# Patient Record
Sex: MALE | Race: WHITE | Employment: OTHER | URBAN - METROPOLITAN AREA
[De-identification: names, ages, dates, MRNs, and addresses within clinical notes are randomized per-mention and may not be internally consistent; named-entity substitution may affect disease eponyms.]

---

## 2017-01-27 ENCOUNTER — HOSPITAL ENCOUNTER (OUTPATIENT)
Dept: CARDIOLOGY | Age: 82
Discharge: HOME | End: 2017-01-27
Payer: MEDICARE

## 2017-01-27 PROCEDURE — 93280 PM DEVICE PROGR EVAL DUAL: CPT

## 2017-04-11 RX ORDER — ATENOLOL 25 MG/1
TABLET ORAL
Qty: 180 TABLET | Refills: 0 | Status: SHIPPED | OUTPATIENT
Start: 2017-04-11 | End: 2017-07-10 | Stop reason: SDUPTHER

## 2017-05-07 ENCOUNTER — HOSPITAL ENCOUNTER (EMERGENCY)
Age: 82
Discharge: HOME | End: 2017-05-07
Attending: FAMILY MEDICINE
Payer: MEDICARE

## 2017-05-07 ENCOUNTER — APPOINTMENT (OUTPATIENT)
Dept: GENERAL RADIOLOGY | Age: 82
End: 2017-05-07
Payer: MEDICARE

## 2017-05-07 VITALS
HEIGHT: 66 IN | DIASTOLIC BLOOD PRESSURE: 54 MMHG | HEART RATE: 62 BPM | TEMPERATURE: 97.4 F | OXYGEN SATURATION: 95 % | WEIGHT: 160 LBS | SYSTOLIC BLOOD PRESSURE: 124 MMHG | RESPIRATION RATE: 18 BRPM | BODY MASS INDEX: 25.71 KG/M2

## 2017-05-07 DIAGNOSIS — J40 BRONCHITIS: Primary | ICD-10-CM

## 2017-05-07 LAB
ALBUMIN SERPL-MCNC: 3.7 G/DL (ref 3.9–4.9)
ALP BLD-CCNC: 72 U/L (ref 35–104)
ALT SERPL-CCNC: 9 U/L (ref 0–41)
ANION GAP SERPL CALCULATED.3IONS-SCNC: 12 MEQ/L (ref 7–13)
AST SERPL-CCNC: 14 U/L (ref 0–40)
BASOPHILS ABSOLUTE: 0.1 K/UL (ref 0–0.2)
BASOPHILS RELATIVE PERCENT: 0.9 %
BILIRUB SERPL-MCNC: 0.7 MG/DL (ref 0–1.2)
BUN BLDV-MCNC: 26 MG/DL (ref 8–23)
CALCIUM SERPL-MCNC: 8.6 MG/DL (ref 8.6–10.2)
CHLORIDE BLD-SCNC: 98 MEQ/L (ref 98–107)
CO2: 24 MEQ/L (ref 22–29)
CREAT SERPL-MCNC: 1.06 MG/DL (ref 0.7–1.2)
EOSINOPHILS ABSOLUTE: 0.3 K/UL (ref 0–0.7)
EOSINOPHILS RELATIVE PERCENT: 3.3 %
GFR AFRICAN AMERICAN: >60
GFR NON-AFRICAN AMERICAN: >60
GLOBULIN: 2.9 G/DL (ref 2.3–3.5)
GLUCOSE BLD-MCNC: 202 MG/DL (ref 74–109)
HCT VFR BLD CALC: 37.2 % (ref 42–52)
HEMOGLOBIN: 12.5 G/DL (ref 14–18)
LYMPHOCYTES ABSOLUTE: 1.9 K/UL (ref 1–4.8)
LYMPHOCYTES RELATIVE PERCENT: 21.5 %
MCH RBC QN AUTO: 31.2 PG (ref 27–31.3)
MCHC RBC AUTO-ENTMCNC: 33.6 % (ref 33–37)
MCV RBC AUTO: 92.9 FL (ref 80–100)
MONOCYTES ABSOLUTE: 0.9 K/UL (ref 0.2–0.8)
MONOCYTES RELATIVE PERCENT: 10.3 %
NEUTROPHILS ABSOLUTE: 5.6 K/UL (ref 1.4–6.5)
NEUTROPHILS RELATIVE PERCENT: 64 %
PDW BLD-RTO: 13.4 % (ref 11.5–14.5)
PLATELET # BLD: 182 K/UL (ref 130–400)
POTASSIUM SERPL-SCNC: 4.2 MEQ/L (ref 3.5–5.1)
RBC # BLD: 4.01 M/UL (ref 4.7–6.1)
SODIUM BLD-SCNC: 134 MEQ/L (ref 132–144)
TOTAL PROTEIN: 6.6 G/DL (ref 6.4–8.1)
WBC # BLD: 8.7 K/UL (ref 4.8–10.8)

## 2017-05-07 PROCEDURE — 80053 COMPREHEN METABOLIC PANEL: CPT

## 2017-05-07 PROCEDURE — 71010 XR CHEST PORTABLE: CPT

## 2017-05-07 PROCEDURE — 96367 TX/PROPH/DG ADDL SEQ IV INF: CPT

## 2017-05-07 PROCEDURE — 6360000002 HC RX W HCPCS: Performed by: FAMILY MEDICINE

## 2017-05-07 PROCEDURE — 87077 CULTURE AEROBIC IDENTIFY: CPT

## 2017-05-07 PROCEDURE — 94640 AIRWAY INHALATION TREATMENT: CPT

## 2017-05-07 PROCEDURE — 6370000000 HC RX 637 (ALT 250 FOR IP): Performed by: FAMILY MEDICINE

## 2017-05-07 PROCEDURE — 87185 SC STD ENZYME DETCJ PER NZM: CPT

## 2017-05-07 PROCEDURE — 85025 COMPLETE CBC W/AUTO DIFF WBC: CPT

## 2017-05-07 PROCEDURE — 36415 COLL VENOUS BLD VENIPUNCTURE: CPT

## 2017-05-07 PROCEDURE — 96365 THER/PROPH/DIAG IV INF INIT: CPT

## 2017-05-07 PROCEDURE — 87040 BLOOD CULTURE FOR BACTERIA: CPT

## 2017-05-07 PROCEDURE — 99283 EMERGENCY DEPT VISIT LOW MDM: CPT

## 2017-05-07 PROCEDURE — 87070 CULTURE OTHR SPECIMN AEROBIC: CPT

## 2017-05-07 PROCEDURE — 87205 SMEAR GRAM STAIN: CPT

## 2017-05-07 PROCEDURE — 2580000003 HC RX 258: Performed by: FAMILY MEDICINE

## 2017-05-07 RX ORDER — AZITHROMYCIN 250 MG/1
TABLET, FILM COATED ORAL
Qty: 1 PACKET | Refills: 0 | Status: SHIPPED | OUTPATIENT
Start: 2017-05-07 | End: 2017-05-17

## 2017-05-07 RX ORDER — ALBUTEROL SULFATE 2.5 MG/3ML
2.5 SOLUTION RESPIRATORY (INHALATION)
Status: DISCONTINUED | OUTPATIENT
Start: 2017-05-07 | End: 2017-05-07 | Stop reason: HOSPADM

## 2017-05-07 RX ORDER — IPRATROPIUM BROMIDE AND ALBUTEROL SULFATE 2.5; .5 MG/3ML; MG/3ML
1 SOLUTION RESPIRATORY (INHALATION) ONCE
Status: COMPLETED | OUTPATIENT
Start: 2017-05-07 | End: 2017-05-07

## 2017-05-07 RX ADMIN — IPRATROPIUM BROMIDE AND ALBUTEROL SULFATE 1 AMPULE: .5; 3 SOLUTION RESPIRATORY (INHALATION) at 15:05

## 2017-05-07 RX ADMIN — CEFTRIAXONE SODIUM: 1 INJECTION, POWDER, FOR SOLUTION INTRAMUSCULAR; INTRAVENOUS at 16:05

## 2017-05-07 RX ADMIN — ALBUTEROL SULFATE 2.5 MG: 2.5 SOLUTION RESPIRATORY (INHALATION) at 15:12

## 2017-05-07 RX ADMIN — AZITHROMYCIN 500 MG: 500 INJECTION, POWDER, LYOPHILIZED, FOR SOLUTION INTRAVENOUS at 16:43

## 2017-05-07 ASSESSMENT — PAIN SCALES - GENERAL: PAINLEVEL_OUTOF10: 6

## 2017-05-07 ASSESSMENT — PAIN DESCRIPTION - LOCATION: LOCATION: CHEST

## 2017-05-07 ASSESSMENT — PAIN DESCRIPTION - DESCRIPTORS: DESCRIPTORS: ACHING

## 2017-05-07 ASSESSMENT — PAIN DESCRIPTION - FREQUENCY: FREQUENCY: CONTINUOUS

## 2017-05-07 ASSESSMENT — ENCOUNTER SYMPTOMS: COUGH: 1

## 2017-05-09 LAB
CULTURE, RESPIRATORY: ABNORMAL
CULTURE, RESPIRATORY: ABNORMAL
GRAM STAIN RESULT: ABNORMAL
ORGANISM: ABNORMAL

## 2017-05-12 LAB
BLOOD CULTURE, ROUTINE: NORMAL
CULTURE, BLOOD 2: NORMAL

## 2017-06-01 ENCOUNTER — OFFICE VISIT (OUTPATIENT)
Dept: CARDIOLOGY | Age: 82
End: 2017-06-01

## 2017-06-01 VITALS
BODY MASS INDEX: 27.12 KG/M2 | DIASTOLIC BLOOD PRESSURE: 64 MMHG | RESPIRATION RATE: 16 BRPM | WEIGHT: 168 LBS | SYSTOLIC BLOOD PRESSURE: 132 MMHG | HEART RATE: 76 BPM | OXYGEN SATURATION: 96 %

## 2017-06-01 DIAGNOSIS — I77.9 BILATERAL CAROTID ARTERY DISEASE (HCC): ICD-10-CM

## 2017-06-01 DIAGNOSIS — I25.10 CORONARY ARTERY DISEASE INVOLVING NATIVE CORONARY ARTERY OF NATIVE HEART, ANGINA PRESENCE UNSPECIFIED: Primary | ICD-10-CM

## 2017-06-01 DIAGNOSIS — R09.89 ABNORMAL FOOT PULSE: ICD-10-CM

## 2017-06-01 PROCEDURE — 93000 ELECTROCARDIOGRAM COMPLETE: CPT | Performed by: INTERNAL MEDICINE

## 2017-06-01 PROCEDURE — 99213 OFFICE O/P EST LOW 20 MIN: CPT | Performed by: INTERNAL MEDICINE

## 2017-06-01 RX ORDER — AMLODIPINE BESYLATE 5 MG/1
5 TABLET ORAL DAILY
Status: ON HOLD | COMMUNITY
Start: 2017-04-03 | End: 2018-02-15 | Stop reason: SDUPTHER

## 2017-06-07 ENCOUNTER — HOSPITAL ENCOUNTER (OUTPATIENT)
Dept: NON INVASIVE DIAGNOSTICS | Age: 82
Discharge: HOME | End: 2017-06-07
Payer: MEDICARE

## 2017-06-07 ENCOUNTER — HOSPITAL ENCOUNTER (OUTPATIENT)
Dept: ULTRASOUND IMAGING | Age: 82
Discharge: HOME | End: 2017-06-07
Payer: MEDICARE

## 2017-06-07 DIAGNOSIS — I25.10 CORONARY ARTERY DISEASE INVOLVING NATIVE CORONARY ARTERY OF NATIVE HEART, ANGINA PRESENCE UNSPECIFIED: ICD-10-CM

## 2017-06-07 DIAGNOSIS — R09.89 ABNORMAL FOOT PULSE: ICD-10-CM

## 2017-06-07 DIAGNOSIS — I77.9 BILATERAL CAROTID ARTERY DISEASE (HCC): ICD-10-CM

## 2017-06-07 LAB
LV EF: 50 %
LVEF MODALITY: NORMAL

## 2017-06-07 PROCEDURE — 93924 LWR XTR VASC STDY BILAT: CPT

## 2017-06-07 PROCEDURE — 93306 TTE W/DOPPLER COMPLETE: CPT

## 2017-06-07 PROCEDURE — 93880 EXTRACRANIAL BILAT STUDY: CPT

## 2017-07-10 RX ORDER — ATENOLOL 25 MG/1
TABLET ORAL
Qty: 180 TABLET | Refills: 3 | Status: ON HOLD | OUTPATIENT
Start: 2017-07-10 | End: 2018-09-18 | Stop reason: HOSPADM

## 2017-07-27 ENCOUNTER — OFFICE VISIT (OUTPATIENT)
Dept: CARDIOLOGY | Age: 82
End: 2017-07-27

## 2017-07-27 VITALS
HEART RATE: 60 BPM | BODY MASS INDEX: 27.64 KG/M2 | SYSTOLIC BLOOD PRESSURE: 110 MMHG | DIASTOLIC BLOOD PRESSURE: 80 MMHG | WEIGHT: 172 LBS | HEIGHT: 66 IN

## 2017-07-27 DIAGNOSIS — E78.5 DYSLIPIDEMIA: ICD-10-CM

## 2017-07-27 DIAGNOSIS — I25.10 CORONARY ARTERY DISEASE INVOLVING NATIVE CORONARY ARTERY OF NATIVE HEART WITHOUT ANGINA PECTORIS: ICD-10-CM

## 2017-07-27 DIAGNOSIS — I10 ESSENTIAL HYPERTENSION: Primary | ICD-10-CM

## 2017-07-27 DIAGNOSIS — Z87.891 HISTORY OF TOBACCO ABUSE: ICD-10-CM

## 2017-07-27 PROCEDURE — 99213 OFFICE O/P EST LOW 20 MIN: CPT | Performed by: INTERNAL MEDICINE

## 2017-10-27 ENCOUNTER — HOSPITAL ENCOUNTER (OUTPATIENT)
Dept: CARDIOLOGY | Age: 82
Discharge: HOME | End: 2017-10-27
Payer: MEDICARE

## 2017-10-27 PROCEDURE — 93280 PM DEVICE PROGR EVAL DUAL: CPT

## 2017-11-18 ENCOUNTER — HOSPITAL ENCOUNTER (EMERGENCY)
Age: 82
Discharge: HOME | End: 2017-11-18
Payer: MEDICARE

## 2017-11-18 ENCOUNTER — APPOINTMENT (OUTPATIENT)
Dept: GENERAL RADIOLOGY | Age: 82
End: 2017-11-18
Payer: MEDICARE

## 2017-11-18 VITALS
BODY MASS INDEX: 27.8 KG/M2 | SYSTOLIC BLOOD PRESSURE: 144 MMHG | HEART RATE: 65 BPM | WEIGHT: 173 LBS | TEMPERATURE: 97.9 F | HEIGHT: 66 IN | DIASTOLIC BLOOD PRESSURE: 75 MMHG | RESPIRATION RATE: 20 BRPM | OXYGEN SATURATION: 95 %

## 2017-11-18 DIAGNOSIS — G89.29 CHRONIC RIGHT SHOULDER PAIN: ICD-10-CM

## 2017-11-18 DIAGNOSIS — S89.92XA INJURY OF LEFT KNEE, INITIAL ENCOUNTER: Primary | ICD-10-CM

## 2017-11-18 DIAGNOSIS — M25.511 CHRONIC RIGHT SHOULDER PAIN: ICD-10-CM

## 2017-11-18 PROCEDURE — 99283 EMERGENCY DEPT VISIT LOW MDM: CPT

## 2017-11-18 PROCEDURE — 73564 X-RAY EXAM KNEE 4 OR MORE: CPT

## 2017-11-18 PROCEDURE — 73030 X-RAY EXAM OF SHOULDER: CPT

## 2017-11-18 ASSESSMENT — PAIN DESCRIPTION - PROGRESSION: CLINICAL_PROGRESSION: NOT CHANGED

## 2017-11-18 ASSESSMENT — PAIN DESCRIPTION - LOCATION: LOCATION: SHOULDER;KNEE

## 2017-11-18 ASSESSMENT — PAIN SCALES - GENERAL: PAINLEVEL_OUTOF10: 6

## 2017-11-18 ASSESSMENT — PAIN DESCRIPTION - DESCRIPTORS: DESCRIPTORS: SHARP

## 2017-11-18 ASSESSMENT — PAIN DESCRIPTION - ORIENTATION: ORIENTATION: RIGHT;LEFT

## 2017-11-18 ASSESSMENT — PAIN DESCRIPTION - FREQUENCY: FREQUENCY: CONTINUOUS

## 2017-11-18 ASSESSMENT — ENCOUNTER SYMPTOMS
GASTROINTESTINAL NEGATIVE: 1
RESPIRATORY NEGATIVE: 1
EYES NEGATIVE: 1

## 2017-11-18 ASSESSMENT — PAIN DESCRIPTION - PAIN TYPE: TYPE: ACUTE PAIN;CHRONIC PAIN

## 2017-11-18 ASSESSMENT — PAIN DESCRIPTION - ONSET: ONSET: ON-GOING

## 2017-11-18 NOTE — ED TRIAGE NOTES
To ED with c/o right shoulder and left knee pain. Pt states the shoulder pain has been going on for awhile but the knee pain is new. Pt denies falling but thinks he \"twisted\" his knee.

## 2017-11-18 NOTE — ED PROVIDER NOTES
[facility] ED  eMERGENCY dEPARTMENT eNCOUnter      Pt Name: Yoel Doe  MRN: [id number]  [place] 3/5/1929  Date of evaluation: 11/18/2017  Provider: Clementina Childress PA-C      HISTORY OF PRESENT ILLNESS    Yoel Doe is a 80 y.o. male who presents to the Emergency Department with Chief complaint of left knee pain. Patient states he twisted his knee yesterday and then when he woke up this morning he felt his knee pop when he was getting out of bed. Patient has been ambulating since the injury, but with noted discomfort. Patient states he had a prescription for Tylenol #3 as needed for back pain which he took for his knee pain. Patient states this significantly helped his discomfort. Patient denies any fall or trauma. He also complains of right shoulder pain that he's had for \"long time\". Patient states he went through physical therapy at one point and his symptoms improved, but states he is now experiencing slight discomfort again. Patient is comfortable during emergency room stay and has no other complaints at this time. REVIEW OF SYSTEMS       Review of Systems   Constitutional: Negative. HENT: Negative. Eyes: Negative. Respiratory: Negative. Cardiovascular: Negative. Gastrointestinal: Negative. Endocrine: Negative. Genitourinary: Negative. Musculoskeletal: Positive for arthralgias. Left knee and right shoulder      Skin: Negative. Neurological: Negative. Psychiatric/Behavioral: Negative.           PAST MEDICAL HISTORY     Past Medical History:   Diagnosis Date    Anemia     CAD (coronary artery disease) 4/16/2015    DM (di[facility] mellitus) ([facility] [address]     Dyslipidemia     History of tobacco abuse     HTN (hypertension)     Hypothyroidism     Non-ST elevation MI (NSTEMI) ([facility] [address]     Pacemaker 4/16/2015         SURGICAL HISTORY       Past Surgical History:   Procedure Laterality Date    CATARACT REMOVAL           CURRENT MEDICATIONS Previous Medications    ALBUTEROL (PROVENTIL) (2.5 MG/3ML) 0.083% NEBULIZER SOLUTION    Take 3 mLs by nebulization every 2 hours as needed for Wheezing    AMLODIPINE (NORVASC) 2.5 MG TABLET    Take 2.5 mg by mouth daily    AMLODIPINE (NORVASC) 5 MG TABLET    5 mg daily    ASPIRIN 81 MG TABLET    Take 81 mg by mouth daily. ATENOLOL (TENORMIN) 25 MG TABLET    TAKE 1 TABLET TWICE A DAY    ATORVASTATIN (LIPITOR) 40 MG TABLET    Take 40 mg by mouth daily    BUDESONIDE-FORMOTEROL (SYMBICORT) 80-4.5 MCG/ACT AERO    Inhale 2 puffs into the lungs 2 times daily. CRANBERRY 1000 MG CAPS    Take by mouth    ESOMEPRAZOLE MAGNESIUM (NEXIUM) 40 MG PACK    Take 40 mg by mouth daily    EXENATIDE (BYETTA) 5 MCG/0.02ML INJECTION    Inject 5 mcg into the skin    IPRATROPIUM-ALBUTEROL (DUONEB) 0.5-2.5 (3) MG/3ML SOLN NEBULIZER SOLUTION    Inhale 3 mLs into the lungs 4 times daily    METFORMIN (GLUCOPHAGE) 500 MG TABLET    Take 500 mg by mouth daily (with breakfast). NITROGLYCERIN (NITROSTAT) 0.4 MG SL TABLET    Place 0.4 mg under the tongue every 5 minutes as needed for Chest pain    RAMIPRIL (ALTACE) 5 MG TABLET    Take 5 mg by mouth daily. SITAGLIPTIN (JANUVIA) 50 MG TABLET    Take 50 mg by mouth daily    SYNTHROID 25 MCG TABLET        URSODIOL (ACTIGALL) 300 MG CAPSULE    Take 300 mg by mouth 2 times daily    ZOSTAVAX 24932 UNT/0.65ML INJECTION           ALLERGIES     Review of patient's allergies indicates no known allergies. FAMILY HISTORY     History reviewed. No pertinent family history. SOCIAL HISTORY       Social History     Social History    Marital status:       Spouse name: N/A    Number of children: N/A    Years of education: N/A     Social History Main Topics    Smoking status: Former Smoker    Smokeless tobacco: Never Used    Alcohol use No    Drug use: No    Sexual activity: Not Asked     Other Topics Concern    None     Social History Narrative    None       SCREENINGS

## 2017-11-18 NOTE — ED NOTES
6 inch ace to left knee skin is pink,warm and dry no edema noted.      Willy Ribeiro, MARLENEN  37/88/53 [id number]

## 2018-02-15 ENCOUNTER — HOSPITAL ENCOUNTER (INPATIENT)
Age: 83
LOS: 1 days | Discharge: HOME | DRG: 202 | End: 2018-02-17
Attending: STUDENT IN AN ORGANIZED HEALTH CARE EDUCATION/TRAINING PROGRAM | Admitting: INTERNAL MEDICINE
Payer: MEDICARE

## 2018-02-15 ENCOUNTER — APPOINTMENT (OUTPATIENT)
Dept: CT IMAGING | Age: 83
DRG: 202 | End: 2018-02-15
Payer: MEDICARE

## 2018-02-15 ENCOUNTER — APPOINTMENT (OUTPATIENT)
Dept: GENERAL RADIOLOGY | Age: 83
DRG: 202 | End: 2018-02-15
Payer: MEDICARE

## 2018-02-15 DIAGNOSIS — R94.31 ABNORMAL EKG: Primary | ICD-10-CM

## 2018-02-15 DIAGNOSIS — R06.89 DYSPNEA AND RESPIRATORY ABNORMALITIES: ICD-10-CM

## 2018-02-15 DIAGNOSIS — R77.8 ELEVATED TROPONIN: ICD-10-CM

## 2018-02-15 DIAGNOSIS — J44.1 COPD WITH ACUTE EXACERBATION (HCC): ICD-10-CM

## 2018-02-15 DIAGNOSIS — R06.00 DYSPNEA AND RESPIRATORY ABNORMALITIES: ICD-10-CM

## 2018-02-15 DIAGNOSIS — Z87.898 HISTORY OF FEVER: ICD-10-CM

## 2018-02-15 PROBLEM — R06.02 SOB (SHORTNESS OF BREATH): Status: ACTIVE | Noted: 2018-02-15

## 2018-02-15 LAB
ALBUMIN SERPL-MCNC: 4.1 G/DL (ref 3.9–4.9)
ALP BLD-CCNC: 61 U/L (ref 35–104)
ALT SERPL-CCNC: 12 U/L (ref 0–41)
ANION GAP SERPL CALCULATED.3IONS-SCNC: 13 MEQ/L (ref 7–13)
AST SERPL-CCNC: 15 U/L (ref 0–40)
BASOPHILS ABSOLUTE: 0.1 K/UL (ref 0–0.2)
BASOPHILS RELATIVE PERCENT: 0.6 %
BILIRUB SERPL-MCNC: 1.2 MG/DL (ref 0–1.2)
BILIRUBIN URINE: NEGATIVE
BLOOD, URINE: NEGATIVE
BUN BLDV-MCNC: 23 MG/DL (ref 8–23)
C-REACTIVE PROTEIN, HIGH SENSITIVITY: 46.8 MG/L (ref 0–5)
CALCIUM SERPL-MCNC: 8.7 MG/DL (ref 8.6–10.2)
CHLORIDE BLD-SCNC: 99 MEQ/L (ref 98–107)
CK MB: 2.6 NG/ML (ref 0–6.7)
CLARITY: CLEAR
CO2: 25 MEQ/L (ref 22–29)
COLOR: YELLOW
CREAT SERPL-MCNC: 1.12 MG/DL (ref 0.7–1.2)
CREATINE KINASE-MB INDEX: 2.8 % (ref 0–3.5)
EKG ATRIAL RATE: 61 BPM
EKG P-R INTERVAL: 200 MS
EKG Q-T INTERVAL: 426 MS
EKG QRS DURATION: 100 MS
EKG QTC CALCULATION (BAZETT): 428 MS
EKG R AXIS: -52 DEGREES
EKG T AXIS: -26 DEGREES
EKG VENTRICULAR RATE: 61 BPM
EOSINOPHILS ABSOLUTE: 0.3 K/UL (ref 0–0.7)
EOSINOPHILS RELATIVE PERCENT: 2.9 %
GFR AFRICAN AMERICAN: >60
GFR NON-AFRICAN AMERICAN: >60
GLOBULIN: 2.6 G/DL (ref 2.3–3.5)
GLUCOSE BLD-MCNC: 123 MG/DL (ref 60–115)
GLUCOSE BLD-MCNC: 133 MG/DL (ref 74–109)
GLUCOSE URINE: NEGATIVE MG/DL
HCT VFR BLD CALC: 39.8 % (ref 42–52)
HEMOGLOBIN: 13.3 G/DL (ref 14–18)
KETONES, URINE: NEGATIVE MG/DL
LACTIC ACID: 1.4 MMOL/L (ref 0.5–2.2)
LEUKOCYTE ESTERASE, URINE: NEGATIVE
LYMPHOCYTES ABSOLUTE: 1.5 K/UL (ref 1–4.8)
LYMPHOCYTES RELATIVE PERCENT: 14.5 %
MAGNESIUM: 1.9 MG/DL (ref 1.7–2.3)
MCH RBC QN AUTO: 32 PG (ref 27–31.3)
MCHC RBC AUTO-ENTMCNC: 33.5 % (ref 33–37)
MCV RBC AUTO: 95.6 FL (ref 80–100)
MONOCYTES ABSOLUTE: 0.8 K/UL (ref 0.2–0.8)
MONOCYTES RELATIVE PERCENT: 8.4 %
NEUTROPHILS ABSOLUTE: 7.4 K/UL (ref 1.4–6.5)
NEUTROPHILS RELATIVE PERCENT: 73.6 %
NITRITE, URINE: NEGATIVE
PDW BLD-RTO: 13.7 % (ref 11.5–14.5)
PERFORMED ON: ABNORMAL
PH UA: 6 (ref 5–9)
PLATELET # BLD: 161 K/UL (ref 130–400)
POTASSIUM SERPL-SCNC: 4.8 MEQ/L (ref 3.5–5.1)
PRO-BNP: 651 PG/ML
PROTEIN UA: NEGATIVE MG/DL
RAPID INFLUENZA  B AGN: NEGATIVE
RAPID INFLUENZA A AGN: NEGATIVE
RBC # BLD: 4.16 M/UL (ref 4.7–6.1)
SEDIMENTATION RATE, ERYTHROCYTE: 10 MM (ref 0–20)
SODIUM BLD-SCNC: 137 MEQ/L (ref 132–144)
SPECIFIC GRAVITY UA: 1.04 (ref 1–1.03)
TOTAL CK: 100 U/L (ref 0–190)
TOTAL CK: 94 U/L (ref 0–190)
TOTAL PROTEIN: 6.7 G/DL (ref 6.4–8.1)
TROPONIN: 0.04 NG/ML (ref 0–0.01)
URINE REFLEX TO CULTURE: NORMAL
UROBILINOGEN, URINE: 1 E.U./DL
WBC # BLD: 10.1 K/UL (ref 4.8–10.8)

## 2018-02-15 PROCEDURE — 6370000000 HC RX 637 (ALT 250 FOR IP): Performed by: HOSPITALIST

## 2018-02-15 PROCEDURE — G0378 HOSPITAL OBSERVATION PER HR: HCPCS

## 2018-02-15 PROCEDURE — 6370000000 HC RX 637 (ALT 250 FOR IP): Performed by: STUDENT IN AN ORGANIZED HEALTH CARE EDUCATION/TRAINING PROGRAM

## 2018-02-15 PROCEDURE — 93005 ELECTROCARDIOGRAM TRACING: CPT

## 2018-02-15 PROCEDURE — 83605 ASSAY OF LACTIC ACID: CPT

## 2018-02-15 PROCEDURE — 94640 AIRWAY INHALATION TREATMENT: CPT

## 2018-02-15 PROCEDURE — 82550 ASSAY OF CK (CPK): CPT

## 2018-02-15 PROCEDURE — 2580000003 HC RX 258: Performed by: STUDENT IN AN ORGANIZED HEALTH CARE EDUCATION/TRAINING PROGRAM

## 2018-02-15 PROCEDURE — 81003 URINALYSIS AUTO W/O SCOPE: CPT

## 2018-02-15 PROCEDURE — 80053 COMPREHEN METABOLIC PANEL: CPT

## 2018-02-15 PROCEDURE — 87040 BLOOD CULTURE FOR BACTERIA: CPT

## 2018-02-15 PROCEDURE — 36415 COLL VENOUS BLD VENIPUNCTURE: CPT

## 2018-02-15 PROCEDURE — 85652 RBC SED RATE AUTOMATED: CPT

## 2018-02-15 PROCEDURE — 6360000002 HC RX W HCPCS: Performed by: HOSPITALIST

## 2018-02-15 PROCEDURE — 6360000004 HC RX CONTRAST MEDICATION: Performed by: STUDENT IN AN ORGANIZED HEALTH CARE EDUCATION/TRAINING PROGRAM

## 2018-02-15 PROCEDURE — 71275 CT ANGIOGRAPHY CHEST: CPT

## 2018-02-15 PROCEDURE — 85025 COMPLETE CBC W/AUTO DIFF WBC: CPT

## 2018-02-15 PROCEDURE — 82553 CREATINE MB FRACTION: CPT

## 2018-02-15 PROCEDURE — 71046 X-RAY EXAM CHEST 2 VIEWS: CPT

## 2018-02-15 PROCEDURE — 84484 ASSAY OF TROPONIN QUANT: CPT

## 2018-02-15 PROCEDURE — 83880 ASSAY OF NATRIURETIC PEPTIDE: CPT

## 2018-02-15 PROCEDURE — 2580000003 HC RX 258: Performed by: HOSPITALIST

## 2018-02-15 PROCEDURE — 86403 PARTICLE AGGLUT ANTBDY SCRN: CPT

## 2018-02-15 PROCEDURE — 99285 EMERGENCY DEPT VISIT HI MDM: CPT

## 2018-02-15 PROCEDURE — 94664 DEMO&/EVAL PT USE INHALER: CPT

## 2018-02-15 PROCEDURE — 83735 ASSAY OF MAGNESIUM: CPT

## 2018-02-15 PROCEDURE — 96372 THER/PROPH/DIAG INJ SC/IM: CPT

## 2018-02-15 PROCEDURE — 86141 C-REACTIVE PROTEIN HS: CPT

## 2018-02-15 RX ORDER — ALBUTEROL SULFATE 2.5 MG/3ML
2.5 SOLUTION RESPIRATORY (INHALATION)
Status: DISCONTINUED | OUTPATIENT
Start: 2018-02-15 | End: 2018-02-17 | Stop reason: HOSPADM

## 2018-02-15 RX ORDER — NITROGLYCERIN 0.4 MG/1
0.4 TABLET SUBLINGUAL EVERY 5 MIN PRN
Status: DISCONTINUED | OUTPATIENT
Start: 2018-02-15 | End: 2018-02-17 | Stop reason: HOSPADM

## 2018-02-15 RX ORDER — SODIUM CHLORIDE 0.9 % (FLUSH) 0.9 %
10 SYRINGE (ML) INJECTION PRN
Status: DISCONTINUED | OUTPATIENT
Start: 2018-02-15 | End: 2018-02-17 | Stop reason: HOSPADM

## 2018-02-15 RX ORDER — ASPIRIN 81 MG/1
324 TABLET, CHEWABLE ORAL ONCE
Status: COMPLETED | OUTPATIENT
Start: 2018-02-15 | End: 2018-02-15

## 2018-02-15 RX ORDER — ONDANSETRON 2 MG/ML
4 INJECTION INTRAMUSCULAR; INTRAVENOUS EVERY 6 HOURS PRN
Status: DISCONTINUED | OUTPATIENT
Start: 2018-02-15 | End: 2018-02-17 | Stop reason: HOSPADM

## 2018-02-15 RX ORDER — ATORVASTATIN CALCIUM 40 MG/1
40 TABLET, FILM COATED ORAL DAILY
Status: DISCONTINUED | OUTPATIENT
Start: 2018-02-15 | End: 2018-02-17 | Stop reason: HOSPADM

## 2018-02-15 RX ORDER — LEVOTHYROXINE SODIUM 0.03 MG/1
25 TABLET ORAL DAILY
Status: DISCONTINUED | OUTPATIENT
Start: 2018-02-16 | End: 2018-02-17 | Stop reason: HOSPADM

## 2018-02-15 RX ORDER — AMLODIPINE BESYLATE 5 MG/1
5 TABLET ORAL DAILY
Status: DISCONTINUED | OUTPATIENT
Start: 2018-02-15 | End: 2018-02-16

## 2018-02-15 RX ORDER — ESOMEPRAZOLE MAGNESIUM 40 MG/1
40 FOR SUSPENSION ORAL DAILY
Status: DISCONTINUED | OUTPATIENT
Start: 2018-02-15 | End: 2018-02-15 | Stop reason: CLARIF

## 2018-02-15 RX ORDER — SODIUM CHLORIDE 0.9 % (FLUSH) 0.9 %
10 SYRINGE (ML) INJECTION EVERY 12 HOURS SCHEDULED
Status: DISCONTINUED | OUTPATIENT
Start: 2018-02-15 | End: 2018-02-17 | Stop reason: HOSPADM

## 2018-02-15 RX ORDER — IPRATROPIUM BROMIDE AND ALBUTEROL SULFATE 2.5; .5 MG/3ML; MG/3ML
3 SOLUTION RESPIRATORY (INHALATION) 4 TIMES DAILY
Status: DISCONTINUED | OUTPATIENT
Start: 2018-02-15 | End: 2018-02-15

## 2018-02-15 RX ORDER — SODIUM CHLORIDE 0.9 % (FLUSH) 0.9 %
10 SYRINGE (ML) INJECTION ONCE
Status: COMPLETED | OUTPATIENT
Start: 2018-02-15 | End: 2018-02-15

## 2018-02-15 RX ORDER — ATENOLOL 25 MG/1
25 TABLET ORAL 2 TIMES DAILY
Status: DISCONTINUED | OUTPATIENT
Start: 2018-02-15 | End: 2018-02-17 | Stop reason: HOSPADM

## 2018-02-15 RX ORDER — PANTOPRAZOLE SODIUM 40 MG/1
40 GRANULE, DELAYED RELEASE ORAL
Status: DISCONTINUED | OUTPATIENT
Start: 2018-02-16 | End: 2018-02-17 | Stop reason: HOSPADM

## 2018-02-15 RX ORDER — CEFUROXIME AXETIL 500 MG/1
500 TABLET ORAL 2 TIMES DAILY
Status: ON HOLD | COMMUNITY
End: 2018-02-17

## 2018-02-15 RX ORDER — RAMIPRIL 5 MG/1
5 CAPSULE ORAL DAILY
Status: DISCONTINUED | OUTPATIENT
Start: 2018-02-15 | End: 2018-02-17 | Stop reason: HOSPADM

## 2018-02-15 RX ORDER — ASPIRIN 81 MG/1
81 TABLET, CHEWABLE ORAL DAILY
Status: DISCONTINUED | OUTPATIENT
Start: 2018-02-15 | End: 2018-02-17 | Stop reason: HOSPADM

## 2018-02-15 RX ORDER — DOCUSATE SODIUM 100 MG/1
100 CAPSULE, LIQUID FILLED ORAL 2 TIMES DAILY
Status: DISCONTINUED | OUTPATIENT
Start: 2018-02-15 | End: 2018-02-17 | Stop reason: HOSPADM

## 2018-02-15 RX ORDER — ACETAMINOPHEN 325 MG/1
650 TABLET ORAL EVERY 4 HOURS PRN
Status: DISCONTINUED | OUTPATIENT
Start: 2018-02-15 | End: 2018-02-17 | Stop reason: HOSPADM

## 2018-02-15 RX ORDER — URSODIOL 300 MG/1
300 CAPSULE ORAL 2 TIMES DAILY
Status: DISCONTINUED | OUTPATIENT
Start: 2018-02-15 | End: 2018-02-17 | Stop reason: HOSPADM

## 2018-02-15 RX ORDER — IPRATROPIUM BROMIDE AND ALBUTEROL SULFATE 2.5; .5 MG/3ML; MG/3ML
1 SOLUTION RESPIRATORY (INHALATION) EVERY 4 HOURS PRN
Status: DISCONTINUED | OUTPATIENT
Start: 2018-02-15 | End: 2018-02-16

## 2018-02-15 RX ORDER — AMLODIPINE BESYLATE 5 MG/1
2.5 TABLET ORAL DAILY
Status: CANCELLED | OUTPATIENT
Start: 2018-02-15

## 2018-02-15 RX ORDER — IPRATROPIUM BROMIDE AND ALBUTEROL SULFATE 2.5; .5 MG/3ML; MG/3ML
1 SOLUTION RESPIRATORY (INHALATION)
Status: DISCONTINUED | OUTPATIENT
Start: 2018-02-16 | End: 2018-02-15

## 2018-02-15 RX ADMIN — Medication 10 ML: at 21:20

## 2018-02-15 RX ADMIN — DOCUSATE SODIUM 100 MG: 100 CAPSULE, LIQUID FILLED ORAL at 21:19

## 2018-02-15 RX ADMIN — ATENOLOL 25 MG: 25 TABLET ORAL at 21:19

## 2018-02-15 RX ADMIN — SODIUM CHLORIDE, PRESERVATIVE FREE 10 ML: 5 INJECTION INTRAVENOUS at 17:16

## 2018-02-15 RX ADMIN — IPRATROPIUM BROMIDE AND ALBUTEROL SULFATE 1 AMPULE: .5; 3 SOLUTION RESPIRATORY (INHALATION) at 20:53

## 2018-02-15 RX ADMIN — ACETAMINOPHEN 650 MG: 325 TABLET, FILM COATED ORAL at 21:19

## 2018-02-15 RX ADMIN — IOPAMIDOL 100 ML: 755 INJECTION, SOLUTION INTRAVENOUS at 17:16

## 2018-02-15 RX ADMIN — Medication 2 PUFF: at 20:51

## 2018-02-15 RX ADMIN — ENOXAPARIN SODIUM 40 MG: 100 INJECTION SUBCUTANEOUS at 21:19

## 2018-02-15 RX ADMIN — URSODIOL 300 MG: 300 CAPSULE ORAL at 21:19

## 2018-02-15 RX ADMIN — ASPIRIN 81 MG 324 MG: 81 TABLET ORAL at 18:42

## 2018-02-15 ASSESSMENT — ENCOUNTER SYMPTOMS
DIARRHEA: 0
BACK PAIN: 0
SHORTNESS OF BREATH: 1
VOMITING: 0
CHEST TIGHTNESS: 0
SINUS PRESSURE: 0
ABDOMINAL PAIN: 0
COUGH: 0
TROUBLE SWALLOWING: 0

## 2018-02-15 ASSESSMENT — PAIN SCALES - GENERAL
PAINLEVEL_OUTOF10: 0
PAINLEVEL_OUTOF10: 0
PAINLEVEL_OUTOF10: 5
PAINLEVEL_OUTOF10: 0

## 2018-02-15 ASSESSMENT — PAIN DESCRIPTION - FREQUENCY: FREQUENCY: INTERMITTENT

## 2018-02-15 ASSESSMENT — PAIN DESCRIPTION - PAIN TYPE: TYPE: CHRONIC PAIN

## 2018-02-15 ASSESSMENT — PAIN DESCRIPTION - LOCATION: LOCATION: SHOULDER

## 2018-02-15 NOTE — ED NOTES
Pt given warm blanket. Resting on the cart reading his book. 0 complaints at this time.       Dominic Yee RN  02/15/18 4081

## 2018-02-15 NOTE — ED PROVIDER NOTES
of patient's allergies indicates no known allergies. FAMILY HISTORY     History reviewed. No pertinent family history. SOCIAL HISTORY       Social History     Social History    Marital status:      Spouse name: N/A    Number of children: N/A    Years of education: N/A     Social History Main Topics    Smoking status: Former Smoker    Smokeless tobacco: Never Used    Alcohol use No    Drug use: No    Sexual activity: Not Asked     Other Topics Concern    None     Social History Narrative    None       SCREENINGS             PHYSICAL EXAM    (up to 7 for level 4, 8 or more for level 5)     ED Triage Vitals [02/15/18 1438]   BP Temp Temp src Pulse Resp SpO2 Height Weight   (!) 149/74 99.1 °F (37.3 °C) -- 65 20 93 % 5' 6.5\" (1.689 m) 165 lb (74.8 kg)       Physical Exam   Constitutional: He appears well-developed and well-nourished. No distress. He is not intubated. HENT:   Head: Normocephalic and atraumatic. Head is without Alvarez's sign. Right Ear: External ear normal.   Left Ear: External ear normal.   Nose: Nose normal.   Mouth/Throat: Oropharynx is clear and moist. No oropharyngeal exudate. Eyes: Conjunctivae and EOM are normal. Pupils are equal, round, and reactive to light. No foreign body present in the right eye. Left eye exhibits no exudate. No scleral icterus. Neck: Normal range of motion. Neck supple. No JVD present. No neck rigidity. No tracheal deviation present. Cardiovascular: Normal rate, regular rhythm, normal heart sounds and intact distal pulses. Exam reveals no gallop, no distant heart sounds and no friction rub. No murmur heard. Pulmonary/Chest: Effort normal. No accessory muscle usage or stridor. No apnea, no tachypnea and no bradypnea. He is not intubated. No respiratory distress. He has no decreased breath sounds. He has no wheezes. He has rhonchi in the right lower field. He has no rales. Positive egophony   Abdominal: Soft.  Bowel sounds are normal. He

## 2018-02-16 PROBLEM — R06.02 SOB (SHORTNESS OF BREATH): Status: RESOLVED | Noted: 2018-02-15 | Resolved: 2018-02-16

## 2018-02-16 PROBLEM — J44.1 COPD WITH ACUTE EXACERBATION (HCC): Status: ACTIVE | Noted: 2018-02-16

## 2018-02-16 LAB
ANION GAP SERPL CALCULATED.3IONS-SCNC: 14 MEQ/L (ref 7–13)
BASOPHILS ABSOLUTE: 0.1 K/UL (ref 0–0.2)
BASOPHILS RELATIVE PERCENT: 0.7 %
BUN BLDV-MCNC: 21 MG/DL (ref 8–23)
CALCIUM SERPL-MCNC: 8.8 MG/DL (ref 8.6–10.2)
CHLORIDE BLD-SCNC: 103 MEQ/L (ref 98–107)
CO2: 26 MEQ/L (ref 22–29)
CREAT SERPL-MCNC: 1.28 MG/DL (ref 0.7–1.2)
EOSINOPHILS ABSOLUTE: 0.4 K/UL (ref 0–0.7)
EOSINOPHILS RELATIVE PERCENT: 4.9 %
GFR AFRICAN AMERICAN: >60
GFR NON-AFRICAN AMERICAN: 52.9
GLUCOSE BLD-MCNC: 121 MG/DL (ref 74–109)
GLUCOSE BLD-MCNC: 133 MG/DL (ref 60–115)
GLUCOSE BLD-MCNC: 148 MG/DL (ref 60–115)
GLUCOSE BLD-MCNC: 219 MG/DL (ref 60–115)
GLUCOSE BLD-MCNC: 314 MG/DL (ref 60–115)
HCT VFR BLD CALC: 38.4 % (ref 42–52)
HEMOGLOBIN: 12.6 G/DL (ref 14–18)
LYMPHOCYTES ABSOLUTE: 1.8 K/UL (ref 1–4.8)
LYMPHOCYTES RELATIVE PERCENT: 23.8 %
MAGNESIUM: 2 MG/DL (ref 1.7–2.3)
MCH RBC QN AUTO: 31.6 PG (ref 27–31.3)
MCHC RBC AUTO-ENTMCNC: 32.8 % (ref 33–37)
MCV RBC AUTO: 96.2 FL (ref 80–100)
MONOCYTES ABSOLUTE: 0.7 K/UL (ref 0.2–0.8)
MONOCYTES RELATIVE PERCENT: 9.5 %
NEUTROPHILS ABSOLUTE: 4.6 K/UL (ref 1.4–6.5)
NEUTROPHILS RELATIVE PERCENT: 61.1 %
PDW BLD-RTO: 13.4 % (ref 11.5–14.5)
PERFORMED ON: ABNORMAL
PLATELET # BLD: 151 K/UL (ref 130–400)
POTASSIUM REFLEX MAGNESIUM: 4.9 MEQ/L (ref 3.5–5.1)
RBC # BLD: 3.99 M/UL (ref 4.7–6.1)
SODIUM BLD-SCNC: 143 MEQ/L (ref 132–144)
TROPONIN: 0.03 NG/ML (ref 0–0.01)
TROPONIN: 0.04 NG/ML (ref 0–0.01)
WBC # BLD: 7.5 K/UL (ref 4.8–10.8)

## 2018-02-16 PROCEDURE — 83735 ASSAY OF MAGNESIUM: CPT

## 2018-02-16 PROCEDURE — 97162 PT EVAL MOD COMPLEX 30 MIN: CPT

## 2018-02-16 PROCEDURE — G8987 SELF CARE CURRENT STATUS: HCPCS

## 2018-02-16 PROCEDURE — 6370000000 HC RX 637 (ALT 250 FOR IP): Performed by: INTERNAL MEDICINE

## 2018-02-16 PROCEDURE — G8980 MOBILITY D/C STATUS: HCPCS

## 2018-02-16 PROCEDURE — 6360000002 HC RX W HCPCS: Performed by: HOSPITALIST

## 2018-02-16 PROCEDURE — 96372 THER/PROPH/DIAG INJ SC/IM: CPT

## 2018-02-16 PROCEDURE — 84484 ASSAY OF TROPONIN QUANT: CPT

## 2018-02-16 PROCEDURE — 6370000000 HC RX 637 (ALT 250 FOR IP): Performed by: HOSPITALIST

## 2018-02-16 PROCEDURE — 94640 AIRWAY INHALATION TREATMENT: CPT

## 2018-02-16 PROCEDURE — G8979 MOBILITY GOAL STATUS: HCPCS

## 2018-02-16 PROCEDURE — G8978 MOBILITY CURRENT STATUS: HCPCS

## 2018-02-16 PROCEDURE — 93010 ELECTROCARDIOGRAM REPORT: CPT | Performed by: INTERNAL MEDICINE

## 2018-02-16 PROCEDURE — 36415 COLL VENOUS BLD VENIPUNCTURE: CPT

## 2018-02-16 PROCEDURE — G8989 SELF CARE D/C STATUS: HCPCS

## 2018-02-16 PROCEDURE — G0378 HOSPITAL OBSERVATION PER HR: HCPCS

## 2018-02-16 PROCEDURE — G8988 SELF CARE GOAL STATUS: HCPCS

## 2018-02-16 PROCEDURE — 97165 OT EVAL LOW COMPLEX 30 MIN: CPT

## 2018-02-16 PROCEDURE — 85025 COMPLETE CBC W/AUTO DIFF WBC: CPT

## 2018-02-16 PROCEDURE — 2700000000 HC OXYGEN THERAPY PER DAY

## 2018-02-16 PROCEDURE — 2580000003 HC RX 258: Performed by: HOSPITALIST

## 2018-02-16 PROCEDURE — 80048 BASIC METABOLIC PNL TOTAL CA: CPT

## 2018-02-16 PROCEDURE — 83036 HEMOGLOBIN GLYCOSYLATED A1C: CPT

## 2018-02-16 PROCEDURE — 99222 1ST HOSP IP/OBS MODERATE 55: CPT | Performed by: INTERNAL MEDICINE

## 2018-02-16 PROCEDURE — 1210000000 HC MED SURG R&B

## 2018-02-16 RX ORDER — IPRATROPIUM BROMIDE AND ALBUTEROL SULFATE 2.5; .5 MG/3ML; MG/3ML
1 SOLUTION RESPIRATORY (INHALATION) 3 TIMES DAILY
Status: DISCONTINUED | OUTPATIENT
Start: 2018-02-16 | End: 2018-02-17 | Stop reason: HOSPADM

## 2018-02-16 RX ORDER — PREDNISONE 20 MG/1
40 TABLET ORAL DAILY
Status: DISCONTINUED | OUTPATIENT
Start: 2018-02-16 | End: 2018-02-17

## 2018-02-16 RX ORDER — DEXTROSE MONOHYDRATE 50 MG/ML
100 INJECTION, SOLUTION INTRAVENOUS PRN
Status: DISCONTINUED | OUTPATIENT
Start: 2018-02-16 | End: 2018-02-17 | Stop reason: HOSPADM

## 2018-02-16 RX ORDER — CEFUROXIME AXETIL 500 MG/1
500 TABLET ORAL 2 TIMES DAILY
Status: DISCONTINUED | OUTPATIENT
Start: 2018-02-16 | End: 2018-02-17 | Stop reason: HOSPADM

## 2018-02-16 RX ORDER — DEXTROSE MONOHYDRATE 25 G/50ML
12.5 INJECTION, SOLUTION INTRAVENOUS PRN
Status: DISCONTINUED | OUTPATIENT
Start: 2018-02-16 | End: 2018-02-17 | Stop reason: HOSPADM

## 2018-02-16 RX ORDER — BENZONATATE 100 MG/1
100 CAPSULE ORAL 3 TIMES DAILY PRN
Status: DISCONTINUED | OUTPATIENT
Start: 2018-02-16 | End: 2018-02-17 | Stop reason: HOSPADM

## 2018-02-16 RX ORDER — NICOTINE POLACRILEX 4 MG
15 LOZENGE BUCCAL PRN
Status: DISCONTINUED | OUTPATIENT
Start: 2018-02-16 | End: 2018-02-17 | Stop reason: HOSPADM

## 2018-02-16 RX ORDER — AMLODIPINE BESYLATE 2.5 MG/1
2.5 TABLET ORAL DAILY
Status: DISCONTINUED | OUTPATIENT
Start: 2018-02-16 | End: 2018-02-17 | Stop reason: HOSPADM

## 2018-02-16 RX ORDER — GUAIFENESIN/DEXTROMETHORPHAN 100-10MG/5
5 SYRUP ORAL EVERY 4 HOURS PRN
Status: DISCONTINUED | OUTPATIENT
Start: 2018-02-16 | End: 2018-02-17 | Stop reason: HOSPADM

## 2018-02-16 RX ADMIN — PREDNISONE 40 MG: 20 TABLET ORAL at 11:01

## 2018-02-16 RX ADMIN — Medication 2 PUFF: at 19:37

## 2018-02-16 RX ADMIN — INSULIN LISPRO 2 UNITS: 100 INJECTION, SOLUTION INTRAVENOUS; SUBCUTANEOUS at 18:37

## 2018-02-16 RX ADMIN — URSODIOL 300 MG: 300 CAPSULE ORAL at 08:26

## 2018-02-16 RX ADMIN — IPRATROPIUM BROMIDE AND ALBUTEROL SULFATE 1 AMPULE: .5; 3 SOLUTION RESPIRATORY (INHALATION) at 14:07

## 2018-02-16 RX ADMIN — Medication 10 ML: at 21:23

## 2018-02-16 RX ADMIN — AMLODIPINE BESYLATE 5 MG: 5 TABLET ORAL at 08:27

## 2018-02-16 RX ADMIN — ENOXAPARIN SODIUM 40 MG: 100 INJECTION SUBCUTANEOUS at 21:23

## 2018-02-16 RX ADMIN — PANTOPRAZOLE SODIUM 40 MG: 40 GRANULE, DELAYED RELEASE ORAL at 06:46

## 2018-02-16 RX ADMIN — URSODIOL 300 MG: 300 CAPSULE ORAL at 21:23

## 2018-02-16 RX ADMIN — ASPIRIN 81 MG 81 MG: 81 TABLET ORAL at 08:26

## 2018-02-16 RX ADMIN — IPRATROPIUM BROMIDE AND ALBUTEROL SULFATE 1 AMPULE: .5; 3 SOLUTION RESPIRATORY (INHALATION) at 19:37

## 2018-02-16 RX ADMIN — LEVOTHYROXINE SODIUM 25 MCG: 25 TABLET ORAL at 06:46

## 2018-02-16 RX ADMIN — Medication 10 ML: at 08:29

## 2018-02-16 RX ADMIN — IPRATROPIUM BROMIDE AND ALBUTEROL SULFATE 1 AMPULE: .5; 3 SOLUTION RESPIRATORY (INHALATION) at 10:32

## 2018-02-16 RX ADMIN — LINAGLIPTIN 5 MG: 5 TABLET, FILM COATED ORAL at 08:27

## 2018-02-16 RX ADMIN — ATORVASTATIN CALCIUM 40 MG: 40 TABLET, FILM COATED ORAL at 08:27

## 2018-02-16 RX ADMIN — INSULIN LISPRO 1 UNITS: 100 INJECTION, SOLUTION INTRAVENOUS; SUBCUTANEOUS at 13:33

## 2018-02-16 RX ADMIN — ATENOLOL 25 MG: 25 TABLET ORAL at 08:27

## 2018-02-16 RX ADMIN — RAMIPRIL 5 MG: 5 CAPSULE ORAL at 08:27

## 2018-02-16 RX ADMIN — CEFUROXIME AXETIL 500 MG: 500 TABLET ORAL at 11:01

## 2018-02-16 RX ADMIN — CEFUROXIME AXETIL 500 MG: 500 TABLET ORAL at 21:23

## 2018-02-16 RX ADMIN — DOCUSATE SODIUM 100 MG: 100 CAPSULE, LIQUID FILLED ORAL at 08:27

## 2018-02-16 RX ADMIN — Medication 2 PUFF: at 10:34

## 2018-02-16 ASSESSMENT — ENCOUNTER SYMPTOMS
SORE THROAT: 0
HEARTBURN: 1
ORTHOPNEA: 0
DOUBLE VISION: 0
NAUSEA: 0
ABDOMINAL PAIN: 0
SHORTNESS OF BREATH: 1
HEMOPTYSIS: 0
VOMITING: 0
COUGH: 1
DIARRHEA: 0
WHEEZING: 1

## 2018-02-16 ASSESSMENT — PAIN SCALES - GENERAL: PAINLEVEL_OUTOF10: 0

## 2018-02-16 NOTE — H&P
Tessie Singh is an 80 y.o.  male. Past medical history of COPD, coronary artery disease with history of CABG, hypertension, diabetes type 2, PVD, hyperlipidemia. Presents with cough and shortness of breath. States he went to an urgent care center due to above symptoms, given antibiotic, doesn't feel better. States she still has shortness of breath and cough. Noted elevated troponin ER, patient denies chest pain. No abdominal pain, no nausea vomiting or diarrhea. Past Medical History:   Diagnosis Date    Anemia     CAD (coronary artery disease) 4/16/2015    DM [facility] mellitus) ([facility] [address]     Dyslipidemia     History of tobacco abuse     HTN (hypertension)     Hypothyroidism     Non-ST elevation MI (NSTEMI) ([facility] [address]     Pacemaker 4/16/2015     Past Surgical History:   Procedure Laterality Date    CATARACT REMOVAL      MIDDLE EAR SURGERY Left 1998    \"they had to reset the bones\" after an infection     No current facility-administered medications on file prior to encounter. Current Outpatient Prescriptions on File Prior to Encounter   Medication Sig Dispense Refill    atenolol (TENORMIN) 25 MG tablet TAKE 1 TABLET TWICE A  tablet 3    atorvastatin (LIPITOR) 40 MG tablet Take 40 mg by mouth daily      ursodiol (ACTIGALL) 300 MG capsule Take 300 mg by mouth 2 times daily      sitaGLIPtin (JANUVIA) 50 MG tablet Take 50 mg by mouth daily      amLODIPine (NORVASC) 2.5 MG tablet Take 2.5 mg by mouth daily      esomeprazole Magnesium (NEXIUM) 40 MG PACK Take 40 mg by mouth daily      Cranberry 1000 MG CAPS Take by mouth      exenatide (BYETTA) 5 MCG/0.02ML injection Inject 5 mcg into the skin      nitroGLYCERIN (NITROSTAT) 0.4 MG SL tablet Place 0.4 mg under the tongue every 5 minutes as needed for Chest pain      aspirin 81 MG tablet Take 81 mg by mouth daily.  SYNTHROID 25 MCG tablet       metFORMIN (GLUCOPHAGE) 500 MG tablet Take 500 mg by mouth daily (with breakfast).  ramipril (ALTACE) 5 MG tablet Take 5 mg by mouth daily.  budesonide-formoterol (SYMBICORT) 80-4.5 MCG/ACT AERO Inhale 2 puffs into the lungs 2 times daily. Social History     Social History    Marital status:      Spouse name: N/A    Number of children: N/A    Years of education: N/A     Social History Main Topics    Smoking status: Former Smoker    Smokeless tobacco: Never Used    Alcohol use No    Drug use: No    Sexual activity: Not Asked     Other Topics Concern    None     Social History Narrative    None     History reviewed. No pertinent family history. Noncontributory    Allergies: No Known Allergies    Principal Problem:    SOB (shortness of breath)  Resolved Problems:    * No resolved hospital problems. *    Blood pressure 116/61, pulse 60, temperature 97.9 °F (36.6 °C), temperature source Oral, resp. rate 16, height 5' 6\" (1.676 m), weight 162 lb 7.7 oz (73.7 kg), SpO2 97 %. Review of Systems   Constitutional: Positive for fever and malaise/fatigue. HENT: Positive for hearing loss. Negative for sore throat. Eyes: Negative for double vision. Respiratory: Positive for cough, shortness of breath and wheezing. Negative for hemoptysis. Cardiovascular: Negative for chest pain, palpitations, orthopnea, claudication and leg swelling. Gastrointestinal: Positive for heartburn. Negative for abdominal pain, diarrhea, nausea and vomiting. Genitourinary: Negative for dysuria. Musculoskeletal: Positive for myalgias. Skin: Negative for itching and rash. Neurological: Negative for dizziness. Physical Exam   HENT:   Head: Normocephalic and atraumatic. Eyes: Conjunctivae and EOM are normal. Pupils are equal, round, and reactive to light. Neck: Neck supple. Cardiovascular: Normal rate, regular rhythm and normal heart sounds. Pulmonary/Chest: He has decreased breath sounds in the right lower field and the left lower field. He has wheezes.    Abdominal: Soft. Bowel sounds are normal. He exhibits no distension. There is no tenderness. Musculoskeletal: He exhibits no edema. Lymphadenopathy:     He has no cervical adenopathy. Neurological: He is alert. Skin: Skin is warm and dry. Nursing note and vitals reviewed.     Results for orders placed or performed during the hospital encounter of 02/15/18   Rapid Influenza A/B Antigens   Result Value Ref Range    Rapid Influenza A Ag Negative Negative    Rapid Influenza B Ag Negative Negative   CBC Auto Differential   Result Value Ref Range    WBC 10.1 4.8 - 10.8 K/uL    RBC 4.16 (L) 4.70 - 6.10 M/uL    Hemoglobin 13.3 (L) 14.0 - 18.0 g/dL    Hematocrit 39.8 (L) 42.0 - 52.0 %    MCV 95.6 80.0 - 100.0 fL    MCH 32.0 (H) 27.0 - 31.3 pg    MCHC 33.5 33.0 - 37.0 %    RDW 13.7 11.5 - 14.5 %    Platelets 403 623 - 152 K/uL    Neutrophils % 73.6 %    Lymphocytes % 14.5 %    Monocytes % 8.4 %    Eosinophils % 2.9 %    Basophils % 0.6 %    Neutrophils # 7.4 (H) 1.4 - 6.5 K/uL    Lymphocytes # 1.5 1.0 - 4.8 K/uL    Monocytes # 0.8 0.2 - 0.8 K/uL    Eosinophils # 0.3 0.0 - 0.7 K/uL    Basophils # 0.1 0.0 - 0.2 K/uL   Comprehensive Metabolic Panel   Result Value Ref Range    Sodium 137 132 - 144 mEq/L    Potassium 4.8 3.5 - 5.1 mEq/L    Chloride 99 98 - 107 mEq/L    CO2 25 22 - 29 mEq/L    Anion Gap 13 7 - 13 mEq/L    Glucose 133 (H) 74 - 109 mg/dL    BUN 23 8 - 23 mg/dL    CREATININE 1.12 0.70 - 1.20 mg/dL    GFR Non-African American >60.0 >60    GFR  >60.0 >60    Calcium 8.7 8.6 - 10.2 mg/dL    Total Protein 6.7 6.4 - 8.1 g/dL    Alb 4.1 3.9 - 4.9 g/dL    Total Bilirubin 1.2 0.0 - 1.2 mg/dL    Alkaline Phosphatase 61 35 - 104 U/L    ALT 12 0 - 41 U/L    AST 15 0 - 40 U/L    Globulin 2.6 2.3 - 3.5 g/dL   Lactic Acid, Plasma   Result Value Ref Range    Lactic Acid 1.4 0.5 - 2.2 mmol/L   Brain Natriuretic Peptide   Result Value Ref Range    Pro- pg/mL   CK   Result Value Ref Range    Total  0 - 190 U/L

## 2018-02-16 NOTE — PLAN OF CARE
Problem: Mobility - Impaired:  Goal: Mobility will improve  Mobility will improve  Outcome: Met This Shift  Therapy evaluation completed. Please see daily notes and/or progress notes for details related to planned treatment interventions, goals and functional abilities.

## 2018-02-16 NOTE — CARE COORDINATION
I met w pt and explained C3 role in pt care. From home w gdtr, states he is independent, still drives/cooks/clean, etc. Does not know O2 provider company name. Has been seen by HRV and I also reinforced him using his nebulizer (he hasn't used it in a very long time). I also personally showed him proper use of MDI. Pt does not have a pulmonologist and does not want to see anyone but Dr Medhat Kwon. I left a note for [facility] asking for [facility]. C3 will follow.

## 2018-02-16 NOTE — PROGRESS NOTES
will consist of: OT evaluation only; pt at functional baseline. Goals:  OT evaluation only; pt at functional baseline    Patient Goal: \"Drive home once I leave here. \"   Discussed and agreed upon: [x] Yes   [] No         Comments:     Assessment/Discharge Disposition:  Assessment: Pt presents with minimal decrease in endurance compared to baseline. Pt is independent in ADLs/basic IADLs and functional transfers/mobility with AD. Pt does not required skilled OT services. Performance deficits / Impairments: Decreased endurance  Prognosis: Good  Discharge Recommendations: Home independently  No Skilled OT: At baseline function, Independent with functional mobility, Independent with ADL's, No OT goals identified       Prognosis:  [x] Good   []Fair   [] Poor     Barriers to Improvement: none     G-Codes:  OT G-codes  Functional Limitation: Self care  Self Care Current Status (): At least 1 percent but less than 20 percent impaired, limited or restricted  Self Care Goal Status (): At least 1 percent but less than 20 percent impaired, limited or restricted  Self Care Discharge Status ():  At least 1 percent but less than 20 percent impaired, limited or restricted    Time in:  11:40  Time out:  11:55  Timed treatment minutes:  15  Total treatment time/minutes:  15    Electronically signed by:    Eliza Marroquin OT  2/16/2018, 11:51 AM

## 2018-02-16 NOTE — CONSULTS
5 mg Oral Daily    atenolol  25 mg Oral BID    levothyroxine  25 mcg Oral Daily    sodium chloride flush  10 mL Intravenous 2 times per day    docusate sodium  100 mg Oral BID    enoxaparin  40 mg Subcutaneous Daily    pantoprazole sodium  40 mg Oral QAM AC       PRN Meds:nitroGLYCERIN, albuterol, sodium chloride flush, acetaminophen, ondansetron, ipratropium-albuterol    REVIEW OF SYSTEMS:  As in history of present illness  Other 14 point review of system is negative. PHYSICAL EXAM:    Vitals:  /61   Pulse 60   Temp 97.9 °F (36.6 °C) (Oral)   Resp 16   Ht 5' 6\" (1.676 m)   Wt 162 lb 7.7 oz (73.7 kg)   SpO2 97%   BMI 26.22 kg/m²   General: Alert, awake, Oriented x3  .comfortable in bed, No distress. Head: Atraumatic , Normocephalic   Eyes: PERRL. No sclera icterus. No conjunctival injection. No discharge   ENT: No nasal  discharge. Pharynx clear. Neck:  Trachea midline. No thyromegaly, no JVD, No cervical adenopathy. Chest : Bilaterally symmetrical ,Normal effort,  No accessory muscle use  Lung : . Fair BS bilateral, decreased BS at bases. No Rales. Few scattered wheezing. No rhonchi. No dullness on percussion. Heart[de-identified] Normal  rate. Regular rhythm. No mumur ,  Rub or gallop  ABD: Non-tender. Non-distended. No masses. No organmegaly. Normal bowel sounds. No hernia. Musculoskeleton: normal range of motion in all extremites, strength and tone   Extremities: No pitting ,Cyanosis ,No clubbing  Neuro: no cranial nerve abnormality, normal reflex and sensation, no focal weakness   Skin: Warm and dry. No erythema rash on exposed extremities.         Data Review  Recent Labs      02/15/18   1507  02/16/18   0538   WBC  10.1  7.5   HGB  13.3*  12.6*   HCT  39.8*  38.4*   PLT  161  151      Recent Labs      02/15/18   1507  02/16/18   0538   NA  137  143   K  4.8  4.9   CL  99  103   CO2  25  26   BUN  23  21   CREATININE  1.12  1.28*   GLUCOSE  133*  121*       ABGs: No results for input(s): PHART,

## 2018-02-16 NOTE — PROGRESS NOTES
Assistance: Independent  Homemaking Assistance: Independent  Homemaking Responsibilities: Yes  Ambulation Assistance: Independent (no AD)  Transfer Assistance: Independent    OBJECTIVE:   Vision/Hearing:  Vision: Within Functional Limits  Hearing: Exceptions to [facility]  Hearing Exceptions: Hard of hearing/hearing concerns;Bilateral hearing aid    Cognition:  Overall Orientation Status: Within Normal Limits  Follows Commands: Within Functional Limits    Observation/Palpation  Observation: no acute distress noted2 L O2    ROM:  RLE AROM: WFL  LLE AROM : WFL    Strength:  Strength RLE  Comment: 4+/5 functionally assessed  Strength LLE  Comment: 4+/5 functionally assessed  Strength RUE  Strength RUE: WFL  Strength LUE  Strength LUE: WFL  Strength Other  Other: core WFL    Neuro:  Balance  Posture: Fair  Sitting - Static: Good  Sitting - Dynamic: Good  Standing - Static: Good  Standing - Dynamic: Good;- (functional reaching ~6 in OOBOS)     Motor Control  Gross Motor?: WFL  Sensation  Overall Sensation Status: WNL    Bed mobility  Supine to Sit: Independent    Transfers  Sit to Stand: Independent  Stand to sit: Independent  Comment: 5 x STS 11.5 seconds place pt at low risk for falls compared to age related norms    Ambulation  Ambulation?: Yes  Ambulation 1  Surface: level tile  Device: No Device  Other Apparatus: O2 (2 L O2)  Assistance: Supervision  Quality of Gait: reciprocal, no LOB  Distance: 60ft with turns  Comments: VC for safety with line management with good follow through    Activity Tolerance  Activity Tolerance: Patient Tolerated treatment well  Activity Tolerance: Denies SOB with functional mobility assessment          ASSESSMENT:   Body structures, Functions, Activity limitations: Decreased functional mobility ; Decreased strength;Decreased endurance;Decreased balance  Decision Making: Medium Complexity  No Skilled PT: Safe to return home    Prognosis: Good  Patient Education: Pt educated in role of acute care PT, PT POC, benefits of mobility while in house, benefits of sitting up in chair, safety techniques with O2 tubing, use of call light for assistance, d/c planning, d/c recommendation. Barriers to Learning: none    DISCHARGE RECOMMENDATIONS:  Discharge Recommendations: Continue to assess pending progress  No Skilled PT: Safe to return home    Pt is independent with all functional mobility. Pt states that he has no d/c needs or concerns for safe return home with PRN assistance from family. No skilled PT needs identified at this time. Please re-consult if there is a change in functional mobility status. REQUIRES PT FOLLOW UP: No      PLAN OF CARE:  Plan  Times per week: 3-6  Times per day: Daily  Current Treatment Recommendations: Strengthening, Balance Training, Transfer Training, Endurance Training, Gait Training, Stair training, ROM, Patient/Caregiver Education & Training, Home Exercise Program, Safety Education & Training, Neuromuscular Re-education  Safety Devices  Type of devices: All fall risk precautions in place    G-Code:  PT G-Codes  Functional Assessment Tool Used: clinical judgement  Functional Limitation: Mobility: Walking and moving around  Mobility: Walking and Moving Around Current Status (): At least 1 percent but less than 20 percent impaired, limited or restricted  Mobility: Walking and Moving Around Goal Status (): At least 1 percent but less than 20 percent impaired, limited or restricted  Mobility: Walking and Moving Around Discharge Status ():  At least 1 percent but less than 20 percent impaired, limited or restricted    Goals:  Patient goals : \"go home\"    Therapy Time:   Individual   Time In 1009   Time Out [address] [place], [place], 02/16/18 at 10:30 AM

## 2018-02-17 VITALS
HEART RATE: 66 BPM | OXYGEN SATURATION: 99 % | SYSTOLIC BLOOD PRESSURE: 122 MMHG | HEIGHT: 66 IN | BODY MASS INDEX: 26.11 KG/M2 | TEMPERATURE: 97.3 F | RESPIRATION RATE: 18 BRPM | DIASTOLIC BLOOD PRESSURE: 43 MMHG | WEIGHT: 162.48 LBS

## 2018-02-17 LAB
GLUCOSE BLD-MCNC: 169 MG/DL (ref 60–115)
GLUCOSE BLD-MCNC: 186 MG/DL (ref 60–115)
HBA1C MFR BLD: 7.3 % (ref 4.8–5.9)
PERFORMED ON: ABNORMAL
PERFORMED ON: ABNORMAL

## 2018-02-17 PROCEDURE — 6360000002 HC RX W HCPCS: Performed by: HOSPITALIST

## 2018-02-17 PROCEDURE — 96372 THER/PROPH/DIAG INJ SC/IM: CPT

## 2018-02-17 PROCEDURE — 2700000000 HC OXYGEN THERAPY PER DAY

## 2018-02-17 PROCEDURE — G0378 HOSPITAL OBSERVATION PER HR: HCPCS

## 2018-02-17 PROCEDURE — 2580000003 HC RX 258: Performed by: HOSPITALIST

## 2018-02-17 PROCEDURE — 94760 N-INVAS EAR/PLS OXIMETRY 1: CPT

## 2018-02-17 PROCEDURE — 6370000000 HC RX 637 (ALT 250 FOR IP): Performed by: INTERNAL MEDICINE

## 2018-02-17 PROCEDURE — 6370000000 HC RX 637 (ALT 250 FOR IP): Performed by: HOSPITALIST

## 2018-02-17 PROCEDURE — 94640 AIRWAY INHALATION TREATMENT: CPT

## 2018-02-17 RX ORDER — PREDNISONE 1 MG/1
10 TABLET ORAL 2 TIMES DAILY
Status: DISCONTINUED | OUTPATIENT
Start: 2018-02-17 | End: 2018-02-17 | Stop reason: HOSPADM

## 2018-02-17 RX ORDER — PREDNISONE 10 MG/1
10 TABLET ORAL 2 TIMES DAILY
Qty: 10 TABLET | Refills: 0 | Status: SHIPPED | OUTPATIENT
Start: 2018-02-17 | End: 2018-02-22

## 2018-02-17 RX ORDER — CEFUROXIME AXETIL 500 MG/1
500 TABLET ORAL 2 TIMES DAILY
Qty: 10 TABLET | Refills: 0 | Status: SHIPPED | OUTPATIENT
Start: 2018-02-17 | End: 2018-02-22

## 2018-02-17 RX ORDER — IPRATROPIUM BROMIDE AND ALBUTEROL SULFATE 2.5; .5 MG/3ML; MG/3ML
3 SOLUTION RESPIRATORY (INHALATION) 3 TIMES DAILY
Qty: 100 ML | Refills: 0 | Status: ON HOLD | OUTPATIENT
Start: 2018-02-17 | End: 2022-02-07

## 2018-02-17 RX ORDER — ALBUTEROL SULFATE 2.5 MG/3ML
2.5 SOLUTION RESPIRATORY (INHALATION)
Qty: 100 EACH | Refills: 0 | Status: ON HOLD | OUTPATIENT
Start: 2018-02-17 | End: 2019-07-09 | Stop reason: HOSPADM

## 2018-02-17 RX ORDER — GUAIFENESIN/DEXTROMETHORPHAN 100-10MG/5
5 SYRUP ORAL EVERY 4 HOURS PRN
Qty: 120 ML | COMMUNITY
Start: 2018-02-17 | End: 2018-02-27

## 2018-02-17 RX ADMIN — ASPIRIN 81 MG 81 MG: 81 TABLET ORAL at 09:22

## 2018-02-17 RX ADMIN — LINAGLIPTIN 5 MG: 5 TABLET, FILM COATED ORAL at 09:22

## 2018-02-17 RX ADMIN — PREDNISONE 10 MG: 5 TABLET ORAL at 09:22

## 2018-02-17 RX ADMIN — RAMIPRIL 5 MG: 5 CAPSULE ORAL at 09:22

## 2018-02-17 RX ADMIN — URSODIOL 300 MG: 300 CAPSULE ORAL at 09:22

## 2018-02-17 RX ADMIN — CEFUROXIME AXETIL 500 MG: 500 TABLET ORAL at 09:22

## 2018-02-17 RX ADMIN — INSULIN LISPRO 1 UNITS: 100 INJECTION, SOLUTION INTRAVENOUS; SUBCUTANEOUS at 09:23

## 2018-02-17 RX ADMIN — Medication 10 ML: at 09:21

## 2018-02-17 RX ADMIN — IPRATROPIUM BROMIDE AND ALBUTEROL SULFATE 1 AMPULE: .5; 3 SOLUTION RESPIRATORY (INHALATION) at 08:02

## 2018-02-17 RX ADMIN — ATENOLOL 25 MG: 25 TABLET ORAL at 09:22

## 2018-02-17 RX ADMIN — PANTOPRAZOLE SODIUM 40 MG: 40 GRANULE, DELAYED RELEASE ORAL at 05:25

## 2018-02-17 RX ADMIN — DOCUSATE SODIUM 100 MG: 100 CAPSULE, LIQUID FILLED ORAL at 09:22

## 2018-02-17 RX ADMIN — ATORVASTATIN CALCIUM 40 MG: 40 TABLET, FILM COATED ORAL at 09:22

## 2018-02-17 RX ADMIN — ENOXAPARIN SODIUM 40 MG: 100 INJECTION SUBCUTANEOUS at 09:21

## 2018-02-17 RX ADMIN — Medication 2 PUFF: at 08:02

## 2018-02-17 RX ADMIN — LEVOTHYROXINE SODIUM 25 MCG: 25 TABLET ORAL at 05:25

## 2018-02-17 RX ADMIN — AMLODIPINE BESYLATE 2.5 MG: 2.5 TABLET ORAL at 09:22

## 2018-02-17 ASSESSMENT — ENCOUNTER SYMPTOMS
SHORTNESS OF BREATH: 0
VOMITING: 0
COUGH: 0
NAUSEA: 0

## 2018-02-17 NOTE — PROGRESS NOTES
Irene Anna is a 80 y.o. male patient.     Current Facility-Administered Medications   Medication Dose Route Frequency Provider Last Rate Last Dose    predniSONE (DELTASONE) tablet 10 mg  10 mg Oral BID Huang Doss MD   10 mg at 02/17/18 0922    ipratropium-albuterol (DUONEB) nebulizer solution 1 ampule  1 ampule Inhalation TID Huang Doss MD   1 ampule at 02/17/18 0802    insulin lispro (HUMALOG) injection vial 0-6 Units  0-6 Units Subcutaneous TID  Huang Doss MD   1 Units at 02/17/18 5770    insulin lispro (HUMALOG) injection vial 0-3 Units  0-3 Units Subcutaneous Nightly Huang Doss MD   2 Units at 02/16/18 2124    glucose (GLUTOSE) 40 % oral gel 15 g  15 g Oral PRN Huang Doss MD        dextrose 50 % solution 12.5 g  12.5 g Intravenous PRN Huang Doss MD        glucagon (rDNA) injection 1 mg  1 mg Intramuscular PRN Huang Doss MD        dextrose 5 % solution  100 mL/hr Intravenous PRN Huang Dsos MD        guaiFENesin-dextromethorphan (ROBITUSSIN DM) 100-10 MG/5ML syrup 5 mL  5 mL Oral Q4H PRN Huang Doss MD        benzonatate (TESSALON) capsule 100 mg  100 mg Oral TID PRN Huang Doss MD        amLODIPine (NORVASC) tablet 2.5 mg  2.5 mg Oral Daily Huang Doss MD   2.5 mg at 02/17/18 1492    cefUROXime (CEFTIN) tablet 500 mg  500 mg Oral BID Huang Doss MD   500 mg at 02/17/18 3157    ramipril (ALTACE) capsule 5 mg  5 mg Oral Daily Brien Burton MD   5 mg at 02/17/18 0922    mometasone-formoterol (DULERA) 100-5 MCG/ACT inhaler 2 puff  2 puff Inhalation BID Brien Burton MD   2 puff at 02/17/18 0802    aspirin chewable tablet 81 mg  81 mg Oral Daily Brien Burton MD   81 mg at 02/17/18 2033    exenatide (BYETTA) injection 5 mcg  5 mcg Subcutaneous BID  Brien Burton MD        nitroGLYCERIN (NITROSTAT) SL tablet 0.4 mg  0.4 mg Sublingual Q5 Min PRN Brien Burton MD        atorvastatin (LIPITOR) tablet 40 mg  40 mg Oral

## 2018-02-17 NOTE — FLOWSHEET NOTE
Assessment done, lungs clear, rarely has a cough. No complaint of shortness of breath. Up in room with steady gait.

## 2018-02-20 LAB
BLOOD CULTURE, ROUTINE: NORMAL
CULTURE, BLOOD 2: NORMAL

## 2018-03-23 ENCOUNTER — HOSPITAL ENCOUNTER (EMERGENCY)
Age: 83
Discharge: HOME | End: 2018-03-23
Payer: MEDICARE

## 2018-03-23 VITALS
WEIGHT: 167 LBS | SYSTOLIC BLOOD PRESSURE: 130 MMHG | HEIGHT: 67 IN | BODY MASS INDEX: 26.21 KG/M2 | RESPIRATION RATE: 18 BRPM | OXYGEN SATURATION: 96 % | DIASTOLIC BLOOD PRESSURE: 69 MMHG | HEART RATE: 80 BPM | TEMPERATURE: 97.2 F

## 2018-03-23 DIAGNOSIS — L55.9 SUNBURN: Primary | ICD-10-CM

## 2018-03-23 PROCEDURE — 6370000000 HC RX 637 (ALT 250 FOR IP): Performed by: PHYSICIAN ASSISTANT

## 2018-03-23 PROCEDURE — 99282 EMERGENCY DEPT VISIT SF MDM: CPT

## 2018-03-23 RX ORDER — BACITRACIN, NEOMYCIN, POLYMYXIN B 400; 3.5; 5 [USP'U]/G; MG/G; [USP'U]/G
OINTMENT TOPICAL
Qty: 28.35 G | Refills: 2 | Status: ON HOLD | OUTPATIENT
Start: 2018-03-23 | End: 2019-07-09 | Stop reason: HOSPADM

## 2018-03-23 RX ORDER — BACITRACIN, NEOMYCIN, POLYMYXIN B 400; 3.5; 5 [USP'U]/G; MG/G; [USP'U]/G
OINTMENT TOPICAL
Qty: 28.35 G | Refills: 2 | Status: SHIPPED | OUTPATIENT
Start: 2018-03-23 | End: 2018-03-23 | Stop reason: ALTCHOICE

## 2018-03-23 RX ORDER — GINSENG 100 MG
CAPSULE ORAL ONCE
Status: DISCONTINUED | OUTPATIENT
Start: 2018-03-23 | End: 2018-03-23 | Stop reason: HOSPADM

## 2018-03-23 RX ORDER — BACITRACIN, NEOMYCIN, POLYMYXIN B 400; 3.5; 5 [USP'U]/G; MG/G; [USP'U]/G
OINTMENT TOPICAL ONCE
Status: COMPLETED | OUTPATIENT
Start: 2018-03-23 | End: 2018-03-23

## 2018-03-23 RX ADMIN — BACITRACIN, NEOMYCIN, POLYMYXIN B 3.5 G: 400; 3.5; 5 OINTMENT TOPICAL at 13:36

## 2018-03-23 ASSESSMENT — PAIN DESCRIPTION - ORIENTATION: ORIENTATION: RIGHT;LEFT

## 2018-03-23 ASSESSMENT — ENCOUNTER SYMPTOMS
GASTROINTESTINAL NEGATIVE: 1
RESPIRATORY NEGATIVE: 1
COLOR CHANGE: 1
EYES NEGATIVE: 1

## 2018-03-23 ASSESSMENT — PAIN DESCRIPTION - PAIN TYPE: TYPE: ACUTE PAIN

## 2018-03-23 ASSESSMENT — PAIN DESCRIPTION - DESCRIPTORS: DESCRIPTORS: BURNING

## 2018-03-23 ASSESSMENT — PAIN SCALES - GENERAL: PAINLEVEL_OUTOF10: 6

## 2018-03-23 ASSESSMENT — PAIN DESCRIPTION - LOCATION: LOCATION: LEG

## 2018-03-23 NOTE — ED PROVIDER NOTES
[address] [facility] ED  eMERGENCY dEPARTMENT eNCOUnter      Pt Name: Bryan Rawls  MRN: [id number]  [place] 3/5/1929  Date of evaluation: 3/23/2018  Provider: Medhat Adorno PA-C      HISTORY OF PRESENT ILLNESS    Bryan Rawls is a 80 y.o. male who presents to the Emergency Department with Chief complaint of sunburn to bilateral lower extremities. Patient states he was in [place] on Wednesday and sitting outside in shorts developed sunburn to lower extremity. Patient states he noticed the blisters weight on Wednesday evening and into Thursday morning. He has not taken any medication prior to arrival.  He states the pain is not getting any worse since the initial onset 2 days ago. He has no other complaints at this time. REVIEW OF SYSTEMS       Review of Systems   Constitutional: Negative. HENT: Negative. Eyes: Negative. Respiratory: Negative. Cardiovascular: Negative. Gastrointestinal: Negative. Endocrine: Negative. Genitourinary: Negative. Musculoskeletal: Negative. Skin: Positive for color change and wound. Neurological: Negative. Psychiatric/Behavioral: Negative.           PAST MEDICAL HISTORY     Past Medical History:   Diagnosis Date    Anemia     CAD (coronary artery disease) 4/16/2015    DM [facility]di[facility] mellitus) [facility] [address]     Dyslipidemia     History of tobacco abuse     HTN (hypertension)     Hypothyroidism     Non-ST elevation MI (NSTEMI) ([facility] [address] [facility]     Pacemaker 4/16/2015         SURGICAL HISTORY       Past Surgical History:   Procedure Laterality Date    CATARACT REMOVAL      MIDDLE EAR SURGERY Left 1998    \"they had to reset the bones\" after an infection         CURRENT MEDICATIONS       Previous Medications    ALBUTEROL (PROVENTIL) (2.5 MG/3ML) 0.083% NEBULIZER SOLUTION    Take 3 mLs by nebulization every 2 hours as needed for Wheezing    AMLODIPINE (NORVASC) 2.5 MG TABLET    Take 2.5 mg by mouth daily    ASPIRIN 81 MG TABLET    Take 81 mg by mouth Normocephalic and atraumatic. Eyes: Conjunctivae are normal. Pupils are equal, round, and reactive to light. Neck: Normal range of motion. Neck supple. Cardiovascular: Normal rate and regular rhythm. No murmur heard. Pulmonary/Chest: Breath sounds normal. No respiratory distress. He has no wheezes. He has no rales. Abdominal: Soft. He exhibits no distension. There is no tenderness. Musculoskeletal: Normal range of motion. Neurological: He is alert and oriented to person, place, and time. No cranial nerve deficit. Skin: Skin is warm and dry. Burn noted. No rash noted. Rash is not vesicular. There is erythema. Psychiatric: He has a normal mood and affect. Judgment normal.         All other labs were within normal range or not returned as of this dictation. EMERGENCY DEPARTMENT COURSE and DIFFERENTIAL DIAGNOSIS/MDM:   Vitals:    Vitals:    03/23/18 1217   BP: 130/69   Pulse: 80   Resp: 18   Temp: 97.2 °F (36.2 °C)   TempSrc: Oral   SpO2: 96%   Weight: 167 lb (75.8 kg)   Height: 5' 6.5\" (1.689 m)       ED Course        Patient's wound cleansed with Hibiclens and saline. Bacitracin and sterile dressing applied. Patient instructed to follow-up with family doctor in the next 2 days for re-evaluation and treatment. Return here immediatelly if symptoms worsen or if new concerning symptoms arise. He verbalizes understanding of discharge and has no further questions. PROCEDURES:  Unless otherwise noted below, none     Procedures      FINAL IMPRESSION      1.  Sunburn          DISPOSITION/PLAN   DISPOSITION Decision To Discharge 03/23/2018 12:56:51 PM          Brittanie Cárdenas PA-C (electronically signed)  Attending Emergency Physician  [address] [place], [place] [address]VICTORINO  03/23/18 1918

## 2018-03-23 NOTE — ED NOTES
Waiting bacitracin from pharmacy/ pt. Has hibbuckyans soaksat this time. NO C/O PAIN      Cailin Arnett LPN  52/96/21 2070

## 2018-03-23 NOTE — ED NOTES
Wounds cleansed with hibicleans, bacitracin applied and wrapped with non stick dressings and jorden, pt tolerated well     Rajani Lau RN  03/23/18 3319

## 2018-05-15 ENCOUNTER — OFFICE VISIT (OUTPATIENT)
Dept: CARDIOLOGY CLINIC | Age: 83
End: 2018-05-15
Payer: MEDICARE

## 2018-05-15 VITALS
BODY MASS INDEX: 26.02 KG/M2 | WEIGHT: 165.8 LBS | SYSTOLIC BLOOD PRESSURE: 130 MMHG | HEART RATE: 74 BPM | DIASTOLIC BLOOD PRESSURE: 70 MMHG | HEIGHT: 67 IN | OXYGEN SATURATION: 94 %

## 2018-05-15 DIAGNOSIS — Z87.891 HISTORY OF TOBACCO ABUSE: ICD-10-CM

## 2018-05-15 DIAGNOSIS — I25.10 CORONARY ARTERY DISEASE INVOLVING NATIVE CORONARY ARTERY OF NATIVE HEART WITHOUT ANGINA PECTORIS: ICD-10-CM

## 2018-05-15 DIAGNOSIS — E78.5 DYSLIPIDEMIA: ICD-10-CM

## 2018-05-15 DIAGNOSIS — Z95.0 HISTORY OF PACEMAKER: ICD-10-CM

## 2018-05-15 DIAGNOSIS — I10 ESSENTIAL HYPERTENSION: Primary | ICD-10-CM

## 2018-05-15 PROCEDURE — 99214 OFFICE O/P EST MOD 30 MIN: CPT | Performed by: INTERNAL MEDICINE

## 2018-08-02 ENCOUNTER — HOSPITAL ENCOUNTER (OUTPATIENT)
Dept: CARDIOLOGY | Age: 83
Discharge: HOME | End: 2018-08-02
Payer: MEDICARE

## 2018-08-02 PROCEDURE — 93280 PM DEVICE PROGR EVAL DUAL: CPT

## 2018-09-17 ENCOUNTER — APPOINTMENT (OUTPATIENT)
Dept: CT IMAGING | Age: 83
End: 2018-09-17
Payer: MEDICARE

## 2018-09-17 ENCOUNTER — APPOINTMENT (OUTPATIENT)
Dept: GENERAL RADIOLOGY | Age: 83
End: 2018-09-17
Payer: MEDICARE

## 2018-09-17 ENCOUNTER — HOSPITAL ENCOUNTER (OUTPATIENT)
Age: 83
Setting detail: OBSERVATION
Discharge: HOME | End: 2018-09-18
Attending: STUDENT IN AN ORGANIZED HEALTH CARE EDUCATION/TRAINING PROGRAM | Admitting: INTERNAL MEDICINE
Payer: MEDICARE

## 2018-09-17 DIAGNOSIS — I20.8 ANGINAL EQUIVALENT (HCC): Primary | ICD-10-CM

## 2018-09-17 DIAGNOSIS — E11.65 TYPE 2 DIABETES MELLITUS WITH HYPERGLYCEMIA, UNSPECIFIED WHETHER LONG TERM INSULIN USE (HCC): ICD-10-CM

## 2018-09-17 DIAGNOSIS — R77.8 ELEVATED TROPONIN: ICD-10-CM

## 2018-09-17 PROBLEM — R06.02 SOB (SHORTNESS OF BREATH): Status: ACTIVE | Noted: 2018-09-17

## 2018-09-17 LAB
ALBUMIN SERPL-MCNC: 3.6 G/DL (ref 3.9–4.9)
ALP BLD-CCNC: 54 U/L (ref 35–104)
ALT SERPL-CCNC: 19 U/L (ref 0–41)
ANION GAP SERPL CALCULATED.3IONS-SCNC: 13 MEQ/L (ref 7–13)
APTT: 27.1 SEC (ref 21.6–35.4)
AST SERPL-CCNC: 19 U/L (ref 0–40)
BASOPHILS ABSOLUTE: 0.1 K/UL (ref 0–0.2)
BASOPHILS RELATIVE PERCENT: 0.8 %
BILIRUB SERPL-MCNC: 0.6 MG/DL (ref 0–1.2)
BILIRUBIN URINE: NEGATIVE
BLOOD, URINE: NEGATIVE
BUN BLDV-MCNC: 26 MG/DL (ref 8–23)
C-REACTIVE PROTEIN, HIGH SENSITIVITY: 4.8 MG/L (ref 0–5)
CALCIUM SERPL-MCNC: 8.7 MG/DL (ref 8.6–10.2)
CHLORIDE BLD-SCNC: 99 MEQ/L (ref 98–107)
CHOLESTEROL, TOTAL: 177 MG/DL (ref 0–199)
CLARITY: CLEAR
CO2: 26 MEQ/L (ref 22–29)
COLOR: YELLOW
CREAT SERPL-MCNC: 1.39 MG/DL (ref 0.7–1.2)
D DIMER: 1.37 MG/L FEU (ref 0–0.5)
EOSINOPHILS ABSOLUTE: 0.2 K/UL (ref 0–0.7)
EOSINOPHILS RELATIVE PERCENT: 2.7 %
GFR AFRICAN AMERICAN: 58.1
GFR NON-AFRICAN AMERICAN: 48.1
GLOBULIN: 2.6 G/DL (ref 2.3–3.5)
GLUCOSE BLD-MCNC: 168 MG/DL (ref 60–115)
GLUCOSE BLD-MCNC: 225 MG/DL (ref 74–109)
GLUCOSE BLD-MCNC: 242 MG/DL (ref 60–115)
GLUCOSE URINE: 100 MG/DL
HBA1C MFR BLD: 8.2 % (ref 4.8–5.9)
HCT VFR BLD CALC: 37.8 % (ref 42–52)
HDLC SERPL-MCNC: 35 MG/DL (ref 40–59)
HEMOGLOBIN: 12.7 G/DL (ref 14–18)
INR BLD: 1
KETONES, URINE: NEGATIVE MG/DL
LACTIC ACID: 1.4 MMOL/L (ref 0.5–2.2)
LDL CHOLESTEROL CALCULATED: 103 MG/DL (ref 0–129)
LEUKOCYTE ESTERASE, URINE: NEGATIVE
LYMPHOCYTES ABSOLUTE: 1.3 K/UL (ref 1–4.8)
LYMPHOCYTES RELATIVE PERCENT: 18.5 %
MAGNESIUM: 1.9 MG/DL (ref 1.7–2.3)
MCH RBC QN AUTO: 31.6 PG (ref 27–31.3)
MCHC RBC AUTO-ENTMCNC: 33.7 % (ref 33–37)
MCV RBC AUTO: 94 FL (ref 80–100)
MONOCYTES ABSOLUTE: 0.8 K/UL (ref 0.2–0.8)
MONOCYTES RELATIVE PERCENT: 11 %
NEUTROPHILS ABSOLUTE: 4.7 K/UL (ref 1.4–6.5)
NEUTROPHILS RELATIVE PERCENT: 67 %
NITRITE, URINE: NEGATIVE
PDW BLD-RTO: 13.6 % (ref 11.5–14.5)
PERFORMED ON: ABNORMAL
PERFORMED ON: ABNORMAL
PH UA: 6 (ref 5–9)
PLATELET # BLD: 157 K/UL (ref 130–400)
POTASSIUM SERPL-SCNC: 4.4 MEQ/L (ref 3.5–5.1)
PRO-BNP: 241 PG/ML
PROTEIN UA: NEGATIVE MG/DL
PROTHROMBIN TIME: 10.5 SEC (ref 9.6–12.3)
RAPID INFLUENZA  B AGN: NEGATIVE
RAPID INFLUENZA A AGN: NEGATIVE
RBC # BLD: 4.02 M/UL (ref 4.7–6.1)
SODIUM BLD-SCNC: 138 MEQ/L (ref 132–144)
SPECIFIC GRAVITY UA: 1.02 (ref 1–1.03)
TOTAL CK: 116 U/L (ref 0–190)
TOTAL PROTEIN: 6.2 G/DL (ref 6.4–8.1)
TRIGL SERPL-MCNC: 194 MG/DL (ref 0–200)
TROPONIN: 0.03 NG/ML (ref 0–0.01)
TROPONIN: 0.03 NG/ML (ref 0–0.01)
TROPONIN: 0.04 NG/ML (ref 0–0.01)
TSH SERPL DL<=0.05 MIU/L-ACNC: 4.61 UIU/ML (ref 0.27–4.2)
URINE REFLEX TO CULTURE: ABNORMAL
UROBILINOGEN, URINE: 1 E.U./DL
WBC # BLD: 7.1 K/UL (ref 4.8–10.8)

## 2018-09-17 PROCEDURE — 80053 COMPREHEN METABOLIC PANEL: CPT

## 2018-09-17 PROCEDURE — 99215 OFFICE O/P EST HI 40 MIN: CPT | Performed by: INTERNAL MEDICINE

## 2018-09-17 PROCEDURE — 83605 ASSAY OF LACTIC ACID: CPT

## 2018-09-17 PROCEDURE — 83036 HEMOGLOBIN GLYCOSYLATED A1C: CPT

## 2018-09-17 PROCEDURE — 6370000000 HC RX 637 (ALT 250 FOR IP): Performed by: INTERNAL MEDICINE

## 2018-09-17 PROCEDURE — 85730 THROMBOPLASTIN TIME PARTIAL: CPT

## 2018-09-17 PROCEDURE — 94640 AIRWAY INHALATION TREATMENT: CPT

## 2018-09-17 PROCEDURE — 6370000000 HC RX 637 (ALT 250 FOR IP): Performed by: STUDENT IN AN ORGANIZED HEALTH CARE EDUCATION/TRAINING PROGRAM

## 2018-09-17 PROCEDURE — 83880 ASSAY OF NATRIURETIC PEPTIDE: CPT

## 2018-09-17 PROCEDURE — 80061 LIPID PANEL: CPT

## 2018-09-17 PROCEDURE — 6360000004 HC RX CONTRAST MEDICATION: Performed by: STUDENT IN AN ORGANIZED HEALTH CARE EDUCATION/TRAINING PROGRAM

## 2018-09-17 PROCEDURE — 83735 ASSAY OF MAGNESIUM: CPT

## 2018-09-17 PROCEDURE — 6360000002 HC RX W HCPCS: Performed by: INTERNAL MEDICINE

## 2018-09-17 PROCEDURE — G0378 HOSPITAL OBSERVATION PER HR: HCPCS

## 2018-09-17 PROCEDURE — 87040 BLOOD CULTURE FOR BACTERIA: CPT

## 2018-09-17 PROCEDURE — 94664 DEMO&/EVAL PT USE INHALER: CPT

## 2018-09-17 PROCEDURE — 82550 ASSAY OF CK (CPK): CPT

## 2018-09-17 PROCEDURE — 71275 CT ANGIOGRAPHY CHEST: CPT

## 2018-09-17 PROCEDURE — 99285 EMERGENCY DEPT VISIT HI MDM: CPT

## 2018-09-17 PROCEDURE — 87804 INFLUENZA ASSAY W/OPTIC: CPT

## 2018-09-17 PROCEDURE — 96372 THER/PROPH/DIAG INJ SC/IM: CPT

## 2018-09-17 PROCEDURE — 85379 FIBRIN DEGRADATION QUANT: CPT

## 2018-09-17 PROCEDURE — APPNB45 APP NON BILLABLE 31-45 MINUTES: Performed by: NURSE PRACTITIONER

## 2018-09-17 PROCEDURE — 2580000003 HC RX 258: Performed by: INTERNAL MEDICINE

## 2018-09-17 PROCEDURE — 84443 ASSAY THYROID STIM HORMONE: CPT

## 2018-09-17 PROCEDURE — 93005 ELECTROCARDIOGRAM TRACING: CPT

## 2018-09-17 PROCEDURE — 86141 C-REACTIVE PROTEIN HS: CPT

## 2018-09-17 PROCEDURE — 85025 COMPLETE CBC W/AUTO DIFF WBC: CPT

## 2018-09-17 PROCEDURE — 71046 X-RAY EXAM CHEST 2 VIEWS: CPT

## 2018-09-17 PROCEDURE — 85610 PROTHROMBIN TIME: CPT

## 2018-09-17 PROCEDURE — 93010 ELECTROCARDIOGRAM REPORT: CPT | Performed by: INTERNAL MEDICINE

## 2018-09-17 PROCEDURE — 36415 COLL VENOUS BLD VENIPUNCTURE: CPT

## 2018-09-17 PROCEDURE — 81003 URINALYSIS AUTO W/O SCOPE: CPT

## 2018-09-17 PROCEDURE — 84484 ASSAY OF TROPONIN QUANT: CPT

## 2018-09-17 RX ORDER — LEVOTHYROXINE SODIUM 0.03 MG/1
25 TABLET ORAL DAILY
Status: DISCONTINUED | OUTPATIENT
Start: 2018-09-17 | End: 2018-09-18 | Stop reason: HOSPADM

## 2018-09-17 RX ORDER — AZITHROMYCIN 250 MG/1
250 TABLET, FILM COATED ORAL DAILY
Status: DISCONTINUED | OUTPATIENT
Start: 2018-09-17 | End: 2018-09-18 | Stop reason: HOSPADM

## 2018-09-17 RX ORDER — PREDNISONE 10 MG/1
10 TABLET ORAL DAILY
Status: DISCONTINUED | OUTPATIENT
Start: 2018-09-17 | End: 2018-09-18 | Stop reason: HOSPADM

## 2018-09-17 RX ORDER — ASPIRIN 81 MG/1
81 TABLET, CHEWABLE ORAL ONCE
Status: COMPLETED | OUTPATIENT
Start: 2018-09-17 | End: 2018-09-17

## 2018-09-17 RX ORDER — ASPIRIN 81 MG/1
81 TABLET, CHEWABLE ORAL DAILY
Status: DISCONTINUED | OUTPATIENT
Start: 2018-09-18 | End: 2018-09-18 | Stop reason: HOSPADM

## 2018-09-17 RX ORDER — SODIUM CHLORIDE 0.9 % (FLUSH) 0.9 %
10 SYRINGE (ML) INJECTION PRN
Status: DISCONTINUED | OUTPATIENT
Start: 2018-09-17 | End: 2018-09-18 | Stop reason: HOSPADM

## 2018-09-17 RX ORDER — ATORVASTATIN CALCIUM 40 MG/1
40 TABLET, FILM COATED ORAL DAILY
Status: DISCONTINUED | OUTPATIENT
Start: 2018-09-17 | End: 2018-09-18 | Stop reason: HOSPADM

## 2018-09-17 RX ORDER — ATENOLOL 25 MG/1
25 TABLET ORAL 2 TIMES DAILY
Status: DISCONTINUED | OUTPATIENT
Start: 2018-09-17 | End: 2018-09-18

## 2018-09-17 RX ORDER — GUAIFENESIN 600 MG/1
600 TABLET, EXTENDED RELEASE ORAL 2 TIMES DAILY
Status: DISCONTINUED | OUTPATIENT
Start: 2018-09-17 | End: 2018-09-18 | Stop reason: HOSPADM

## 2018-09-17 RX ORDER — DEXTROSE MONOHYDRATE 25 G/50ML
12.5 INJECTION, SOLUTION INTRAVENOUS PRN
Status: DISCONTINUED | OUTPATIENT
Start: 2018-09-17 | End: 2018-09-18 | Stop reason: HOSPADM

## 2018-09-17 RX ORDER — NICOTINE POLACRILEX 4 MG
15 LOZENGE BUCCAL PRN
Status: DISCONTINUED | OUTPATIENT
Start: 2018-09-17 | End: 2018-09-18 | Stop reason: HOSPADM

## 2018-09-17 RX ORDER — ESOMEPRAZOLE MAGNESIUM 40 MG/1
40 FOR SUSPENSION ORAL DAILY
Status: DISCONTINUED | OUTPATIENT
Start: 2018-09-17 | End: 2018-09-17 | Stop reason: CLARIF

## 2018-09-17 RX ORDER — SODIUM CHLORIDE 0.9 % (FLUSH) 0.9 %
10 SYRINGE (ML) INJECTION EVERY 12 HOURS SCHEDULED
Status: DISCONTINUED | OUTPATIENT
Start: 2018-09-17 | End: 2018-09-18 | Stop reason: HOSPADM

## 2018-09-17 RX ORDER — ALBUTEROL SULFATE 2.5 MG/3ML
2.5 SOLUTION RESPIRATORY (INHALATION) EVERY 6 HOURS PRN
Status: DISCONTINUED | OUTPATIENT
Start: 2018-09-17 | End: 2018-09-17

## 2018-09-17 RX ORDER — BENZONATATE 100 MG/1
100 CAPSULE ORAL 3 TIMES DAILY PRN
Status: DISCONTINUED | OUTPATIENT
Start: 2018-09-17 | End: 2018-09-18 | Stop reason: HOSPADM

## 2018-09-17 RX ORDER — IPRATROPIUM BROMIDE AND ALBUTEROL SULFATE 2.5; .5 MG/3ML; MG/3ML
3 SOLUTION RESPIRATORY (INHALATION) 3 TIMES DAILY
Status: DISCONTINUED | OUTPATIENT
Start: 2018-09-17 | End: 2018-09-18 | Stop reason: HOSPADM

## 2018-09-17 RX ORDER — ONDANSETRON 2 MG/ML
4 INJECTION INTRAMUSCULAR; INTRAVENOUS EVERY 6 HOURS PRN
Status: DISCONTINUED | OUTPATIENT
Start: 2018-09-17 | End: 2018-09-18 | Stop reason: HOSPADM

## 2018-09-17 RX ORDER — PANTOPRAZOLE SODIUM 40 MG/1
40 TABLET, DELAYED RELEASE ORAL
Status: DISCONTINUED | OUTPATIENT
Start: 2018-09-17 | End: 2018-09-18 | Stop reason: HOSPADM

## 2018-09-17 RX ORDER — RAMIPRIL 5 MG/1
5 CAPSULE ORAL DAILY
Status: DISCONTINUED | OUTPATIENT
Start: 2018-09-17 | End: 2018-09-17

## 2018-09-17 RX ORDER — URSODIOL 300 MG/1
300 CAPSULE ORAL 2 TIMES DAILY
Status: DISCONTINUED | OUTPATIENT
Start: 2018-09-17 | End: 2018-09-18 | Stop reason: HOSPADM

## 2018-09-17 RX ORDER — SODIUM CHLORIDE 9 MG/ML
INJECTION, SOLUTION INTRAVENOUS CONTINUOUS
Status: DISCONTINUED | OUTPATIENT
Start: 2018-09-17 | End: 2018-09-18 | Stop reason: HOSPADM

## 2018-09-17 RX ORDER — AMLODIPINE BESYLATE 2.5 MG/1
2.5 TABLET ORAL DAILY
Status: DISCONTINUED | OUTPATIENT
Start: 2018-09-17 | End: 2018-09-18

## 2018-09-17 RX ORDER — ALBUTEROL SULFATE 2.5 MG/3ML
2.5 SOLUTION RESPIRATORY (INHALATION)
Status: DISCONTINUED | OUTPATIENT
Start: 2018-09-17 | End: 2018-09-18 | Stop reason: HOSPADM

## 2018-09-17 RX ORDER — NITROGLYCERIN 0.4 MG/1
0.4 TABLET SUBLINGUAL EVERY 5 MIN PRN
Status: DISCONTINUED | OUTPATIENT
Start: 2018-09-17 | End: 2018-09-18 | Stop reason: HOSPADM

## 2018-09-17 RX ORDER — ACETAMINOPHEN 325 MG/1
650 TABLET ORAL EVERY 4 HOURS PRN
Status: DISCONTINUED | OUTPATIENT
Start: 2018-09-17 | End: 2018-09-18 | Stop reason: HOSPADM

## 2018-09-17 RX ORDER — DEXTROSE MONOHYDRATE 50 MG/ML
100 INJECTION, SOLUTION INTRAVENOUS PRN
Status: DISCONTINUED | OUTPATIENT
Start: 2018-09-17 | End: 2018-09-18 | Stop reason: HOSPADM

## 2018-09-17 RX ADMIN — SODIUM CHLORIDE: 9 INJECTION, SOLUTION INTRAVENOUS at 17:27

## 2018-09-17 RX ADMIN — GUAIFENESIN 600 MG: 600 TABLET, EXTENDED RELEASE ORAL at 21:04

## 2018-09-17 RX ADMIN — Medication 2 PUFF: at 19:58

## 2018-09-17 RX ADMIN — IPRATROPIUM BROMIDE AND ALBUTEROL SULFATE 3 ML: .5; 3 SOLUTION RESPIRATORY (INHALATION) at 19:58

## 2018-09-17 RX ADMIN — INSULIN LISPRO 1 UNITS: 100 INJECTION, SOLUTION INTRAVENOUS; SUBCUTANEOUS at 18:24

## 2018-09-17 RX ADMIN — AZITHROMYCIN 250 MG: 250 TABLET, FILM COATED ORAL at 17:37

## 2018-09-17 RX ADMIN — IPRATROPIUM BROMIDE AND ALBUTEROL SULFATE 3 ML: .5; 3 SOLUTION RESPIRATORY (INHALATION) at 16:52

## 2018-09-17 RX ADMIN — Medication 10 ML: at 21:04

## 2018-09-17 RX ADMIN — PREDNISONE 10 MG: 10 TABLET ORAL at 18:24

## 2018-09-17 RX ADMIN — ENOXAPARIN SODIUM 40 MG: 40 INJECTION SUBCUTANEOUS at 17:26

## 2018-09-17 RX ADMIN — IOPAMIDOL 100 ML: 755 INJECTION, SOLUTION INTRAVENOUS at 13:36

## 2018-09-17 RX ADMIN — URSODIOL 300 MG: 300 CAPSULE ORAL at 21:03

## 2018-09-17 RX ADMIN — ATORVASTATIN CALCIUM 40 MG: 40 TABLET, FILM COATED ORAL at 21:03

## 2018-09-17 RX ADMIN — ATENOLOL 25 MG: 25 TABLET ORAL at 21:03

## 2018-09-17 RX ADMIN — ASPIRIN 81 MG 81 MG: 81 TABLET ORAL at 14:51

## 2018-09-17 ASSESSMENT — ENCOUNTER SYMPTOMS
TROUBLE SWALLOWING: 0
GASTROINTESTINAL NEGATIVE: 1
VOMITING: 0
CHOKING: 0
COUGH: 1
SINUS PRESSURE: 0
WHEEZING: 0
CHEST TIGHTNESS: 0
ABDOMINAL PAIN: 0
COUGH: 0
HEMOPTYSIS: 0
STRIDOR: 0
BACK PAIN: 0
EYES NEGATIVE: 1
WHEEZING: 1
DIARRHEA: 0
ALLERGIC/IMMUNOLOGIC NEGATIVE: 1
NAUSEA: 0
SHORTNESS OF BREATH: 1
APNEA: 0
ORTHOPNEA: 0

## 2018-09-17 ASSESSMENT — HEART SCORE: ECG: 1

## 2018-09-17 NOTE — PROGRESS NOTES
[facility] Respiratory Therapy Evaluation   Current Order:  duoneb tid       Home Regimen: yes      Ordering Physician: roxie  Re-evaluation Date:  9/20     Diagnosis: sepsis      Patient Status: Stable / Unstable + Physician notified    The following MDI Criteria must be met in order to convert aerosol to MDI with spacer. If unable to meet, MDI will be converted to aerosol:  []  Patient able to demonstrate the ability to use MDI effectively  []  Patient alert and cooperative  []  Patient able to take deep breath with 5-10 second hold  []  Medication(s) available in this delivery method   []  Peak flow greater than or equal to 200 ml/min            Current Order Substituted To  (same drug, same frequency)   Aerosol to MDI [] Albuterol Sulfate 0.083% unit dose by aerosol Albuterol Sulfate MDI 2 puffs by inhalation with spacer    [] Levalbuterol 1.25 mg unit dose by aerosol Levalbuterol MDI 2 puffs by inhalation with spacer    [] Levalbuterol 0.63 mg unit dose by aerosol Levalbuterol MDI 2 puffs by inhalation with spacer    [] Ipratropium Bromide 0.02% unit dose by aerosol Ipratropium Bromide MDI 2 puffs by inhalation with spacer    [] Duoneb (Ipratropium + Albuterol) unit dose by aerosol Ipratropium MDI + Albuterol MDI 2 puffs by inhalation w/spacer   MDI to Aerosol [] Albuterol Sulfate MDI Albuterol Sulfate 0.083% unit dose by aerosol    [] Levalbuterol MDI 2 puffs by inhalation Levalbuterol 1.25 mg unit dose by aerosol    [] Ipratropium Bromide MDI by inhalation Ipratropium Bromide 0.02% unit dose by aerosol    [] Combivent (Ipratropium + Albuterol) MDI by inhalation Duoneb (Ipratropium + Albuterol) unit dose by aerosol   Treatment Assessment [Frequency/Schedule]:  Change frequency to: _________no change_________________________________________per Protocol, P&T, MEC      Points 0 1 2 3 4   Pulmonary Status  Non-Smoker  []   Smoking history   < 20 pack years  []   Smoking history  ?  20 pack years  []   Pulmonary

## 2018-09-17 NOTE — ED PROVIDER NOTES
[address] [facility] ED  eMERGENCY dEPARTMENT eNCOUnter      Pt Name: Devan Mullen  MRN: [id number]  [place] 3/5/1929  Date of evaluation: 9/17/2018  Provider: Jacobo Torres, [address] [place] [address]       Chief Complaint   Patient presents with    Shortness of Breath     and cough since last night         HISTORY OF PRESENT ILLNESS   (Location/Symptom, Timing/Onset, Context/Setting, Quality, Duration, Modifying Factors, Severity)  Note limiting factors. Devan Mullen is a 80 y.o. male who presents to the emergency department with complaint of SOB last night. No fever or chills. Patient has an occasional cough. Patient denies chest pain. On physical exam Lungs are clear bilaterally. HPI    Nursing Notes were reviewed. REVIEW OF SYSTEMS    (2-9 systems for level 4, 10 or more for level 5)     Review of Systems   Constitutional: Positive for fatigue. Negative for activity change, appetite change, chills, fever and unexpected weight change. HENT: Negative for drooling, ear pain, nosebleeds, sinus pressure and trouble swallowing. Respiratory: Positive for shortness of breath. Negative for cough and chest tightness. Cardiovascular: Negative for chest pain and leg swelling. Gastrointestinal: Negative for abdominal pain, diarrhea, nausea and vomiting. Endocrine: Negative for polydipsia and polyphagia. Genitourinary: Negative for dysuria, flank pain and frequency. Musculoskeletal: Negative for back pain and myalgias. Skin: Negative for pallor and rash. Neurological: Negative for syncope, weakness and headaches. Hematological: Does not bruise/bleed easily. All other systems reviewed and are negative. Except as noted above the remainder of the review of systems was reviewed and negative.        PAST MEDICAL HISTORY     Past Medical History:   Diagnosis Date    Anemia     CAD (coronary artery disease) 4/16/2015    DM (diabetes mellitus) ([facility] [address]     Dyslipidemia     History of tobacco abuse     HTN (hypertension)     Hypothyroidism     Non-ST elevation [facility] ([facility]STEMI) [facility] [address]     Pacemaker 4/16/2015         SURGICAL HISTORY       Past Surgical History:   Procedure Laterality Date    CATARACT REMOVAL      MIDDLE EAR SURGERY Left 1998    \"they had to reset the bones\" after an infection         CURRENT MEDICATIONS       Previous Medications    ALBUTEROL (PROVENTIL) (2.5 MG/3ML) 0.083% NEBULIZER SOLUTION    Take 3 mLs by nebulization every 2 hours as needed for Wheezing    AMLODIPINE (NORVASC) 2.5 MG TABLET    Take 2.5 mg by mouth daily    ASPIRIN 81 MG TABLET    Take 81 mg by mouth daily. ATENOLOL (TENORMIN) 25 MG TABLET    TAKE 1 TABLET TWICE A DAY    ATORVASTATIN (LIPITOR) 40 MG TABLET    Take 40 mg by mouth daily    BUDESONIDE-FORMOTEROL (SYMBICORT) 80-4.5 MCG/ACT AERO    Inhale 2 puffs into the lungs 2 times daily. CRANBERRY 1000 MG CAPS    Take by mouth    ESOMEPRAZOLE MAGNESIUM (NEXIUM) 40 MG PACK    Take 40 mg by mouth daily    EXENATIDE (BYETTA) 5 MCG/0.02ML INJECTION    Inject 5 mcg into the skin    IPRATROPIUM-ALBUTEROL (DUONEB) 0.5-2.5 (3) MG/3ML SOLN NEBULIZER SOLUTION    Inhale 3 mLs into the lungs three times daily    METFORMIN (GLUCOPHAGE) 500 MG TABLET    Take 500 mg by mouth daily (with breakfast). NEOMYCIN-BACITRACIN-POLYMYXIN (NEOSPORIN) 400-5-5000 OINTMENT    Apply topically 2 times daily. NITROGLYCERIN (NITROSTAT) 0.4 MG SL TABLET    Place 0.4 mg under the tongue every 5 minutes as needed for Chest pain    RAMIPRIL (ALTACE) 5 MG TABLET    Take 5 mg by mouth daily. SITAGLIPTIN (JANUVIA) 50 MG TABLET    Take 50 mg by mouth daily    SYNTHROID 25 MCG TABLET        URSODIOL (ACTIGALL) 300 MG CAPSULE    Take 300 mg by mouth 2 times daily       ALLERGIES     Patient has no known allergies. FAMILY HISTORY     History reviewed. No pertinent family history. SOCIAL HISTORY       Social History     Social History    Marital status:       Spouse name: N/A   [facility] [phone],  Coag results called to and read back by Alisia Neville, 09/17/2018 12:10, by  [id number] [facility] [address]    Narrative:     Barbara Morin tel. [phone],  Trop results called to and read back by Alisia Neville, 09/17/2018 12:21, by  JACK   CK   HIGH SENSITIVITY CRP   LACTIC ACID, PLASMA   MAGNESIUM   PROTIME-INR    Narrative:     Barbara Morin tel. [phone],  Coag results called to and read back by Alisia Neville, 09/17/2018 12:10, by  [facility] JOSE       All other labs were within normal range or not returned as of this dictation. EMERGENCY DEPARTMENT COURSE and DIFFERENTIAL DIAGNOSIS/MDM:   Vitals:    Vitals:    09/17/18 1036 09/17/18 1306 09/17/18 1413   BP: (!) 147/73  (!) 172/73   Pulse: 83 61 69   Resp: 18 17 16   Temp: 97.9 °F (36.6 °C)     TempSrc: Oral     SpO2: 95% 100% 99%   Weight: 160 lb (72.6 kg)     Height: 5' 7\" (1.702 m)             MDM  Patient had a positive d-dimer and a CAT scan of the chest was ordered. There is no infiltrate nor pulmonary embolus and seen. Patient has elevation of the troponin. Aspirin was ordered for the patient. Dr. Ursula Dent the hospitalist's accepted the patient to his service. CONSULTS:  IP CONSULT TO HOSPITALIST    PROCEDURES:  Unless otherwise noted below, none     Procedures    FINAL IMPRESSION      1. Anginal equivalent ([address]    2. Type 2 diabetes mellitus with hyperglycemia, unspecified whether long term insulin use (HCC)    3. Elevated troponin          DISPOSITION/PLAN   DISPOSITION Decision To Admit 09/17/2018 02:23:40 PM      PATIENT REFERRED TO:  No follow-up provider specified.     DISCHARGE MEDICATIONS:  New Prescriptions    No medications on file          (Please note that portions of this note were completed with a voice recognition program.  Efforts were made to edit the dictations but occasionally words are mis-transcribed.)    Keshawn English DO (electronically signed)  Attending Emergency Physician          Keshawn English DO  09/17/18 1437

## 2018-09-17 NOTE — CONSULTS
vomiting, diarrhea, constipation, motor weakness, insomnia, weight loss, syncope, dizziness, lightheadedness, palpitations, PND, orthopnea, or claudication. Compliant with meds.      4-10-14: as above, doing well overall, doing \"great\", states he's active and indepedant of ADL. Pt denies chest pain, dyspnea, dyspnea on exertion, change in exercise capacity, fatigue,  nausea, vomiting, diarrhea, constipation, motor weakness, insomnia, weight loss, syncope, dizziness, lightheadedness, palpitations, PND, orthopnea, or claudication. Compliant with meds. Pacer check in 1/2014.      8-12-14: as above, s/p hospitalization for PNA. Pt denies chest pain, dyspnea, dyspnea on exertion, change in exercise capacity, fatigue,  nausea, vomiting, diarrhea, constipation, motor weakness, insomnia, weight loss, syncope, dizziness, lightheadedness, palpitations, PND, orthopnea, or claudication. Compliant with meds.       10-16-14: as above, feeling good overall. Pt denies chest pain, dyspnea, dyspnea on exertion, change in exercise capacity, fatigue,  nausea, vomiting, diarrhea, constipation, motor weakness, insomnia, weight loss, syncope, dizziness, lightheadedness, palpitations, PND, orthopnea, or claudication. Does have a slight cough.      4-16-15: as above, Pt denies chest pain, dyspnea, dyspnea on exertion, change in exercise capacity, fatigue,  nausea, vomiting, diarrhea, constipation, motor weakness, insomnia, weight loss, syncope, dizziness, lightheadedness, palpitations, PND, orthopnea, or claudication. Recent PPM check was normal.      10-15-15: as above, doing well, feels good overall. Denies any symptoms. Pt denies chest pain, dyspnea, dyspnea on exertion, change in exercise capacity, fatigue,  nausea, vomiting, diarrhea, constipation, motor weakness, insomnia, weight loss, syncope, dizziness, lightheadedness, palpitations, PND, orthopnea. Does have some foot cramps at night sometimes. No Sl nitro use.  No THICKENING AND SCATTERED PLAQUE. ESTIMATED RIGHT INTERNAL CAROTID ARTERY STENOSIS IS LESS THAN 50% AND ESTIMATED LEFT INTERNAL CAROTID ARTERY STENOSIS IS LESS THAN 50%.       S/p ECHO  Summary   Normal mitral valve structure and function.   Trace (1+) mitral regurgitation is present.   Aortic valve leaflets are mildly thickened.   Normal tricuspid valve structure and function.   Normal pulmonic valve structure and function.   Mildly dilated left atrium.   Left ventricular ejection fraction is visually estimated at 50%.  Impaired relaxation compatible with diastolic dysfunction. ( reversed E/A   ratio)   Normal right atrium.   Normal right ventricle structure and function.   Pacer wire visualized in right ventricle.   Miscellaneous normal findings were found.   No evidence of pericardial effusion.   No evidence of pleural effusion.        5-15-18: Pt denies chest pain, dyspnea, dyspnea on exertion, change in exercise capacity, fatigue,  nausea, vomiting, diarrhea, constipation, motor weakness, insomnia, weight loss, syncope, dizziness, lightheadedness, palpitations, PND, orthopnea, or claudication. No nitro use. BP and hr are good. CAD is stable. No LE discoloration or ulcers. No LE edema. No CHF type symptoms. Lipid profile is normal. No recent hospitalization. No change in meds. Has not had PPm check in a while.      No Known Allergies    Current Facility-Administered Medications   Medication Dose Route Frequency Provider Last Rate Last Dose    sodium chloride flush 0.9 % injection 10 mL  10 mL Intravenous 2 times per day Emma Henderson MD        sodium chloride flush 0.9 % injection 10 mL  10 mL Intravenous PRN Emma Henderson MD        acetaminophen (TYLENOL) tablet 650 mg  650 mg Oral Q4H PRN Emma Henderson MD        magnesium hydroxide (MILK OF MAGNESIA) 400 MG/5ML suspension 30 mL  30 mL Oral Daily PRN Emma Henderson MD        ondansetron [facility]) injection 4 mg  4 mg Intravenous Q6H PRN Jenifer Jessica Ht 5' 7\" (1.702 m)   Wt 160 lb (72.6 kg)   SpO2 99%   BMI 25.06 kg/m²    Physical Exam   Constitutional: He is oriented to person, place, and time. He appears well-developed and well-nourished. HENT:   Head: Normocephalic. Eyes: Pupils are equal, round, and reactive to light. Neck: No JVD present. No tracheal deviation present. No thyromegaly present. Cardiovascular: Normal rate, regular rhythm, normal heart sounds and intact distal pulses. Exam reveals no gallop and no friction rub. No murmur heard. Pulmonary/Chest: Effort normal. No respiratory distress. He has decreased breath sounds. He has wheezes. He has no rales. He exhibits no tenderness. Abdominal: Soft. Bowel sounds are normal.   Musculoskeletal: Normal range of motion. He exhibits no edema or tenderness. Lymphadenopathy:     He has no cervical adenopathy. Neurological: He is alert and oriented to person, place, and time. Skin: Skin is warm and dry. Psychiatric: He has a normal mood and affect.        LABS:  CBC:   Lab Results   Component Value Date    WBC 7.1 09/17/2018    RBC 4.02 09/17/2018    HGB 12.7 09/17/2018    HCT 37.8 09/17/2018    MCV 94.0 09/17/2018    MCH 31.6 09/17/2018    MCHC 33.7 09/17/2018    RDW 13.6 09/17/2018     09/17/2018    MPV 9.0 07/29/2014     CBC with Differential:    Lab Results   Component Value Date    WBC 7.1 09/17/2018    RBC 4.02 09/17/2018    HGB 12.7 09/17/2018    HCT 37.8 09/17/2018     09/17/2018    MCV 94.0 09/17/2018    MCH 31.6 09/17/2018    MCHC 33.7 09/17/2018    RDW 13.6 09/17/2018    BANDSPCT 14 05/18/2016    LYMPHOPCT 18.5 09/17/2018    MONOPCT 11.0 09/17/2018    BASOPCT 0.8 09/17/2018    MONOSABS 0.8 09/17/2018    LYMPHSABS 1.3 09/17/2018    EOSABS 0.2 09/17/2018    BASOSABS 0.1 09/17/2018     CMP:    Lab Results   Component Value Date     09/17/2018    K 4.4 09/17/2018    K 4.9 02/16/2018    CL 99 09/17/2018    CO2 26 09/17/2018    BUN 26 09/17/2018    CREATININE

## 2018-09-17 NOTE — H&P
Exam   Vitals: BP (!) 172/73   Pulse 69   Temp 97.9 °F (36.6 °C) (Oral)   Resp 16   Ht 5' 7\" (1.702 m)   Wt 160 lb (72.6 kg)   SpO2 99%   BMI 25.06 kg/m²   Constitutional: He is oriented to person, place, and time. He appears well-developed and well-nourished. No distress. HENT:   Head: Normocephalic and atraumatic. Head is without Alvarez's sign. Mouth/Throat: Oropharynx is clear and moist. No oropharyngeal exudate. Eyes: Pupils are equal, round, and reactive to light. Conjunctivae and EOM are normal.   Neck: Normal range of motion. Neck supple. Cardiovascular: Normal rate, regular rhythm, normal heart sounds and intact distal pulses. Exam reveals no gallop, no distant heart sounds and no friction rub.   no edema noted on lower extremities. No murmur heard. Pulmonary/Chest: Few crackles noted on the left side. No wheezing, rales or rhonchi. Abdominal: Soft. Bowel sounds are normal. He exhibits no distension, no pulsatile liver, no ascites and no mass. There is no hepatosplenomegaly. There is no tenderness. There is no rebound and no guarding. Musculoskeletal: Normal range of motion. He has no cervical adenopathy. Neurological: He is alert and oriented to person, place, and time. He has normal reflexes. No cranial nerve deficit. Coordination normal.   Skin: Skin is warm and dry. No rash noted. He is not diaphoretic. No erythema. No pallor. Psychiatric: He has a normal mood and affect. His behavior is normal. Judgment and thought content normal.   Nursing note and vitals reviewed.     Recent Labs      09/17/18   1115   WBC  7.1   HGB  12.7*   PLT  157     Recent Labs      09/17/18   1115   NA  138   K  4.4   CL  99   CO2  26   BUN  26*   CREATININE  1.39*   GLUCOSE  225*   AST  19   ALT  19   BILITOT  0.6   ALKPHOS  54     Troponin T:   Recent Labs      09/17/18   1115   TROPONINI  0.039*       ABGs:   Lab Results   Component Value Date    PHART 7.348 12/23/2016    PO2ART 227 12/23/2016

## 2018-09-18 VITALS
SYSTOLIC BLOOD PRESSURE: 176 MMHG | HEART RATE: 61 BPM | TEMPERATURE: 97.4 F | HEIGHT: 67 IN | RESPIRATION RATE: 18 BRPM | OXYGEN SATURATION: 98 % | WEIGHT: 160 LBS | BODY MASS INDEX: 25.11 KG/M2 | DIASTOLIC BLOOD PRESSURE: 63 MMHG

## 2018-09-18 LAB
EKG ATRIAL RATE: 61 BPM
EKG ATRIAL RATE: 62 BPM
EKG P AXIS: 48 DEGREES
EKG P-R INTERVAL: 210 MS
EKG P-R INTERVAL: 226 MS
EKG Q-T INTERVAL: 468 MS
EKG Q-T INTERVAL: 478 MS
EKG QRS DURATION: 146 MS
EKG QRS DURATION: 152 MS
EKG QTC CALCULATION (BAZETT): 475 MS
EKG QTC CALCULATION (BAZETT): 481 MS
EKG R AXIS: -63 DEGREES
EKG R AXIS: -68 DEGREES
EKG T AXIS: -6 DEGREES
EKG T AXIS: 1 DEGREES
EKG VENTRICULAR RATE: 61 BPM
EKG VENTRICULAR RATE: 62 BPM
GLUCOSE BLD-MCNC: 144 MG/DL (ref 60–115)
GLUCOSE BLD-MCNC: 174 MG/DL (ref 60–115)
LV EF: 60 %
LVEF MODALITY: NORMAL
MAGNESIUM: 2 MG/DL (ref 1.7–2.3)
PERFORMED ON: ABNORMAL
PERFORMED ON: ABNORMAL

## 2018-09-18 PROCEDURE — 99213 OFFICE O/P EST LOW 20 MIN: CPT | Performed by: NURSE PRACTITIONER

## 2018-09-18 PROCEDURE — 6370000000 HC RX 637 (ALT 250 FOR IP): Performed by: INTERNAL MEDICINE

## 2018-09-18 PROCEDURE — G0378 HOSPITAL OBSERVATION PER HR: HCPCS

## 2018-09-18 PROCEDURE — 6360000002 HC RX W HCPCS: Performed by: INTERNAL MEDICINE

## 2018-09-18 PROCEDURE — 93306 TTE W/DOPPLER COMPLETE: CPT

## 2018-09-18 PROCEDURE — 96372 THER/PROPH/DIAG INJ SC/IM: CPT

## 2018-09-18 PROCEDURE — 2580000003 HC RX 258: Performed by: INTERNAL MEDICINE

## 2018-09-18 PROCEDURE — 83735 ASSAY OF MAGNESIUM: CPT

## 2018-09-18 PROCEDURE — 93005 ELECTROCARDIOGRAM TRACING: CPT

## 2018-09-18 PROCEDURE — 94640 AIRWAY INHALATION TREATMENT: CPT

## 2018-09-18 PROCEDURE — 36415 COLL VENOUS BLD VENIPUNCTURE: CPT

## 2018-09-18 PROCEDURE — 93280 PM DEVICE PROGR EVAL DUAL: CPT

## 2018-09-18 PROCEDURE — 2700000000 HC OXYGEN THERAPY PER DAY

## 2018-09-18 PROCEDURE — 94760 N-INVAS EAR/PLS OXIMETRY 1: CPT

## 2018-09-18 RX ORDER — AMLODIPINE BESYLATE 5 MG/1
5 TABLET ORAL DAILY
Qty: 30 TABLET | Refills: 3 | Status: ON HOLD | OUTPATIENT
Start: 2018-09-19 | End: 2019-07-09 | Stop reason: HOSPADM

## 2018-09-18 RX ORDER — AMLODIPINE BESYLATE 10 MG/1
10 TABLET ORAL DAILY
Status: DISCONTINUED | OUTPATIENT
Start: 2018-09-19 | End: 2018-09-18

## 2018-09-18 RX ORDER — CARVEDILOL 12.5 MG/1
12.5 TABLET ORAL 2 TIMES DAILY WITH MEALS
Status: DISCONTINUED | OUTPATIENT
Start: 2018-09-18 | End: 2018-09-18 | Stop reason: HOSPADM

## 2018-09-18 RX ORDER — AMLODIPINE BESYLATE 5 MG/1
5 TABLET ORAL DAILY
Status: DISCONTINUED | OUTPATIENT
Start: 2018-09-19 | End: 2018-09-18 | Stop reason: HOSPADM

## 2018-09-18 RX ORDER — AZITHROMYCIN 250 MG/1
250 TABLET, FILM COATED ORAL DAILY
Qty: 3 TABLET | Refills: 0 | Status: SHIPPED | OUTPATIENT
Start: 2018-09-19 | End: 2018-09-22

## 2018-09-18 RX ORDER — BENZONATATE 100 MG/1
100 CAPSULE ORAL 3 TIMES DAILY PRN
Qty: 30 CAPSULE | Refills: 0 | Status: SHIPPED | OUTPATIENT
Start: 2018-09-18 | End: 2018-09-25

## 2018-09-18 RX ORDER — PREDNISONE 10 MG/1
5 TABLET ORAL DAILY
Qty: 2 TABLET | Refills: 0 | Status: SHIPPED | OUTPATIENT
Start: 2018-09-19 | End: 2018-09-23

## 2018-09-18 RX ORDER — CARVEDILOL 12.5 MG/1
12.5 TABLET ORAL 2 TIMES DAILY WITH MEALS
Qty: 60 TABLET | Refills: 3 | Status: SHIPPED | OUTPATIENT
Start: 2018-09-18 | End: 2019-04-15 | Stop reason: SDUPTHER

## 2018-09-18 RX ADMIN — LINAGLIPTIN 5 MG: 5 TABLET, FILM COATED ORAL at 09:46

## 2018-09-18 RX ADMIN — ASPIRIN 81 MG 81 MG: 81 TABLET ORAL at 09:46

## 2018-09-18 RX ADMIN — IPRATROPIUM BROMIDE AND ALBUTEROL SULFATE 3 ML: .5; 3 SOLUTION RESPIRATORY (INHALATION) at 08:17

## 2018-09-18 RX ADMIN — GUAIFENESIN 600 MG: 600 TABLET, EXTENDED RELEASE ORAL at 09:46

## 2018-09-18 RX ADMIN — URSODIOL 300 MG: 300 CAPSULE ORAL at 09:46

## 2018-09-18 RX ADMIN — INSULIN LISPRO 1 UNITS: 100 INJECTION, SOLUTION INTRAVENOUS; SUBCUTANEOUS at 09:50

## 2018-09-18 RX ADMIN — ATORVASTATIN CALCIUM 40 MG: 40 TABLET, FILM COATED ORAL at 09:46

## 2018-09-18 RX ADMIN — ENOXAPARIN SODIUM 40 MG: 40 INJECTION SUBCUTANEOUS at 09:46

## 2018-09-18 RX ADMIN — Medication 2 PUFF: at 08:17

## 2018-09-18 RX ADMIN — AMLODIPINE BESYLATE 2.5 MG: 2.5 TABLET ORAL at 09:46

## 2018-09-18 RX ADMIN — ATENOLOL 25 MG: 25 TABLET ORAL at 09:46

## 2018-09-18 RX ADMIN — AZITHROMYCIN 250 MG: 250 TABLET, FILM COATED ORAL at 09:46

## 2018-09-18 RX ADMIN — PREDNISONE 10 MG: 10 TABLET ORAL at 09:46

## 2018-09-18 RX ADMIN — INSULIN LISPRO 1 UNITS: 100 INJECTION, SOLUTION INTRAVENOUS; SUBCUTANEOUS at 12:33

## 2018-09-18 RX ADMIN — PANTOPRAZOLE SODIUM 40 MG: 40 TABLET, DELAYED RELEASE ORAL at 06:24

## 2018-09-18 RX ADMIN — IPRATROPIUM BROMIDE AND ALBUTEROL SULFATE 3 ML: .5; 3 SOLUTION RESPIRATORY (INHALATION) at 13:27

## 2018-09-18 RX ADMIN — LEVOTHYROXINE SODIUM 25 MCG: 25 TABLET ORAL at 06:24

## 2018-09-18 RX ADMIN — Medication 10 ML: at 09:47

## 2018-09-18 ASSESSMENT — ENCOUNTER SYMPTOMS: COUGH: 1

## 2018-09-18 NOTE — DISCHARGE SUMMARY
Hospital Medicine Discharge Summary    Patient: Errol Cowden     MRN: [id number]  Gender: male  : 3/5/1929   Age: 80 y.o. Code Status: Full Code     Admit Date: 2018   Discharge Date:   2017  Disposition:  Home or Self Care     Primary Care Provider: Marlen Jordan DO    Admitting Physician: Amador Mckenzie MD  Discharge Physician: Amador Mckenzie MD       Discharge Diagnoses:  . Cough and shortness of breath. Acute bronchitis. Detected troponin. no evidence of acute coronary syndrome. Likely secondary to viral illness caused some demand ischemia and probably from his kidney dysfunction. Chronic kidney disease stage 3  . Hypertension. Uncontrolled. hyperlipidemia coronary artery disease status post CABG diabetes mellitus      Hospital Course: The patient was admitted and treated in the hospital for the above medical problems. His breathing improved with prednisone and azithromycin and Tessalon. Cardiology had seen the patient and cleared him for discharge. Blood pressure medications were adjusted per cardiology recommendation. Patient will be discharged in stable medical condition.   He needs to follow-up with his primary care physician      Discharge Medications:   Current Discharge Medication List      START taking these medications    Details   carvedilol (COREG) 12.5 MG tablet Take 1 tablet by mouth 2 times daily (with meals)  Qty: 60 tablet, Refills: 3      azithromycin (ZITHROMAX) 250 MG tablet Take 1 tablet by mouth daily for 3 days  Qty: 3 tablet, Refills: 0      predniSONE (DELTASONE) 10 MG tablet Take 0.5 tablets by mouth daily for 4 days  Qty: 2 tablet, Refills: 0      benzonatate (TESSALON) 100 MG capsule Take 1 capsule by mouth 3 times daily as needed for Cough  Qty: 30 capsule, Refills: 0           Current Discharge Medication List      CONTINUE these medications which have CHANGED    Details   amLODIPine (NORVASC) 5 MG tablet Take 1 tablet by mouth daily  Qty: 30 tablet, Refills: 3           Current Discharge Medication List      CONTINUE these medications which have NOT CHANGED    Details   albuterol (PROVENTIL) (2.5 MG/3ML) 0.083% nebulizer solution Take 3 mLs by nebulization every 2 hours as needed for Wheezing  Qty: 100 each, Refills: 0    Associated Diagnoses: COPD with acute exacerbation ([facility] [address]      ipratropium-albuterol (DUONEB) 0.5-2.5 (3) MG/3ML SOLN nebulizer solution Inhale 3 mLs into the lungs three times daily  Qty: 100 mL, Refills: 0    Associated Diagnoses: COPD with acute exacerbation ([facility])      atorvastatin (LIPITOR) 40 MG tablet Take 40 mg by mouth daily      ursodiol (ACTIGALL) 300 MG capsule Take 300 mg by mouth 2 times daily      sitaGLIPtin (JANUVIA) 50 MG tablet Take 50 mg by mouth daily      esomeprazole Magnesium (NEXIUM) 40 MG PACK Take 40 mg by mouth daily      Cranberry 1000 MG CAPS Take by mouth      nitroGLYCERIN (NITROSTAT) 0.4 MG SL tablet Place 0.4 mg under the tongue every 5 minutes as needed for Chest pain      aspirin 81 MG tablet Take 81 mg by mouth daily. SYNTHROID 25 MCG tablet       metFORMIN (GLUCOPHAGE) 500 MG tablet Take 500 mg by mouth daily (with breakfast). ramipril (ALTACE) 5 MG tablet Take 5 mg by mouth daily. budesonide-formoterol (SYMBICORT) 80-4.5 MCG/ACT AERO Inhale 2 puffs into the lungs 2 times daily. neomycin-bacitracin-polymyxin (NEOSPORIN) 400-5-5000 ointment Apply topically 2 times daily.   Qty: 28.35 g, Refills: 2      exenatide (BYETTA) 5 MCG/0.02ML injection Inject 5 mcg into the skin           Current Discharge Medication List      STOP taking these medications       atenolol (TENORMIN) 25 MG tablet Comments:   Reason for Stopping:                 Exam:   BP (!) 176/63   Pulse 61   Temp 97.4 °F (36.3 °C) (Axillary)   Resp 18   Ht 5' 7\" (1.702 m)   Wt 160 lb (72.6 kg)   SpO2 98%   BMI 25.06 kg/m²         Significant Test Results   Radiology:  Xr Chest Standard (2 Vw)    Result Date: this facility use dose modulation, iterative reconstruction, and/or weight based dosing when appropriate to reduce radiation dose to as low as reasonably achievable. Consults:     IP CONSULT TO HOSPITALIST  IP CONSULT TO CARDIOLOGY    Labs: For convenience and continuity at follow-up the following most recent labs are provided:    Lab Results   Component Value Date    WBC 7.1 09/17/2018    HGB 12.7 09/17/2018    HCT 37.8 09/17/2018    MCV 94.0 09/17/2018     09/17/2018     09/17/2018    K 4.4 09/17/2018    K 4.9 02/16/2018    CL 99 09/17/2018    CO2 26 09/17/2018    BUN 26 09/17/2018    CREATININE 1.39 09/17/2018    CALCIUM 8.7 09/17/2018    PHOS 3.3 12/08/2013    ALKPHOS 54 09/17/2018    ALT 19 09/17/2018    AST 19 09/17/2018    BILITOT 0.6 09/17/2018    LABALBU 3.6 09/17/2018    LDLCALC 103 09/17/2018    TRIG 194 09/17/2018     Lab Results   Component Value Date    INR 1.0 09/17/2018    INR 1.0 12/23/2016    INR 1.0 05/18/2016       The patient was seen and examined on day of discharge and this discharge summary is in conjunction with any daily progress note from day of discharge. Time spent on discharge is more than 20 minutes in the examination, evaluation, counseling and review of medications and discharge plan. Signed: Gerardo Pollard MD   9/18/2018      Thank you Khris Ramirez DO for the opportunity to be involved in this patient's care.  If you have any questions or concerns please feel free to contact

## 2018-09-18 NOTE — FLOWSHEET NOTE
1450- Saline lock removed, telemetry removed and returned to [facility].Discharge instructions given. 36- Discharged via wheelchair with belongings, RX and instructions.

## 2018-09-18 NOTE — CONSULTS
% 18.5 %    Monocytes % 11.0 %    Eosinophils % 2.7 %    Basophils % 0.8 %    Neutrophils # 4.7 1.4 - 6.5 K/uL    Lymphocytes # 1.3 1.0 - 4.8 K/uL    Monocytes # 0.8 0.2 - 0.8 K/uL    Eosinophils # 0.2 0.0 - 0.7 K/uL    Basophils # 0.1 0.0 - 0.2 K/uL   CK    Collection Time: 09/17/18 11:15 AM   Result Value Ref Range    Total  0 - 190 U/L   Comprehensive Metabolic Panel    Collection Time: 09/17/18 11:15 AM   Result Value Ref Range    Sodium 138 132 - 144 mEq/L    Potassium 4.4 3.5 - 5.1 mEq/L    Chloride 99 98 - 107 mEq/L    CO2 26 22 - 29 mEq/L    Anion Gap 13 7 - 13 mEq/L    Glucose 225 (H) 74 - 109 mg/dL    BUN 26 (H) 8 - 23 mg/dL    CREATININE 1.39 (H) 0.70 - 1.20 mg/dL    GFR Non- 48.1 (L) >60    GFR  58.1 (L) >60    Calcium 8.7 8.6 - 10.2 mg/dL    Total Protein 6.2 (L) 6.4 - 8.1 g/dL    Alb 3.6 (L) 3.9 - 4.9 g/dL    Total Bilirubin 0.6 0.0 - 1.2 mg/dL    Alkaline Phosphatase 54 35 - 104 U/L    ALT 19 0 - 41 U/L    AST 19 0 - 40 U/L    Globulin 2.6 2.3 - 3.5 g/dL   Lactic Acid, Plasma    Collection Time: 09/17/18 11:15 AM   Result Value Ref Range    Lactic Acid 1.4 0.5 - 2.2 mmol/L   Lipid Panel    Collection Time: 09/17/18 11:15 AM   Result Value Ref Range    Cholesterol, Total 177 0 - 199 mg/dL    Triglycerides 194 0 - 200 mg/dL    HDL 35 (L) 40 - 59 mg/dL    LDL Calculated 103 0 - 129 mg/dL   Magnesium    Collection Time: 09/17/18 11:15 AM   Result Value Ref Range    Magnesium 1.9 1.7 - 2.3 mg/dL   Protime-INR    Collection Time: 09/17/18 11:15 AM   Result Value Ref Range    Protime 10.5 9.6 - 12.3 sec    INR 1.0    Troponin    Collection Time: 09/17/18 11:15 AM   Result Value Ref Range    Troponin 0.039 (HH) 0.000 - 0.010 ng/mL   TSH without Reflex    Collection Time: 09/17/18 11:15 AM   Result Value Ref Range    TSH 4.610 (H) 0.270 - 4.200 uIU/mL   Urinalysis Reflex to Culture    Collection Time: 09/17/18 11:15 AM   Result Value Ref Range    Color, UA Yellow LDL-C management. 2. Cons. Med rx  3. No plan for any invasive workup  4. Check 2D Echo for LV function, PA pressures, wall motion abnormalities and any significant valvular disease. 5. Monitor on tele  6. GI/DVT proph  7. No nephrotoxic agents. Will hold ACEI for now.        Electronically signed by Esteban Posada DO on 9/17/2018 at 10:31 PM

## 2018-09-18 NOTE — FLOWSHEET NOTE
1391- Assessment done patient in bed resting, no complaints offered. Does have productive cough of thick yellow tinged sputum.

## 2018-09-18 NOTE — PROGRESS NOTES
Progress Note  Patient: Roxi Core  Unit/Bed: V691/K654-99  YOB: 1929  MRN: [id number]  Acct: [de-identified]   Admitting Diagnosis: SOB (shortness of breath) [R06.02]  Admit Date:  9/17/2018  Hospital Day: 0    Chief Complaint:  SOB    Subjective  9/18.18  Patient awake and alert  Vital signs BP slightly elevated  Tele paced    89y male presented to the emergency department drove himself to ER complaining of shortness of breath, cough, congestion past 2 days. Has history of copd, hx cabg 2013 lima-LAD and SVG-PDA, htn, dyslipidemia, DM, pacemaker. He states felt cough and was concerned he was getting pneumonia. No chest pain at this time able to speak full sentences. EKG paced  Slight elevation in trop secondary renal insuff     Patient presents for follow up medical evaluation. Patient is followed on a regular basis by Dr. Keke Patel DO. S/p hospitalization for back pain/sciatica type of pain and was noted to have a mildly elevated Troponin. Patient completely denied any CP, SOB or diaphoresis, or N/V. He did have mildly elevated BP upon presentation to ER. Had a stressful evening with daughter the night before. He did have a normal stress test earlier this year at [facility]. Denies any hx of cardiac cathPt denies chest pain, dyspnea, dyspnea on exertion, change in exercise capacity, fatigue, nausea, vomiting, diarrhea, constipation, motor weakness, insomnia, weight loss, syncope, dizziness, lightheadedness, palpitations, PND, orthopnea, or claudication.    12--2-13: as above, s/p abnormal cardiac CTA, could not exclude significant stenosis of LAD or RCA due to heavy calcification. Patient did have an episode left sided CP yesterday.     12-30-13; as above, cadiac cath with severe RCA and LAD stenosis, s/p CABGx2 with LIMA to LAD and SVG to PDA. Patient with post op bradycardia/arrhythmia needing to have a dual chamber PPM. Doing well overall, living at home now.  Pt denies chest pain, dyspnea, dyspnea waveforms.        Review of Systems:   Review of Systems   Constitutional: Positive for fatigue. Respiratory: Positive for cough. Physical Examination:    BP (!) 176/63   Pulse 61   Temp 97.4 °F (36.3 °C) (Axillary)   Resp 18   Ht 5' 7\" (1.702 m)   Wt 160 lb (72.6 kg)   SpO2 98%   BMI 25.06 kg/m²    Physical Exam   Constitutional: He is oriented to person, place, and time. He appears well-developed and well-nourished. HENT:   Head: Normocephalic. Eyes: Pupils are equal, round, and reactive to light. Neck: No JVD present. No tracheal deviation present. No thyromegaly present. Cardiovascular: Normal rate, regular rhythm, normal heart sounds and intact distal pulses. Exam reveals no gallop and no friction rub. No murmur heard. Pulmonary/Chest: Effort normal. No respiratory distress. He has decreased breath sounds. He has no wheezes. He has no rales. He exhibits no tenderness. Abdominal: Soft. Bowel sounds are normal.   Musculoskeletal: Normal range of motion. He exhibits no edema or tenderness. Lymphadenopathy:     He has no cervical adenopathy. Neurological: He is alert and oriented to person, place, and time. Skin: Skin is warm and dry. Psychiatric: He has a normal mood and affect.        LABS:  CBC:   Lab Results   Component Value Date    WBC 7.1 09/17/2018    RBC 4.02 09/17/2018    HGB 12.7 09/17/2018    HCT 37.8 09/17/2018    MCV 94.0 09/17/2018    MCH 31.6 09/17/2018    MCHC 33.7 09/17/2018    RDW 13.6 09/17/2018     09/17/2018    MPV 9.0 07/29/2014     CBC with Differential:    Lab Results   Component Value Date    WBC 7.1 09/17/2018    RBC 4.02 09/17/2018    HGB 12.7 09/17/2018    HCT 37.8 09/17/2018     09/17/2018    MCV 94.0 09/17/2018    MCH 31.6 09/17/2018    MCHC 33.7 09/17/2018    RDW 13.6 09/17/2018    BANDSPCT 14 05/18/2016    LYMPHOPCT 18.5 09/17/2018    MONOPCT 11.0 09/17/2018    BASOPCT 0.8 09/17/2018    MONOSABS 0.8 09/17/2018    LYMPHSABS 1.3

## 2018-09-19 PROCEDURE — 93010 ELECTROCARDIOGRAM REPORT: CPT | Performed by: INTERNAL MEDICINE

## 2018-09-22 LAB
BLOOD CULTURE, ROUTINE: NORMAL
CULTURE, BLOOD 2: NORMAL

## 2018-12-09 ENCOUNTER — HOSPITAL ENCOUNTER (EMERGENCY)
Age: 83
Discharge: HOME | End: 2018-12-09
Payer: MEDICARE

## 2018-12-09 VITALS
HEIGHT: 67 IN | DIASTOLIC BLOOD PRESSURE: 68 MMHG | HEART RATE: 76 BPM | BODY MASS INDEX: 25.9 KG/M2 | SYSTOLIC BLOOD PRESSURE: 120 MMHG | RESPIRATION RATE: 18 BRPM | WEIGHT: 165 LBS | OXYGEN SATURATION: 98 % | TEMPERATURE: 98 F

## 2018-12-09 DIAGNOSIS — E11.65 TYPE 2 DIABETES MELLITUS WITH HYPERGLYCEMIA, UNSPECIFIED WHETHER LONG TERM INSULIN USE (HCC): ICD-10-CM

## 2018-12-09 DIAGNOSIS — L30.9 DERMATITIS: Primary | ICD-10-CM

## 2018-12-09 LAB
CHP ED QC CHECK: YES
GLUCOSE BLD-MCNC: 190 MG/DL
GLUCOSE BLD-MCNC: 191 MG/DL (ref 60–115)
PERFORMED ON: ABNORMAL

## 2018-12-09 PROCEDURE — 99282 EMERGENCY DEPT VISIT SF MDM: CPT

## 2018-12-09 RX ORDER — MOMETASONE FUROATE 1 MG/G
CREAM TOPICAL
Qty: 1 TUBE | Refills: 0 | Status: ON HOLD | OUTPATIENT
Start: 2018-12-09 | End: 2019-07-09 | Stop reason: HOSPADM

## 2018-12-09 RX ORDER — FEXOFENADINE HCL 180 MG/1
180 TABLET ORAL DAILY
Qty: 10 TABLET | Refills: 0 | Status: SHIPPED | OUTPATIENT
Start: 2018-12-09 | End: 2019-01-08

## 2018-12-09 ASSESSMENT — ENCOUNTER SYMPTOMS
PHOTOPHOBIA: 0
BACK PAIN: 0
ANAL BLEEDING: 0
SHORTNESS OF BREATH: 0
VOICE CHANGE: 0
EYE DISCHARGE: 0
APNEA: 0
COUGH: 0
ABDOMINAL DISTENTION: 0

## 2018-12-09 NOTE — ED PROVIDER NOTES
negative. Except as noted above the remainder of the review of systems was reviewed and negative. PAST MEDICAL HISTORY     Past Medical History:   Diagnosis Date    Anemia     CAD (coronary artery disease) 4/16/2015    [facility]di[facility] mellitus) [facility] [address]     Dyslipidemia     History of tobacco abuse     HTN (hypertension)     Hypothyroidism     Non-ST elevation [facility]     Pacemaker 4/16/2015         SURGICALHISTORY       Past Surgical History:   Procedure Laterality Date    CATARACT REMOVAL      MIDDLE EAR SURGERY Left 1998    \"they had to reset the bones\" after an infection         CURRENT MEDICATIONS       Previous Medications    ALBUTEROL (PROVENTIL) (2.5 MG/3ML) 0.083% NEBULIZER SOLUTION    Take 3 mLs by nebulization every 2 hours as needed for Wheezing    AMLODIPINE (NORVASC) 5 MG TABLET    Take 1 tablet by mouth daily    ASPIRIN 81 MG TABLET    Take 81 mg by mouth daily. ATORVASTATIN (LIPITOR) 40 MG TABLET    Take 40 mg by mouth daily    BUDESONIDE-FORMOTEROL (SYMBICORT) 80-4.5 MCG/ACT AERO    Inhale 2 puffs into the lungs 2 times daily. CARVEDILOL (COREG) 12.5 MG TABLET    Take 1 tablet by mouth 2 times daily (with meals)    CRANBERRY 1000 MG CAPS    Take by mouth    ESOMEPRAZOLE MAGNESIUM (NEXIUM) 40 MG PACK    Take 40 mg by mouth daily    EXENATIDE (BYETTA) 5 MCG/0.02ML INJECTION    Inject 5 mcg into the skin    IPRATROPIUM-ALBUTEROL (DUONEB) 0.5-2.5 (3) MG/3ML SOLN NEBULIZER SOLUTION    Inhale 3 mLs into the lungs three times daily    METFORMIN (GLUCOPHAGE) 500 MG TABLET    Take 500 mg by mouth daily (with breakfast). NEOMYCIN-BACITRACIN-POLYMYXIN (NEOSPORIN) 400-5-5000 OINTMENT    Apply topically 2 times daily. NITROGLYCERIN (NITROSTAT) 0.4 MG SL TABLET    Place 0.4 mg under the tongue every 5 minutes as needed for Chest pain    RAMIPRIL (ALTACE) 5 MG TABLET    Take 5 mg by mouth daily.     SITAGLIPTIN (JANUVIA) 50 MG TABLET    Take 50 mg by mouth daily    SYNTHROID 25 MCG TABLET        URSODIOL (ACTIGALL) 300 MG CAPSULE    Take 300 mg by mouth 2 times daily       ALLERGIES     Patient has no known allergies. FAMILY HISTORY     No family history on file. SOCIAL HISTORY       Social History     Social History    Marital status:      Spouse name: N/A    Number of children: N/A    Years of education: N/A     Social History Main Topics    Smoking status: Former Smoker    Smokeless tobacco: Never Used    Alcohol use No    Drug use: No    Sexual activity: Not on file     Other Topics Concern    Not on file     Social History Narrative    No narrative on file       SCREENINGS      @FLOW([id number])@      PHYSICAL EXAM    (up to 7 for level 4, 8 or more for level 5)     ED Triage Vitals [12/09/18 1747]   BP Temp Temp Source Pulse Resp SpO2 Height Weight   120/68 98 °F (36.7 °C) Temporal 76 18 98 % 5' 6.5\" (1.689 m) 165 lb (74.8 kg)       Physical Exam   Constitutional: He is oriented to person, place, and time. He appears well-developed and well-nourished. No distress. HENT:   Head: Normocephalic and atraumatic. Mouth/Throat: Oropharynx is clear and moist.   Eyes: Pupils are equal, round, and reactive to light. Right eye exhibits no discharge. Left eye exhibits no discharge. Neck: Normal range of motion. Neck supple. Cardiovascular: Normal rate, regular rhythm, normal heart sounds and intact distal pulses. Pulmonary/Chest: Effort normal and breath sounds normal. No stridor. No respiratory distress. He has no wheezes. He has no rales. Abdominal: Soft. Bowel sounds are normal. He exhibits no distension. There is no tenderness. Musculoskeletal: Normal range of motion. He exhibits no edema or tenderness. Neurological: He is alert and oriented to person, place, and time. Skin: Skin is warm. Rash noted. raised rash noted left leg greater than right    Psychiatric: He has a normal mood and affect. Nursing note and vitals reviewed.       DIAGNOSTIC RESULTS     EKG: All EKG's are interpreted by the Emergency Department Physician who either signs or Co-signsthis chart in the absence of a cardiologist.         RADIOLOGY:   [place] such as CT, Ultrasound and MRI are read by the radiologist. Plain radiographic images are visualized and preliminarily interpreted by the emergency physician with the below findings:         Interpretation per the Radiologist below, if available at the time ofthis note:    No orders to display         ED BEDSIDE ULTRASOUND:   Performed by ED Physician - none    LABS:  Labs Reviewed   POCT GLUCOSE   POCT GLUCOSE       All other labs were within normal range or not returned as of this dictation. EMERGENCY DEPARTMENT COURSE and DIFFERENTIAL DIAGNOSIS/MDM:   Vitals:    Vitals:    12/09/18 1747   BP: 120/68   Pulse: 76   Resp: 18   Temp: 98 °F (36.7 °C)   TempSrc: Temporal   SpO2: 98%   Weight: 165 lb (74.8 kg)   Height: 5' 6.5\" (1.689 m)            MDM  Number of Diagnoses or Management Options  Diagnosis management comments: The patient regarding medications use topical medications and antihistamines since he has diabetes. We discussed follow-up with primary care return to ER for any symptoms worsen his symptoms develop. Amount and/or Complexity of Data Reviewed  Clinical lab tests: ordered and reviewed        CRITICAL CARE TIME     CONSULTS:  None    PROCEDURES:  Unless otherwise noted below, none     Procedures    FINAL IMPRESSION      1. Dermatitis    2.  Type 2 diabetes mellitus with hyperglycemia, unspecified whether long term insulin use [facility])          DISPOSITION/PLAN   DISPOSITION Decision To Discharge 12/09/2018 05:59:42 PM      PATIENT REFERRED TO:  Yeni James DO  [address] [place] [address] [id number]    Schedule an appointment as soon as possible for a visit in 2 days      [facility]) ED  [address] [address]  [phone]    If symptoms worsen      DISCHARGE

## 2018-12-29 ENCOUNTER — HOSPITAL ENCOUNTER (EMERGENCY)
Age: 83
Discharge: HOME | End: 2018-12-29
Attending: EMERGENCY MEDICINE
Payer: MEDICARE

## 2018-12-29 ENCOUNTER — APPOINTMENT (OUTPATIENT)
Dept: GENERAL RADIOLOGY | Age: 83
End: 2018-12-29
Payer: MEDICARE

## 2018-12-29 VITALS
OXYGEN SATURATION: 93 % | TEMPERATURE: 97.4 F | RESPIRATION RATE: 17 BRPM | HEART RATE: 65 BPM | HEIGHT: 66 IN | DIASTOLIC BLOOD PRESSURE: 57 MMHG | SYSTOLIC BLOOD PRESSURE: 177 MMHG | WEIGHT: 165 LBS | BODY MASS INDEX: 26.52 KG/M2

## 2018-12-29 DIAGNOSIS — J20.9 ACUTE BRONCHITIS, UNSPECIFIED ORGANISM: Primary | ICD-10-CM

## 2018-12-29 DIAGNOSIS — J44.1 COPD EXACERBATION (HCC): ICD-10-CM

## 2018-12-29 LAB
ALBUMIN SERPL-MCNC: 4.1 G/DL (ref 3.9–4.9)
ALP BLD-CCNC: 56 U/L (ref 35–104)
ALT SERPL-CCNC: 20 U/L (ref 0–41)
ANION GAP SERPL CALCULATED.3IONS-SCNC: 11 MEQ/L (ref 7–13)
AST SERPL-CCNC: 17 U/L (ref 0–40)
BASOPHILS ABSOLUTE: 0.1 K/UL (ref 0–0.2)
BASOPHILS RELATIVE PERCENT: 1 %
BILIRUB SERPL-MCNC: 0.6 MG/DL (ref 0–1.2)
BUN BLDV-MCNC: 22 MG/DL (ref 8–23)
CALCIUM SERPL-MCNC: 9.2 MG/DL (ref 8.6–10.2)
CHLORIDE BLD-SCNC: 100 MEQ/L (ref 98–107)
CO2: 28 MEQ/L (ref 22–29)
CREAT SERPL-MCNC: 1.36 MG/DL (ref 0.7–1.2)
EKG ATRIAL RATE: 65 BPM
EKG P AXIS: 100 DEGREES
EKG P-R INTERVAL: 214 MS
EKG Q-T INTERVAL: 438 MS
EKG QRS DURATION: 140 MS
EKG QTC CALCULATION (BAZETT): 455 MS
EKG R AXIS: -62 DEGREES
EKG T AXIS: -7 DEGREES
EKG VENTRICULAR RATE: 65 BPM
EOSINOPHILS ABSOLUTE: 0.3 K/UL (ref 0–0.7)
EOSINOPHILS RELATIVE PERCENT: 3.8 %
GFR AFRICAN AMERICAN: 59.6
GFR NON-AFRICAN AMERICAN: 49.2
GLOBULIN: 3.3 G/DL (ref 2.3–3.5)
GLUCOSE BLD-MCNC: 166 MG/DL (ref 74–109)
HCT VFR BLD CALC: 40.1 % (ref 42–52)
HEMOGLOBIN: 13.4 G/DL (ref 14–18)
LACTIC ACID: 1 MMOL/L (ref 0.5–2.2)
LYMPHOCYTES ABSOLUTE: 2.4 K/UL (ref 1–4.8)
LYMPHOCYTES RELATIVE PERCENT: 31.5 %
MCH RBC QN AUTO: 32.1 PG (ref 27–31.3)
MCHC RBC AUTO-ENTMCNC: 33.5 % (ref 33–37)
MCV RBC AUTO: 95.8 FL (ref 80–100)
MONOCYTES ABSOLUTE: 0.7 K/UL (ref 0.2–0.8)
MONOCYTES RELATIVE PERCENT: 9.6 %
NEUTROPHILS ABSOLUTE: 4.1 K/UL (ref 1.4–6.5)
NEUTROPHILS RELATIVE PERCENT: 54.1 %
PDW BLD-RTO: 13.1 % (ref 11.5–14.5)
PLATELET # BLD: 188 K/UL (ref 130–400)
POTASSIUM SERPL-SCNC: 4.2 MEQ/L (ref 3.5–5.1)
PROCALCITONIN: 0.03 NG/ML (ref 0–0.15)
RAPID INFLUENZA  B AGN: NEGATIVE
RAPID INFLUENZA A AGN: NEGATIVE
RBC # BLD: 4.18 M/UL (ref 4.7–6.1)
SODIUM BLD-SCNC: 139 MEQ/L (ref 132–144)
TOTAL PROTEIN: 7.4 G/DL (ref 6.4–8.1)
TROPONIN: 0.04 NG/ML (ref 0–0.01)
WBC # BLD: 7.6 K/UL (ref 4.8–10.8)

## 2018-12-29 PROCEDURE — 96375 TX/PRO/DX INJ NEW DRUG ADDON: CPT

## 2018-12-29 PROCEDURE — 99285 EMERGENCY DEPT VISIT HI MDM: CPT

## 2018-12-29 PROCEDURE — 85025 COMPLETE CBC W/AUTO DIFF WBC: CPT

## 2018-12-29 PROCEDURE — 83605 ASSAY OF LACTIC ACID: CPT

## 2018-12-29 PROCEDURE — 2580000003 HC RX 258: Performed by: EMERGENCY MEDICINE

## 2018-12-29 PROCEDURE — 93005 ELECTROCARDIOGRAM TRACING: CPT

## 2018-12-29 PROCEDURE — 96365 THER/PROPH/DIAG IV INF INIT: CPT

## 2018-12-29 PROCEDURE — 84145 PROCALCITONIN (PCT): CPT

## 2018-12-29 PROCEDURE — 6360000002 HC RX W HCPCS: Performed by: EMERGENCY MEDICINE

## 2018-12-29 PROCEDURE — 87804 INFLUENZA ASSAY W/OPTIC: CPT

## 2018-12-29 PROCEDURE — 94760 N-INVAS EAR/PLS OXIMETRY 1: CPT

## 2018-12-29 PROCEDURE — 84484 ASSAY OF TROPONIN QUANT: CPT

## 2018-12-29 PROCEDURE — 80053 COMPREHEN METABOLIC PANEL: CPT

## 2018-12-29 PROCEDURE — 87040 BLOOD CULTURE FOR BACTERIA: CPT

## 2018-12-29 PROCEDURE — 36415 COLL VENOUS BLD VENIPUNCTURE: CPT

## 2018-12-29 PROCEDURE — 71045 X-RAY EXAM CHEST 1 VIEW: CPT

## 2018-12-29 PROCEDURE — 94640 AIRWAY INHALATION TREATMENT: CPT

## 2018-12-29 RX ORDER — AZITHROMYCIN 250 MG/1
TABLET, FILM COATED ORAL
Qty: 1 PACKET | Refills: 0 | Status: SHIPPED | OUTPATIENT
Start: 2018-12-29 | End: 2019-04-17 | Stop reason: ALTCHOICE

## 2018-12-29 RX ORDER — SODIUM CHLORIDE 0.9 % (FLUSH) 0.9 %
3 SYRINGE (ML) INJECTION EVERY 8 HOURS
Status: DISCONTINUED | OUTPATIENT
Start: 2018-12-29 | End: 2018-12-30 | Stop reason: HOSPADM

## 2018-12-29 RX ORDER — 0.9 % SODIUM CHLORIDE 0.9 %
500 INTRAVENOUS SOLUTION INTRAVENOUS ONCE
Status: COMPLETED | OUTPATIENT
Start: 2018-12-29 | End: 2018-12-29

## 2018-12-29 RX ORDER — METHYLPREDNISOLONE SODIUM SUCCINATE 125 MG/2ML
125 INJECTION, POWDER, LYOPHILIZED, FOR SOLUTION INTRAMUSCULAR; INTRAVENOUS ONCE
Status: COMPLETED | OUTPATIENT
Start: 2018-12-29 | End: 2018-12-29

## 2018-12-29 RX ORDER — ALBUTEROL SULFATE 90 UG/1
2 AEROSOL, METERED RESPIRATORY (INHALATION) EVERY 6 HOURS PRN
Qty: 1 INHALER | Refills: 3 | Status: SHIPPED | OUTPATIENT
Start: 2018-12-29

## 2018-12-29 RX ORDER — LEVALBUTEROL 1.25 MG/.5ML
1.25 SOLUTION, CONCENTRATE RESPIRATORY (INHALATION)
Status: DISCONTINUED | OUTPATIENT
Start: 2018-12-29 | End: 2018-12-30 | Stop reason: HOSPADM

## 2018-12-29 RX ORDER — METHYLPREDNISOLONE 4 MG/1
TABLET ORAL
Qty: 1 KIT | Refills: 0 | Status: ON HOLD | OUTPATIENT
Start: 2018-12-29 | End: 2019-07-06

## 2018-12-29 RX ADMIN — METHYLPREDNISOLONE SODIUM SUCCINATE 125 MG: 125 INJECTION, POWDER, FOR SOLUTION INTRAMUSCULAR; INTRAVENOUS at 21:30

## 2018-12-29 RX ADMIN — SODIUM CHLORIDE 500 ML: 9 INJECTION, SOLUTION INTRAVENOUS at 21:34

## 2018-12-29 RX ADMIN — AZITHROMYCIN MONOHYDRATE 500 MG: 500 INJECTION, POWDER, LYOPHILIZED, FOR SOLUTION INTRAVENOUS at 21:37

## 2018-12-29 RX ADMIN — CEFTRIAXONE SODIUM 1 G: 1 INJECTION, POWDER, FOR SOLUTION INTRAMUSCULAR; INTRAVENOUS at 21:30

## 2018-12-29 RX ADMIN — LEVALBUTEROL 1.25 MG: 1.25 SOLUTION, CONCENTRATE RESPIRATORY (INHALATION) at 20:51

## 2018-12-31 PROCEDURE — 93010 ELECTROCARDIOGRAM REPORT: CPT | Performed by: INTERNAL MEDICINE

## 2019-01-04 LAB
BLOOD CULTURE, ROUTINE: NORMAL
CULTURE, BLOOD 2: NORMAL

## 2019-02-10 ENCOUNTER — HOSPITAL ENCOUNTER (EMERGENCY)
Age: 84
Discharge: HOME | End: 2019-02-10
Payer: MEDICARE

## 2019-02-10 VITALS
HEART RATE: 78 BPM | TEMPERATURE: 97.6 F | DIASTOLIC BLOOD PRESSURE: 98 MMHG | OXYGEN SATURATION: 97 % | BODY MASS INDEX: 25.9 KG/M2 | RESPIRATION RATE: 18 BRPM | SYSTOLIC BLOOD PRESSURE: 161 MMHG | HEIGHT: 67 IN | WEIGHT: 165 LBS

## 2019-02-10 DIAGNOSIS — L30.9 DERMATITIS: Primary | ICD-10-CM

## 2019-02-10 PROCEDURE — 99282 EMERGENCY DEPT VISIT SF MDM: CPT

## 2019-02-10 RX ORDER — DIAPER,BRIEF,INFANT-TODD,DISP
EACH MISCELLANEOUS
Qty: 1 TUBE | Refills: 1 | Status: SHIPPED | OUTPATIENT
Start: 2019-02-10 | End: 2019-02-17

## 2019-02-10 RX ORDER — DIPHENHYDRAMINE HCL 25 MG
25 CAPSULE ORAL EVERY 4 HOURS PRN
Qty: 1 CAPSULE | Refills: 0 | Status: SHIPPED | OUTPATIENT
Start: 2019-02-10 | End: 2019-02-20

## 2019-02-10 ASSESSMENT — ENCOUNTER SYMPTOMS
COUGH: 0
SHORTNESS OF BREATH: 0
ABDOMINAL PAIN: 0
BACK PAIN: 0

## 2019-04-15 RX ORDER — CARVEDILOL 12.5 MG/1
12.5 TABLET ORAL 2 TIMES DAILY WITH MEALS
Qty: 60 TABLET | Refills: 5 | Status: SHIPPED | OUTPATIENT
Start: 2019-04-15 | End: 2019-04-15 | Stop reason: SDUPTHER

## 2019-04-16 RX ORDER — CARVEDILOL 12.5 MG/1
TABLET ORAL
Qty: 180 TABLET | Refills: 2 | Status: ON HOLD | OUTPATIENT
Start: 2019-04-16 | End: 2019-07-09 | Stop reason: SINTOL

## 2019-04-17 ENCOUNTER — HOSPITAL ENCOUNTER (EMERGENCY)
Age: 84
Discharge: HOME | End: 2019-04-17
Payer: MEDICARE

## 2019-04-17 ENCOUNTER — APPOINTMENT (OUTPATIENT)
Dept: GENERAL RADIOLOGY | Age: 84
End: 2019-04-17
Payer: MEDICARE

## 2019-04-17 VITALS
WEIGHT: 165 LBS | BODY MASS INDEX: 26.52 KG/M2 | RESPIRATION RATE: 16 BRPM | HEART RATE: 61 BPM | TEMPERATURE: 97.6 F | SYSTOLIC BLOOD PRESSURE: 151 MMHG | HEIGHT: 66 IN | DIASTOLIC BLOOD PRESSURE: 64 MMHG | OXYGEN SATURATION: 96 %

## 2019-04-17 DIAGNOSIS — J44.1 COPD EXACERBATION (HCC): Primary | ICD-10-CM

## 2019-04-17 LAB
ALBUMIN SERPL-MCNC: 4.3 G/DL (ref 3.5–4.6)
ALP BLD-CCNC: 55 U/L (ref 35–104)
ALT SERPL-CCNC: 17 U/L (ref 0–41)
ANION GAP SERPL CALCULATED.3IONS-SCNC: 12 MEQ/L (ref 9–15)
AST SERPL-CCNC: 22 U/L (ref 0–40)
BASOPHILS ABSOLUTE: 0.1 K/UL (ref 0–0.2)
BASOPHILS RELATIVE PERCENT: 0.9 %
BILIRUB SERPL-MCNC: 0.4 MG/DL (ref 0.2–0.7)
BUN BLDV-MCNC: 29 MG/DL (ref 8–23)
CALCIUM SERPL-MCNC: 8.7 MG/DL (ref 8.5–9.9)
CHLORIDE BLD-SCNC: 102 MEQ/L (ref 95–107)
CO2: 24 MEQ/L (ref 20–31)
CREAT SERPL-MCNC: 1.41 MG/DL (ref 0.7–1.2)
EKG ATRIAL RATE: 61 BPM
EKG P AXIS: 24 DEGREES
EKG P-R INTERVAL: 232 MS
EKG Q-T INTERVAL: 456 MS
EKG QRS DURATION: 148 MS
EKG QTC CALCULATION (BAZETT): 459 MS
EKG R AXIS: -71 DEGREES
EKG T AXIS: -36 DEGREES
EKG VENTRICULAR RATE: 61 BPM
EOSINOPHILS ABSOLUTE: 0.2 K/UL (ref 0–0.7)
EOSINOPHILS RELATIVE PERCENT: 2.6 %
GFR AFRICAN AMERICAN: 57.1
GFR NON-AFRICAN AMERICAN: 47.2
GLOBULIN: 2.5 G/DL (ref 2.3–3.5)
GLUCOSE BLD-MCNC: 129 MG/DL (ref 70–99)
HCT VFR BLD CALC: 40 % (ref 42–52)
HEMOGLOBIN: 13.6 G/DL (ref 14–18)
LYMPHOCYTES ABSOLUTE: 2.5 K/UL (ref 1–4.8)
LYMPHOCYTES RELATIVE PERCENT: 32.3 %
MCH RBC QN AUTO: 32 PG (ref 27–31.3)
MCHC RBC AUTO-ENTMCNC: 33.9 % (ref 33–37)
MCV RBC AUTO: 94.3 FL (ref 80–100)
MONOCYTES ABSOLUTE: 0.7 K/UL (ref 0.2–0.8)
MONOCYTES RELATIVE PERCENT: 9.3 %
NEUTROPHILS ABSOLUTE: 4.3 K/UL (ref 1.4–6.5)
NEUTROPHILS RELATIVE PERCENT: 54.9 %
PDW BLD-RTO: 13.5 % (ref 11.5–14.5)
PLATELET # BLD: 186 K/UL (ref 130–400)
POTASSIUM SERPL-SCNC: 5.1 MEQ/L (ref 3.4–4.9)
RBC # BLD: 4.24 M/UL (ref 4.7–6.1)
SODIUM BLD-SCNC: 138 MEQ/L (ref 135–144)
TOTAL PROTEIN: 6.8 G/DL (ref 6.3–8)
TROPONIN: 0.04 NG/ML (ref 0–0.01)
WBC # BLD: 7.8 K/UL (ref 4.8–10.8)

## 2019-04-17 PROCEDURE — 96374 THER/PROPH/DIAG INJ IV PUSH: CPT

## 2019-04-17 PROCEDURE — 71046 X-RAY EXAM CHEST 2 VIEWS: CPT

## 2019-04-17 PROCEDURE — 93005 ELECTROCARDIOGRAM TRACING: CPT

## 2019-04-17 PROCEDURE — 94640 AIRWAY INHALATION TREATMENT: CPT

## 2019-04-17 PROCEDURE — 36415 COLL VENOUS BLD VENIPUNCTURE: CPT

## 2019-04-17 PROCEDURE — 84484 ASSAY OF TROPONIN QUANT: CPT

## 2019-04-17 PROCEDURE — 85025 COMPLETE CBC W/AUTO DIFF WBC: CPT

## 2019-04-17 PROCEDURE — 99285 EMERGENCY DEPT VISIT HI MDM: CPT

## 2019-04-17 PROCEDURE — 6370000000 HC RX 637 (ALT 250 FOR IP): Performed by: NURSE PRACTITIONER

## 2019-04-17 PROCEDURE — 80053 COMPREHEN METABOLIC PANEL: CPT

## 2019-04-17 PROCEDURE — 6360000002 HC RX W HCPCS: Performed by: NURSE PRACTITIONER

## 2019-04-17 RX ORDER — AZITHROMYCIN 250 MG/1
250 TABLET, FILM COATED ORAL SEE ADMIN INSTRUCTIONS
Qty: 6 TABLET | Refills: 0 | Status: SHIPPED | OUTPATIENT
Start: 2019-04-17 | End: 2019-04-22

## 2019-04-17 RX ORDER — PREDNISONE 20 MG/1
40 TABLET ORAL DAILY
Qty: 10 TABLET | Refills: 0 | Status: SHIPPED | OUTPATIENT
Start: 2019-04-17 | End: 2019-04-22

## 2019-04-17 RX ORDER — IPRATROPIUM BROMIDE AND ALBUTEROL SULFATE 2.5; .5 MG/3ML; MG/3ML
1 SOLUTION RESPIRATORY (INHALATION) ONCE
Status: COMPLETED | OUTPATIENT
Start: 2019-04-17 | End: 2019-04-17

## 2019-04-17 RX ORDER — METHYLPREDNISOLONE SODIUM SUCCINATE 125 MG/2ML
125 INJECTION, POWDER, LYOPHILIZED, FOR SOLUTION INTRAMUSCULAR; INTRAVENOUS ONCE
Status: COMPLETED | OUTPATIENT
Start: 2019-04-17 | End: 2019-04-17

## 2019-04-17 RX ADMIN — METHYLPREDNISOLONE SODIUM SUCCINATE 125 MG: 125 INJECTION, POWDER, FOR SOLUTION INTRAMUSCULAR; INTRAVENOUS at 21:53

## 2019-04-17 RX ADMIN — IPRATROPIUM BROMIDE AND ALBUTEROL SULFATE 1 AMPULE: .5; 3 SOLUTION RESPIRATORY (INHALATION) at 21:53

## 2019-04-17 ASSESSMENT — PAIN DESCRIPTION - LOCATION: LOCATION: HEAD

## 2019-04-17 ASSESSMENT — PAIN DESCRIPTION - FREQUENCY: FREQUENCY: CONTINUOUS

## 2019-04-17 ASSESSMENT — PULMONARY FUNCTION TESTS: PEFR_L/MIN: 180

## 2019-04-17 ASSESSMENT — PAIN SCALES - GENERAL: PAINLEVEL_OUTOF10: 4

## 2019-04-17 ASSESSMENT — PAIN DESCRIPTION - DESCRIPTORS: DESCRIPTORS: ACHING

## 2019-04-17 ASSESSMENT — PAIN DESCRIPTION - PAIN TYPE: TYPE: ACUTE PAIN

## 2019-04-18 NOTE — ED TRIAGE NOTES
Pt c/o a cough, congestion, and a headache that started yesterday, Pt is A&OX3, calm, ambulatory, afebrile, breathes are equal and unlabored, moist productive cough,

## 2019-04-18 NOTE — ED NOTES
Pt a&ox4,s kin w/d/pink, pulses palp, sr on monitor, 0 c/o, 0 pain, 0 distress, pt resting in bed, reading book, will monitor.      Ravi Carey RN  04/17/19 3250

## 2019-04-20 ASSESSMENT — ENCOUNTER SYMPTOMS
COUGH: 1
VOICE CHANGE: 0
SHORTNESS OF BREATH: 1
VOMITING: 0
NAUSEA: 0
CONSTIPATION: 0
DIARRHEA: 0
COLOR CHANGE: 0
TROUBLE SWALLOWING: 0
BACK PAIN: 0
ABDOMINAL PAIN: 0
SORE THROAT: 0

## 2019-04-20 NOTE — ED PROVIDER NOTES
[address] [facility] ED  eMERGENCY dEPARTMENT eNCOUnter      Pt Name: Damon Dotson  MRN: [id number]  [place] 3/5/1929  Date of evaluation: 4/17/2019  Provider: CHRISTINE Vásquez CNP     CHIEF COMPLAINT       Chief Complaint   Patient presents with    Cough     pt c/o a cough, congestion, and headache since yesterday       HISTORYOF PRESENT ILLNESS   (Location/Symptom, Timing/Onset, Context/Setting, Quality, Duration, ModifyingFactors, Severity) Note limiting factors. MARGIE Portillo is a 89 [address]year old male patient who presents to the ER with complaints of cough, congestion and intermittent shortness of breath and headache since yesterday. He denies any vision changes, lightheadedness, dizziness, chest pain, sputum production, fever, recent travel, sick contacts, N/V/D, abdominal pain or urinary symptoms. He notes intermittent shortness of breath however notes that this is not new and he is always short of breath somewhat and is not any more short of breath than usual.     Nursing Notes werereviewed. REVIEW OF SYSTEMS    (2+ for level 4; 10+ for level 5)     Review of Systems   Constitutional: Negative for appetite change and fever. HENT: Positive for congestion. Negative for drooling, ear pain, sore throat, trouble swallowing and voice change. Respiratory: Positive for cough and shortness of breath. Cardiovascular: Negative for chest pain. Gastrointestinal: Negative for abdominal pain, constipation, diarrhea, nausea and vomiting. Genitourinary: Negative for decreased urine volume and dysuria. Musculoskeletal: Negative for arthralgias and back pain. Skin: Negative for color change. Neurological: Positive for headaches. Negative for dizziness, weakness and light-headedness. Psychiatric/Behavioral: Negative for agitation and behavioral problems. All other systems reviewed and are negative.       Except as noted above the remainder of the review of systems was reviewed and daily      Cranberry 1000 MG CAPS Take by mouth      exenatide (BYETTA) 5 MCG/0.02ML injection Inject 5 mcg into the skin      nitroGLYCERIN (NITROSTAT) 0.4 MG SL tablet Place 0.4 mg under the tongue every 5 minutes as needed for Chest pain      aspirin 81 MG tablet Take 81 mg by mouth daily. SYNTHROID 25 MCG tablet Historical Med      metFORMIN (GLUCOPHAGE) 500 MG tablet Take 500 mg by mouth daily (with breakfast). ramipril (ALTACE) 5 MG tablet Take 5 mg by mouth daily. budesonide-formoterol (SYMBICORT) 80-4.5 MCG/ACT AERO Inhale 2 puffs into the lungs 2 times daily. ALLERGIES     Patient has no known allergies. FAMILY HISTORY     History reviewed. No pertinent family history. SOCIAL HISTORY       Social History     Socioeconomic History    Marital status:       Spouse name: None    Number of children: None    Years of education: None    Highest education level: None   Occupational History    None   Social Needs    Financial resource strain: None    Food insecurity:     Worry: None     Inability: None    Transportation needs:     Medical: None     Non-medical: None   Tobacco Use    Smoking status: Former Smoker    Smokeless tobacco: Never Used   Substance and Sexual Activity    Alcohol use: No    Drug use: No    Sexual activity: None   Lifestyle    Physical activity:     Days per week: None     Minutes per session: None    Stress: None   Relationships    Social connections:     Talks on phone: None     Gets together: None     Attends Congregational service: None     Active member of club or organization: None     Attends meetings of clubs or organizations: None     Relationship status: None    Intimate partner violence:     Fear of current or ex partner: None     Emotionally abused: None     Physically abused: None     Forced sexual activity: None   Other Topics Concern    None   Social History Narrative    None       SCREENINGS           PHYSICAL EXAM    (up to 7 for level 4, 8 or more for level 5)     ED Triage Vitals [04/17/19 2039]   BP Temp Temp Source Pulse Resp SpO2 Height Weight   (!) 146/83 97.6 °F (36.4 °C) Oral 80 18 96 % 5' 6\" (1.676 m) 165 lb (74.8 kg)       Physical Exam   Constitutional: He is oriented to person, place, and time. He appears well-developed and well-nourished. No distress. HENT:   Head: Normocephalic and atraumatic. Eyes: Conjunctivae are normal. Right eye exhibits no discharge. Left eye exhibits no discharge. Neck: Normal range of motion. Neck supple. No JVD present. No tracheal deviation present. Cardiovascular: Normal rate and regular rhythm. Pulmonary/Chest: Effort normal. No stridor. No respiratory distress. He has wheezes (faint inspiratory and expiratory wheezing). He has no rales. He exhibits no tenderness. Abdominal: Soft. Bowel sounds are normal. He exhibits no distension and no mass. There is no tenderness. There is no rebound and no guarding. No hernia. Musculoskeletal: Normal range of motion. Lymphadenopathy:     He has no cervical adenopathy. Neurological: He is alert and oriented to person, place, and time. Skin: Skin is warm and dry. Psychiatric: He has a normal mood and affect. Nursing note and vitals reviewed. DIAGNOSTIC RESULTS     EKG: All EKG's are interpreted by the [facility] Physician who either signs or Co-signs this chart in the absence of a cardiologist.    Patient's EKG shows AV dual-aced rhythm with prolonged AV conduction. NO acute st segment elevation. No EKG changes from previous EKG on 12/29/18. RADIOLOGY:   Non-plain film images such as CT, Ultrasound and MRI are read by the radiologist. Plain radiographic images are visualized and preliminarily interpreted by the emergency physician with the below findings:    No acute cardiopulmonary disease process.      Interpretation per the Radiologist below (ifavailable at the time of note entry):    XR CHEST STANDARD (2 VW) Final Result   NO ACUTE CHEST DISEASE          LABS:  Labs Reviewed   CBC WITH AUTO DIFFERENTIAL - Abnormal; Notable for the following components:       Result Value    RBC 4.24 (*)     Hemoglobin 13.6 (*)     Hematocrit 40.0 (*)     MCH 32.0 (*)     All other components within normal limits   COMPREHENSIVE METABOLIC PANEL - Abnormal; Notable for the following components:    Potassium 5.1 (*)     Glucose 129 (*)     BUN 29 (*)     CREATININE 1.41 (*)     GFR Non- 47.2 (*)     GFR  57.1 (*)     All other components within normal limits   TROPONIN - Abnormal; Notable for the following components:    Troponin 0.035 (*)     All other components within normal limits    Narrative:     CALL  [facility]  [facility] tel. [phone],  troponin critical  results called to and read back by rekha hicks, 04/17/2019  22:18, by PRAVIN       All other labs were within normal range or not returned as of this dictation. EMERGENCY DEPARTMENT COURSE and DIFFERENTIAL DIAGNOSIS/MDM:   Vitals:    Vitals:    04/17/19 2039 04/17/19 2139 04/17/19 2246   BP: (!) 146/83 (!) 149/68 (!) 151/64   Pulse: 80 60 61   Resp: 18 18 16   Temp: 97.6 °F (36.4 °C)     TempSrc: Oral     SpO2: 96% 95% 96%   Weight: 165 lb (74.8 kg)     Height: 5' 6\" (1.676 m)         MDM    Presents to the emergency room with the above-noted complaint. He is nontoxic. He is not in any distress and is laying in bed reading his book. Sick labs have been obtained and cardiac workup to rule out acute cardiac disease process. Patient's troponin is elevated however reviewing his past prominence this is his baseline. His urine creatinine are slightly elevated however this is baseline as well. Patient was provided with DuoNeb and Solu-Medrol while in the emergency room. He will be discharged home in stable condition with a diagnosis of COPD exacerbation with a prescription for prednisone and azithromycin.  I have provided him with anticipatory guidance, instructed on follow-up and to return to the ER if not better or any new or concerning symptoms. Patient verbalizes understanding and has no further questions at this time. CRITICAL CARE TIME     Total Critical Care time(not applicable if blank)      Total minutes, excluding separately reportable procedures. There was a high probability of clinically significant/life threatening deterioration in the patient's condition which required my urgent intervention.  This includesdiscussing the case with consultants, reviewing laboratory studies and images independently, arranging disposition, and speaking with patient/family    CONSULTS:  None    PROCEDURES:  Unless otherwise notedbelow, none     Procedures    FINAL IMPRESSION     1. COPD exacerbation ([facility] [address]          DISPOSITION/PLAN   DISPOSITION Decision To Discharge 04/17/2019 10:33:11 PM      PATIENT REFERRED TO:  Marlen Jordan DO  [address] [facility]  [phone]    Schedule an appointment as soon as possible for a visit in 1 day        DISCHARGE MEDICATIONS:  Discharge Medication List as of 4/17/2019 10:34 PM      START taking these medications    Details   predniSONE (DELTASONE) 20 MG tablet Take 2 tablets by mouth daily for 5 days, Disp-10 tablet, R-0Print      azithromycin (ZITHROMAX) 250 MG tablet Take 1 tablet by mouth See Admin Instructions for 5 days 500mg on day 1 followed by 250mg on days 2 - 5, Disp-6 tablet, R-0Print                (Please note that portions of this note were completed with a voice recognition program.  Efforts were [place] [facility] edit the dictations but occasionally words and phrases are mis-transcribed.)    Tramaine Garcia, CHRISTINE - CNP  (electronically signed)                 CHRISTINE Brar CNP  04/20/19 [address], CHRISTINE - CNP  04/20/19 [id number]

## 2019-07-05 ENCOUNTER — APPOINTMENT (OUTPATIENT)
Dept: CT IMAGING | Age: 84
DRG: 641 | End: 2019-07-05
Payer: MEDICARE

## 2019-07-05 ENCOUNTER — HOSPITAL ENCOUNTER (EMERGENCY)
Age: 84
Discharge: HOME | DRG: 641 | End: 2019-07-05
Attending: EMERGENCY MEDICINE
Payer: MEDICARE

## 2019-07-05 VITALS
OXYGEN SATURATION: 94 % | SYSTOLIC BLOOD PRESSURE: 158 MMHG | HEART RATE: 93 BPM | RESPIRATION RATE: 15 BRPM | WEIGHT: 170 LBS | HEIGHT: 66 IN | BODY MASS INDEX: 27.32 KG/M2 | TEMPERATURE: 98 F | DIASTOLIC BLOOD PRESSURE: 61 MMHG

## 2019-07-05 DIAGNOSIS — J44.1 COPD WITH EXACERBATION (HCC): Primary | ICD-10-CM

## 2019-07-05 LAB
ALBUMIN SERPL-MCNC: 4.4 G/DL (ref 3.5–4.6)
ALP BLD-CCNC: 59 U/L (ref 35–104)
ALT SERPL-CCNC: 18 U/L (ref 0–41)
ANION GAP SERPL CALCULATED.3IONS-SCNC: 14 MEQ/L (ref 9–15)
AST SERPL-CCNC: 21 U/L (ref 0–40)
BASOPHILS ABSOLUTE: 0 K/UL (ref 0–0.2)
BASOPHILS RELATIVE PERCENT: 0.8 %
BILIRUB SERPL-MCNC: 1 MG/DL (ref 0.2–0.7)
BUN BLDV-MCNC: 20 MG/DL (ref 8–23)
CALCIUM SERPL-MCNC: 9.2 MG/DL (ref 8.5–9.9)
CHLORIDE BLD-SCNC: 104 MEQ/L (ref 95–107)
CO2: 25 MEQ/L (ref 20–31)
CREAT SERPL-MCNC: 1.11 MG/DL (ref 0.7–1.2)
D DIMER: 1.82 MG/L FEU (ref 0–0.5)
EKG ATRIAL RATE: 76 BPM
EKG P AXIS: 16 DEGREES
EKG P-R INTERVAL: 154 MS
EKG Q-T INTERVAL: 424 MS
EKG QRS DURATION: 136 MS
EKG QTC CALCULATION (BAZETT): 477 MS
EKG R AXIS: -68 DEGREES
EKG T AXIS: 13 DEGREES
EKG VENTRICULAR RATE: 76 BPM
EOSINOPHILS ABSOLUTE: 0.2 K/UL (ref 0–0.7)
EOSINOPHILS RELATIVE PERCENT: 3.5 %
GFR AFRICAN AMERICAN: >60
GFR NON-AFRICAN AMERICAN: >60
GLOBULIN: 2.7 G/DL (ref 2.3–3.5)
GLUCOSE BLD-MCNC: 137 MG/DL (ref 70–99)
HCT VFR BLD CALC: 40.4 % (ref 42–52)
HEMOGLOBIN: 13.7 G/DL (ref 14–18)
LYMPHOCYTES ABSOLUTE: 1.3 K/UL (ref 1–4.8)
LYMPHOCYTES RELATIVE PERCENT: 20.7 %
MAGNESIUM: 1.8 MG/DL (ref 1.7–2.4)
MCH RBC QN AUTO: 32.3 PG (ref 27–31.3)
MCHC RBC AUTO-ENTMCNC: 34 % (ref 33–37)
MCV RBC AUTO: 95.1 FL (ref 80–100)
MONOCYTES ABSOLUTE: 0.8 K/UL (ref 0.2–0.8)
MONOCYTES RELATIVE PERCENT: 12.9 %
NEUTROPHILS ABSOLUTE: 3.9 K/UL (ref 1.4–6.5)
NEUTROPHILS RELATIVE PERCENT: 62.1 %
PDW BLD-RTO: 13.5 % (ref 11.5–14.5)
PLATELET # BLD: 181 K/UL (ref 130–400)
POTASSIUM SERPL-SCNC: 4.7 MEQ/L (ref 3.4–4.9)
PRO-BNP: 553 PG/ML
RBC # BLD: 4.24 M/UL (ref 4.7–6.1)
SODIUM BLD-SCNC: 143 MEQ/L (ref 135–144)
TOTAL PROTEIN: 7.1 G/DL (ref 6.3–8)
TROPONIN: 0.03 NG/ML (ref 0–0.01)
WBC # BLD: 6.3 K/UL (ref 4.8–10.8)

## 2019-07-05 PROCEDURE — 83735 ASSAY OF MAGNESIUM: CPT

## 2019-07-05 PROCEDURE — 93010 ELECTROCARDIOGRAM REPORT: CPT | Performed by: INTERNAL MEDICINE

## 2019-07-05 PROCEDURE — 6360000004 HC RX CONTRAST MEDICATION: Performed by: EMERGENCY MEDICINE

## 2019-07-05 PROCEDURE — 93005 ELECTROCARDIOGRAM TRACING: CPT | Performed by: EMERGENCY MEDICINE

## 2019-07-05 PROCEDURE — 99285 EMERGENCY DEPT VISIT HI MDM: CPT

## 2019-07-05 PROCEDURE — 85025 COMPLETE CBC W/AUTO DIFF WBC: CPT

## 2019-07-05 PROCEDURE — 85379 FIBRIN DEGRADATION QUANT: CPT

## 2019-07-05 PROCEDURE — 80053 COMPREHEN METABOLIC PANEL: CPT

## 2019-07-05 PROCEDURE — 84484 ASSAY OF TROPONIN QUANT: CPT

## 2019-07-05 PROCEDURE — 6360000002 HC RX W HCPCS: Performed by: EMERGENCY MEDICINE

## 2019-07-05 PROCEDURE — 71275 CT ANGIOGRAPHY CHEST: CPT

## 2019-07-05 PROCEDURE — 83880 ASSAY OF NATRIURETIC PEPTIDE: CPT

## 2019-07-05 PROCEDURE — 94761 N-INVAS EAR/PLS OXIMETRY MLT: CPT

## 2019-07-05 PROCEDURE — 36415 COLL VENOUS BLD VENIPUNCTURE: CPT

## 2019-07-05 PROCEDURE — 94640 AIRWAY INHALATION TREATMENT: CPT

## 2019-07-05 PROCEDURE — 6370000000 HC RX 637 (ALT 250 FOR IP): Performed by: EMERGENCY MEDICINE

## 2019-07-05 PROCEDURE — 96374 THER/PROPH/DIAG INJ IV PUSH: CPT

## 2019-07-05 RX ORDER — METHYLPREDNISOLONE SODIUM SUCCINATE 125 MG/2ML
125 INJECTION, POWDER, LYOPHILIZED, FOR SOLUTION INTRAMUSCULAR; INTRAVENOUS ONCE
Status: COMPLETED | OUTPATIENT
Start: 2019-07-05 | End: 2019-07-05

## 2019-07-05 RX ORDER — PREDNISONE 20 MG/1
40 TABLET ORAL DAILY
Qty: 20 TABLET | Refills: 0 | Status: ON HOLD | OUTPATIENT
Start: 2019-07-05 | End: 2019-07-09 | Stop reason: HOSPADM

## 2019-07-05 RX ORDER — ALBUTEROL SULFATE 2.5 MG/3ML
2.5 SOLUTION RESPIRATORY (INHALATION)
Status: COMPLETED | OUTPATIENT
Start: 2019-07-05 | End: 2019-07-05

## 2019-07-05 RX ORDER — AZITHROMYCIN 500 MG/1
500 TABLET, FILM COATED ORAL ONCE
Status: COMPLETED | OUTPATIENT
Start: 2019-07-05 | End: 2019-07-05

## 2019-07-05 RX ORDER — AZITHROMYCIN 250 MG/1
TABLET, FILM COATED ORAL
Qty: 1 PACKET | Refills: 0 | Status: ON HOLD | OUTPATIENT
Start: 2019-07-05 | End: 2019-07-09 | Stop reason: HOSPADM

## 2019-07-05 RX ADMIN — AZITHROMYCIN MONOHYDRATE 500 MG: 500 TABLET ORAL at 11:30

## 2019-07-05 RX ADMIN — ALBUTEROL SULFATE 2.5 MG: 2.5 SOLUTION RESPIRATORY (INHALATION) at 11:41

## 2019-07-05 RX ADMIN — METHYLPREDNISOLONE SODIUM SUCCINATE 125 MG: 125 INJECTION, POWDER, FOR SOLUTION INTRAMUSCULAR; INTRAVENOUS at 11:29

## 2019-07-05 RX ADMIN — IOPAMIDOL 100 ML: 755 INJECTION, SOLUTION INTRAVENOUS at 12:32

## 2019-07-05 RX ADMIN — ALBUTEROL SULFATE 2.5 MG: 2.5 SOLUTION RESPIRATORY (INHALATION) at 11:27

## 2019-07-05 RX ADMIN — ALBUTEROL SULFATE 2.5 MG: 2.5 SOLUTION RESPIRATORY (INHALATION) at 11:16

## 2019-07-05 ASSESSMENT — ENCOUNTER SYMPTOMS
SHORTNESS OF BREATH: 1
COUGH: 1
SORE THROAT: 0
ABDOMINAL PAIN: 0
DIARRHEA: 0
BACK PAIN: 0
NAUSEA: 0
VOMITING: 0

## 2019-07-05 ASSESSMENT — PULMONARY FUNCTION TESTS
PEFR_L/MIN: 150

## 2019-07-05 NOTE — ED PROVIDER NOTES
insecurity:     Worry: None     Inability: None    Transportation needs:     Medical: None     Non-medical: None   Tobacco Use    Smoking status: Former Smoker    Smokeless tobacco: Never Used   Substance and Sexual Activity    Alcohol use: No    Drug use: No    Sexual activity: None   Lifestyle    Physical activity:     Days per week: None     Minutes per session: None    Stress: None   Relationships    Social connections:     Talks on phone: None     Gets together: None     Attends Religion service: None     Active member of club or organization: None     Attends meetings of clubs or organizations: None     Relationship status: None    Intimate partner violence:     Fear of current or ex partner: None     Emotionally abused: None     Physically abused: None     Forced sexual activity: None   Other Topics Concern    None   Social History Narrative    None         PHYSICAL EXAM       ED Triage Vitals [07/05/19 1013]   BP Temp Temp src Pulse Resp SpO2 Height Weight   (!) 161/75 98 °F (36.7 °C) -- 85 22 94 % 5' 6\" (1.676 m) 170 lb (77.1 kg)       Physical Exam   Constitutional: He is oriented to person, place, and time. He appears well-developed. HENT:   Head: Normocephalic. Right Ear: External ear normal.   Left Ear: External ear normal.   Mouth/Throat: Oropharynx is clear and moist.   Eyes: Pupils are equal, round, and reactive to light. Conjunctivae are normal.   Neck: Normal range of motion. Neck supple. Cardiovascular:   Paced heart rhythm   Pulmonary/Chest:   Breath sounds are diminished bilaterally. No wheezing or rales noted on ascultation. No accessory muscle use   Abdominal: Soft. Bowel sounds are normal. He exhibits no distension. There is no tenderness. Musculoskeletal: Normal range of motion. Neurological: He is alert and oriented to person, place, and time. Skin: Skin is warm and dry. Psychiatric: He has a normal mood and affect.    Nursing note and vitals

## 2019-07-05 NOTE — ED TRIAGE NOTES
Pt to ER with c/o SOB and cough that started yesterday with congestion, pt has moist cough and crackles noted , resp even and unlabored, pt a&ox4

## 2019-07-06 ENCOUNTER — HOSPITAL ENCOUNTER (INPATIENT)
Age: 84
LOS: 3 days | Discharge: HOME | DRG: 641 | End: 2019-07-09
Attending: INTERNAL MEDICINE | Admitting: INTERNAL MEDICINE
Payer: MEDICARE

## 2019-07-06 ENCOUNTER — APPOINTMENT (OUTPATIENT)
Dept: GENERAL RADIOLOGY | Age: 84
DRG: 641 | End: 2019-07-06
Payer: MEDICARE

## 2019-07-06 DIAGNOSIS — R19.7 DIARRHEA, UNSPECIFIED TYPE: ICD-10-CM

## 2019-07-06 DIAGNOSIS — E86.1 HYPOTENSION DUE TO HYPOVOLEMIA: Primary | ICD-10-CM

## 2019-07-06 DIAGNOSIS — I95.89 HYPOTENSION DUE TO HYPOVOLEMIA: Primary | ICD-10-CM

## 2019-07-06 PROBLEM — I95.9 HYPOTENSION: Status: ACTIVE | Noted: 2019-07-06

## 2019-07-06 LAB
ALBUMIN SERPL-MCNC: 3.7 G/DL (ref 3.5–4.6)
ALP BLD-CCNC: 48 U/L (ref 35–104)
ALT SERPL-CCNC: 14 U/L (ref 0–41)
ANION GAP SERPL CALCULATED.3IONS-SCNC: 17 MEQ/L (ref 9–15)
AST SERPL-CCNC: 13 U/L (ref 0–40)
BASOPHILS ABSOLUTE: 0 K/UL (ref 0–0.2)
BASOPHILS RELATIVE PERCENT: 0.3 %
BILIRUB SERPL-MCNC: 0.4 MG/DL (ref 0.2–0.7)
BILIRUBIN URINE: NEGATIVE
BLOOD, URINE: NEGATIVE
BUN BLDV-MCNC: 30 MG/DL (ref 8–23)
CALCIUM SERPL-MCNC: 8.1 MG/DL (ref 8.5–9.9)
CHLORIDE BLD-SCNC: 105 MEQ/L (ref 95–107)
CLARITY: CLEAR
CO2: 16 MEQ/L (ref 20–31)
COLOR: YELLOW
CREAT SERPL-MCNC: 1.39 MG/DL (ref 0.7–1.2)
EKG ATRIAL RATE: 62 BPM
EKG P-R INTERVAL: 220 MS
EKG Q-T INTERVAL: 458 MS
EKG QRS DURATION: 140 MS
EKG QTC CALCULATION (BAZETT): 464 MS
EKG R AXIS: -65 DEGREES
EKG T AXIS: -49 DEGREES
EKG VENTRICULAR RATE: 62 BPM
EOSINOPHILS ABSOLUTE: 0 K/UL (ref 0–0.7)
EOSINOPHILS RELATIVE PERCENT: 0.1 %
GFR AFRICAN AMERICAN: 58
GFR NON-AFRICAN AMERICAN: 48
GLOBULIN: 2.4 G/DL (ref 2.3–3.5)
GLUCOSE BLD-MCNC: 219 MG/DL (ref 70–99)
GLUCOSE BLD-MCNC: 251 MG/DL (ref 60–115)
GLUCOSE URINE: NEGATIVE MG/DL
HCT VFR BLD CALC: 32.2 % (ref 42–52)
HEMOGLOBIN: 11.1 G/DL (ref 14–18)
KETONES, URINE: NEGATIVE MG/DL
LACTIC ACID: 3 MMOL/L (ref 0.5–2.2)
LEUKOCYTE ESTERASE, URINE: NEGATIVE
LIPASE: 26 U/L (ref 12–95)
LYMPHOCYTES ABSOLUTE: 0.9 K/UL (ref 1–4.8)
LYMPHOCYTES RELATIVE PERCENT: 9.5 %
MAGNESIUM: 1.9 MG/DL (ref 1.7–2.4)
MCH RBC QN AUTO: 32.5 PG (ref 27–31.3)
MCHC RBC AUTO-ENTMCNC: 34.5 % (ref 33–37)
MCV RBC AUTO: 94 FL (ref 80–100)
MONOCYTES ABSOLUTE: 0.7 K/UL (ref 0.2–0.8)
MONOCYTES RELATIVE PERCENT: 8.4 %
NEUTROPHILS ABSOLUTE: 7.3 K/UL (ref 1.4–6.5)
NEUTROPHILS RELATIVE PERCENT: 81.7 %
NITRITE, URINE: NEGATIVE
PDW BLD-RTO: 14.2 % (ref 11.5–14.5)
PERFORMED ON: ABNORMAL
PH UA: 5.5 (ref 5–9)
PLATELET # BLD: 158 K/UL (ref 130–400)
POTASSIUM SERPL-SCNC: 4.8 MEQ/L (ref 3.4–4.9)
PROCALCITONIN: 0.08 NG/ML (ref 0–0.15)
PROTEIN UA: ABNORMAL MG/DL
RBC # BLD: 3.43 M/UL (ref 4.7–6.1)
SODIUM BLD-SCNC: 138 MEQ/L (ref 135–144)
SPECIFIC GRAVITY UA: 1.02 (ref 1–1.03)
TOTAL PROTEIN: 6.1 G/DL (ref 6.3–8)
TROPONIN: 0.04 NG/ML (ref 0–0.01)
URINE REFLEX TO CULTURE: ABNORMAL
UROBILINOGEN, URINE: 0.2 E.U./DL
WBC # BLD: 8.9 K/UL (ref 4.8–10.8)

## 2019-07-06 PROCEDURE — 84145 PROCALCITONIN (PCT): CPT

## 2019-07-06 PROCEDURE — 2060000000 HC ICU INTERMEDIATE R&B

## 2019-07-06 PROCEDURE — 96372 THER/PROPH/DIAG INJ SC/IM: CPT

## 2019-07-06 PROCEDURE — 2580000003 HC RX 258: Performed by: PHYSICIAN ASSISTANT

## 2019-07-06 PROCEDURE — 83735 ASSAY OF MAGNESIUM: CPT

## 2019-07-06 PROCEDURE — 84484 ASSAY OF TROPONIN QUANT: CPT

## 2019-07-06 PROCEDURE — 6370000000 HC RX 637 (ALT 250 FOR IP): Performed by: INTERNAL MEDICINE

## 2019-07-06 PROCEDURE — 80053 COMPREHEN METABOLIC PANEL: CPT

## 2019-07-06 PROCEDURE — 94640 AIRWAY INHALATION TREATMENT: CPT

## 2019-07-06 PROCEDURE — 85025 COMPLETE CBC W/AUTO DIFF WBC: CPT

## 2019-07-06 PROCEDURE — 94664 DEMO&/EVAL PT USE INHALER: CPT

## 2019-07-06 PROCEDURE — 96361 HYDRATE IV INFUSION ADD-ON: CPT

## 2019-07-06 PROCEDURE — 81003 URINALYSIS AUTO W/O SCOPE: CPT

## 2019-07-06 PROCEDURE — 6360000002 HC RX W HCPCS: Performed by: PHYSICIAN ASSISTANT

## 2019-07-06 PROCEDURE — 36415 COLL VENOUS BLD VENIPUNCTURE: CPT

## 2019-07-06 PROCEDURE — 71045 X-RAY EXAM CHEST 1 VIEW: CPT

## 2019-07-06 PROCEDURE — 83605 ASSAY OF LACTIC ACID: CPT

## 2019-07-06 PROCEDURE — 99285 EMERGENCY DEPT VISIT HI MDM: CPT

## 2019-07-06 PROCEDURE — 6370000000 HC RX 637 (ALT 250 FOR IP): Performed by: PHYSICIAN ASSISTANT

## 2019-07-06 PROCEDURE — 83690 ASSAY OF LIPASE: CPT

## 2019-07-06 PROCEDURE — 93005 ELECTROCARDIOGRAM TRACING: CPT | Performed by: PHYSICIAN ASSISTANT

## 2019-07-06 RX ORDER — SODIUM CHLORIDE 0.9 % (FLUSH) 0.9 %
10 SYRINGE (ML) INJECTION PRN
Status: DISCONTINUED | OUTPATIENT
Start: 2019-07-06 | End: 2019-07-09 | Stop reason: HOSPADM

## 2019-07-06 RX ORDER — BUDESONIDE AND FORMOTEROL FUMARATE DIHYDRATE 80; 4.5 UG/1; UG/1
2 AEROSOL RESPIRATORY (INHALATION) 2 TIMES DAILY
Status: DISCONTINUED | OUTPATIENT
Start: 2019-07-06 | End: 2019-07-06 | Stop reason: CLARIF

## 2019-07-06 RX ORDER — IPRATROPIUM BROMIDE AND ALBUTEROL SULFATE 2.5; .5 MG/3ML; MG/3ML
3 SOLUTION RESPIRATORY (INHALATION) 3 TIMES DAILY
Status: DISCONTINUED | OUTPATIENT
Start: 2019-07-06 | End: 2019-07-07

## 2019-07-06 RX ORDER — SODIUM CHLORIDE 0.9 % (FLUSH) 0.9 %
10 SYRINGE (ML) INJECTION EVERY 12 HOURS SCHEDULED
Status: DISCONTINUED | OUTPATIENT
Start: 2019-07-06 | End: 2019-07-09 | Stop reason: HOSPADM

## 2019-07-06 RX ORDER — SODIUM CHLORIDE 9 MG/ML
INJECTION, SOLUTION INTRAVENOUS CONTINUOUS
Status: DISCONTINUED | OUTPATIENT
Start: 2019-07-06 | End: 2019-07-08

## 2019-07-06 RX ORDER — PREDNISONE 10 MG/1
40 TABLET ORAL DAILY
Status: DISCONTINUED | OUTPATIENT
Start: 2019-07-06 | End: 2019-07-06

## 2019-07-06 RX ORDER — FAMOTIDINE 20 MG/1
20 TABLET, FILM COATED ORAL DAILY
Status: DISCONTINUED | OUTPATIENT
Start: 2019-07-06 | End: 2019-07-09 | Stop reason: HOSPADM

## 2019-07-06 RX ORDER — ATORVASTATIN CALCIUM 40 MG/1
40 TABLET, FILM COATED ORAL DAILY
Status: DISCONTINUED | OUTPATIENT
Start: 2019-07-06 | End: 2019-07-09 | Stop reason: HOSPADM

## 2019-07-06 RX ORDER — 0.9 % SODIUM CHLORIDE 0.9 %
1000 INTRAVENOUS SOLUTION INTRAVENOUS ONCE
Status: COMPLETED | OUTPATIENT
Start: 2019-07-06 | End: 2019-07-06

## 2019-07-06 RX ORDER — ONDANSETRON 2 MG/ML
4 INJECTION INTRAMUSCULAR; INTRAVENOUS EVERY 6 HOURS PRN
Status: DISCONTINUED | OUTPATIENT
Start: 2019-07-06 | End: 2019-07-09 | Stop reason: HOSPADM

## 2019-07-06 RX ORDER — NICOTINE POLACRILEX 4 MG
15 LOZENGE BUCCAL PRN
Status: DISCONTINUED | OUTPATIENT
Start: 2019-07-06 | End: 2019-07-09 | Stop reason: HOSPADM

## 2019-07-06 RX ORDER — DEXTROSE MONOHYDRATE 50 MG/ML
100 INJECTION, SOLUTION INTRAVENOUS PRN
Status: DISCONTINUED | OUTPATIENT
Start: 2019-07-06 | End: 2019-07-09 | Stop reason: HOSPADM

## 2019-07-06 RX ORDER — ALBUTEROL SULFATE 90 UG/1
2 AEROSOL, METERED RESPIRATORY (INHALATION) EVERY 6 HOURS PRN
Status: DISCONTINUED | OUTPATIENT
Start: 2019-07-06 | End: 2019-07-09 | Stop reason: HOSPADM

## 2019-07-06 RX ORDER — ASPIRIN 81 MG/1
81 TABLET, CHEWABLE ORAL DAILY
Status: DISCONTINUED | OUTPATIENT
Start: 2019-07-06 | End: 2019-07-09 | Stop reason: HOSPADM

## 2019-07-06 RX ORDER — DEXTROSE MONOHYDRATE 25 G/50ML
12.5 INJECTION, SOLUTION INTRAVENOUS PRN
Status: DISCONTINUED | OUTPATIENT
Start: 2019-07-06 | End: 2019-07-09 | Stop reason: HOSPADM

## 2019-07-06 RX ORDER — ALBUTEROL SULFATE 2.5 MG/3ML
2.5 SOLUTION RESPIRATORY (INHALATION)
Status: DISCONTINUED | OUTPATIENT
Start: 2019-07-06 | End: 2019-07-09 | Stop reason: HOSPADM

## 2019-07-06 RX ORDER — FAMOTIDINE 20 MG/1
20 TABLET, FILM COATED ORAL 2 TIMES DAILY
Status: DISCONTINUED | OUTPATIENT
Start: 2019-07-06 | End: 2019-07-06

## 2019-07-06 RX ORDER — LEVOTHYROXINE SODIUM 0.03 MG/1
25 TABLET ORAL DAILY
Status: DISCONTINUED | OUTPATIENT
Start: 2019-07-06 | End: 2019-07-09 | Stop reason: HOSPADM

## 2019-07-06 RX ADMIN — SODIUM CHLORIDE: 9 INJECTION, SOLUTION INTRAVENOUS at 22:19

## 2019-07-06 RX ADMIN — FAMOTIDINE 20 MG: 20 TABLET ORAL at 21:02

## 2019-07-06 RX ADMIN — ATORVASTATIN CALCIUM 40 MG: 40 TABLET, FILM COATED ORAL at 21:02

## 2019-07-06 RX ADMIN — ENOXAPARIN SODIUM 40 MG: 40 INJECTION SUBCUTANEOUS at 21:02

## 2019-07-06 RX ADMIN — MOMETASONE FUROATE AND FORMOTEROL FUMARATE DIHYDRATE 2 PUFF: 100; 5 AEROSOL RESPIRATORY (INHALATION) at 22:22

## 2019-07-06 RX ADMIN — SODIUM CHLORIDE 1000 ML: 9 INJECTION, SOLUTION INTRAVENOUS at 13:07

## 2019-07-06 RX ADMIN — IPRATROPIUM BROMIDE AND ALBUTEROL SULFATE 3 ML: .5; 3 SOLUTION RESPIRATORY (INHALATION) at 22:21

## 2019-07-06 ASSESSMENT — ENCOUNTER SYMPTOMS
DIARRHEA: 1
COUGH: 1

## 2019-07-06 ASSESSMENT — PAIN SCALES - GENERAL: PAINLEVEL_OUTOF10: 0

## 2019-07-07 LAB
ANION GAP SERPL CALCULATED.3IONS-SCNC: 11 MEQ/L (ref 9–15)
BASOPHILS ABSOLUTE: 0 K/UL (ref 0–0.2)
BASOPHILS RELATIVE PERCENT: 0.2 %
BUN BLDV-MCNC: 32 MG/DL (ref 8–23)
CALCIUM SERPL-MCNC: 8.2 MG/DL (ref 8.5–9.9)
CHLORIDE BLD-SCNC: 110 MEQ/L (ref 95–107)
CO2: 20 MEQ/L (ref 20–31)
CREAT SERPL-MCNC: 1.35 MG/DL (ref 0.7–1.2)
EOSINOPHILS ABSOLUTE: 0 K/UL (ref 0–0.7)
EOSINOPHILS RELATIVE PERCENT: 0.2 %
GFR AFRICAN AMERICAN: >60
GFR NON-AFRICAN AMERICAN: 49.6
GLUCOSE BLD-MCNC: 167 MG/DL (ref 60–115)
GLUCOSE BLD-MCNC: 212 MG/DL (ref 60–115)
GLUCOSE BLD-MCNC: 216 MG/DL (ref 70–99)
GLUCOSE BLD-MCNC: 221 MG/DL (ref 60–115)
HCT VFR BLD CALC: 33.8 % (ref 42–52)
HEMOGLOBIN: 11.3 G/DL (ref 14–18)
LACTIC ACID: 1.6 MMOL/L (ref 0.5–2.2)
LYMPHOCYTES ABSOLUTE: 1.9 K/UL (ref 1–4.8)
LYMPHOCYTES RELATIVE PERCENT: 18.9 %
MCH RBC QN AUTO: 32 PG (ref 27–31.3)
MCHC RBC AUTO-ENTMCNC: 33.3 % (ref 33–37)
MCV RBC AUTO: 96.1 FL (ref 80–100)
MONOCYTES ABSOLUTE: 0.9 K/UL (ref 0.2–0.8)
MONOCYTES RELATIVE PERCENT: 8.7 %
NEUTROPHILS ABSOLUTE: 7.2 K/UL (ref 1.4–6.5)
NEUTROPHILS RELATIVE PERCENT: 72 %
PDW BLD-RTO: 14.1 % (ref 11.5–14.5)
PERFORMED ON: ABNORMAL
PLATELET # BLD: 168 K/UL (ref 130–400)
POTASSIUM REFLEX MAGNESIUM: 4.6 MEQ/L (ref 3.4–4.9)
RBC # BLD: 3.52 M/UL (ref 4.7–6.1)
SODIUM BLD-SCNC: 141 MEQ/L (ref 135–144)
TSH SERPL DL<=0.05 MIU/L-ACNC: 1.09 UIU/ML (ref 0.44–3.86)
WBC # BLD: 10.1 K/UL (ref 4.8–10.8)

## 2019-07-07 PROCEDURE — 84443 ASSAY THYROID STIM HORMONE: CPT

## 2019-07-07 PROCEDURE — 2580000003 HC RX 258: Performed by: PHYSICIAN ASSISTANT

## 2019-07-07 PROCEDURE — 97165 OT EVAL LOW COMPLEX 30 MIN: CPT

## 2019-07-07 PROCEDURE — 6370000000 HC RX 637 (ALT 250 FOR IP): Performed by: PHYSICIAN ASSISTANT

## 2019-07-07 PROCEDURE — 97161 PT EVAL LOW COMPLEX 20 MIN: CPT

## 2019-07-07 PROCEDURE — 85025 COMPLETE CBC W/AUTO DIFF WBC: CPT

## 2019-07-07 PROCEDURE — 2060000000 HC ICU INTERMEDIATE R&B

## 2019-07-07 PROCEDURE — 36415 COLL VENOUS BLD VENIPUNCTURE: CPT

## 2019-07-07 PROCEDURE — 94640 AIRWAY INHALATION TREATMENT: CPT

## 2019-07-07 PROCEDURE — 80048 BASIC METABOLIC PNL TOTAL CA: CPT

## 2019-07-07 PROCEDURE — 2700000000 HC OXYGEN THERAPY PER DAY

## 2019-07-07 PROCEDURE — 6370000000 HC RX 637 (ALT 250 FOR IP): Performed by: INTERNAL MEDICINE

## 2019-07-07 PROCEDURE — 6360000002 HC RX W HCPCS: Performed by: PHYSICIAN ASSISTANT

## 2019-07-07 PROCEDURE — 83605 ASSAY OF LACTIC ACID: CPT

## 2019-07-07 PROCEDURE — 96372 THER/PROPH/DIAG INJ SC/IM: CPT

## 2019-07-07 RX ORDER — IPRATROPIUM BROMIDE AND ALBUTEROL SULFATE 2.5; .5 MG/3ML; MG/3ML
3 SOLUTION RESPIRATORY (INHALATION) 3 TIMES DAILY
Status: DISCONTINUED | OUTPATIENT
Start: 2019-07-07 | End: 2019-07-09 | Stop reason: HOSPADM

## 2019-07-07 RX ORDER — CARVEDILOL 6.25 MG/1
6.25 TABLET ORAL 2 TIMES DAILY WITH MEALS
Status: DISCONTINUED | OUTPATIENT
Start: 2019-07-07 | End: 2019-07-09 | Stop reason: HOSPADM

## 2019-07-07 RX ADMIN — ENOXAPARIN SODIUM 40 MG: 40 INJECTION SUBCUTANEOUS at 08:19

## 2019-07-07 RX ADMIN — IPRATROPIUM BROMIDE AND ALBUTEROL SULFATE 3 ML: .5; 3 SOLUTION RESPIRATORY (INHALATION) at 12:28

## 2019-07-07 RX ADMIN — ASPIRIN 81 MG 81 MG: 81 TABLET ORAL at 08:19

## 2019-07-07 RX ADMIN — LEVOTHYROXINE SODIUM 25 MCG: 25 TABLET ORAL at 09:29

## 2019-07-07 RX ADMIN — INSULIN LISPRO 4 UNITS: 100 INJECTION, SOLUTION INTRAVENOUS; SUBCUTANEOUS at 11:49

## 2019-07-07 RX ADMIN — Medication 10 ML: at 09:16

## 2019-07-07 RX ADMIN — MOMETASONE FUROATE AND FORMOTEROL FUMARATE DIHYDRATE 2 PUFF: 100; 5 AEROSOL RESPIRATORY (INHALATION) at 19:55

## 2019-07-07 RX ADMIN — MOMETASONE FUROATE AND FORMOTEROL FUMARATE DIHYDRATE 2 PUFF: 100; 5 AEROSOL RESPIRATORY (INHALATION) at 07:03

## 2019-07-07 RX ADMIN — FAMOTIDINE 20 MG: 20 TABLET ORAL at 08:19

## 2019-07-07 RX ADMIN — CARVEDILOL 6.25 MG: 6.25 TABLET, FILM COATED ORAL at 16:24

## 2019-07-07 RX ADMIN — IPRATROPIUM BROMIDE AND ALBUTEROL SULFATE 3 ML: .5; 3 SOLUTION RESPIRATORY (INHALATION) at 07:03

## 2019-07-07 RX ADMIN — INSULIN LISPRO 2 UNITS: 100 INJECTION, SOLUTION INTRAVENOUS; SUBCUTANEOUS at 16:24

## 2019-07-07 RX ADMIN — IPRATROPIUM BROMIDE AND ALBUTEROL SULFATE 3 ML: .5; 3 SOLUTION RESPIRATORY (INHALATION) at 19:55

## 2019-07-07 RX ADMIN — CARVEDILOL 6.25 MG: 6.25 TABLET, FILM COATED ORAL at 09:29

## 2019-07-07 ASSESSMENT — ENCOUNTER SYMPTOMS
DIARRHEA: 1
NAUSEA: 0
SHORTNESS OF BREATH: 0
COUGH: 0
VOMITING: 0

## 2019-07-07 ASSESSMENT — PAIN SCALES - GENERAL
PAINLEVEL_OUTOF10: 0

## 2019-07-07 NOTE — PROGRESS NOTES
Physical Therapy Med Surg Initial Assessment  Facility/Department: [address] [place] [address]  [facility]       NAME: Bebeto Ingram  : 3/5/1929 (80 y.o.)  MRN: [id number]  CODE STATUS: Full Code    Date of Service: 2019    Patient Diagnosis(es): Hypotension [I95.9]   Chief Complaint   Patient presents with    Dizziness     Patient Active Problem List    Diagnosis Date Noted    Anginal equivalent ([facility] [address]      Priority: High    Hypotension 2019    SOB (shortness of breath) 2018    COPD with acute exacerbation ([facility] [address] 2018    Bronchitis 2016    CAD (coronary artery disease) 2015    HTN (hypertension)     Dyslipidemia     DM (diabetes mellitus) ([facility] [address]     Hypothyroidism     History of tobacco abuse     Anemia         Past Medical History:   Diagnosis Date    Anemia     CAD (coronary artery disease) 2015    DM (diabetes mellitus) ([facility] [address]     Dyslipidemia     History of tobacco abuse     HTN (hypertension)     Hypothyroidism     Non-ST elevation MI (NSTEMI) ([facility] [address]     Pacemaker 2015     Past Surgical History:   Procedure Laterality Date    CATARACT REMOVAL      MIDDLE EAR SURGERY Left     \"they had to reset the bones\" after an infection       Chart Reviewed: Yes  Patient assessed for rehabilitation services?: Yes  Family / Caregiver Present: No    Restrictions:  Restrictions/Precautions: Fall Risk     SUBJECTIVE: Subjective: Pt reports feeling 90% of his PLOF, pt feels weakness still  Response To Previous Treatment: Not applicable  Pre Treatment Pain Screening  Pain at present: 0  Scale Used: Numeric Score  Intervention List: Patient able to continue with treatment    Post Treatment Pain Screening:   Pain Screening  Patient Currently in Pain: Denies  Pain Assessment  Pain Assessment: 0-10  Pain Level: 0    Prior Level of Function:  Social/Functional History  Lives With: (granddtr)  Type of Home: House  Home Layout: Two level, Bed/Bath upstairs(13-14 with

## 2019-07-08 LAB
ANION GAP SERPL CALCULATED.3IONS-SCNC: 12 MEQ/L (ref 9–15)
BASOPHILS ABSOLUTE: 0 K/UL (ref 0–0.2)
BASOPHILS RELATIVE PERCENT: 0.5 %
BUN BLDV-MCNC: 27 MG/DL (ref 8–23)
CALCIUM SERPL-MCNC: 8 MG/DL (ref 8.5–9.9)
CHLORIDE BLD-SCNC: 106 MEQ/L (ref 95–107)
CO2: 24 MEQ/L (ref 20–31)
CREAT SERPL-MCNC: 1.4 MG/DL (ref 0.7–1.2)
EOSINOPHILS ABSOLUTE: 0.1 K/UL (ref 0–0.7)
EOSINOPHILS RELATIVE PERCENT: 2.1 %
GFR AFRICAN AMERICAN: 57.6
GFR NON-AFRICAN AMERICAN: 47.6
GLUCOSE BLD-MCNC: 114 MG/DL (ref 60–115)
GLUCOSE BLD-MCNC: 125 MG/DL (ref 70–99)
GLUCOSE BLD-MCNC: 137 MG/DL (ref 60–115)
GLUCOSE BLD-MCNC: 200 MG/DL (ref 60–115)
GLUCOSE BLD-MCNC: 243 MG/DL (ref 60–115)
HCT VFR BLD CALC: 35.2 % (ref 42–52)
HEMOGLOBIN: 11.9 G/DL (ref 14–18)
LACTIC ACID: 1.2 MMOL/L (ref 0.5–2.2)
LV EF: 50 %
LVEF MODALITY: NORMAL
LYMPHOCYTES ABSOLUTE: 2.2 K/UL (ref 1–4.8)
LYMPHOCYTES RELATIVE PERCENT: 31.9 %
MCH RBC QN AUTO: 31.9 PG (ref 27–31.3)
MCHC RBC AUTO-ENTMCNC: 33.7 % (ref 33–37)
MCV RBC AUTO: 94.6 FL (ref 80–100)
MONOCYTES ABSOLUTE: 0.6 K/UL (ref 0.2–0.8)
MONOCYTES RELATIVE PERCENT: 8.6 %
NEUTROPHILS ABSOLUTE: 3.9 K/UL (ref 1.4–6.5)
NEUTROPHILS RELATIVE PERCENT: 56.9 %
PDW BLD-RTO: 14.3 % (ref 11.5–14.5)
PERFORMED ON: ABNORMAL
PERFORMED ON: NORMAL
PLATELET # BLD: 167 K/UL (ref 130–400)
POTASSIUM REFLEX MAGNESIUM: 4.3 MEQ/L (ref 3.4–4.9)
RBC # BLD: 3.72 M/UL (ref 4.7–6.1)
REASON FOR REJECTION: NORMAL
REJECTED TEST: NORMAL
SODIUM BLD-SCNC: 142 MEQ/L (ref 135–144)
WBC # BLD: 6.9 K/UL (ref 4.8–10.8)

## 2019-07-08 PROCEDURE — 93306 TTE W/DOPPLER COMPLETE: CPT

## 2019-07-08 PROCEDURE — 6360000002 HC RX W HCPCS: Performed by: PHYSICIAN ASSISTANT

## 2019-07-08 PROCEDURE — 2060000000 HC ICU INTERMEDIATE R&B

## 2019-07-08 PROCEDURE — 6370000000 HC RX 637 (ALT 250 FOR IP): Performed by: INTERNAL MEDICINE

## 2019-07-08 PROCEDURE — 85025 COMPLETE CBC W/AUTO DIFF WBC: CPT

## 2019-07-08 PROCEDURE — 96372 THER/PROPH/DIAG INJ SC/IM: CPT

## 2019-07-08 PROCEDURE — 36415 COLL VENOUS BLD VENIPUNCTURE: CPT

## 2019-07-08 PROCEDURE — 94640 AIRWAY INHALATION TREATMENT: CPT

## 2019-07-08 PROCEDURE — 97116 GAIT TRAINING THERAPY: CPT

## 2019-07-08 PROCEDURE — 83605 ASSAY OF LACTIC ACID: CPT

## 2019-07-08 PROCEDURE — 6370000000 HC RX 637 (ALT 250 FOR IP): Performed by: PHYSICIAN ASSISTANT

## 2019-07-08 PROCEDURE — 80048 BASIC METABOLIC PNL TOTAL CA: CPT

## 2019-07-08 PROCEDURE — 94760 N-INVAS EAR/PLS OXIMETRY 1: CPT

## 2019-07-08 PROCEDURE — 2700000000 HC OXYGEN THERAPY PER DAY

## 2019-07-08 RX ADMIN — ASPIRIN 81 MG 81 MG: 81 TABLET ORAL at 08:15

## 2019-07-08 RX ADMIN — IPRATROPIUM BROMIDE AND ALBUTEROL SULFATE 3 ML: .5; 3 SOLUTION RESPIRATORY (INHALATION) at 19:44

## 2019-07-08 RX ADMIN — ENOXAPARIN SODIUM 40 MG: 40 INJECTION SUBCUTANEOUS at 08:15

## 2019-07-08 RX ADMIN — INSULIN LISPRO 4 UNITS: 100 INJECTION, SOLUTION INTRAVENOUS; SUBCUTANEOUS at 12:56

## 2019-07-08 RX ADMIN — CARVEDILOL 6.25 MG: 6.25 TABLET, FILM COATED ORAL at 08:15

## 2019-07-08 RX ADMIN — MOMETASONE FUROATE AND FORMOTEROL FUMARATE DIHYDRATE 2 PUFF: 100; 5 AEROSOL RESPIRATORY (INHALATION) at 07:17

## 2019-07-08 RX ADMIN — FAMOTIDINE 20 MG: 20 TABLET ORAL at 08:15

## 2019-07-08 RX ADMIN — IPRATROPIUM BROMIDE AND ALBUTEROL SULFATE 3 ML: .5; 3 SOLUTION RESPIRATORY (INHALATION) at 13:31

## 2019-07-08 RX ADMIN — LEVOTHYROXINE SODIUM 25 MCG: 25 TABLET ORAL at 08:15

## 2019-07-08 RX ADMIN — IPRATROPIUM BROMIDE AND ALBUTEROL SULFATE 3 ML: .5; 3 SOLUTION RESPIRATORY (INHALATION) at 07:17

## 2019-07-08 RX ADMIN — MOMETASONE FUROATE AND FORMOTEROL FUMARATE DIHYDRATE 2 PUFF: 100; 5 AEROSOL RESPIRATORY (INHALATION) at 19:44

## 2019-07-08 ASSESSMENT — PAIN SCALES - GENERAL
PAINLEVEL_OUTOF10: 0

## 2019-07-08 NOTE — CARE COORDINATION
PLANNING FOR DISCHARGE LATER TODAY ONCE ECHO DONE. PT DENIES NEEDS. PT/OT EVAL DONE AND REC. HOME, NO NEEDS.

## 2019-07-09 VITALS
WEIGHT: 160.5 LBS | BODY MASS INDEX: 25.79 KG/M2 | HEIGHT: 66 IN | HEART RATE: 67 BPM | RESPIRATION RATE: 16 BRPM | OXYGEN SATURATION: 91 % | TEMPERATURE: 97.5 F | DIASTOLIC BLOOD PRESSURE: 71 MMHG | SYSTOLIC BLOOD PRESSURE: 161 MMHG

## 2019-07-09 LAB
ANION GAP SERPL CALCULATED.3IONS-SCNC: 13 MEQ/L (ref 9–15)
BASOPHILS ABSOLUTE: 0 K/UL (ref 0–0.2)
BASOPHILS RELATIVE PERCENT: 0.6 %
BUN BLDV-MCNC: 25 MG/DL (ref 8–23)
CALCIUM SERPL-MCNC: 8.5 MG/DL (ref 8.5–9.9)
CHLORIDE BLD-SCNC: 107 MEQ/L (ref 95–107)
CO2: 22 MEQ/L (ref 20–31)
CREAT SERPL-MCNC: 1.18 MG/DL (ref 0.7–1.2)
EOSINOPHILS ABSOLUTE: 0.1 K/UL (ref 0–0.7)
EOSINOPHILS RELATIVE PERCENT: 2.3 %
GFR AFRICAN AMERICAN: >60
GFR NON-AFRICAN AMERICAN: 57.9
GLUCOSE BLD-MCNC: 140 MG/DL (ref 70–99)
GLUCOSE BLD-MCNC: 145 MG/DL (ref 60–115)
GLUCOSE BLD-MCNC: 193 MG/DL (ref 60–115)
HCT VFR BLD CALC: 35.5 % (ref 42–52)
HEMOGLOBIN: 12.2 G/DL (ref 14–18)
LACTIC ACID: 1.3 MMOL/L (ref 0.5–2.2)
LYMPHOCYTES ABSOLUTE: 2 K/UL (ref 1–4.8)
LYMPHOCYTES RELATIVE PERCENT: 33 %
MCH RBC QN AUTO: 32.3 PG (ref 27–31.3)
MCHC RBC AUTO-ENTMCNC: 34.4 % (ref 33–37)
MCV RBC AUTO: 93.8 FL (ref 80–100)
MONOCYTES ABSOLUTE: 0.5 K/UL (ref 0.2–0.8)
MONOCYTES RELATIVE PERCENT: 8.8 %
NEUTROPHILS ABSOLUTE: 3.4 K/UL (ref 1.4–6.5)
NEUTROPHILS RELATIVE PERCENT: 55.3 %
PDW BLD-RTO: 13.7 % (ref 11.5–14.5)
PERFORMED ON: ABNORMAL
PERFORMED ON: ABNORMAL
PLATELET # BLD: 172 K/UL (ref 130–400)
POTASSIUM REFLEX MAGNESIUM: 4.5 MEQ/L (ref 3.4–4.9)
RBC # BLD: 3.79 M/UL (ref 4.7–6.1)
SODIUM BLD-SCNC: 142 MEQ/L (ref 135–144)
WBC # BLD: 6.2 K/UL (ref 4.8–10.8)

## 2019-07-09 PROCEDURE — 94640 AIRWAY INHALATION TREATMENT: CPT

## 2019-07-09 PROCEDURE — 93010 ELECTROCARDIOGRAM REPORT: CPT | Performed by: INTERNAL MEDICINE

## 2019-07-09 PROCEDURE — 96372 THER/PROPH/DIAG INJ SC/IM: CPT

## 2019-07-09 PROCEDURE — 83605 ASSAY OF LACTIC ACID: CPT

## 2019-07-09 PROCEDURE — 85025 COMPLETE CBC W/AUTO DIFF WBC: CPT

## 2019-07-09 PROCEDURE — 6370000000 HC RX 637 (ALT 250 FOR IP): Performed by: INTERNAL MEDICINE

## 2019-07-09 PROCEDURE — 2580000003 HC RX 258: Performed by: PHYSICIAN ASSISTANT

## 2019-07-09 PROCEDURE — 94664 DEMO&/EVAL PT USE INHALER: CPT

## 2019-07-09 PROCEDURE — 6360000002 HC RX W HCPCS: Performed by: PHYSICIAN ASSISTANT

## 2019-07-09 PROCEDURE — 6370000000 HC RX 637 (ALT 250 FOR IP): Performed by: PHYSICIAN ASSISTANT

## 2019-07-09 PROCEDURE — 80048 BASIC METABOLIC PNL TOTAL CA: CPT

## 2019-07-09 PROCEDURE — 36415 COLL VENOUS BLD VENIPUNCTURE: CPT

## 2019-07-09 RX ORDER — CARVEDILOL 12.5 MG/1
6.25 TABLET ORAL 2 TIMES DAILY WITH MEALS
Qty: 180 TABLET | Refills: 0 | Status: ON HOLD
Start: 2019-07-09 | End: 2021-12-28 | Stop reason: HOSPADM

## 2019-07-09 RX ADMIN — Medication 10 ML: at 08:41

## 2019-07-09 RX ADMIN — Medication 10 ML: at 00:55

## 2019-07-09 RX ADMIN — INSULIN LISPRO 2 UNITS: 100 INJECTION, SOLUTION INTRAVENOUS; SUBCUTANEOUS at 08:34

## 2019-07-09 RX ADMIN — LEVOTHYROXINE SODIUM 25 MCG: 25 TABLET ORAL at 08:33

## 2019-07-09 RX ADMIN — IPRATROPIUM BROMIDE AND ALBUTEROL SULFATE 3 ML: .5; 3 SOLUTION RESPIRATORY (INHALATION) at 07:24

## 2019-07-09 RX ADMIN — ENOXAPARIN SODIUM 40 MG: 40 INJECTION SUBCUTANEOUS at 08:34

## 2019-07-09 RX ADMIN — FAMOTIDINE 20 MG: 20 TABLET ORAL at 08:34

## 2019-07-09 RX ADMIN — IPRATROPIUM BROMIDE AND ALBUTEROL SULFATE 3 ML: .5; 3 SOLUTION RESPIRATORY (INHALATION) at 13:08

## 2019-07-09 RX ADMIN — INSULIN LISPRO 2 UNITS: 100 INJECTION, SOLUTION INTRAVENOUS; SUBCUTANEOUS at 12:05

## 2019-07-09 RX ADMIN — CARVEDILOL 6.25 MG: 6.25 TABLET, FILM COATED ORAL at 08:34

## 2019-07-09 RX ADMIN — CARVEDILOL 6.25 MG: 6.25 TABLET, FILM COATED ORAL at 00:54

## 2019-07-09 RX ADMIN — ATORVASTATIN CALCIUM 40 MG: 40 TABLET, FILM COATED ORAL at 00:53

## 2019-07-09 RX ADMIN — MOMETASONE FUROATE AND FORMOTEROL FUMARATE DIHYDRATE 2 PUFF: 100; 5 AEROSOL RESPIRATORY (INHALATION) at 07:25

## 2019-07-09 RX ADMIN — ASPIRIN 81 MG 81 MG: 81 TABLET ORAL at 08:34

## 2019-07-09 NOTE — DISCHARGE INSTR - ACTIVITY
As tolerated. Get up from lying down slowly. If you feel dizzy, sit down. If dizziness does not resolve call you doctor.

## 2019-07-09 NOTE — DISCHARGE SUMMARY
this medication, and follow the directions you see here. carvedilol 12.5 MG tablet  Commonly known as:  COREG  Take 0.5 tablets by mouth 2 times daily (with meals)  What changed:  See the new instructions. CONTINUE taking these medications    aspirin 81 MG tablet     atorvastatin 40 MG tablet  Commonly known as:  LIPITOR     esomeprazole Magnesium 40 MG Pack  Commonly known as:  NEXIUM     ipratropium-albuterol 0.5-2.5 (3) MG/3ML Soln nebulizer solution  Commonly known as:  DUONEB  Inhale 3 mLs into the lungs three times daily     JANUVIA 50 MG tablet  Generic drug:  SITagliptin     nitroGLYCERIN 0.4 MG SL tablet  Commonly known as:  NITROSTAT     SYMBICORT 80-4.5 MCG/ACT Aero  Generic drug:  budesonide-formoterol     SYNTHROID 25 MCG tablet  Generic drug:  levothyroxine     ursodiol 300 MG capsule  Commonly known as:  ACTIGALL        STOP taking these medications    ALTACE 5 MG tablet  Generic drug:  ramipril     amLODIPine 5 MG tablet  Commonly known as:  NORVASC     azithromycin 250 MG tablet  Commonly known as:  ZITHROMAX Z-JAVDE     Cranberry 1000 MG Caps     exenatide 5 MCG/0.02ML injection  Commonly known as:  BYETTA     metFORMIN 500 MG tablet  Commonly known as:  GLUCOPHAGE     methylPREDNISolone 4 MG tablet  Commonly known as:  MEDROL (JAVED)     mometasone 0.1 % cream  Commonly known as:  ELOCON     neomycin-bacitracin-polymyxin 400-5-5000 ointment  Commonly known as:  NEOSPORIN     predniSONE 20 MG tablet  Commonly known as:  Ancel Clock           Where to Get Your Medications      Information about where to get these medications is not yet available    Ask your nurse or doctor about these medications  · carvedilol 12.5 MG tablet       Activity: activity as tolerated  Diet: cardiac diet  Wound Care: none needed    Follow-up with cardiology in 1-2 weeks  Follow - up with PCP in 1-2 weeks    Signed:   Garrett Strong  7/9/2019  9:18 AM     D/C time > 30 minutes

## 2019-07-09 NOTE — PROGRESS NOTES
[facility] EMERGENCY [facility] [place] Respiratory Therapy Evaluation   Current Order:  duoneb tid and albuterol q2 prn       Home Regimen: tid      Ordering Physician: Stephanie Shea  Re-evaluation Date:  7/12     Diagnosis: hypotension      Patient Status: Stable / Unstable + Physician notified    The following MDI Criteria must be met in order to convert aerosol to MDI with spacer.  If unable to meet, MDI will be converted to aerosol:  []  Patient able to demonstrate the ability to use MDI effectively  []  Patient alert and cooperative  []  Patient able to take deep breath with 5-10 second hold  []  Medication(s) available in this delivery method   []  Peak flow greater than or equal to 200 ml/min            Current Order Substituted To  (same drug, same frequency)   Aerosol to MDI [] Albuterol Sulfate 0.083% unit dose by aerosol Albuterol Sulfate MDI 2 puffs by inhalation with spacer    [] Levalbuterol 1.25 mg unit dose by aerosol Levalbuterol MDI 2 puffs by inhalation with spacer    [] Levalbuterol 0.63 mg unit dose by aerosol Levalbuterol MDI 2 puffs by inhalation with spacer    [] Ipratropium Bromide 0.02% unit dose by aerosol Ipratropium Bromide MDI 2 puffs by inhalation with spacer    [] Duoneb (Ipratropium + Albuterol) unit dose by aerosol Ipratropium MDI + Albuterol MDI 2 puffs by inhalation w/spacer   MDI to Aerosol [] Albuterol Sulfate MDI Albuterol Sulfate 0.083% unit dose by aerosol    [] Levalbuterol MDI 2 puffs by inhalation Levalbuterol 1.25 mg unit dose by aerosol    [] Ipratropium Bromide MDI by inhalation Ipratropium Bromide 0.02% unit dose by aerosol    [] Combivent (Ipratropium + Albuterol) MDI by inhalation Duoneb (Ipratropium + Albuterol) unit dose by aerosol   Treatment Assessment [Frequency/Schedule]:  Change frequency to: no changes per home freq __________________________________________________per Protocol, P&T, MEC      Points 0 1 2 3 4   Pulmonary Status  Non-Smoker  []   Smoking history   < 20 pack years  []

## 2019-09-19 ENCOUNTER — HOSPITAL ENCOUNTER (INPATIENT)
Age: 84
LOS: 1 days | Discharge: HOME | DRG: 282 | End: 2019-09-21
Attending: INTERNAL MEDICINE | Admitting: INTERNAL MEDICINE
Payer: MEDICARE

## 2019-09-19 ENCOUNTER — APPOINTMENT (OUTPATIENT)
Dept: GENERAL RADIOLOGY | Age: 84
DRG: 282 | End: 2019-09-19
Payer: MEDICARE

## 2019-09-19 PROBLEM — R07.9 CHEST PAIN: Status: ACTIVE | Noted: 2019-09-19

## 2019-09-19 LAB
ALBUMIN SERPL-MCNC: 4 G/DL (ref 3.5–4.6)
ALP BLD-CCNC: 56 U/L (ref 35–104)
ALT SERPL-CCNC: 18 U/L (ref 0–41)
ANION GAP SERPL CALCULATED.3IONS-SCNC: 12 MEQ/L (ref 9–15)
APTT: 30.5 SEC (ref 24.4–36.8)
AST SERPL-CCNC: 17 U/L (ref 0–40)
BASOPHILS ABSOLUTE: 0.1 K/UL (ref 0–0.2)
BASOPHILS RELATIVE PERCENT: 1.1 %
BILIRUB SERPL-MCNC: 0.4 MG/DL (ref 0.2–0.7)
BUN BLDV-MCNC: 25 MG/DL (ref 8–23)
CALCIUM SERPL-MCNC: 9 MG/DL (ref 8.5–9.9)
CHLORIDE BLD-SCNC: 103 MEQ/L (ref 95–107)
CO2: 26 MEQ/L (ref 20–31)
CREAT SERPL-MCNC: 1.27 MG/DL (ref 0.7–1.2)
EOSINOPHILS ABSOLUTE: 0.2 K/UL (ref 0–0.7)
EOSINOPHILS RELATIVE PERCENT: 2.8 %
GFR AFRICAN AMERICAN: >60
GFR NON-AFRICAN AMERICAN: 53.2
GLOBULIN: 3.3 G/DL (ref 2.3–3.5)
GLUCOSE BLD-MCNC: 171 MG/DL (ref 70–99)
HCT VFR BLD CALC: 40.6 % (ref 42–52)
HEMOGLOBIN: 13.7 G/DL (ref 14–18)
INR BLD: 1
LYMPHOCYTES ABSOLUTE: 2.1 K/UL (ref 1–4.8)
LYMPHOCYTES RELATIVE PERCENT: 34.9 %
MCH RBC QN AUTO: 31.9 PG (ref 27–31.3)
MCHC RBC AUTO-ENTMCNC: 33.8 % (ref 33–37)
MCV RBC AUTO: 94.5 FL (ref 80–100)
MONOCYTES ABSOLUTE: 0.6 K/UL (ref 0.2–0.8)
MONOCYTES RELATIVE PERCENT: 9.9 %
NEUTROPHILS ABSOLUTE: 3.1 K/UL (ref 1.4–6.5)
NEUTROPHILS RELATIVE PERCENT: 51.3 %
PDW BLD-RTO: 13.2 % (ref 11.5–14.5)
PLATELET # BLD: 200 K/UL (ref 130–400)
POTASSIUM SERPL-SCNC: 4.2 MEQ/L (ref 3.4–4.9)
PROTHROMBIN TIME: 13.9 SEC (ref 12.3–14.9)
RBC # BLD: 4.29 M/UL (ref 4.7–6.1)
SODIUM BLD-SCNC: 141 MEQ/L (ref 135–144)
TOTAL CK: 79 U/L (ref 0–190)
TOTAL PROTEIN: 7.3 G/DL (ref 6.3–8)
TROPONIN: 0.03 NG/ML (ref 0–0.01)
TROPONIN: 0.04 NG/ML (ref 0–0.01)
WBC # BLD: 6.1 K/UL (ref 4.8–10.8)

## 2019-09-19 PROCEDURE — 85025 COMPLETE CBC W/AUTO DIFF WBC: CPT

## 2019-09-19 PROCEDURE — 94664 DEMO&/EVAL PT USE INHALER: CPT

## 2019-09-19 PROCEDURE — 6360000002 HC RX W HCPCS: Performed by: PERSONAL EMERGENCY RESPONSE ATTENDANT

## 2019-09-19 PROCEDURE — 2580000003 HC RX 258: Performed by: PERSONAL EMERGENCY RESPONSE ATTENDANT

## 2019-09-19 PROCEDURE — 93005 ELECTROCARDIOGRAM TRACING: CPT | Performed by: PERSONAL EMERGENCY RESPONSE ATTENDANT

## 2019-09-19 PROCEDURE — 6370000000 HC RX 637 (ALT 250 FOR IP): Performed by: PERSONAL EMERGENCY RESPONSE ATTENDANT

## 2019-09-19 PROCEDURE — 85730 THROMBOPLASTIN TIME PARTIAL: CPT

## 2019-09-19 PROCEDURE — G0378 HOSPITAL OBSERVATION PER HR: HCPCS

## 2019-09-19 PROCEDURE — 36415 COLL VENOUS BLD VENIPUNCTURE: CPT

## 2019-09-19 PROCEDURE — 84484 ASSAY OF TROPONIN QUANT: CPT

## 2019-09-19 PROCEDURE — 80053 COMPREHEN METABOLIC PANEL: CPT

## 2019-09-19 PROCEDURE — 93005 ELECTROCARDIOGRAM TRACING: CPT | Performed by: EMERGENCY MEDICINE

## 2019-09-19 PROCEDURE — 99285 EMERGENCY DEPT VISIT HI MDM: CPT

## 2019-09-19 PROCEDURE — 71046 X-RAY EXAM CHEST 2 VIEWS: CPT

## 2019-09-19 PROCEDURE — 85610 PROTHROMBIN TIME: CPT

## 2019-09-19 PROCEDURE — 82550 ASSAY OF CK (CPK): CPT

## 2019-09-19 PROCEDURE — 96372 THER/PROPH/DIAG INJ SC/IM: CPT

## 2019-09-19 RX ORDER — IPRATROPIUM BROMIDE AND ALBUTEROL SULFATE 2.5; .5 MG/3ML; MG/3ML
1 SOLUTION RESPIRATORY (INHALATION) EVERY 4 HOURS PRN
Status: DISCONTINUED | OUTPATIENT
Start: 2019-09-19 | End: 2019-09-21 | Stop reason: HOSPADM

## 2019-09-19 RX ORDER — SODIUM CHLORIDE 0.9 % (FLUSH) 0.9 %
10 SYRINGE (ML) INJECTION PRN
Status: DISCONTINUED | OUTPATIENT
Start: 2019-09-19 | End: 2019-09-21 | Stop reason: HOSPADM

## 2019-09-19 RX ORDER — IPRATROPIUM BROMIDE AND ALBUTEROL SULFATE 2.5; .5 MG/3ML; MG/3ML
1 SOLUTION RESPIRATORY (INHALATION) CONTINUOUS PRN
Status: DISCONTINUED | OUTPATIENT
Start: 2019-09-19 | End: 2019-09-19

## 2019-09-19 RX ORDER — ASPIRIN 81 MG/1
81 TABLET, CHEWABLE ORAL DAILY
Status: DISCONTINUED | OUTPATIENT
Start: 2019-09-20 | End: 2019-09-21 | Stop reason: HOSPADM

## 2019-09-19 RX ORDER — CARVEDILOL 6.25 MG/1
6.25 TABLET ORAL ONCE
Status: COMPLETED | OUTPATIENT
Start: 2019-09-19 | End: 2019-09-19

## 2019-09-19 RX ORDER — ONDANSETRON 2 MG/ML
4 INJECTION INTRAMUSCULAR; INTRAVENOUS EVERY 6 HOURS PRN
Status: DISCONTINUED | OUTPATIENT
Start: 2019-09-19 | End: 2019-09-21 | Stop reason: HOSPADM

## 2019-09-19 RX ORDER — ASPIRIN 81 MG/1
162 TABLET, CHEWABLE ORAL ONCE
Status: COMPLETED | OUTPATIENT
Start: 2019-09-19 | End: 2019-09-19

## 2019-09-19 RX ORDER — NITROGLYCERIN 0.4 MG/1
0.4 TABLET SUBLINGUAL EVERY 5 MIN PRN
Status: DISCONTINUED | OUTPATIENT
Start: 2019-09-19 | End: 2019-09-21 | Stop reason: HOSPADM

## 2019-09-19 RX ORDER — SODIUM CHLORIDE 0.9 % (FLUSH) 0.9 %
10 SYRINGE (ML) INJECTION EVERY 12 HOURS SCHEDULED
Status: DISCONTINUED | OUTPATIENT
Start: 2019-09-19 | End: 2019-09-21 | Stop reason: HOSPADM

## 2019-09-19 RX ADMIN — NITROGLYCERIN 0.4 MG: 0.4 TABLET, ORALLY DISINTEGRATING SUBLINGUAL at 19:57

## 2019-09-19 RX ADMIN — ENOXAPARIN SODIUM 70 MG: 80 INJECTION SUBCUTANEOUS at 22:07

## 2019-09-19 RX ADMIN — ASPIRIN 81 MG 162 MG: 81 TABLET ORAL at 19:56

## 2019-09-19 RX ADMIN — CARVEDILOL 6.25 MG: 6.25 TABLET, FILM COATED ORAL at 20:43

## 2019-09-19 RX ADMIN — Medication 10 ML: at 22:07

## 2019-09-19 ASSESSMENT — PAIN SCALES - GENERAL: PAINLEVEL_OUTOF10: 0

## 2019-09-19 ASSESSMENT — HEART SCORE: ECG: 0

## 2019-09-19 ASSESSMENT — ENCOUNTER SYMPTOMS
SORE THROAT: 0
SHORTNESS OF BREATH: 1
RHINORRHEA: 0
DIARRHEA: 0
ABDOMINAL PAIN: 0
COUGH: 1
BLOOD IN STOOL: 0
VOMITING: 0
COLOR CHANGE: 0
NAUSEA: 1

## 2019-09-19 NOTE — ED TRIAGE NOTES
Pt c/o chest pain ans SOB that started today, Pt is A&OX3, calm, ambulatory, afebrile, breathes are equal and unlabored.

## 2019-09-19 NOTE — ED PROVIDER NOTES
[address] [facility] ED  eMERGENCY dEPARTMENT eNCOUnter      Pt Name: Yoel Tanner  MRN: [id number]  [place] 3/5/1929  Date of evaluation: 9/19/2019  Provider: NA Baum      HISTORY OF PRESENT ILLNESS    Yoel Tanner is a 80 y.o. male with PMHx of pacemaker, CAD, anemia, diabetes, hypertension, hypothyroidism, non-STEMI presents to the emergency department with chest pain. Patient 1.5 hours ago while walking from the car to a restaurant started with midsternal sharp pains which lasted approximately 5 minutes. It seemed to be relieved with rest.  He was lightheaded, short of breath, nauseous, and questionably diaphoretic. He is unsure if he was wheezing at the time, stating that he wheezes pretty frequently. he has been asymptomatic since and is asymptomatic at this time. Family became concerned and wanted him to come to the emergency room. Patient is supposed to wear oxygen but does not per family, patient is unsure how often he is supposed to be wearing oxygen. He has had this pain in the past and states it only goes away on its own but has never had it evaluated by .  He states it has been years since he is seeing his cardiologist.  He has had worsening productive cough lately. He denies fevers or any oral intake today. HPI    Nursing Notes were reviewed. REVIEW OF SYSTEMS       Review of Systems   Constitutional: Negative for appetite change, chills and fever. HENT: Negative for congestion, rhinorrhea and sore throat. Respiratory: Positive for cough and shortness of breath. Cardiovascular: Positive for chest pain. Gastrointestinal: Positive for nausea. Negative for abdominal pain, blood in stool, diarrhea and vomiting. Genitourinary: Negative for difficulty urinating. Musculoskeletal: Negative for neck stiffness. Skin: Negative for color change and rash. Neurological: Negative for dizziness, syncope, weakness, light-headedness, numbness and headaches.    All other systems reviewed and are negative. PAST MEDICAL HISTORY     Past Medical History:   Diagnosis Date    Anemia     CAD (coronary artery disease) 4/16/2015    DM (diabetes mellitus) [facility] [address] [facility]     Dyslipidemia     History of tobacco abuse     HTN (hypertension)     Hypothyroidism     [facility]     Pacemaker 4/16/2015         SURGICAL HISTORY       Past Surgical History:   Procedure Laterality Date    CATARACT REMOVAL      MIDDLE EAR SURGERY Left 1998    \"they had to reset the bones\" after an infection         CURRENT MEDICATIONS       Previous Medications    ALBUTEROL SULFATE HFA (PROVENTIL HFA) 108 (90 BASE) MCG/ACT INHALER    Inhale 2 puffs into the lungs every 6 hours as needed for Wheezing    ASPIRIN 81 MG TABLET    Take 81 mg by mouth daily. ATORVASTATIN (LIPITOR) 40 MG TABLET    Take 40 mg by mouth daily    BUDESONIDE-FORMOTEROL (SYMBICORT) 80-4.5 MCG/ACT AERO    Inhale 2 puffs into the lungs 2 times daily. CARVEDILOL (COREG) 12.5 MG TABLET    Take 0.5 tablets by mouth 2 times daily (with meals)    ESOMEPRAZOLE MAGNESIUM (NEXIUM) 40 MG PACK    Take 40 mg by mouth daily    IPRATROPIUM-ALBUTEROL (DUONEB) 0.5-2.5 (3) MG/3ML SOLN NEBULIZER SOLUTION    Inhale 3 mLs into the lungs three times daily    NITROGLYCERIN (NITROSTAT) 0.4 MG SL TABLET    Place 0.4 mg under the tongue every 5 minutes as needed for Chest pain    SITAGLIPTIN (JANUVIA) 50 MG TABLET    Take 50 mg by mouth daily    SYNTHROID 25 MCG TABLET        URSODIOL (ACTIGALL) 300 MG CAPSULE    Take 300 mg by mouth 2 times daily       ALLERGIES     Patient has no known allergies. FAMILY HISTORY     History reviewed. No pertinent family history. SOCIAL HISTORY       Social History     Socioeconomic History    Marital status:       Spouse name: None    Number of children: None    Years of education: None    Highest education level: None   Occupational History    None   Social Needs    Financial resource strain: None    Food insecurity:     Worry: None     Inability: None    Transportation needs:     Medical: None     Non-medical: None   Tobacco Use    Smoking status: Former Smoker    Smokeless tobacco: Never Used   Substance and Sexual Activity    Alcohol use: No    Drug use: No    Sexual activity: None   Lifestyle    Physical activity:     Days per week: None     Minutes per session: None    Stress: None   Relationships    Social connections:     Talks on phone: None     Gets together: None     Attends Denominational service: None     Active member of club or organization: None     Attends meetings of clubs or organizations: None     Relationship status: None    Intimate partner violence:     Fear of current or ex partner: None     Emotionally abused: None     Physically abused: None     Forced sexual activity: None   Other Topics Concern    None   Social History Narrative    None         PHYSICAL EXAM         ED Triage Vitals [09/19/19 1831]   BP Temp Temp Source Pulse Resp SpO2 Height Weight   (!) 144/62 97.9 °F (36.6 °C) Temporal 81 16 94 % -- --       Physical Exam   Constitutional: He is oriented to person, place, and time. He appears well-developed and well-nourished. Laughing and smiling in the room nontoxic   HENT:   Head: Normocephalic and atraumatic. Mouth/Throat: Oropharynx is clear and moist.   Eyes: Pupils are equal, round, and reactive to light. Conjunctivae and EOM are normal.   Neck: Normal range of motion. Neck supple. No tracheal deviation present. Cardiovascular: Normal heart sounds and intact distal pulses. Pulmonary/Chest: Effort normal and breath sounds normal. No stridor. No respiratory distress. Abdominal: Soft. Bowel sounds are normal. He exhibits no distension and no mass. There is no tenderness. There is no rebound and no guarding. Musculoskeletal: Normal range of motion. Neurological: He is alert and oriented to person, place, and time.  He has 1831 09/19/19 1833 09/19/19 1916   BP: (!) 144/62  (!) 145/67   Pulse: 81  68   Resp: 16  18   Temp: 97.9 °F (36.6 °C) 98.4 °F (36.9 °C)    TempSrc: Temporal     SpO2: 94%  94%         MDM    Chest x-ray shows no acute process. Blood work does reveal creatinine 1.27, BUN 25, troponin 0.036. Patient does have chronically elevated troponins. Heart Score 4. On reassessment, patient is rubbing his chest stating that he does have some chest pain and shortness of breath present. He is unsure what time the chest pain exactly came back. Patient provides very vague information. There is no diaphoresis. He will be admitted at this time. EKG will be repeated and ASA and nitro given. Nitro did relieve some of patients chest pain. He is noted to be slightly hypoxic and will be placed on O2 and given breathing treatments. CRITICAL CARE TIME   Total Critical Caretime was 0 minutes, excluding separately reportable procedures. There was a high probability of clinically significant/life threatening deterioration in the patient's condition which required my urgent intervention. Procedures    FINAL IMPRESSION      1. Chest pain, unspecified type          DISPOSITION/PLAN   DISPOSITION        PATIENT REFERRED TO:  No follow-up provider specified. DISCHARGE MEDICATIONS:  New Prescriptions    No medications on file          (Please notethat portions of this note were completed with a voice recognition program.  Efforts were made to edit the dictations but occasionally words are mis-transcribed. )    NA Mckeon (electronically signed)  Emergency Physician Assistant       Rosetta Choi  09/19/19 2014

## 2019-09-20 ENCOUNTER — APPOINTMENT (OUTPATIENT)
Dept: CARDIAC CATH/INVASIVE PROCEDURES | Age: 84
DRG: 282 | End: 2019-09-20
Payer: MEDICARE

## 2019-09-20 PROBLEM — I21.4 NSTEMI (NON-ST ELEVATED MYOCARDIAL INFARCTION) (HCC): Status: ACTIVE | Noted: 2019-09-20

## 2019-09-20 LAB
GLUCOSE BLD-MCNC: 142 MG/DL (ref 60–115)
GLUCOSE BLD-MCNC: 238 MG/DL (ref 60–115)
HCT VFR BLD CALC: 36.6 % (ref 42–52)
HEMOGLOBIN: 12.4 G/DL (ref 14–18)
LV EF: 50 %
LVEF MODALITY: NORMAL
MCH RBC QN AUTO: 32.1 PG (ref 27–31.3)
MCHC RBC AUTO-ENTMCNC: 33.8 % (ref 33–37)
MCV RBC AUTO: 95 FL (ref 80–100)
PDW BLD-RTO: 13.2 % (ref 11.5–14.5)
PERFORMED ON: ABNORMAL
PERFORMED ON: ABNORMAL
PLATELET # BLD: 177 K/UL (ref 130–400)
RBC # BLD: 3.85 M/UL (ref 4.7–6.1)
TROPONIN: 0.03 NG/ML (ref 0–0.01)
WBC # BLD: 5.3 K/UL (ref 4.8–10.8)

## 2019-09-20 PROCEDURE — C1894 INTRO/SHEATH, NON-LASER: HCPCS

## 2019-09-20 PROCEDURE — 2060000000 HC ICU INTERMEDIATE R&B

## 2019-09-20 PROCEDURE — 93459 L HRT ART/GRFT ANGIO: CPT | Performed by: INTERNAL MEDICINE

## 2019-09-20 PROCEDURE — B2151ZZ FLUOROSCOPY OF LEFT HEART USING LOW OSMOLAR CONTRAST: ICD-10-PCS | Performed by: INTERNAL MEDICINE

## 2019-09-20 PROCEDURE — 84484 ASSAY OF TROPONIN QUANT: CPT

## 2019-09-20 PROCEDURE — 6370000000 HC RX 637 (ALT 250 FOR IP): Performed by: INTERNAL MEDICINE

## 2019-09-20 PROCEDURE — 2580000003 HC RX 258: Performed by: INTERNAL MEDICINE

## 2019-09-20 PROCEDURE — 94640 AIRWAY INHALATION TREATMENT: CPT

## 2019-09-20 PROCEDURE — C1725 CATH, TRANSLUMIN NON-LASER: HCPCS

## 2019-09-20 PROCEDURE — C1769 GUIDE WIRE: HCPCS

## 2019-09-20 PROCEDURE — 85027 COMPLETE CBC AUTOMATED: CPT

## 2019-09-20 PROCEDURE — 2709999900 HC NON-CHARGEABLE SUPPLY

## 2019-09-20 PROCEDURE — 2580000003 HC RX 258

## 2019-09-20 PROCEDURE — 6360000002 HC RX W HCPCS: Performed by: INTERNAL MEDICINE

## 2019-09-20 PROCEDURE — 99223 1ST HOSP IP/OBS HIGH 75: CPT | Performed by: INTERNAL MEDICINE

## 2019-09-20 PROCEDURE — 93010 ELECTROCARDIOGRAM REPORT: CPT | Performed by: INTERNAL MEDICINE

## 2019-09-20 PROCEDURE — 6360000004 HC RX CONTRAST MEDICATION: Performed by: INTERNAL MEDICINE

## 2019-09-20 PROCEDURE — B2111ZZ FLUOROSCOPY OF MULTIPLE CORONARY ARTERIES USING LOW OSMOLAR CONTRAST: ICD-10-PCS | Performed by: INTERNAL MEDICINE

## 2019-09-20 PROCEDURE — 6360000002 HC RX W HCPCS

## 2019-09-20 PROCEDURE — 2580000003 HC RX 258: Performed by: PERSONAL EMERGENCY RESPONSE ATTENDANT

## 2019-09-20 PROCEDURE — 2500000003 HC RX 250 WO HCPCS

## 2019-09-20 PROCEDURE — B2131ZZ FLUOROSCOPY OF MULTIPLE CORONARY ARTERY BYPASS GRAFTS USING LOW OSMOLAR CONTRAST: ICD-10-PCS | Performed by: INTERNAL MEDICINE

## 2019-09-20 PROCEDURE — 6370000000 HC RX 637 (ALT 250 FOR IP): Performed by: PERSONAL EMERGENCY RESPONSE ATTENDANT

## 2019-09-20 PROCEDURE — 2700000000 HC OXYGEN THERAPY PER DAY

## 2019-09-20 PROCEDURE — 4A023N7 MEASUREMENT OF CARDIAC SAMPLING AND PRESSURE, LEFT HEART, PERCUTANEOUS APPROACH: ICD-10-PCS | Performed by: INTERNAL MEDICINE

## 2019-09-20 PROCEDURE — 36415 COLL VENOUS BLD VENIPUNCTURE: CPT

## 2019-09-20 RX ORDER — LEVOTHYROXINE SODIUM 0.03 MG/1
25 TABLET ORAL DAILY
Status: DISCONTINUED | OUTPATIENT
Start: 2019-09-20 | End: 2019-09-21 | Stop reason: HOSPADM

## 2019-09-20 RX ORDER — HYDRALAZINE HYDROCHLORIDE 20 MG/ML
10 INJECTION INTRAMUSCULAR; INTRAVENOUS EVERY 6 HOURS PRN
Status: DISCONTINUED | OUTPATIENT
Start: 2019-09-20 | End: 2019-09-21 | Stop reason: HOSPADM

## 2019-09-20 RX ORDER — SODIUM CHLORIDE 0.9 % (FLUSH) 0.9 %
10 SYRINGE (ML) INJECTION PRN
Status: DISCONTINUED | OUTPATIENT
Start: 2019-09-20 | End: 2019-09-20 | Stop reason: SDUPTHER

## 2019-09-20 RX ORDER — NITROGLYCERIN 0.4 MG/1
0.4 TABLET SUBLINGUAL EVERY 5 MIN PRN
Status: DISCONTINUED | OUTPATIENT
Start: 2019-09-20 | End: 2019-09-20 | Stop reason: SDUPTHER

## 2019-09-20 RX ORDER — PANTOPRAZOLE SODIUM 40 MG/1
40 TABLET, DELAYED RELEASE ORAL
Status: DISCONTINUED | OUTPATIENT
Start: 2019-09-21 | End: 2019-09-21 | Stop reason: HOSPADM

## 2019-09-20 RX ORDER — ALBUTEROL SULFATE 90 UG/1
2 AEROSOL, METERED RESPIRATORY (INHALATION) EVERY 6 HOURS PRN
Status: DISCONTINUED | OUTPATIENT
Start: 2019-09-20 | End: 2019-09-21 | Stop reason: HOSPADM

## 2019-09-20 RX ORDER — ESOMEPRAZOLE MAGNESIUM 40 MG/1
40 FOR SUSPENSION ORAL DAILY
Status: DISCONTINUED | OUTPATIENT
Start: 2019-09-20 | End: 2019-09-20 | Stop reason: CLARIF

## 2019-09-20 RX ORDER — DIPHENHYDRAMINE HYDROCHLORIDE 50 MG/ML
50 INJECTION INTRAMUSCULAR; INTRAVENOUS ONCE
Status: DISCONTINUED | OUTPATIENT
Start: 2019-09-20 | End: 2019-09-21 | Stop reason: HOSPADM

## 2019-09-20 RX ORDER — ACETAMINOPHEN 325 MG/1
650 TABLET ORAL EVERY 4 HOURS PRN
Status: DISCONTINUED | OUTPATIENT
Start: 2019-09-20 | End: 2019-09-21 | Stop reason: HOSPADM

## 2019-09-20 RX ORDER — ATORVASTATIN CALCIUM 40 MG/1
40 TABLET, FILM COATED ORAL DAILY
Status: DISCONTINUED | OUTPATIENT
Start: 2019-09-20 | End: 2019-09-21 | Stop reason: HOSPADM

## 2019-09-20 RX ORDER — SODIUM CHLORIDE 450 MG/100ML
75 INJECTION, SOLUTION INTRAVENOUS CONTINUOUS
Status: DISCONTINUED | OUTPATIENT
Start: 2019-09-20 | End: 2019-09-20 | Stop reason: SDUPTHER

## 2019-09-20 RX ORDER — SODIUM CHLORIDE 0.9 % (FLUSH) 0.9 %
10 SYRINGE (ML) INJECTION EVERY 12 HOURS SCHEDULED
Status: DISCONTINUED | OUTPATIENT
Start: 2019-09-20 | End: 2019-09-21 | Stop reason: HOSPADM

## 2019-09-20 RX ORDER — ONDANSETRON 2 MG/ML
4 INJECTION INTRAMUSCULAR; INTRAVENOUS EVERY 6 HOURS PRN
Status: DISCONTINUED | OUTPATIENT
Start: 2019-09-20 | End: 2019-09-20 | Stop reason: SDUPTHER

## 2019-09-20 RX ORDER — URSODIOL 300 MG/1
300 CAPSULE ORAL 2 TIMES DAILY
Status: DISCONTINUED | OUTPATIENT
Start: 2019-09-20 | End: 2019-09-21 | Stop reason: HOSPADM

## 2019-09-20 RX ORDER — CARVEDILOL 6.25 MG/1
6.25 TABLET ORAL 2 TIMES DAILY WITH MEALS
Status: DISCONTINUED | OUTPATIENT
Start: 2019-09-20 | End: 2019-09-21 | Stop reason: HOSPADM

## 2019-09-20 RX ORDER — IPRATROPIUM BROMIDE AND ALBUTEROL SULFATE 2.5; .5 MG/3ML; MG/3ML
3 SOLUTION RESPIRATORY (INHALATION) 3 TIMES DAILY
Status: DISCONTINUED | OUTPATIENT
Start: 2019-09-20 | End: 2019-09-21 | Stop reason: HOSPADM

## 2019-09-20 RX ORDER — SODIUM CHLORIDE 9 MG/ML
INJECTION, SOLUTION INTRAVENOUS CONTINUOUS
Status: DISPENSED | OUTPATIENT
Start: 2019-09-20 | End: 2019-09-21

## 2019-09-20 RX ADMIN — ATORVASTATIN CALCIUM 40 MG: 40 TABLET, FILM COATED ORAL at 11:22

## 2019-09-20 RX ADMIN — ASPIRIN 81 MG 81 MG: 81 TABLET ORAL at 08:09

## 2019-09-20 RX ADMIN — CARVEDILOL 6.25 MG: 6.25 TABLET, FILM COATED ORAL at 11:23

## 2019-09-20 RX ADMIN — CARVEDILOL 6.25 MG: 6.25 TABLET, FILM COATED ORAL at 20:20

## 2019-09-20 RX ADMIN — Medication 10 ML: at 08:09

## 2019-09-20 RX ADMIN — SODIUM CHLORIDE: 9 INJECTION, SOLUTION INTRAVENOUS at 20:20

## 2019-09-20 RX ADMIN — LINAGLIPTIN 5 MG: 5 TABLET, FILM COATED ORAL at 11:22

## 2019-09-20 RX ADMIN — HYDRALAZINE HYDROCHLORIDE 10 MG: 20 INJECTION INTRAMUSCULAR; INTRAVENOUS at 20:21

## 2019-09-20 RX ADMIN — SODIUM CHLORIDE 75 ML/HR: 4.5 INJECTION, SOLUTION INTRAVENOUS at 11:20

## 2019-09-20 RX ADMIN — MOMETASONE FUROATE AND FORMOTEROL FUMARATE DIHYDRATE 2 PUFF: 100; 5 AEROSOL RESPIRATORY (INHALATION) at 19:36

## 2019-09-20 RX ADMIN — LEVOTHYROXINE SODIUM 25 MCG: 25 TABLET ORAL at 11:22

## 2019-09-20 RX ADMIN — URSODIOL 300 MG: 300 CAPSULE ORAL at 20:20

## 2019-09-20 RX ADMIN — IOPAMIDOL 55 ML: 612 INJECTION, SOLUTION INTRAVENOUS at 17:22

## 2019-09-20 RX ADMIN — IPRATROPIUM BROMIDE AND ALBUTEROL SULFATE 3 ML: .5; 3 SOLUTION RESPIRATORY (INHALATION) at 19:36

## 2019-09-20 ASSESSMENT — ENCOUNTER SYMPTOMS
STRIDOR: 0
SHORTNESS OF BREATH: 1
CHEST TIGHTNESS: 1
BLOOD IN STOOL: 0
WHEEZING: 0
GASTROINTESTINAL NEGATIVE: 1
COUGH: 0
NAUSEA: 0
EYES NEGATIVE: 1

## 2019-09-20 ASSESSMENT — PAIN SCALES - GENERAL
PAINLEVEL_OUTOF10: 0
PAINLEVEL_OUTOF10: 0

## 2019-09-20 NOTE — PLAN OF CARE
Patient laying in bed. Oriented patient to room and call system. Bed in low/locked position with bed alarm active. 0 out of 10 pain stated at this time.

## 2019-09-20 NOTE — H&P
Active member of club or organization: None     Attends meetings of clubs or organizations: None     Relationship status: None    Intimate partner violence:     Fear of current or ex partner: None     Emotionally abused: None     Physically abused: None     Forced sexual activity: None   Other Topics Concern    None   Social History Narrative    None       Family Hx:  History reviewed. No pertinent family history.     No Known Allergies    Current Facility-Administered Medications   Medication Dose Route Frequency Provider Last Rate Last Dose    mometasone-formoterol (DULERA) 100-5 MCG/ACT inhaler 2 puff  2 puff Inhalation BID Real Farr MD        levothyroxine (SYNTHROID) tablet 25 mcg  25 mcg Oral Daily Real Farr MD        aspirin tablet 81 mg  81 mg Oral Daily Real Farr MD        esomeprazole Magnesium (NEXIUM) 40 MG PACK 40 mg  40 mg Oral Daily Real Farr MD        nitroGLYCERIN (NITROSTAT) SL tablet 0.4 mg  0.4 mg Sublingual Q5 Min PRN Real Farr MD        atorvastatin (LIPITOR) tablet 40 mg  40 mg Oral Daily Real Farr MD        ursodiol (ACTIGALL) capsule 300 mg  300 mg Oral BID Real Farr MD        linagliptin (TRADJENTA) tablet 5 mg  5 mg Oral Daily Real Farr MD        ipratropium-albuterol (DUONEB) nebulizer solution 3 mL  3 mL Inhalation TID Real Farr MD        albuterol sulfate  (90 Base) MCG/ACT inhaler 2 puff  2 puff Inhalation Q6H PRN Real Farr MD        carvedilol (COREG) tablet 6.25 mg  6.25 mg Oral BID WC Real Farr MD        nitroGLYCERIN (NITROSTAT) SL tablet 0.4 mg  0.4 mg Sublingual Q5 Min PRN NA Álvarez   0.4 mg at 09/19/19 1957    sodium chloride flush 0.9 % injection 10 mL  10 mL Intravenous 2 times per day NA Álvarez   10 mL at 09/20/19 0809    sodium chloride flush 0.9 % injection 10 mL  10 mL Intravenous PRN NA Álvarez        magnesium hydroxide (MILK OF MAGNESIA) 400 MG/5ML suspension 30 mL  30 mL Oral Daily PRN Lauren Pierre Alaoscarma        ondansetron [facility]) injection 4 mg  4 mg Intravenous Q6H PRN Rosetta Lara        aspirin chewable tablet 81 mg  81 mg Oral Daily Rosetta Lara   81 mg at 09/20/19 0809    ipratropium-albuterol (DUONEB) nebulizer solution 1 ampule  1 ampule Inhalation Q4H PRN Vasiliy Harris MD           Review of Systems:   Review of Systems   Constitutional: Negative. Negative for diaphoresis and fatigue. HENT: Negative. Eyes: Negative. Respiratory: Positive for chest tightness and shortness of breath. Negative for cough, wheezing and stridor. Cardiovascular: Positive for chest pain. Negative for palpitations and leg swelling. Gastrointestinal: Negative. Negative for blood in stool and nausea. Genitourinary: Negative. Musculoskeletal: Negative. Skin: Negative. Neurological: Negative. Negative for dizziness, syncope, weakness and light-headedness. Hematological: Negative. Psychiatric/Behavioral: Negative. Physical Examination:    BP (!) 161/72   Pulse 63   Temp 97.5 °F (36.4 °C)   Resp 18   Ht 5' 7\" (1.702 m)   Wt 162 lb 11.2 oz (73.8 kg)   SpO2 96%   BMI 25.48 kg/m²    Physical Exam   Constitutional: He appears healthy. No distress. HENT:   Normal cephalic and Atraumatic   Eyes: Pupils are equal, round, and reactive to light. Neck: Normal range of motion and thyroid normal. Neck supple. No JVD present. No neck adenopathy. No thyromegaly present. Cardiovascular: Normal rate, regular rhythm, intact distal pulses and normal pulses. Murmur heard. Pulmonary/Chest: Effort normal and breath sounds normal. He has no wheezes. He has no rales. He exhibits no tenderness. Abdominal: Soft. Bowel sounds are normal. There is no tenderness. Musculoskeletal: Normal range of motion. He exhibits no edema or tenderness. Neurological: He is alert and oriented to person, place, and time. Skin: Skin is warm. No cyanosis.  Nails show will give BB now. 3. LVEF 50%  4. PPM  5. Dyslipidemia- Statin   6.  Chronic Anemia- stable      Electronically signed by Pascual Chopra MD on 9/20/2019 at 10:39 AM

## 2019-09-20 NOTE — CARE COORDINATION
[facility] AT [facility] Case Management Initial Discharge Assessment    Met with Patient to discuss discharge plan. PCP: Aleksandr Almaguer, DO                                Date of Last Visit: 1 YR AGO    If no PCP, list provided? N/A    Discharge Planning    Living Arrangements: independently at home    Who do you live with? HIS GRANDDAUGHTER AND HER FAMILY    Who helps you with your care:  self    If lives at home:     Do you have any barriers navigating in your home? no    Patient can perform ADL? Yes    Current Services (outpatient and in home) :  None    Dialysis: No    Is transportation available to get to your appointments? Yes    DME Equipment:  no    Respiratory equipment: PRN/HS Oxygen 2L Liters and Nebulizer    Respiratory provider:  yes - UNKOWN     Pharmacy:  yes -     Consult with Medication Assistance Program?  No      Patient agreeable to [place] ? N/A    Patient agreeable to SNF/Rehab? N/A    Other discharge needs identified? N/A    Was Freedom of Choice Provided? N/A    Does Patient Have a High-Risk for Readmission Diagnosis (CHF, PN, MI, COPD)? Yes- NSTEMI      Initial Discharge Plan? (Note: please see concurrent daily documentation for any updates after initial note).     DENIES NEEDS FOR DISCHARGE    Electronically signed by Liss Elaine RN on 9/20/2019 at 11:45 AM

## 2019-09-20 NOTE — PROGRESS NOTES
Returned from cath lab. Is alert and oriented times three. No bleeding or hematoma noted. Right pedal pulse is 2+. No complaints offered at present .

## 2019-09-20 NOTE — PROGRESS NOTES
Monitor applied to pt and transport put to take pt to [facility] .  Right groin site remains soft and no bleeding or hematoma noted. Right pedal pulse1+. No complaints offered.

## 2019-09-20 NOTE — PROGRESS NOTES
[facility] AT [place] Respiratory Therapy Evaluation   Current Order:  prn      Home Regimen: prn       Ordering Physician: kaylin  Re-evaluation Date:  n/a     Diagnosis: chest pain       Patient Status: Stable / Unstable + Physician notified    The following MDI Criteria must be met in order to convert aerosol to MDI with spacer. If unable to meet, MDI will be converted to aerosol:  []  Patient able to demonstrate the ability to use MDI effectively  []  Patient alert and cooperative  []  Patient able to take deep breath with 5-10 second hold  []  Medication(s) available in this delivery method   []  Peak flow greater than or equal to 200 ml/min            Current Order Substituted To  (same drug, same frequency)   Aerosol to MDI [] Albuterol Sulfate 0.083% unit dose by aerosol Albuterol Sulfate MDI 2 puffs by inhalation with spacer    [] Levalbuterol 1.25 mg unit dose by aerosol Levalbuterol MDI 2 puffs by inhalation with spacer    [] Levalbuterol 0.63 mg unit dose by aerosol Levalbuterol MDI 2 puffs by inhalation with spacer    [] Ipratropium Bromide 0.02% unit dose by aerosol Ipratropium Bromide MDI 2 puffs by inhalation with spacer    [] Duoneb (Ipratropium + Albuterol) unit dose by aerosol Ipratropium MDI + Albuterol MDI 2 puffs by inhalation w/spacer   MDI to Aerosol [] Albuterol Sulfate MDI Albuterol Sulfate 0.083% unit dose by aerosol    [] Levalbuterol MDI 2 puffs by inhalation Levalbuterol 1.25 mg unit dose by aerosol    [] Ipratropium Bromide MDI by inhalation Ipratropium Bromide 0.02% unit dose by aerosol    [] Combivent (Ipratropium + Albuterol) MDI by inhalation Duoneb (Ipratropium + Albuterol) unit dose by aerosol   Treatment Assessment [Frequency/Schedule]:  Change frequency to: _______________q4prn___________________________________per Protocol, P&T, MEC      Points 0 1 2 3 4   Pulmonary Status  Non-Smoker  [x]   Smoking history   < 20 pack years  []   Smoking history  ?  20 pack years  []   Pulmonary Disorder  (acute or chronic)  []   Severe or Chronic w/ Exacerbation  []     Surgical Status No [x]   Surgeries     General []   Surgery Lower []   Abdominal Thoracic or []   Upper Abdominal Thoracic with  PulmonaryDisorder  []     Chest X-ray Clear/Not  Ordered     []  Chronic Changes  Results Pending  [x]  Infiltrates, atelectasis, pleural effusion, or edema  []  Infiltrates in more than one lobe []  Infiltrate + Atelectasis, &/or pleural effusion  []    Respiratory Pattern Regular,  RR = 12-20 [x]  Increased,  RR = 21-25 []  BUTT, irregular,  or RR = 26-30 []  Decreased FEV1  or RR = 31-35 []  Severe SOB, use  of accessory muscles, or RR ? 35  []    Mental Status Alert, oriented,  Cooperative [x]  Confused but Follows commands []  Lethargic or unable to follow commands []  Obtunded  []  Comatose  []    Breath Sounds Clear to  auscultation  [x]  Decreased unilaterally or  in bases only []  Decreased  bilaterally  []  Crackles or intermittent wheezes []  Wheezes []    Cough Strong, Spontan., & nonproductive [x]  Strong,  spontaneous, &  productive []  Weak,  Nonproductive []  Weak, productive or  with wheezes []  No spontaneous  cough or may require suctioning []    Level of Activity Ambulatory []  Ambulatory w/ Assist  [x]  Non-ambulatory []  Paraplegic []  Quadriplegic []    Total    Score:__2_____     Triage Score:___5_____      Tri       Triage:     1. (>20) Freq: Q3    2. (16-20) Freq: Q4   3. (11-15) Freq: QID & Albuterol Q2 PRN    4. (6-10) Freq: TID & Albuterol Q2 PRN    5. (0-5) Freq Q4prn

## 2019-09-20 NOTE — PROGRESS NOTES
Report called to [facility].  No bleeding or hematoma noted to right femoral site.

## 2019-09-21 VITALS
DIASTOLIC BLOOD PRESSURE: 52 MMHG | RESPIRATION RATE: 18 BRPM | HEART RATE: 56 BPM | HEIGHT: 67 IN | SYSTOLIC BLOOD PRESSURE: 130 MMHG | TEMPERATURE: 97.7 F | WEIGHT: 162.7 LBS | OXYGEN SATURATION: 94 % | BODY MASS INDEX: 25.54 KG/M2

## 2019-09-21 LAB
ANION GAP SERPL CALCULATED.3IONS-SCNC: 9 MEQ/L (ref 9–15)
BUN BLDV-MCNC: 19 MG/DL (ref 8–23)
CALCIUM SERPL-MCNC: 8.3 MG/DL (ref 8.5–9.9)
CHLORIDE BLD-SCNC: 109 MEQ/L (ref 95–107)
CO2: 25 MEQ/L (ref 20–31)
CREAT SERPL-MCNC: 1.12 MG/DL (ref 0.7–1.2)
EKG ATRIAL RATE: 61 BPM
EKG ATRIAL RATE: 66 BPM
EKG ATRIAL RATE: 76 BPM
EKG P AXIS: 44 DEGREES
EKG P AXIS: 5 DEGREES
EKG P AXIS: 6 DEGREES
EKG P-R INTERVAL: 158 MS
EKG P-R INTERVAL: 158 MS
EKG P-R INTERVAL: 178 MS
EKG Q-T INTERVAL: 422 MS
EKG Q-T INTERVAL: 456 MS
EKG Q-T INTERVAL: 460 MS
EKG QRS DURATION: 130 MS
EKG QRS DURATION: 136 MS
EKG QRS DURATION: 140 MS
EKG QTC CALCULATION (BAZETT): 463 MS
EKG QTC CALCULATION (BAZETT): 474 MS
EKG QTC CALCULATION (BAZETT): 478 MS
EKG R AXIS: -72 DEGREES
EKG R AXIS: -80 DEGREES
EKG R AXIS: -80 DEGREES
EKG T AXIS: -26 DEGREES
EKG T AXIS: -36 DEGREES
EKG T AXIS: -4 DEGREES
EKG VENTRICULAR RATE: 61 BPM
EKG VENTRICULAR RATE: 66 BPM
EKG VENTRICULAR RATE: 76 BPM
GFR AFRICAN AMERICAN: >60
GFR NON-AFRICAN AMERICAN: >60
GLUCOSE BLD-MCNC: 125 MG/DL (ref 70–99)
HCT VFR BLD CALC: 38.3 % (ref 42–52)
HEMOGLOBIN: 12.5 G/DL (ref 14–18)
MCH RBC QN AUTO: 31.2 PG (ref 27–31.3)
MCHC RBC AUTO-ENTMCNC: 32.7 % (ref 33–37)
MCV RBC AUTO: 95.2 FL (ref 80–100)
PDW BLD-RTO: 13.4 % (ref 11.5–14.5)
PLATELET # BLD: 167 K/UL (ref 130–400)
POTASSIUM SERPL-SCNC: 4.2 MEQ/L (ref 3.4–4.9)
RBC # BLD: 4.03 M/UL (ref 4.7–6.1)
SODIUM BLD-SCNC: 143 MEQ/L (ref 135–144)
WBC # BLD: 5.5 K/UL (ref 4.8–10.8)

## 2019-09-21 PROCEDURE — 85027 COMPLETE CBC AUTOMATED: CPT

## 2019-09-21 PROCEDURE — 2700000000 HC OXYGEN THERAPY PER DAY

## 2019-09-21 PROCEDURE — 94640 AIRWAY INHALATION TREATMENT: CPT

## 2019-09-21 PROCEDURE — 6370000000 HC RX 637 (ALT 250 FOR IP): Performed by: NURSE PRACTITIONER

## 2019-09-21 PROCEDURE — 6370000000 HC RX 637 (ALT 250 FOR IP): Performed by: INTERNAL MEDICINE

## 2019-09-21 PROCEDURE — 80048 BASIC METABOLIC PNL TOTAL CA: CPT

## 2019-09-21 PROCEDURE — 36415 COLL VENOUS BLD VENIPUNCTURE: CPT

## 2019-09-21 PROCEDURE — 2580000003 HC RX 258: Performed by: PERSONAL EMERGENCY RESPONSE ATTENDANT

## 2019-09-21 PROCEDURE — 6370000000 HC RX 637 (ALT 250 FOR IP): Performed by: PERSONAL EMERGENCY RESPONSE ATTENDANT

## 2019-09-21 PROCEDURE — 2580000003 HC RX 258: Performed by: INTERNAL MEDICINE

## 2019-09-21 PROCEDURE — 93005 ELECTROCARDIOGRAM TRACING: CPT | Performed by: INTERNAL MEDICINE

## 2019-09-21 RX ORDER — NITROGLYCERIN 0.4 MG/1
0.4 TABLET SUBLINGUAL EVERY 5 MIN PRN
Qty: 25 TABLET | Refills: 0 | Status: SHIPPED | OUTPATIENT
Start: 2019-09-21 | End: 2022-02-07

## 2019-09-21 RX ORDER — LISINOPRIL 5 MG/1
5 TABLET ORAL DAILY
Qty: 30 TABLET | Refills: 3 | Status: ON HOLD | OUTPATIENT
Start: 2019-09-21 | End: 2021-02-13 | Stop reason: HOSPADM

## 2019-09-21 RX ORDER — LISINOPRIL 5 MG/1
5 TABLET ORAL DAILY
Status: DISCONTINUED | OUTPATIENT
Start: 2019-09-21 | End: 2019-09-21 | Stop reason: HOSPADM

## 2019-09-21 RX ADMIN — MOMETASONE FUROATE AND FORMOTEROL FUMARATE DIHYDRATE 2 PUFF: 100; 5 AEROSOL RESPIRATORY (INHALATION) at 08:52

## 2019-09-21 RX ADMIN — LISINOPRIL 5 MG: 5 TABLET ORAL at 08:30

## 2019-09-21 RX ADMIN — URSODIOL 300 MG: 300 CAPSULE ORAL at 12:15

## 2019-09-21 RX ADMIN — LEVOTHYROXINE SODIUM 25 MCG: 25 TABLET ORAL at 06:35

## 2019-09-21 RX ADMIN — PANTOPRAZOLE SODIUM 40 MG: 40 TABLET, DELAYED RELEASE ORAL at 06:35

## 2019-09-21 RX ADMIN — CARVEDILOL 6.25 MG: 6.25 TABLET, FILM COATED ORAL at 06:35

## 2019-09-21 RX ADMIN — SODIUM CHLORIDE: 9 INJECTION, SOLUTION INTRAVENOUS at 06:35

## 2019-09-21 RX ADMIN — Medication 10 ML: at 08:34

## 2019-09-21 RX ADMIN — LINAGLIPTIN 5 MG: 5 TABLET, FILM COATED ORAL at 08:30

## 2019-09-21 RX ADMIN — ASPIRIN 81 MG 81 MG: 81 TABLET ORAL at 08:30

## 2019-09-21 RX ADMIN — IPRATROPIUM BROMIDE AND ALBUTEROL SULFATE 3 ML: .5; 3 SOLUTION RESPIRATORY (INHALATION) at 08:52

## 2019-09-21 ASSESSMENT — ENCOUNTER SYMPTOMS
CHEST TIGHTNESS: 1
GASTROINTESTINAL NEGATIVE: 1
EYE ITCHING: 0
SORE THROAT: 0
CHOKING: 0
APNEA: 0
COUGH: 0
NAUSEA: 0
COLOR CHANGE: 0
BACK PAIN: 0
EYE DISCHARGE: 0
STRIDOR: 0
SINUS PRESSURE: 0
FACIAL SWELLING: 0
SHORTNESS OF BREATH: 1
WHEEZING: 0
TROUBLE SWALLOWING: 0
VOMITING: 0

## 2019-09-21 ASSESSMENT — PAIN SCALES - GENERAL
PAINLEVEL_OUTOF10: 0
PAINLEVEL_OUTOF10: 0

## 2019-09-21 NOTE — PROGRESS NOTES
Progress Note  Patient: Kendy Gonzalez  Unit/Bed:  N932/O455-81  YOB: 1929  MRN: [id number]  Acct: [de-identified]   Admitting Diagnosis: Chest pain [R07.9]  NSTEMI (non-ST elevated myocardial infarction) [facility]) [I21.4]  Admit Date:  9/19/2019  Hospital Day: 1    Chief Complaint:  Chest pain    History of Present Illness:   Walking from car into restaurant caused severe CP.  initially felt sharp associated with SOB nausea and weakness. Then CP became very heavy and pressure. No radiation. NTG in ER relieved most of his symptoms. Did well overnight. This AM still has pressure sensation. Trops detected.     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 Pt underwent cardiac cath yesterday and his right groin remains soft with no increasing swelling or bruising. Pt states he lives home with granddaughter and his son, so he would not be returning home also. He states he ambulates without any assistive devices    Review of Systems:   Review of Systems   Constitutional: Negative for appetite change, chills, diaphoresis, fatigue and fever. HENT: Negative for congestion, ear pain, facial swelling, hearing loss, sinus pressure, sore throat, tinnitus and trouble swallowing. Eyes: Negative for discharge, itching and visual disturbance. Respiratory: Positive for chest tightness and shortness of breath. Negative for apnea, cough, choking, wheezing and stridor. Cardiovascular: Positive for chest pain. Negative for palpitations and leg swelling. Gastrointestinal: Negative. Endocrine: Negative for cold intolerance, heat intolerance, polydipsia and polyuria. Genitourinary: Negative for dysuria, flank pain, frequency, hematuria and urgency. Musculoskeletal: Negative for arthralgias, back pain, gait problem, joint swelling and myalgias. Skin: Negative.   Negative for color change. Neurological: Negative for dizziness, tremors, seizures, syncope, speech difficulty, weakness, numbness and headaches. Psychiatric/Behavioral: Negative for agitation, behavioral problems, confusion, decreased concentration, dysphoric mood and hallucinations. Physical Examination:    /72   Pulse 69   Temp 97.7 °F (36.5 °C)   Resp 18   Ht 5' 7\" (1.702 m)   Wt 162 lb 11.2 oz (73.8 kg)   SpO2 96%   BMI 25.48 kg/m²    Physical Exam   Constitutional: He is oriented to person, place, and time. He appears well-developed. HENT:   Head: Normocephalic and atraumatic. Mouth/Throat: Oropharynx is clear and moist.   Eyes: Pupils are equal, round, and reactive to light. Neck: No tracheal deviation present. No thyromegaly present. Cardiovascular: Normal rate and regular rhythm. Exam reveals no gallop. Murmur heard. Pulmonary/Chest: Effort normal and breath sounds normal. He has no wheezes. He has no rales. He exhibits no tenderness. Abdominal: Soft. Bowel sounds are normal. He exhibits no distension and no mass. There is no tenderness. There is no rebound and no guarding. Musculoskeletal: Normal range of motion. He exhibits no edema or tenderness. Lymphadenopathy:     He has no cervical adenopathy. Neurological: He is alert and oriented to person, place, and time. Coordination normal.   Skin: Skin is warm and dry. No rash noted. No erythema.    Psychiatric: His behavior is normal. Thought content normal.       LABS:  CBC:   Lab Results   Component Value Date    WBC 5.5 09/21/2019    RBC 4.03 09/21/2019    HGB 12.5 09/21/2019    HCT 38.3 09/21/2019    MCV 95.2 09/21/2019    MCH 31.2 09/21/2019    MCHC 32.7 09/21/2019    RDW 13.4 09/21/2019     09/21/2019    MPV 9.0 07/29/2014     CBC with Differential:   Lab Results   Component Value Date    WBC 5.5 09/21/2019    RBC 4.03 09/21/2019    HGB 12.5 09/21/2019    HCT 38.3 09/21/2019     09/21/2019    MCV 95.2 09/21/2019    MCH 31.2 09/21/2019    MCHC 32.7 09/21/2019    RDW 13.4 09/21/2019    BANDSPCT 14 05/18/2016    LYMPHOPCT 34.9 09/19/2019    MONOPCT 9.9 09/19/2019    BASOPCT 1.1 09/19/2019    MONOSABS 0.6 09/19/2019    LYMPHSABS 2.1 09/19/2019    EOSABS 0.2 09/19/2019    BASOSABS 0.1 09/19/2019     CMP:    Lab Results   Component Value Date     09/19/2019    K 4.2 09/19/2019    K 4.5 07/09/2019     09/19/2019    CO2 26 09/19/2019    BUN 25 09/19/2019    CREATININE 1.27 09/19/2019    GFRAA >60.0 09/19/2019    LABGLOM 53.2 09/19/2019    GLUCOSE 171 09/19/2019    PROT 7.3 09/19/2019    LABALBU 4.0 09/19/2019    CALCIUM 9.0 09/19/2019    BILITOT 0.4 09/19/2019    ALKPHOS 56 09/19/2019    AST 17 09/19/2019    ALT 18 09/19/2019     BMP:    Lab Results   Component Value Date     09/19/2019    K 4.2 09/19/2019    K 4.5 07/09/2019     09/19/2019    CO2 26 09/19/2019    BUN 25 09/19/2019    LABALBU 4.0 09/19/2019    CREATININE 1.27 09/19/2019    CALCIUM 9.0 09/19/2019    GFRAA >60.0 09/19/2019    LABGLOM 53.2 09/19/2019    GLUCOSE 171 09/19/2019     Magnesium:    Lab Results   Component Value Date    MG 1.9 07/06/2019     Troponin:    Lab Results   Component Value Date    TROPONINI 0.032 09/20/2019     Echo form July 2019    Left Ventricle  Left ventricular ejection fraction is visually estimated at 50%. Impaired relaxation compatible with diastolic dysfunction. ( reversed E/A  ratio)  EKG:  need this am EKG    Assessment:    Active Hospital Problems    Diagnosis Date Noted    NSTEMI (non-ST elevated myocardial infarction) ([facility] [address] [I21.4] 09/20/2019    Chest pain [R07.9] 09/19/2019               Plan:  1. NSTEMI- on admission- pt underwent cardiac cath with no interventions- recommended medical management  2. HTN- controlled- BUT WILL ADD ACE/ ARB to medication regime  3. Pt does have a pacemaker- and he does have a paced on tele  4. Chronic anemia stable  5.  Right groin form cath- soft and non tender

## 2019-09-21 NOTE — DISCHARGE INSTR - DIET

## 2019-09-21 NOTE — DISCHARGE SUMMARY
Cardiology Discharge Summary      Patient Identification:  Carlos Colmenares  : 3/5/1929  MRN: [id number]   Account: [de-identified]     Admit date: 2019  Discharge date: 2019   Attending provider: Harper Felipe MD        Primary care provider: Sherif Elaine DO     Admission Diagnoses:  Chest pain  <principal problem not specified>         Discharge Diagnoses: Active Hospital Problems    Diagnosis Date Noted    NSTEMI (non-ST elevated myocardial infarction) ([facility] [address] [I21.4] 2019    Chest pain [R07.9] 2019          Hospital Course:   Carlos Colmenares is a80 y.o. male admitted to [facility] on 2019 for chest pain. Walking from car into restaurant caused severe CP.  initially felt sharp associated with SOB nausea and weakness. Then CP became very heavy and pressure. No radiation.  NTG in ER relieved most of his symptoms.  Did well overnight.  still has pressure sensation. - underwent cardiac cath with no interventions- recommended medical management. This am pt remain CP free, we will see how he does with exertion and plan of d/c today        Procedures:   Cardiac cath      Consults:   none    Examination:  /62   Pulse 63   Temp 97.7 °F (36.5 °C)   Resp 18   Ht 5' 7\" (1.702 m)   Wt 162 lb 11.2 oz (73.8 kg)   SpO2 95%   BMI 25.48 kg/m²    Physical Exam   Constitutional: He is oriented to person, place, and time. He appears well-developed. No distress. HENT:   Head: Normocephalic and atraumatic. Mouth/Throat: No oropharyngeal exudate. Eyes: Pupils are equal, round, and reactive to light. Conjunctivae and EOM are normal. Right eye exhibits no discharge. Left eye exhibits no discharge. Neck: Normal range of motion. Neck supple. No tracheal deviation present. No thyromegaly present. Cardiovascular: Normal rate, regular rhythm and intact distal pulses. Murmur heard.   Pulmonary/Chest: Effort normal and breath sounds normal. No respiratory distress. He has no wheezes. He has no rales. He exhibits no tenderness. Abdominal: Soft. Bowel sounds are normal. There is no tenderness. There is no rebound and no guarding. Musculoskeletal: Normal range of motion. He exhibits no edema, tenderness or deformity. Lymphadenopathy:     He has no cervical adenopathy. Neurological: He is alert and oriented to person, place, and time. No cranial nerve deficit. Coordination normal.   Skin: Skin is warm and dry. No rash noted. He is not diaphoretic. No erythema. Medications:  Current Discharge Medication List      CONTINUE these medications which have NOT CHANGED    Details   carvedilol (COREG) 12.5 MG tablet Take 0.5 tablets by mouth 2 times daily (with meals)  Qty: 180 tablet, Refills: 0    Comments: **Patient requests 90 days supply**      albuterol sulfate HFA (PROVENTIL HFA) 108 (90 Base) MCG/ACT inhaler Inhale 2 puffs into the lungs every 6 hours as needed for Wheezing  Qty: 1 Inhaler, Refills: 3      ipratropium-albuterol (DUONEB) 0.5-2.5 (3) MG/3ML SOLN nebulizer solution Inhale 3 mLs into the lungs three times daily  Qty: 100 mL, Refills: 0    Associated Diagnoses: COPD with acute exacerbation (HCC)      atorvastatin (LIPITOR) 40 MG tablet Take 40 mg by mouth daily      sitaGLIPtin (JANUVIA) 50 MG tablet Take 50 mg by mouth daily      esomeprazole Magnesium (NEXIUM) 40 MG PACK Take 40 mg by mouth daily      nitroGLYCERIN (NITROSTAT) 0.4 MG SL tablet Place 0.4 mg under the tongue every 5 minutes as needed for Chest pain      aspirin 81 MG tablet Take 81 mg by mouth daily. SYNTHROID 25 MCG tablet       budesonide-formoterol (SYMBICORT) 80-4.5 MCG/ACT AERO Inhale 2 puffs into the lungs 2 times daily.       ursodiol (ACTIGALL) 300 MG capsule Take 300 mg by mouth 2 times daily             Significant Diagnostics:   Radiology: Xr Chest Standard (2 Vw)    Result Date: 9/20/2019  EXAMINATION: XR CHEST (2 VW)  CLINICAL HISTORY:  cough COMPARISONS: July 6, 2019 FINDINGS: Two views of the chest are submitted. There is a right-sided ICD device. Leads overlying the cardiac silhouette. Unchanged. There are multiple median sternotomy wires. The cardiac silhouette is of normal size configuration. . Pulmonary vascular attenuated. Right sided trachea. No focal infiltrates. No effusions. No Pneumothoraces.                                                                                    NO ACUTE ACTIVE CARDIOPULMONARY PROCESS      Labs:   Recent Results (from the past 72 hour(s))   EKG 12 Lead - Chest Pain    Collection Time: 09/19/19  6:33 PM   Result Value Ref Range    Ventricular Rate 76 BPM    Atrial Rate 76 BPM    P-R Interval 158 ms    QRS Duration 130 ms    Q-T Interval 422 ms    QTc Calculation (Bazett) 474 ms    P Axis 44 degrees    R Axis -80 degrees    T Axis -4 degrees   APTT    Collection Time: 09/19/19  6:41 PM   Result Value Ref Range    aPTT 30.5 24.4 - 36.8 sec   Protime-INR    Collection Time: 09/19/19  6:41 PM   Result Value Ref Range    Protime 13.9 12.3 - 14.9 sec    INR 1.0    Comprehensive Metabolic Panel    Collection Time: 09/19/19  6:42 PM   Result Value Ref Range    Sodium 141 135 - 144 mEq/L    Potassium 4.2 3.4 - 4.9 mEq/L    Chloride 103 95 - 107 mEq/L    CO2 26 20 - 31 mEq/L    Anion Gap 12 9 - 15 mEq/L    Glucose 171 (H) 70 - 99 mg/dL    BUN 25 (H) 8 - 23 mg/dL    CREATININE 1.27 (H) 0.70 - 1.20 mg/dL    GFR Non-African American 53.2 (L) >60    GFR  >60.0 >60    Calcium 9.0 8.5 - 9.9 mg/dL    Total Protein 7.3 6.3 - 8.0 g/dL    Alb 4.0 3.5 - 4.6 g/dL    Total Bilirubin 0.4 0.2 - 0.7 mg/dL    Alkaline Phosphatase 56 35 - 104 U/L    ALT 18 0 - 41 U/L    AST 17 0 - 40 U/L    Globulin 3.3 2.3 - 3.5 g/dL   Troponin    Collection Time: 09/19/19  6:42 PM   Result Value Ref Range    Troponin 0.036 (HH) 0.000 - 0.010 ng/mL   CK    Collection Time: 09/19/19  6:42 PM   Result Value Ref Range    Total CK 79 0 - 190 U/L   CBC Auto Differential    Collection Time: 09/19/19  6:42 PM   Result Value Ref Range    WBC 6.1 4.8 - 10.8 K/uL    RBC 4.29 (L) 4.70 - 6.10 M/uL    Hemoglobin 13.7 (L) 14.0 - 18.0 g/dL    Hematocrit 40.6 (L) 42.0 - 52.0 %    MCV 94.5 80.0 - 100.0 fL    MCH 31.9 (H) 27.0 - 31.3 pg    MCHC 33.8 33.0 - 37.0 %    RDW 13.2 11.5 - 14.5 %    Platelets 349 171 - 904 K/uL    Neutrophils % 51.3 %    Lymphocytes % 34.9 %    Monocytes % 9.9 %    Eosinophils % 2.8 %    Basophils % 1.1 %    Neutrophils Absolute 3.1 1.4 - 6.5 K/uL    Lymphocytes Absolute 2.1 1.0 - 4.8 K/uL    Monocytes Absolute 0.6 0.2 - 0.8 K/uL    Eosinophils Absolute 0.2 0.0 - 0.7 K/uL    Basophils Absolute 0.1 0.0 - 0.2 K/uL   EKG 12 Lead    Collection Time: 09/19/19  7:55 PM   Result Value Ref Range    Ventricular Rate 66 BPM    Atrial Rate 66 BPM    P-R Interval 158 ms    QRS Duration 136 ms    Q-T Interval 456 ms    QTc Calculation (Bazett) 478 ms    P Axis 6 degrees    R Axis -80 degrees    T Axis -26 degrees   Troponin    Collection Time: 09/19/19  9:41 PM   Result Value Ref Range    Troponin 0.027 (HH) 0.000 - 0.010 ng/mL   Troponin    Collection Time: 09/20/19 12:17 AM   Result Value Ref Range    Troponin 0.032 (HH) 0.000 - 0.010 ng/mL   CBC    Collection Time: 09/20/19  5:46 AM   Result Value Ref Range    WBC 5.3 4.8 - 10.8 K/uL    RBC 3.85 (L) 4.70 - 6.10 M/uL    Hemoglobin 12.4 (L) 14.0 - 18.0 g/dL    Hematocrit 36.6 (L) 42.0 - 52.0 %    MCV 95.0 80.0 - 100.0 fL    MCH 32.1 (H) 27.0 - 31.3 pg    MCHC 33.8 33.0 - 37.0 %    RDW 13.2 11.5 - 14.5 %    Platelets 804 953 - 148 K/uL   POCT Glucose    Collection Time: 09/20/19  5:54 AM   Result Value Ref Range    POC Glucose 142 (H) 60 - 115 mg/dl    Performed on ACCU-CHEK    POCT Glucose    Collection Time: 09/20/19 10:56 AM   Result Value Ref Range    POC Glucose 238 (H) 60 - 115 mg/dl    Performed on ACCU-CHEK    CBC    Collection Time: 09/21/19  5:43 AM   Result Value Ref Range    WBC

## 2019-09-21 NOTE — PROGRESS NOTES
Paloma Waters is a 80 y.o. male patient.     Current Facility-Administered Medications   Medication Dose Route Frequency Provider Last Rate Last Dose    mometasone-formoterol (DULERA) 100-5 MCG/ACT inhaler 2 puff  2 puff Inhalation BID Nadiya Kirkpatrick MD   2 puff at 09/20/19 1936    levothyroxine (SYNTHROID) tablet 25 mcg  25 mcg Oral Daily Nadiya Kirkpatrick MD   25 mcg at 09/20/19 1122    atorvastatin (LIPITOR) tablet 40 mg  40 mg Oral Daily Nadiya Kirkpatrick MD   40 mg at 09/20/19 1122    ursodiol (ACTIGALL) capsule 300 mg  300 mg Oral BID Nadiya Kirkpatrick MD   300 mg at 09/20/19 2020    linagliptin (TRADJENTA) tablet 5 mg  5 mg Oral Daily Nadiya Kirkpatrick MD   5 mg at 09/20/19 1122    ipratropium-albuterol (DUONEB) nebulizer solution 3 mL  3 mL Inhalation TID Nadiya Kirkpatrick MD   3 mL at 09/20/19 1936    albuterol sulfate  (90 Base) MCG/ACT inhaler 2 puff  2 puff Inhalation Q6H PRN Nadiya Kirkpatrick MD        carvedilol (COREG) tablet 6.25 mg  6.25 mg Oral BID WC Nadiya Kirkpatrick MD   6.25 mg at 09/20/19 2020    sodium chloride flush 0.9 % injection 10 mL  10 mL Intravenous 2 times per day Nadiya Kirkpatrick MD        diphenhydrAMINE (BENADRYL) injection 50 mg  50 mg Intravenous Once Nadiya Kirkpatrick MD   Stopped at 09/20/19 1200    hydrocortisone sodium succinate PF (SOLU-CORTEF) injection 200 mg  200 mg Intravenous Once Nadiya Kirkpatrick MD        pantoprazole (PROTONIX) tablet 40 mg  40 mg Oral QAM AC Nadiya Kirkpatrick MD        0.9 % sodium chloride infusion   Intravenous Continuous Jose Burgos,  mL/hr at 09/20/19 2020      acetaminophen (TYLENOL) tablet 650 mg  650 mg Oral Q4H PRN Jose Garciaiday, DO        hydrALAZINE (APRESOLINE) injection 10 mg  10 mg Intravenous Q6H PRN Yuan Rivera MD   10 mg at 09/20/19 2021    nitroGLYCERIN (NITROSTAT) SL tablet 0.4 mg  0.4 mg Sublingual Q5 Min PRN Gary Merlin, PA   0.4 mg at 09/19/19 1957    sodium chloride flush 0.9 % injection 10 mL  10 mL Intravenous 2 times person, place and time. Normal strength. GCS score is 15. Pupils:  Pupils are equal, round, and reactive to light. Skin:  Warm and dry. Assessment:    Condition: In stable condition. (Patient is alert and oriented x 4. Denies pain. Right groin site remains stable, no bleeding no hematoma. Pedal pulses palpable. ). Plan:   Encourage ambulation and ad anna activity. Respiratory Plan: on 2L oxygen continuously at baseline. Consults: cardiology. Restricted diet (Cardiac; no caffeine). Administer medications as ordered.          Diego Moe RN  9/21/2019

## 2019-09-21 NOTE — FLOWSHEET NOTE
Patients right groin cath site is soft, no hematoma noted. Dressing is clean and dry. Pt denies any pain.

## 2019-09-25 PROCEDURE — 93010 ELECTROCARDIOGRAM REPORT: CPT | Performed by: INTERNAL MEDICINE

## 2019-09-26 ENCOUNTER — HOSPITAL ENCOUNTER (EMERGENCY)
Age: 84
Discharge: HOME | End: 2019-09-26
Attending: EMERGENCY MEDICINE
Payer: MEDICARE

## 2019-09-26 ENCOUNTER — APPOINTMENT (OUTPATIENT)
Dept: GENERAL RADIOLOGY | Age: 84
End: 2019-09-26
Payer: MEDICARE

## 2019-09-26 VITALS
RESPIRATION RATE: 20 BRPM | HEART RATE: 68 BPM | WEIGHT: 165 LBS | SYSTOLIC BLOOD PRESSURE: 151 MMHG | HEIGHT: 66 IN | BODY MASS INDEX: 26.52 KG/M2 | TEMPERATURE: 98 F | OXYGEN SATURATION: 95 % | DIASTOLIC BLOOD PRESSURE: 99 MMHG

## 2019-09-26 DIAGNOSIS — J40 BRONCHITIS: Primary | ICD-10-CM

## 2019-09-26 DIAGNOSIS — J44.1 COPD EXACERBATION (HCC): ICD-10-CM

## 2019-09-26 LAB
ALBUMIN SERPL-MCNC: 4.1 G/DL (ref 3.5–4.6)
ALP BLD-CCNC: 64 U/L (ref 35–104)
ALT SERPL-CCNC: 17 U/L (ref 0–41)
ANION GAP SERPL CALCULATED.3IONS-SCNC: 13 MEQ/L (ref 9–15)
AST SERPL-CCNC: 15 U/L (ref 0–40)
BASOPHILS ABSOLUTE: 0 K/UL (ref 0–0.2)
BASOPHILS RELATIVE PERCENT: 0.5 %
BILIRUB SERPL-MCNC: 0.8 MG/DL (ref 0.2–0.7)
BUN BLDV-MCNC: 15 MG/DL (ref 8–23)
CALCIUM SERPL-MCNC: 9 MG/DL (ref 8.5–9.9)
CHLORIDE BLD-SCNC: 100 MEQ/L (ref 95–107)
CO2: 26 MEQ/L (ref 20–31)
CREAT SERPL-MCNC: 1.29 MG/DL (ref 0.7–1.2)
EKG ATRIAL RATE: 67 BPM
EKG P AXIS: 4 DEGREES
EKG P-R INTERVAL: 172 MS
EKG Q-T INTERVAL: 458 MS
EKG QRS DURATION: 140 MS
EKG QTC CALCULATION (BAZETT): 483 MS
EKG R AXIS: -63 DEGREES
EKG T AXIS: -23 DEGREES
EKG VENTRICULAR RATE: 67 BPM
EOSINOPHILS ABSOLUTE: 0.2 K/UL (ref 0–0.7)
EOSINOPHILS RELATIVE PERCENT: 2.7 %
GFR AFRICAN AMERICAN: >60
GFR NON-AFRICAN AMERICAN: 52.3
GLOBULIN: 3.5 G/DL (ref 2.3–3.5)
GLUCOSE BLD-MCNC: 244 MG/DL (ref 70–99)
HCT VFR BLD CALC: 40.6 % (ref 42–52)
HEMOGLOBIN: 13.4 G/DL (ref 14–18)
LACTIC ACID: 2.2 MMOL/L (ref 0.5–2.2)
LYMPHOCYTES ABSOLUTE: 1.9 K/UL (ref 1–4.8)
LYMPHOCYTES RELATIVE PERCENT: 27.1 %
MCH RBC QN AUTO: 31.6 PG (ref 27–31.3)
MCHC RBC AUTO-ENTMCNC: 33 % (ref 33–37)
MCV RBC AUTO: 95.8 FL (ref 80–100)
MONOCYTES ABSOLUTE: 0.7 K/UL (ref 0.2–0.8)
MONOCYTES RELATIVE PERCENT: 9.8 %
NEUTROPHILS ABSOLUTE: 4.3 K/UL (ref 1.4–6.5)
NEUTROPHILS RELATIVE PERCENT: 59.9 %
PDW BLD-RTO: 13.6 % (ref 11.5–14.5)
PLATELET # BLD: 197 K/UL (ref 130–400)
POTASSIUM SERPL-SCNC: 4.1 MEQ/L (ref 3.4–4.9)
RBC # BLD: 4.24 M/UL (ref 4.7–6.1)
SODIUM BLD-SCNC: 139 MEQ/L (ref 135–144)
TOTAL PROTEIN: 7.6 G/DL (ref 6.3–8)
TROPONIN: 0.03 NG/ML (ref 0–0.01)
WBC # BLD: 7.1 K/UL (ref 4.8–10.8)

## 2019-09-26 PROCEDURE — 80053 COMPREHEN METABOLIC PANEL: CPT

## 2019-09-26 PROCEDURE — 93005 ELECTROCARDIOGRAM TRACING: CPT | Performed by: EMERGENCY MEDICINE

## 2019-09-26 PROCEDURE — 2580000003 HC RX 258: Performed by: EMERGENCY MEDICINE

## 2019-09-26 PROCEDURE — 83605 ASSAY OF LACTIC ACID: CPT

## 2019-09-26 PROCEDURE — 99285 EMERGENCY DEPT VISIT HI MDM: CPT

## 2019-09-26 PROCEDURE — 96374 THER/PROPH/DIAG INJ IV PUSH: CPT

## 2019-09-26 PROCEDURE — 6360000002 HC RX W HCPCS: Performed by: EMERGENCY MEDICINE

## 2019-09-26 PROCEDURE — 71046 X-RAY EXAM CHEST 2 VIEWS: CPT

## 2019-09-26 PROCEDURE — 6370000000 HC RX 637 (ALT 250 FOR IP): Performed by: EMERGENCY MEDICINE

## 2019-09-26 PROCEDURE — 94640 AIRWAY INHALATION TREATMENT: CPT

## 2019-09-26 PROCEDURE — 87040 BLOOD CULTURE FOR BACTERIA: CPT

## 2019-09-26 PROCEDURE — 84484 ASSAY OF TROPONIN QUANT: CPT

## 2019-09-26 PROCEDURE — 85025 COMPLETE CBC W/AUTO DIFF WBC: CPT

## 2019-09-26 PROCEDURE — 36415 COLL VENOUS BLD VENIPUNCTURE: CPT

## 2019-09-26 PROCEDURE — 94760 N-INVAS EAR/PLS OXIMETRY 1: CPT

## 2019-09-26 RX ORDER — AZITHROMYCIN 250 MG/1
TABLET, FILM COATED ORAL
Qty: 1 PACKET | Refills: 0 | Status: SHIPPED | OUTPATIENT
Start: 2019-09-26 | End: 2019-09-30

## 2019-09-26 RX ORDER — PREDNISONE 20 MG/1
40 TABLET ORAL DAILY
Qty: 10 TABLET | Refills: 0 | Status: SHIPPED | OUTPATIENT
Start: 2019-09-26 | End: 2019-10-01

## 2019-09-26 RX ORDER — METHYLPREDNISOLONE SODIUM SUCCINATE 125 MG/2ML
125 INJECTION, POWDER, LYOPHILIZED, FOR SOLUTION INTRAMUSCULAR; INTRAVENOUS ONCE
Status: COMPLETED | OUTPATIENT
Start: 2019-09-26 | End: 2019-09-26

## 2019-09-26 RX ORDER — IPRATROPIUM BROMIDE AND ALBUTEROL SULFATE 2.5; .5 MG/3ML; MG/3ML
1 SOLUTION RESPIRATORY (INHALATION) PRN
Status: DISCONTINUED | OUTPATIENT
Start: 2019-09-26 | End: 2019-09-26 | Stop reason: HOSPADM

## 2019-09-26 RX ORDER — 0.9 % SODIUM CHLORIDE 0.9 %
500 INTRAVENOUS SOLUTION INTRAVENOUS ONCE
Status: COMPLETED | OUTPATIENT
Start: 2019-09-26 | End: 2019-09-26

## 2019-09-26 RX ADMIN — SODIUM CHLORIDE 500 ML: 9 INJECTION, SOLUTION INTRAVENOUS at 13:20

## 2019-09-26 RX ADMIN — METHYLPREDNISOLONE SODIUM SUCCINATE 125 MG: 125 INJECTION, POWDER, FOR SOLUTION INTRAMUSCULAR; INTRAVENOUS at 13:20

## 2019-09-26 RX ADMIN — IPRATROPIUM BROMIDE AND ALBUTEROL SULFATE 1 AMPULE: .5; 3 SOLUTION RESPIRATORY (INHALATION) at 13:22

## 2019-09-26 ASSESSMENT — ENCOUNTER SYMPTOMS
ABDOMINAL PAIN: 0
SORE THROAT: 0
EYE PAIN: 0
VOMITING: 0
COUGH: 1
NAUSEA: 0
SHORTNESS OF BREATH: 1
WHEEZING: 1
CHEST TIGHTNESS: 0

## 2019-09-26 ASSESSMENT — PAIN SCALES - GENERAL: PAINLEVEL_OUTOF10: 5

## 2019-09-26 ASSESSMENT — PAIN DESCRIPTION - DESCRIPTORS: DESCRIPTORS: THROBBING

## 2019-09-26 ASSESSMENT — PAIN DESCRIPTION - LOCATION: LOCATION: HEAD

## 2019-09-26 ASSESSMENT — PAIN DESCRIPTION - PAIN TYPE: TYPE: ACUTE PAIN

## 2019-09-27 PROCEDURE — 93010 ELECTROCARDIOGRAM REPORT: CPT | Performed by: INTERNAL MEDICINE

## 2019-10-01 LAB
BLOOD CULTURE, ROUTINE: NORMAL
CULTURE, BLOOD 2: NORMAL

## 2019-12-04 ENCOUNTER — APPOINTMENT (OUTPATIENT)
Dept: GENERAL RADIOLOGY | Age: 84
End: 2019-12-04
Payer: MEDICARE

## 2019-12-04 ENCOUNTER — HOSPITAL ENCOUNTER (EMERGENCY)
Age: 84
Discharge: HOME | End: 2019-12-04
Attending: STUDENT IN AN ORGANIZED HEALTH CARE EDUCATION/TRAINING PROGRAM
Payer: MEDICARE

## 2019-12-04 VITALS
OXYGEN SATURATION: 95 % | BODY MASS INDEX: 26.52 KG/M2 | WEIGHT: 165 LBS | HEIGHT: 66 IN | DIASTOLIC BLOOD PRESSURE: 74 MMHG | RESPIRATION RATE: 16 BRPM | HEART RATE: 58 BPM | SYSTOLIC BLOOD PRESSURE: 153 MMHG | TEMPERATURE: 97.8 F

## 2019-12-04 DIAGNOSIS — J20.9 ACUTE BRONCHITIS, UNSPECIFIED ORGANISM: Primary | ICD-10-CM

## 2019-12-04 LAB
ALBUMIN SERPL-MCNC: 4.3 G/DL (ref 3.5–4.6)
ALP BLD-CCNC: 67 U/L (ref 35–104)
ALT SERPL-CCNC: 14 U/L (ref 0–41)
ANION GAP SERPL CALCULATED.3IONS-SCNC: 11 MEQ/L (ref 9–15)
AST SERPL-CCNC: 15 U/L (ref 0–40)
BASOPHILS ABSOLUTE: 0 K/UL (ref 0–0.2)
BASOPHILS RELATIVE PERCENT: 0.8 %
BILIRUB SERPL-MCNC: 1 MG/DL (ref 0.2–0.7)
BUN BLDV-MCNC: 21 MG/DL (ref 8–23)
C-REACTIVE PROTEIN: 5.4 MG/L (ref 0–5)
CALCIUM SERPL-MCNC: 9.2 MG/DL (ref 8.5–9.9)
CHLORIDE BLD-SCNC: 101 MEQ/L (ref 95–107)
CO2: 25 MEQ/L (ref 20–31)
CREAT SERPL-MCNC: 1.21 MG/DL (ref 0.7–1.2)
EOSINOPHILS ABSOLUTE: 0.2 K/UL (ref 0–0.7)
EOSINOPHILS RELATIVE PERCENT: 2.9 %
GFR AFRICAN AMERICAN: >60
GFR NON-AFRICAN AMERICAN: 56.2
GLOBULIN: 3 G/DL (ref 2.3–3.5)
GLUCOSE BLD-MCNC: 246 MG/DL (ref 70–99)
HCT VFR BLD CALC: 42.6 % (ref 42–52)
HEMOGLOBIN: 14.1 G/DL (ref 14–18)
INFLUENZA A BY PCR: NEGATIVE
INFLUENZA B BY PCR: NEGATIVE
LACTIC ACID: 1.5 MMOL/L (ref 0.5–2.2)
LYMPHOCYTES ABSOLUTE: 1.7 K/UL (ref 1–4.8)
LYMPHOCYTES RELATIVE PERCENT: 29.6 %
MAGNESIUM: 1.9 MG/DL (ref 1.7–2.4)
MCH RBC QN AUTO: 31.2 PG (ref 27–31.3)
MCHC RBC AUTO-ENTMCNC: 33.1 % (ref 33–37)
MCV RBC AUTO: 94.3 FL (ref 80–100)
MONOCYTES ABSOLUTE: 0.5 K/UL (ref 0.2–0.8)
MONOCYTES RELATIVE PERCENT: 9.5 %
NEUTROPHILS ABSOLUTE: 3.3 K/UL (ref 1.4–6.5)
NEUTROPHILS RELATIVE PERCENT: 57.2 %
PDW BLD-RTO: 13.4 % (ref 11.5–14.5)
PLATELET # BLD: 176 K/UL (ref 130–400)
POTASSIUM SERPL-SCNC: 4 MEQ/L (ref 3.4–4.9)
PRO-BNP: 530 PG/ML
RBC # BLD: 4.52 M/UL (ref 4.7–6.1)
SODIUM BLD-SCNC: 137 MEQ/L (ref 135–144)
TOTAL CK: 62 U/L (ref 0–190)
TOTAL PROTEIN: 7.3 G/DL (ref 6.3–8)
WBC # BLD: 5.8 K/UL (ref 4.8–10.8)

## 2019-12-04 PROCEDURE — 85025 COMPLETE CBC W/AUTO DIFF WBC: CPT

## 2019-12-04 PROCEDURE — 71046 X-RAY EXAM CHEST 2 VIEWS: CPT

## 2019-12-04 PROCEDURE — 87502 INFLUENZA DNA AMP PROBE: CPT

## 2019-12-04 PROCEDURE — 36415 COLL VENOUS BLD VENIPUNCTURE: CPT

## 2019-12-04 PROCEDURE — 83880 ASSAY OF NATRIURETIC PEPTIDE: CPT

## 2019-12-04 PROCEDURE — 83735 ASSAY OF MAGNESIUM: CPT

## 2019-12-04 PROCEDURE — 86140 C-REACTIVE PROTEIN: CPT

## 2019-12-04 PROCEDURE — 80053 COMPREHEN METABOLIC PANEL: CPT

## 2019-12-04 PROCEDURE — 82550 ASSAY OF CK (CPK): CPT

## 2019-12-04 PROCEDURE — 83605 ASSAY OF LACTIC ACID: CPT

## 2019-12-04 PROCEDURE — 87040 BLOOD CULTURE FOR BACTERIA: CPT

## 2019-12-04 PROCEDURE — 99283 EMERGENCY DEPT VISIT LOW MDM: CPT

## 2019-12-04 RX ORDER — ECHINACEA PURPUREA EXTRACT 125 MG
1 TABLET ORAL 4 TIMES DAILY PRN
Qty: 1 BOTTLE | Refills: 0 | Status: ON HOLD | OUTPATIENT
Start: 2019-12-04 | End: 2021-06-22

## 2019-12-04 RX ORDER — AZITHROMYCIN 250 MG/1
TABLET, FILM COATED ORAL
Qty: 1 PACKET | Refills: 0 | Status: SHIPPED | OUTPATIENT
Start: 2019-12-04 | End: 2019-12-14

## 2019-12-04 ASSESSMENT — ENCOUNTER SYMPTOMS
CHEST TIGHTNESS: 0
ABDOMINAL PAIN: 0
SHORTNESS OF BREATH: 0
TROUBLE SWALLOWING: 0
COUGH: 1
VOMITING: 0
RHINORRHEA: 1
BACK PAIN: 0
SINUS PRESSURE: 0
DIARRHEA: 0

## 2019-12-09 LAB
BLOOD CULTURE, ROUTINE: NORMAL
CULTURE, BLOOD 2: NORMAL

## 2020-02-13 ENCOUNTER — APPOINTMENT (OUTPATIENT)
Dept: GENERAL RADIOLOGY | Age: 85
End: 2020-02-13
Payer: MEDICARE

## 2020-02-13 ENCOUNTER — HOSPITAL ENCOUNTER (OUTPATIENT)
Age: 85
Setting detail: OBSERVATION
Discharge: HOME | End: 2020-02-14
Attending: INTERNAL MEDICINE | Admitting: INTERNAL MEDICINE
Payer: MEDICARE

## 2020-02-13 PROBLEM — J44.1 COPD EXACERBATION (HCC): Status: ACTIVE | Noted: 2020-02-13

## 2020-02-13 LAB
ALBUMIN SERPL-MCNC: 4.1 G/DL (ref 3.5–4.6)
ALP BLD-CCNC: 59 U/L (ref 35–104)
ALT SERPL-CCNC: 14 U/L (ref 0–41)
ANION GAP SERPL CALCULATED.3IONS-SCNC: 13 MEQ/L (ref 9–15)
AST SERPL-CCNC: 19 U/L (ref 0–40)
BASOPHILS ABSOLUTE: 0.1 K/UL (ref 0–0.2)
BASOPHILS RELATIVE PERCENT: 1.2 %
BILIRUB SERPL-MCNC: 0.8 MG/DL (ref 0.2–0.7)
BUN BLDV-MCNC: 21 MG/DL (ref 8–23)
CALCIUM SERPL-MCNC: 8.9 MG/DL (ref 8.5–9.9)
CHLORIDE BLD-SCNC: 99 MEQ/L (ref 95–107)
CO2: 25 MEQ/L (ref 20–31)
CREAT SERPL-MCNC: 1.15 MG/DL (ref 0.7–1.2)
EKG ATRIAL RATE: 85 BPM
EKG P-R INTERVAL: 276 MS
EKG Q-T INTERVAL: 394 MS
EKG QRS DURATION: 116 MS
EKG QTC CALCULATION (BAZETT): 468 MS
EKG R AXIS: -72 DEGREES
EKG T AXIS: -24 DEGREES
EKG VENTRICULAR RATE: 85 BPM
EOSINOPHILS ABSOLUTE: 0.2 K/UL (ref 0–0.7)
EOSINOPHILS RELATIVE PERCENT: 2.4 %
GFR AFRICAN AMERICAN: >60
GFR NON-AFRICAN AMERICAN: 59.6
GLOBULIN: 2.8 G/DL (ref 2.3–3.5)
GLUCOSE BLD-MCNC: 145 MG/DL (ref 60–115)
GLUCOSE BLD-MCNC: 178 MG/DL (ref 70–99)
GLUCOSE BLD-MCNC: 275 MG/DL (ref 60–115)
HCT VFR BLD CALC: 43.2 % (ref 42–52)
HEMOGLOBIN: 14.5 G/DL (ref 14–18)
INFLUENZA A BY PCR: NEGATIVE
INFLUENZA B BY PCR: NEGATIVE
LACTIC ACID: 1.7 MMOL/L (ref 0.5–2.2)
LYMPHOCYTES ABSOLUTE: 1.3 K/UL (ref 1–4.8)
LYMPHOCYTES RELATIVE PERCENT: 16 %
MAGNESIUM: 1.8 MG/DL (ref 1.7–2.4)
MCH RBC QN AUTO: 31.1 PG (ref 27–31.3)
MCHC RBC AUTO-ENTMCNC: 33.6 % (ref 33–37)
MCV RBC AUTO: 92.6 FL (ref 80–100)
MONOCYTES ABSOLUTE: 0.6 K/UL (ref 0.2–0.8)
MONOCYTES RELATIVE PERCENT: 7 %
NEUTROPHILS ABSOLUTE: 6.2 K/UL (ref 1.4–6.5)
NEUTROPHILS RELATIVE PERCENT: 73.4 %
PDW BLD-RTO: 14 % (ref 11.5–14.5)
PERFORMED ON: ABNORMAL
PERFORMED ON: ABNORMAL
PLATELET # BLD: 191 K/UL (ref 130–400)
POTASSIUM SERPL-SCNC: 4.8 MEQ/L (ref 3.4–4.9)
PROCALCITONIN: <0.02 NG/ML (ref 0–0.15)
RBC # BLD: 4.67 M/UL (ref 4.7–6.1)
SODIUM BLD-SCNC: 137 MEQ/L (ref 135–144)
TOTAL PROTEIN: 6.9 G/DL (ref 6.3–8)
TROPONIN: 0.04 NG/ML (ref 0–0.01)
WBC # BLD: 8.4 K/UL (ref 4.8–10.8)

## 2020-02-13 PROCEDURE — 6370000000 HC RX 637 (ALT 250 FOR IP): Performed by: PHYSICIAN ASSISTANT

## 2020-02-13 PROCEDURE — 94664 DEMO&/EVAL PT USE INHALER: CPT

## 2020-02-13 PROCEDURE — 87502 INFLUENZA DNA AMP PROBE: CPT

## 2020-02-13 PROCEDURE — 93005 ELECTROCARDIOGRAM TRACING: CPT | Performed by: PHYSICIAN ASSISTANT

## 2020-02-13 PROCEDURE — 36415 COLL VENOUS BLD VENIPUNCTURE: CPT

## 2020-02-13 PROCEDURE — 85025 COMPLETE CBC W/AUTO DIFF WBC: CPT

## 2020-02-13 PROCEDURE — 96372 THER/PROPH/DIAG INJ SC/IM: CPT

## 2020-02-13 PROCEDURE — 6370000000 HC RX 637 (ALT 250 FOR IP): Performed by: INTERNAL MEDICINE

## 2020-02-13 PROCEDURE — 6360000002 HC RX W HCPCS: Performed by: PHYSICIAN ASSISTANT

## 2020-02-13 PROCEDURE — 96374 THER/PROPH/DIAG INJ IV PUSH: CPT

## 2020-02-13 PROCEDURE — G0378 HOSPITAL OBSERVATION PER HR: HCPCS

## 2020-02-13 PROCEDURE — 71045 X-RAY EXAM CHEST 1 VIEW: CPT

## 2020-02-13 PROCEDURE — 2580000003 HC RX 258: Performed by: INTERNAL MEDICINE

## 2020-02-13 PROCEDURE — 83605 ASSAY OF LACTIC ACID: CPT

## 2020-02-13 PROCEDURE — 84145 PROCALCITONIN (PCT): CPT

## 2020-02-13 PROCEDURE — 80053 COMPREHEN METABOLIC PANEL: CPT

## 2020-02-13 PROCEDURE — 84484 ASSAY OF TROPONIN QUANT: CPT

## 2020-02-13 PROCEDURE — 83735 ASSAY OF MAGNESIUM: CPT

## 2020-02-13 PROCEDURE — 94667 MNPJ CHEST WALL 1ST: CPT

## 2020-02-13 PROCEDURE — 99285 EMERGENCY DEPT VISIT HI MDM: CPT

## 2020-02-13 PROCEDURE — 87040 BLOOD CULTURE FOR BACTERIA: CPT

## 2020-02-13 PROCEDURE — 94640 AIRWAY INHALATION TREATMENT: CPT

## 2020-02-13 PROCEDURE — 6360000002 HC RX W HCPCS: Performed by: INTERNAL MEDICINE

## 2020-02-13 RX ORDER — LISINOPRIL 5 MG/1
5 TABLET ORAL DAILY
Status: DISCONTINUED | OUTPATIENT
Start: 2020-02-14 | End: 2020-02-14 | Stop reason: HOSPADM

## 2020-02-13 RX ORDER — DEXTROSE MONOHYDRATE 25 G/50ML
12.5 INJECTION, SOLUTION INTRAVENOUS PRN
Status: DISCONTINUED | OUTPATIENT
Start: 2020-02-13 | End: 2020-02-14 | Stop reason: HOSPADM

## 2020-02-13 RX ORDER — BUDESONIDE AND FORMOTEROL FUMARATE DIHYDRATE 80; 4.5 UG/1; UG/1
2 AEROSOL RESPIRATORY (INHALATION) 2 TIMES DAILY
Status: DISCONTINUED | OUTPATIENT
Start: 2020-02-13 | End: 2020-02-14 | Stop reason: HOSPADM

## 2020-02-13 RX ORDER — METHYLPREDNISOLONE SODIUM SUCCINATE 125 MG/2ML
125 INJECTION, POWDER, LYOPHILIZED, FOR SOLUTION INTRAMUSCULAR; INTRAVENOUS ONCE
Status: COMPLETED | OUTPATIENT
Start: 2020-02-13 | End: 2020-02-13

## 2020-02-13 RX ORDER — ASPIRIN 81 MG/1
324 TABLET, CHEWABLE ORAL ONCE
Status: COMPLETED | OUTPATIENT
Start: 2020-02-13 | End: 2020-02-13

## 2020-02-13 RX ORDER — ONDANSETRON 2 MG/ML
4 INJECTION INTRAMUSCULAR; INTRAVENOUS EVERY 6 HOURS PRN
Status: DISCONTINUED | OUTPATIENT
Start: 2020-02-13 | End: 2020-02-14 | Stop reason: HOSPADM

## 2020-02-13 RX ORDER — IPRATROPIUM BROMIDE AND ALBUTEROL SULFATE 2.5; .5 MG/3ML; MG/3ML
1 SOLUTION RESPIRATORY (INHALATION) EVERY 4 HOURS PRN
Status: DISCONTINUED | OUTPATIENT
Start: 2020-02-13 | End: 2020-02-14 | Stop reason: HOSPADM

## 2020-02-13 RX ORDER — SENNA PLUS 8.6 MG/1
1 TABLET ORAL NIGHTLY
Status: DISCONTINUED | OUTPATIENT
Start: 2020-02-13 | End: 2020-02-14 | Stop reason: HOSPADM

## 2020-02-13 RX ORDER — ATORVASTATIN CALCIUM 40 MG/1
40 TABLET, FILM COATED ORAL DAILY
Status: DISCONTINUED | OUTPATIENT
Start: 2020-02-13 | End: 2020-02-14 | Stop reason: HOSPADM

## 2020-02-13 RX ORDER — POLYETHYLENE GLYCOL 3350 17 G/17G
17 POWDER, FOR SOLUTION ORAL DAILY
Status: DISCONTINUED | OUTPATIENT
Start: 2020-02-13 | End: 2020-02-14 | Stop reason: HOSPADM

## 2020-02-13 RX ORDER — LEVOTHYROXINE SODIUM 0.03 MG/1
25 TABLET ORAL DAILY
Status: DISCONTINUED | OUTPATIENT
Start: 2020-02-14 | End: 2020-02-14 | Stop reason: HOSPADM

## 2020-02-13 RX ORDER — SODIUM CHLORIDE 0.9 % (FLUSH) 0.9 %
10 SYRINGE (ML) INJECTION EVERY 12 HOURS SCHEDULED
Status: DISCONTINUED | OUTPATIENT
Start: 2020-02-13 | End: 2020-02-14 | Stop reason: HOSPADM

## 2020-02-13 RX ORDER — SODIUM CHLORIDE 0.9 % (FLUSH) 0.9 %
10 SYRINGE (ML) INJECTION PRN
Status: DISCONTINUED | OUTPATIENT
Start: 2020-02-13 | End: 2020-02-14 | Stop reason: HOSPADM

## 2020-02-13 RX ORDER — CARVEDILOL 6.25 MG/1
6.25 TABLET ORAL 2 TIMES DAILY WITH MEALS
Status: DISCONTINUED | OUTPATIENT
Start: 2020-02-13 | End: 2020-02-14 | Stop reason: HOSPADM

## 2020-02-13 RX ORDER — LANSOPRAZOLE
30 KIT DAILY
Status: DISCONTINUED | OUTPATIENT
Start: 2020-02-14 | End: 2020-02-14 | Stop reason: HOSPADM

## 2020-02-13 RX ORDER — DEXTROSE MONOHYDRATE 50 MG/ML
100 INJECTION, SOLUTION INTRAVENOUS PRN
Status: DISCONTINUED | OUTPATIENT
Start: 2020-02-13 | End: 2020-02-14 | Stop reason: HOSPADM

## 2020-02-13 RX ORDER — ASPIRIN 81 MG/1
81 TABLET ORAL DAILY
Status: DISCONTINUED | OUTPATIENT
Start: 2020-02-14 | End: 2020-02-14 | Stop reason: HOSPADM

## 2020-02-13 RX ORDER — IPRATROPIUM BROMIDE AND ALBUTEROL SULFATE 2.5; .5 MG/3ML; MG/3ML
1 SOLUTION RESPIRATORY (INHALATION)
Status: DISCONTINUED | OUTPATIENT
Start: 2020-02-13 | End: 2020-02-13

## 2020-02-13 RX ORDER — NICOTINE POLACRILEX 4 MG
15 LOZENGE BUCCAL PRN
Status: DISCONTINUED | OUTPATIENT
Start: 2020-02-13 | End: 2020-02-14 | Stop reason: HOSPADM

## 2020-02-13 RX ORDER — IPRATROPIUM BROMIDE AND ALBUTEROL SULFATE 2.5; .5 MG/3ML; MG/3ML
1 SOLUTION RESPIRATORY (INHALATION) ONCE
Status: COMPLETED | OUTPATIENT
Start: 2020-02-13 | End: 2020-02-13

## 2020-02-13 RX ORDER — ACETAMINOPHEN 325 MG/1
650 TABLET ORAL EVERY 4 HOURS PRN
Status: DISCONTINUED | OUTPATIENT
Start: 2020-02-13 | End: 2020-02-14 | Stop reason: HOSPADM

## 2020-02-13 RX ORDER — PREDNISONE 20 MG/1
40 TABLET ORAL DAILY
Status: DISCONTINUED | OUTPATIENT
Start: 2020-02-14 | End: 2020-02-14 | Stop reason: HOSPADM

## 2020-02-13 RX ORDER — GUAIFENESIN 600 MG/1
600 TABLET, EXTENDED RELEASE ORAL 2 TIMES DAILY
Status: DISCONTINUED | OUTPATIENT
Start: 2020-02-13 | End: 2020-02-14 | Stop reason: HOSPADM

## 2020-02-13 RX ADMIN — SENNOSIDES 8.6 MG: 8.6 TABLET, FILM COATED ORAL at 20:15

## 2020-02-13 RX ADMIN — ASPIRIN 81 MG 324 MG: 81 TABLET ORAL at 14:37

## 2020-02-13 RX ADMIN — METHYLPREDNISOLONE SODIUM SUCCINATE 125 MG: 125 INJECTION, POWDER, FOR SOLUTION INTRAMUSCULAR; INTRAVENOUS at 14:37

## 2020-02-13 RX ADMIN — ATORVASTATIN CALCIUM 40 MG: 40 TABLET, FILM COATED ORAL at 20:15

## 2020-02-13 RX ADMIN — ENOXAPARIN SODIUM 40 MG: 40 INJECTION SUBCUTANEOUS at 17:32

## 2020-02-13 RX ADMIN — IPRATROPIUM BROMIDE AND ALBUTEROL SULFATE 1 AMPULE: .5; 3 SOLUTION RESPIRATORY (INHALATION) at 14:37

## 2020-02-13 RX ADMIN — GUAIFENESIN 600 MG: 600 TABLET, EXTENDED RELEASE ORAL at 20:15

## 2020-02-13 RX ADMIN — CARVEDILOL 6.25 MG: 6.25 TABLET, FILM COATED ORAL at 17:32

## 2020-02-13 RX ADMIN — BUDESONIDE AND FORMOTEROL FUMARATE DIHYDRATE 2 PUFF: 80; 4.5 AEROSOL RESPIRATORY (INHALATION) at 20:22

## 2020-02-13 RX ADMIN — Medication 10 ML: at 20:45

## 2020-02-13 ASSESSMENT — ENCOUNTER SYMPTOMS
NAUSEA: 0
SHORTNESS OF BREATH: 1
VOMITING: 0
BACK PAIN: 0
CHEST TIGHTNESS: 1
ABDOMINAL PAIN: 0
DIARRHEA: 0
PHOTOPHOBIA: 0
RHINORRHEA: 0
SORE THROAT: 0
COUGH: 1
EYE PAIN: 0

## 2020-02-13 NOTE — H&P
(NITROSTAT) 0.4 MG SL tablet Place 1 tablet under the tongue every 5 minutes as needed for Chest pain 9/21/19 10/1/19  CHRISTINE Nunez - CNP   carvedilol (COREG) 12.5 MG tablet Take 0.5 tablets by mouth 2 times daily (with meals) 7/9/19   Garrett Granados MD   albuterol sulfate HFA (PROVENTIL HFA) 108 (90 Base) MCG/ACT inhaler Inhale 2 puffs into the lungs every 6 hours as needed for Wheezing 12/29/18   Anai Barakat MD   ipratropium-albuterol (DUONEB) 0.5-2.5 (3) MG/3ML SOLN nebulizer solution Inhale 3 mLs into the lungs three times daily 2/17/18   Bard Peabody, MD   atorvastatin (LIPITOR) 40 MG tablet Take 40 mg by mouth daily    Historical Provider, MD   ursodiol (ACTIGALL) 300 MG capsule Take 300 mg by mouth 2 times daily    Historical Provider, MD   sitaGLIPtin (JANUVIA) 50 MG tablet Take 50 mg by mouth daily    Historical Provider, MD   esomeprazole Magnesium (NEXIUM) 40 MG PACK Take 40 mg by mouth daily    Historical Provider, MD   aspirin 81 MG tablet Take 81 mg by mouth daily. Historical Provider, MD   SYNTHROID 25 MCG tablet  10/13/13   Historical Provider, MD   budesonide-formoterol (SYMBICORT) 80-4.5 MCG/ACT AERO Inhale 2 puffs into the lungs 2 times daily. Historical Provider, MD       Allergies:  Patient has no known allergies. Social History:   TOBACCO:   reports that he has quit smoking. He has never used smokeless tobacco.  ETOH:   reports no history of alcohol use. Family History:   History reviewed. No pertinent family history. REVIEW OF SYSTEMS:  Ten systems reviewed and negative except for stated in HPI    Physical Exam:    Vitals: BP (!) 148/64   Pulse 64   Temp 98 °F (36.7 °C) (Temporal)   Resp 18   Ht 5' 6\" (1.676 m)   Wt 160 lb (72.6 kg)   SpO2 95%   BMI 25.82 kg/m²   General appearance: alert, appears stated age and cooperative. NAD. Elderly  Skin: Skin color, texture, turgor normal. No rashes or lesions  HEENT: eomi, perrla.  MMM  Neck: No JVD or lymphadenopathy  Lungs: Crackles at left lung base. Diminished breath sounds and mild rhonchi at right lung base. Heart: RRR, no murmur or gallp  Abdomen: soft, nontender. Bsx4. No masses or organomegaly  Extremities: no edema, redness, or tenderness in calves. Neurologic: No focal deficits     Recent Labs     02/13/20  1430   WBC 8.4   HGB 14.5        Recent Labs     02/13/20  1430      K 4.8   CL 99   CO2 25   BUN 21   CREATININE 1.15   GLUCOSE 178*   AST 19   ALT 14   BILITOT 0.8*   ALKPHOS 59     Troponin T:   Recent Labs     02/13/20  1430   TROPONINI 0.037*       ABGs:   Lab Results   Component Value Date    PHART 7.348 12/23/2016    PO2ART 227 12/23/2016    WHS7DVE 41 12/23/2016     INR: No results for input(s): INR in the last 72 hours. URINALYSIS:No results for input(s): NITRITE, COLORU, PHUR, LABCAST, WBCUA, RBCUA, MUCUS, TRICHOMONAS, YEAST, BACTERIA, CLARITYU, SPECGRAV, LEUKOCYTESUR, UROBILINOGEN, BILIRUBINUR, BLOODU, GLUCOSEU, AMORPHOUS in the last 72 hours. Invalid input(s): Marie Askew  -----------------------------------------------------------------   Xr Chest Portable    Result Date: 2/13/2020  XR CHEST PORTABLE Exam Date/Time:  2/13/2020 2:30 PM Clinical History:   cough chest pain . Cough . Comparison:  12/4/2019. RESULT: Lines, tubes, and devices:  Right-sided pacemaker, unchanged. Median sternotomy. Lungs and pleura:  No consolidation. No pleural effusion. No pneumothorax. Normal pulmonary vascular pattern. Cardiomediastinal silhouette:  Normal. Aortic atherosclerotic calcification. Other:  No acute osseous findings. No acute radiographic abnormality. Assessment and Plan     61-year-old male with hypertension, type 2 diabetes, CAD, COPD presents with 2-day history of worsening dyspnea, cough, and pleuritic chest pain. 1.  Acute exacerbation of COPD suspect triggered by viral bronchitis  -Steroids, nebulizers, and supportive respiratory care.   If he improves

## 2020-02-13 NOTE — ED PROVIDER NOTES
[address] [facility] ED  EMERGENCY DEPARTMENT ENCOUNTER      Pt Name: Yessica Emanuel  MRN: [id number]  Armscandacegfurt 3/5/1929  Date of evaluation: 2/13/2020  Provider: Dean Mendez PA-C      HISTORY OF PRESENT ILLNESS    Yessica Emanuel is a 80 y.o. male who presents to the Emergency Department with \"gorilla sitting on my chest\", sob, and cough for 2 days. Cough is productive. Pt is concerned of pneumonia. He has h/o copd, has nebulizer machine but has not been using it. He denies nausea vomiting diaphoresis. REVIEW OF SYSTEMS       Review of Systems   Constitutional: Positive for fatigue. Negative for chills, diaphoresis and fever. HENT: Positive for congestion. Negative for rhinorrhea and sore throat. Eyes: Negative for photophobia and pain. Respiratory: Positive for cough, chest tightness and shortness of breath. Cardiovascular: Positive for chest pain. Negative for palpitations. Gastrointestinal: Negative for abdominal pain, diarrhea, nausea and vomiting. Genitourinary: Negative for dysuria and flank pain. Musculoskeletal: Negative for back pain. Skin: Negative for rash. Neurological: Negative for dizziness, light-headedness and headaches. Psychiatric/Behavioral: Negative. All other systems reviewed and are negative.         PAST MEDICAL HISTORY     Past Medical History:   Diagnosis Date    Anemia     CAD (coronary artery disease) 4/16/2015    DM (diabetes mellitus) ([facility] [address]     Dyslipidemia     History of tobacco abuse     HTN (hypertension)     Hypothyroidism     Non-ST elevation MI (NSTEMI) ([facility] [address]     Pacemaker 4/16/2015         SURGICAL HISTORY       Past Surgical History:   Procedure Laterality Date    CATARACT REMOVAL      MIDDLE EAR SURGERY Left 1998    \"they had to reset the bones\" after an infection         CURRENT MEDICATIONS       Previous Medications    ALBUTEROL SULFATE HFA (PROVENTIL HFA) 108 (90 BASE) MCG/ACT INHALER    Inhale 2 puffs into the lungs every 6 hours as needed for Wheezing    ASPIRIN 81 MG TABLET    Take 81 mg by mouth daily. ATORVASTATIN (LIPITOR) 40 MG TABLET    Take 40 mg by mouth daily    BUDESONIDE-FORMOTEROL (SYMBICORT) 80-4.5 MCG/ACT AERO    Inhale 2 puffs into the lungs 2 times daily. CARVEDILOL (COREG) 12.5 MG TABLET    Take 0.5 tablets by mouth 2 times daily (with meals)    ESOMEPRAZOLE MAGNESIUM (NEXIUM) 40 MG PACK    Take 40 mg by mouth daily    IPRATROPIUM-ALBUTEROL (DUONEB) 0.5-2.5 (3) MG/3ML SOLN NEBULIZER SOLUTION    Inhale 3 mLs into the lungs three times daily    LISINOPRIL (PRINIVIL;ZESTRIL) 5 MG TABLET    Take 1 tablet by mouth daily    NITROGLYCERIN (NITROSTAT) 0.4 MG SL TABLET    Place 1 tablet under the tongue every 5 minutes as needed for Chest pain    SITAGLIPTIN (JANUVIA) 50 MG TABLET    Take 50 mg by mouth daily    SODIUM CHLORIDE (OCEAN) 0.65 % NASAL SPRAY    1 spray by Nasal route 4 times daily as needed for Congestion    SYNTHROID 25 MCG TABLET        URSODIOL (ACTIGALL) 300 MG CAPSULE    Take 300 mg by mouth 2 times daily       ALLERGIES     Patient has no known allergies. FAMILY HISTORY     History reviewed. No pertinent family history. SOCIAL HISTORY       Social History     Socioeconomic History    Marital status:       Spouse name: None    Number of children: None    Years of education: None    Highest education level: None   Occupational History    None   Social Needs    Financial resource strain: None    Food insecurity:     Worry: None     Inability: None    Transportation needs:     Medical: None     Non-medical: None   Tobacco Use    Smoking status: Former Smoker    Smokeless tobacco: Never Used   Substance and Sexual Activity    Alcohol use: No    Drug use: No    Sexual activity: None   Lifestyle    Physical activity:     Days per week: None     Minutes per session: None    Stress: None   Relationships    Social connections:     Talks on phone: None     Gets together: None     Attends DEPARTMENT COURSE and DIFFERENTIALDIAGNOSIS/MDM:   Vitals:    Vitals:    02/13/20 1339 02/13/20 1437 02/13/20 1504   BP: (!) 149/83  (!) 147/70   Pulse: 79  60   Resp: 18 16 16   Temp: 98 °F (36.7 °C)     TempSrc: Temporal     SpO2: 95% 97% 95%   Weight: 160 lb (72.6 kg)     Height: 5' 6\" (1.676 m)              Ekg: atrial paced rhythm at 85bpm rbbb left axis. Patient is nontoxic no acute distress he is afebrile hemodynamically stable. Patient was provided with aspirin, breathing treatments and IV steroids. On reevaluation patient does feel improved but tells me he does not feel well enough to go home and thinks he will return. Patient is 80years old, history of COPD extensive cardiac history, and I think would benefit from hospital stay versus outpatient treatment at this time. Hospitalist accepted the patient. Patient stable for discharge.     PROCEDURES:  Unless otherwise noted below, none     Procedures      FINAL IMPRESSION      1. COPD exacerbation ([address]    2. Chest pain, unspecified type          DISPOSITION/PLAN   DISPOSITION Admitted 02/13/2020 03:50:34 PM          Keke Lewis (electronically signed)  Attending Emergency Physician        Reddy Ramsey PA-C  02/13/20 [id number]

## 2020-02-14 VITALS
DIASTOLIC BLOOD PRESSURE: 65 MMHG | BODY MASS INDEX: 25.71 KG/M2 | OXYGEN SATURATION: 94 % | WEIGHT: 160 LBS | HEIGHT: 66 IN | RESPIRATION RATE: 16 BRPM | TEMPERATURE: 97.5 F | SYSTOLIC BLOOD PRESSURE: 137 MMHG | HEART RATE: 63 BPM

## 2020-02-14 LAB
GLUCOSE BLD-MCNC: 195 MG/DL (ref 60–115)
GLUCOSE BLD-MCNC: 268 MG/DL (ref 60–115)
PERFORMED ON: ABNORMAL
PERFORMED ON: ABNORMAL

## 2020-02-14 PROCEDURE — 2700000000 HC OXYGEN THERAPY PER DAY

## 2020-02-14 PROCEDURE — 6370000000 HC RX 637 (ALT 250 FOR IP): Performed by: INTERNAL MEDICINE

## 2020-02-14 PROCEDURE — 94640 AIRWAY INHALATION TREATMENT: CPT

## 2020-02-14 PROCEDURE — G0378 HOSPITAL OBSERVATION PER HR: HCPCS

## 2020-02-14 PROCEDURE — G0008 ADMIN INFLUENZA VIRUS VAC: HCPCS | Performed by: INTERNAL MEDICINE

## 2020-02-14 PROCEDURE — 6360000002 HC RX W HCPCS: Performed by: INTERNAL MEDICINE

## 2020-02-14 PROCEDURE — 93010 ELECTROCARDIOGRAM REPORT: CPT | Performed by: INTERNAL MEDICINE

## 2020-02-14 PROCEDURE — 90686 IIV4 VACC NO PRSV 0.5 ML IM: CPT | Performed by: INTERNAL MEDICINE

## 2020-02-14 PROCEDURE — 94761 N-INVAS EAR/PLS OXIMETRY MLT: CPT

## 2020-02-14 PROCEDURE — 2580000003 HC RX 258: Performed by: INTERNAL MEDICINE

## 2020-02-14 RX ORDER — PREDNISONE 20 MG/1
40 TABLET ORAL DAILY
Qty: 8 TABLET | Refills: 0 | Status: SHIPPED | OUTPATIENT
Start: 2020-02-14 | End: 2020-02-18

## 2020-02-14 RX ORDER — GUAIFENESIN 600 MG/1
600 TABLET, EXTENDED RELEASE ORAL 2 TIMES DAILY
Qty: 10 TABLET | Refills: 0 | Status: ON HOLD | OUTPATIENT
Start: 2020-02-14 | End: 2021-06-22

## 2020-02-14 RX ADMIN — LINAGLIPTIN 5 MG: 5 TABLET, FILM COATED ORAL at 09:20

## 2020-02-14 RX ADMIN — POLYETHYLENE GLYCOL 3350 17 G: 17 POWDER, FOR SOLUTION ORAL at 09:20

## 2020-02-14 RX ADMIN — ASPIRIN 81 MG: 81 TABLET, COATED ORAL at 09:18

## 2020-02-14 RX ADMIN — Medication 10 ML: at 09:21

## 2020-02-14 RX ADMIN — LISINOPRIL 5 MG: 5 TABLET ORAL at 09:20

## 2020-02-14 RX ADMIN — Medication 30 MG: at 09:20

## 2020-02-14 RX ADMIN — GUAIFENESIN 600 MG: 600 TABLET, EXTENDED RELEASE ORAL at 09:20

## 2020-02-14 RX ADMIN — CARVEDILOL 6.25 MG: 6.25 TABLET, FILM COATED ORAL at 09:18

## 2020-02-14 RX ADMIN — LEVOTHYROXINE SODIUM 25 MCG: 25 TABLET ORAL at 06:04

## 2020-02-14 RX ADMIN — INFLUENZA A VIRUS A/BRISBANE/02/2018 IVR-190 (H1N1) ANTIGEN (PROPIOLACTONE INACTIVATED), INFLUENZA A VIRUS A/KANSAS/14/2017 X-327 (H3N2) ANTIGEN (PROPIOLACTONE INACTIVATED), INFLUENZA B VIRUS B/MARYLAND/15/2016 ANTIGEN (PROPIOLACTONE INACTIVATED), INFLUENZA B VIRUS B/PHUKET/3073/2013 BVR-1B ANTIGEN (PROPIOLACTONE INACTIVATED) 0.5 ML: 15; 15; 15; 15 INJECTION, SUSPENSION INTRAMUSCULAR at 09:24

## 2020-02-14 RX ADMIN — INSULIN LISPRO 1 UNITS: 100 INJECTION, SOLUTION INTRAVENOUS; SUBCUTANEOUS at 09:43

## 2020-02-14 RX ADMIN — PREDNISONE 40 MG: 20 TABLET ORAL at 09:19

## 2020-02-14 RX ADMIN — BUDESONIDE AND FORMOTEROL FUMARATE DIHYDRATE 2 PUFF: 80; 4.5 AEROSOL RESPIRATORY (INHALATION) at 08:23

## 2020-02-14 NOTE — DISCHARGE SUMMARY
[facility] Medicine Discharge Summary    Silvino Espinoza  :  3/5/1929  MRN:  [id number]    Admit date:  2020  Discharge date:  2020    Admitting Physician:  Jadiel Mesa DO  Primary Care Physician:  Allen Moreno DO    Discharge Diagnoses:    Principal Problem:    COPD exacerbation [facility])  Resolved Problems:    * No resolved hospital problems. *    Chief Complaint   Patient presents with    Cough     times two days        Condition: improved   Activity: no restrictions   Diet: diabetic  Disposition: home  Functional Status: ambulatory    Significant Findings:     CXR: no acute findings    Hospital Course:   44-year-old male with hypertension, type 2 diabetes, CAD, COPD presents with 2-day history of worsening dyspnea, cough, and pleuritic chest pain. He was found to have acute exacerbation of COPD suspect triggered by viral bronchitis. He improved significantly with steroids, nebulizers, and supportive respiratory care. He admitted to being constipated and was given a bowel regimen with successful large bowel movement during the hospitalization. He was discharged with prednisone burst and guaifenesin. He still had mild cough but was otherwise asymptomatic at the time of discharge. While in the ED, he had stable vital signs and laboratory values however was nervous to be discharged and therefore the ED recommended admission. This Observation hospitalization shows that he can improve quickly with steroids and in the future, if his vital signs, laboratory values, and work of breathing are stable- he should be discharged home from the ED with appropriate medications and close PCP follow-up.     Exam on Discharge:   /65   Pulse 63   Temp 97.5 °F (36.4 °C) (Oral)   Resp 16   Ht 5' 6\" (1.676 m)   Wt 160 lb (72.6 kg)   SpO2 94%   BMI 25.82 kg/m²   General appearance: alert, cooperative and no distress  Mental Status: oriented to person, place and time and normal

## 2020-02-14 NOTE — PROGRESS NOTES
Pt assessment complete. Pt alert and oriented. Hard of hearing. Denies pain, shortness of breath. Has occasional nonproductive cough. Spoke with granddaughter. Pt states he has prn oxygen to wear at night. 94% on room air. Vs stable. Will continue to monitor.
years  [x]   Pulmonary Disorder  (acute or chronic)  []   Severe or Chronic w/ Exacerbation  []     Surgical Status No [x]   Surgeries     General []   Surgery Lower []   Abdominal Thoracic or []   Upper Abdominal Thoracic with  PulmonaryDisorder  []     Chest X-ray Clear/Not  Ordered     [x]  Chronic Changes  Results Pending  []  Infiltrates, atelectasis, pleural effusion, or edema  []  Infiltrates in more than one lobe []  Infiltrate + Atelectasis, &/or pleural effusion  []    Respiratory Pattern Regular,  RR = 12-20 [x]  Increased,  RR = 21-25 []  BUTT, irregular,  or RR = 26-30 []  Decreased FEV1  or RR = 31-35 []  Severe SOB, use  of accessory muscles, or RR ? 35  []    Mental Status Alert, oriented,  Cooperative [x]  Confused but Follows commands []  Lethargic or unable to follow commands []  Obtunded  []  Comatose  []    Breath Sounds Clear to  auscultation  []  Decreased unilaterally or  in bases only []  Decreased  bilaterally  []  Crackles or intermittent wheezes [x]  Wheezes []    Cough Strong, Spontan., & nonproductive [x]  Strong,  spontaneous, &  productive []  Weak,  Nonproductive []  Weak, productive or  with wheezes []  No spontaneous  cough or may require suctioning []    Level of Activity Ambulatory [x]  Ambulatory w/ Assist  []  Non-ambulatory []  Paraplegic []  Quadriplegic []    Total    Score:___5____     Triage Score:____5____      Tri       Triage:     1. (>20) Freq: Q3    2. (16-20) Freq: Q4   3. (11-15) Freq: QID & Albuterol Q2 PRN    4. (6-10) Freq: TID & Albuterol Q2 PRN    5. (0-5) Freq Q4prn

## 2020-02-18 LAB
BLOOD CULTURE, ROUTINE: NORMAL
CULTURE, BLOOD 2: NORMAL

## 2020-03-26 ENCOUNTER — APPOINTMENT (OUTPATIENT)
Dept: GENERAL RADIOLOGY | Age: 85
End: 2020-03-26
Payer: MEDICARE

## 2020-03-26 ENCOUNTER — HOSPITAL ENCOUNTER (OUTPATIENT)
Age: 85
Setting detail: OBSERVATION
Discharge: HOME | End: 2020-03-28
Attending: EMERGENCY MEDICINE | Admitting: INTERNAL MEDICINE
Payer: MEDICARE

## 2020-03-26 ENCOUNTER — APPOINTMENT (OUTPATIENT)
Dept: CT IMAGING | Age: 85
End: 2020-03-26
Payer: MEDICARE

## 2020-03-26 PROBLEM — R55 SYNCOPE AND COLLAPSE: Status: ACTIVE | Noted: 2020-03-26

## 2020-03-26 LAB
ALBUMIN SERPL-MCNC: 3.6 G/DL (ref 3.5–4.6)
ALP BLD-CCNC: 62 U/L (ref 35–104)
ALT SERPL-CCNC: 11 U/L (ref 0–41)
ANION GAP SERPL CALCULATED.3IONS-SCNC: 13 MEQ/L (ref 9–15)
AST SERPL-CCNC: 14 U/L (ref 0–40)
BASOPHILS ABSOLUTE: 0 K/UL (ref 0–0.2)
BASOPHILS RELATIVE PERCENT: 0.7 %
BILIRUB SERPL-MCNC: 0.9 MG/DL (ref 0.2–0.7)
BUN BLDV-MCNC: 33 MG/DL (ref 8–23)
CALCIUM SERPL-MCNC: 8.4 MG/DL (ref 8.5–9.9)
CHLORIDE BLD-SCNC: 101 MEQ/L (ref 95–107)
CO2: 23 MEQ/L (ref 20–31)
CREAT SERPL-MCNC: 1.58 MG/DL (ref 0.7–1.2)
EKG ATRIAL RATE: 63 BPM
EKG P AXIS: -65 DEGREES
EKG P-R INTERVAL: 256 MS
EKG Q-T INTERVAL: 484 MS
EKG QRS DURATION: 156 MS
EKG QTC CALCULATION (BAZETT): 495 MS
EKG R AXIS: -68 DEGREES
EKG T AXIS: 94 DEGREES
EKG VENTRICULAR RATE: 63 BPM
EOSINOPHILS ABSOLUTE: 0.2 K/UL (ref 0–0.7)
EOSINOPHILS RELATIVE PERCENT: 3.1 %
GFR AFRICAN AMERICAN: 50
GFR NON-AFRICAN AMERICAN: 41.3
GLOBULIN: 3.2 G/DL (ref 2.3–3.5)
GLUCOSE BLD-MCNC: 189 MG/DL (ref 60–115)
GLUCOSE BLD-MCNC: 207 MG/DL (ref 70–99)
HCT VFR BLD CALC: 42 % (ref 42–52)
HEMOGLOBIN: 13.9 G/DL (ref 14–18)
LYMPHOCYTES ABSOLUTE: 1.6 K/UL (ref 1–4.8)
LYMPHOCYTES RELATIVE PERCENT: 23.6 %
MAGNESIUM: 1.9 MG/DL (ref 1.7–2.4)
MCH RBC QN AUTO: 30.8 PG (ref 27–31.3)
MCHC RBC AUTO-ENTMCNC: 33 % (ref 33–37)
MCV RBC AUTO: 93.3 FL (ref 80–100)
MONOCYTES ABSOLUTE: 0.6 K/UL (ref 0.2–0.8)
MONOCYTES RELATIVE PERCENT: 8.6 %
NEUTROPHILS ABSOLUTE: 4.5 K/UL (ref 1.4–6.5)
NEUTROPHILS RELATIVE PERCENT: 64 %
PDW BLD-RTO: 13.4 % (ref 11.5–14.5)
PERFORMED ON: ABNORMAL
PLATELET # BLD: 175 K/UL (ref 130–400)
POTASSIUM SERPL-SCNC: 4.7 MEQ/L (ref 3.4–4.9)
RBC # BLD: 4.5 M/UL (ref 4.7–6.1)
SODIUM BLD-SCNC: 137 MEQ/L (ref 135–144)
TOTAL PROTEIN: 6.8 G/DL (ref 6.3–8)
TROPONIN: 0.04 NG/ML (ref 0–0.01)
WBC # BLD: 7 K/UL (ref 4.8–10.8)

## 2020-03-26 PROCEDURE — 70450 CT HEAD/BRAIN W/O DYE: CPT

## 2020-03-26 PROCEDURE — 90471 IMMUNIZATION ADMIN: CPT | Performed by: EMERGENCY MEDICINE

## 2020-03-26 PROCEDURE — 2580000003 HC RX 258: Performed by: EMERGENCY MEDICINE

## 2020-03-26 PROCEDURE — 96375 TX/PRO/DX INJ NEW DRUG ADDON: CPT

## 2020-03-26 PROCEDURE — 6360000002 HC RX W HCPCS: Performed by: INTERNAL MEDICINE

## 2020-03-26 PROCEDURE — 72125 CT NECK SPINE W/O DYE: CPT

## 2020-03-26 PROCEDURE — 90715 TDAP VACCINE 7 YRS/> IM: CPT | Performed by: EMERGENCY MEDICINE

## 2020-03-26 PROCEDURE — 94640 AIRWAY INHALATION TREATMENT: CPT

## 2020-03-26 PROCEDURE — 96376 TX/PRO/DX INJ SAME DRUG ADON: CPT

## 2020-03-26 PROCEDURE — 2580000003 HC RX 258: Performed by: INTERNAL MEDICINE

## 2020-03-26 PROCEDURE — 6360000002 HC RX W HCPCS: Performed by: EMERGENCY MEDICINE

## 2020-03-26 PROCEDURE — 6370000000 HC RX 637 (ALT 250 FOR IP): Performed by: INTERNAL MEDICINE

## 2020-03-26 PROCEDURE — 36415 COLL VENOUS BLD VENIPUNCTURE: CPT

## 2020-03-26 PROCEDURE — G0378 HOSPITAL OBSERVATION PER HR: HCPCS

## 2020-03-26 PROCEDURE — 93005 ELECTROCARDIOGRAM TRACING: CPT

## 2020-03-26 PROCEDURE — 84484 ASSAY OF TROPONIN QUANT: CPT

## 2020-03-26 PROCEDURE — 71046 X-RAY EXAM CHEST 2 VIEWS: CPT

## 2020-03-26 PROCEDURE — 85025 COMPLETE CBC W/AUTO DIFF WBC: CPT

## 2020-03-26 PROCEDURE — 83735 ASSAY OF MAGNESIUM: CPT

## 2020-03-26 PROCEDURE — 96361 HYDRATE IV INFUSION ADD-ON: CPT

## 2020-03-26 PROCEDURE — 96374 THER/PROPH/DIAG INJ IV PUSH: CPT

## 2020-03-26 PROCEDURE — 99285 EMERGENCY DEPT VISIT HI MDM: CPT

## 2020-03-26 PROCEDURE — 96372 THER/PROPH/DIAG INJ SC/IM: CPT

## 2020-03-26 PROCEDURE — 6830039000 HC L3 TRAUMA ALERT

## 2020-03-26 PROCEDURE — 80053 COMPREHEN METABOLIC PANEL: CPT

## 2020-03-26 RX ORDER — BUDESONIDE AND FORMOTEROL FUMARATE DIHYDRATE 80; 4.5 UG/1; UG/1
2 AEROSOL RESPIRATORY (INHALATION) 2 TIMES DAILY
Status: DISCONTINUED | OUTPATIENT
Start: 2020-03-26 | End: 2020-03-28 | Stop reason: HOSPADM

## 2020-03-26 RX ORDER — ACETAMINOPHEN 650 MG/1
650 SUPPOSITORY RECTAL EVERY 6 HOURS PRN
Status: DISCONTINUED | OUTPATIENT
Start: 2020-03-26 | End: 2020-03-28 | Stop reason: HOSPADM

## 2020-03-26 RX ORDER — 0.9 % SODIUM CHLORIDE 0.9 %
1000 INTRAVENOUS SOLUTION INTRAVENOUS ONCE
Status: COMPLETED | OUTPATIENT
Start: 2020-03-26 | End: 2020-03-26

## 2020-03-26 RX ORDER — ONDANSETRON 2 MG/ML
4 INJECTION INTRAMUSCULAR; INTRAVENOUS EVERY 6 HOURS PRN
Status: DISCONTINUED | OUTPATIENT
Start: 2020-03-26 | End: 2020-03-28 | Stop reason: HOSPADM

## 2020-03-26 RX ORDER — ONDANSETRON 2 MG/ML
4 INJECTION INTRAMUSCULAR; INTRAVENOUS ONCE
Status: COMPLETED | OUTPATIENT
Start: 2020-03-26 | End: 2020-03-26

## 2020-03-26 RX ORDER — MAGNESIUM SULFATE IN WATER 40 MG/ML
2 INJECTION, SOLUTION INTRAVENOUS PRN
Status: DISCONTINUED | OUTPATIENT
Start: 2020-03-26 | End: 2020-03-28 | Stop reason: HOSPADM

## 2020-03-26 RX ORDER — NICOTINE POLACRILEX 4 MG
15 LOZENGE BUCCAL PRN
Status: DISCONTINUED | OUTPATIENT
Start: 2020-03-26 | End: 2020-03-28 | Stop reason: HOSPADM

## 2020-03-26 RX ORDER — ASPIRIN 81 MG/1
81 TABLET ORAL DAILY
Status: DISCONTINUED | OUTPATIENT
Start: 2020-03-26 | End: 2020-03-28 | Stop reason: HOSPADM

## 2020-03-26 RX ORDER — POLYETHYLENE GLYCOL 3350 17 G/17G
17 POWDER, FOR SOLUTION ORAL DAILY PRN
Status: DISCONTINUED | OUTPATIENT
Start: 2020-03-26 | End: 2020-03-28 | Stop reason: HOSPADM

## 2020-03-26 RX ORDER — SODIUM CHLORIDE 9 MG/ML
INJECTION, SOLUTION INTRAVENOUS CONTINUOUS
Status: DISCONTINUED | OUTPATIENT
Start: 2020-03-26 | End: 2020-03-28 | Stop reason: HOSPADM

## 2020-03-26 RX ORDER — CARVEDILOL 6.25 MG/1
6.25 TABLET ORAL 2 TIMES DAILY WITH MEALS
Status: DISCONTINUED | OUTPATIENT
Start: 2020-03-26 | End: 2020-03-28 | Stop reason: HOSPADM

## 2020-03-26 RX ORDER — DEXTROSE MONOHYDRATE 50 MG/ML
100 INJECTION, SOLUTION INTRAVENOUS PRN
Status: DISCONTINUED | OUTPATIENT
Start: 2020-03-26 | End: 2020-03-28 | Stop reason: HOSPADM

## 2020-03-26 RX ORDER — PROMETHAZINE HYDROCHLORIDE 12.5 MG/1
12.5 TABLET ORAL EVERY 6 HOURS PRN
Status: DISCONTINUED | OUTPATIENT
Start: 2020-03-26 | End: 2020-03-28 | Stop reason: HOSPADM

## 2020-03-26 RX ORDER — SODIUM CHLORIDE 0.9 % (FLUSH) 0.9 %
10 SYRINGE (ML) INJECTION PRN
Status: DISCONTINUED | OUTPATIENT
Start: 2020-03-26 | End: 2020-03-28 | Stop reason: HOSPADM

## 2020-03-26 RX ORDER — POTASSIUM CHLORIDE 1.5 G/1.77G
40 POWDER, FOR SOLUTION ORAL PRN
Status: DISCONTINUED | OUTPATIENT
Start: 2020-03-26 | End: 2020-03-28 | Stop reason: HOSPADM

## 2020-03-26 RX ORDER — SODIUM CHLORIDE 0.9 % (FLUSH) 0.9 %
10 SYRINGE (ML) INJECTION EVERY 12 HOURS SCHEDULED
Status: DISCONTINUED | OUTPATIENT
Start: 2020-03-26 | End: 2020-03-28 | Stop reason: HOSPADM

## 2020-03-26 RX ORDER — MORPHINE SULFATE 2 MG/ML
2 INJECTION, SOLUTION INTRAMUSCULAR; INTRAVENOUS
Status: COMPLETED | OUTPATIENT
Start: 2020-03-26 | End: 2020-03-26

## 2020-03-26 RX ORDER — IPRATROPIUM BROMIDE AND ALBUTEROL SULFATE 2.5; .5 MG/3ML; MG/3ML
1 SOLUTION RESPIRATORY (INHALATION)
Status: DISCONTINUED | OUTPATIENT
Start: 2020-03-26 | End: 2020-03-26

## 2020-03-26 RX ORDER — LEVOTHYROXINE SODIUM 0.03 MG/1
25 TABLET ORAL DAILY
Status: DISCONTINUED | OUTPATIENT
Start: 2020-03-27 | End: 2020-03-28 | Stop reason: HOSPADM

## 2020-03-26 RX ORDER — POTASSIUM CHLORIDE 7.45 MG/ML
10 INJECTION INTRAVENOUS PRN
Status: DISCONTINUED | OUTPATIENT
Start: 2020-03-26 | End: 2020-03-28 | Stop reason: HOSPADM

## 2020-03-26 RX ORDER — IPRATROPIUM BROMIDE AND ALBUTEROL SULFATE 2.5; .5 MG/3ML; MG/3ML
1 SOLUTION RESPIRATORY (INHALATION) EVERY 4 HOURS PRN
Status: DISCONTINUED | OUTPATIENT
Start: 2020-03-26 | End: 2020-03-28 | Stop reason: HOSPADM

## 2020-03-26 RX ORDER — LISINOPRIL 5 MG/1
5 TABLET ORAL DAILY
Status: DISCONTINUED | OUTPATIENT
Start: 2020-03-26 | End: 2020-03-28 | Stop reason: HOSPADM

## 2020-03-26 RX ORDER — DEXTROSE MONOHYDRATE 25 G/50ML
12.5 INJECTION, SOLUTION INTRAVENOUS PRN
Status: DISCONTINUED | OUTPATIENT
Start: 2020-03-26 | End: 2020-03-28 | Stop reason: HOSPADM

## 2020-03-26 RX ORDER — ATORVASTATIN CALCIUM 40 MG/1
40 TABLET, FILM COATED ORAL NIGHTLY
Status: DISCONTINUED | OUTPATIENT
Start: 2020-03-26 | End: 2020-03-28 | Stop reason: HOSPADM

## 2020-03-26 RX ORDER — ACETAMINOPHEN 325 MG/1
650 TABLET ORAL EVERY 6 HOURS PRN
Status: DISCONTINUED | OUTPATIENT
Start: 2020-03-26 | End: 2020-03-28 | Stop reason: HOSPADM

## 2020-03-26 RX ORDER — POTASSIUM CHLORIDE 20 MEQ/1
40 TABLET, EXTENDED RELEASE ORAL PRN
Status: DISCONTINUED | OUTPATIENT
Start: 2020-03-26 | End: 2020-03-28 | Stop reason: HOSPADM

## 2020-03-26 RX ADMIN — ENOXAPARIN SODIUM 30 MG: 30 INJECTION SUBCUTANEOUS at 21:24

## 2020-03-26 RX ADMIN — ONDANSETRON 4 MG: 2 INJECTION INTRAMUSCULAR; INTRAVENOUS at 16:25

## 2020-03-26 RX ADMIN — TETANUS TOXOID, REDUCED DIPHTHERIA TOXOID AND ACELLULAR PERTUSSIS VACCINE, ADSORBED 0.5 ML: 5; 2.5; 8; 8; 2.5 SUSPENSION INTRAMUSCULAR at 16:26

## 2020-03-26 RX ADMIN — MORPHINE SULFATE 2 MG: 2 INJECTION, SOLUTION INTRAMUSCULAR; INTRAVENOUS at 17:44

## 2020-03-26 RX ADMIN — BUDESONIDE AND FORMOTEROL FUMARATE DIHYDRATE 2 PUFF: 80; 4.5 AEROSOL RESPIRATORY (INHALATION) at 20:58

## 2020-03-26 RX ADMIN — SODIUM CHLORIDE: 9 INJECTION, SOLUTION INTRAVENOUS at 21:23

## 2020-03-26 RX ADMIN — ASPIRIN 81 MG: 81 TABLET, COATED ORAL at 21:24

## 2020-03-26 RX ADMIN — ATORVASTATIN CALCIUM 40 MG: 40 TABLET, FILM COATED ORAL at 21:24

## 2020-03-26 RX ADMIN — SODIUM CHLORIDE 1000 ML: 9 INJECTION, SOLUTION INTRAVENOUS at 16:25

## 2020-03-26 RX ADMIN — LISINOPRIL 5 MG: 5 TABLET ORAL at 21:25

## 2020-03-26 RX ADMIN — MORPHINE SULFATE 2 MG: 2 INJECTION, SOLUTION INTRAMUSCULAR; INTRAVENOUS at 16:25

## 2020-03-26 RX ADMIN — CARVEDILOL 6.25 MG: 6.25 TABLET, FILM COATED ORAL at 21:24

## 2020-03-26 ASSESSMENT — PAIN DESCRIPTION - LOCATION
LOCATION: NECK
LOCATION: NECK

## 2020-03-26 ASSESSMENT — ENCOUNTER SYMPTOMS
SORE THROAT: 0
DIARRHEA: 0
ABDOMINAL PAIN: 0
NAUSEA: 0
VOMITING: 0
COUGH: 0
SHORTNESS OF BREATH: 0
BACK PAIN: 0

## 2020-03-26 ASSESSMENT — PAIN SCALES - GENERAL
PAINLEVEL_OUTOF10: 4
PAINLEVEL_OUTOF10: 6
PAINLEVEL_OUTOF10: 6
PAINLEVEL_OUTOF10: 2

## 2020-03-26 ASSESSMENT — PAIN DESCRIPTION - ONSET: ONSET: ON-GOING

## 2020-03-26 ASSESSMENT — PAIN DESCRIPTION - DESCRIPTORS: DESCRIPTORS: DULL

## 2020-03-26 ASSESSMENT — PAIN DESCRIPTION - PAIN TYPE
TYPE: ACUTE PAIN
TYPE: ACUTE PAIN

## 2020-03-26 ASSESSMENT — PAIN DESCRIPTION - FREQUENCY: FREQUENCY: CONTINUOUS

## 2020-03-26 NOTE — ED NOTES
Dr Pablo Morocho cleared c-spine precautions and removed cervical collar.       Luis Ortega, YESI  03/26/20 [id number]

## 2020-03-26 NOTE — ED NOTES
This nurse called Shira, granddaughter, to update her on admission status, but the granddaughter did not answer her phone. This nurse left a voicemail, asking the granddaughter to call back for updated information.           Mallorie Burk RN  03/26/20 [id number]

## 2020-03-26 NOTE — ED NOTES
Patient to ct/x-ray via cart. Tolerated well.       Mindy Victor  03/26/20 [id number] [place] S[address]  03/26/20 [id number]

## 2020-03-26 NOTE — ED PROVIDER NOTES
(90 BASE) MCG/ACT INHALER    Inhale 2 puffs into the lungs every 6 hours as needed for Wheezing    ASPIRIN 81 MG TABLET    Take 81 mg by mouth daily. ATORVASTATIN (LIPITOR) 40 MG TABLET    Take 40 mg by mouth daily    BUDESONIDE-FORMOTEROL (SYMBICORT) 80-4.5 MCG/ACT AERO    Inhale 2 puffs into the lungs 2 times daily. CARVEDILOL (COREG) 12.5 MG TABLET    Take 0.5 tablets by mouth 2 times daily (with meals)    ESOMEPRAZOLE MAGNESIUM (NEXIUM) 40 MG PACK    Take 40 mg by mouth daily    GUAIFENESIN (MUCINEX) 600 MG EXTENDED RELEASE TABLET    Take 1 tablet by mouth 2 times daily    IPRATROPIUM-ALBUTEROL (DUONEB) 0.5-2.5 (3) MG/3ML SOLN NEBULIZER SOLUTION    Inhale 3 mLs into the lungs three times daily    LISINOPRIL (PRINIVIL;ZESTRIL) 5 MG TABLET    Take 1 tablet by mouth daily    NITROGLYCERIN (NITROSTAT) 0.4 MG SL TABLET    Place 1 tablet under the tongue every 5 minutes as needed for Chest pain    SITAGLIPTIN (JANUVIA) 50 MG TABLET    Take 50 mg by mouth daily    SODIUM CHLORIDE (OCEAN) 0.65 % NASAL SPRAY    1 spray by Nasal route 4 times daily as needed for Congestion    SYNTHROID 25 MCG TABLET        URSODIOL (ACTIGALL) 300 MG CAPSULE    Take 300 mg by mouth 2 times daily       ALLERGIES     Patient has no known allergies. FAMILY HISTORY     History reviewed. No pertinent family history. SOCIAL HISTORY       Social History     Socioeconomic History    Marital status:       Spouse name: None    Number of children: None    Years of education: None    Highest education level: None   Occupational History    None   Social Needs    Financial resource strain: None    Food insecurity     Worry: None     Inability: None    Transportation needs     Medical: None     Non-medical: None   Tobacco Use    Smoking status: Former Smoker    Smokeless tobacco: Never Used   Substance and Sexual Activity    Alcohol use: No    Drug use: No    Sexual activity: None   Lifestyle    Physical activity Days per week: None     Minutes per session: None    Stress: None   Relationships    Social connections     Talks on phone: None     Gets together: None     Attends Pentecostal service: None     Active member of club or organization: None     Attends meetings of clubs or organizations: None     Relationship status: None    Intimate partner violence     Fear of current or ex partner: None     Emotionally abused: None     Physically abused: None     Forced sexual activity: None   Other Topics Concern    None   Social History Narrative    None         PHYSICAL EXAM       ED Triage Vitals [03/26/20 1516]   BP Temp Temp Source Pulse Resp SpO2 Height Weight   114/67 97.8 °F (36.6 °C) Oral 77 18 96 % 5' 6\" (1.676 m) 160 lb (72.6 kg)       Physical Exam  Vitals signs and nursing note reviewed. Constitutional:       Appearance: He is well-developed. HENT:      Head: Normocephalic. Right Ear: External ear normal.      Left Ear: External ear normal.   Eyes:      Conjunctiva/sclera: Conjunctivae normal.      Pupils: Pupils are equal, round, and reactive to light. Neck:      Musculoskeletal: Normal range of motion and neck supple. Comments: No cspine tenderness  Cardiovascular:      Rate and Rhythm: Normal rate and regular rhythm. Heart sounds: Normal heart sounds. Pulmonary:      Effort: Pulmonary effort is normal.      Breath sounds: Normal breath sounds. Abdominal:      General: Bowel sounds are normal. There is no distension. Palpations: Abdomen is soft. Tenderness: There is no abdominal tenderness. Musculoskeletal: Normal range of motion. Skin:     General: Skin is warm and dry. Neurological:      Mental Status: He is alert and oriented to person, place, and time. Psychiatric:         Mood and Affect: Mood normal.           MDM  81 yo male presents to the ED with syncope, headache. Pt is afebrile, hemodynamically stable.   Pt given 1 L NS, IV morphine, IV zofran with moderate

## 2020-03-26 NOTE — ED NOTES
This nurse explained that his belongings can be locked up with security. Patient is declining to have his belongings go with security. Patient understands that [facility] cannot be responsible for lost or stolen items while the items are with the patient. Patient states understanding of this information.         Rafat Contreras RN  03/26/20 9561

## 2020-03-26 NOTE — ED NOTES
Shira, granddaughter, updated of patient's POC. Denice Askew is aware that we are currently waiting for CT and xray results. No questions or concerns at this time.      Claudia Biggs RN  03/26/20 3844

## 2020-03-26 NOTE — H&P
Department of Internal Medicine  General Internal Medicine  Attending History and Physical      CHIEF COMPLAINT:  Syncope    Reason for Admission:  Syncope    History Obtained From:  patient    HISTORY OF PRESENT ILLNESS:      The patient is a 80 y.o. male with significant past medical history of CAD, DM2, HTN, HLD, hypothyroid, sp pacemaker who presents with syncope. States that he was getting out of bed and took about 3 steps, got dizzy and fell, hitting his head on the floor. His granddaughter rushed up and immediately called EMS. Denies LOC. Remembers entire event. States that this occasionally happens when he gets up, but he has never fallen due to it. States that he ambulates on his own without assist device. States appetite is good, but states that he does not drink enough fluids. Denies issues with urination or BMs. Denies fevers, chills, cough, congestion, sob, cp, palpitations, abd pain, n/v/d, changes in urination or appetite. In the ED, workup largely negative except for large hematoma on his head which is bruised and painful. Initially wanted to give IVF and send home, but family concerned and wanted him admitted. Past Medical History:        Diagnosis Date    Anemia     CAD (coronary artery disease) 4/16/2015    DM [facility] mellitus[facility] [address] [facility]     Dyslipidemia     History of tobacco abuse     HTN (hypertension)     Hypothyroidism     Non-ST elevation MI (NSTEMI) ([facility] [address] [facility]     Pacemaker 4/16/2015     Past Surgical History:        Procedure Laterality Date    CATARACT REMOVAL      MIDDLE EAR SURGERY Left 1998    \"they had to reset the bones\" after an infection       Medications Prior to Admission:    Not in a hospital admission. Allergies:  Patient has no known allergies. Social History: Former smoker, no etoh, no drugs    Family History:   History reviewed. No pertinent family history.   REVIEW OF SYSTEMS:  12 point ROS was negative unless otherwise noted in the HPI   PHYSICAL

## 2020-03-27 LAB
ANION GAP SERPL CALCULATED.3IONS-SCNC: 12 MEQ/L (ref 9–15)
BUN BLDV-MCNC: 28 MG/DL (ref 8–23)
CALCIUM SERPL-MCNC: 7.7 MG/DL (ref 8.5–9.9)
CHLORIDE BLD-SCNC: 107 MEQ/L (ref 95–107)
CK MB: 5.3 NG/ML (ref 0–6.7)
CO2: 20 MEQ/L (ref 20–31)
CREAT SERPL-MCNC: 1.27 MG/DL (ref 0.7–1.2)
CREATINE KINASE-MB INDEX: 1.9 % (ref 0–3.5)
GFR AFRICAN AMERICAN: >60
GFR NON-AFRICAN AMERICAN: 53.2
GLUCOSE BLD-MCNC: 112 MG/DL (ref 70–99)
GLUCOSE BLD-MCNC: 121 MG/DL (ref 60–115)
GLUCOSE BLD-MCNC: 127 MG/DL (ref 60–115)
GLUCOSE BLD-MCNC: 196 MG/DL (ref 60–115)
GLUCOSE BLD-MCNC: 205 MG/DL (ref 60–115)
HCT VFR BLD CALC: 38.7 % (ref 42–52)
HEMOGLOBIN: 12.5 G/DL (ref 14–18)
LV EF: 60 %
LVEF MODALITY: NORMAL
MCH RBC QN AUTO: 31 PG (ref 27–31.3)
MCHC RBC AUTO-ENTMCNC: 32.3 % (ref 33–37)
MCV RBC AUTO: 95.7 FL (ref 80–100)
PDW BLD-RTO: 14.1 % (ref 11.5–14.5)
PERFORMED ON: ABNORMAL
PLATELET # BLD: 154 K/UL (ref 130–400)
POTASSIUM REFLEX MAGNESIUM: 4.2 MEQ/L (ref 3.4–4.9)
RBC # BLD: 4.04 M/UL (ref 4.7–6.1)
SODIUM BLD-SCNC: 139 MEQ/L (ref 135–144)
TOTAL CK: 277 U/L (ref 0–190)
WBC # BLD: 6 K/UL (ref 4.8–10.8)

## 2020-03-27 PROCEDURE — 93306 TTE W/DOPPLER COMPLETE: CPT

## 2020-03-27 PROCEDURE — 6370000000 HC RX 637 (ALT 250 FOR IP): Performed by: INTERNAL MEDICINE

## 2020-03-27 PROCEDURE — 82553 CREATINE MB FRACTION: CPT

## 2020-03-27 PROCEDURE — 95816 EEG AWAKE AND DROWSY: CPT | Performed by: PSYCHIATRY & NEUROLOGY

## 2020-03-27 PROCEDURE — 95816 EEG AWAKE AND DROWSY: CPT

## 2020-03-27 PROCEDURE — 94640 AIRWAY INHALATION TREATMENT: CPT

## 2020-03-27 PROCEDURE — 99219 PR INITIAL OBSERVATION CARE/DAY 50 MINUTES: CPT | Performed by: PSYCHIATRY & NEUROLOGY

## 2020-03-27 PROCEDURE — 97161 PT EVAL LOW COMPLEX 20 MIN: CPT

## 2020-03-27 PROCEDURE — 82550 ASSAY OF CK (CPK): CPT

## 2020-03-27 PROCEDURE — 2580000003 HC RX 258: Performed by: INTERNAL MEDICINE

## 2020-03-27 PROCEDURE — 94761 N-INVAS EAR/PLS OXIMETRY MLT: CPT

## 2020-03-27 PROCEDURE — 97162 PT EVAL MOD COMPLEX 30 MIN: CPT

## 2020-03-27 PROCEDURE — 6360000002 HC RX W HCPCS: Performed by: INTERNAL MEDICINE

## 2020-03-27 PROCEDURE — G0378 HOSPITAL OBSERVATION PER HR: HCPCS

## 2020-03-27 PROCEDURE — 80048 BASIC METABOLIC PNL TOTAL CA: CPT

## 2020-03-27 PROCEDURE — 96372 THER/PROPH/DIAG INJ SC/IM: CPT

## 2020-03-27 PROCEDURE — 36415 COLL VENOUS BLD VENIPUNCTURE: CPT

## 2020-03-27 PROCEDURE — 85027 COMPLETE CBC AUTOMATED: CPT

## 2020-03-27 PROCEDURE — 97165 OT EVAL LOW COMPLEX 30 MIN: CPT

## 2020-03-27 RX ADMIN — ENOXAPARIN SODIUM 30 MG: 30 INJECTION SUBCUTANEOUS at 20:49

## 2020-03-27 RX ADMIN — CARVEDILOL 6.25 MG: 6.25 TABLET, FILM COATED ORAL at 07:42

## 2020-03-27 RX ADMIN — INSULIN LISPRO 2 UNITS: 100 INJECTION, SOLUTION INTRAVENOUS; SUBCUTANEOUS at 12:15

## 2020-03-27 RX ADMIN — ATORVASTATIN CALCIUM 40 MG: 40 TABLET, FILM COATED ORAL at 20:49

## 2020-03-27 RX ADMIN — LEVOTHYROXINE SODIUM 25 MCG: 25 TABLET ORAL at 06:28

## 2020-03-27 RX ADMIN — ASPIRIN 81 MG: 81 TABLET, COATED ORAL at 07:42

## 2020-03-27 RX ADMIN — BUDESONIDE AND FORMOTEROL FUMARATE DIHYDRATE 2 PUFF: 80; 4.5 AEROSOL RESPIRATORY (INHALATION) at 19:47

## 2020-03-27 RX ADMIN — CARVEDILOL 6.25 MG: 6.25 TABLET, FILM COATED ORAL at 18:40

## 2020-03-27 RX ADMIN — BUDESONIDE AND FORMOTEROL FUMARATE DIHYDRATE 2 PUFF: 80; 4.5 AEROSOL RESPIRATORY (INHALATION) at 07:39

## 2020-03-27 RX ADMIN — LISINOPRIL 5 MG: 5 TABLET ORAL at 07:42

## 2020-03-27 RX ADMIN — SODIUM CHLORIDE: 9 INJECTION, SOLUTION INTRAVENOUS at 07:43

## 2020-03-27 ASSESSMENT — PAIN SCALES - GENERAL
PAINLEVEL_OUTOF10: 0
PAINLEVEL_OUTOF10: 0

## 2020-03-27 ASSESSMENT — ENCOUNTER SYMPTOMS
CHEST TIGHTNESS: 0
CONSTIPATION: 0
SHORTNESS OF BREATH: 0
ABDOMINAL DISTENTION: 0
VOMITING: 0
NAUSEA: 0
DIARRHEA: 0
COLOR CHANGE: 0
ABDOMINAL PAIN: 0
WHEEZING: 0
COUGH: 0
TROUBLE SWALLOWING: 0

## 2020-03-27 NOTE — CONSULTS
[facility] Neurology Consult Note  Name: Kathie Garland  Age: 80 y.o. Gender: male  CodeStatus: DNR-CCA  Allergies: No Known Allergies    Chief Complaint:Loss of Consciousness (Patient had unwitnessed LOC after getting up out of bed)    Primary Care Provider: Mallorie Miller DO  InpatientTreatment Team: Treatment Team: Attending Provider: Destinee Gustafson DO; Consulting Physician: Misbah Suarez MD; : Seble Neff RN; Respiratory Therapist (Day): Cher Gr RCP; Patient Care Tech: Olamide Nine; Tech: Charlee Dues; Unit Clerk: Isabelle Lopez; Registered Nurse: Tony Díaz RN; : VINCE Zepeda  Admission Date: 3/26/2020      HPI   Pt seen and examined for neurology consult. 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 Pt was at home where he lives with his granddaughter and her family. He woke up from sleeping and walked a few steps and had syncopal episode. When he fell he hit the right side of his face/head. He denies recent illness, fever, chills, headache. No new medications recently. No recent n/v/d. He report his PO intake has been wnl. Daughter, who lives in [place], called in and told nurses that when pt was in [place] a couple weeks ago that he was complaining of dizziness. Pt denies previous syncopal episode. No reports of loss of b/b or tongue biting with episode. No recent cp/pressure, palps. Pt orthostatic BPS are positive and he is noted to have FATEMEH. No arrhythmias noted on tele.  Pt with PPM.   Labs/diagnostics: mag 1.9, trop 0.038, WBC 7.0, hgb 13.9, hct 42.0, platelets 593, Na 483, K 4.7, BUN 33, cr 1.58, glucose 207, CT head:  2.5 cm area of hypoattenuation within the medial right temporal lobe adjacent to the temporal horn of the right lateral ventricle is nonspecific and may relate to a small area of encephalomalacia however herpes encephalitis cannot be excluded   Pt currently A&O x3, NAD, cooperative. No focal deficits. No seizure activity. Noted ecchymosis over right frontal/periorbital region. No snyder sign. No rhinorrhea. NO Headache, double vision, blurry vision, difficulty with speech, difficulty with swallowing, weakness, numbness, pain, nausea, vomiting, choking, neck pain, dizziness  Vitals:    03/27/20 0739   BP:    Pulse:    Resp:    Temp:    SpO2: 94%   Breast Cancer-related family history is not on file. Social History     Socioeconomic History    Marital status:       Spouse name: Not on file    Number of children: Not on file    Years of education: Not on file    Highest education level: Not on file   Occupational History    Not on file   Social Needs    Financial resource strain: Not on file    Food insecurity     Worry: Not on file     Inability: Not on file    Transportation needs     Medical: Not on file     Non-medical: Not on file   Tobacco Use    Smoking status: Former Smoker    Smokeless tobacco: Never Used   Substance and Sexual Activity    Alcohol use: No    Drug use: No    Sexual activity: Not on file   Lifestyle    Physical activity     Days per week: Not on file     Minutes per session: Not on file    Stress: Not on file   Relationships    Social connections     Talks on phone: Not on file     Gets together: Not on file     Attends Mosque service: Not on file     Active member of club or organization: Not on file     Attends meetings of clubs or organizations: Not on file     Relationship status: Not on file    Intimate partner violence     Fear of current or ex partner: Not on file     Emotionally abused: Not on file     Physically abused: Not on file     Forced sexual activity: Not on file   Other Topics Concern    Not on file   Social History Narrative    Not on file        Review of Systems   Constitutional: Negative for appetite seizure activity. Coordination: Coordination normal.               Medications:  Reviewed    Infusion Medications:    sodium chloride 100 mL/hr at 03/27/20 0743    dextrose       Scheduled Medications:    sodium chloride flush  10 mL Intravenous 2 times per day    enoxaparin  30 mg Subcutaneous Daily    lisinopril  5 mg Oral Daily    carvedilol  6.25 mg Oral BID WC    atorvastatin  40 mg Oral Nightly    insulin lispro  0-6 Units Subcutaneous TID WC    insulin lispro  0-3 Units Subcutaneous Nightly    aspirin  81 mg Oral Daily    levothyroxine  25 mcg Oral Daily    budesonide-formoterol  2 puff Inhalation BID     PRN Meds: sodium chloride flush, acetaminophen **OR** acetaminophen, polyethylene glycol, promethazine **OR** ondansetron, potassium chloride **OR** potassium alternative oral replacement **OR** potassium chloride, magnesium sulfate, glucose, dextrose, glucagon (rDNA), dextrose, ipratropium-albuterol    Labs:   Recent Labs     03/26/20  1545 03/27/20  0456   WBC 7.0 6.0   HGB 13.9* 12.5*   HCT 42.0 38.7*    154     Recent Labs     03/26/20  1545 03/27/20  0456    139   K 4.7 4.2    107   CO2 23 20   BUN 33* 28*   CREATININE 1.58* 1.27*   CALCIUM 8.4* 7.7*     Recent Labs     03/26/20  1545   AST 14   ALT 11   BILITOT 0.9*   ALKPHOS 62     No results for input(s): INR in the last 72 hours. Recent Labs     03/26/20  1545   TROPONINI 0.038*       Urinalysis:   Lab Results   Component Value Date    NITRU Negative 07/06/2019    WBCUA 3-5 12/23/2016    BACTERIA Few 12/23/2016    RBCUA 3-5 12/23/2016    BLOODU Negative 07/06/2019    SPECGRAV 1.017 07/06/2019    GLUCOSEU Negative 07/06/2019       Radiology:   Most recent    EEG No procedure found. MRI of Brain No results found for this or any previous visit. No results found for this or any previous visit. MRA of the Head and Neck: No results found for this or any previous visit. No results found for visit. Results for orders placed during the hospital encounter of 06/07/17   US Carotid Artery Bilateral    Narrative EXAMINATION:  ULTRASOUND CAROTID ARTERIES:    CLINICAL HISTORY:  DIABETES, HYPERLIPIDEMIA, HYPERTENSION. HISTORY OF CORONARY BYPASS    COMPARISON:  12/4/2013. TECHNIQUE:  Grayscale, duplex Doppler. Sonographic imaging was performed by a registered sonographer and the images are submitted for interpretation. FINDINGS:  Grayscale images demonstrate intimal thickening and mild scattered plaque in the common carotid arteries, carotid bulbs, and internal carotid arteries. There is tortuosity of the distal internal carotid arteries. Peak systolic velocity in the   right internal carotid artery is 107 cm/s with an ICA/CCA ratio of 1.25. The peak systolic velocity in the left internal carotid artery is 102 cm/s with an ICA/CCA ratio of 1.01. There is antegrade flow in both vertebral arteries. Prior exam in 2013 demonstrated elevated velocity in the distal left internal carotid artery in the area of tortuosity, not evident on today's study. Impression INTIMAL THICKENING AND SCATTERED PLAQUE. ESTIMATED RIGHT INTERNAL CAROTID ARTERY STENOSIS IS LESS THAN 50% AND ESTIMATED LEFT INTERNAL CAROTID ARTERY STENOSIS IS LESS THAN 50%. Validated velocity measurements with angiographic measurements and velocity criteria are extrapolated from diameter data as defined by the Society of Radiologist in [address] [place] [address] Drive Radiology 2003; [id number]\". Echo No results found for this or any previous visit. Assessment/Plan:  Syncope, likely orthostatic in nature  Fall with CHI  FATEMEH, IVF  orthostatic BPS positive, IVF  CT head:  2.5 cm area of hypoattenuation within the medial right temporal lobe adjacent to the temporal horn of the right lateral ventricle is nonspecific and may relate to a small area of encephalomalacia however herpes encephalitis cannot be excluded.   Pt does not

## 2020-03-27 NOTE — DISCHARGE SUMMARY
Physician Discharge Summary     Patient ID:  Gian Kenyon  [id number]  89 y.o.  3/5/1929    Admit date: 3/26/2020    Discharge date : 03/28/2020    Admitting Physician: MACK CUI,      Discharge Physician: MACK CUI,      Admission Diagnoses: Syncope and collapse [R55]    Discharge Diagnoses: Syncope 2/2 orthostatic hypotension    Admission Condition: fair    Discharged Condition: good    Hospital Course: 80 y.o. male with significant past medical history of CAD, DM2, HTN, HLD, hypothyroid, sp pacemaker who presents with syncope. States that he was getting out of bed and took about 3 steps, got dizzy and fell, hitting his head on the floor. His granddaughter rushed up and immediately called EMS. Denies LOC. Remembers entire event. States that this occasionally happens when he gets up, but he has never fallen due to it. States that he ambulates on his own without assist device. States appetite is good, but states that he does not drink enough fluids. In the ED CT head and neck were negative except for R forehead hematoma. Was given IVF and admitted. Neuro was consulted. Patient was without symptoms during admission. Orthostatics were mildly positive and improved with hydration. Patient states that he does not drink enough water. Patient encouraged to hydrate. Echo showed diastolic dysfx with preserved EF. Pt unable to have MRI due to pacemaker. Pt discharged home on 3/28 in stable and improved condition. Consults: neurology      Discharge Exam:  Constitutional: Awake and alert in no acute distress.  Lying in bed comfortably  Head: R forehead with hematoma - swelling improved today and less bruising  Eyes: EOMI, PERRLA  ENT: moist mucous membranes  Neck: neck supple, trachea midline  Lungs: Good inspiratory effort, CTABL, no wheeze, no rhonchi, no rales  Heart: RRR, normal S1 and S2, no murmurs  GI: Soft, non-distended, non tender, no guarding, no rebound, +BS  MSK: no edema noted  Pulses: 2+ pulses bilaterally  Skin: warm, dry  Psych: appropriate affect       Labs:   Recent Labs     03/26/20  1545 03/27/20  0456   WBC 7.0 6.0   HGB 13.9* 12.5*   HCT 42.0 38.7*    154     Recent Labs     03/26/20  1545 03/27/20  0456    139   K 4.7 4.2    107   CO2 23 20   BUN 33* 28*   CREATININE 1.58* 1.27*   CALCIUM 8.4* 7.7*     Recent Labs     03/26/20  1545   AST 14   ALT 11   BILITOT 0.9*   ALKPHOS 62     No results for input(s): INR in the last 72 hours. Recent Labs     03/26/20  1545 03/27/20  1047   CKTOTAL  --  277*   TROPONINI 0.038*  --        Urinalysis:   Lab Results   Component Value Date    NITRU Negative 07/06/2019    WBCUA 3-5 12/23/2016    BACTERIA Few 12/23/2016    RBCUA 3-5 12/23/2016    BLOODU Negative 07/06/2019    SPECGRAV 1.017 07/06/2019    GLUCOSEU Negative 07/06/2019       Radiology:   Most recent    Chest CT      WITH CONTRAST:No results found for this or any previous visit. WITHOUT CONTRAST: No results found for this or any previous visit. CXR      2-view:   Results for orders placed during the hospital encounter of 03/26/20   XR CHEST STANDARD (2 VW)    Narrative EXAMINATION: Chest x-ray, 2 view    HISTORY: Syncope. Recent fall. TECHNIQUE: Frontal and lateral views of the chest    COMPARISON: Portable chest radiograph from February 13, 2020    FINDINGS:     Right-sided cardiac pacemaker is present. Evidence of prior thoracic surgery. Atherosclerotic calcification of the thoracic aorta. Cardiomediastinal silhouette is within normal limits. No pneumothorax, pleural effusion, or consolidation. The lungs appear   hyperinflated suggesting COPD. Degenerative changes of the spine. Impression No acute intrathoracic process. Portable:   Results for orders placed during the hospital encounter of 02/13/20   XR CHEST PORTABLE    Narrative XR CHEST PORTABLE    Exam Date/Time:  2/13/2020 2:30 PM  Clinical History:   cough chest pain . Cough .   Comparison: 12/4/2019. RESULT:     Lines, tubes, and devices:  Right-sided pacemaker, unchanged. Median sternotomy. Lungs and pleura:  No consolidation. No pleural effusion. No pneumothorax. Normal pulmonary vascular pattern. Cardiomediastinal silhouette:  Normal. Aortic atherosclerotic calcification. Other:  No acute osseous findings. Impression No acute radiographic abnormality. Echo No results found for this or any previous visit.     Disposition: home    In process/preliminary results:  Outstanding Order Results     Date and Time Order Name Status Description    3/26/2020 1547 EKG 12 Lead - Chest Pain Preliminary           Patient Instructions:   Current Discharge Medication List      CONTINUE these medications which have NOT CHANGED    Details   guaiFENesin (MUCINEX) 600 MG extended release tablet Take 1 tablet by mouth 2 times daily  Qty: 10 tablet, Refills: 0      sodium chloride (OCEAN) 0.65 % nasal spray 1 spray by Nasal route 4 times daily as needed for Congestion  Qty: 1 Bottle, Refills: 0      lisinopril (PRINIVIL;ZESTRIL) 5 MG tablet Take 1 tablet by mouth daily  Qty: 30 tablet, Refills: 3      nitroGLYCERIN (NITROSTAT) 0.4 MG SL tablet Place 1 tablet under the tongue every 5 minutes as needed for Chest pain  Qty: 25 tablet, Refills: 0      carvedilol (COREG) 12.5 MG tablet Take 0.5 tablets by mouth 2 times daily (with meals)  Qty: 180 tablet, Refills: 0    Comments: **Patient requests 90 days supply**      albuterol sulfate HFA (PROVENTIL HFA) 108 (90 Base) MCG/ACT inhaler Inhale 2 puffs into the lungs every 6 hours as needed for Wheezing  Qty: 1 Inhaler, Refills: 3      ipratropium-albuterol (DUONEB) 0.5-2.5 (3) MG/3ML SOLN nebulizer solution Inhale 3 mLs into the lungs three times daily  Qty: 100 mL, Refills: 0    Associated Diagnoses: COPD with acute exacerbation (HCC)      atorvastatin (LIPITOR) 40 MG tablet Take 40 mg by mouth daily      ursodiol (ACTIGALL) 300 MG capsule Take

## 2020-03-27 NOTE — PROGRESS NOTES
symptoms this date. Pt ed in importance of taking time between position changes to decrease risk for syncope. Pt demonstrates safe functional mobility for home going with out need for AD. REQUIRES PT FOLLOW UP: No      PLAN OF CARE:  Plan  Times per week: eval only  Safety Devices  Type of devices: All fall risk precautions in place    Goals:  Patient goals : \"go home\"    [facility] (6 CLICK) [address] Mobility Raw Score : 22     Therapy Time:   Individual   Time In 0915   Time Out 0932   Minutes [address] [facility], 03/27/20 at 9:40 AM         Definitions for assistance levels  Independent = pt does not require any physical supervision or assistance from another person for activity completion. Device may be needed.   Stand by assistance = pt requires verbal cues or instructions from another person, close to but not touching, to perform the activity  Minimal assistance= pt performs 75% or more of the activity; assistance is required to complete the activity  Moderate assistance= pt performs 50% of the activity; assistance is required to complete the activity  Maximal assistance = pt performs 25% of the activity; assistance is required to complete the activity  Dependent = pt requires total physical assistance to accomplish the task

## 2020-03-27 NOTE — PROGRESS NOTES
IND/Mod IND       Therapy key for assistance levels -   Independent = Pt. is able to perform task with no assistance but may require a device   Stand by assistance = Pt. does not perform task at an independent level but does not need physical assistance, requires verbal cues  Minimal, Moderate, Maximal Assistance = Pt. requires physical assistance (25%, 50%, 75% assist from helper) for task but is able to actively participate in task   Dependent = Pt. requires total assistance with task and is not able to actively participate with task completion     Functional Mobility:  Functional Mobility  Functional - Mobility Device: No device  Activity: To/from bathroom  Assist Level: Supervision  Transfers  Sit to stand: Independent  Stand to sit: Independent    Bed Mobility  Bed mobility  Supine to Sit: Independent  Sit to Supine: Unable to assess(pt to chair )    Seated and Standing Balance:  Balance  Sitting Balance: Independent  Standing Balance: Independent    Functional Endurance:  Activity Tolerance  Activity Tolerance: Patient Tolerated treatment well    D/C Recommendations:  OT D/C RECOMMENDATIONS  REQUIRES OT FOLLOW UP: No    Equipment Recommendations:  OT Equipment Recommendations  Equipment Needed: No    OT Education:   OT Education  Patient Education: safety with functional transfers, pt verbalized understanding     OT Follow Up:  OT D/C RECOMMENDATIONS  REQUIRES OT FOLLOW UP: No       Assessment/Discharge Disposition:  Assessment: Pt is a 80 y.o. male s/p fall. Pt reported IND as baseline. Pt appears to be at baseline.    Prognosis: Good  Discharge Recommendations: Continue to assess pending progress  Decision Making: Low Complexity  History: Multi comorb   Exam: IND/supervised  Assistance / Modification: IND/supervised     Six Click Score   How much help for putting on and taking off regular lower body clothing?: None  How much help for Bathing?: None  How much help for Toileting?: None  How much help for putting on and taking off regular upper body clothing?: None  How much help for taking care of personal grooming?: None  How much help for eating meals?: None  AM-PAC Inpatient Daily Activity Raw Score: 24  AM-PAC Inpatient ADL T-Scale Score : 57.54  ADL Inpatient CMS 0-100% Score: 0    Plan:  Plan  Times per week: No acute OT recommended at this time. Goals:   N/A    Patient Goal:    Home    Discussed and agreed upon: Yes Comments:     Therapy Time:   OT Individual Minutes  Time In: 0551  Time Out: 0929  Minutes: 12    Eval: 12 minutes     Electronically signed by:     GIN Niño  3/27/2020, 9:39 AM

## 2020-03-27 NOTE — FLOWSHEET NOTE
0745- Assessment complete, medications given. Patient [place] but alert and oriented x4. Lungs are clear, breathing unlabored, non productive occasional cough present. HR regular, paced on telemetry. Bowel sounds present in all quadrants. Patient as equal strength in all extremities. Bruising on right side of head and right eye, skin otherwise intact. Gait is steady with walker. 1030- Shauna Khan at [facility], [facility] states that patients pacemaker is NOT compatible for MRI.

## 2020-03-27 NOTE — PROGRESS NOTES
Disorder  (acute or chronic)  []   Severe or Chronic w/ Exacerbation  []     Surgical Status No [x]   Surgeries     General []   Surgery Lower []   Abdominal Thoracic or []   Upper Abdominal Thoracic with  PulmonaryDisorder  []     Chest X-ray Clear/Not  Ordered     [x]  Chronic Changes  Results Pending  []  Infiltrates, atelectasis, pleural effusion, or edema  []  Infiltrates in more than one lobe []  Infiltrate + Atelectasis, &/or pleural effusion  []    Respiratory Pattern Regular,  RR = 12-20 [x]  Increased,  RR = 21-25 []  BUTT, irregular,  or RR = 26-30 []  Decreased FEV1  or RR = 31-35 []  Severe SOB, use  of accessory muscles, or RR ? 35  []    Mental Status Alert, oriented,  Cooperative [x]  Confused but Follows commands []  Lethargic or unable to follow commands []  Obtunded  []  Comatose  []    Breath Sounds Clear to  auscultation  [x]  Decreased unilaterally or  in bases only []  Decreased  bilaterally  []  Crackles or intermittent wheezes []  Wheezes []    Cough Strong, Spontan., & nonproductive [x]  Strong,  spontaneous, &  productive []  Weak,  Nonproductive []  Weak, productive or  with wheezes []  No spontaneous  cough or may require suctioning []    Level of Activity Ambulatory []  Ambulatory w/ Assist  [x]  Non-ambulatory []  Paraplegic []  Quadriplegic []    Total    Score:___3____     Triage Score:____5____      Tri       Triage:     1. (>20) Freq: Q3    2. (16-20) Freq: Q4   3. (11-15) Freq: QID & Albuterol Q2 PRN    4. (6-10) Freq: TID & Albuterol Q2 PRN    5. (0-5) Freq Q4prn

## 2020-03-27 NOTE — CARE COORDINATION
[facility] EMERGENCY [facility] AT [facility] Case Management Initial Discharge Assessment    Met with Patient to discuss discharge plan. PCP: Doron Mcpherson, DO                                Date of Last Visit: \"a while\", at least a year ago    If no PCP, list provided? N/A    Discharge Planning    Living Arrangements: independently at home    Who do you live with? granddaughter    Who helps you with your care:  self    If lives at home:     Do you have any barriers navigating in your home? no    Patient can perform ADL? Yes    Current Services (outpatient and in home) :  None    Dialysis: No    Is transportation available to get to your appointments? Yes - patient drives    DME Equipment:  No - none owned or used. Has cane as part of knife collection w/ sword inside that he could use if needed    Respiratory equipment: None    Respiratory provider:  no     Pharmacy:  yes - [facility] on [address] with Medication Assistance Program?  No      Patient agreeable to [facility]? N/A    Patient agreeable to SNF/Rehab? N/A    Other discharge needs identified? N/A    Freedom of choice list provided with basic dialogue that supports the patient's individualized plan of care/goals and shares the quality data associated with the providers. N/A    Does Patient Have a High-Risk for Readmission Diagnosis (CHF, PN, MI, COPD)? No    If Yes,The plan for Transition of Care is related to the following treatment goals: syncope work-up    Initial Discharge Plan? (Note: please see concurrent daily documentation for any updates after initial note). Patient reports being completely independent pta. Currently denies needs. Neuro eval and any further work-up pending. PT/OT ordered.      The Patient and/or patient representative: patient was provided with choice of any post-acute providers for care and equipment and agrees with discharge plan  N/A    Electronically signed by Jenny Meyers RN on 3/27/2020 at 10:05 AM

## 2020-03-28 ENCOUNTER — APPOINTMENT (OUTPATIENT)
Dept: ULTRASOUND IMAGING | Age: 85
End: 2020-03-28
Payer: MEDICARE

## 2020-03-28 VITALS
WEIGHT: 160 LBS | TEMPERATURE: 98.8 F | BODY MASS INDEX: 25.71 KG/M2 | OXYGEN SATURATION: 96 % | HEIGHT: 66 IN | HEART RATE: 60 BPM | SYSTOLIC BLOOD PRESSURE: 168 MMHG | DIASTOLIC BLOOD PRESSURE: 63 MMHG | RESPIRATION RATE: 14 BRPM

## 2020-03-28 LAB
GLUCOSE BLD-MCNC: 130 MG/DL (ref 60–115)
GLUCOSE BLD-MCNC: 143 MG/DL (ref 60–115)
PERFORMED ON: ABNORMAL
PERFORMED ON: ABNORMAL

## 2020-03-28 PROCEDURE — 94640 AIRWAY INHALATION TREATMENT: CPT

## 2020-03-28 PROCEDURE — 2580000003 HC RX 258: Performed by: INTERNAL MEDICINE

## 2020-03-28 PROCEDURE — 99225 PR SBSQ OBSERVATION CARE/DAY 25 MINUTES: CPT | Performed by: PSYCHIATRY & NEUROLOGY

## 2020-03-28 PROCEDURE — 94760 N-INVAS EAR/PLS OXIMETRY 1: CPT

## 2020-03-28 PROCEDURE — 6370000000 HC RX 637 (ALT 250 FOR IP): Performed by: INTERNAL MEDICINE

## 2020-03-28 PROCEDURE — 6360000002 HC RX W HCPCS: Performed by: INTERNAL MEDICINE

## 2020-03-28 PROCEDURE — 93880 EXTRACRANIAL BILAT STUDY: CPT

## 2020-03-28 PROCEDURE — G0378 HOSPITAL OBSERVATION PER HR: HCPCS

## 2020-03-28 PROCEDURE — 96372 THER/PROPH/DIAG INJ SC/IM: CPT

## 2020-03-28 RX ORDER — ATENOLOL 25 MG/1
25 TABLET ORAL 2 TIMES DAILY
Status: ON HOLD | COMMUNITY
End: 2020-03-28 | Stop reason: HOSPADM

## 2020-03-28 RX ADMIN — Medication 10 ML: at 10:39

## 2020-03-28 RX ADMIN — INSULIN LISPRO 1 UNITS: 100 INJECTION, SOLUTION INTRAVENOUS; SUBCUTANEOUS at 12:14

## 2020-03-28 RX ADMIN — LEVOTHYROXINE SODIUM 25 MCG: 25 TABLET ORAL at 06:48

## 2020-03-28 RX ADMIN — LISINOPRIL 5 MG: 5 TABLET ORAL at 10:39

## 2020-03-28 RX ADMIN — CARVEDILOL 6.25 MG: 6.25 TABLET, FILM COATED ORAL at 10:39

## 2020-03-28 RX ADMIN — SODIUM CHLORIDE: 9 INJECTION, SOLUTION INTRAVENOUS at 01:55

## 2020-03-28 RX ADMIN — BUDESONIDE AND FORMOTEROL FUMARATE DIHYDRATE 2 PUFF: 80; 4.5 AEROSOL RESPIRATORY (INHALATION) at 07:29

## 2020-03-28 RX ADMIN — ASPIRIN 81 MG: 81 TABLET, COATED ORAL at 10:39

## 2020-03-28 RX ADMIN — ENOXAPARIN SODIUM 30 MG: 30 INJECTION SUBCUTANEOUS at 10:39

## 2020-03-28 NOTE — DISCHARGE INSTR - COC
Continuity of Care Form    Patient Name: Asiya Gonzalez   :  3/5/1929  MRN:  [id number]    Admit date:  3/26/2020  Discharge date:  ***    Code Status Order: DNR-CCA   Advance Directives:     Admitting Physician:  Lisbeth Davis DO  PCP: Razia Russell DO    Discharging Nurse: [facility] Unit/Room#: G499/L331-54  Discharging Unit Phone Number: ***    Emergency Contact:   Extended Emergency Contact Information  Primary Emergency Contact: [address] Phone: [phone]  Work Phone: [phone]  Mobile Phone: [phone]  Relation: [address] [facility]  Secondary Emergency Contact:  [address] [place] [address] Phone: [phone]  Relation: Child   needed?  No    Past Surgical History:  Past Surgical History:   Procedure Laterality Date    CATARACT REMOVAL      MIDDLE EAR SURGERY Left     \"they had to reset the bones\" after an infection       Immunization History:   Immunization History   Administered Date(s) Administered    Influenza, High Dose (Fluzone 65 yrs and older) 2016, 2016, 10/12/2017    Influenza, Quadv, IM, PF (6 mo and older Fluzone, Flulaval, Fluarix, and 3 yrs and older Afluria) 2020    Pneumococcal Conjugate 13-valent ([id number]) 2016    Pneumococcal Polysaccharide (Rcpwkomkk34) 2012    Tdap (Boostrix, Adacel) 2020    Zoster Live (Zostavax) 2013       Active Problems:  Patient Active Problem List   Diagnosis Code    HTN (hypertension) I10    Dyslipidemia E78.5    DM (diabetes mellitus) ([facility] [address] E11.9    Hypothyroidism E03.9    History of tobacco abuse Z87.891    Anemia D64.9    CAD (coronary artery disease) I25.10    Bronchitis J40    COPD with acute exacerbation ([facility]) J44.1    SOB (shortness of breath) R06.02    Anginal equivalent (HCC) I20.8    Hypotension I95.9    Chest pain R07.9    NSTEMI (non-ST elevated myocardial infarction) ([facility]) I21.4    COPD exacerbation (HCC) J44.1    Syncope and collapse R55       Isolation/Infection:   Isolation          No Isolation        Patient Infection Status     None to display          Nurse Assessment:  Last Vital Signs: BP (!) 168/63   Pulse 60   Temp 98.8 °F (37.1 °C) (Oral)   Resp 14   Ht 5' 6\" (1.676 m)   Wt 160 lb (72.6 kg)   SpO2 96%   BMI 25.82 kg/m²     Last documented pain score (0-10 scale): Pain Level: 0  Last Weight:   Wt Readings from Last 1 Encounters:   20 160 lb (72.6 kg)     Mental Status:  {IP PT MENTAL STATUS:}    IV Access:  { CEDRICK IV ACCESS:859075947}    Nursing Mobility/ADLs:  Walking   {CHP DME KEGI:334514911}  Transfer  {CHP DME XDNR:980993451}  Bathing  {CHP DME XDY}  Dressing  {CHP DME NQVN:202053776}  Toileting  {CHP DME KXET:829563029}  Feeding  {[facility] DME APVH:081990155}  Med Admin  {P DME XUQY:205485244}  Med Delivery   { CEDRICK MED Delivery:328842928}    Wound Care Documentation and Therapy:        Elimination:  Continence:   · Bowel: {YES / YE:55056}  · Bladder: {YES / GE:66236}  Urinary Catheter: {Urinary Catheter:666003402}   Colostomy/Ileostomy/Ileal Conduit: {YES / HA:83059}       Date of Last BM: ***    Intake/Output Summary (Last 24 hours) at 3/28/2020 0753  Last data filed at 3/28/2020 0112  Gross per 24 hour   Intake 1000 ml   Output 450 ml   Net 550 ml     I/O last 3 completed shifts:   In: 1000 [P.O.:1000]  Out: 450 [Urine:450]    Safety Concerns:     [address] [employer] Safety Concerns:733462101}    Impairments/Disabilities:      [address] [employer] Impairments/Disabilities:575118553}    Nutrition Therapy:  Current Nutrition Therapy:   [address] [employer] Diet List:377152334}    Routes of Feeding: {CHP DME Other Feedings:021993050}  Liquids: {Slp liquid thickness:12045}  Daily Fluid Restriction: {CHP DME Yes amt example:919141949}  Last Modified Barium Swallow with Video (Video Swallowing Test): {Done Not Done BKG}    Treatments at the Time of Hospital Discharge:   Respiratory Treatments: ***  Oxygen Therapy:

## 2020-03-28 NOTE — PROGRESS NOTES
Neurology Follow up    SUBJECTIVE:  NO Headache, double vision,blurry vision,difficulty with speech,difficulty with swallowing,weakness,numbness,pain,nausea,vomitting,chocking,neck pain,dizziness,  Patient is hard of hearing but is able to adequately tell me that he has no headache and there is not been any fevers. Is not been any major confusional state. Is hard of hearing but does well  PHYSICAL EXAM:    BP (!) 168/63   Pulse 60   Temp 98.8 °F (37.1 °C) (Oral)   Resp 14   Ht 5' 6\" (1.676 m)   Wt 160 lb (72.6 kg)   SpO2 96%   BMI 25.82 kg/m²    General Appearance:      Mental Status Exam:             Level of Alertness:   awake            Orientation:   person, place,             Memory:   normal            Fund of Knowledge:  normal            Attention/Concentration:  normal            Language:  normal      Funduscopic Exam:     Cranial Nerves  Small scalp hematoma is notable. Cranial nerve III           Pupils:  equal, round, reactive to light      Cranial nerves III, IV, VI           Extraocular Movements: intact      Cranial nerve V           Facial sensation:  intact      Cranial nerve VII           Facial strength: intact      Cranial nerve VIII           Hearing:  intact      Motor:    Drift:  absent  Motor exam is symmetrical 5 out of 5 all extremities bilaterally, pt  is actually walking around in the room.   Tone:  normal  Abnormal Movements:  absent            Sensory:        Pinprick             Right Upper Extremity:  normal             Left Upper Extremity:  normal             Right Lower Extremity:  normal             Left Lower Extremity:  normal           Vibration                         Touch            Proprioception                 Coordination:           Finger/Nose   Right:  normal              Left:  normal          Heel-Knee-Shin                Right:  normal              Left:  normal          Rapid Alternating Movements              Right:  normal              Left: area of hypoattenuation within the medial right temporal lobe adjacent to the temporal horn of the right lateral ventricle is nonspecific and may relate to a small area of encephalomalacia however herpes encephalitis cannot be excluded and MRI of the brain with contrast is recommended to further evaluate. All CT scans at this facility use dose modulation, iterative reconstruction, and/or weight based dosing when appropriate to reduce radiation dose to as low as reasonably achievable. Ct Cervical Spine Wo Contrast    Result Date: 3/26/2020  EXAM: CT CERVICAL SPINE WO CONTRAST COMPARISON: None available REASON FOR EXAMINATION:  Pain after a fall TECHNIQUE: Multiple contiguous axial CT images of the cervical spine were obtained. Multiplanar reformats performed. FINDINGS:  No acute fracture or malalignment. Atlanto-occipital articulation is maintained. Atlantodental interval is preserved. Cervical spinal vertebral body heights are preserved. Multilevel degenerative disc disease with degenerative endplate changes and osseous at C6-7. Multilevel facet arthropathy and uncovertebral hypertrophy. There is no prevertebral soft tissue swelling. Lung apices are clear. Degenerative changes of the cervical spine without acute fracture or malalignment. All CT scans at this facility use dose modulation, iterative reconstruction, and/or weight based dosing when appropriate to reduce radiation dose to as low as reasonably achievable.        Recent Labs     03/26/20  1545 03/27/20  0456   WBC 7.0 6.0   HGB 13.9* 12.5*    154     Recent Labs     03/26/20  1545 03/27/20  0456    139   K 4.7 4.2    107   CO2 23 20   BUN 33* 28*   CREATININE 1.58* 1.27*   GLUCOSE 207* 112*     Recent Labs     03/26/20  1545   BILITOT 0.9*   ALKPHOS 62   AST 14   ALT 11     Lab Results   Component Value Date    PROTIME 13.9 09/19/2019    INR 1.0 09/19/2019     No results found for: LITHIUM, DILFRTOT, VALPROATE    ASSESSMENT AND

## 2020-07-08 ENCOUNTER — APPOINTMENT (OUTPATIENT)
Dept: GENERAL RADIOLOGY | Age: 85
End: 2020-07-08
Payer: MEDICARE

## 2020-07-08 ENCOUNTER — HOSPITAL ENCOUNTER (EMERGENCY)
Age: 85
Discharge: HOME | End: 2020-07-08
Attending: STUDENT IN AN ORGANIZED HEALTH CARE EDUCATION/TRAINING PROGRAM
Payer: MEDICARE

## 2020-07-08 VITALS
BODY MASS INDEX: 24.11 KG/M2 | DIASTOLIC BLOOD PRESSURE: 60 MMHG | HEIGHT: 66 IN | TEMPERATURE: 98.7 F | RESPIRATION RATE: 18 BRPM | OXYGEN SATURATION: 92 % | SYSTOLIC BLOOD PRESSURE: 117 MMHG | WEIGHT: 150 LBS | HEART RATE: 63 BPM

## 2020-07-08 LAB
ALBUMIN SERPL-MCNC: 3.9 G/DL (ref 3.5–4.6)
ALP BLD-CCNC: 63 U/L (ref 35–104)
ALT SERPL-CCNC: 12 U/L (ref 0–41)
ANION GAP SERPL CALCULATED.3IONS-SCNC: 12 MEQ/L (ref 9–15)
AST SERPL-CCNC: 17 U/L (ref 0–40)
BASOPHILS ABSOLUTE: 0 K/UL (ref 0–0.2)
BASOPHILS RELATIVE PERCENT: 0.6 %
BILIRUB SERPL-MCNC: 0.9 MG/DL (ref 0.2–0.7)
BUN BLDV-MCNC: 21 MG/DL (ref 8–23)
CALCIUM SERPL-MCNC: 9.2 MG/DL (ref 8.5–9.9)
CHLORIDE BLD-SCNC: 106 MEQ/L (ref 95–107)
CO2: 25 MEQ/L (ref 20–31)
CREAT SERPL-MCNC: 1.46 MG/DL (ref 0.7–1.2)
EOSINOPHILS ABSOLUTE: 0.2 K/UL (ref 0–0.7)
EOSINOPHILS RELATIVE PERCENT: 2.9 %
GFR AFRICAN AMERICAN: 54.7
GFR NON-AFRICAN AMERICAN: 45.2
GLOBULIN: 2.8 G/DL (ref 2.3–3.5)
GLUCOSE BLD-MCNC: 152 MG/DL (ref 70–99)
HCT VFR BLD CALC: 40.8 % (ref 42–52)
HEMOGLOBIN: 13.6 G/DL (ref 14–18)
LYMPHOCYTES ABSOLUTE: 1.2 K/UL (ref 1–4.8)
LYMPHOCYTES RELATIVE PERCENT: 16.9 %
MAGNESIUM: 2.1 MG/DL (ref 1.7–2.4)
MCH RBC QN AUTO: 31.4 PG (ref 27–31.3)
MCHC RBC AUTO-ENTMCNC: 33.4 % (ref 33–37)
MCV RBC AUTO: 93.8 FL (ref 80–100)
MONOCYTES ABSOLUTE: 0.6 K/UL (ref 0.2–0.8)
MONOCYTES RELATIVE PERCENT: 8.9 %
NEUTROPHILS ABSOLUTE: 5 K/UL (ref 1.4–6.5)
NEUTROPHILS RELATIVE PERCENT: 70.7 %
PDW BLD-RTO: 14.4 % (ref 11.5–14.5)
PLATELET # BLD: 198 K/UL (ref 130–400)
POTASSIUM SERPL-SCNC: 4.2 MEQ/L (ref 3.4–4.9)
PRO-BNP: 363 PG/ML
RBC # BLD: 4.35 M/UL (ref 4.7–6.1)
SODIUM BLD-SCNC: 143 MEQ/L (ref 135–144)
TOTAL PROTEIN: 6.7 G/DL (ref 6.3–8)
TROPONIN: 0.04 NG/ML (ref 0–0.01)
WBC # BLD: 7.1 K/UL (ref 4.8–10.8)

## 2020-07-08 PROCEDURE — 80053 COMPREHEN METABOLIC PANEL: CPT

## 2020-07-08 PROCEDURE — 85025 COMPLETE CBC W/AUTO DIFF WBC: CPT

## 2020-07-08 PROCEDURE — 83880 ASSAY OF NATRIURETIC PEPTIDE: CPT

## 2020-07-08 PROCEDURE — 96374 THER/PROPH/DIAG INJ IV PUSH: CPT

## 2020-07-08 PROCEDURE — 83735 ASSAY OF MAGNESIUM: CPT

## 2020-07-08 PROCEDURE — 71045 X-RAY EXAM CHEST 1 VIEW: CPT

## 2020-07-08 PROCEDURE — 96375 TX/PRO/DX INJ NEW DRUG ADDON: CPT

## 2020-07-08 PROCEDURE — 6360000002 HC RX W HCPCS: Performed by: NURSE PRACTITIONER

## 2020-07-08 PROCEDURE — 36415 COLL VENOUS BLD VENIPUNCTURE: CPT

## 2020-07-08 PROCEDURE — 99285 EMERGENCY DEPT VISIT HI MDM: CPT

## 2020-07-08 PROCEDURE — U0003 INFECTIOUS AGENT DETECTION BY NUCLEIC ACID (DNA OR RNA); SEVERE ACUTE RESPIRATORY SYNDROME CORONAVIRUS 2 (SARS-COV-2) (CORONAVIRUS DISEASE [COVID-19]), AMPLIFIED PROBE TECHNIQUE, MAKING USE OF HIGH THROUGHPUT TECHNOLOGIES AS DESCRIBED BY CMS-2020-01-R: HCPCS

## 2020-07-08 PROCEDURE — 84484 ASSAY OF TROPONIN QUANT: CPT

## 2020-07-08 RX ORDER — ONDANSETRON 2 MG/ML
4 INJECTION INTRAMUSCULAR; INTRAVENOUS ONCE
Status: COMPLETED | OUTPATIENT
Start: 2020-07-08 | End: 2020-07-08

## 2020-07-08 RX ORDER — MORPHINE SULFATE 2 MG/ML
2 INJECTION, SOLUTION INTRAMUSCULAR; INTRAVENOUS ONCE
Status: COMPLETED | OUTPATIENT
Start: 2020-07-08 | End: 2020-07-08

## 2020-07-08 RX ORDER — METHYLPREDNISOLONE SODIUM SUCCINATE 125 MG/2ML
125 INJECTION, POWDER, LYOPHILIZED, FOR SOLUTION INTRAMUSCULAR; INTRAVENOUS ONCE
Status: COMPLETED | OUTPATIENT
Start: 2020-07-08 | End: 2020-07-08

## 2020-07-08 RX ADMIN — MORPHINE SULFATE 2 MG: 2 INJECTION, SOLUTION INTRAMUSCULAR; INTRAVENOUS at 13:02

## 2020-07-08 RX ADMIN — ONDANSETRON 4 MG: 2 INJECTION INTRAMUSCULAR; INTRAVENOUS at 13:02

## 2020-07-08 RX ADMIN — METHYLPREDNISOLONE SODIUM SUCCINATE 125 MG: 125 INJECTION, POWDER, FOR SOLUTION INTRAMUSCULAR; INTRAVENOUS at 12:22

## 2020-07-08 ASSESSMENT — ENCOUNTER SYMPTOMS
SORE THROAT: 1
NAUSEA: 0
WHEEZING: 0
ABDOMINAL PAIN: 0
EYE REDNESS: 0
CONSTIPATION: 0
VOMITING: 0
DIARRHEA: 0
COUGH: 1
TROUBLE SWALLOWING: 0
COLOR CHANGE: 0
EYE PAIN: 0
SHORTNESS OF BREATH: 1
BLOOD IN STOOL: 0
BACK PAIN: 0
RHINORRHEA: 1
EYE DISCHARGE: 0

## 2020-07-08 ASSESSMENT — PAIN DESCRIPTION - LOCATION: LOCATION: GENERALIZED

## 2020-07-08 ASSESSMENT — PAIN - FUNCTIONAL ASSESSMENT: PAIN_FUNCTIONAL_ASSESSMENT: ACTIVITIES ARE NOT PREVENTED

## 2020-07-08 ASSESSMENT — PAIN DESCRIPTION - PAIN TYPE: TYPE: ACUTE PAIN

## 2020-07-08 ASSESSMENT — PAIN SCALES - GENERAL
PAINLEVEL_OUTOF10: 5
PAINLEVEL_OUTOF10: 6

## 2020-07-08 ASSESSMENT — PAIN DESCRIPTION - PROGRESSION: CLINICAL_PROGRESSION: GRADUALLY WORSENING

## 2020-07-08 ASSESSMENT — PAIN DESCRIPTION - DESCRIPTORS: DESCRIPTORS: ACHING

## 2020-07-08 ASSESSMENT — PAIN SCALES - WONG BAKER: WONGBAKER_NUMERICALRESPONSE: 2

## 2020-07-08 ASSESSMENT — PAIN DESCRIPTION - FREQUENCY: FREQUENCY: INTERMITTENT

## 2020-07-08 ASSESSMENT — PAIN DESCRIPTION - ORIENTATION: ORIENTATION: OTHER (COMMENT)

## 2020-07-08 ASSESSMENT — PAIN DESCRIPTION - ONSET: ONSET: ON-GOING

## 2020-07-08 NOTE — ED NOTES
Pt medicated per MAR. Pt rates pain at 6/10 when he coughs and is generalized. Pt friend was at bedside and pt has been swabbed for covid. Friend states that she thinks that I am just saying that and I am not the boss. Pt came out once to state that he was in pain and I did inform her that I will inform the physician once she comes back to the nurses station. As I went to go medicate pt for the pain, radiology was coming in, that is when I informed pt that she is no longer will be able to stay in his room and she can either wait in the waiting room or leave. That is when she, Jolie Almanzar, started to yell and state that we don't even know if he has it so it did not make sense that she could not stay with him and wanted to talk to the charge nurse as she did not believe me. Charge nurse as well as physician did inform Jolie Almanzar of the hospital policy and she still did not believe that she had to leave and was cursing. Jolie Almanzar went back to the pt room and stated to pt that it was I, this nurse, was the reason she could not stay and stated I was a \"bitch\".  Order for pain medication was placed at [phone] and was given by Adrinaa Henson RN  07/08/20 2223

## 2020-07-08 NOTE — ED TRIAGE NOTES
Pt reports that he has been SOB for that past couple days pt has harsh cough in triage pt family at his side pt alert x4 hard of hearing reports he has body aches and HA

## 2020-07-08 NOTE — ED PROVIDER NOTES
dysuria, flank pain and hematuria. Musculoskeletal: Negative for arthralgias, back pain and myalgias. Skin: Negative for color change, rash and wound. Neurological: Negative for dizziness, syncope, weakness, light-headedness and headaches. Psychiatric/Behavioral: Negative for behavioral problems. All other systems reviewed and are negative. Except as noted above the remainder of the review of systems was reviewed and negative. PAST MEDICAL HISTORY     Past Medical History:   Diagnosis Date    Anemia     CAD (coronary artery disease) 4/16/2015    [facility] [address]     Dyslipidemia     History of tobacco abuse     HTN (hypertension)     Hypothyroidism     Non-ST elevation MI (NSTEMI) [facility] [address]     Pacemaker 4/16/2015     Past Surgical History:   Procedure Laterality Date    CATARACT REMOVAL      MIDDLE EAR SURGERY Left 1998    \"they had to reset the bones\" after an infection     Social History     Socioeconomic History    Marital status:       Spouse name: Not on file    Number of children: Not on file    Years of education: Not on file    Highest education level: Not on file   Occupational History    Not on file   Social Needs    Financial resource strain: Not on file    Food insecurity     Worry: Not on file     Inability: Not on file    Transportation needs     Medical: Not on file     Non-medical: Not on file   Tobacco Use    Smoking status: Former Smoker    Smokeless tobacco: Never Used   Substance and Sexual Activity    Alcohol use: No    Drug use: No    Sexual activity: Not on file   Lifestyle    Physical activity     Days per week: Not on file     Minutes per session: Not on file    Stress: Not on file   Relationships    Social connections     Talks on phone: Not on file     Gets together: Not on file     Attends Oriental orthodox service: Not on file     Active member of club or organization: Not on file     Attends meetings of clubs or organizations: Not on file     Relationship status: Not on file    Intimate partner violence     Fear of current or ex partner: Not on file     Emotionally abused: Not on file     Physically abused: Not on file     Forced sexual activity: Not on file   Other Topics Concern    Not on file   Social History Narrative    Not on file       SCREENINGS             PHYSICAL EXAM    (up to 7 for level 4, 8 or more for level 5)     ED Triage Vitals [07/08/20 1143]   BP Temp Temp Source Pulse Resp SpO2 Height Weight   (!) 114/57 98.7 °F (37.1 °C) Temporal 78 18 93 % 5' 6\" (1.676 m) 150 lb (68 kg)       Physical Exam  Vitals signs and nursing note reviewed. Constitutional:       General: He is not in acute distress. Appearance: He is well-developed. He is not diaphoretic. HENT:      Head: Normocephalic and atraumatic. Nose: Congestion and rhinorrhea present. Mouth/Throat:      Pharynx: Posterior oropharyngeal erythema present. Eyes:      Conjunctiva/sclera: Conjunctivae normal.      Pupils: Pupils are equal, round, and reactive to light. Neck:      Musculoskeletal: Normal range of motion and neck supple. Cardiovascular:      Rate and Rhythm: Normal rate and regular rhythm. Heart sounds: Normal heart sounds. Pulmonary:      Effort: Pulmonary effort is normal. No respiratory distress. Breath sounds: Normal breath sounds. No wheezing. Abdominal:      General: Bowel sounds are normal.      Palpations: Abdomen is soft. Tenderness: There is no abdominal tenderness. Skin:     General: Skin is warm and dry. Capillary Refill: Capillary refill takes less than 2 seconds. Findings: No rash. Neurological:      Mental Status: He is alert and oriented to person, place, and time. Cranial Nerves: No cranial nerve deficit.    Psychiatric:         Behavior: Behavior normal.         RESULTS     EKG: All EKG's are interpreted by the Emergency Department Physician who either signs or Co-signsthis chart in the absence of a cardiologist.        RADIOLOGY:   [place] such as CT, Ultrasound and MRI are read by the radiologist. Plain radiographic images are visualized and preliminarily interpreted by the emergency physician with the below findings:    NAD    Interpretation per the Radiologist below, if available at the time ofthis note:    XR CHEST PORTABLE    (Results Pending)         ED BEDSIDE ULTRASOUND:   Performed by ED Physician - none    LABS:  Labs Reviewed   COMPREHENSIVE METABOLIC PANEL - Abnormal; Notable for the following components:       Result Value    Glucose 152 (*)     CREATININE 1.46 (*)     GFR Non- 45.2 (*)     GFR  54.7 (*)     Total Bilirubin 0.9 (*)     All other components within normal limits   CBC WITH AUTO DIFFERENTIAL - Abnormal; Notable for the following components:    RBC 4.35 (*)     Hemoglobin 13.6 (*)     Hematocrit 40.8 (*)     MCH 31.4 (*)     All other components within normal limits   TROPONIN - Abnormal; Notable for the following components:    Troponin 0.044 (*)     All other components within normal limits    Narrative:     CALL  [facility] tel. [phone],  Troponin results called to and read back by Shira Lanza, 07/08/2020 13:18,  by Chichi Reyna    Narrative:     Sonia Owenjaskaran tel. [phone],  Troponin results called to and read back by Shira Lanza, 07/08/2020 13:18,  by [place] [facility]    Narrative:     ORDER WAS CANCELLED 07/08/2020 12:55, Cancelled:  error. All other labs were within normal range or not returned as of this dictation.     EMERGENCY DEPARTMENT COURSE and DIFFERENTIAL DIAGNOSIS/MDM:   Vitals:    Vitals:    07/08/20 1143 07/08/20 1302   BP: (!) 114/57 (!) 108/53   Pulse: 78 63   Resp: 18 20   Temp: 98.7 °F (37.1 °C)    TempSrc: Temporal    SpO2: 93% 95%   Weight: 150 lb (68 kg)    Height: 5' 6\" (1.676 m)             MDM   Patient is a 70-year-old male presenting to the ER with a chief complaint of shortness of breath, rhinorrhea, and sore throat. Patient is hemodynamically stable nontoxic-appearing and afebrile at this time. The patient does meet criteria for symptomatic possibility of being infected with COVID and the patient has been out of his house and has been in contact with positive patients. COVID swab ordered, chest x-ray, and shortness of breath work-up. Patient is present with his best friend in the room and the patient's best friend is asked to go to the waiting room because the patient is being swabbed for covid. Patient's best friend is screaming and shouting about the policy of the hospital and yelling at staff members. Dr. Jatin Melton also evaluated this patient. Patient troponin and kidney function slightly elevated but they are at his baseline. The rest of the lab work is unremarkable and chest x-ray is negative. Patient is ambulated and was able to maintain 94% on room air. Patient is instructed to refer to primary care doctor for follow-up and return back to the emergency department he worsens in any way. The patient is instructed that he was swabs today for the coronavirus and that if it is positive he is going to receive a phone call. He is instructed to quarantine for 14 days anyways until the swab is back. He is discharged ambulatory in a stable condition with stable vital signs. CRITICAL CARE TIME       CONSULTS:  None    PROCEDURES:  Unless otherwise noted below, none     Procedures    FINAL IMPRESSION      1. Body aches    2. Suspected COVID-19 virus infection    3.  Anxiety state          DISPOSITION/PLAN   DISPOSITION        PATIENT REFERRED TO:  Ashley Cueva DO  [address] [place] [id number] [phone]    Schedule an appointment as soon as possible for a visit in 1 day        DISCHARGE MEDICATIONS:  New Prescriptions    No medications on file          (Please notethat portions of this note were completed with a voice recognition program.  Efforts were made to edit the dictations but occasionally words are mis-transcribed.)    CHRISTINE Abreu CNP (electronically signed)  Attending Emergency Physician          CHRISTINE Mora CNP  07/08/20 [address] [place] [address] CHRISTINE Magallon CNP  07/08/20 1400

## 2020-07-08 NOTE — ED NOTES
Pt states that he feels a little better.  verbalized to pt and his granddaughter regarding d/c     [address] [facility] V, RN  07/08/20 8576

## 2020-07-09 ENCOUNTER — CARE COORDINATION (OUTPATIENT)
Dept: CARE COORDINATION | Age: 85
End: 2020-07-09

## 2020-07-10 ENCOUNTER — CARE COORDINATION (OUTPATIENT)
Dept: CARE COORDINATION | Age: 85
End: 2020-07-10

## 2020-07-10 LAB
SARS-COV-2: NOT DETECTED
SOURCE: NORMAL

## 2021-01-26 ENCOUNTER — APPOINTMENT (OUTPATIENT)
Dept: GENERAL RADIOLOGY | Age: 86
DRG: 271 | End: 2021-01-26
Payer: MEDICARE

## 2021-01-26 ENCOUNTER — APPOINTMENT (OUTPATIENT)
Dept: CT IMAGING | Age: 86
DRG: 271 | End: 2021-01-26
Payer: MEDICARE

## 2021-01-26 ENCOUNTER — HOSPITAL ENCOUNTER (INPATIENT)
Age: 86
LOS: 6 days | Discharge: NURSING, REHAB OR LONG-TERM CARE | DRG: 271 | End: 2021-02-01
Attending: EMERGENCY MEDICINE | Admitting: INTERNAL MEDICINE
Payer: MEDICARE

## 2021-01-26 DIAGNOSIS — I82.421 ACUTE DEEP VEIN THROMBOSIS (DVT) OF ILIAC VEIN OF RIGHT LOWER EXTREMITY (HCC): Primary | ICD-10-CM

## 2021-01-26 DIAGNOSIS — I82.431 ACUTE DEEP VEIN THROMBOSIS (DVT) OF POPLITEAL VEIN OF RIGHT LOWER EXTREMITY (HCC): ICD-10-CM

## 2021-01-26 DIAGNOSIS — I70.201 FEMORAL ARTERY OCCLUSION, RIGHT (HCC): ICD-10-CM

## 2021-01-26 PROBLEM — I70.209 FEMORAL ARTERY OCCLUSION (HCC): Status: ACTIVE | Noted: 2021-01-26

## 2021-01-26 LAB
ALBUMIN SERPL-MCNC: 4 G/DL (ref 3.5–4.6)
ALP BLD-CCNC: 70 U/L (ref 35–104)
ALT SERPL-CCNC: 11 U/L (ref 0–41)
ANION GAP SERPL CALCULATED.3IONS-SCNC: 13 MEQ/L (ref 9–15)
APTT: 26.7 SEC (ref 24.4–36.8)
AST SERPL-CCNC: 21 U/L (ref 0–40)
BASOPHILS ABSOLUTE: 0 K/UL (ref 0–0.2)
BASOPHILS RELATIVE PERCENT: 0.5 %
BILIRUB SERPL-MCNC: 0.6 MG/DL (ref 0.2–0.7)
BUN BLDV-MCNC: 36 MG/DL (ref 8–23)
CALCIUM SERPL-MCNC: 10.6 MG/DL (ref 8.5–9.9)
CHLORIDE BLD-SCNC: 94 MEQ/L (ref 95–107)
CK MB: 3.4 NG/ML (ref 0–6.7)
CO2: 23 MEQ/L (ref 20–31)
CREAT SERPL-MCNC: 1.39 MG/DL (ref 0.7–1.2)
CREATINE KINASE-MB INDEX: 4.3 % (ref 0–3.5)
D DIMER: >20 MG/L FEU (ref 0–0.5)
EOSINOPHILS ABSOLUTE: 0.2 K/UL (ref 0–0.7)
EOSINOPHILS RELATIVE PERCENT: 1.8 %
GFR AFRICAN AMERICAN: 57.8
GFR NON-AFRICAN AMERICAN: 47.8
GLOBULIN: 3.4 G/DL (ref 2.3–3.5)
GLUCOSE BLD-MCNC: 177 MG/DL (ref 70–99)
HCT VFR BLD CALC: 43 % (ref 42–52)
HEMOGLOBIN: 14.6 G/DL (ref 14–18)
INR BLD: 1
LYMPHOCYTES ABSOLUTE: 1.6 K/UL (ref 1–4.8)
LYMPHOCYTES RELATIVE PERCENT: 18.4 %
MCH RBC QN AUTO: 31.3 PG (ref 27–31.3)
MCHC RBC AUTO-ENTMCNC: 34 % (ref 33–37)
MCV RBC AUTO: 92.1 FL (ref 80–100)
MONOCYTES ABSOLUTE: 0.9 K/UL (ref 0.2–0.8)
MONOCYTES RELATIVE PERCENT: 10 %
NEUTROPHILS ABSOLUTE: 6 K/UL (ref 1.4–6.5)
NEUTROPHILS RELATIVE PERCENT: 69.3 %
PDW BLD-RTO: 13.4 % (ref 11.5–14.5)
PLATELET # BLD: 145 K/UL (ref 130–400)
PLATELET SLIDE REVIEW: ADEQUATE
POC CREATININE WHOLE BLOOD: 1.7
POTASSIUM SERPL-SCNC: 5.9 MEQ/L (ref 3.4–4.9)
PROTHROMBIN TIME: 13.1 SEC (ref 12.3–14.9)
RBC # BLD: 4.67 M/UL (ref 4.7–6.1)
SARS-COV-2, NAAT: NOT DETECTED
SODIUM BLD-SCNC: 130 MEQ/L (ref 135–144)
TOTAL CK: 80 U/L (ref 0–190)
TOTAL PROTEIN: 7.4 G/DL (ref 6.3–8)
TROPONIN: 0.03 NG/ML (ref 0–0.01)
WBC # BLD: 8.6 K/UL (ref 4.8–10.8)

## 2021-01-26 PROCEDURE — 2580000003 HC RX 258: Performed by: EMERGENCY MEDICINE

## 2021-01-26 PROCEDURE — 75635 CT ANGIO ABDOMINAL ARTERIES: CPT

## 2021-01-26 PROCEDURE — 82550 ASSAY OF CK (CPK): CPT

## 2021-01-26 PROCEDURE — 96374 THER/PROPH/DIAG INJ IV PUSH: CPT

## 2021-01-26 PROCEDURE — 6360000002 HC RX W HCPCS: Performed by: EMERGENCY MEDICINE

## 2021-01-26 PROCEDURE — U0002 COVID-19 LAB TEST NON-CDC: HCPCS

## 2021-01-26 PROCEDURE — 99283 EMERGENCY DEPT VISIT LOW MDM: CPT

## 2021-01-26 PROCEDURE — 6360000004 HC RX CONTRAST MEDICATION: Performed by: EMERGENCY MEDICINE

## 2021-01-26 PROCEDURE — 36415 COLL VENOUS BLD VENIPUNCTURE: CPT

## 2021-01-26 PROCEDURE — 85379 FIBRIN DEGRADATION QUANT: CPT

## 2021-01-26 PROCEDURE — 86850 RBC ANTIBODY SCREEN: CPT

## 2021-01-26 PROCEDURE — 71045 X-RAY EXAM CHEST 1 VIEW: CPT

## 2021-01-26 PROCEDURE — 93005 ELECTROCARDIOGRAM TRACING: CPT | Performed by: EMERGENCY MEDICINE

## 2021-01-26 PROCEDURE — 82553 CREATINE MB FRACTION: CPT

## 2021-01-26 PROCEDURE — 84484 ASSAY OF TROPONIN QUANT: CPT

## 2021-01-26 PROCEDURE — 86901 BLOOD TYPING SEROLOGIC RH(D): CPT

## 2021-01-26 PROCEDURE — 85025 COMPLETE CBC W/AUTO DIFF WBC: CPT

## 2021-01-26 PROCEDURE — 85610 PROTHROMBIN TIME: CPT

## 2021-01-26 PROCEDURE — 86900 BLOOD TYPING SEROLOGIC ABO: CPT

## 2021-01-26 PROCEDURE — 85730 THROMBOPLASTIN TIME PARTIAL: CPT

## 2021-01-26 PROCEDURE — 2000000000 HC ICU R&B

## 2021-01-26 PROCEDURE — 80053 COMPREHEN METABOLIC PANEL: CPT

## 2021-01-26 RX ORDER — 0.9 % SODIUM CHLORIDE 0.9 %
1000 INTRAVENOUS SOLUTION INTRAVENOUS ONCE
Status: DISCONTINUED | OUTPATIENT
Start: 2021-01-26 | End: 2021-02-01 | Stop reason: HOSPADM

## 2021-01-26 RX ORDER — HEPARIN SODIUM 5000 [USP'U]/ML
80 INJECTION, SOLUTION INTRAVENOUS; SUBCUTANEOUS ONCE
Status: COMPLETED | OUTPATIENT
Start: 2021-01-26 | End: 2021-01-26

## 2021-01-26 RX ORDER — 0.9 % SODIUM CHLORIDE 0.9 %
1000 INTRAVENOUS SOLUTION INTRAVENOUS ONCE
Status: COMPLETED | OUTPATIENT
Start: 2021-01-26 | End: 2021-01-27

## 2021-01-26 RX ORDER — HEPARIN SODIUM 10000 [USP'U]/100ML
18 INJECTION, SOLUTION INTRAVENOUS CONTINUOUS
Status: DISCONTINUED | OUTPATIENT
Start: 2021-01-26 | End: 2021-01-29

## 2021-01-26 RX ADMIN — HEPARIN SODIUM AND DEXTROSE 18 UNITS/KG/HR: 10000; 5 INJECTION INTRAVENOUS at 22:33

## 2021-01-26 RX ADMIN — SODIUM CHLORIDE 1000 ML: 9 INJECTION, SOLUTION INTRAVENOUS at 22:28

## 2021-01-26 RX ADMIN — IOPAMIDOL 100 ML: 612 INJECTION, SOLUTION INTRAVENOUS at 22:21

## 2021-01-26 RX ADMIN — HEPARIN SODIUM 6000 UNITS: 5000 INJECTION INTRAVENOUS; SUBCUTANEOUS at 22:28

## 2021-01-26 ASSESSMENT — PAIN DESCRIPTION - FREQUENCY: FREQUENCY: CONTINUOUS

## 2021-01-26 ASSESSMENT — PAIN SCALES - GENERAL: PAINLEVEL_OUTOF10: 6

## 2021-01-26 ASSESSMENT — PAIN DESCRIPTION - PAIN TYPE: TYPE: ACUTE PAIN

## 2021-01-26 ASSESSMENT — PAIN DESCRIPTION - LOCATION: LOCATION: LEG

## 2021-01-27 ENCOUNTER — APPOINTMENT (OUTPATIENT)
Dept: CARDIAC CATH/INVASIVE PROCEDURES | Age: 86
DRG: 271 | End: 2021-01-27
Payer: MEDICARE

## 2021-01-27 PROBLEM — E87.1 HYPONATREMIA: Status: ACTIVE | Noted: 2021-01-27

## 2021-01-27 PROBLEM — I82.90 VENOUS THROMBOSIS OF LEG: Status: ACTIVE | Noted: 2021-01-27

## 2021-01-27 LAB
ABO/RH: NORMAL
ALBUMIN SERPL-MCNC: 3.7 G/DL (ref 3.5–4.6)
ALP BLD-CCNC: 66 U/L (ref 35–104)
ALT SERPL-CCNC: 11 U/L (ref 0–41)
ANION GAP SERPL CALCULATED.3IONS-SCNC: 15 MEQ/L (ref 9–15)
ANTI-XA UNFRAC HEPARIN: 1.34 IU/ML
ANTI-XA UNFRAC HEPARIN: 1.38 IU/ML
ANTIBODY SCREEN: NORMAL
AST SERPL-CCNC: 14 U/L (ref 0–40)
BASOPHILS ABSOLUTE: 0 K/UL (ref 0–0.2)
BASOPHILS RELATIVE PERCENT: 0.5 %
BILIRUB SERPL-MCNC: 1 MG/DL (ref 0.2–0.7)
BUN BLDV-MCNC: 29 MG/DL (ref 8–23)
CALCIUM SERPL-MCNC: 9.4 MG/DL (ref 8.5–9.9)
CHLORIDE BLD-SCNC: 100 MEQ/L (ref 95–107)
CO2: 18 MEQ/L (ref 20–31)
CREAT SERPL-MCNC: 1.19 MG/DL (ref 0.7–1.2)
EKG ATRIAL RATE: 64 BPM
EKG ATRIAL RATE: 69 BPM
EKG P AXIS: 32 DEGREES
EKG P AXIS: 36 DEGREES
EKG P-R INTERVAL: 164 MS
EKG P-R INTERVAL: 182 MS
EKG Q-T INTERVAL: 424 MS
EKG Q-T INTERVAL: 464 MS
EKG QRS DURATION: 140 MS
EKG QRS DURATION: 144 MS
EKG QTC CALCULATION (BAZETT): 454 MS
EKG QTC CALCULATION (BAZETT): 478 MS
EKG R AXIS: -78 DEGREES
EKG R AXIS: -80 DEGREES
EKG T AXIS: 23 DEGREES
EKG T AXIS: 4 DEGREES
EKG VENTRICULAR RATE: 64 BPM
EKG VENTRICULAR RATE: 69 BPM
EOSINOPHILS ABSOLUTE: 0.2 K/UL (ref 0–0.7)
EOSINOPHILS RELATIVE PERCENT: 2.3 %
GFR AFRICAN AMERICAN: 46
GFR AFRICAN AMERICAN: >60
GFR NON-AFRICAN AMERICAN: 38
GFR NON-AFRICAN AMERICAN: 57.2
GLOBULIN: 3.2 G/DL (ref 2.3–3.5)
GLUCOSE BLD-MCNC: 146 MG/DL (ref 70–99)
GLUCOSE BLD-MCNC: 147 MG/DL (ref 60–115)
GLUCOSE BLD-MCNC: 206 MG/DL (ref 60–115)
GLUCOSE BLD-MCNC: 216 MG/DL (ref 60–115)
HCT VFR BLD CALC: 42.9 % (ref 42–52)
HEMOGLOBIN: 14.3 G/DL (ref 14–18)
LYMPHOCYTES ABSOLUTE: 1.9 K/UL (ref 1–4.8)
LYMPHOCYTES RELATIVE PERCENT: 27.4 %
MCH RBC QN AUTO: 30.9 PG (ref 27–31.3)
MCHC RBC AUTO-ENTMCNC: 33.3 % (ref 33–37)
MCV RBC AUTO: 92.9 FL (ref 80–100)
MONOCYTES ABSOLUTE: 0.7 K/UL (ref 0.2–0.8)
MONOCYTES RELATIVE PERCENT: 9.7 %
NEUTROPHILS ABSOLUTE: 4.1 K/UL (ref 1.4–6.5)
NEUTROPHILS RELATIVE PERCENT: 60.1 %
PDW BLD-RTO: 13.4 % (ref 11.5–14.5)
PERFORMED ON: ABNORMAL
PLATELET # BLD: 135 K/UL (ref 130–400)
POC ACTIVATED CLOTTING TIME KAOLIN: 180 SEC (ref 82–152)
POC ACTIVATED CLOTTING TIME KAOLIN: 202 SEC (ref 82–152)
POC ACTIVATED CLOTTING TIME KAOLIN: 246 SEC (ref 82–152)
POC CREATININE: 1.7 MG/DL (ref 0.8–1.3)
POC SAMPLE TYPE: ABNORMAL
POTASSIUM REFLEX MAGNESIUM: 4.2 MEQ/L (ref 3.4–4.9)
RBC # BLD: 4.62 M/UL (ref 4.7–6.1)
SODIUM BLD-SCNC: 133 MEQ/L (ref 135–144)
TOTAL CK: 67 U/L (ref 0–190)
TOTAL PROTEIN: 6.9 G/DL (ref 6.3–8)
WBC # BLD: 6.8 K/UL (ref 4.8–10.8)

## 2021-01-27 PROCEDURE — 6370000000 HC RX 637 (ALT 250 FOR IP): Performed by: INTERNAL MEDICINE

## 2021-01-27 PROCEDURE — 75825 VEIN X-RAY TRUNK: CPT

## 2021-01-27 PROCEDURE — 76376 3D RENDER W/INTRP POSTPROCES: CPT

## 2021-01-27 PROCEDURE — 94761 N-INVAS EAR/PLS OXIMETRY MLT: CPT

## 2021-01-27 PROCEDURE — 2000000000 HC ICU R&B

## 2021-01-27 PROCEDURE — 85025 COMPLETE CBC W/AUTO DIFF WBC: CPT

## 2021-01-27 PROCEDURE — 067C3DZ DILATION OF RIGHT COMMON ILIAC VEIN WITH INTRALUMINAL DEVICE, PERCUTANEOUS APPROACH: ICD-10-PCS | Performed by: SURGERY

## 2021-01-27 PROCEDURE — 37253 INTRVASC US NONCORONARY ADDL: CPT

## 2021-01-27 PROCEDURE — 2709999900 HC NON-CHARGEABLE SUPPLY

## 2021-01-27 PROCEDURE — 93005 ELECTROCARDIOGRAM TRACING: CPT | Performed by: NURSE PRACTITIONER

## 2021-01-27 PROCEDURE — C1887 CATHETER, GUIDING: HCPCS

## 2021-01-27 PROCEDURE — 36556 INSERT NON-TUNNEL CV CATH: CPT

## 2021-01-27 PROCEDURE — 6360000002 HC RX W HCPCS

## 2021-01-27 PROCEDURE — 75820 VEIN X-RAY ARM/LEG: CPT

## 2021-01-27 PROCEDURE — 2580000003 HC RX 258: Performed by: NURSE PRACTITIONER

## 2021-01-27 PROCEDURE — 75827 VEIN X-RAY CHEST: CPT

## 2021-01-27 PROCEDURE — 37252 INTRVASC US NONCORONARY 1ST: CPT

## 2021-01-27 PROCEDURE — 6360000002 HC RX W HCPCS: Performed by: INTERNAL MEDICINE

## 2021-01-27 PROCEDURE — 36005 INJECTION EXT VENOGRAPHY: CPT

## 2021-01-27 PROCEDURE — 85347 COAGULATION TIME ACTIVATED: CPT

## 2021-01-27 PROCEDURE — 93010 ELECTROCARDIOGRAM REPORT: CPT | Performed by: INTERNAL MEDICINE

## 2021-01-27 PROCEDURE — 80053 COMPREHEN METABOLIC PANEL: CPT

## 2021-01-27 PROCEDURE — 06CC3ZZ EXTIRPATION OF MATTER FROM RIGHT COMMON ILIAC VEIN, PERCUTANEOUS APPROACH: ICD-10-PCS | Performed by: SURGERY

## 2021-01-27 PROCEDURE — C1894 INTRO/SHEATH, NON-LASER: HCPCS

## 2021-01-27 PROCEDURE — 85520 HEPARIN ASSAY: CPT

## 2021-01-27 PROCEDURE — 82550 ASSAY OF CK (CPK): CPT

## 2021-01-27 PROCEDURE — 06CF3ZZ EXTIRPATION OF MATTER FROM RIGHT EXTERNAL ILIAC VEIN, PERCUTANEOUS APPROACH: ICD-10-PCS | Performed by: SURGERY

## 2021-01-27 PROCEDURE — 2500000003 HC RX 250 WO HCPCS

## 2021-01-27 PROCEDURE — C1757 CATH, THROMBECTOMY/EMBOLECT: HCPCS

## 2021-01-27 PROCEDURE — 36010 PLACE CATHETER IN VEIN: CPT

## 2021-01-27 PROCEDURE — 82948 REAGENT STRIP/BLOOD GLUCOSE: CPT

## 2021-01-27 PROCEDURE — 6370000000 HC RX 637 (ALT 250 FOR IP): Performed by: NURSE PRACTITIONER

## 2021-01-27 PROCEDURE — 06CM3ZZ EXTIRPATION OF MATTER FROM RIGHT FEMORAL VEIN, PERCUTANEOUS APPROACH: ICD-10-PCS | Performed by: SURGERY

## 2021-01-27 PROCEDURE — 2580000003 HC RX 258

## 2021-01-27 PROCEDURE — 94640 AIRWAY INHALATION TREATMENT: CPT

## 2021-01-27 PROCEDURE — 36415 COLL VENOUS BLD VENIPUNCTURE: CPT

## 2021-01-27 PROCEDURE — 6360000002 HC RX W HCPCS: Performed by: NURSE PRACTITIONER

## 2021-01-27 PROCEDURE — 6360000004 HC RX CONTRAST MEDICATION: Performed by: SURGERY

## 2021-01-27 PROCEDURE — 99223 1ST HOSP IP/OBS HIGH 75: CPT | Performed by: INTERNAL MEDICINE

## 2021-01-27 PROCEDURE — 37187 VENOUS MECH THROMBECTOMY: CPT

## 2021-01-27 PROCEDURE — C1753 CATH, INTRAVAS ULTRASOUND: HCPCS

## 2021-01-27 PROCEDURE — C1769 GUIDE WIRE: HCPCS

## 2021-01-27 RX ORDER — HYDRALAZINE HYDROCHLORIDE 20 MG/ML
10 INJECTION INTRAMUSCULAR; INTRAVENOUS EVERY 6 HOURS PRN
Status: DISCONTINUED | OUTPATIENT
Start: 2021-01-27 | End: 2021-02-01 | Stop reason: HOSPADM

## 2021-01-27 RX ORDER — ATORVASTATIN CALCIUM 40 MG/1
40 TABLET, FILM COATED ORAL NIGHTLY
Status: DISCONTINUED | OUTPATIENT
Start: 2021-01-27 | End: 2021-02-01 | Stop reason: HOSPADM

## 2021-01-27 RX ORDER — NICOTINE POLACRILEX 4 MG
15 LOZENGE BUCCAL PRN
Status: DISCONTINUED | OUTPATIENT
Start: 2021-01-27 | End: 2021-02-01 | Stop reason: HOSPADM

## 2021-01-27 RX ORDER — CARVEDILOL 6.25 MG/1
6.25 TABLET ORAL 2 TIMES DAILY WITH MEALS
Status: DISCONTINUED | OUTPATIENT
Start: 2021-01-27 | End: 2021-02-01 | Stop reason: HOSPADM

## 2021-01-27 RX ORDER — SODIUM CHLORIDE 0.9 % (FLUSH) 0.9 %
10 SYRINGE (ML) INJECTION EVERY 12 HOURS SCHEDULED
Status: DISCONTINUED | OUTPATIENT
Start: 2021-01-27 | End: 2021-02-01 | Stop reason: HOSPADM

## 2021-01-27 RX ORDER — PANTOPRAZOLE SODIUM 40 MG/1
40 TABLET, DELAYED RELEASE ORAL
Status: DISCONTINUED | OUTPATIENT
Start: 2021-01-27 | End: 2021-02-01 | Stop reason: HOSPADM

## 2021-01-27 RX ORDER — PANTOPRAZOLE SODIUM 40 MG/10ML
40 INJECTION, POWDER, LYOPHILIZED, FOR SOLUTION INTRAVENOUS DAILY
Status: DISCONTINUED | OUTPATIENT
Start: 2021-01-27 | End: 2021-01-27

## 2021-01-27 RX ORDER — IPRATROPIUM BROMIDE AND ALBUTEROL SULFATE 2.5; .5 MG/3ML; MG/3ML
3 SOLUTION RESPIRATORY (INHALATION) 3 TIMES DAILY
Status: DISCONTINUED | OUTPATIENT
Start: 2021-01-27 | End: 2021-02-01 | Stop reason: HOSPADM

## 2021-01-27 RX ORDER — POLYETHYLENE GLYCOL 3350 17 G/17G
17 POWDER, FOR SOLUTION ORAL DAILY PRN
Status: DISCONTINUED | OUTPATIENT
Start: 2021-01-27 | End: 2021-02-01 | Stop reason: HOSPADM

## 2021-01-27 RX ORDER — ASPIRIN 81 MG/1
81 TABLET, CHEWABLE ORAL DAILY
Status: DISCONTINUED | OUTPATIENT
Start: 2021-01-27 | End: 2021-02-01 | Stop reason: HOSPADM

## 2021-01-27 RX ORDER — DEXTROSE MONOHYDRATE 50 MG/ML
100 INJECTION, SOLUTION INTRAVENOUS PRN
Status: DISCONTINUED | OUTPATIENT
Start: 2021-01-27 | End: 2021-02-01 | Stop reason: HOSPADM

## 2021-01-27 RX ORDER — ONDANSETRON 2 MG/ML
4 INJECTION INTRAMUSCULAR; INTRAVENOUS EVERY 6 HOURS PRN
Status: DISCONTINUED | OUTPATIENT
Start: 2021-01-27 | End: 2021-02-01 | Stop reason: HOSPADM

## 2021-01-27 RX ORDER — IODIXANOL 320 MG/ML
100 INJECTION, SOLUTION INTRAVASCULAR ONCE
Status: COMPLETED | OUTPATIENT
Start: 2021-01-27 | End: 2021-01-27

## 2021-01-27 RX ORDER — LEVOTHYROXINE SODIUM 0.03 MG/1
25 TABLET ORAL DAILY
Status: DISCONTINUED | OUTPATIENT
Start: 2021-01-27 | End: 2021-02-01 | Stop reason: HOSPADM

## 2021-01-27 RX ORDER — SODIUM CHLORIDE 0.9 % (FLUSH) 0.9 %
10 SYRINGE (ML) INJECTION PRN
Status: DISCONTINUED | OUTPATIENT
Start: 2021-01-27 | End: 2021-02-01 | Stop reason: HOSPADM

## 2021-01-27 RX ORDER — URSODIOL 300 MG/1
300 CAPSULE ORAL 2 TIMES DAILY
Status: DISCONTINUED | OUTPATIENT
Start: 2021-01-27 | End: 2021-02-01 | Stop reason: HOSPADM

## 2021-01-27 RX ORDER — DEXTROSE MONOHYDRATE 25 G/50ML
12.5 INJECTION, SOLUTION INTRAVENOUS PRN
Status: DISCONTINUED | OUTPATIENT
Start: 2021-01-27 | End: 2021-02-01 | Stop reason: HOSPADM

## 2021-01-27 RX ORDER — MAGNESIUM HYDROXIDE/ALUMINUM HYDROXICE/SIMETHICONE 120; 1200; 1200 MG/30ML; MG/30ML; MG/30ML
30 SUSPENSION ORAL EVERY 6 HOURS PRN
Status: DISCONTINUED | OUTPATIENT
Start: 2021-01-27 | End: 2021-02-01 | Stop reason: HOSPADM

## 2021-01-27 RX ORDER — ALBUTEROL SULFATE 90 UG/1
2 AEROSOL, METERED RESPIRATORY (INHALATION) EVERY 6 HOURS PRN
Status: DISCONTINUED | OUTPATIENT
Start: 2021-01-27 | End: 2021-02-01 | Stop reason: HOSPADM

## 2021-01-27 RX ORDER — METOCLOPRAMIDE HYDROCHLORIDE 5 MG/ML
10 INJECTION INTRAMUSCULAR; INTRAVENOUS ONCE
Status: COMPLETED | OUTPATIENT
Start: 2021-01-27 | End: 2021-01-27

## 2021-01-27 RX ORDER — ACETAMINOPHEN 650 MG/1
650 SUPPOSITORY RECTAL EVERY 6 HOURS PRN
Status: DISCONTINUED | OUTPATIENT
Start: 2021-01-27 | End: 2021-02-01 | Stop reason: HOSPADM

## 2021-01-27 RX ORDER — ACETAMINOPHEN 325 MG/1
650 TABLET ORAL EVERY 6 HOURS PRN
Status: DISCONTINUED | OUTPATIENT
Start: 2021-01-27 | End: 2021-02-01 | Stop reason: HOSPADM

## 2021-01-27 RX ORDER — PROMETHAZINE HYDROCHLORIDE 12.5 MG/1
12.5 TABLET ORAL EVERY 6 HOURS PRN
Status: DISCONTINUED | OUTPATIENT
Start: 2021-01-27 | End: 2021-02-01 | Stop reason: HOSPADM

## 2021-01-27 RX ADMIN — IPRATROPIUM BROMIDE AND ALBUTEROL SULFATE 3 ML: .5; 3 SOLUTION RESPIRATORY (INHALATION) at 21:46

## 2021-01-27 RX ADMIN — CARVEDILOL 6.25 MG: 6.25 TABLET, FILM COATED ORAL at 18:39

## 2021-01-27 RX ADMIN — CARVEDILOL 6.25 MG: 6.25 TABLET, FILM COATED ORAL at 12:39

## 2021-01-27 RX ADMIN — PROMETHAZINE HYDROCHLORIDE 12.5 MG: 12.5 TABLET ORAL at 12:39

## 2021-01-27 RX ADMIN — LEVOTHYROXINE SODIUM 25 MCG: 0.03 TABLET ORAL at 12:39

## 2021-01-27 RX ADMIN — ATORVASTATIN CALCIUM 40 MG: 40 TABLET, FILM COATED ORAL at 20:47

## 2021-01-27 RX ADMIN — HYDRALAZINE HYDROCHLORIDE 10 MG: 20 INJECTION INTRAMUSCULAR; INTRAVENOUS at 10:33

## 2021-01-27 RX ADMIN — URSODIOL 300 MG: 300 CAPSULE ORAL at 20:47

## 2021-01-27 RX ADMIN — Medication 10 ML: at 20:48

## 2021-01-27 RX ADMIN — MAGNESIUM HYDROXIDE/ALUMINUM HYDROXICE/SIMETHICONE 30 ML: 120; 1200; 1200 SUSPENSION ORAL at 12:39

## 2021-01-27 RX ADMIN — INSULIN LISPRO 4 UNITS: 100 INJECTION, SOLUTION INTRAVENOUS; SUBCUTANEOUS at 12:47

## 2021-01-27 RX ADMIN — METOCLOPRAMIDE HYDROCHLORIDE 10 MG: 5 INJECTION INTRAMUSCULAR; INTRAVENOUS at 12:39

## 2021-01-27 RX ADMIN — IPRATROPIUM BROMIDE AND ALBUTEROL SULFATE 3 ML: .5; 3 SOLUTION RESPIRATORY (INHALATION) at 15:43

## 2021-01-27 RX ADMIN — PANTOPRAZOLE SODIUM 40 MG: 40 TABLET, DELAYED RELEASE ORAL at 12:39

## 2021-01-27 RX ADMIN — IODIXANOL 30 ML: 320 INJECTION, SOLUTION INTRAVASCULAR at 09:50

## 2021-01-27 RX ADMIN — HEPARIN SODIUM AND DEXTROSE 12 UNITS/KG/HR: 10000; 5 INJECTION INTRAVENOUS at 21:32

## 2021-01-27 RX ADMIN — MAGNESIUM HYDROXIDE/ALUMINUM HYDROXICE/SIMETHICONE 30 ML: 120; 1200; 1200 SUSPENSION ORAL at 18:40

## 2021-01-27 RX ADMIN — Medication 10 ML: at 10:32

## 2021-01-27 ASSESSMENT — PAIN SCALES - GENERAL
PAINLEVEL_OUTOF10: 0

## 2021-01-27 ASSESSMENT — ENCOUNTER SYMPTOMS
NAUSEA: 1
ABDOMINAL PAIN: 0
DIARRHEA: 0
COUGH: 0
BACK PAIN: 0
SORE THROAT: 0
PHOTOPHOBIA: 0
VOMITING: 0
NAUSEA: 0
RHINORRHEA: 0
SHORTNESS OF BREATH: 0
COUGH: 1
WHEEZING: 0

## 2021-01-27 NOTE — OP NOTE
Ruth Donaldson  80 y.o. IC11/IC11-01    Pre-Op Diagnosis:   1. Right lower extremity DVT in the right common iliac, external iliac, common femoral, profundofemoral, and femoral veins. Post-Op Diagnosis:  Same    Procedure:  1) Mechanical embolectomy of right common iliac, external iliac, common femoral, femoral vein dvt with inari clottriever  2) Intravascular ultrasound right common iliac vein. 3) Intravascular ultrasound right external iliac vein. Surgeon:  Alphia Hatchet, MD    Anesthesia:  Conscious Sedation. Sedation time: 2 hours    EBL: Minimal    Complications: None    Condition: Fair    Indications for procedure:  Patient is a 80year old male with history of right lower extremity pain and swelling for 1 day. He was found to have an extensive right lower extremity ilio-popliteal dvt. The risks and benefits of lysis and embolectomy of the dvt were discussed with the patient, and he has agreed to consent to the procedure. Risks including, but not limited to bleeding, infection, pain, MI, PE, and death were discussed with the patient. Description of procedure:  After informed consent was obtained from the patient, the patient was taken to the angiography suite and prepped and draped in the usual sterile fashion and placed in the prone position. Access gained to the right popliteal vein under ultrasound guidance with a micropuncture needle. A 6 Salv[place]orean sheath was advanced over the wire. Right lower extremity venogram and venacavogram were then performed which showed thrombus within the popliteal vein, femoral vein, common femoral vein, external iliac vein, common iliac vein. The inferior vena cava was patent. Once this was delineated, the Inari clottriever was placed into the inferior vena cava and mechanical thrombectomy was performed using this device.   Completion venogram showed no residual thrombus within the femoral vein, common femoral vein, external iliac vein, and common

## 2021-01-27 NOTE — ED PROVIDER NOTES
eMERGENCY dEPARTMENT eNCOUnter      [address] [facility]    Chief Complaint   Patient presents with    Leg Pain     Right     NOTE: Patient Has got very Poor Hearing and communication was done by writing    HPI    Katharine Hensley is a 80 y.o. male with Hx of CAD, HTN, Pacemaker who presentsto ED from home  By private car  With complaint of RLE pain redness swelling  Onset since morning  Intensity of symptoms moderate  Patient noticed his leg to be red painful and swollen which was progressively getting worse.  He has a hx of smoking  He denies CP/SOB/HA    PAST MEDICAL HISTORY    Past Medical History:   Diagnosis Date    Anemia     CAD (coronary artery disease) 4/16/2015    [facility] [address]     Dyslipidemia     History of tobacco abuse     HTN (hypertension)     Hypothyroidism     Non-ST elevation MI (NSTEMI) [facility] [address]     Pacemaker 4/16/2015       SURGICAL HISTORY    Past Surgical History:   Procedure Laterality Date    CATARACT REMOVAL      MIDDLE EAR SURGERY Left 1998    \"they had to reset the bones\" after an infection       CURRENT MEDICATIONS    Current Outpatient Rx   Medication Sig Dispense Refill    guaiFENesin (MUCINEX) 600 MG extended release tablet Take 1 tablet by mouth 2 times daily 10 tablet 0    sodium chloride (OCEAN) 0.65 % nasal spray 1 spray by Nasal route 4 times daily as needed for Congestion 1 Bottle 0    lisinopril (PRINIVIL;ZESTRIL) 5 MG tablet Take 1 tablet by mouth daily 30 tablet 3    nitroGLYCERIN (NITROSTAT) 0.4 MG SL tablet Place 1 tablet under the tongue every 5 minutes as needed for Chest pain 25 tablet 0    carvedilol (COREG) 12.5 MG tablet Take 0.5 tablets by mouth 2 times daily (with meals) 180 tablet 0    albuterol sulfate HFA (PROVENTIL HFA) 108 (90 Base) MCG/ACT inhaler Inhale 2 puffs into the lungs every 6 hours as needed for Wheezing 1 Inhaler 3    ipratropium-albuterol (DUONEB) 0.5-2.5 (3) MG/3ML SOLN nebulizer solution Inhale 3 mLs into the lungs three times daily 100 mL 0    atorvastatin (LIPITOR) 40 MG tablet Take 40 mg by mouth daily      ursodiol (ACTIGALL) 300 MG capsule Take 300 mg by mouth 2 times daily      sitaGLIPtin (JANUVIA) 50 MG tablet Take 50 mg by mouth daily      esomeprazole Magnesium (NEXIUM) 40 MG PACK Take 40 mg by mouth daily      aspirin 81 MG tablet Take 81 mg by mouth daily.  SYNTHROID 25 MCG tablet       budesonide-formoterol (SYMBICORT) 80-4.5 MCG/ACT AERO Inhale 2 puffs into the lungs 2 times daily. ALLERGIES    No Known Allergies    FAMILY HISTORY    No family history on file. SOCIAL HISTORY    Social History     Socioeconomic History    Marital status:       Spouse name: Not on file    Number of children: Not on file    Years of education: Not on file    Highest education level: Not on file   Occupational History    Not on file   Social Needs    Financial resource strain: Not on file    Food insecurity     Worry: Not on file     Inability: Not on file    Transportation needs     Medical: Not on file     Non-medical: Not on file   Tobacco Use    Smoking status: Former Smoker    Smokeless tobacco: Never Used   Substance and Sexual Activity    Alcohol use: No    Drug use: No    Sexual activity: Not on file   Lifestyle    Physical activity     Days per week: Not on file     Minutes per session: Not on file    Stress: Not on file   Relationships    Social connections     Talks on phone: Not on file     Gets together: Not on file     Attends Anabaptist service: Not on file     Active member of club or organization: Not on file     Attends meetings of clubs or organizations: Not on file     Relationship status: Not on file    Intimate partner violence     Fear of current or ex partner: Not on file     Emotionally abused: Not on file     Physically abused: Not on file     Forced sexual activity: Not on file   Other Topics Concern    Not on file   Social History Narrative    Not on file REVIEW OF SYSTEMS    Constitutional:  Denies fever, chills, weight loss or weakness   Eyes:  Denies photophobia or discharge   HENT:  Denies sore throat or ear pain   Respiratory:  Denies cough or shortness of breath   Cardiovascular:  Denies chest pain, palpitations or swelling   GI:  Denies abdominal pain, nausea, vomiting, or diarrhea   Musculoskeletal: RLE pain redness, swelling,  Denies back pain   Skin:  Denies rash   Neurologic:  Denies headache, focal weakness or sensory changes   Endocrine:  Denies polyuria or polydypsia   Lymphatic:  Denies swollen glands   Psychiatric:  Denies depression, suicidal ideation or homicidal ideation   All systems negative except as marked. PHYSICAL EXAM    VITAL SIGNS: BP (!) 155/71   Pulse 67   Temp 96.5 °F (35.8 °C) (Temporal)   Resp 18   Ht 5' 7\" (1.702 m)   Wt 165 lb (74.8 kg)   SpO2 98%   BMI 25.84 kg/m²    Constitutional:  Well developed, Well nourished, No acute distress, Non-toxic appearance. HENT:  Normocephalic, Atraumatic, Bilateral external ears normal, Oropharynx moist, No oral exudates, Nose normal. Neck- Normal range of motion, No tenderness, Supple, No stridor. Eyes:  PERRL, EOMI, Conjunctiva normal, No discharge. Respiratory:  Normal breath sounds, No respiratory distress, No wheezing, No chest tenderness. Cardiovascular:  Normal heart rate, Normal rhythm, No murmurs, No rubs, No gallops. GI:  Bowel sounds normal, Soft, No tenderness, No masses, No pulsatile masses. :  No CVA tenderness. Musculoskeletal:  RLE red swollen below the mid thigh , Dorsalis pedis Absent  Patient is able to move the toes Back- No tenderness. Integument:  Warm, Dry, No erythema, No rash. Lymphatic:  No lymphadenopathy noted. Neurologic:  Alert & oriented x 3, Normal motor function, Normal sensory function, No focal deficits noted.    Psychiatric:  Affect normal, Judgment normal, Mood normal.     EKG    NSR, HR 69 , Left Axis,RBBB,  No ST- T wave Narrative:     Dorinda Madrid tel. [phone],  DIMER results called to and read back by Mirna Mcfarlane RN, 01/26/2021 22:51,  by TROY   POCT CREATININE - URINE - Normal   PROTIME-INR   APTT   COVID-19   CK   TYPE AND SCREEN             Summation      Patient Course:     ED Medications administered this visit:    Medications   0.9 % sodium chloride bolus (1,000 mLs Intravenous New Bag 1/26/21 2228)   heparin 25,000 units in dextrose 5% 250 mL (premix) infusion (18 Units/kg/hr × 74.8 kg Intravenous New Bag 1/26/21 2233)   0.9 % sodium chloride bolus (has no administration in time range)   heparin (porcine) injection 6,000 Units (6,000 Units Intravenous Given 1/26/21 2228)   iopamidol (ISOVUE-300) 61 % injection 100 mL (100 mLs Intravenous Given 1/26/21 2221)       New Prescriptions from this visit:    New Prescriptions    No medications on file       Follow-up:  No follow-up provider specified. Final Impression:   1. Acute deep vein thrombosis (DVT) of iliac vein of right lower extremity (HCC)    2. Acute deep vein thrombosis (DVT) of popliteal vein of right lower extremity (HCC)    3.  Femoral artery occlusion, right ([address]               (Please note that portions of this note were completed with a voice recognition program.  Efforts were made to edit the dictations but occasionally words are mis-transcribed.)         Angélica Aguilar MD  01/26/21 7868

## 2021-01-27 NOTE — FLOWSHEET NOTE
Patient went to his thrombectomy via R popliteal. Site negative. Foot and lower leg less red than this morning. Leg still swollen overall +1. Pulses doppled bilaterally in the feet. Patient having nausea- medicated appropriately. Rested over the afternoon. [address] daughter visited in the evening. Patient to go back tomorrow for stenting to R illiac vein. No further needs.

## 2021-01-27 NOTE — ED TRIAGE NOTES
Patient presents with c/o \"6/10\"pain and swelling in right thigh. Started suddenly today. Denies injury. Denies history of DVT. States he takes a daily aspirin. Denies taking other blood thinners. Denies CP. Denies SOB.

## 2021-01-27 NOTE — CONSULTS
Pulmonary and Critical Care Medicine  Consult Note  Encounter Date: 2021 11:24 AM    Mr. Delores Jaimes is a 80 y.o. male  : 3/5/1929  Requesting Provider: Jerson Reyes MD    Reason for request: ICU management            HISTORY OF PRESENT ILLNESS:    Patient is 80 y.o. presents with extensive right lower extremity DVT, patient was admitted yesterday, with right lower extremity pain and swelling, occurred yesterday morning, he denies chest pain, no difficulty breathing, no abdominal pain, no nausea no vomiting, his leg is red and painful. Patient has no history of prior DVT or PE    Spoke with patient grandchild [facility], patient did not have any recent surgery, trauma, hospitalization, or traveling. However family history is positive for venous thromboembolic disease multiple family members. Past Medical History:        Diagnosis Date    Anemia     CAD (coronary artery disease) 2015    [facility] [address]     Dyslipidemia     History of tobacco abuse     HTN (hypertension)     Hypothyroidism     Non-ST elevation MI (NSTEMI) ([facility] [address]     Pacemaker 2015       Past Surgical History:        Procedure Laterality Date    CATARACT REMOVAL      MIDDLE EAR SURGERY Left     \"they had to reset the bones\" after an infection       Social History:     reports that he has quit smoking. He has never used smokeless tobacco. He reports that he does not drink alcohol or use drugs. Family History:   History reviewed. No pertinent family history. Family history of VTE multiple family member     Allergies:  Patient has no known allergies.         MEDICATIONS during current hospitalization:    Continuous Infusions:   dextrose      heparin (PORCINE) Infusion 15 Units/kg/hr (21 1038)       Scheduled Meds:   sodium chloride flush  10 mL Intravenous 2 times per day    insulin lispro  0-12 Units Subcutaneous Q6H    metoclopramide  10 mg Intravenous Once    pantoprazole  40 mg Oral QAM AC    aspirin  81 mg Oral Daily    atorvastatin  40 mg Oral Nightly    carvedilol  6.25 mg Oral BID WC    levothyroxine  25 mcg Oral Daily    ursodiol  300 mg Oral BID    ipratropium-albuterol  3 mL Inhalation TID    sodium chloride  1,000 mL Intravenous Once       PRN Meds:sodium chloride flush, promethazine **OR** ondansetron, polyethylene glycol, acetaminophen **OR** acetaminophen, glucose, dextrose, glucagon (rDNA), dextrose, hydrALAZINE, aluminum & magnesium hydroxide-simethicone, albuterol sulfate HFA        REVIEW OF SYSTEMS:  ROS: 10 organs review of system is done including general, psychological, ENT, hematological, endocrine, respiratory, cardiovascular, gastrointestinal, musculoskeletal, neurological,  allergy and Immunology is done and is otherwise negative. PHYSICAL EXAM:    Vitals:  BP (!) 184/111   Pulse 103   Temp 97.7 °F (36.5 °C) (Oral)   Resp 20   Ht 5' 7\" (1.702 m)   Wt 165 lb (74.8 kg)   SpO2 100%   BMI 25.84 kg/m²     General: alert, cooperative, no distress  Head: normocephalic, atraumatic  Eyes:No gross abnormalities. ENT:  MMM no lesions  Neck:  supple and no masses  Chest : clear to auscultation bilaterally- no wheezes, rales or rhonchi, normal air movement, no respiratory distress  Heart[de-identified] Heart sounds are normal.  Regular rate and rhythm without murmur, gallop or rub. ABD:  symmetric, soft, non-tender, no guarding or rebounds   Musculoskeletal : no cyanosis, no clubbing and erythema and edema in RLE   Neuro:  Grossly normal, hard of hearing  Skin: No rashes or nodules noted.   Lymph node:  no cervical nodes  Urology: No [place]   Psychiatric: appropriate        Data Review  Recent Labs     01/26/21  2100 01/27/21  0709   WBC 8.6 6.8   HGB 14.6 14.3   HCT 43.0 42.9    135      Recent Labs     01/26/21  2100 01/26/21 2118 01/27/21  0709   *  --  133*   K 5.9*  --  4.2   CL 94*  --  100   CO2 23  --  18*   BUN 36*  --  29*   CREATININE 1.39* 1.7* 1.19 GLUCOSE 177*  --  146*       MV Settings: ABGs: No results for input(s): PHART, INI2FMJ, PO2ART, BGE6ONV, BEART, L3IVWUVZ, KWH1RVW in the last 72 hours.   O2 Device: None (Room air)  Lab Results   Component Value Date    LACTA 1.7 02/13/2020    LACTA 1.5 12/04/2019    LACTA 2.2 09/26/2019       Radiology  I personally reviewed imaging studies and chest x-ray is negative        Assessment, plan:   Patient is at risk due to    · Acute DVT , unprovoked   · Severe peripheral vascular disease  · History of COPD  · Diabetes  · Hypertension    Recommendation  · Patient underwent thrombectomy today, further plans as per vascular surgery  · DuoNeb every 6 hours while awake  · Heparin drip  · Resume home medication  · Patient will need extended anticoagulation treatment  · O2 if needed to keep sat 88 to 90%         Thank you for consultation    Electronically signed by Geneva Ortega MD, [facility],  on 1/27/2021 at 11:24 AM

## 2021-01-27 NOTE — PROGRESS NOTES
0100- Received patient from ER. Patient alert,oriented and cooperative. Very [place]. Heparin drip infusing as ordered. 46- Spoke to granddaughter and updated on status and surgery in 700 AM  0215Eddye David NP up to assess patient Orders for high blood pressure. 0300- 12 lead EKG  0330- Bilat groins shaved. Attempted second IV multiple times x3 nurses  0500- Xa drawn and sent. Lab unable to draw rest of labs. Will attempt later. 2802- Perfect served Dr Skip Snyder for consent information. Patient signed consent.

## 2021-01-27 NOTE — PROGRESS NOTES
Cc consult Dr Barrett Sandhu aware of consult Electronically signed by [id number] on 1/27/2021 at 8:07 AM  Vasc surg consult Dr David Bales aware of consult Electronically signed by [id number] on 1/27/2021 at 8:08 AM

## 2021-01-27 NOTE — H&P
[facility]  MEDICINE    HISTORY AND PHYSICAL EXAM    PATIENT NAME:  Vipul Garner    MRN:  [id number]  SERVICE DATE:  1/27/2021   SERVICE TIME:  2:11 AM    Primary Care Physician: Nay Solares DO         SUBJECTIVE  CHIEF COMPLAINT: Right leg edema and pain    HPI: Patient being admitted for right femoral arterial occlusion and venous thrombus. Patient presented to the ED in January 26 for the complaint of right leg pain and swelling. Patient reports that the pain is \" a little bit\" and states it was difficult to. Patient denies any chest pain, shortness of breath, abdominal pain, nausea, or vomiting. Patient reports he has had a cough for about a week with white production. Patient denies fever. Patient has a past medical history of CAD, hypertension, hypothyroidism, hyperlipidemia, diabetes type 2 without the use of insulin. PAST MEDICAL HISTORY:    Past Medical History:   Diagnosis Date    Anemia     CAD (coronary artery disease) 4/16/2015    DM [facility]     Dyslipidemia     History of tobacco abuse     HTN (hypertension)     Hypothyroidism     Non-ST elevation MI (NSTEMI) ([facility]     Pacemaker 4/16/2015     PAST SURGICAL HISTORY:    Past Surgical History:   Procedure Laterality Date    CATARACT REMOVAL      MIDDLE EAR SURGERY Left 1998    \"they had to reset the bones\" after an infection     FAMILY HISTORY:  No family history on file. SOCIAL HISTORY:    Social History     Socioeconomic History    Marital status:       Spouse name: Not on file    Number of children: Not on file    Years of education: Not on file    Highest education level: Not on file   Occupational History    Not on file   Social Needs    Financial resource strain: Not on file    Food insecurity     Worry: Not on file     Inability: Not on file    Transportation needs     Medical: Not on file     Non-medical: Not on file   Tobacco Use    Smoking status: Former Smoker    Smokeless tobacco: Never Used   Substance and Sexual Activity    Alcohol use: No    Drug use: No    Sexual activity: Not on file   Lifestyle    Physical activity     Days per week: Not on file     Minutes per session: Not on file    Stress: Not on file   Relationships    Social connections     Talks on phone: Not on file     Gets together: Not on file     Attends Congregation service: Not on file     Active member of club or organization: Not on file     Attends meetings of clubs or organizations: Not on file     Relationship status: Not on file    Intimate partner violence     Fear of current or ex partner: Not on file     Emotionally abused: Not on file     Physically abused: Not on file     Forced sexual activity: Not on file   Other Topics Concern    Not on file   Social History Narrative    Not on file     MEDICATIONS:   Prior to Admission medications    Medication Sig Start Date End Date Taking?  Authorizing Provider   guaiFENesin (MUCINEX) 600 MG extended release tablet Take 1 tablet by mouth 2 times daily 2/14/20   Ronnie Brennan,    sodium chloride (OCEAN) 0.65 % nasal spray 1 spray by Nasal route 4 times daily as needed for Congestion 12/4/19   Bonita Garner,    lisinopril (PRINIVIL;ZESTRIL) 5 MG tablet Take 1 tablet by mouth daily 9/21/19   Kimberly List, APRN - CNP   nitroGLYCERIN (NITROSTAT) 0.4 MG SL tablet Place 1 tablet under the tongue every 5 minutes as needed for Chest pain 9/21/19 10/1/19  Kimberly List, APRN - CNP   carvedilol (COREG) 12.5 MG tablet Take 0.5 tablets by mouth 2 times daily (with meals) 7/9/19   Garrett Alexander MD   albuterol sulfate HFA (PROVENTIL HFA) 108 (90 Base) MCG/ACT inhaler Inhale 2 puffs into the lungs every 6 hours as needed for Wheezing 12/29/18   Alicia Dixon MD   ipratropium-albuterol (DUONEB) 0.5-2.5 (3) MG/3ML SOLN nebulizer solution Inhale 3 mLs into the lungs three times daily 2/17/18   Yu Bullock MD   atorvastatin (LIPITOR) 40 MG tablet Take 40 mg by mouth daily    Historical Provider, MD   ursodiol (ACTIGALL) 300 MG capsule Take 300 mg by mouth 2 times daily    Historical Provider, MD   sitaGLIPtin (JANUVIA) 50 MG tablet Take 50 mg by mouth daily    Historical Provider, MD   esomeprazole Magnesium (NEXIUM) 40 MG PACK Take 40 mg by mouth daily    Historical Provider, MD   aspirin 81 MG tablet Take 81 mg by mouth daily. Historical Provider, MD   SYNTHROID 25 MCG tablet  10/13/13   Historical Provider, MD   budesonide-formoterol (SYMBICORT) 80-4.5 MCG/ACT AERO Inhale 2 puffs into the lungs 2 times daily. Historical Provider, MD       ALLERGIES: Patient has no known allergies. REVIEW OF SYSTEM:   Review of Systems   Constitutional: Negative for activity change, chills, fatigue and fever. HENT: Negative for congestion, ear pain, rhinorrhea and sore throat. Eyes: Negative for photophobia and visual disturbance. Respiratory: Positive for cough. Negative for shortness of breath and wheezing. Cardiovascular: Negative for chest pain. Gastrointestinal: Negative for abdominal pain, diarrhea, nausea and vomiting. Endocrine: Negative for polydipsia, polyphagia and polyuria. Genitourinary: Negative for dysuria, flank pain, hematuria and urgency. Musculoskeletal: Positive for gait problem and myalgias. Negative for back pain and neck stiffness. Skin: Negative for rash and wound. Allergic/Immunologic: Negative for immunocompromised state. Neurological: Negative for dizziness and headaches. Psychiatric/Behavioral: Negative for behavioral problems and confusion. OBJECTIVE  PHYSICAL EXAM: BP (!) 173/78   Pulse 61   Temp 96.5 °F (35.8 °C) (Temporal)   Resp 16   Ht 5' 7\" (1.702 m)   Wt 165 lb (74.8 kg)   SpO2 99%   BMI 25.84 kg/m²     Physical Exam  Vitals signs and nursing note reviewed. Constitutional:       Appearance: He is well-developed.    HENT:      Right Ear: External ear normal.      Left Ear: External ear normal. American 57.8 (L) >60    Calcium 10.6 (H) 8.5 - 9.9 mg/dL    Total Protein 7.4 6.3 - 8.0 g/dL    Albumin 4.0 3.5 - 4.6 g/dL    Total Bilirubin 0.6 0.2 - 0.7 mg/dL    Alkaline Phosphatase 70 35 - 104 U/L    ALT 11 0 - 41 U/L    AST 21 0 - 40 U/L    Globulin 3.4 2.3 - 3.5 g/dL   CBC Auto Differential    Collection Time: 01/26/21  9:00 PM   Result Value Ref Range    WBC 8.6 4.8 - 10.8 K/uL    RBC 4.67 (L) 4.70 - 6.10 M/uL    Hemoglobin 14.6 14.0 - 18.0 g/dL    Hematocrit 43.0 42.0 - 52.0 %    MCV 92.1 80.0 - 100.0 fL    MCH 31.3 27.0 - 31.3 pg    MCHC 34.0 33.0 - 37.0 %    RDW 13.4 11.5 - 14.5 %    Platelets 276 166 - 555 K/uL    PLATELET SLIDE REVIEW Adequate     Neutrophils % 69.3 %    Lymphocytes % 18.4 %    Monocytes % 10.0 %    Eosinophils % 1.8 %    Basophils % 0.5 %    Neutrophils Absolute 6.0 1.4 - 6.5 K/uL    Lymphocytes Absolute 1.6 1.0 - 4.8 K/uL    Monocytes Absolute 0.9 (H) 0.2 - 0.8 K/uL    Eosinophils Absolute 0.2 0.0 - 0.7 K/uL    Basophils Absolute 0.0 0.0 - 0.2 K/uL   Protime-INR    Collection Time: 01/26/21  9:00 PM   Result Value Ref Range    Protime 13.1 12.3 - 14.9 sec    INR 1.0    APTT    Collection Time: 01/26/21  9:00 PM   Result Value Ref Range    aPTT 26.7 24.4 - 36.8 sec   Troponin    Collection Time: 01/26/21  9:00 PM   Result Value Ref Range    Troponin 0.030 (HH) 0.000 - 0.010 ng/mL   D-Dimer, Quantitative    Collection Time: 01/26/21  9:00 PM   Result Value Ref Range    D-Dimer, Quant >20.00 (HH) 0.00 - 0.50 mg/L FEU   COVID-19    Collection Time: 01/26/21  9:09 PM   Result Value Ref Range    SARS-CoV-2, NAAT Not Detected Not Detected   POCT CREATININE    Collection Time: 01/26/21  9:17 PM   Result Value Ref Range    POC CREATININE WHOLE BLOOD 1.7    TYPE AND SCREEN    Collection Time: 01/26/21 10:45 PM   Result Value Ref Range    ABO/Rh A POS     Antibody Screen NEG        IMAGING:  No results found.     VTE Prophylaxis: low molecular weight heparin -  continue    ASSESSMENT AND

## 2021-01-27 NOTE — PROGRESS NOTES
Progress Note  Date:2021       Room:[place] [address]-  Patient Name:Chey Lomax     YOB: 1929     Age:89 y.o. Patient has hard time hearing. Status post surgery. Patient had embolectomy but needs iliac vein stent tomorrow. Is okay to start diet as per surgery      Subjective    Subjective:  Symptoms:  No shortness of breath, malaise, cough, chest pain, weakness, headache, chest pressure, anorexia, diarrhea or anxiety. Diet:  He reports  nausea. No vomiting. Review of Systems   Respiratory: Negative for cough and shortness of breath. Cardiovascular: Negative for chest pain. Gastrointestinal: Positive for nausea. Negative for anorexia, diarrhea and vomiting. Neurological: Negative for weakness. Objective         Vitals Last 24 Hours:  TEMPERATURE:  Temp  Av.1 °F (36.2 °C)  Min: 96.5 °F (35.8 °C)  Max: 97.7 °F (36.5 °C)  RESPIRATIONS RANGE: Resp  Avg: 15.7  Min: 10  Max: 20  PULSE OXIMETRY RANGE: SpO2  Av.1 %  Min: 96 %  Max: 100 %  PULSE RANGE: Pulse  Av.7  Min: 61  Max: 103  BLOOD PRESSURE RANGE: Systolic (46XZP), shelter:576 , Min:149 , XBX:223   ; Diastolic (11LGK), FDN:50, Min:52, Max:111    I/O (24Hr): Intake/Output Summary (Last 24 hours) at 2021 1315  Last data filed at 2021 1266  Gross per 24 hour   Intake 298 ml   Output 600 ml   Net -302 ml     Objective:  General Appearance: In no acute distress and ill-appearing. Vital signs: (most recent): Blood pressure (!) 161/52, pulse 100, temperature 97.7 °F (36.5 °C), temperature source Oral, resp. rate 20, height 5' 7\" (1.702 m), weight 165 lb (74.8 kg), SpO2 100 %. HEENT: Normal HEENT exam.    Lungs:  Normal effort. Heart: Normal rate. Regular rhythm. S1 normal.    Abdomen: Abdomen is soft. Bowel sounds are normal.   There is no epigastric area or suprapubic area tenderness. Pulses: Distal pulses are intact. Neurological: Patient is alert.     Pupils:  Pupils are equal, round, and reactive to light. Skin:  Warm. Labs/Imaging/Diagnostics    Labs:  CBC:  Recent Labs     01/26/21  2100 01/27/21  0709   WBC 8.6 6.8   RBC 4.67* 4.62*   HGB 14.6 14.3   HCT 43.0 42.9   MCV 92.1 92.9   RDW 13.4 13.4    135     CHEMISTRIES:  Recent Labs     01/26/21  2100 01/26/21 2118 01/27/21  0709   *  --  133*   K 5.9*  --  4.2   CL 94*  --  100   CO2 23  --  18*   BUN 36*  --  29*   CREATININE 1.39* 1.7* 1.19   GLUCOSE 177*  --  146*     PT/INR:  Recent Labs     01/26/21 2100   PROTIME 13.1   INR 1.0     APTT:  Recent Labs     01/26/21 2100   APTT 26.7     LIVER PROFILE:  Recent Labs     01/26/21  2100 01/27/21  0709   AST 21 14   ALT 11 11   BILITOT 0.6 1.0*   ALKPHOS 70 66       Imaging Last 24 Hours:  Cta Abdominal Aorta W Bilat Runoff W Wo Contrast    Result Date: 1/27/2021  CTA ABDOMINAL AORTA W BILAT RUNOFF W WO CONTRAST: 1/26/2021 CLINICAL HISTORY:  RLE swelling Pain . COMPARISON: CT abdomen and pelvis without contrast 12/4/2015. Spiral enhanced images were obtained of the abdominal aorta through both feet during the infusion of a total of 250 mL of Isovue 300 contrast with runoff CTA protocol. Delayed imaging was also performed. Routine and volume rendered images were obtained on a 3-dimensional workstation. All CT scans at this facility use dose modulation, iterative reconstruction, and/or weight based dosing when appropriate to reduce radiation dose to as low as reasonably achievable. FINDINGS: Moderately extensive disease with areas of flow-limiting narrowing are present within the mid to distal right superficial femoral artery, which is abruptly occluded at the abductor hiatus. Best depicted on the delayed imaging is extensive DVT throughout the right lower extremity extending proximally to the right common iliac vein.  At the most inferior aspect of the initial study, filling defects are suspected within the subsegmental left lower lobe pulmonary artery branches, suspicious for pulmonary emboli. No definite emboli are noted on the right. The visualized lung bases are clear. The abdominal aorta is normal in caliber with mild to moderate calcific plaquing. Both iliac, common femoral, and the left femoral arteries are widely patent with mild disease. Delayed imaging demonstrates patency of the left popliteal artery with extensive calcific disease of the infrapopliteal runoff, which is incompletely visualized. There is no significant reconstitution of the right popliteal or infrapopliteal arteries. The celiac, superior mesenteric and both solitary renal arteries are widely patent. Colonic diverticulosis is again noted, without evidence of diverticulitis. No other significant change from 12/4/2015 is identified. 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 EXTENSIVE RIGHT LOWER EXTREMITY DVT EXTENDING INTO THE RIGHT COMMON ILIAC VEIN. SUSPECT LEFT LOWER LOBE PULMONARY EMBOLI. Xr Chest Portable    Result Date: 1/27/2021  EXAMINATION: Portable AP ERECT view of the chest. CLINICAL HISTORY:  preop  with pain and swelling in right thigh. DATE: 1/26/2021 10:02 PM COMPARISONS: 7/8/2020 FINDINGS: There are multiple sternal sutures and mediastinal clips present. Normal cardiac silhouette. An pacemaker overlies the right hemithorax and has a lead extending into the right atrium and second lead extending into the right ventricle. Lungs are  clear without consolidation. No pleural effusion or pneumothorax. There are mild degenerative changes in spine. NO ACUTE CARDIOPULMONARY ABNORMALITY. NO CHANGE.      Assessment//Plan           Hospital Problems           Last Modified POA    * (Principal) 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[address] 1/27/2021 Yes    HTN (hypertension) 1/27/2021 Yes    Dyslipidemia 1/27/2021 Yes    DM (diabetes mellitus) ([address] 1/27/2021 Yes    Hypothyroidism 1/27/2021 Yes    CAD (coronary artery disease) 1/27/2021 Yes COPD exacerbation ([facility] 1/27/2021 Yes    Venous thrombosis of leg 1/27/2021 Yes    Hyponatremia 1/27/2021 Yes        Assessment & Plan  1/27; the DVT was unprovoked, will get oncology evaluation. Patient has severe peripheral vascular disease appreciate vascular surgery input. Will resume diet. SSI with low-dose coverage. 1 dose of IV Reglan for nausea. Zofran as needed for nausea vomiting. Darius Romero Resume home medications. Keep oxygen saturation above 88%.   Electronically signed by Lindsey Fishman MD on 1/27/21 at 1:15 PM EST

## 2021-01-27 NOTE — CARE COORDINATION
Attempted to complete CMI. Pt sleeping, s/p thrombectomy. Is very [place] per nursing. Called dtr Allison Agrawal [phone]. She lives in [place], does check on him and is aware he is in the hospital.  She suggested I call  dtr Real Mcdonald, [phone] who lives with pt. See below. Electronically signed by Lucy Ryan RN on 1/27/2021 at 2:42 PM    [facility] [place] Case Management Initial Discharge Assessment    Met with  DTR SHEA  to discuss discharge plan. PCP: Blas Beaulieu DO /  [address]                     Date of Last Visit: AT LEAST 4 MONTHS AGO    If no PCP, list provided? N/A    Discharge Planning    Living Arrangements: independently at home and \"VERY INDEPENDENT 89 YR OLD\" HE CARES FOR SELF SHE OVERSEES MEDS, MEALS. USES ONLY PRN DME AND O2 PRN    Who do you live with?  DTR HER FIANCE AND 3 BOYS. (AGES 3-5-9 YRD OLDS)    Who helps you with your care:  [address] () [facility]    If lives at home:     Do you have any barriers navigating in your home? yes - PTS ROOM AND BR ON THE 2ND FLOOR    Patient can perform ADL? Yes (GRAND DTR MAKES SURE SHOWERS REGULARY, SETS UP MED'S IN PILL BOX, SHE PREPARES MEALS AND GROCERY SHOPS)    Current Services (outpatient and in home) :  None    Dialysis: No    Is transportation available to get to your appointments? Yes    DME Equipment:  yes - WALKER AND CANE USES PRN (STUBBORN)    Respiratory equipment: PRN/HS Oxygen 2L Liters and Nebulizer    Respiratory provider:  yes - [address]     Pharmacy:  yes - [facility] [id number], [facility] with Medication Assistance Program?  No      Patient agreeable to [facility]? Declined     Patient agreeable to SNF/Rehab? Declined    Other discharge needs identified?       Other TBD    Freedom of choice list provided with basic dialogue that supports the patient's individualized plan of care/goals and shares the quality data associated with the providers. Yes    Does Patient Have a High-Risk for Readmission Diagnosis (CHF, PN, MI, COPD)? Yes NOT CURRENTLY EXACERBATED THIS VISIT    The plan for Transition of Care is related to the following treatment goals:TO RETURN AS BEFORE    Initial Discharge Plan? HOME WITH FAMILY GRAND FLORES ALSO IS AN STNA. SHE STATES PT'S SON IS A PARAPLEGIC, LIVES NEXT DOOR AND SHE ALSO TAKES CARE OF HIM. SHE STATES PT WILL DECLINE REHAB OR SNF, EVEN C. CM TO FOLLOW.      The Patient and/or patient representative:GRAND SANDRA HAYWARD was provided with choice of any post-acute providers for care and equipment and agrees with discharge plan  Yes    Electronically signed by Debbie Mccoy RN on 1/27/2021 at 2:42 PM

## 2021-01-27 NOTE — CONSULTS
lungs three times daily 2/17/18  Yes Ruby Fay MD   atorvastatin (LIPITOR) 40 MG tablet Take 40 mg by mouth daily   Yes Historical Provider, MD   ursodiol (ACTIGALL) 300 MG capsule Take 300 mg by mouth 2 times daily   Yes Historical Provider, MD   sitaGLIPtin (JANUVIA) 50 MG tablet Take 50 mg by mouth daily   Yes Historical Provider, MD   esomeprazole Magnesium (NEXIUM) 40 MG PACK Take 40 mg by mouth daily   Yes Historical Provider, MD   aspirin 81 MG tablet Take 81 mg by mouth daily. Yes Historical Provider, MD   SYNTHROID 25 MCG tablet  10/13/13  Yes Historical Provider, MD   budesonide-formoterol (SYMBICORT) 80-4.5 MCG/ACT AERO Inhale 2 puffs into the lungs 2 times daily. Yes Historical Provider, MD   sodium chloride (OCEAN) 0.65 % nasal spray 1 spray by Nasal route 4 times daily as needed for Congestion 12/4/19   Amelia Garner DO   nitroGLYCERIN (NITROSTAT) 0.4 MG SL tablet Place 1 tablet under the tongue every 5 minutes as needed for Chest pain 9/21/19 10/1/19  Diane Garay, APRN - CNP   albuterol sulfate HFA (PROVENTIL HFA) 108 (90 Base) MCG/ACT inhaler Inhale 2 puffs into the lungs every 6 hours as needed for Wheezing 12/29/18   Merari Stone MD        Allergies:       Patient has no known allergies. Social History:     Tobacco:    reports that he has quit smoking. He has never used smokeless tobacco.  Alcohol:      reports no history of alcohol use. Drug Use:  reports no history of drug use. Family History:     Family history of DVT.     Review of Systems:     Positive and Negative as described in HPI    Constitutional:  negative for  fevers, chills, sweats, fatigue, and weight loss  HEENT:  negative for vision or hearing changes,   Respiratory: Positive for cough negative for shortness of breath, or congestion  Cardiovascular:  negative for  chest pain, palpitations  Gastrointestinal:  negative for nausea, vomiting, diarrhea, constipation, abdominal pain  Genitourinary:  negative for frequency, dysuria  Integument:  negative for rash, skin lesions  Chest/Breast:  No painful inspiration or expiration, no rib sternal pain  Musculoskeletal: Right lower extremity pain  Neurological:  negative for headaches, dizziness, lightheadedness, numbness, pain and tingling extremities  Lymphatics: no lymphadenopathy or painful masses  Behavior/Psych:  negative for depression and anxiety    Physical Exam:     Vitals:  BP (!) 154/73   Pulse 73   Temp 97.7 °F (36.5 °C) (Oral)   Resp 14   Ht 5' 7\" (1.702 m)   Wt 165 lb (74.8 kg)   SpO2 100%   BMI 25.84 kg/m²       General appearance - alert, well appearing and in no acute distress  Mental status - oriented to person, place and time with normal affect  Head - normocephalic and atraumatic  Eyes - pupils equal and reactive, extraocular eye movements intact, conjunctiva clear  Ears - hearing appears to be intact  Nose - no drainage noted  Mouth - mucous membranes moist  Neck - supple, no carotid bruits, thyroid not palpable, no JVD  Chest - clear to auscultation, normal effort  Heart - normal rate, regular rhythm, no murmurs  Abdomen - soft, non-tender, non-distended, bowel sounds present all four quadrants, no masses, hepatomegaly, splenomegaly or aortic enlargement  Neurological - normal speech, no focal findings or movement disorder noted, cranial nerves II through XII grossly intact  Extremities -right lower extremity 2+ edema. Skin - no gross lesions, rashes, or induration noted        R femoral 2+ L femoral 2+   R posterior tibial 0+ L posterior tibial 0+   R dorsalis pedis 0+ L dorsalis pedis 0+     Right dp and peroneal mono/biphasic. No pt signal  Left dp and pt mono/biphasic.      Labs     SARS-CoV-2, NAAT Not Detected  Not Detected Final 01/26/2021  9:09 PM [address]      WBC 8.6  4.8 - 10.8 K/uL Final 01/26/2021  9:00 PM [address] [facility]   RBC 4.67Low   4.70 - 6.10 M/uL Final 01/26/2021  9:00 PM [address] [facility] Hemoglobin 14.6  14.0 - 18.0 g/dL Final 01/26/2021  9:00 PM [address] [facility]   Hematocrit 43.0  42.0 - 52.0 % Final 01/26/2021  9:00 PM [facility] - [facility]   MCV 92.1  80.0 - 100.0 fL Final 01/26/2021  9:00 PM [facility] - [facility]   MCH 31.3  27.0 - 31.3 pg Final 01/26/2021  9:00 PM [address] [facility]   MCHC 34.0  33.0 - 37.0 % Final 01/26/2021  9:00 PM [address] [facility]   RDW 13.4  11.5 - 14.5 % Final 01/26/2021  9:00 PM [address] [facility]   Platelets 480  208 - 400 K/uL Final 01/26/2021  9:00 PM [address] [facility]     Anti-XA Unfrac Heparin 1.38  IU/mL Final 01/27/2021  5:03 AM [address] [facility]         Total CK 80  0 - 190 U/L Final 01/26/2021  9:00 PM [address] [facility]   CK-MB 3.4  0.0 - 6.7 ng/mL Final 01/26/2021  9:00 PM [address] [facility]   CK-MB Index 4.3High   0.0 - 3.5 % Final 01/26/2021  9:00 PM [address] [facility]           Imaging:   CTA images reviewed and shows extensive acute dvt in the right common and external iliac vein, profunda femoral vein, common femoral vein, femoral vein and popliteal vein. Also noted chronic appearing right superficial femoral artery and popliteal artery occlusion. Assessment:     Meghan Martínez is a 80 y.o.  male who had extensive right lower extremity DVT with pain and swelling in the leg. He appears to have chronic bilateral lower extremity PVD right greater than left.       Plan:     Right lower extremity DVT lysis and embolectomy

## 2021-01-28 ENCOUNTER — APPOINTMENT (OUTPATIENT)
Dept: CARDIAC CATH/INVASIVE PROCEDURES | Age: 86
DRG: 271 | End: 2021-01-28
Payer: MEDICARE

## 2021-01-28 LAB
ALBUMIN SERPL-MCNC: 3.4 G/DL (ref 3.5–4.6)
ALP BLD-CCNC: 58 U/L (ref 35–104)
ALT SERPL-CCNC: 6 U/L (ref 0–41)
ANION GAP SERPL CALCULATED.3IONS-SCNC: 13 MEQ/L (ref 9–15)
ANTI-XA UNFRAC HEPARIN: 0.35 IU/ML
ANTI-XA UNFRAC HEPARIN: 0.61 IU/ML
ANTI-XA UNFRAC HEPARIN: 0.74 IU/ML
AST SERPL-CCNC: 15 U/L (ref 0–40)
BASOPHILS ABSOLUTE: 0 K/UL (ref 0–0.2)
BASOPHILS RELATIVE PERCENT: 0.3 %
BILIRUB SERPL-MCNC: 1 MG/DL (ref 0.2–0.7)
BUN BLDV-MCNC: 31 MG/DL (ref 8–23)
CALCIUM SERPL-MCNC: 8.5 MG/DL (ref 8.5–9.9)
CHLORIDE BLD-SCNC: 102 MEQ/L (ref 95–107)
CO2: 18 MEQ/L (ref 20–31)
CREAT SERPL-MCNC: 1.54 MG/DL (ref 0.7–1.2)
EOSINOPHILS ABSOLUTE: 0.1 K/UL (ref 0–0.7)
EOSINOPHILS RELATIVE PERCENT: 0.8 %
GFR AFRICAN AMERICAN: 51.4
GFR NON-AFRICAN AMERICAN: 42.5
GLOBULIN: 3 G/DL (ref 2.3–3.5)
GLUCOSE BLD-MCNC: 143 MG/DL (ref 70–99)
GLUCOSE BLD-MCNC: 147 MG/DL (ref 60–115)
GLUCOSE BLD-MCNC: 168 MG/DL (ref 60–115)
GLUCOSE BLD-MCNC: 174 MG/DL (ref 60–115)
GLUCOSE BLD-MCNC: 177 MG/DL (ref 60–115)
HCT VFR BLD CALC: 32.5 % (ref 42–52)
HCT VFR BLD CALC: 32.9 % (ref 42–52)
HEMOGLOBIN: 11 G/DL (ref 14–18)
HEMOGLOBIN: 11 G/DL (ref 14–18)
LYMPHOCYTES ABSOLUTE: 2 K/UL (ref 1–4.8)
LYMPHOCYTES RELATIVE PERCENT: 21 %
MCH RBC QN AUTO: 31.1 PG (ref 27–31.3)
MCH RBC QN AUTO: 31.8 PG (ref 27–31.3)
MCHC RBC AUTO-ENTMCNC: 33.5 % (ref 33–37)
MCHC RBC AUTO-ENTMCNC: 33.9 % (ref 33–37)
MCV RBC AUTO: 92.7 FL (ref 80–100)
MCV RBC AUTO: 93.7 FL (ref 80–100)
MONOCYTES ABSOLUTE: 1.1 K/UL (ref 0.2–0.8)
MONOCYTES RELATIVE PERCENT: 11.5 %
NEUTROPHILS ABSOLUTE: 6.4 K/UL (ref 1.4–6.5)
NEUTROPHILS RELATIVE PERCENT: 66.4 %
PDW BLD-RTO: 13.2 % (ref 11.5–14.5)
PDW BLD-RTO: 13.4 % (ref 11.5–14.5)
PERFORMED ON: ABNORMAL
PLATELET # BLD: 122 K/UL (ref 130–400)
PLATELET # BLD: 131 K/UL (ref 130–400)
POC ACTIVATED CLOTTING TIME KAOLIN: 180 SEC (ref 82–152)
POC SAMPLE TYPE: ABNORMAL
POTASSIUM REFLEX MAGNESIUM: 4.7 MEQ/L (ref 3.4–4.9)
RBC # BLD: 3.46 M/UL (ref 4.7–6.1)
RBC # BLD: 3.55 M/UL (ref 4.7–6.1)
SODIUM BLD-SCNC: 133 MEQ/L (ref 135–144)
TOTAL PROTEIN: 6.4 G/DL (ref 6.3–8)
WBC # BLD: 9.2 K/UL (ref 4.8–10.8)
WBC # BLD: 9.7 K/UL (ref 4.8–10.8)

## 2021-01-28 PROCEDURE — 2500000003 HC RX 250 WO HCPCS

## 2021-01-28 PROCEDURE — 94760 N-INVAS EAR/PLS OXIMETRY 1: CPT

## 2021-01-28 PROCEDURE — 2000000000 HC ICU R&B

## 2021-01-28 PROCEDURE — 37252 INTRVASC US NONCORONARY 1ST: CPT

## 2021-01-28 PROCEDURE — 06HC3DZ INSERTION OF INTRALUMINAL DEVICE INTO RIGHT COMMON ILIAC VEIN, PERCUTANEOUS APPROACH: ICD-10-PCS | Performed by: SURGERY

## 2021-01-28 PROCEDURE — 2580000003 HC RX 258: Performed by: INTERNAL MEDICINE

## 2021-01-28 PROCEDURE — C1876 STENT, NON-COA/NON-COV W/DEL: HCPCS

## 2021-01-28 PROCEDURE — C1887 CATHETER, GUIDING: HCPCS

## 2021-01-28 PROCEDURE — 6360000002 HC RX W HCPCS

## 2021-01-28 PROCEDURE — 82948 REAGENT STRIP/BLOOD GLUCOSE: CPT

## 2021-01-28 PROCEDURE — 06HF3DZ INSERTION OF INTRALUMINAL DEVICE INTO RIGHT EXTERNAL ILIAC VEIN, PERCUTANEOUS APPROACH: ICD-10-PCS | Performed by: SURGERY

## 2021-01-28 PROCEDURE — 85027 COMPLETE CBC AUTOMATED: CPT

## 2021-01-28 PROCEDURE — 85025 COMPLETE CBC W/AUTO DIFF WBC: CPT

## 2021-01-28 PROCEDURE — 80053 COMPREHEN METABOLIC PANEL: CPT

## 2021-01-28 PROCEDURE — 6370000000 HC RX 637 (ALT 250 FOR IP): Performed by: INTERNAL MEDICINE

## 2021-01-28 PROCEDURE — C1769 GUIDE WIRE: HCPCS

## 2021-01-28 PROCEDURE — 2709999900 HC NON-CHARGEABLE SUPPLY

## 2021-01-28 PROCEDURE — C1894 INTRO/SHEATH, NON-LASER: HCPCS

## 2021-01-28 PROCEDURE — 36415 COLL VENOUS BLD VENIPUNCTURE: CPT

## 2021-01-28 PROCEDURE — 6360000004 HC RX CONTRAST MEDICATION: Performed by: SURGERY

## 2021-01-28 PROCEDURE — C1725 CATH, TRANSLUMIN NON-LASER: HCPCS

## 2021-01-28 PROCEDURE — 85347 COAGULATION TIME ACTIVATED: CPT

## 2021-01-28 PROCEDURE — 36005 INJECTION EXT VENOGRAPHY: CPT

## 2021-01-28 PROCEDURE — 94640 AIRWAY INHALATION TREATMENT: CPT

## 2021-01-28 PROCEDURE — 85520 HEPARIN ASSAY: CPT

## 2021-01-28 PROCEDURE — 2700000000 HC OXYGEN THERAPY PER DAY

## 2021-01-28 PROCEDURE — 99222 1ST HOSP IP/OBS MODERATE 55: CPT | Performed by: INTERNAL MEDICINE

## 2021-01-28 PROCEDURE — 94761 N-INVAS EAR/PLS OXIMETRY MLT: CPT

## 2021-01-28 PROCEDURE — 75820 VEIN X-RAY ARM/LEG: CPT

## 2021-01-28 PROCEDURE — 37238 OPEN/PERQ PLACE STENT SAME: CPT

## 2021-01-28 PROCEDURE — 2580000003 HC RX 258: Performed by: NURSE PRACTITIONER

## 2021-01-28 PROCEDURE — C1753 CATH, INTRAVAS ULTRASOUND: HCPCS

## 2021-01-28 RX ORDER — SODIUM CHLORIDE 9 MG/ML
INJECTION, SOLUTION INTRAVENOUS CONTINUOUS
Status: DISCONTINUED | OUTPATIENT
Start: 2021-01-28 | End: 2021-01-30

## 2021-01-28 RX ADMIN — ATORVASTATIN CALCIUM 40 MG: 40 TABLET, FILM COATED ORAL at 21:27

## 2021-01-28 RX ADMIN — PANTOPRAZOLE SODIUM 40 MG: 40 TABLET, DELAYED RELEASE ORAL at 10:30

## 2021-01-28 RX ADMIN — CARVEDILOL 6.25 MG: 6.25 TABLET, FILM COATED ORAL at 10:30

## 2021-01-28 RX ADMIN — ASPIRIN 81 MG: 81 TABLET, CHEWABLE ORAL at 10:30

## 2021-01-28 RX ADMIN — CARVEDILOL 6.25 MG: 6.25 TABLET, FILM COATED ORAL at 17:56

## 2021-01-28 RX ADMIN — LEVOTHYROXINE SODIUM 25 MCG: 0.03 TABLET ORAL at 10:30

## 2021-01-28 RX ADMIN — IPRATROPIUM BROMIDE AND ALBUTEROL SULFATE 3 ML: .5; 3 SOLUTION RESPIRATORY (INHALATION) at 15:49

## 2021-01-28 RX ADMIN — Medication 10 ML: at 08:18

## 2021-01-28 RX ADMIN — IPRATROPIUM BROMIDE AND ALBUTEROL SULFATE 3 ML: .5; 3 SOLUTION RESPIRATORY (INHALATION) at 04:46

## 2021-01-28 RX ADMIN — IOVERSOL 70 ML: 678 INJECTION INTRA-ARTERIAL; INTRAVENOUS at 14:06

## 2021-01-28 RX ADMIN — URSODIOL 300 MG: 300 CAPSULE ORAL at 21:27

## 2021-01-28 RX ADMIN — IPRATROPIUM BROMIDE AND ALBUTEROL SULFATE 3 ML: .5; 3 SOLUTION RESPIRATORY (INHALATION) at 19:56

## 2021-01-28 RX ADMIN — SODIUM CHLORIDE: 9 INJECTION, SOLUTION INTRAVENOUS at 10:54

## 2021-01-28 RX ADMIN — URSODIOL 300 MG: 300 CAPSULE ORAL at 10:30

## 2021-01-28 ASSESSMENT — ENCOUNTER SYMPTOMS
COUGH: 0
NAUSEA: 1
NAUSEA: 0
VOMITING: 0
DIARRHEA: 0
SHORTNESS OF BREATH: 0

## 2021-01-28 ASSESSMENT — PAIN SCALES - GENERAL
PAINLEVEL_OUTOF10: 0

## 2021-01-28 NOTE — PROGRESS NOTES
Pt taken to cath lab 2 for peripheral procedure with Dr. Payton Forrest.  Electronically signed by Jennifer Neville RN on 1/28/2021 at 3:30 PM

## 2021-01-28 NOTE — FLOWSHEET NOTE
2030:  Assessment initiated. Denies pain. Right occlusive popliteal dressing has a small amount of drainage at site. Area is soft without ecchymosis. Heparin off for 1 hour at 2006 due to elevated Xa. 2130:  Heparin resumed at 2106 at 12/units/kg/hour. No signs and symptoms of bleeding.   2230. Lab notified of next Xa draw at 0330.

## 2021-01-28 NOTE — OP NOTE
Claude Osler  80 y.o. IC11/IC11-01      Pre-Op Diagnosis:   1. Right lower extremity DVT in the right common iliac, external iliac, common femoral, profundofemoral, and femoral veins. 2.  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     Post-Op Diagnosis:  Same      Procedure:  1)  Intravascular ultrasound right common iliac vein  2)  Intravascular ultrasound right external iliac vein  3)  Right common and external iliac vein stenting with 14x120 venvo stent. Surgeon:  Geneva Fox MD    Anesthesia:  Conscious Sedation. Sedation time: 90 minutes    EBL: Minimal    Complications: None    Condition: Fair    Indications for procedure:  Patient is a 80year old male with history of right lower extremity pain and swelling for 1 day. He was found to have an extensive right lower extremity ilio-popliteal dvt. He underwent embolectomy of the dvt yesterday and was found to have iliac vein stenosis. The risks and benefits of stenting the right common and external iliac vein were discussed with the patient, and he has agreed to consent to the procedure. Risks including, but not limited to bleeding, infection, pain, MI, PE, and death were discussed with the patient.     Description of procedure:  After informed consent was obtained from the patient, the patient was taken to the angiography suite and prepped and draped in the usual sterile fashion. Under ultrasound guidance access was gained to the right popliteal vein. Initial access attempts were perivenous as extravasation of contrast was noted. When venous access was confirmed, a 9 fr sheath was placed in the popliteal vein. The right common femoral vein, external iliac vein, and common iliac veins remained patent on venogram as well as on intravascular ultrasound. Intravascular ultrasound was performed on the common and external iliac veins to note the location of the stenosis and to measure the length of the stent.       The right common and external iliac vein was stented with a 14 x 120 venovo stent and then post dilated with a 12 x 60 atlas gold balloon with good angiographic results with no hemodynamically significant residual stenosis. The stent also was widely patent on intravascular ultrasound. The patient tolerated the procedure well and was sent to ICU in fair condition. Hemostasis was be achieved with manual compression at the access site. Will restart heparin gtt in 1 hour. Will convert to coumadin tomorrow if remains hemodynamically stable with no signs of bleeding.

## 2021-01-28 NOTE — PROGRESS NOTES
Pt. returned to ICU from cath lab. Right posterior insertion site for procedure assessed with Cath Lab nurse, ROSANNE Capellan President, RN. Dressing CDI. No hematoma noted at this time. Pulses doppled. Unchanged from AM. Bilateral feet slightly cool. Warm blanket provided. I called pt's granddaughter \"Geovanna\" and updated her. She states the Dr. Rae Hammans already called her and gave her the details of the procedure.     Electronically signed by Bo Hill RN on 1/28/2021 at 3:34 PM

## 2021-01-28 NOTE — PROGRESS NOTES
Progress Note  Date:2021       Room:[place] [address]-01  Patient Name:Chey Lomax     YOB: 1929     Age:89 y.o. Patient has hard time hearing. Status post surgery. Patient had embolectomy but needs iliac vein stent tomorrow. Is okay to start diet as per surgery      Subjective    Subjective:  Symptoms:  No shortness of breath, malaise, cough, chest pain, weakness, headache, chest pressure, anorexia, diarrhea or anxiety. Diet:  He reports  nausea. No vomiting. Review of Systems   Respiratory: Negative for cough and shortness of breath. Cardiovascular: Negative for chest pain. Gastrointestinal: Positive for nausea. Negative for anorexia, diarrhea and vomiting. Neurological: Negative for weakness. Objective         Vitals Last 24 Hours:  TEMPERATURE:  Temp  Av °F (36.7 °C)  Min: 97.8 °F (36.6 °C)  Max: 98.1 °F (36.7 °C)  RESPIRATIONS RANGE: Resp  Av.7  Min: 13  Max: 24  PULSE OXIMETRY RANGE: SpO2  Av.2 %  Min: 28 %  Max: 100 %  PULSE RANGE: Pulse  Av.1  Min: 64  Max: 103  BLOOD PRESSURE RANGE: Systolic (14BRV), EZH:630 , Min:98 , XNY:115   ; Diastolic (79NJR), QFY:52, Min:35, Max:111    I/O (24Hr): Intake/Output Summary (Last 24 hours) at 2021 0954  Last data filed at 2021 0550  Gross per 24 hour   Intake 660 ml   Output 1150 ml   Net -490 ml     Objective:  General Appearance: In no acute distress and ill-appearing. Vital signs: (most recent): Blood pressure (!) 118/52, pulse 68, temperature 97.8 °F (36.6 °C), temperature source Oral, resp. rate 18, height 5' 7\" (1.702 m), weight 165 lb (74.8 kg), SpO2 96 %. HEENT: Normal HEENT exam.    Lungs:  Normal effort. Heart: Normal rate. Regular rhythm. S1 normal.    Abdomen: Abdomen is soft. Bowel sounds are normal.   There is no epigastric area or suprapubic area tenderness. Pulses: Distal pulses are intact. Neurological: Patient is alert.     Pupils:  Pupils are equal, round, and reactive to light. Skin:  Warm. Labs/Imaging/Diagnostics    Labs:  CBC:  Recent Labs     01/26/21  2100 01/27/21  0709 01/28/21  0639   WBC 8.6 6.8 9.7   RBC 4.67* 4.62* 3.55*   HGB 14.6 14.3 11.0*   HCT 43.0 42.9 32.9*   MCV 92.1 92.9 92.7   RDW 13.4 13.4 13.4    135 122*     CHEMISTRIES:  Recent Labs     01/26/21 2100 01/26/21 2118 01/27/21  0709 01/28/21  0348   *  --  133* 133*   K 5.9*  --  4.2 4.7   CL 94*  --  100 102   CO2 23  --  18* 18*   BUN 36*  --  29* 31*   CREATININE 1.39* 1.7* 1.19 1.54*   GLUCOSE 177*  --  146* 143*     PT/INR:  Recent Labs     01/26/21 2100   PROTIME 13.1   INR 1.0     APTT:  Recent Labs     01/26/21 2100   APTT 26.7     LIVER PROFILE:  Recent Labs     01/26/21 2100 01/27/21  0709 01/28/21  0348   AST 21 14 15   ALT 11 11 6   BILITOT 0.6 1.0* 1.0*   ALKPHOS 70 66 58       Imaging Last 24 Hours:  Cta Abdominal Aorta W Bilat Runoff W Wo Contrast    Result Date: 1/27/2021  CTA ABDOMINAL AORTA W BILAT RUNOFF W WO CONTRAST: 1/26/2021 CLINICAL HISTORY:  RLE swelling Pain . COMPARISON: CT abdomen and pelvis without contrast 12/4/2015. Spiral enhanced images were obtained of the abdominal aorta through both feet during the infusion of a total of 250 mL of Isovue 300 contrast with runoff CTA protocol. Delayed imaging was also performed. Routine and volume rendered images were obtained on a 3-dimensional workstation. All CT scans at this facility use dose modulation, iterative reconstruction, and/or weight based dosing when appropriate to reduce radiation dose to as low as reasonably achievable. FINDINGS: Moderately extensive disease with areas of flow-limiting narrowing are present within the mid to distal right superficial femoral artery, which is abruptly occluded at the abductor hiatus. Best depicted on the delayed imaging is extensive DVT throughout the right lower extremity extending proximally to the right common iliac vein.  At the most inferior aspect of DM (diabetes mellitus) ([facility] 1/27/2021 Yes    Hypothyroidism 1/27/2021 Yes    CAD (coronary artery disease) 1/27/2021 Yes    COPD exacerbation ([facility] 1/27/2021 Yes    Venous thrombosis of leg 1/27/2021 Yes    Hyponatremia 1/27/2021 Yes        Assessment & Plan    1/27; the DVT was unprovoked, will get oncology evaluation. Patient has severe peripheral vascular disease appreciate vascular surgery input. Will resume diet. SSI with low-dose coverage. 1 dose of IV Reglan for nausea. Zofran as needed for nausea vomiting. Juliette Rebolledo Resume home medications. Keep oxygen saturation above 88%. 1/28: pt is going for revasc surgery, no overnight events, pt is npo except meds will start IVF, cardiology eval for pacemaker, pt can be transferred out of ICU if ok with vascular surgery.   Electronically signed by Kristyn Cristina MD on 1/27/21 at 1:15 PM EST

## 2021-01-28 NOTE — PROGRESS NOTES
Progress Note  Date:2021       Room:[place] [id number]-  Patient Name:Chey Lomax     YOB: 1929     Age:89 y.o. Subjective    Subjective:  Symptoms:  (Limited interaction due to pt. wanting to rest. Only took a sip of water. Appears comfortable. ). Diet:  NPO. No nausea or vomiting. Activity level: Normal.    Pain:  He reports no pain. Review of Systems   Constitutional: Negative for fever. Gastrointestinal: Negative for nausea and vomiting. Objective         Vitals Last 24 Hours:  TEMPERATURE:  Temp  Av °F (36.7 °C)  Min: 97.8 °F (36.6 °C)  Max: 98.1 °F (36.7 °C)  RESPIRATIONS RANGE: Resp  Av.8  Min: 13  Max: 24  PULSE OXIMETRY RANGE: SpO2  Av %  Min: 28 %  Max: 100 %  PULSE RANGE: Pulse  Av.7  Min: 64  Max: 103  BLOOD PRESSURE RANGE: Systolic (26KZC), MZH:595 , Min:98 , MW   ; Diastolic (17FNO), GKB:11, Min:35, Max:111    I/O (24Hr): Intake/Output Summary (Last 24 hours) at 2021 0906  Last data filed at 2021 0550  Gross per 24 hour   Intake 660 ml   Output 1150 ml   Net -490 ml     Objective:  General Appearance:  Comfortable and in no acute distress. Vital signs: (most recent): Blood pressure (!) 125/43, pulse 64, temperature 97.8 °F (36.6 °C), temperature source Oral, resp. rate 13, height 5' 7\" (1.702 m), weight 165 lb (74.8 kg), SpO2 96 %. Vital signs are normal.  No fever. Output: Producing urine. HEENT: (Very [place])    Lungs:  Normal effort and normal respiratory rate. He is not in respiratory distress. No stridor. There are decreased breath sounds. No rales, wheezes or rhonchi. Heart: Normal rate. Regular rhythm. (IVCD)  Chest: Symmetric chest wall expansion. No chest wall tenderness. Abdomen: Abdomen is soft and non-distended. There are no signs of ascites. Bowel sounds are normal.   There is no abdominal tenderness. Extremities: Normal range of motion. Pulses: (Left LE pulses easily doppled.  Rt. PT doppled, unable to find DP. Foot warm. Humza. )    Neurological: (Opens eyes to name and when speaking close to his right ear. Answers appropriately. Took a sip of water, but would not take meds. ). Pupils:  Pupils are equal, round, and reactive to light. Skin:  Warm and dry. No rash, ecchymosis, cyanosis or ulceration. Labs/Imaging/Diagnostics    Labs:  CBC:  Recent Labs     01/26/21 2100 01/27/21  0709 01/28/21  0639   WBC 8.6 6.8 9.7   RBC 4.67* 4.62* 3.55*   HGB 14.6 14.3 11.0*   HCT 43.0 42.9 32.9*   MCV 92.1 92.9 92.7   RDW 13.4 13.4 13.4    135 122*     CHEMISTRIES:  Recent Labs     01/26/21 2100 01/26/21 2118 01/27/21  0709 01/28/21  0348   *  --  133* 133*   K 5.9*  --  4.2 4.7   CL 94*  --  100 102   CO2 23  --  18* 18*   BUN 36*  --  29* 31*   CREATININE 1.39* 1.7* 1.19 1.54*   GLUCOSE 177*  --  146* 143*     PT/INR:  Recent Labs     01/26/21 2100   PROTIME 13.1   INR 1.0     APTT:  Recent Labs     01/26/21 2100   APTT 26.7     LIVER PROFILE:  Recent Labs     01/26/21 2100 01/27/21  0709 01/28/21  0348   AST 21 14 15   ALT 11 11 6   BILITOT 0.6 1.0* 1.0*   ALKPHOS 70 66 58       Imaging Last 24 Hours:  Cta Abdominal Aorta W Bilat Runoff W Wo Contrast    Result Date: 1/27/2021  CTA ABDOMINAL AORTA W BILAT RUNOFF W WO CONTRAST: 1/26/2021 CLINICAL HISTORY:  RLE swelling Pain . COMPARISON: CT abdomen and pelvis without contrast 12/4/2015. Spiral enhanced images were obtained of the abdominal aorta through both feet during the infusion of a total of 250 mL of Isovue 300 contrast with runoff CTA protocol. Delayed imaging was also performed. Routine and volume rendered images were obtained on a 3-dimensional workstation. All CT scans at this facility use dose modulation, iterative reconstruction, and/or weight based dosing when appropriate to reduce radiation dose to as low as reasonably achievable.  FINDINGS: Moderately extensive disease with areas of flow-limiting narrowing are present within the mid to distal right superficial femoral artery, which is abruptly occluded at the abductor hiatus. Best depicted on the delayed imaging is extensive DVT throughout the right lower extremity extending proximally to the right common iliac vein. At the most inferior aspect of the initial study, filling defects are suspected within the subsegmental left lower lobe pulmonary artery branches, suspicious for pulmonary emboli. No definite emboli are noted on the right. The visualized lung bases are clear. The abdominal aorta is normal in caliber with mild to moderate calcific plaquing. Both iliac, common femoral, and the left femoral arteries are widely patent with mild disease. Delayed imaging demonstrates patency of the left popliteal artery with extensive calcific disease of the infrapopliteal runoff, which is incompletely visualized. There is no significant reconstitution of the right popliteal or infrapopliteal arteries. The celiac, superior mesenteric and both solitary renal arteries are widely patent. Colonic diverticulosis is again noted, without evidence of diverticulitis. No other significant change from 12/4/2015 is identified. 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 EXTENSIVE RIGHT LOWER EXTREMITY DVT EXTENDING INTO THE RIGHT COMMON ILIAC VEIN. SUSPECT LEFT LOWER LOBE PULMONARY EMBOLI. Xr Chest Portable    Result Date: 1/27/2021  EXAMINATION: Portable AP ERECT view of the chest. CLINICAL HISTORY:  preop  with pain and swelling in right thigh. DATE: 1/26/2021 10:02 PM COMPARISONS: 7/8/2020 FINDINGS: There are multiple sternal sutures and mediastinal clips present. Normal cardiac silhouette. An pacemaker overlies the right hemithorax and has a lead extending into the right atrium and second lead extending into the right ventricle. Lungs are  clear without consolidation. No pleural effusion or pneumothorax.   There are mild degenerative changes in spine. NO ACUTE CARDIOPULMONARY ABNORMALITY. NO CHANGE. Assessment//Plan           Hospital Problems           Last Modified POA    * (Principal) 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[facility] 1/27/2021 Yes    HTN (hypertension) 1/27/2021 Yes    Dyslipidemia 1/27/2021 Yes    DM (diabetes mellitus) ([facility] 1/27/2021 Yes    Hypothyroidism 1/27/2021 Yes    CAD (coronary artery disease) 1/27/2021 Yes    [facility]ation [facility] 1/27/2021 Yes    Venous thrombosis of leg 1/27/2021 Yes    Hyponatremia 1/27/2021 Yes        Assessment:    Condition: In stable condition. (Going back to surgery today for a peripheral revasc/stent. ). Plan:   Transfer to floor. Activity Plan: Per Dr. Jordan Rolon. Wean off oxygen. Consults: Vascular surgery. NPO. Administer medications as ordered.          Electronically signed by Michael Cotter RN on 1/28/21 at 9:06 AM EST

## 2021-01-28 NOTE — PROGRESS NOTES
Pt woke up at 10:30 and agreed to take his meds. Meds administered po. Pt. Tolerated this well. He is aware of return for revasc. Daughter \"Manuela\" updated. His friend \"Shama\" also called. She was updated by Barney Fernandez. Message that she loves him given to patient. IVF hung per order.  Electronically signed by Babak Atkinson RN on 1/28/2021 at 10:57 AM

## 2021-01-28 NOTE — FLOWSHEET NOTE
9708- handoff report. Donnamaria Lent    Pupils equal and reactive. AOx4, independent, turns self, active Upper and Lower extremities. Hand grasps strong, RLE edema  +1, BLE strong, Palpable pulses present on  Bilateral Lower and Upper extremities. Rt popliteal puncture soft and palpable, no hematoma present. Pt O2 dropped during night applied 3L. N/C.

## 2021-01-28 NOTE — CONSULTS
Consult Note  Patient: Ruth Donaldson  Unit/Bed: IC11/IC11-01  YOB: 1929  MRN: [id number]  Acct: [de-identified]   Admitting Diagnosis: Femoral artery occlusion [facility]) [I70.209]  Date:  1/26/2021  Hospital Day: 2      Chief Complaint:  Peripheral vascular disease, mode CABG and pacemaker    History of Present Illness:  Patient underwent intravascular ultrasound right common iliac vein intravascular ultrasound the right external iliac vein right common external iliac vein stenting with 14x120 veno o stent. He is a [employer] clinic patient used to follow with Dr. Bonilla. He is known to coronary artery disease status post CABG by [facility] with LIMA to the LAD SVG to PDA by [facility] in 2013 he also had pacemaker for reportedly sick sinus syndrome.   It appears that he did not have any recent nonischemic evaluation    No Known Allergies    Current Facility-Administered Medications   Medication Dose Route Frequency Provider Last Rate Last Admin    0.9 % sodium chloride infusion   Intravenous Continuous Georgia Quintero MD 75 mL/hr at 01/28/21 1054 New Bag at 01/28/21 1054    insulin lispro (HUMALOG) injection vial 0-12 Units  0-12 Units Subcutaneous 4x Daily AC & HS Georgia Quintero MD        sodium chloride flush 0.9 % injection 10 mL  10 mL Intravenous 2 times per day Santos Shafer RN, NP   10 mL at 01/28/21 0818    sodium chloride flush 0.9 % injection 10 mL  10 mL Intravenous PRN Santos Shafer RN, NP        promethazine (PHENERGAN) tablet 12.5 mg  12.5 mg Oral Q6H PRN Santos Shafer RN, NP   12.5 mg at 01/27/21 1239    Or    ondansetron (ZOFRAN) injection 4 mg  4 mg Intravenous Q6H PRN Santos Shafer RN, NP        polyethylene glycol (GLYCOLAX) packet 17 g  17 g Oral Daily PRN Santos Shafer RN, NP        acetaminophen (TYLENOL) tablet 650 mg  650 mg Oral Q6H PRN Santos Shafer RN, NP        Or    acetaminophen (TYLENOL) suppository 650 mg  650 mg Rectal Q6H PRN Santos Shafer RN, NP        glucose (GLUTOSE) 40 % oral gel 15 g  15 g Oral PRN Santos Shafer RN, NP        dextrose 50 % IV solution  12.5 g Intravenous PRN Santos Shafer RN, NP        glucagon (rDNA) injection 1 mg  1 mg Intramuscular PRN Santos Shafer RN, NP        dextrose 5 % solution  100 mL/hr Intravenous PRN Santos Shafer RN, NP        hydrALAZINE (APRESOLINE) injection 10 mg  10 mg Intravenous Q6H PRN Santos Shafer RN, NP   10 mg at 01/27/21 1033    aluminum & magnesium hydroxide-simethicone (MAALOX) 648-114-28 MG/5ML suspension 30 mL  30 mL Oral Q6H PRN Magaly Guajardo MD   30 mL at 01/27/21 1840    pantoprazole (PROTONIX) tablet 40 mg  40 mg Oral QAM AC Magaly Guajardo MD   40 mg at 01/28/21 1030    aspirin chewable tablet 81 mg  81 mg Oral Daily Magaly Guajardo MD   81 mg at 01/28/21 1030    atorvastatin (LIPITOR) tablet 40 mg  40 mg Oral Nightly Magaly Guajardo MD   40 mg at 01/27/21 2047    carvedilol (COREG) tablet 6.25 mg  6.25 mg Oral BID WC Magaly Guajardo MD   6.25 mg at 01/28/21 1030    levothyroxine (SYNTHROID) tablet 25 mcg  25 mcg Oral Daily Magaly Guajardo MD   25 mcg at 01/28/21 1030    ursodiol (ACTIGALL) capsule 300 mg  300 mg Oral BID Magaly Guajardo MD   300 mg at 01/28/21 1030    ipratropium-albuterol (DUONEB) nebulizer solution 3 mL  3 mL Inhalation TID Magaly Guajardo MD   3 mL at 01/28/21 1549    albuterol sulfate  (90 Base) MCG/ACT inhaler 2 puff  2 puff Inhalation Q6H PRN Magaly Guajardo MD        heparin 25,000 units in dextrose 5% 250 mL (premix) infusion  18 Units/kg/hr Intravenous Continuous Rashid Bullard MD 8.2 mL/hr at 01/28/21 1535 11 Units/kg/hr at 01/28/21 1535    0.9 % sodium chloride bolus  1,000 mL Intravenous Once Velasquez Drake MD           PMHx:  Past Medical History:   Diagnosis Date    Anemia     CAD (coronary artery disease) 4/16/2015    DM [facility]diabetes mellitus) [facility] [address]     Dyslipidemia  History of tobacco abuse     HTN (hypertension)     Hypothyroidism     Non-ST elevation MI (NSTEMI) ([facility] [address]     Pacemaker 4/16/2015       PSHx:  Past Surgical History:   Procedure Laterality Date    CATARACT REMOVAL      MIDDLE EAR SURGERY Left 1998    \"they had to reset the bones\" after an infection       Social Hx:  Social History     Socioeconomic History    Marital status:      Spouse name: None    Number of children: None    Years of education: None    Highest education level: None   Occupational History    None   Social Needs    Financial resource strain: None    Food insecurity     Worry: None     Inability: None    Transportation needs     Medical: None     Non-medical: None   Tobacco Use    Smoking status: Former Smoker    Smokeless tobacco: Never Used   Substance and Sexual Activity    Alcohol use: No    Drug use: No    Sexual activity: None   Lifestyle    Physical activity     Days per week: None     Minutes per session: None    Stress: None   Relationships    Social connections     Talks on phone: None     Gets together: None     Attends Catholic service: None     Active member of club or organization: None     Attends meetings of clubs or organizations: None     Relationship status: None    Intimate partner violence     Fear of current or ex partner: None     Emotionally abused: None     Physically abused: None     Forced sexual activity: None   Other Topics Concern    None   Social History Narrative    None       Family Hx:  History reviewed. No pertinent family history.     Review of Systems:   Review of Systems      Physical Examination:    BP (!) 94/30   Pulse 60   Temp 98 °F (36.7 °C) (Oral)   Resp 14   Ht 5' 7\" (1.702 m)   Wt 165 lb (74.8 kg)   SpO2 100%   BMI 25.84 kg/m²    Physical Exam    LABS:  CBC:  Lab Results   Component Value Date    WBC 9.7 01/28/2021    RBC 3.55 01/28/2021    HGB 11.0 01/28/2021    HCT 32.9 01/28/2021    MCV 92.7 01/28/2021    MCH 31.1 01/28/2021    MCHC 33.5 01/28/2021    RDW 13.4 01/28/2021     01/28/2021    MPV 9.0 07/29/2014     CBC with Differential:   Lab Results   Component Value Date    WBC 9.7 01/28/2021    RBC 3.55 01/28/2021    HGB 11.0 01/28/2021    HCT 32.9 01/28/2021     01/28/2021    MCV 92.7 01/28/2021    MCH 31.1 01/28/2021    MCHC 33.5 01/28/2021    RDW 13.4 01/28/2021    BANDSPCT 14 05/18/2016    LYMPHOPCT 21.0 01/28/2021    MONOPCT 11.5 01/28/2021    BASOPCT 0.3 01/28/2021    MONOSABS 1.1 01/28/2021    LYMPHSABS 2.0 01/28/2021    EOSABS 0.1 01/28/2021    BASOSABS 0.0 01/28/2021     CMP:    Lab Results   Component Value Date     01/28/2021    K 4.7 01/28/2021     01/28/2021    CO2 18 01/28/2021    BUN 31 01/28/2021    CREATININE 1.54 01/28/2021    GFRAA 51.4 01/28/2021    LABGLOM 42.5 01/28/2021    GLUCOSE 143 01/28/2021    PROT 6.4 01/28/2021    LABALBU 3.4 01/28/2021    CALCIUM 8.5 01/28/2021    BILITOT 1.0 01/28/2021    ALKPHOS 58 01/28/2021    AST 15 01/28/2021    ALT 6 01/28/2021     BMP:    Lab Results   Component Value Date     01/28/2021    K 4.7 01/28/2021     01/28/2021    CO2 18 01/28/2021    BUN 31 01/28/2021    LABALBU 3.4 01/28/2021    CREATININE 1.54 01/28/2021    CALCIUM 8.5 01/28/2021    GFRAA 51.4 01/28/2021    LABGLOM 42.5 01/28/2021    GLUCOSE 143 01/28/2021     Magnesium:    Lab Results   Component Value Date    MG 2.1 07/08/2020     Troponin:    Lab Results   Component Value Date    TROPONINI 0.030 01/26/2021       Radiology:  Cta Abdominal Aorta W Bilat Runoff W Wo Contrast    Result Date: 1/27/2021  CTA ABDOMINAL AORTA W BILAT RUNOFF W WO CONTRAST: 1/26/2021 CLINICAL HISTORY:  RLE swelling Pain . COMPARISON: CT abdomen and pelvis without contrast 12/4/2015. Spiral enhanced images were obtained of the abdominal aorta through both feet during the infusion of a total of 250 mL of Isovue 300 contrast with runoff CTA protocol. Delayed imaging was also performed. Routine and volume rendered images were obtained on a 3-dimensional workstation. All CT scans at this facility use dose modulation, iterative reconstruction, and/or weight based dosing when appropriate to reduce radiation dose to as low as reasonably achievable. FINDINGS: Moderately extensive disease with areas of flow-limiting narrowing are present within the mid to distal right superficial femoral artery, which is abruptly occluded at the abductor hiatus. Best depicted on the delayed imaging is extensive DVT throughout the right lower extremity extending proximally to the right common iliac vein. At the most inferior aspect of the initial study, filling defects are suspected within the subsegmental left lower lobe pulmonary artery branches, suspicious for pulmonary emboli. No definite emboli are noted on the right. The visualized lung bases are clear. The abdominal aorta is normal in caliber with mild to moderate calcific plaquing. Both iliac, common femoral, and the left femoral arteries are widely patent with mild disease. Delayed imaging demonstrates patency of the left popliteal artery with extensive calcific disease of the infrapopliteal runoff, which is incompletely visualized. There is no significant reconstitution of the right popliteal or infrapopliteal arteries. The celiac, superior mesenteric and both solitary renal arteries are widely patent. Colonic diverticulosis is again noted, without evidence of diverticulitis. No other significant change from 12/4/2015 is identified. 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 EXTENSIVE RIGHT LOWER EXTREMITY DVT EXTENDING INTO THE RIGHT COMMON ILIAC VEIN. SUSPECT LEFT LOWER LOBE PULMONARY EMBOLI. Xr Chest Portable    Result Date: 1/27/2021  EXAMINATION: Portable AP ERECT view of the chest. CLINICAL HISTORY:  preop  with pain and swelling in right thigh.  DATE: 1/26/2021 10:02 PM COMPARISONS: 7/8/2020 FINDINGS: There are multiple sternal sutures and mediastinal clips present. Normal cardiac silhouette. An pacemaker overlies the right hemithorax and has a lead extending into the right atrium and second lead extending into the right ventricle. Lungs are  clear without consolidation. No pleural effusion or pneumothorax. There are mild degenerative changes in spine. NO ACUTE CARDIOPULMONARY ABNORMALITY. NO CHANGE.        EKG:      Assessment:    Active Hospital Problems    Diagnosis Date Noted    Venous thrombosis of leg [I82.90] 01/27/2021    Hyponatremia [E87.1] 01/27/2021    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[facility] [address] [I70.209] 01/26/2021    COPD exacerbation ([facility] [address] [J44.1] 02/13/2020    CAD (coronary artery disease) [I25.10] 04/16/2015    HTN (hypertension) [I10]     DM (diabetes mellitus) ([facility] [address] [E11.9]     Dyslipidemia [E78.5]     Hypothyroidism [E03.9]           Plan: 1. Pacemaker evaluation   2.Echo (last echo in 2020 showed normal LV and RV with normal left ventricular fraction)      Electronically signed by Shelby Staton MD on 1/28/2021 at 4:47 PM

## 2021-01-28 NOTE — PROGRESS NOTES
Spiritual Care Services     Summary of Visit:      Spiritual Assessment/Intervention/Outcomes:    Encounter Summary  Services provided to[de-identified] Patient  Referral/Consult From[de-identified] Rounding  Support System: Children, Family members  Continue Visiting: No  Complexity of Encounter: Low  Length of Encounter: 15 minutes  Spiritual Assessment Completed: Yes  Routine  Type: Initial  Assessment: Approachable, Sleeping  Intervention: [place], Sustaining presence/ Ministry of presence  Outcome: Receptive              Advance Directives (For Healthcare)  Pre-existing DNR Comfort Care/DNR Arrest/DNI Order: No  Healthcare Directive: Yes, patient has an advance directive for healthcare treatment  Type of Healthcare Directive: Durable power of  for health care  Copy in Chart: No, copy requested from family  [address] [place] [address] Agent's Name: Noe Leesa  If you are unable to speak for yourself, does your Healthcare Agent or Legal Spokesperson know your healthcare wishes?: Yes           Values / Beliefs  Do you have any ethnic, cultural, sacramental, or spiritual Church needs you would like us to be aware of while you are in the hospital?: No    Care Plan:        [id number] [address]   Electronically signed by Adin Varner on 1/28/21 at 4:34 PM EST     To reach a  for emotional and spiritual support, place an [facility] consult request.   If a  is needed immediately, dial 0 and ask to page the on-call .

## 2021-01-29 LAB
ALBUMIN SERPL-MCNC: 3.1 G/DL (ref 3.5–4.6)
ALP BLD-CCNC: 50 U/L (ref 35–104)
ALT SERPL-CCNC: 8 U/L (ref 0–41)
ANION GAP SERPL CALCULATED.3IONS-SCNC: 11 MEQ/L (ref 9–15)
ANTI-XA LOV TREATMENT: 0.97 IU/ML
ANTI-XA UNFRAC HEPARIN: 0.26 IU/ML
ANTI-XA UNFRAC HEPARIN: 0.5 IU/ML
ANTI-XA UNFRAC HEPARIN: 0.97 IU/ML
ANTI-XA UNFRAC HEPARIN: 1.03 IU/ML
APTT: >150 SEC (ref 24.4–36.8)
AST SERPL-CCNC: 12 U/L (ref 0–40)
BASOPHILS ABSOLUTE: 0 K/UL (ref 0–0.2)
BASOPHILS RELATIVE PERCENT: 0.3 %
BILIRUB SERPL-MCNC: 0.6 MG/DL (ref 0.2–0.7)
BUN BLDV-MCNC: 29 MG/DL (ref 8–23)
CALCIUM SERPL-MCNC: 7.6 MG/DL (ref 8.5–9.9)
CHLORIDE BLD-SCNC: 103 MEQ/L (ref 95–107)
CO2: 23 MEQ/L (ref 20–31)
CREAT SERPL-MCNC: 1.57 MG/DL (ref 0.7–1.2)
EOSINOPHILS ABSOLUTE: 0.2 K/UL (ref 0–0.7)
EOSINOPHILS RELATIVE PERCENT: 2.6 %
GFR AFRICAN AMERICAN: 50.3
GFR NON-AFRICAN AMERICAN: 41.5
GLOBULIN: 2.3 G/DL (ref 2.3–3.5)
GLUCOSE BLD-MCNC: 135 MG/DL (ref 70–99)
GLUCOSE BLD-MCNC: 169 MG/DL (ref 60–115)
GLUCOSE BLD-MCNC: 184 MG/DL (ref 60–115)
GLUCOSE BLD-MCNC: 254 MG/DL (ref 60–115)
HCT VFR BLD CALC: 30.3 % (ref 42–52)
HEMOGLOBIN: 9.9 G/DL (ref 14–18)
INR BLD: 1.3
LYMPHOCYTES ABSOLUTE: 1.9 K/UL (ref 1–4.8)
LYMPHOCYTES RELATIVE PERCENT: 20.9 %
MCH RBC QN AUTO: 30.9 PG (ref 27–31.3)
MCHC RBC AUTO-ENTMCNC: 32.5 % (ref 33–37)
MCV RBC AUTO: 95.2 FL (ref 80–100)
MONOCYTES ABSOLUTE: 1 K/UL (ref 0.2–0.8)
MONOCYTES RELATIVE PERCENT: 11.1 %
NEUTROPHILS ABSOLUTE: 5.8 K/UL (ref 1.4–6.5)
NEUTROPHILS RELATIVE PERCENT: 65.1 %
PDW BLD-RTO: 13.7 % (ref 11.5–14.5)
PERFORMED ON: ABNORMAL
PLATELET # BLD: 118 K/UL (ref 130–400)
POTASSIUM REFLEX MAGNESIUM: 4.4 MEQ/L (ref 3.4–4.9)
PROTHROMBIN TIME: 16.2 SEC (ref 12.3–14.9)
RBC # BLD: 3.19 M/UL (ref 4.7–6.1)
SODIUM BLD-SCNC: 137 MEQ/L (ref 135–144)
TOTAL PROTEIN: 5.4 G/DL (ref 6.3–8)
WBC # BLD: 8.9 K/UL (ref 4.8–10.8)

## 2021-01-29 PROCEDURE — 94761 N-INVAS EAR/PLS OXIMETRY MLT: CPT

## 2021-01-29 PROCEDURE — 85610 PROTHROMBIN TIME: CPT

## 2021-01-29 PROCEDURE — 6370000000 HC RX 637 (ALT 250 FOR IP): Performed by: INTERNAL MEDICINE

## 2021-01-29 PROCEDURE — 93296 REM INTERROG EVL PM/IDS: CPT

## 2021-01-29 PROCEDURE — 94640 AIRWAY INHALATION TREATMENT: CPT

## 2021-01-29 PROCEDURE — 99232 SBSQ HOSP IP/OBS MODERATE 35: CPT | Performed by: INTERNAL MEDICINE

## 2021-01-29 PROCEDURE — 97162 PT EVAL MOD COMPLEX 30 MIN: CPT

## 2021-01-29 PROCEDURE — 2700000000 HC OXYGEN THERAPY PER DAY

## 2021-01-29 PROCEDURE — 1210000000 HC MED SURG R&B

## 2021-01-29 PROCEDURE — 97166 OT EVAL MOD COMPLEX 45 MIN: CPT

## 2021-01-29 PROCEDURE — 36415 COLL VENOUS BLD VENIPUNCTURE: CPT

## 2021-01-29 PROCEDURE — 94664 DEMO&/EVAL PT USE INHALER: CPT

## 2021-01-29 PROCEDURE — 99231 SBSQ HOSP IP/OBS SF/LOW 25: CPT | Performed by: INTERNAL MEDICINE

## 2021-01-29 PROCEDURE — 85025 COMPLETE CBC W/AUTO DIFF WBC: CPT

## 2021-01-29 PROCEDURE — 85730 THROMBOPLASTIN TIME PARTIAL: CPT

## 2021-01-29 PROCEDURE — 6360000002 HC RX W HCPCS: Performed by: INTERNAL MEDICINE

## 2021-01-29 PROCEDURE — 85520 HEPARIN ASSAY: CPT

## 2021-01-29 PROCEDURE — 80053 COMPREHEN METABOLIC PANEL: CPT

## 2021-01-29 RX ORDER — HEPARIN SODIUM 10000 [USP'U]/100ML
5-30 INJECTION, SOLUTION INTRAVENOUS CONTINUOUS
Status: DISCONTINUED | OUTPATIENT
Start: 2021-01-29 | End: 2021-02-01 | Stop reason: HOSPADM

## 2021-01-29 RX ORDER — HEPARIN SODIUM 1000 [USP'U]/ML
40 INJECTION, SOLUTION INTRAVENOUS; SUBCUTANEOUS PRN
Status: DISCONTINUED | OUTPATIENT
Start: 2021-01-29 | End: 2021-02-01 | Stop reason: HOSPADM

## 2021-01-29 RX ORDER — HEPARIN SODIUM 1000 [USP'U]/ML
80 INJECTION, SOLUTION INTRAVENOUS; SUBCUTANEOUS PRN
Status: DISCONTINUED | OUTPATIENT
Start: 2021-01-29 | End: 2021-02-01 | Stop reason: HOSPADM

## 2021-01-29 RX ORDER — HEPARIN SODIUM 5000 [USP'U]/ML
60 INJECTION, SOLUTION INTRAVENOUS; SUBCUTANEOUS PRN
Status: DISCONTINUED | OUTPATIENT
Start: 2021-01-29 | End: 2021-02-01 | Stop reason: HOSPADM

## 2021-01-29 RX ORDER — HEPARIN SODIUM 5000 [USP'U]/ML
30 INJECTION, SOLUTION INTRAVENOUS; SUBCUTANEOUS PRN
Status: DISCONTINUED | OUTPATIENT
Start: 2021-01-29 | End: 2021-02-01 | Stop reason: HOSPADM

## 2021-01-29 RX ORDER — HEPARIN SODIUM 5000 [USP'U]/ML
60 INJECTION, SOLUTION INTRAVENOUS; SUBCUTANEOUS ONCE
Status: DISCONTINUED | OUTPATIENT
Start: 2021-01-29 | End: 2021-02-01 | Stop reason: HOSPADM

## 2021-01-29 RX ORDER — HEPARIN SODIUM 10000 [USP'U]/100ML
5-30 INJECTION, SOLUTION INTRAVENOUS CONTINUOUS
Status: DISCONTINUED | OUTPATIENT
Start: 2021-01-29 | End: 2021-01-29 | Stop reason: DRUGHIGH

## 2021-01-29 RX ADMIN — HEPARIN SODIUM AND DEXTROSE 13 UNITS/KG/HR: 10000; 5 INJECTION INTRAVENOUS at 01:34

## 2021-01-29 RX ADMIN — IPRATROPIUM BROMIDE AND ALBUTEROL SULFATE 3 ML: .5; 3 SOLUTION RESPIRATORY (INHALATION) at 14:38

## 2021-01-29 RX ADMIN — ASPIRIN 81 MG: 81 TABLET, CHEWABLE ORAL at 07:59

## 2021-01-29 RX ADMIN — CARVEDILOL 6.25 MG: 6.25 TABLET, FILM COATED ORAL at 18:50

## 2021-01-29 RX ADMIN — CARVEDILOL 6.25 MG: 6.25 TABLET, FILM COATED ORAL at 07:59

## 2021-01-29 RX ADMIN — ATORVASTATIN CALCIUM 40 MG: 40 TABLET, FILM COATED ORAL at 20:55

## 2021-01-29 RX ADMIN — HEPARIN SODIUM AND DEXTROSE 10 UNITS/KG/HR: 10000; 5 INJECTION INTRAVENOUS at 08:03

## 2021-01-29 RX ADMIN — URSODIOL 300 MG: 300 CAPSULE ORAL at 07:59

## 2021-01-29 RX ADMIN — IPRATROPIUM BROMIDE AND ALBUTEROL SULFATE 3 ML: .5; 3 SOLUTION RESPIRATORY (INHALATION) at 03:51

## 2021-01-29 RX ADMIN — LEVOTHYROXINE SODIUM 25 MCG: 0.03 TABLET ORAL at 07:59

## 2021-01-29 RX ADMIN — URSODIOL 300 MG: 300 CAPSULE ORAL at 20:55

## 2021-01-29 RX ADMIN — HEPARIN SODIUM 2990 UNITS: 1000 INJECTION INTRAVENOUS; SUBCUTANEOUS at 01:41

## 2021-01-29 RX ADMIN — PANTOPRAZOLE SODIUM 40 MG: 40 TABLET, DELAYED RELEASE ORAL at 07:59

## 2021-01-29 RX ADMIN — IPRATROPIUM BROMIDE AND ALBUTEROL SULFATE 3 ML: .5; 3 SOLUTION RESPIRATORY (INHALATION) at 19:46

## 2021-01-29 ASSESSMENT — PAIN SCALES - GENERAL
PAINLEVEL_OUTOF10: 0

## 2021-01-29 ASSESSMENT — ENCOUNTER SYMPTOMS
SHORTNESS OF BREATH: 0
NAUSEA: 1
VOMITING: 0
DIARRHEA: 0
COUGH: 0

## 2021-01-29 NOTE — PROGRESS NOTES
Progress Note  Patient: Adelina Age  Unit/Bed: 11/IC11-01  YOB: 1929  MRN: [id number]  Acct: [de-identified]   Admitting Diagnosis: Femoral artery occlusion [facility]) [I70.209]  Date:  1/26/2021  Hospital Day: 3    Chief Complaint:  Embolectomy and stenting of the pain    Subjective  Remote two-vessel CABG remote pacemaker    Review of Systems:   Review of Systems   Unable to perform ROS: Acuity of condition         Physical Examination:    BP (!) 137/47   Pulse 64   Temp 97.6 °F (36.4 °C) (Oral)   Resp 17   Ht 5' 7\" (1.702 m)   Wt 173 lb 8 oz (78.7 kg)   SpO2 95%   BMI 27.17 kg/m²    Physical Exam    LABS:  CBC:   Lab Results   Component Value Date    WBC 8.9 01/29/2021    RBC 3.19 01/29/2021    HGB 9.9 01/29/2021    HCT 30.3 01/29/2021    MCV 95.2 01/29/2021    MCH 30.9 01/29/2021    MCHC 32.5 01/29/2021    RDW 13.7 01/29/2021     01/29/2021    MPV 9.0 07/29/2014     CBC with Differential:   Lab Results   Component Value Date    WBC 8.9 01/29/2021    RBC 3.19 01/29/2021    HGB 9.9 01/29/2021    HCT 30.3 01/29/2021     01/29/2021    MCV 95.2 01/29/2021    MCH 30.9 01/29/2021    MCHC 32.5 01/29/2021    RDW 13.7 01/29/2021    BANDSPCT 14 05/18/2016    LYMPHOPCT 20.9 01/29/2021    MONOPCT 11.1 01/29/2021    BASOPCT 0.3 01/29/2021    MONOSABS 1.0 01/29/2021    LYMPHSABS 1.9 01/29/2021    EOSABS 0.2 01/29/2021    BASOSABS 0.0 01/29/2021     CMP:    Lab Results   Component Value Date     01/29/2021    K 4.4 01/29/2021     01/29/2021    CO2 23 01/29/2021    BUN 29 01/29/2021    CREATININE 1.57 01/29/2021    GFRAA 50.3 01/29/2021    LABGLOM 41.5 01/29/2021    GLUCOSE 135 01/29/2021    PROT 5.4 01/29/2021    LABALBU 3.1 01/29/2021    CALCIUM 7.6 01/29/2021    BILITOT 0.6 01/29/2021    ALKPHOS 50 01/29/2021    AST 12 01/29/2021    ALT 8 01/29/2021     BMP:    Lab Results   Component Value Date     01/29/2021    K 4.4 01/29/2021     01/29/2021    CO2 23 01/29/2021    BUN 29 01/29/2021    LABALBU 3.1 01/29/2021    CREATININE 1.57 01/29/2021    CALCIUM 7.6 01/29/2021    GFRAA 50.3 01/29/2021    LABGLOM 41.5 01/29/2021    GLUCOSE 135 01/29/2021     Magnesium:    Lab Results   Component Value Date    MG 2.1 07/08/2020     Troponin:    Lab Results   Component Value Date    TROPONINI 0.030 01/26/2021       Radiology:  No results found. EKG:  Assessment:    Active Hospital Problems    Diagnosis Date Noted    Venous thrombosis of leg [I82.90] 01/27/2021    Hyponatremia [E87.1] 01/27/2021    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[facility] [address] [I70.209] 01/26/2021    COPD exacerbation ([facility] [address] [J44.1] 02/13/2020    CAD (coronary artery disease) [I25.10] 04/16/2015    HTN (hypertension) [I10]     DM (diabetes mellitus) ([facility] [address] [E11.9]     Dyslipidemia [E78.5]     Hypothyroidism [E03.9]            Plan:  Pacemaker evaluation. this was done following open heart surgery for sick sinus syndrome     electronically signed by Grant Flannery MD on 1/29/2021 at 11:20 AM

## 2021-01-29 NOTE — PROGRESS NOTES
[facility] Evaluation   Current Order:  Tid duoneb       Home Regimen: TID      Ordering Physician: DR Kobi Carcamo  Re-evaluation Date:  2/1     Diagnosis: FEMORAL ARTERY OCCLUSION      Patient Status: Stable     The following MDI Criteria must be met in order to convert aerosol to MDI with spacer. If unable to meet, MDI will be converted to aerosol:  []  Patient able to demonstrate the ability to use MDI effectively  []  Patient alert and cooperative  []  Patient able to take deep breath with 5-10 second hold  []  Medication(s) available in this delivery method   []  Peak flow greater than or equal to 200 ml/min            Current Order Substituted To  (same drug, same frequency)   Aerosol to MDI [] Albuterol Sulfate 0.083% unit dose by aerosol Albuterol Sulfate MDI 2 puffs by inhalation with spacer    [] Levalbuterol 1.25 mg unit dose by aerosol Levalbuterol MDI 2 puffs by inhalation with spacer    [] Levalbuterol 0.63 mg unit dose by aerosol Levalbuterol MDI 2 puffs by inhalation with spacer    [] Ipratropium Bromide 0.02% unit dose by aerosol Ipratropium Bromide MDI 2 puffs by inhalation with spacer    [] Duoneb (Ipratropium + Albuterol) unit dose by aerosol Ipratropium MDI + Albuterol MDI 2 puffs by inhalation w/spacer   MDI to Aerosol [] Albuterol Sulfate MDI Albuterol Sulfate 0.083% unit dose by aerosol    [] Levalbuterol MDI 2 puffs by inhalation Levalbuterol 1.25 mg unit dose by aerosol    [] Ipratropium Bromide MDI by inhalation Ipratropium Bromide 0.02% unit dose by aerosol    [] Combivent (Ipratropium + Albuterol) MDI by inhalation Duoneb (Ipratropium + Albuterol) unit dose by aerosol   Treatment Assessment [Frequency/Schedule]:  Change frequency to: ______________NO CHANGES_______________per Protocol, P&T, MEC      Points 0 1 2 3 4   Pulmonary Status  Non-Smoker  []   Smoking history   < 20 pack years  []   Smoking history  ?  20 pack years  []   Pulmonary Disorder  (acute or chronic)  [x]   Severe or Chronic w/ Exacerbation  []     Surgical Status No [x]   Surgeries     General []   Surgery Lower []   Abdominal Thoracic or []   Upper Abdominal Thoracic with  PulmonaryDisorder  []     Chest X-ray Clear/Not  Ordered     []  Chronic Changes  Results Pending  [x]  Infiltrates, atelectasis, pleural effusion, or edema  []  Infiltrates in more than one lobe []  Infiltrate + Atelectasis, &/or pleural effusion  []    Respiratory Pattern Regular,  RR = 12-20 [x]  Increased,  RR = 21-25 []  BUTT, irregular,  or RR = 26-30 []  Decreased FEV1  or RR = 31-35 []  Severe SOB, use  of accessory muscles, or RR ? 35  []    Mental Status Alert, oriented,  Cooperative [x]  Confused but Follows commands []  Lethargic or unable to follow commands []  Obtunded  []  Comatose  []    Breath Sounds Clear to  auscultation  []  Decreased unilaterally or  in bases only [x]  Decreased  bilaterally  []  Crackles or intermittent wheezes []  Wheezes []    Cough Strong, Spontan., & nonproductive [x]  Strong,  spontaneous, &  productive []  Weak,  Nonproductive []  Weak, productive or  with wheezes []  No spontaneous  cough or may require suctioning []    Level of Activity Ambulatory []  Ambulatory w/ Assist  []  Non-ambulatory [x]  Paraplegic []  Quadriplegic []    Total    Score:___7____     Triage Score:___4_____      Tri       Triage:     1. (>20) Freq: Q3    2. (16-20) Freq: Q4   3. (11-15) Freq: QID & Albuterol Q2 PRN    4. (6-10) Freq: TID & Albuterol Q2 PRN    5. (0-5) Freq Q4prn

## 2021-01-29 NOTE — PROGRESS NOTES
[facility] OCCUPATIONAL THERAPY EVALUATION - ACUTE     NAME: Eric Atkinson  : 3/5/1929 (80 y.o.)  MRN: [id number]  CODE STATUS: Full Code  Room: Y815/P943-61    Date of Service: 2021    Patient Diagnosis(es): Femoral artery occlusion [facility]) [I70.209]   Chief Complaint   Patient presents with    Leg Pain     Right     Patient Active Problem List    Diagnosis Date Noted    Anginal equivalent ([facility]      Priority: High    Venous thrombosis of leg 2021    Hyponatremia 2021    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[facility] 2021    Syncope and collapse 2020    COPD exacerbation ([facility] 2020    NSTEMI (non-ST elevated myocardial infarction) ([facility] 2019    Chest pain 2019    Hypotension 2019    SOB (shortness of breath) 2018    COPD with acute exacerbation ([facility] 2018    Bronchitis 2016    CAD (coronary artery disease) 2015    HTN (hypertension)     Dyslipidemia     DM (diabetes mellitus) ([address]     Hypothyroidism     History of tobacco abuse     Anemia         Past Medical History:   Diagnosis Date    Anemia     CAD (coronary artery disease) 2015    DM (diabetes mellitus) ([address]     Dyslipidemia     History of tobacco abuse     HTN (hypertension)     Hypothyroidism     Non-ST elevation MI (NSTEMI) ([facility] [facility]     Pacemaker 2015     Past Surgical History:   Procedure Laterality Date    CATARACT REMOVAL      MIDDLE EAR SURGERY Left     \"they had to reset the bones\" after an infection        Restrictions  Restrictions/Precautions: Fall Risk(moderate fall risk per lees score)     Safety Devices: Safety Devices  Safety Devices in place: Yes  Type of devices:  All fall risk precautions in place        Subjective  Pre Treatment Pain Screening  Pain at present: 0  Scale Used: Numeric Score  Intervention List: Patient able to continue with treatment, Patient declined any intervention    Pain Reassessment:   Pain Assessment  Patient Currently in Pain: No  Pain Assessment: 0-10  Pain Level: 0       Prior Level of Function:  Social/Functional History  Lives With: Daughter(Granddaughter and her family)  Type of Home: House  Home Layout: Two level, Bed/Bath upstairs  Home Access: Stairs to enter with rails  Entrance Stairs - Number of Steps: 14  Bathroom Shower/Tub: Tub/Shower unit  ADL Assistance: (Pt. unclear in answer; however, states he is an independent person)  Homemaking Assistance: (Granddaughter completes)  Ambulation Assistance: Independent(No AD)  Transfer Assistance: Independent  Active : Yes  Additional Comments: Pt. significantly [place]; information gathered via writing but some difficulty noted in clarifying questions    OBJECTIVE:     Orientation Status:  Orientation  Overall Orientation Status: Within Functional Limits    Observation:  Observation/Palpation  Posture: Fair(age related flexion changes, increased T kyphosis)  Observation: pleasant, cooperative, no acute distress noted    Cognition Status:  Cognition  Overall Cognitive Status: WFL    Perception Status:  Perception  Overall Perceptual Status: WFL    Sensation Status:  Sensation  Overall Sensation Status: WFL    Vision and Hearing Status:  Vision  Vision: Impaired  Vision Exceptions: Wears glasses at all times  Hearing  Hearing: Exceptions to [facility]  Hearing Exceptions: Hard of hearing/hearing concerns, Bilateral hearing aid(Significant [place] even with B HA present)     ROM:   LUE AROM (degrees)  LUE AROM : WFL  Left Hand AROM (degrees)  Left Hand AROM: WFL  RUE AROM (degrees)  RUE AROM : WFL  Right Hand AROM (degrees)  Right Hand AROM: WFL    Strength:  LUE Strength  Gross LUE Strength: Exceptions to [facility]  L Hand General: 3+/5  LUE Strength Comment: 3+/5 all planes  RUE Strength  Gross RUE Strength: Exceptions to [facility] [address] [facility]  R Hand General: 3+/5  RUE Strength Comment: 3+/5 all planes    Coordination, Tone, Quality of Movement:    Tone RUE  RUE Tone: Normotonic  Tone LUE  LUE Tone: Normotonic  Coordination  Movements Are Fluid And Coordinated: No  Coordination and Movement description: Fine motor impairments, Decreased speed, Decreased accuracy, Right UE, Left UE    Hand Dominance:  Hand Dominance  Hand Dominance: Right    ADL Status:  ADL  Feeding: Independent  Grooming: Supervision  UE Bathing: Stand by assistance  LE Bathing: Minimal assistance  UE Dressing: Stand by assistance  LE Dressing: Minimal assistance  Toileting: Minimal assistance  Additional Comments: Simulated ADLs as above.  Pt. demonstrates mild LOB d/t LE pain and decreased overall mobility d/t feeling weak  Toilet Transfers  Toilet Transfer: Unable to assess  Toilet Transfers Comments: Anticipate CGA based on other mobility       Therapy key for assistance levels -   Independent = Pt. is able to perform task with no assistance but may require a device   Stand by assistance = Pt. does not perform task at an independent level but does not need physical assistance, requires verbal cues  Minimal, Moderate, Maximal Assistance = Pt. requires physical assistance (25%, 50%, 75% assist from helper) for task but is able to actively participate in task   Dependent = Pt. requires total assistance with task and is not able to actively participate with task completion     Functional Mobility:  Functional Mobility  Functional - Mobility Device: No device  Activity: Other  Assist Level: Contact guard assistance  Functional Mobility Comments: CGA to step to chair only  Transfers  Sit to stand: Contact guard assistance  Stand to sit: Contact guard assistance  Transfer Comments: Increased time and effort    Bed Mobility  Bed mobility  Supine to Sit: Stand by assistance  Comment: Up to chair at end of eval. Increased time and effort for mobility    Seated and Standing Balance:  Balance  Sitting Balance: Supervision  Standing Balance: Contact guard assistance    Functional Endurance:  Activity Tolerance  Activity Tolerance: Patient Tolerated treatment well    D/C Recommendations:  OT D/C RECOMMENDATIONS  REQUIRES OT FOLLOW UP: Yes    Equipment Recommendations:  OT Equipment Recommendations  Other: Continue to assess    OT Education:   OT Education  OT Education: OT Role, Plan of Care  Patient Education: Educated pt on role of acute care OT  Barriers to Learning: None    OT Follow Up:  OT D/C RECOMMENDATIONS  REQUIRES OT FOLLOW UP: Yes       Assessment/Discharge Disposition:  Assessment: Pt is a 80year old man from home with family who presents to [facility] with the above deficits which impact his ability to perform ADLs and IADLs. Pt. limited d/t fatigue and LE discomfort. Pt. would benefit from continued OT to maximize independence and safety with ADL tasks.   Performance deficits / Impairments: Decreased functional mobility , Decreased ADL status, Decreased strength, Decreased balance, Decreased endurance, Decreased high-level IADLs, Decreased fine motor control, Decreased coordination  Discharge Recommendations: Continue to assess pending progress  Decision Making: Medium Complexity  History: Pt's medical history is moderately complex  Exam: Pt. has 8 performance deficits  Assistance / Modification: Pt. requires min A    Six Click Score   How much help for putting on and taking off regular lower body clothing?: A Little  How much help for Bathing?: A Little  How much help for Toileting?: A Little  How much help for putting on and taking off regular upper body clothing?: A Little  How much help for taking care of personal grooming?: A Little  How much help for eating meals?: None  AM-[facility] Inpatient Daily Activity Raw Score: 19  AM-[facility] Inpatient ADL T-Scale Score : 40.22  ADL Inpatient CMS 0-100% Score: 42.8    Plan:  Plan  Times per week: 1-3x  Plan weeks: Length of acute stay  Current Treatment Recommendations: Strengthening, Balance Training, Functional Mobility Training, Endurance Training, Pain Management, Safety Education & Training, Patient/Caregiver Education & Training, Self-Care / ADL, Equipment Evaluation, Education, & procurement, Home Management Training    Goals:   Patient will:    - Improve functional endurance to tolerate/complete 30 mins of ADL's  - Be Mod I in UB ADLs   - Be Mod I in LB ADLs  - Be Mod I in ADL transfers without LOB  - Be Mod I in toileting tasks  - Improve B hand fine motor coordination to [facility] in order to manage clothing fasteners/self-care containers in a timely manner  - Improve B UE strength and endurance to 4/5 in order to participate in self-care activities as projected. - Access appropriate D/C site with as few architectural barriers as possible. - Sequence self-care tasks with no verbal cues for safety    Patient Goal: Patient goals : \"I want to get home\"     Discussed and agreed upon: Yes Comments:     Therapy Time:   OT Individual Minutes  Time In: 3374  Time Out: 94 31 11  Minutes: 11    Eval: 11 minutes     Electronically signed by:     GIN Smith  1/29/2021, 4:23 PM

## 2021-01-29 NOTE — PROGRESS NOTES
PM analysis per order Gregorio MONZON. 0 Atrial high rate episodes and 0 Vent high rate episodes since 9/18/18. Gd RA/RV pacer capture, sense, and lead impedances. Normal DDDR function. No changes made. Battery status OK, longevity 6.5 yrs. Bedside RN updated.

## 2021-01-29 NOTE — PROGRESS NOTES
0700am Report from [facility]. Alert and oriented. Very [place]. R popliteal incision dry and intact. Transparent dressing with 2 X2. No bleeding noted. Doppled pedal pulses. No complaints at this time  08:30am Pat from pacemaker services in to check pacemaker  0900am Dr Lambert Caba in to see patient and updated on status. orders   09:35am Daughter called and updated on status  10;15AM DR Dick Multani called and updated on status  !0:50am Report called to [facility] on 4wt patient to transfer.   Daughter Hernandez Pedroza called and notified of patients transfer to 4wt  11:15am DR Dick Multani in to see patient and updated on status

## 2021-01-29 NOTE — FLOWSHEET NOTE
Handoff report   Pupils eequal reactive. AOx4. Independent, turns self, active upper and lower extremities. Pulses palpable. RLE edema. Rt popliteal puncture palpable and soft no hematoma present. Pt lethargic since surgery rested through night.

## 2021-01-29 NOTE — PROGRESS NOTES
reactive to light. Skin:  Warm. Labs/Imaging/Diagnostics    Labs:  CBC:  Recent Labs     01/28/21  0639 01/28/21  1906 01/29/21  0536   WBC 9.7 9.2 8.9   RBC 3.55* 3.46* 3.19*   HGB 11.0* 11.0* 9.9*   HCT 32.9* 32.5* 30.3*   MCV 92.7 93.7 95.2   RDW 13.4 13.2 13.7   * 131 118*     CHEMISTRIES:  Recent Labs     01/27/21  0709 01/28/21  0348 01/29/21  0536   * 133* 137   K 4.2 4.7 4.4    102 103   CO2 18* 18* 23   BUN 29* 31* 29*   CREATININE 1.19 1.54* 1.57*   GLUCOSE 146* 143* 135*     PT/INR:  Recent Labs     01/26/21  2100 01/29/21  0536   PROTIME 13.1 16.2*   INR 1.0 1.3     APTT:  Recent Labs     01/26/21  2100 01/29/21  0536   APTT 26.7 >150.0*     LIVER PROFILE:  Recent Labs     01/27/21  0709 01/28/21  0348 01/29/21  0536   AST 14 15 12   ALT 11 6 8   BILITOT 1.0* 1.0* 0.6   ALKPHOS 66 58 50       Imaging Last 24 Hours:  Cta Abdominal Aorta W Bilat Runoff W Wo Contrast    Result Date: 1/27/2021  CTA ABDOMINAL AORTA W BILAT RUNOFF W WO CONTRAST: 1/26/2021 CLINICAL HISTORY:  RLE swelling Pain . COMPARISON: CT abdomen and pelvis without contrast 12/4/2015. Spiral enhanced images were obtained of the abdominal aorta through both feet during the infusion of a total of 250 mL of Isovue 300 contrast with runoff CTA protocol. Delayed imaging was also performed. Routine and volume rendered images were obtained on a 3-dimensional workstation. All CT scans at this facility use dose modulation, iterative reconstruction, and/or weight based dosing when appropriate to reduce radiation dose to as low as reasonably achievable. FINDINGS: Moderately extensive disease with areas of flow-limiting narrowing are present within the mid to distal right superficial femoral artery, which is abruptly occluded at the abductor hiatus. Best depicted on the delayed imaging is extensive DVT throughout the right lower extremity extending proximally to the right common iliac vein.  At the most inferior aspect of the initial study, filling defects are suspected within the subsegmental left lower lobe pulmonary artery branches, suspicious for pulmonary emboli. No definite emboli are noted on the right. The visualized lung bases are clear. The abdominal aorta is normal in caliber with mild to moderate calcific plaquing. Both iliac, common femoral, and the left femoral arteries are widely patent with mild disease. Delayed imaging demonstrates patency of the left popliteal artery with extensive calcific disease of the infrapopliteal runoff, which is incompletely visualized. There is no significant reconstitution of the right popliteal or infrapopliteal arteries. The celiac, superior mesenteric and both solitary renal arteries are widely patent. Colonic diverticulosis is again noted, without evidence of diverticulitis. No other significant change from 12/4/2015 is identified. 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 EXTENSIVE RIGHT LOWER EXTREMITY DVT EXTENDING INTO THE RIGHT COMMON ILIAC VEIN. SUSPECT LEFT LOWER LOBE PULMONARY EMBOLI. Xr Chest Portable    Result Date: 1/27/2021  EXAMINATION: Portable AP ERECT view of the chest. CLINICAL HISTORY:  preop  with pain and swelling in right thigh. DATE: 1/26/2021 10:02 PM COMPARISONS: 7/8/2020 FINDINGS: There are multiple sternal sutures and mediastinal clips present. Normal cardiac silhouette. An pacemaker overlies the right hemithorax and has a lead extending into the right atrium and second lead extending into the right ventricle. Lungs are  clear without consolidation. No pleural effusion or pneumothorax. There are mild degenerative changes in spine. NO ACUTE CARDIOPULMONARY ABNORMALITY. NO CHANGE.      Assessment//Plan           Hospital Problems           Last Modified POA    * (Principal) 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[address] 1/27/2021 Yes    HTN (hypertension) 1/27/2021 Yes    Dyslipidemia 1/27/2021 Yes    DM (diabetes mellitus) ([facility] 1/27/2021 Yes    Hypothyroidism 1/27/2021 Yes    CAD (coronary artery disease) 1/27/2021 Yes    COPD exacerbation ([facility] 1/27/2021 Yes    Venous thrombosis of leg 1/27/2021 Yes    Hyponatremia 1/27/2021 Yes        Assessment & Plan    1/27; the DVT was unprovoked, will get oncology evaluation. Patient has severe peripheral vascular disease appreciate vascular surgery input. Will resume diet. SSI with low-dose coverage. 1 dose of IV Reglan for nausea. Zofran as needed for nausea vomiting. Wilbert Dirk Resume home medications. Keep oxygen saturation above 88%. 1/28: pt is going for revasc surgery, no overnight events, pt is npo except meds will start IVF, cardiology eval for pacemaker, pt can be transferred out of ICU if ok with vascular surgery. 1/29: Status post right common and external iliac vein stenting by vascular surgery. Patient can be transferred out of ICU. Appreciate cardiology input for checking the pacemaker. Will order PT OT.     Electronically signed by Cecelia Delgado MD on 1/27/21 at 1:15 PM EST

## 2021-01-29 NOTE — PROGRESS NOTES
INPATIENT PROGRESS NOTES    PATIENT NAME: Laverne Wilson  MRN: [id number]  SERVICE DATE:  January 29, 2021   SERVICE TIME:  3:52 PM      PRIMARY SERVICE: Pulmonary Disease    CHIEF COMPLAIN: Right lower extremity DVT      INTERVAL HPI: Patient seen and examined at bedside, Interval Notes, orders reviewed. Nursing notes noted  Patient is sitting in the chair. Complains of right leg pain when stand up. No complaint of shortness of breath. No chest pain pleuritic pain. No cough or sputum production. OBJECTIVE    Body mass index is 27.17 kg/m². PHYSICAL EXAM:  Vitals:  BP (!) 130/44   Pulse 64   Temp 98.8 °F (37.1 °C) (Oral)   Resp 18   Ht 5' 7\" (1.702 m)   Wt 173 lb 8 oz (78.7 kg)   SpO2 95%   BMI 27.17 kg/m²   General: Alert, awake . comfortable in bed, No distress. Head: Atraumatic , Normocephalic   Eyes: PERRL. No sclera icterus. No conjunctival injection. No discharge   ENT: No nasal  discharge. Pharynx clear. lips, teeth, mucosa and gums are normal, tongue protrudes in the midline  Neck:  Trachea midline. No thyromegaly, no JVD, No cervical adenopathy. Chest : Bilaterally symmetrical ,Normal effort,  No accessory muscle use  Lung : . Fair BS bilateral, decreased BS at bases. No Rales. No wheezing. No rhonchi. Heart[de-identified] Normal  rate. Regular rhythm. No mumur ,  Rub or gallop  ABD: Non-tender. Non-distended. No masses. No organmegaly. Normal bowel sounds. No hernia. Ext : Erythema and edema of right lower extremity.   No Cyanosis No clubbing  Neuro: no focal weakness          DATA:   Recent Labs     01/28/21  1906 01/29/21  0536   WBC 9.2 8.9   HGB 11.0* 9.9*   HCT 32.5* 30.3*   MCV 93.7 95.2    118*     Recent Labs     01/28/21  0348 01/29/21  0536   * 137   K 4.7 4.4    103   CO2 18* 23   BUN 31* 29*   CREATININE 1.54* 1.57*   GLUCOSE 143* 135*   CALCIUM 8.5 7.6*   PROT 6.4 5.4*   LABALBU 3.4* 3.1*   BILITOT 1.0* 0.6   ALKPHOS 58 50   AST 15 12   ALT 6 8   LABGLOM 42.5* 41.5* GFRAA 51.4* 50.3*   GLOB 3.0 2.3       MV Settings:          No results for input(s): PHART, OWO9LDE, PO2ART, JWE2SJU, BEART, B4OGDCPH in the last 72 hours. O2 Device: Nasal cannula  O2 Flow Rate (L/min): 3 L/min    DIET CARDIAC; Carb Control: 3 carb choices (45 gms)/meal; Low Sodium (2 GM)     MEDICATIONS during current hospitalization:    Continuous Infusions:   heparin (PORCINE) Infusion      sodium chloride 75 mL/hr at 01/28/21 1054    dextrose         Scheduled Meds:   heparin (porcine)  60 Units/kg Intravenous Once    insulin lispro  0-12 Units Subcutaneous 4x Daily AC & HS    sodium chloride flush  10 mL Intravenous 2 times per day    pantoprazole  40 mg Oral QAM AC    aspirin  81 mg Oral Daily    atorvastatin  40 mg Oral Nightly    carvedilol  6.25 mg Oral BID WC    levothyroxine  25 mcg Oral Daily    ursodiol  300 mg Oral BID    ipratropium-albuterol  3 mL Inhalation TID    sodium chloride  1,000 mL Intravenous Once       PRN Meds:heparin (porcine), heparin (porcine), heparin (porcine), heparin (porcine), sodium chloride flush, promethazine **OR** ondansetron, polyethylene glycol, acetaminophen **OR** acetaminophen, glucose, dextrose, glucagon (rDNA), dextrose, hydrALAZINE, aluminum & magnesium hydroxide-simethicone, albuterol sulfate HFA    Radiology  Cta Abdominal Aorta W Bilat Runoff W Wo Contrast    Result Date: 1/27/2021  CTA ABDOMINAL AORTA W BILAT RUNOFF W WO CONTRAST: 1/26/2021 CLINICAL HISTORY:  RLE swelling Pain . COMPARISON: CT abdomen and pelvis without contrast 12/4/2015. Spiral enhanced images were obtained of the abdominal aorta through both feet during the infusion of a total of 250 mL of Isovue 300 contrast with runoff CTA protocol. Delayed imaging was also performed. Routine and volume rendered images were obtained on a 3-dimensional workstation.  All CT scans at this facility use dose modulation, iterative reconstruction, and/or weight based dosing when appropriate to reduce radiation dose to as low as reasonably achievable. FINDINGS: Moderately extensive disease with areas of flow-limiting narrowing are present within the mid to distal right superficial femoral artery, which is abruptly occluded at the abductor hiatus. Best depicted on the delayed imaging is extensive DVT throughout the right lower extremity extending proximally to the right common iliac vein. At the most inferior aspect of the initial study, filling defects are suspected within the subsegmental left lower lobe pulmonary artery branches, suspicious for pulmonary emboli. No definite emboli are noted on the right. The visualized lung bases are clear. The abdominal aorta is normal in caliber with mild to moderate calcific plaquing. Both iliac, common femoral, and the left femoral arteries are widely patent with mild disease. Delayed imaging demonstrates patency of the left popliteal artery with extensive calcific disease of the infrapopliteal runoff, which is incompletely visualized. There is no significant reconstitution of the right popliteal or infrapopliteal arteries. The celiac, superior mesenteric and both solitary renal arteries are widely patent. Colonic diverticulosis is again noted, without evidence of diverticulitis. No other significant change from 12/4/2015 is identified. 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 EXTENSIVE RIGHT LOWER EXTREMITY DVT EXTENDING INTO THE RIGHT COMMON ILIAC VEIN. SUSPECT LEFT LOWER LOBE PULMONARY EMBOLI. Xr Chest Portable    Result Date: 1/27/2021  EXAMINATION: Portable AP ERECT view of the chest. CLINICAL HISTORY:  preop  with pain and swelling in right thigh. DATE: 1/26/2021 10:02 PM COMPARISONS: 7/8/2020 FINDINGS: There are multiple sternal sutures and mediastinal clips present. Normal cardiac silhouette.  An pacemaker overlies the right hemithorax and has a lead extending into the right atrium and second lead extending into the right ventricle. Lungs are  clear without consolidation. No pleural effusion or pneumothorax. There are mild degenerative changes in spine. NO ACUTE CARDIOPULMONARY ABNORMALITY. NO CHANGE. IMPRESSION AND SUGGESTION:  1. Acute DVT unprovoked. 2. History of COPD no exacerbation  3. Severe peripheral vascular disease  4. Hypertension  5. Diabetes mellitus    Patient is not having short of breath he is on 3 L O2 via nasal cannula O2 saturation 95%. He is on heparin for anticoagulation. On DuoNeb 3 times daily. Albuterol every 2 hours as needed. Chest x-ray no active disease. We will follow as needed.       Electronically signed by Chuy Arriaza MD, FCCP on 1/29/2021 at 3:52 PM

## 2021-01-29 NOTE — PROGRESS NOTES
Physical Therapy   Facility/Department: [facility] MED SURG IC11/IC11-01    NAME: Renuka Su    : 3/5/1929 (80 y.o.)  MRN: [id number]    Account: [de-identified]  Gender: male    PT evaluation and treatment orders received. Chart reviewed. PT eval attempted. Hold PT eval: pt has strict bedrest ordered. Spoke with YESI Franco via telephone RE: requiring updated activity orders. Will attempt PT evaluation again at earliest convenience.       Electronically signed by Frederic Irwin PT on 21 at 11:13 AM EST

## 2021-01-29 NOTE — PROGRESS NOTES
Alert and oriented x4. Very [place]. Lungs clear. Heparin drip adjusted per  Protocol. Xa 1.03. held drip for 60 min. Decreased to 7 units/kg/hr. Updated grand daughter via phone.   Electronically signed by Rashaad Quevedo RN on 1/29/2021 at 6:46 PM

## 2021-01-29 NOTE — PROGRESS NOTES
Physical Therapy Med Surg Initial Assessment  Fac[facility]/Department[facility]  [facility]       NAME: Meghan Martínez  : 3/5/1929 (80 y.o.)  MRN: [id number]  CODE STATUS: Full Code    Date of Service: 2021    Patient Diagnosis(es): Femoral artery occlusion [facility]) [I70.209]   Chief Complaint   Patient presents with    Leg Pain     Right     Patient Active Problem List    Diagnosis Date Noted    Anginal equivalent ([facility]      Priority: High    Venous thrombosis of leg 2021    Hyponatremia 2021    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    Syncope and collapse 2020    COPD exacerbation ([address] 2020    NSTEMI (non-ST elevated myocardial infarction) ([address] 2019    Chest pain 2019    Hypotension 2019    SOB (shortness of breath) 2018    COPD with acute exacerbation ([address] 2018    Bronchitis 2016    CAD (coronary artery disease) 2015    HTN (hypertension)     Dyslipidemia     DM (diabetes mellitus) ([address]     Hypothyroidism     History of tobacco abuse     Anemia         Past Medical History:   Diagnosis Date    Anemia     CAD (coronary artery disease) 2015    DM (diabetes mellitus) ([address]     Dyslipidemia     History of tobacco abuse     HTN (hypertension)     Hypothyroidism     Non-ST elevation MI (NSTEMI) ([address]     Pacemaker 2015     Past Surgical History:   Procedure Laterality Date    CATARACT REMOVAL      MIDDLE EAR SURGERY Left     \"they had to reset the bones\" after an infection       Chart Reviewed: Yes  Patient assessed for rehabilitation services?: Yes  Family / Caregiver Present: No  General Comment  Comments: Pt laying in bed, agreeable to PT eval    Restrictions:  Restrictions/Precautions: Fall Risk(moderate fall risk per lees score)     SUBJECTIVE: Subjective: Pt reports that he has not ate lunch yet and is hungry.  RN notified and allowed pudding and diet soda, provided to pt while he waits for lunch try    Pain  Pre Treatment Pain Screening  Pain at present: 0  Scale Used: Numeric Score    Post Treatment Pain Screening:   Pain Screening  Patient Currently in Pain: No  Pain Assessment  Pain Level: 0    Prior Level of Function:  Social/Functional History  Lives With: Daughter(Granddaughter and her family)  Type of Home: House  Home Layout: Two level, Bed/Bath upstairs  Home Access: Stairs to enter with rails  Entrance Stairs - Number of Steps: 14  Bathroom Shower/Tub: Tub/Shower unit  ADL Assistance: (Pt. unclear in answer; however, states he is an independent person)  Homemaking Assistance: (Granddaughter completes)  Ambulation Assistance: Independent(No AD)  Transfer Assistance: Independent  Active :  Yes  Additional Comments: Pt. significantly [place]; information gathered via writing but some difficulty noted in clarifying questions    OBJECTIVE:   Vision: Within Functional Limits  Hearing: Within functional limits    Cognition:  Overall Orientation Status: Within Functional Limits  Follows Commands: Within Functional Limits    Observation/Palpation  Posture: Fair(age related flexion changes, increased T kyphosis)  Observation: pleasant, cooperative, no acute distress noted    ROM:  RLE General PROM: impaired hip flexor and hamstring flexibility  RLE AROM: WFL  LLE General PROM: impaired hip flexor and hamstring flexibility  LLE AROM : WFL    Strength:  Strength RLE  Comment: functionally >/= 3+/5  Strength LLE  Comment: functionally >/= 3+/5    Neuro:  Balance  Posture: Fair  Sitting - Static: Good(supervision)  Sitting - Dynamic: Good;-  Standing - Static: Fair(CGA)  Standing - Dynamic: Fair(CGA)     Tone RLE  RLE Tone: Normotonic  Tone LLE  LLE Tone: Normotonic  Motor Control  Gross Motor?: WFL  Sensation  Overall Sensation Status: WFL    Bed mobility  Supine to Sit: Stand by assistance  Sit to Supine: Unable to assess  Comment: pt up to bed side chair upon departure; pt requires increased time and effort    Transfers  Sit to Stand: Contact guard assistance  Stand to sit: Contact guard assistance  Bed to Chair: Contact guard assistance    Ambulation  Ambulation?: Yes  Ambulation 1  Surface: level tile  Device: No Device  Assistance: Contact guard assistance  Quality of Gait: flexed posture  Gait Deviations: Decreased step length;Shuffles;Decreased step height  Distance: 3ft  Comments: turning and sidesteps to bedside chair, increased time and effort, VC for safe hand placement with approach to chair    Activity Tolerance  Activity Tolerance: Patient Tolerated treatment well      PT Education  PT Education: Goals;PT Role;Plan of Care    ASSESSMENT:   Body structures, Functions, Activity limitations: Decreased functional mobility ; Decreased balance;Decreased strength;Decreased posture  Decision Making: Medium Complexity  History: med  Exam: med  Clinical Presentation: med    Prognosis: Good  Barriers to Learning: none    DISCHARGE RECOMMENDATIONS:  Discharge Recommendations: Continue to assess pending progress, Patient would benefit from continued therapy after discharge    Assessment: Pt demonstrates the above deficits and decline in functional mobility status placing them at increased risk for falls. Pt would benefit from physical therapy to address above deficits and allow for safe return home at highest level of function, decrease risk for falls, and improve QOL.     REQUIRES PT FOLLOW UP: Yes      PLAN OF CARE:  Plan  Times per week: 3-6  Current Treatment Recommendations: Strengthening, Functional Mobility Training, Neuromuscular Re-education, Home Exercise Program, Equipment Evaluation, Education, & procurement, Modalities, Safety Education & Training, Gait Training, Transfer Training, Balance Training, Stair training, Patient/Caregiver Education & Training, Positioning  Safety Devices  Type of devices: Left in chair, Chair alarm in place, Call light within reach    Goals:  Patient goals Calin Henning better, go home\"  Long term goals  Long term goal 1: Bed mobility with indep  Long term goal 2: Functional transfers with indep  Long term goal 3: amb >/= 100ft with LRAD vs no AD and indep  Long term goal 4: Pt will negotiate >/=12 steps with handrails and SBA to navigate home environment    [facility] (6 CLICK) BASIC MOBILITY  AM-PAC Inpatient Mobility Raw Score : 16     Therapy Time:   Individual   Time In 1348   Time Out 1405   Minutes [address] [place] [address], 01/29/21 at 2:55 PM       Definitions for assistance levels  Independent = pt does not require any physical supervision or assistance from another person for activity completion. Device may be needed.   Stand by assistance = pt requires verbal cues or instructions from another person, close to but not touching, to perform the activity  Minimal assistance= pt performs 75% or more of the activity; assistance is required to complete the activity  Moderate assistance= pt performs 50% of the activity; assistance is required to complete the activity  Maximal assistance = pt performs 25% of the activity; assistance is required to complete the activity  Dependent = pt requires total physical assistance to accomplish the task

## 2021-01-29 NOTE — PROGRESS NOTES
Progress Note  Patient: Salvador Viramontes  Unit/Bed: IC11/IC11-01  YOB: 1929  MRN: [id number]  Acct: [de-identified]   Admitting Diagnosis: Femoral artery occlusion [facility]) [I70.209]  Date:  1/26/2021  Hospital Day: 3    Chief Complaint:  Right leg pain and swelling    Subjective  S/p right common and external iliac stent yesterday. Patient states pain and swelling much improved. Physical Examination:    BP (!) 137/47   Pulse 64   Temp 97.6 °F (36.4 °C) (Oral)   Resp 17   Ht 5' 7\" (1.702 m)   Wt 173 lb 8 oz (78.7 kg)   SpO2 95%   BMI 27.17 kg/m²    Physical Exam  Right leg edema improved. No hematoma at popliteal access site. Right dp biphasic.     LABS:  CBC:   Lab Results   Component Value Date    WBC 8.9 01/29/2021    RBC 3.19 01/29/2021    HGB 9.9 01/29/2021    HCT 30.3 01/29/2021    MCV 95.2 01/29/2021    MCH 30.9 01/29/2021    MCHC 32.5 01/29/2021    RDW 13.7 01/29/2021     01/29/2021    MPV 9.0 07/29/2014     CBC with Differential:   Lab Results   Component Value Date    WBC 8.9 01/29/2021    RBC 3.19 01/29/2021    HGB 9.9 01/29/2021    HCT 30.3 01/29/2021     01/29/2021    MCV 95.2 01/29/2021    MCH 30.9 01/29/2021    MCHC 32.5 01/29/2021    RDW 13.7 01/29/2021    BANDSPCT 14 05/18/2016    LYMPHOPCT 20.9 01/29/2021    MONOPCT 11.1 01/29/2021    BASOPCT 0.3 01/29/2021    MONOSABS 1.0 01/29/2021    LYMPHSABS 1.9 01/29/2021    EOSABS 0.2 01/29/2021    BASOSABS 0.0 01/29/2021     CMP:    Lab Results   Component Value Date     01/29/2021    K 4.4 01/29/2021     01/29/2021    CO2 23 01/29/2021    BUN 29 01/29/2021    CREATININE 1.57 01/29/2021    GFRAA 50.3 01/29/2021    LABGLOM 41.5 01/29/2021    GLUCOSE 135 01/29/2021    PROT 5.4 01/29/2021    LABALBU 3.1 01/29/2021    CALCIUM 7.6 01/29/2021    BILITOT 0.6 01/29/2021    ALKPHOS 50 01/29/2021    AST 12 01/29/2021    ALT 8 01/29/2021     BMP:    Lab Results   Component Value Date     01/29/2021    K 4.4 01/29/2021     01/29/2021    CO2 23 01/29/2021    BUN 29 01/29/2021    LABALBU 3.1 01/29/2021    CREATININE 1.57 01/29/2021    CALCIUM 7.6 01/29/2021    GFRAA 50.3 01/29/2021    LABGLOM 41.5 01/29/2021    GLUCOSE 135 01/29/2021     Anti-XA Unfrac Heparin 0.97  IU/mL Final 01/29/2021  9:19 AM [facility] - [facility] Lab         Radiology:  No results found. Assessment:    Active Hospital Problems    Diagnosis Date Noted    Venous thrombosis of leg [I82.90] 01/27/2021    Hyponatremia [E87.1] 01/27/2021    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[facility] [I70.209] 01/26/2021    COPD exacerbation ([facility] [J44.1] 02/13/2020    CAD (coronary artery disease) [I25.10] 04/16/2015    HTN (hypertension) [I10]     DM (diabetes mellitus) ([facility] [E11.9]     Dyslipidemia [E78.5]     Hypothyroidism [E03.9]            Plan:  1. Ok to transition to coumadin today. 2. Heparin anti-xa elevated. D/w nursing to decrease heparin gtt per protocol. 3. PT to eval.  4. Currently no indication for arterial intervention as patient does not have lifestyle limiting claudication, rest pain, or tissue loss.     Electronically signed by Tiara Winkler MD on 1/29/2021 at 11:20 AM

## 2021-01-30 LAB
ALBUMIN SERPL-MCNC: 2.8 G/DL (ref 3.5–4.6)
ALP BLD-CCNC: 42 U/L (ref 35–104)
ALT SERPL-CCNC: 8 U/L (ref 0–41)
ANION GAP SERPL CALCULATED.3IONS-SCNC: 13 MEQ/L (ref 9–15)
ANTI-XA UNFRAC HEPARIN: 0.25 IU/ML
ANTI-XA UNFRAC HEPARIN: 0.41 IU/ML
ANTI-XA UNFRAC HEPARIN: 0.42 IU/ML
ANTI-XA UNFRAC HEPARIN: 0.48 IU/ML
AST SERPL-CCNC: 11 U/L (ref 0–40)
BASOPHILS ABSOLUTE: 0 K/UL (ref 0–0.2)
BASOPHILS RELATIVE PERCENT: 0.4 %
BILIRUB SERPL-MCNC: 0.6 MG/DL (ref 0.2–0.7)
BUN BLDV-MCNC: 26 MG/DL (ref 8–23)
CALCIUM SERPL-MCNC: 7.8 MG/DL (ref 8.5–9.9)
CHLORIDE BLD-SCNC: 102 MEQ/L (ref 95–107)
CO2: 20 MEQ/L (ref 20–31)
CREAT SERPL-MCNC: 1.41 MG/DL (ref 0.7–1.2)
EOSINOPHILS ABSOLUTE: 0.1 K/UL (ref 0–0.7)
EOSINOPHILS RELATIVE PERCENT: 1.8 %
GFR AFRICAN AMERICAN: 56.9
GFR NON-AFRICAN AMERICAN: 47
GLOBULIN: 2.7 G/DL (ref 2.3–3.5)
GLUCOSE BLD-MCNC: 150 MG/DL (ref 70–99)
GLUCOSE BLD-MCNC: 161 MG/DL (ref 60–115)
GLUCOSE BLD-MCNC: 168 MG/DL (ref 60–115)
GLUCOSE BLD-MCNC: 181 MG/DL (ref 60–115)
GLUCOSE BLD-MCNC: 196 MG/DL (ref 60–115)
HCT VFR BLD CALC: 26.8 % (ref 42–52)
HCT VFR BLD CALC: 26.9 % (ref 42–52)
HEMOGLOBIN: 8.8 G/DL (ref 14–18)
HEMOGLOBIN: 8.8 G/DL (ref 14–18)
LYMPHOCYTES ABSOLUTE: 1.9 K/UL (ref 1–4.8)
LYMPHOCYTES RELATIVE PERCENT: 24.5 %
MCH RBC QN AUTO: 30.5 PG (ref 27–31.3)
MCH RBC QN AUTO: 31.1 PG (ref 27–31.3)
MCHC RBC AUTO-ENTMCNC: 32.8 % (ref 33–37)
MCHC RBC AUTO-ENTMCNC: 32.9 % (ref 33–37)
MCV RBC AUTO: 93.2 FL (ref 80–100)
MCV RBC AUTO: 94.5 FL (ref 80–100)
MONOCYTES ABSOLUTE: 0.9 K/UL (ref 0.2–0.8)
MONOCYTES RELATIVE PERCENT: 11.8 %
NEUTROPHILS ABSOLUTE: 4.7 K/UL (ref 1.4–6.5)
NEUTROPHILS RELATIVE PERCENT: 61.5 %
PDW BLD-RTO: 13.1 % (ref 11.5–14.5)
PDW BLD-RTO: 13.5 % (ref 11.5–14.5)
PERFORMED ON: ABNORMAL
PLATELET # BLD: 134 K/UL (ref 130–400)
PLATELET # BLD: 134 K/UL (ref 130–400)
POTASSIUM REFLEX MAGNESIUM: 4.3 MEQ/L (ref 3.4–4.9)
RBC # BLD: 2.84 M/UL (ref 4.7–6.1)
RBC # BLD: 2.88 M/UL (ref 4.7–6.1)
SODIUM BLD-SCNC: 135 MEQ/L (ref 135–144)
TOTAL PROTEIN: 5.5 G/DL (ref 6.3–8)
WBC # BLD: 6.9 K/UL (ref 4.8–10.8)
WBC # BLD: 7.6 K/UL (ref 4.8–10.8)

## 2021-01-30 PROCEDURE — 80053 COMPREHEN METABOLIC PANEL: CPT

## 2021-01-30 PROCEDURE — 85027 COMPLETE CBC AUTOMATED: CPT

## 2021-01-30 PROCEDURE — 99232 SBSQ HOSP IP/OBS MODERATE 35: CPT | Performed by: INTERNAL MEDICINE

## 2021-01-30 PROCEDURE — 36415 COLL VENOUS BLD VENIPUNCTURE: CPT

## 2021-01-30 PROCEDURE — 6370000000 HC RX 637 (ALT 250 FOR IP): Performed by: INTERNAL MEDICINE

## 2021-01-30 PROCEDURE — 85025 COMPLETE CBC W/AUTO DIFF WBC: CPT

## 2021-01-30 PROCEDURE — 6360000002 HC RX W HCPCS: Performed by: SURGERY

## 2021-01-30 PROCEDURE — 2580000003 HC RX 258: Performed by: NURSE PRACTITIONER

## 2021-01-30 PROCEDURE — 94640 AIRWAY INHALATION TREATMENT: CPT

## 2021-01-30 PROCEDURE — 85520 HEPARIN ASSAY: CPT

## 2021-01-30 PROCEDURE — 6370000000 HC RX 637 (ALT 250 FOR IP): Performed by: NURSE PRACTITIONER

## 2021-01-30 PROCEDURE — 94761 N-INVAS EAR/PLS OXIMETRY MLT: CPT

## 2021-01-30 PROCEDURE — 1210000000 HC MED SURG R&B

## 2021-01-30 RX ORDER — WARFARIN SODIUM 5 MG/1
5 TABLET ORAL
Status: COMPLETED | OUTPATIENT
Start: 2021-01-30 | End: 2021-01-30

## 2021-01-30 RX ADMIN — Medication 10 ML: at 08:08

## 2021-01-30 RX ADMIN — HEPARIN SODIUM AND DEXTROSE 9.02 UNITS/KG/HR: 10000; 5 INJECTION INTRAVENOUS at 21:43

## 2021-01-30 RX ADMIN — PANTOPRAZOLE SODIUM 40 MG: 40 TABLET, DELAYED RELEASE ORAL at 05:20

## 2021-01-30 RX ADMIN — WARFARIN SODIUM 5 MG: 5 TABLET ORAL at 17:11

## 2021-01-30 RX ADMIN — IPRATROPIUM BROMIDE AND ALBUTEROL SULFATE 3 ML: .5; 3 SOLUTION RESPIRATORY (INHALATION) at 08:02

## 2021-01-30 RX ADMIN — HEPARIN SODIUM 2000 UNITS: 5000 INJECTION INTRAVENOUS; SUBCUTANEOUS at 05:34

## 2021-01-30 RX ADMIN — ATORVASTATIN CALCIUM 40 MG: 40 TABLET, FILM COATED ORAL at 21:40

## 2021-01-30 RX ADMIN — IPRATROPIUM BROMIDE AND ALBUTEROL SULFATE 3 ML: .5; 3 SOLUTION RESPIRATORY (INHALATION) at 19:38

## 2021-01-30 RX ADMIN — ACETAMINOPHEN 650 MG: 325 TABLET, FILM COATED ORAL at 08:08

## 2021-01-30 RX ADMIN — IPRATROPIUM BROMIDE AND ALBUTEROL SULFATE 3 ML: .5; 3 SOLUTION RESPIRATORY (INHALATION) at 13:31

## 2021-01-30 RX ADMIN — LEVOTHYROXINE SODIUM 25 MCG: 0.03 TABLET ORAL at 05:20

## 2021-01-30 RX ADMIN — URSODIOL 300 MG: 300 CAPSULE ORAL at 08:08

## 2021-01-30 RX ADMIN — Medication 10 ML: at 21:41

## 2021-01-30 RX ADMIN — CARVEDILOL 6.25 MG: 6.25 TABLET, FILM COATED ORAL at 17:11

## 2021-01-30 RX ADMIN — ASPIRIN 81 MG: 81 TABLET, CHEWABLE ORAL at 08:08

## 2021-01-30 RX ADMIN — URSODIOL 300 MG: 300 CAPSULE ORAL at 21:40

## 2021-01-30 RX ADMIN — CARVEDILOL 6.25 MG: 6.25 TABLET, FILM COATED ORAL at 08:08

## 2021-01-30 ASSESSMENT — ENCOUNTER SYMPTOMS
RESPIRATORY NEGATIVE: 1
GASTROINTESTINAL NEGATIVE: 1
EYES NEGATIVE: 1
WHEEZING: 0
BLOOD IN STOOL: 0
STRIDOR: 0
NAUSEA: 0
CHEST TIGHTNESS: 0
SHORTNESS OF BREATH: 0
COUGH: 0

## 2021-01-30 ASSESSMENT — PAIN DESCRIPTION - LOCATION: LOCATION: LEG

## 2021-01-30 ASSESSMENT — PAIN SCALES - GENERAL
PAINLEVEL_OUTOF10: 6
PAINLEVEL_OUTOF10: 0

## 2021-01-30 ASSESSMENT — PAIN DESCRIPTION - ORIENTATION: ORIENTATION: LOWER

## 2021-01-30 ASSESSMENT — PAIN DESCRIPTION - PAIN TYPE: TYPE: OTHER (COMMENT)

## 2021-01-30 NOTE — CARE COORDINATION
Shira, granddaughter phoned. HIPAA code provided. Discussed discharge planning. She is agreeable to [facility] rehab and declines SNF. She states he is very well taken care of at home if we can get him to his prior function. She states his living space is on the 2nd floor and requires him to ambulate up stairs. Will continue to follow.

## 2021-01-30 NOTE — PROGRESS NOTES
Clinical Pharmacy Note    Meghan Martínez is a 80 y.o. male for whom pharmacy has been asked to manage warfarin therapy. Reason for Admission: Femoral artery occlusion    Consulting Physician: Dr. Camille Espinoza  Warfarin dose prior to admission: n/a  Warfarin Indication: Unprovoked LLE DVT   Target INR range: 2-3  Order for concomitant anticoagulant therapy: Heparin drip    Outpatient warfarin provider: New start warfarin    Past Medical History:   Diagnosis Date    Anemia     CAD (coronary artery disease) 4/16/2015    DM [facility]di[facility] mellitus) [facility]     Dyslipidemia     History of tobacco abuse     HTN (hypertension)     Hypothyroidism     Non-ST elevation MI (NSTEMI) ([facility]     Pacemaker 4/16/2015     Recent Labs     01/29/21  0536   INR 1.3     Recent Labs     01/28/21  1906 01/29/21  0536 01/30/21  0501   HGB 11.0* 9.9* 8.8*   HCT 32.5* 30.3* 26.8*    118* 134     Current warfarin drug-drug interactions: n/a    Current diet order/intake: cardiac diet    Date INR Warfarin Dose   01/30/21 1.3 (1/29) 5 mg                                   Patient new start to warfarin, INR 1.3 (1/29), goal 2-3 indicated for Unprovoked LLE DVT. Also receiving heparin drip. Will order 5 mg dose tonight, patient will likely require less maintenance dose due to advanced age. Daily PT/INR during pharmacy consult to monitor and dose warfarin therapy. Thank you for the consult.      Olman TuckerD.  1:39 PM 1/30/2021

## 2021-01-30 NOTE — CARE COORDINATION
Met with patient while he sitting up in bed eating breakfast. Pt is [place]. [place] of choice given. Pt agreeable to rehab, if indicated. Notified YESI Frank. Phoned granddaughter Yuly Meehan and left voicemail.

## 2021-01-30 NOTE — DISCHARGE SUMMARY
Discharge Summary    Date: 1/30/2021  Patient Name: Meghan Martínez YOB: 1929 Age: 80 y.o. Admit Date: 1/26/2021  Discharge Date: 1/23/2021  Discharge Condition: [address]    Admission Diagnosis  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 (I70.209)     Discharge Diagnosis  Principal Problem: 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HCC)Active Problems: HTN (hypertension) Dyslipidemia DM (diabetes mellitus) ([facility] Hypothyroidism  CAD (coronary artery disease)  COPD exacerbation (HCC)  Venous thrombosis of leg  HyponatremiaResolved Problems:  * No resolved hospital problems. [facility] Stay  Narrative of Hospital Course:  1/27; the DVT was unprovoked, will get oncology evaluation as out pt. Patient has severe peripheral vascular disease appreciate vascular surgery input. Will resume diet. SSI with low-dose coverage. 1 dose of IV Reglan for nausea. Zofran as needed for nausea vomiting. Lindsay Dues Resume home medications. Keep oxygen saturation above 88%. 1/28: pt is going for revasc surgery, no overnight events, pt is npo except meds will start IVF, cardiology eval for pacemaker, pt can be transferred out of ICU if ok with vascular surgery. Patient heparin was bridged with Coumadin. Patient will go to acute rehab for functional mobility. Consultants:  IP CONSULT TO VASCULAR SURGERYIP CONSULT TO CRITICAL CAREIP CONSULT TO CARDIOLOGYIP CONSULT TO REHAB/TCU ADMISSION COORDINATORIP CONSULT TO PHARMACY    Surgeries/procedures Performed:       Treatments:    IV Hydration, Cardiac Medications and Anticoagulation    Heparin    Discharge Plan/Disposition:  Home    Hospital/Incidental Findings Requiring Follow Up:    Patient Instructions:    Diet: Cardiac Diet    Activity:Activity as Tolerated  For number of days (if applicable): Other Instructions:    Provider Follow-Up:   No follow-ups on file.      Significant Diagnostic Studies:    Recent Labs:  Admission on 01/26/2021No results displayed because visit has over 200 results. ------------    Radiology last 7 days:  Cta Abdominal Aorta W Bilat Runoff W Wo ContrastResult Date: 1/27/2021RIGHT SUPERFICIAL FEMORAL ARTERY DISEASE WITH ABRUPT OCCLUSION DISTALLY, WITHOUT SIGNIFICANT RECONSTITUTION. EXTENSIVE RIGHT LOWER EXTREMITY DVT EXTENDING INTO THE RIGHT COMMON ILIAC VEIN. SUSPECT LEFT LOWER LOBE PULMONARY EMBOLI. Xr Chest PortableResult Date: 1/27/2021NO ACUTE CARDIOPULMONARY ABNORMALITY. NO CHANGE.      [unfilled]    Discharge Medications    Current Discharge Medication List    Current Discharge Medication List    Current Discharge Medication ListCONTINUE these medications which have NOT CHANGEDguaiFENesin ([place]) 600 MG extended release tabletTake 1 tablet by mouth 2 times dailyQty: 10 tablet Refills: 0lisinopril (PRINIVIL;ZESTRIL) 5 MG tabletTake 1 tablet by mouth dailyQty: 30 tablet Refills: 3carvedilol (COREG) 12.5 MG tabletTake 0.5 tablets by mouth 2 times daily (with meals)Qty: 180 tablet Refills: 0Comments: **Patient requests 90 days supply**ipratropium-albuterol (DUONEB) 0.5-2.5 (3) MG/3ML SOLN nebulizer solutionInhale 3 mLs into the lungs three times dailyQty: 100 mL Refills: 0Associated Diagnoses:COPD with acute exacerbation (HCC)atorvastatin (LIPITOR) 40 MG tabletTake 40 mg by mouth dailyursodiol (ACTIGALL) 300 MG capsuleTake 300 mg by mouth 2 times dailysitaGLIPtin (JANUVIA) 50 MG tabletTake 50 mg by mouth dailyesomeprazole Magnesium (NEXIUM) 40 MG PACKTake 40 mg by mouth dailyaspirin 81 MG tabletTake 81 mg by mouth daily. SYNTHROID 25 MCG tabletbudesonide-formoterol (SYMBICORT) 80-4.5 MCG/ACT AEROInhale 2 puffs into the lungs 2 times daily. sodium chloride (OCEAN) 0.65 % nasal spray1 spray by Nasal route 4 times daily as needed for CongestionQty: 1 Bottle Refills: 0nitroGLYCERIN (NITROSTAT) 0.4 MG SL tabletPlace 1 tablet under the tongue every 5 minutes as needed for Chest painQty: 25 tablet Refills: 0albuterol sulfate HFA (PROVENTIL HFA) 108 (90 Base) MCG/ACT inhalerInhale 2 puffs into the lungs every 6 hours as needed for HEART OF THE [facility]: 1 Inhaler Refills: 3    Current Discharge Medication List    Time Spent on Discharge:E] minutes were spent in patient examination, evaluation, counseling as well as medication reconciliation, prescriptions for required medications, discharge plan, and follow up.     Electronically signed by Karla Conway MD on 1/30/21 at 12:15 PM EST   overtime on dc summary was 45 min

## 2021-01-30 NOTE — PROGRESS NOTES
Pt. Alert and oriented x4. [place]. This student nurse utilized pen and paper, and hand signals to communicate with pt. This communication was affective. Head to toe assessment, V/S completed. Findings reported to floor nurse RN Lizz [place]. V/S WNL for baseline. Pt has IV's in the anterior left hand and left AC. Heparin continuous. Incision and dressing on anterior leg dry and intact. Pt bed in low potion, bed alarm initiated, bedside table within reach, and call light within reach. Pt has cardiology, PT/OT on consult. Pt intake was % and 240 ml of fluids with breakfast. Pt was given hygiene care, body washed, hair combed, teeth brushed, and bed linen changed. To possibly avoid further DVT this student nurse encouraged pt to move as often as possible, and find new hobbies that involve physical activity at home. Encouraged pt to possibly get out of the bed to get in chair for an hour. Pt declined at this time due to being tired from head to toe assessment, and ADL care with wash up.

## 2021-01-30 NOTE — PROGRESS NOTES
Progress Note  Patient: Anupam Diaz  Unit/Bed: J716/V111-85  YOB: 1929  MRN: [id number]  Acct: [de-identified]   Admitting Diagnosis: Femoral artery occlusion [facility]) [P93.430]  Admit Date:  1/26/2021  Hospital Day: 4    Chief Complaint: PAD CAD PPM    Histories:  Past Medical History:   Diagnosis Date    Anemia     CAD (coronary artery disease) 4/16/2015    DM [facility]diabetes mellitus[facility]     Dyslipidemia     History of tobacco abuse     HTN (hypertension)     Hypothyroidism     Non-ST elevation MI (NSTEMI) ([facility]     Pacemaker 4/16/2015     Past Surgical History:   Procedure Laterality Date    CATARACT REMOVAL      MIDDLE EAR SURGERY Left 1998    \"they had to reset the bones\" after an infection     History reviewed. No pertinent family history. Social History     Socioeconomic History    Marital status:       Spouse name: None    Number of children: None    Years of education: None    Highest education level: None   Occupational History    None   Social Needs    Financial resource strain: None    Food insecurity     Worry: None     Inability: None    Transportation needs     Medical: None     Non-medical: None   Tobacco Use    Smoking status: Former Smoker    Smokeless tobacco: Never Used   Substance and Sexual Activity    Alcohol use: No    Drug use: No    Sexual activity: None   Lifestyle    Physical activity     Days per week: None     Minutes per session: None    Stress: None   Relationships    Social connections     Talks on phone: None     Gets together: None     Attends Hoah[employer]ism service: None     Active member of club or organization: None     Attends meetings of clubs or organizations: None     Relationship status: None    Intimate partner violence     Fear of current or ex partner: None     Emotionally abused: None     Physically abused: None     Forced sexual activity: None   Other Topics Concern    None   Social History Narrative    None       Subjective/HPI very [place]. No CP no SOB ambulates eats well    EKG:SR 60-70        Review of Systems:   Review of Systems   Constitutional: Negative. Negative for diaphoresis and fatigue. HENT: Negative. Eyes: Negative. Respiratory: Negative. Negative for cough, chest tightness, shortness of breath, wheezing and stridor. Cardiovascular: Negative. Negative for chest pain, palpitations and leg swelling. Gastrointestinal: Negative. Negative for blood in stool and nausea. Genitourinary: Negative. Musculoskeletal: Negative. Skin: Negative. Neurological: Negative. Negative for dizziness, syncope, weakness and light-headedness. Hematological: Negative. Psychiatric/Behavioral: Negative. Physical Examination:    BP (!) 118/43   Pulse 62   Temp 98.1 °F (36.7 °C) (Oral)   Resp 18   Ht 5' 7\" (1.702 m)   Wt 173 lb 8 oz (78.7 kg)   SpO2 94%   BMI 27.17 kg/m²    Physical Exam   Constitutional: He appears healthy. No distress. HENT:   Normal cephalic and Atraumatic   Eyes: Pupils are equal, round, and reactive to light. Neck: Normal range of motion and thyroid normal. Neck supple. No JVD present. No neck adenopathy. No thyromegaly present. Cardiovascular: Normal rate and regular rhythm. Exam reveals decreased pulses. Murmur heard. Pulmonary/Chest: Effort normal and breath sounds normal. He has no wheezes. He has no rales. He exhibits no tenderness. Abdominal: Soft. Bowel sounds are normal. There is no abdominal tenderness. Musculoskeletal: Normal range of motion. General: No tenderness or edema. Neurological: He is alert and oriented to person, place, and time. Skin: Skin is warm. No cyanosis. Nails show no clubbing.        LABS:  CBC:   Lab Results   Component Value Date    WBC 7.6 01/30/2021    RBC 2.88 01/30/2021    HGB 8.8 01/30/2021    HCT 26.8 01/30/2021    MCV 93.2 01/30/2021    MCH 30.5 01/30/2021    MCHC 32.8 01/30/2021    RDW 13.1 01/30/2021     01/30/2021 MPV 9.0 07/29/2014     CBC with Differential:    Lab Results   Component Value Date    WBC 7.6 01/30/2021    RBC 2.88 01/30/2021    HGB 8.8 01/30/2021    HCT 26.8 01/30/2021     01/30/2021    MCV 93.2 01/30/2021    MCH 30.5 01/30/2021    MCHC 32.8 01/30/2021    RDW 13.1 01/30/2021    BANDSPCT 14 05/18/2016    LYMPHOPCT 24.5 01/30/2021    MONOPCT 11.8 01/30/2021    BASOPCT 0.4 01/30/2021    MONOSABS 0.9 01/30/2021    LYMPHSABS 1.9 01/30/2021    EOSABS 0.1 01/30/2021    BASOSABS 0.0 01/30/2021     CMP:    Lab Results   Component Value Date     01/30/2021    K 4.3 01/30/2021     01/30/2021    CO2 20 01/30/2021    BUN 26 01/30/2021    CREATININE 1.41 01/30/2021    GFRAA 56.9 01/30/2021    LABGLOM 47.0 01/30/2021    GLUCOSE 150 01/30/2021    PROT 5.5 01/30/2021    LABALBU 2.8 01/30/2021    CALCIUM 7.8 01/30/2021    BILITOT 0.6 01/30/2021    ALKPHOS 42 01/30/2021    AST 11 01/30/2021    ALT 8 01/30/2021     BMP:    Lab Results   Component Value Date     01/30/2021    K 4.3 01/30/2021     01/30/2021    CO2 20 01/30/2021    BUN 26 01/30/2021    LABALBU 2.8 01/30/2021    CREATININE 1.41 01/30/2021    CALCIUM 7.8 01/30/2021    GFRAA 56.9 01/30/2021    LABGLOM 47.0 01/30/2021    GLUCOSE 150 01/30/2021     Magnesium:    Lab Results   Component Value Date    MG 2.1 07/08/2020     Troponin:    Lab Results   Component Value Date    TROPONINI 0.030 01/26/2021        Active Hospital Problems    Diagnosis Date Noted    Venous thrombosis of leg [I82.90] 01/27/2021     Priority: Low    Hyponatremia [E87.1] 01/27/2021     Priority: Low    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[facility] [I70.209] 01/26/2021     Priority: Low    COPD exacerbation ([facility] [J44.1] 02/13/2020     Priority: Low    CAD (coronary artery disease) [I25.10] 04/16/2015     Priority: Low    HTN (hypertension) [I10]      Priority: Low    DM (diabetes mellitus) ([facility] [E11.9]      Priority: Low    Dyslipidemia [E78.5]      Priority: Low    Hypothyroidism [E03.9]      Priority: Low        Assessment/Plan:  1. PAD- s/p RLE Revasc stable  2. Unprovoked LLE DVT - on Heparin gtt and warfarin started. 3. CAD CABG stable no angina. Continue asa BB Statin   4. HTN Stable  5. PPM stable   6. LVEF 60%  7.  Will need Rehab       Electronically signed by Jeremy Ng MD on 1/30/2021 at 10:56 AM

## 2021-01-30 NOTE — CARE COORDINATION
Clinicals for prior authorization to Acute Inpatient Rehab successfully faxed to [facility], Ref# [id number]. Will continue to monitor for response from [facility].  Electronically signed by Antolin Oakley RN on 1/30/21 at 11:49 AM EST

## 2021-01-30 NOTE — CONSULTS
2.8 01/30/2021    BILITOT 0.6 01/30/2021    ALKPHOS 42 01/30/2021    AST 11 01/30/2021    ALT 8 01/30/2021     Lab Results   Component Value Date    WBC 6.9 01/30/2021    RBC 2.84 01/30/2021    HGB 8.8 01/30/2021    HCT 26.9 01/30/2021    MCV 94.5 01/30/2021    MCH 31.1 01/30/2021    MCHC 32.9 01/30/2021    RDW 13.5 01/30/2021     01/30/2021    MPV 9.0 07/29/2014     No results found for: VITD25  Lab Results   Component Value Date    COLORU Yellow 07/06/2019    NITRU Negative 07/06/2019    GLUCOSEU Negative 07/06/2019    KETUA Negative 07/06/2019    UROBILINOGEN 0.2 07/06/2019    BILIRUBINUR Negative 07/06/2019     Lab Results   Component Value Date    PROTIME 16.2 01/29/2021     Lab Results   Component Value Date    INR 1.3 01/29/2021         I discussed results with patient. Current Rehabilitation Assessments:    Rehabilitation:  Physical therapy: FIMS:  BedMobility:      Transfers: Sit to Stand: Contact guard assistance  Stand to sit: Contact guard assistance  Bed to Chair: Contact guard assistance, Ambulation 1  Surface: level tile  Device: No Device  Assistance: Contact guard assistance  Quality of Gait: flexed posture  Gait Deviations: Decreased step length, Shuffles, Decreased step height  Distance: 3ft  Comments: turning and sidesteps to bedside chair, increased time and effort, VC for safe hand placement with approach to chair,      FIMS: ,  ,       Occupational therapy: FIMS:   ,  , Assessment: Pt is a 80year old man from home with family who presents to [facility] with the above deficits which impact his ability to perform ADLs and IADLs. Pt. limited d/t fatigue and LE discomfort. Pt. would benefit from continued OT to maximize independence and safety with ADL tasks.       ADL  Feeding: Independent (01/29/21 1614)  Grooming: Supervision (01/29/21 1614)  UE Bathing: Stand by assistance (01/29/21 1614)  LE Bathing: Minimal assistance (01/29/21 1614)  UE Dressing: Stand by assistance (01/29/21 1659)  LE Dressing: Minimal assistance (01/29/21 1614)  Toileting: Minimal assistance (01/29/21 1614)  Additional Comments: Simulated ADLs as above. Pt. demonstrates mild LOB d/t LE pain and decreased overall mobility d/t feeling weak (01/29/21 1614)  OCCUPATIONAL THERAPY  Hand Dominance: Right  ADL  Feeding: Independent (01/29/21 1614)  Grooming: Supervision (01/29/21 1614)  UE Bathing: Stand by assistance (01/29/21 1614)  LE Bathing: Minimal assistance (01/29/21 1614)  UE Dressing: Stand by assistance (01/29/21 1614)  LE Dressing: Minimal assistance (01/29/21 1614)  Toileting: Minimal assistance (01/29/21 1614)  Additional Comments: Simulated ADLs as above.  Pt. demonstrates mild LOB d/t LE pain and decreased overall mobility d/t feeling weak (01/29/21 1614)  Toilet Transfers  Toilet Transfer: Unable to assess (01/29/21 1616)  Toilet Transfers Comments: Anticipate CGA based on other mobility (01/29/21 1616)            LTG:                 Speech therapy: FIMS:          Past Medical History:          Diagnosis Date    Anemia     CAD (coronary artery disease) 4/16/2015    [facility]     Dyslipidemia     History of tobacco abuse     HTN (hypertension)     Hypothyroidism     Non-ST elevation [facility] (NSTEMI) [facility]     Pacemaker 4/16/2015         PastSurgical History:          Procedure Laterality Date    CATARACT REMOVAL      MIDDLE EAR SURGERY Left 1998    \"they had to reset the bones\" after an infection         Allergies:   No Known Allergies     CurrentMedications:     Current Facility-Administered Medications: warfarin (COUMADIN) tablet 5 mg, 5 mg, Oral, Once  warfarin (COUMADIN) daily dosing (placeholder), , Other, RX Placeholder  heparin (porcine) injection 5,980 Units, 80 Units/kg, Intravenous, PRN  heparin (porcine) injection 2,990 Units, 40 Units/kg, Intravenous, PRN  heparin (porcine) injection 4,700 Units, 60 Units/kg, Intravenous, Once  heparin (porcine) injection 4,700 Units, 60 Units/kg, Intravenous, PRN  heparin (porcine) injection 2,350 Units, 30 Units/kg, Intravenous, PRN  heparin 25,000 units in dextrose 5% 250 mL (premix) infusion, 5-30 Units/kg/hr, Intravenous, Continuous  insulin lispro (HUMALOG) injection vial 0-12 Units, 0-12 Units, Subcutaneous, 4x Daily AC & HS  sodium chloride flush 0.9 % injection 10 mL, 10 mL, Intravenous, 2 times per day  sodium chloride flush 0.9 % injection 10 mL, 10 mL, Intravenous, PRN  promethazine (PHENERGAN) tablet 12.5 mg, 12.5 mg, Oral, Q6H PRN **OR** ondansetron (ZOFRAN) injection 4 mg, 4 mg, Intravenous, Q6H PRN  polyethylene glycol (GLYCOLAX) packet 17 g, 17 g, Oral, Daily PRN  acetaminophen (TYLENOL) tablet 650 mg, 650 mg, Oral, Q6H PRN **OR** acetaminophen (TYLENOL) suppository 650 mg, 650 mg, Rectal, Q6H PRN  glucose (GLUTOSE) 40 % oral gel 15 g, 15 g, Oral, PRN  dextrose 50 % IV solution, 12.5 g, Intravenous, PRN  glucagon (rDNA) injection 1 mg, 1 mg, Intramuscular, PRN  dextrose 5 % solution, 100 mL/hr, Intravenous, PRN  hydrALAZINE (APRESOLINE) injection 10 mg, 10 mg, Intravenous, Q6H PRN  aluminum & magnesium hydroxide-simethicone (MAALOX) 200-200-20 MG/5ML suspension 30 mL, 30 mL, Oral, Q6H PRN  pantoprazole (PROTONIX) tablet 40 mg, 40 mg, Oral, QAM AC  aspirin chewable tablet 81 mg, 81 mg, Oral, Daily  atorvastatin (LIPITOR) tablet 40 mg, 40 mg, Oral, Nightly  carvedilol (COREG) tablet 6.25 mg, 6.25 mg, Oral, BID WC  levothyroxine (SYNTHROID) tablet 25 mcg, 25 mcg, Oral, Daily  ursodiol (ACTIGALL) capsule 300 mg, 300 mg, Oral, BID  ipratropium-albuterol (DUONEB) nebulizer solution 3 mL, 3 mL, Inhalation, TID  albuterol sulfate  (90 Base) MCG/ACT inhaler 2 puff, 2 puff, Inhalation, Q6H PRN  0.9 % sodium chloride bolus, 1,000 mL, Intravenous, Once      Social History:    Social History     Socioeconomic History    Marital status:       Spouse name: Not on file    Number of children: Not on file    Years of education: Not on file    SpO2 95%   BMI 27.17 kg/m²   I/O:   PO/Intake:  fair PO intake, no problems observed or reported. Bowel/Bladder:  continent, no problems noted. General:  Patient is well developed, adequately nourished, non-obese and     well kempt. HEENT:    PERRLA, hearing intact to loud voice, external inspection of ear     and nose benign. Inspection of lips, tongue and gums benign  Musculoskeletal: No significant change in strength or tone. All joints stable. Inspection and palpation of digits and nails show no clubbing,       cyanosis or inflammatory conditions. Neuro/Psychiatric: Affect: flat but pleasant. Alert and oriented to person, place and     Situation with    cues. No significant change in deep tendon reflexes or     sensation  Lungs:  Diminished, CTA-B. Respiration effort is normal at rest.     Heart:   S1 = S2, RRR. No loud murmurs. Abdomen:  Soft, non-tender, no enlargement of liver or spleen. Extremities:  No significant lower extremity edema or tenderness. Skin:   Intact to general survey, no visualized or palpated problems. Rehabilitation:  Physical therapy:   Bed Mobility:      Transfers: Sit to Stand: Contact guard assistance  Stand to sit: Contact guard assistance  Bed to Chair: Contact guard assistance, Ambulation 1  Surface: level tile  Device: No Device  Assistance: Contact guard assistance  Quality of Gait: flexed posture  Gait Deviations: Decreased step length, Shuffles, Decreased step height  Distance: 3ft  Comments: turning and sidesteps to bedside chair, increased time and effort, VC for safe hand placement with approach to chair,      FIMS:  ,  ,     Occupational therapy:    ,  , Assessment: Pt is a 80year old man from home with family who presents to [address] [place] [address] with the above deficits which impact his ability to perform ADLs and IADLs. Pt. limited d/t fatigue and LE discomfort.  Pt. would benefit from continued OT to maximize independence and safety with ADL tasks.     Speech therapy:              Diagnostics:    Recent Results (from the past 24 hour(s))   POCT Glucose    Collection Time: 01/29/21  9:41 PM   Result Value Ref Range    POC Glucose 169 (H) 60 - 115 mg/dl    Performed on ACCU-CHEK    HEPARIN LEVEL/ANTI-XA    Collection Time: 01/29/21 10:37 PM   Result Value Ref Range    Anti-XA Unfrac Heparin 0.50 IU/mL   HEPARIN LEVEL/ANTI-XA    Collection Time: 01/30/21  5:01 AM   Result Value Ref Range    Anti-XA Unfrac Heparin 0.25 IU/mL   Comprehensive Metabolic Panel w/ Reflex to MG    Collection Time: 01/30/21  5:01 AM   Result Value Ref Range    Sodium 135 135 - 144 mEq/L    Potassium reflex Magnesium 4.3 3.4 - 4.9 mEq/L    Chloride 102 95 - 107 mEq/L    CO2 20 20 - 31 mEq/L    Anion Gap 13 9 - 15 mEq/L    Glucose 150 (H) 70 - 99 mg/dL    BUN 26 (H) 8 - 23 mg/dL    CREATININE 1.41 (H) 0.70 - 1.20 mg/dL    GFR Non- 47.0 (L) >60    GFR  56.9 (L) >60    Calcium 7.8 (L) 8.5 - 9.9 mg/dL    Total Protein 5.5 (L) 6.3 - 8.0 g/dL    Albumin 2.8 (L) 3.5 - 4.6 g/dL    Total Bilirubin 0.6 0.2 - 0.7 mg/dL    Alkaline Phosphatase 42 35 - 104 U/L    ALT 8 0 - 41 U/L    AST 11 0 - 40 U/L    Globulin 2.7 2.3 - 3.5 g/dL   CBC auto differential    Collection Time: 01/30/21  5:01 AM   Result Value Ref Range    WBC 7.6 4.8 - 10.8 K/uL    RBC 2.88 (L) 4.70 - 6.10 M/uL    Hemoglobin 8.8 (L) 14.0 - 18.0 g/dL    Hematocrit 26.8 (L) 42.0 - 52.0 %    MCV 93.2 80.0 - 100.0 fL    MCH 30.5 27.0 - 31.3 pg    MCHC 32.8 (L) 33.0 - 37.0 %    RDW 13.1 11.5 - 14.5 %    Platelets 929 753 - 587 K/uL    Neutrophils % 61.5 %    Lymphocytes % 24.5 %    Monocytes % 11.8 %    Eosinophils % 1.8 %    Basophils % 0.4 %    Neutrophils Absolute 4.7 1.4 - 6.5 K/uL    Lymphocytes Absolute 1.9 1.0 - 4.8 K/uL    Monocytes Absolute 0.9 (H) 0.2 - 0.8 K/uL    Eosinophils Absolute 0.1 0.0 - 0.7 K/uL    Basophils Absolute 0.0 0.0 - 0.2 K/uL   POCT Glucose    Collection Time: 01/30/21  7:51 exacerbation ([address]    Syncope and collapse    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HCC)    Venous thrombosis of leg    Hyponatremia             Recommendations:    1. Considering all of the factors aboveincluding the patient's current medical status, social status/home envirnment, their functional needs and their ability to participate in a therapy program, I feel that they would best be served at    [facility]. It is my opinion that they will be able to tolerate and benefit from 3 hours of therapy a day. I reviewed the varous local options re these levels of care with the patient and family. 2. Nursing care to focus on bowel and bladder issues. 3. Vitamin B12 shot times one  4. Improve hydration and Nutrition by adding Proteinex and push PO fluids       It was my pleasure to evaluate Chey Lomax today. Please call [phone] with questions.     Sonja Haider MD

## 2021-01-30 NOTE — PROGRESS NOTES
Patient assessed and charted on by this RN. Patient is very hard of hearing. He is able to tell me his name, birthday, and where he is. Heparin drip has one therapeutic at this time. Redraw at 1730. Also will be started on coumadin. Still waiting on rehab precert. No new complaints at this time.

## 2021-01-31 LAB
ALBUMIN SERPL-MCNC: 2.7 G/DL (ref 3.5–4.6)
ALP BLD-CCNC: 42 U/L (ref 35–104)
ALT SERPL-CCNC: 9 U/L (ref 0–41)
ANION GAP SERPL CALCULATED.3IONS-SCNC: 14 MEQ/L (ref 9–15)
ANTI-XA UNFRAC HEPARIN: 0.32 IU/ML
AST SERPL-CCNC: 12 U/L (ref 0–40)
BASOPHILS ABSOLUTE: 0 K/UL (ref 0–0.2)
BASOPHILS RELATIVE PERCENT: 0.5 %
BILIRUB SERPL-MCNC: 0.6 MG/DL (ref 0.2–0.7)
BUN BLDV-MCNC: 24 MG/DL (ref 8–23)
CALCIUM SERPL-MCNC: 8 MG/DL (ref 8.5–9.9)
CHLORIDE BLD-SCNC: 102 MEQ/L (ref 95–107)
CO2: 19 MEQ/L (ref 20–31)
CREAT SERPL-MCNC: 1.31 MG/DL (ref 0.7–1.2)
EOSINOPHILS ABSOLUTE: 0.2 K/UL (ref 0–0.7)
EOSINOPHILS RELATIVE PERCENT: 3 %
GFR AFRICAN AMERICAN: >60
GFR NON-AFRICAN AMERICAN: 51.2
GLOBULIN: 3 G/DL (ref 2.3–3.5)
GLUCOSE BLD-MCNC: 136 MG/DL (ref 70–99)
GLUCOSE BLD-MCNC: 143 MG/DL (ref 60–115)
GLUCOSE BLD-MCNC: 144 MG/DL (ref 60–115)
GLUCOSE BLD-MCNC: 177 MG/DL (ref 60–115)
GLUCOSE BLD-MCNC: 209 MG/DL (ref 60–115)
HCT VFR BLD CALC: 26.8 % (ref 42–52)
HEMOGLOBIN: 8.8 G/DL (ref 14–18)
INR BLD: 1.1
LYMPHOCYTES ABSOLUTE: 1.6 K/UL (ref 1–4.8)
LYMPHOCYTES RELATIVE PERCENT: 24.2 %
MCH RBC QN AUTO: 30.6 PG (ref 27–31.3)
MCHC RBC AUTO-ENTMCNC: 32.8 % (ref 33–37)
MCV RBC AUTO: 93.3 FL (ref 80–100)
MONOCYTES ABSOLUTE: 0.8 K/UL (ref 0.2–0.8)
MONOCYTES RELATIVE PERCENT: 11.8 %
NEUTROPHILS ABSOLUTE: 4 K/UL (ref 1.4–6.5)
NEUTROPHILS RELATIVE PERCENT: 60.5 %
PDW BLD-RTO: 13.2 % (ref 11.5–14.5)
PERFORMED ON: ABNORMAL
PLATELET # BLD: 154 K/UL (ref 130–400)
POTASSIUM REFLEX MAGNESIUM: 4.3 MEQ/L (ref 3.4–4.9)
PROTHROMBIN TIME: 14.5 SEC (ref 12.3–14.9)
RBC # BLD: 2.87 M/UL (ref 4.7–6.1)
SODIUM BLD-SCNC: 135 MEQ/L (ref 135–144)
TOTAL PROTEIN: 5.7 G/DL (ref 6.3–8)
WBC # BLD: 6.7 K/UL (ref 4.8–10.8)

## 2021-01-31 PROCEDURE — 85520 HEPARIN ASSAY: CPT

## 2021-01-31 PROCEDURE — 36415 COLL VENOUS BLD VENIPUNCTURE: CPT

## 2021-01-31 PROCEDURE — 6370000000 HC RX 637 (ALT 250 FOR IP): Performed by: INTERNAL MEDICINE

## 2021-01-31 PROCEDURE — 2580000003 HC RX 258: Performed by: NURSE PRACTITIONER

## 2021-01-31 PROCEDURE — 94640 AIRWAY INHALATION TREATMENT: CPT

## 2021-01-31 PROCEDURE — 94760 N-INVAS EAR/PLS OXIMETRY 1: CPT

## 2021-01-31 PROCEDURE — 80053 COMPREHEN METABOLIC PANEL: CPT

## 2021-01-31 PROCEDURE — 99232 SBSQ HOSP IP/OBS MODERATE 35: CPT | Performed by: INTERNAL MEDICINE

## 2021-01-31 PROCEDURE — 85025 COMPLETE CBC W/AUTO DIFF WBC: CPT

## 2021-01-31 PROCEDURE — 1210000000 HC MED SURG R&B

## 2021-01-31 PROCEDURE — 94761 N-INVAS EAR/PLS OXIMETRY MLT: CPT

## 2021-01-31 PROCEDURE — 85610 PROTHROMBIN TIME: CPT

## 2021-01-31 RX ORDER — WARFARIN SODIUM 5 MG/1
5 TABLET ORAL
Status: COMPLETED | OUTPATIENT
Start: 2021-01-31 | End: 2021-01-31

## 2021-01-31 RX ADMIN — PANTOPRAZOLE SODIUM 40 MG: 40 TABLET, DELAYED RELEASE ORAL at 05:43

## 2021-01-31 RX ADMIN — IPRATROPIUM BROMIDE AND ALBUTEROL SULFATE 3 ML: .5; 3 SOLUTION RESPIRATORY (INHALATION) at 19:26

## 2021-01-31 RX ADMIN — URSODIOL 300 MG: 300 CAPSULE ORAL at 08:51

## 2021-01-31 RX ADMIN — ATORVASTATIN CALCIUM 40 MG: 40 TABLET, FILM COATED ORAL at 19:53

## 2021-01-31 RX ADMIN — Medication 10 ML: at 19:53

## 2021-01-31 RX ADMIN — ASPIRIN 81 MG: 81 TABLET, CHEWABLE ORAL at 08:51

## 2021-01-31 RX ADMIN — Medication 10 ML: at 08:55

## 2021-01-31 RX ADMIN — CARVEDILOL 6.25 MG: 6.25 TABLET, FILM COATED ORAL at 16:56

## 2021-01-31 RX ADMIN — IPRATROPIUM BROMIDE AND ALBUTEROL SULFATE 3 ML: .5; 3 SOLUTION RESPIRATORY (INHALATION) at 11:01

## 2021-01-31 RX ADMIN — CARVEDILOL 6.25 MG: 6.25 TABLET, FILM COATED ORAL at 08:51

## 2021-01-31 RX ADMIN — URSODIOL 300 MG: 300 CAPSULE ORAL at 19:53

## 2021-01-31 RX ADMIN — WARFARIN SODIUM 5 MG: 5 TABLET ORAL at 16:56

## 2021-01-31 RX ADMIN — IPRATROPIUM BROMIDE AND ALBUTEROL SULFATE 3 ML: .5; 3 SOLUTION RESPIRATORY (INHALATION) at 07:08

## 2021-01-31 RX ADMIN — LEVOTHYROXINE SODIUM 25 MCG: 0.03 TABLET ORAL at 05:43

## 2021-01-31 ASSESSMENT — ENCOUNTER SYMPTOMS
EYES NEGATIVE: 1
BLOOD IN STOOL: 0
SHORTNESS OF BREATH: 0
NAUSEA: 1
RESPIRATORY NEGATIVE: 1
GASTROINTESTINAL NEGATIVE: 1
COUGH: 0
NAUSEA: 0
VOMITING: 0
STRIDOR: 0
WHEEZING: 0
DIARRHEA: 0
CHEST TIGHTNESS: 0

## 2021-01-31 NOTE — PROGRESS NOTES
Progress Note  Date:2021       [facility]  Patient Name:Chey Lomax     YOB: 1929     Age:89 y.o. Patient has hard time hearing. Status post surgery. Patient had embolectomy but needs iliac vein stent tomorrow. Is okay to start diet as per surgery      Subjective    Subjective:  Symptoms:  No shortness of breath, malaise, cough, chest pain, weakness, headache, chest pressure, anorexia, diarrhea or anxiety. Diet:  He reports  nausea. No vomiting. Review of Systems   Respiratory: Negative for cough and shortness of breath. Cardiovascular: Negative for chest pain. Gastrointestinal: Positive for nausea. Negative for anorexia, diarrhea and vomiting. Neurological: Negative for weakness. Objective         Vitals Last 24 Hours:  TEMPERATURE:  Temp  Av.1 °F (36.7 °C)  Min: 98.1 °F (36.7 °C)  Max: 98.1 °F (36.7 °C)  RESPIRATIONS RANGE: Resp  Av.5  Min: 18  Max: 20  PULSE OXIMETRY RANGE: SpO2  Av.7 %  Min: 95 %  Max: 98 %  PULSE RANGE: Pulse  Av  Min: 59  Max: 63  BLOOD PRESSURE RANGE: Systolic (32DAK), FVW:637 , Min:128 , JRY:001   ; Diastolic (65HKY), MGR:93, Min:34, Max:44    I/O (24Hr): Intake/Output Summary (Last 24 hours) at 2021 1028  Last data filed at 2021 0544  Gross per 24 hour   Intake 829 ml   Output 1200 ml   Net -371 ml     Objective:  General Appearance: In no acute distress and ill-appearing. Vital signs: (most recent): Blood pressure (!) 128/44, pulse 63, temperature 98.1 °F (36.7 °C), temperature source Oral, resp. rate 20, height 5' 7\" (1.702 m), weight 179 lb 12.8 oz (81.6 kg), SpO2 96 %. HEENT: Normal HEENT exam.    Lungs:  Normal effort. Heart: Normal rate. Regular rhythm. S1 normal.    Abdomen: Abdomen is soft. Bowel sounds are normal.   There is no epigastric area or suprapubic area tenderness. Pulses: Distal pulses are intact. Neurological: Patient is alert.     Pupils:  Pupils are equal, round, and reactive to light. Skin:  Warm. Labs/Imaging/Diagnostics    Labs:  CBC:  Recent Labs     01/30/21  0501 01/30/21  1424 01/31/21  0621   WBC 7.6 6.9 6.7   RBC 2.88* 2.84* 2.87*   HGB 8.8* 8.8* 8.8*   HCT 26.8* 26.9* 26.8*   MCV 93.2 94.5 93.3   RDW 13.1 13.5 13.2    134 154     CHEMISTRIES:  Recent Labs     01/29/21  0536 01/30/21  0501 01/31/21  0621    135 135   K 4.4 4.3 4.3    102 102   CO2 23 20 19*   BUN 29* 26* 24*   CREATININE 1.57* 1.41* 1.31*   GLUCOSE 135* 150* 136*     PT/INR:  Recent Labs     01/29/21  0536 01/31/21 0621   PROTIME 16.2* 14.5   INR 1.3 1.1     APTT:  Recent Labs     01/29/21 0536   APTT >150.0*     LIVER PROFILE:  Recent Labs     01/29/21  0536 01/30/21  0501 01/31/21  0621   AST 12 11 12   ALT 8 8 9   BILITOT 0.6 0.6 0.6   ALKPHOS 50 42 42       Imaging Last 24 Hours:  Cta Abdominal Aorta W Bilat Runoff W Wo Contrast    Result Date: 1/27/2021  CTA ABDOMINAL AORTA W BILAT RUNOFF W WO CONTRAST: 1/26/2021 CLINICAL HISTORY:  RLE swelling Pain . COMPARISON: CT abdomen and pelvis without contrast 12/4/2015. Spiral enhanced images were obtained of the abdominal aorta through both feet during the infusion of a total of 250 mL of Isovue 300 contrast with runoff CTA protocol. Delayed imaging was also performed. Routine and volume rendered images were obtained on a 3-dimensional workstation. All CT scans at this facility use dose modulation, iterative reconstruction, and/or weight based dosing when appropriate to reduce radiation dose to as low as reasonably achievable. FINDINGS: Moderately extensive disease with areas of flow-limiting narrowing are present within the mid to distal right superficial femoral artery, which is abruptly occluded at the abductor hiatus. Best depicted on the delayed imaging is extensive DVT throughout the right lower extremity extending proximally to the right common iliac vein.  At the most inferior aspect of the initial study, filling defects are suspected within the subsegmental left lower lobe pulmonary artery branches, suspicious for pulmonary emboli. No definite emboli are noted on the right. The visualized lung bases are clear. The abdominal aorta is normal in caliber with mild to moderate calcific plaquing. Both iliac, common femoral, and the left femoral arteries are widely patent with mild disease. Delayed imaging demonstrates patency of the left popliteal artery with extensive calcific disease of the infrapopliteal runoff, which is incompletely visualized. There is no significant reconstitution of the right popliteal or infrapopliteal arteries. The celiac, superior mesenteric and both solitary renal arteries are widely patent. Colonic diverticulosis is again noted, without evidence of diverticulitis. No other significant change from 12/4/2015 is identified. 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 EXTENSIVE RIGHT LOWER EXTREMITY DVT EXTENDING INTO THE RIGHT COMMON ILIAC VEIN. SUSPECT LEFT LOWER LOBE PULMONARY EMBOLI. Xr Chest Portable    Result Date: 1/27/2021  EXAMINATION: Portable AP ERECT view of the chest. CLINICAL HISTORY:  preop  with pain and swelling in right thigh. DATE: 1/26/2021 10:02 PM COMPARISONS: 7/8/2020 FINDINGS: There are multiple sternal sutures and mediastinal clips present. Normal cardiac silhouette. An pacemaker overlies the right hemithorax and has a lead extending into the right atrium and second lead extending into the right ventricle. Lungs are  clear without consolidation. No pleural effusion or pneumothorax. There are mild degenerative changes in spine. NO ACUTE CARDIOPULMONARY ABNORMALITY. NO CHANGE.      Assessment//Plan           Hospital Problems           Last Modified POA    * (Principal) 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[address] 1/27/2021 Yes    HTN (hypertension) 1/27/2021 Yes    Dyslipidemia 1/27/2021 Yes    DM (diabetes mellitus) ([facility] 1/27/2021 Yes    Hypothyroidism 1/27/2021 Yes    CAD (coronary artery disease) 1/27/2021 Yes    COPD exacerbation ([facility] 1/27/2021 Yes    Venous thrombosis of leg 1/27/2021 Yes    Hyponatremia 1/27/2021 Yes        Assessment & Plan    1/27; the DVT was unprovoked, will get oncology evaluation. Patient has severe peripheral vascular disease appreciate vascular surgery input. Will resume diet. SSI with low-dose coverage. 1 dose of IV Reglan for nausea. Zofran as needed for nausea vomiting. [place] Resume home medications. Keep oxygen saturation above 88%. 1/28: pt is going for revasc surgery, no overnight events, pt is npo except meds will start IVF, cardiology eval for pacemaker, pt can be transferred out of ICU if ok with vascular surgery. 1/29: Status post right common and external iliac vein stenting by vascular surgery. Patient can be transferred out of ICU. Appreciate cardiology input for checking the pacemaker. Will order PT OT.  1/31: Patient is awaiting for pre-CERT to go to acute rehab. Bridging with Coumadin started. Once INR greater than 2 will DC heparin and continue with Coumadin.   Electronically signed by Narinder Guillory MD on 1/27/21 at 1:15 PM EST

## 2021-01-31 NOTE — CARE COORDINATION
Per Availity portal, precert for Acute Rehab pending.   \"RECEIVED CLINICAL INFORMATION NOW PENDING CLINICAL REVIEW\"   Reference #[id number]  Electronically signed by Laurette Snellen, RN on 1/31/21 at 10:10 AM EST

## 2021-01-31 NOTE — PROGRESS NOTES
Progress Note  Patient: Laverne Wilson  Unit/Bed: G788/R545-84  YOB: 1929  MRN: [id number]  Acct: [de-identified]   Admitting Diagnosis: Femoral artery occlusion [facility]) [L23.724]  Admit Date:  1/26/2021  Hospital Day: 5    Chief Complaint: PAD CAD PPM    Histories:  Past Medical History:   Diagnosis Date    Anemia     CAD (coronary artery disease) 4/16/2015    DM (diabetes mellitus) ([facility]     Dyslipidemia     History of tobacco abuse     HTN (hypertension)     Hypothyroidism     Non-ST elevation MI (NSTEMI) ([facility]     Pacemaker 4/16/2015     Past Surgical History:   Procedure Laterality Date    CATARACT REMOVAL      MIDDLE EAR SURGERY Left 1998    \"they had to reset the bones\" after an infection     History reviewed. No pertinent family history. Social History     Socioeconomic History    Marital status:       Spouse name: None    Number of children: None    Years of education: None    Highest education level: None   Occupational History    None   Social Needs    Financial resource strain: None    Food insecurity     Worry: None     Inability: None    Transportation needs     Medical: None     Non-medical: None   Tobacco Use    Smoking status: Former Smoker    Smokeless tobacco: Never Used   Substance and Sexual Activity    Alcohol use: No    Drug use: No    Sexual activity: None   Lifestyle    Physical activity     Days per week: None     Minutes per session: None    Stress: None   Relationships    Social connections     Talks on phone: None     Gets together: None     Attends [employer] witness service: None     Active member of club or organization: None     Attends meetings of clubs or organizations: None     Relationship status: None    Intimate partner violence     Fear of current or ex partner: None     Emotionally abused: None     Physically abused: None     Forced sexual activity: None   Other Topics Concern    None   Social History Narrative    None       Subjective/HPI very [place]. No CP no SOB ambulates eats well. Sleepy today    EKG:SR 59        Review of Systems:   Review of Systems   Constitutional: Negative. Negative for diaphoresis and fatigue. HENT: Negative. Eyes: Negative. Respiratory: Negative. Negative for cough, chest tightness, shortness of breath, wheezing and stridor. Cardiovascular: Negative. Negative for chest pain, palpitations and leg swelling. Gastrointestinal: Negative. Negative for blood in stool and nausea. Genitourinary: Negative. Musculoskeletal: Negative. Skin: Negative. Neurological: Negative. Negative for dizziness, syncope, weakness and light-headedness. Hematological: Negative. Psychiatric/Behavioral: Negative. Physical Examination:    BP (!) 128/44   Pulse 63   Temp 98.1 °F (36.7 °C) (Oral)   Resp 18   Ht 5' 7\" (1.702 m)   Wt 179 lb 12.8 oz (81.6 kg)   SpO2 94%   BMI 28.16 kg/m²    Physical Exam   Constitutional: He appears healthy. No distress. HENT:   Normal cephalic and Atraumatic   Eyes: Pupils are equal, round, and reactive to light. Neck: Normal range of motion and thyroid normal. Neck supple. No JVD present. No neck adenopathy. No thyromegaly present. Cardiovascular: Normal rate and regular rhythm. Exam reveals decreased pulses. Murmur heard. Pulmonary/Chest: Effort normal and breath sounds normal. He has no wheezes. He has no rales. He exhibits no tenderness. Abdominal: Soft. Bowel sounds are normal. There is no abdominal tenderness. Musculoskeletal: Normal range of motion. General: No tenderness or edema. Neurological: He is alert and oriented to person, place, and time. Skin: Skin is warm. No cyanosis. Nails show no clubbing.        LABS:  CBC:   Lab Results   Component Value Date    WBC 6.7 01/31/2021    RBC 2.87 01/31/2021    HGB 8.8 01/31/2021    HCT 26.8 01/31/2021    MCV 93.3 01/31/2021    MCH 30.6 01/31/2021    MCHC 32.8 01/31/2021    RDW 13.2 01/31/2021     Low    Hypothyroidism [E03.9]      Priority: Low        Assessment/Plan:  1. PAD- s/p RLE Revasc stable  2. Unprovoked LLE DVT - on Heparin gtt and warfarin started. INR 1.1 today. 3. CAD CABG stable no angina. Continue asa BB Statin   4. HTN Stable  5. PPM stable   6. LVEF 60%  7.  Will need Rehab       Electronically signed by Monserrat Luna MD on 1/31/2021 at 12:01 PM

## 2021-01-31 NOTE — PROGRESS NOTES
Clinical Pharmacy Note    Warfarin consult follow-up    Recent Labs     01/31/21  0621   INR 1.1     Recent Labs     01/30/21  0501 01/30/21  1424 01/31/21  0621   HGB 8.8* 8.8* 8.8*   HCT 26.8* 26.9* 26.8*    134 154     Current warfarin drug-drug interactions: n/a    Current diet order/intake: cardiac diet    Notes:  Warfarin Dose     01/30/21 1.3 (1/29) 5 mg   01/31/21 1.1  5 mg                                              Patient new start to warfarin, INR 1.1, goal 2-3 indicated for Unprovoked LLE DVT. Also receiving heparin drip. Will order another warfarin 5 mg dose tonight, however patient may require less maintenance dose due to advanced age. Daily PT/INR during pharmacy consult to monitor and dose warfarin therapy. Thank you,    Shawnee Tuttle PharmD.   11:51 AM 1/31/2021

## 2021-01-31 NOTE — PROGRESS NOTES
Subjective: The patient complains of ,\" moderate acute on chronic progressive fatigue and    partially relieved by rest,medications, Pt, OT, and rest and exacerbated by recent illness. I am concerned about patients medical complexities. ROS x10: The patient also complains of severely impaired mobility and activities of daily living. Otherwise no new problems with vision, hearing, nose, mouth, throat, dermal, cardiovascular, GI, , pulmonary, musculoskeletal, psychiatric or neurological. See Rehab H&P on Rehab chart dated . Vital signs:  BP (!) 128/44   Pulse 63   Temp 98.1 °F (36.7 °C) (Oral)   Resp 18   Ht 5' 7\" (1.702 m)   Wt 179 lb 12.8 oz (81.6 kg)   SpO2 94%   BMI 28.16 kg/m²   I/O:   PO/Intake:  fair PO intake, no problems observed or reported. Bowel/Bladder:  continent, no problems noted. General:  Patient is well developed, adequately nourished, non-obese and     well kempt. HEENT:    PERRLA, hearing intact to loud voice, external inspection of ear     and nose benign. Inspection of lips, tongue and gums benign  Musculoskeletal: No significant change in strength or tone. All joints stable. Inspection and palpation of digits and nails show no clubbing,       cyanosis or inflammatory conditions. Neuro/Psychiatric: Affect: flat but pleasant. Alert and oriented to person, place and     Situation with    cues. No significant change in deep tendon reflexes or     sensation  Lungs:  Diminished, CTA-B. Respiration effort is normal at rest.     Heart:   S1 = S2, RRR. No loud murmurs. Abdomen:  Soft, non-tender, no enlargement of liver or spleen. Extremities:  No significant lower extremity edema or tenderness. Skin:   Intact to general survey, no visualized or palpated problems.     Rehabilitation:  Physical therapy:   Bed Mobility:      Transfers: Sit to Stand: Contact guard assistance  Stand to sit: Contact guard assistance  Bed to Chair: Contact guard assistance, Ambulation 1  Surface: level tile  Device: No Device  Assistance: Contact guard assistance  Quality of Gait: flexed posture  Gait Deviations: Decreased step length, Shuffles, Decreased step height  Distance: 3ft  Comments: turning and sidesteps to bedside chair, increased time and effort, VC for safe hand placement with approach to chair,      FIMS:  ,  ,     Occupational therapy:    ,  , Assessment: Pt is a 80year old man from home with family who presents to [facility] with the above deficits which impact his ability to perform ADLs and IADLs. Pt. limited d/t fatigue and LE discomfort. Pt. would benefit from continued OT to maximize independence and safety with ADL tasks.     Speech therapy:        Lab/X-ray studies reviewed, analyzed and discussed with patient and staff:   Recent Results (from the past 24 hour(s))   POCT Glucose    Collection Time: 01/30/21  4:47 PM   Result Value Ref Range    POC Glucose 196 (H) 60 - 115 mg/dl    Performed on ACCU-CHEK    HEPARIN LEVEL/ANTI-XA    Collection Time: 01/30/21  6:32 PM   Result Value Ref Range    Anti-XA Unfrac Heparin 0.41 IU/mL   POCT Glucose    Collection Time: 01/30/21 10:09 PM   Result Value Ref Range    POC Glucose 181 (H) 60 - 115 mg/dl    Performed on ACCU-CHEK    Comprehensive Metabolic Panel w/ Reflex to MG    Collection Time: 01/31/21  6:21 AM   Result Value Ref Range    Sodium 135 135 - 144 mEq/L    Potassium reflex Magnesium 4.3 3.4 - 4.9 mEq/L    Chloride 102 95 - 107 mEq/L    CO2 19 (L) 20 - 31 mEq/L    Anion Gap 14 9 - 15 mEq/L    Glucose 136 (H) 70 - 99 mg/dL    BUN 24 (H) 8 - 23 mg/dL    CREATININE 1.31 (H) 0.70 - 1.20 mg/dL    GFR Non- 51.2 (L) >60    GFR  >60.0 >60    Calcium 8.0 (L) 8.5 - 9.9 mg/dL    Total Protein 5.7 (L) 6.3 - 8.0 g/dL    Albumin 2.7 (L) 3.5 - 4.6 g/dL    Total Bilirubin 0.6 0.2 - 0.7 mg/dL    Alkaline Phosphatase 42 35 - 104 U/L    ALT 9 0 - 41 U/L    AST 12 0 - 40 U/L    Globulin 3.0 2.3 - 3.5 g/dL   CBC auto differential    Collection Time: 01/31/21  6:21 AM   Result Value Ref Range    WBC 6.7 4.8 - 10.8 K/uL    RBC 2.87 (L) 4.70 - 6.10 M/uL    Hemoglobin 8.8 (L) 14.0 - 18.0 g/dL    Hematocrit 26.8 (L) 42.0 - 52.0 %    MCV 93.3 80.0 - 100.0 fL    MCH 30.6 27.0 - 31.3 pg    MCHC 32.8 (L) 33.0 - 37.0 %    RDW 13.2 11.5 - 14.5 %    Platelets 667 192 - 905 K/uL    Neutrophils % 60.5 %    Lymphocytes % 24.2 %    Monocytes % 11.8 %    Eosinophils % 3.0 %    Basophils % 0.5 %    Neutrophils Absolute 4.0 1.4 - 6.5 K/uL    Lymphocytes Absolute 1.6 1.0 - 4.8 K/uL    Monocytes Absolute 0.8 0.2 - 0.8 K/uL    Eosinophils Absolute 0.2 0.0 - 0.7 K/uL    Basophils Absolute 0.0 0.0 - 0.2 K/uL   PROTIME-INR    Collection Time: 01/31/21  6:21 AM   Result Value Ref Range    Protime 14.5 12.3 - 14.9 sec    INR 1.1    HEPARIN LEVEL/ANTI-XA    Collection Time: 01/31/21  6:21 AM   Result Value Ref Range    Anti-XA Unfrac Heparin 0.32 IU/mL   POCT Glucose    Collection Time: 01/31/21  7:55 AM   Result Value Ref Range    POC Glucose 143 (H) 60 - 115 mg/dl    Performed on ACCU-CHEK    POCT Glucose    Collection Time: 01/31/21 11:36 AM   Result Value Ref Range    POC Glucose 177 (H) 60 - 115 mg/dl    Performed on ACCU-CHEK        Cta Abdominal Aorta W Bilat Runoff W Wo Contrast    Result Date: 1/27/2021  CTA ABDOMINAL AORTA W BILAT RUNOFF W WO CONTRAST: 1/26/2021 CLINICAL HISTORY:  RLE swelling Pain . COMPARISON: CT abdomen and pelvis without contrast 12/4/2015. Spiral enhanced images were obtained of the abdominal aorta through both feet during the infusion of a total of 250 mL of Isovue 300 contrast with runoff CTA protocol. Delayed imaging was also performed. Routine and volume rendered images were obtained on a 3-dimensional workstation. All CT scans at this facility use dose modulation, iterative reconstruction, and/or weight based dosing when appropriate to reduce radiation dose to as low as reasonably achievable. FINDINGS: Moderately extensive disease with areas of flow-limiting narrowing are present within the mid to distal right superficial femoral artery, which is abruptly occluded at the abductor hiatus. Best depicted on the delayed imaging is extensive DVT throughout the right lower extremity extending proximally to the right common iliac vein. At the most inferior aspect of the initial study, filling defects are suspected within the subsegmental left lower lobe pulmonary artery branches, suspicious for pulmonary emboli. No definite emboli are noted on the right. The visualized lung bases are clear. The abdominal aorta is normal in caliber with mild to moderate calcific plaquing. Both iliac, common femoral, and the left femoral arteries are widely patent with mild disease. Delayed imaging demonstrates patency of the left popliteal artery with extensive calcific disease of the infrapopliteal runoff, which is incompletely visualized. There is no significant reconstitution of the right popliteal or infrapopliteal arteries. The celiac, superior mesenteric and both solitary renal arteries are widely patent. Colonic diverticulosis is again noted, without evidence of diverticulitis. No other significant change from 12/4/2015 is identified. 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 EXTENSIVE RIGHT LOWER EXTREMITY DVT EXTENDING INTO THE RIGHT COMMON ILIAC VEIN. SUSPECT LEFT LOWER LOBE PULMONARY EMBOLI. Xr Chest Portable    Result Date: 1/27/2021  EXAMINATION: Portable AP ERECT view of the chest. CLINICAL HISTORY:  preop  with pain and swelling in right thigh. DATE: 1/26/2021 10:02 PM COMPARISONS: 7/8/2020 FINDINGS: There are multiple sternal sutures and mediastinal clips present. Normal cardiac silhouette. An pacemaker overlies the right hemithorax and has a lead extending into the right atrium and second lead extending into the right ventricle.  Lungs are clear without consolidation. No pleural effusion or pneumothorax. There are mild degenerative changes in spine. NO ACUTE CARDIOPULMONARY ABNORMALITY. NO CHANGE. Previous extensive, complex labs, notes and diagnostics reviewed and analyzed. ALLERGIES:    Allergies as of 01/26/2021    (No Known Allergies)      (please also verify by checking [facility])     Complex Physical Medicine & Rehab Issues Assess & Plan:   1. Severe abnormality of gait and mobility and impaired self-care and ADL's secondary to progressive severe PVD Femoral artery occlusion, DVT, weakness and pain  . Functional and medical status reassessed regarding patients ability to participate in therapies and patient found to be able to participate in acute intensive comprehensive inpatient rehabilitation program including PT/OT to improve balance, ambulation, ADLs, and to improve the P/AROM. Therapeutic modifications regarding activities in therapies, place, amount of time per day and intensity of therapy made daily. In bed therapies or bedside therapies prn.   2. Bowel and Bladder dysfunction  :  frequent toileting, ambulate to bathroom with assistance, check post void residuals. Check for C.difficile x1 if >2 loose stools in 24 hours, continue bowel & bladder program.  Monitor bowel and bladder function. Lactinex 2 PO every AC. MOM prn, Brown Bomb prn, Glycerin suppository prn, enema prn. 3. Moderate   pain as well as generalized OA pain: reassess pain every shift and prior to and after each therapy session, give prn Tylenol and   See mar, modalities prn in therapy, masage, Lidoderm, K-pad prn. Consider scheduled AM pain meds. 4. Skin healing and breakdown risk:  continue pressure relief program.  Daily skin exams and reports from nursing.   5. Severe fatigue due to nutritional and hydration deficiency: Add and titrate vitamin B12 vitamin D and CoQ10 continue to monitor I&Os, calorie counts prn, dietary consult prn.  6. Acute episodic insomnia with situational adjustment disorder:  prn Ambien, monitor for day time sedation. 7. Falls risk elevated:  patient to use call light to get nursing assistance to get up, bed and chair alarm. 8. Elevated DVT risk: progressive activities in PT, continue prophylaxis BELLE hose, elevation and see mar . 9. Complex discharge planning:  Weekly team meeting every Monday Thursday to assess progress towards goals, discuss and address social, psychological and medical comorbidities and to address difficulties they may be having progressing in therapy. Patient and family education is in progress. The patient is to follow-up with their family physician after discharge. Complex Active General Medical Issues that complicate care Assess & Plan:    Patient Active Problem List   Diagnosis    HTN (hypertension)    Dyslipidemia    DM [facility]) [facility]    Hypothyroidism    History of tobacco abuse    Anemia    CAD (coronary artery disease)    Bronchitis    COPD with acute exacerbation (HCC)    SOB (shortness of breath)    Anginal equivalent (HCC)    Hypotension    Chest pain    NSTEMI (non-ST elevated myocardial infarction) ([facility]    COPD exacerbation (HCC)    Syncope and collapse    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HCC)    Venous thrombosis of leg    Hyponatremia   1.    SHUKRI Weiner MD.O., PM&R     Attending    [address]

## 2021-01-31 NOTE — PROGRESS NOTES
80year old male who presented with pain and swelling in his right leg. Found on CTA:  Extensive right lower extremity DVT extending into right Common iliac vein  Also suspect of left lower lobe pulmonary emboli  Arterial disease with occlusive areas, however, without claudication or tissue loss  In either lower extremity. Now s/p right popliteal vein access for lysis of DVT and stent placement of right iliac vein on 1/28/21    DVT has been decided as unprovoked. Also learned from nurse today that there is family hx/o \"blood clots\"  In this man's son or grandson. Doing well to day, though subjective information was difficult to obyain d/t his severe loss of hearing. Sleeping quietly and in no distress. Respirations comfortable  Denies chest pain. Tender on palpation of right medial thigh and popliteal area    Temp 98.1 HR 63 resp 18  128/44  94-98% on RA  Results for Helene Lang (MRN [id number]) as of 1/31/2021 14:51   Ref. Range 1/31/2021 06:21   Sodium Latest Ref Range: 135 - 144 mEq/L 135   Potassium Latest Ref Range: 3.4 - 4.9 mEq/L 4.3   Chloride Latest Ref Range: 95 - 107 mEq/L 102   CO2 Latest Ref Range: 20 - 31 mEq/L 19 (L)   BUN Latest Ref Range: 8 - 23 mg/dL 24 (H)   Creatinine Latest Ref Range: 0.70 - 1.20 mg/dL 1.31 (H)   Anion Gap Latest Ref Range: 9 - 15 mEq/L 14   GFR Non- Latest Ref Range: >60  51.2 (L)   GFR  Latest Ref Range: >60  >60.0   Glucose Latest Ref Range: 70 - 99 mg/dL 136 (H)     Results for Helene Lang (MRN [id number]) as of 1/31/2021 14:51   Ref.  Range 1/31/2021 06:21   WBC Latest Ref Range: 4.8 - 10.8 K/uL 6.7   RBC Latest Ref Range: 4.70 - 6.10 M/uL 2.87 (L)   Hemoglobin Quant Latest Ref Range: 14.0 - 18.0 g/dL 8.8 (L)   Hematocrit Latest Ref Range: 42.0 - 52.0 % 26.8 (L)   MCV Latest Ref Range: 80.0 - 100.0 fL 93.3   MCH Latest Ref Range: 27.0 - 31.3 pg 30.6   MCHC Latest Ref Range: 33.0 - 37.0 % 32.8 (L)   RDW Latest Ref Range: 11.5 - 14.5 % 13.2   Platelet Count Latest Ref Range: 130 - 400 K/uL 154   Neutrophils % Latest Units: % 60.5   Lymphocyte % Latest Units: % 24.2     PE\"  Neuro intact  RRR S1S2  + for murmur  CTA with diminished sounds on left  Distal extremities warm and pink with doppler signals and no tissue loss  Right popliteal access site with ecchymosis up to mid thigh. Entire area is soft but tender to palpation            A/P  Unprovoked extensive DVT treated with lysis and stenting of right common iliac vein  Doing well now POD#3  No bleed or concernng hematoma at right popliteal access. Agree with hematology/oncology consult, especially in lieu of information of family \"blood clots\"  And the patient's need for long term antocoagulation  Vascular will sign off .  Please contact if any concerns  Thank you

## 2021-02-01 ENCOUNTER — HOSPITAL ENCOUNTER (INPATIENT)
Age: 86
LOS: 12 days | Discharge: HOME | DRG: 092 | End: 2021-02-13
Attending: PHYSICAL MEDICINE & REHABILITATION | Admitting: PHYSICAL MEDICINE & REHABILITATION
Payer: MEDICARE

## 2021-02-01 VITALS
OXYGEN SATURATION: 97 % | HEIGHT: 67 IN | RESPIRATION RATE: 18 BRPM | BODY MASS INDEX: 28.22 KG/M2 | TEMPERATURE: 99.3 F | SYSTOLIC BLOOD PRESSURE: 112 MMHG | DIASTOLIC BLOOD PRESSURE: 45 MMHG | HEART RATE: 68 BPM | WEIGHT: 179.8 LBS

## 2021-02-01 DIAGNOSIS — I99.8 LIMB ISCHEMIA: ICD-10-CM

## 2021-02-01 DIAGNOSIS — I73.9 PVD (PERIPHERAL VASCULAR DISEASE) (HCC): Primary | ICD-10-CM

## 2021-02-01 LAB
ALBUMIN SERPL-MCNC: 2.8 G/DL (ref 3.5–4.6)
ALP BLD-CCNC: 46 U/L (ref 35–104)
ALT SERPL-CCNC: 11 U/L (ref 0–41)
ANION GAP SERPL CALCULATED.3IONS-SCNC: 10 MEQ/L (ref 9–15)
ANTI-XA UNFRAC HEPARIN: 0.32 IU/ML
AST SERPL-CCNC: 14 U/L (ref 0–40)
BASOPHILS ABSOLUTE: 0 K/UL (ref 0–0.2)
BASOPHILS RELATIVE PERCENT: 0.4 %
BILIRUB SERPL-MCNC: 0.7 MG/DL (ref 0.2–0.7)
BUN BLDV-MCNC: 24 MG/DL (ref 8–23)
CALCIUM SERPL-MCNC: 7.8 MG/DL (ref 8.5–9.9)
CHLORIDE BLD-SCNC: 105 MEQ/L (ref 95–107)
CO2: 21 MEQ/L (ref 20–31)
CREAT SERPL-MCNC: 1.17 MG/DL (ref 0.7–1.2)
EOSINOPHILS ABSOLUTE: 0.3 K/UL (ref 0–0.7)
EOSINOPHILS RELATIVE PERCENT: 4.1 %
GFR AFRICAN AMERICAN: >60
GFR NON-AFRICAN AMERICAN: 58.3
GLOBULIN: 2.5 G/DL (ref 2.3–3.5)
GLUCOSE BLD-MCNC: 128 MG/DL (ref 70–99)
GLUCOSE BLD-MCNC: 148 MG/DL (ref 60–115)
GLUCOSE BLD-MCNC: 164 MG/DL (ref 60–115)
GLUCOSE BLD-MCNC: 172 MG/DL (ref 60–115)
GLUCOSE BLD-MCNC: 179 MG/DL (ref 60–115)
HCT VFR BLD CALC: 24.4 % (ref 42–52)
HEMOGLOBIN: 8.1 G/DL (ref 14–18)
INR BLD: 1.1
LYMPHOCYTES ABSOLUTE: 1.9 K/UL (ref 1–4.8)
LYMPHOCYTES RELATIVE PERCENT: 27.3 %
MCH RBC QN AUTO: 30.7 PG (ref 27–31.3)
MCHC RBC AUTO-ENTMCNC: 33.1 % (ref 33–37)
MCV RBC AUTO: 92.8 FL (ref 80–100)
MONOCYTES ABSOLUTE: 0.8 K/UL (ref 0.2–0.8)
MONOCYTES RELATIVE PERCENT: 11.8 %
NEUTROPHILS ABSOLUTE: 3.9 K/UL (ref 1.4–6.5)
NEUTROPHILS RELATIVE PERCENT: 56.4 %
PDW BLD-RTO: 13.4 % (ref 11.5–14.5)
PERFORMED ON: ABNORMAL
PLATELET # BLD: 188 K/UL (ref 130–400)
POTASSIUM REFLEX MAGNESIUM: 4.2 MEQ/L (ref 3.4–4.9)
PROTHROMBIN TIME: 14.5 SEC (ref 12.3–14.9)
RBC # BLD: 2.63 M/UL (ref 4.7–6.1)
SARS-COV-2, NAAT: NOT DETECTED
SODIUM BLD-SCNC: 136 MEQ/L (ref 135–144)
TOTAL PROTEIN: 5.3 G/DL (ref 6.3–8)
WBC # BLD: 6.9 K/UL (ref 4.8–10.8)

## 2021-02-01 PROCEDURE — 6370000000 HC RX 637 (ALT 250 FOR IP): Performed by: INTERNAL MEDICINE

## 2021-02-01 PROCEDURE — 6360000002 HC RX W HCPCS: Performed by: INTERNAL MEDICINE

## 2021-02-01 PROCEDURE — 97535 SELF CARE MNGMENT TRAINING: CPT

## 2021-02-01 PROCEDURE — 6360000002 HC RX W HCPCS: Performed by: SURGERY

## 2021-02-01 PROCEDURE — 6370000000 HC RX 637 (ALT 250 FOR IP): Performed by: PHYSICAL MEDICINE & REHABILITATION

## 2021-02-01 PROCEDURE — 94761 N-INVAS EAR/PLS OXIMETRY MLT: CPT

## 2021-02-01 PROCEDURE — 85520 HEPARIN ASSAY: CPT

## 2021-02-01 PROCEDURE — 94664 DEMO&/EVAL PT USE INHALER: CPT

## 2021-02-01 PROCEDURE — 94640 AIRWAY INHALATION TREATMENT: CPT

## 2021-02-01 PROCEDURE — U0002 COVID-19 LAB TEST NON-CDC: HCPCS

## 2021-02-01 PROCEDURE — 85610 PROTHROMBIN TIME: CPT

## 2021-02-01 PROCEDURE — 80053 COMPREHEN METABOLIC PANEL: CPT

## 2021-02-01 PROCEDURE — 36415 COLL VENOUS BLD VENIPUNCTURE: CPT

## 2021-02-01 PROCEDURE — 1180000000 HC REHAB R&B

## 2021-02-01 PROCEDURE — 85025 COMPLETE CBC W/AUTO DIFF WBC: CPT

## 2021-02-01 RX ORDER — HEPARIN SODIUM 1000 [USP'U]/ML
40 INJECTION, SOLUTION INTRAVENOUS; SUBCUTANEOUS PRN
Status: CANCELLED | OUTPATIENT
Start: 2021-02-01

## 2021-02-01 RX ORDER — PANTOPRAZOLE SODIUM 40 MG/1
40 TABLET, DELAYED RELEASE ORAL
Status: CANCELLED | OUTPATIENT
Start: 2021-02-02

## 2021-02-01 RX ORDER — ASPIRIN 81 MG/1
81 TABLET, CHEWABLE ORAL DAILY
Status: CANCELLED | OUTPATIENT
Start: 2021-02-02

## 2021-02-01 RX ORDER — MAGNESIUM HYDROXIDE/ALUMINUM HYDROXICE/SIMETHICONE 120; 1200; 1200 MG/30ML; MG/30ML; MG/30ML
30 SUSPENSION ORAL EVERY 6 HOURS PRN
Status: DISCONTINUED | OUTPATIENT
Start: 2021-02-01 | End: 2021-02-13 | Stop reason: HOSPADM

## 2021-02-01 RX ORDER — ALBUTEROL SULFATE 2.5 MG/3ML
2.5 SOLUTION RESPIRATORY (INHALATION) EVERY 6 HOURS PRN
Status: CANCELLED | OUTPATIENT
Start: 2021-02-01

## 2021-02-01 RX ORDER — ACETAMINOPHEN 325 MG/1
650 TABLET ORAL EVERY 6 HOURS PRN
Status: DISCONTINUED | OUTPATIENT
Start: 2021-02-01 | End: 2021-02-08

## 2021-02-01 RX ORDER — WARFARIN SODIUM 5 MG/1
5 TABLET ORAL
Status: DISCONTINUED | OUTPATIENT
Start: 2021-02-01 | End: 2021-02-01 | Stop reason: SDUPTHER

## 2021-02-01 RX ORDER — ATORVASTATIN CALCIUM 40 MG/1
40 TABLET, FILM COATED ORAL NIGHTLY
Status: CANCELLED | OUTPATIENT
Start: 2021-02-01

## 2021-02-01 RX ORDER — PROMETHAZINE HYDROCHLORIDE 12.5 MG/1
12.5 TABLET ORAL EVERY 6 HOURS PRN
Status: DISCONTINUED | OUTPATIENT
Start: 2021-02-01 | End: 2021-02-13 | Stop reason: HOSPADM

## 2021-02-01 RX ORDER — CARVEDILOL 6.25 MG/1
6.25 TABLET ORAL 2 TIMES DAILY WITH MEALS
Status: CANCELLED | OUTPATIENT
Start: 2021-02-01

## 2021-02-01 RX ORDER — ONDANSETRON 2 MG/ML
4 INJECTION INTRAMUSCULAR; INTRAVENOUS EVERY 6 HOURS PRN
Status: DISCONTINUED | OUTPATIENT
Start: 2021-02-01 | End: 2021-02-13 | Stop reason: HOSPADM

## 2021-02-01 RX ORDER — NICOTINE POLACRILEX 4 MG
15 LOZENGE BUCCAL PRN
Status: CANCELLED | OUTPATIENT
Start: 2021-02-01

## 2021-02-01 RX ORDER — ACETAMINOPHEN 325 MG/1
650 TABLET ORAL EVERY 6 HOURS PRN
Status: CANCELLED | OUTPATIENT
Start: 2021-02-01

## 2021-02-01 RX ORDER — ALBUTEROL SULFATE 2.5 MG/3ML
2.5 SOLUTION RESPIRATORY (INHALATION) EVERY 6 HOURS PRN
Status: DISCONTINUED | OUTPATIENT
Start: 2021-02-01 | End: 2021-02-02

## 2021-02-01 RX ORDER — LEVOTHYROXINE SODIUM 0.03 MG/1
25 TABLET ORAL DAILY
Status: CANCELLED | OUTPATIENT
Start: 2021-02-02

## 2021-02-01 RX ORDER — ACETAMINOPHEN 650 MG/1
650 SUPPOSITORY RECTAL EVERY 6 HOURS PRN
Status: CANCELLED | OUTPATIENT
Start: 2021-02-01

## 2021-02-01 RX ORDER — HEPARIN SODIUM 1000 [USP'U]/ML
80 INJECTION, SOLUTION INTRAVENOUS; SUBCUTANEOUS PRN
Status: CANCELLED | OUTPATIENT
Start: 2021-02-01

## 2021-02-01 RX ORDER — URSODIOL 300 MG/1
300 CAPSULE ORAL 2 TIMES DAILY
Status: DISCONTINUED | OUTPATIENT
Start: 2021-02-01 | End: 2021-02-13 | Stop reason: HOSPADM

## 2021-02-01 RX ORDER — ACETAMINOPHEN 650 MG/1
650 SUPPOSITORY RECTAL EVERY 6 HOURS PRN
Status: DISCONTINUED | OUTPATIENT
Start: 2021-02-01 | End: 2021-02-08

## 2021-02-01 RX ORDER — MAGNESIUM HYDROXIDE/ALUMINUM HYDROXICE/SIMETHICONE 120; 1200; 1200 MG/30ML; MG/30ML; MG/30ML
30 SUSPENSION ORAL EVERY 6 HOURS PRN
Status: CANCELLED | OUTPATIENT
Start: 2021-02-01

## 2021-02-01 RX ORDER — NICOTINE POLACRILEX 4 MG
15 LOZENGE BUCCAL PRN
Status: DISCONTINUED | OUTPATIENT
Start: 2021-02-01 | End: 2021-02-13 | Stop reason: HOSPADM

## 2021-02-01 RX ORDER — HEPARIN SODIUM 10000 [USP'U]/100ML
5-30 INJECTION, SOLUTION INTRAVENOUS CONTINUOUS
Status: CANCELLED | OUTPATIENT
Start: 2021-02-01

## 2021-02-01 RX ORDER — CARVEDILOL 6.25 MG/1
6.25 TABLET ORAL 2 TIMES DAILY WITH MEALS
Status: DISCONTINUED | OUTPATIENT
Start: 2021-02-01 | End: 2021-02-13 | Stop reason: HOSPADM

## 2021-02-01 RX ORDER — PANTOPRAZOLE SODIUM 40 MG/1
40 TABLET, DELAYED RELEASE ORAL
Status: DISCONTINUED | OUTPATIENT
Start: 2021-02-02 | End: 2021-02-13 | Stop reason: HOSPADM

## 2021-02-01 RX ORDER — POLYETHYLENE GLYCOL 3350 17 G/17G
17 POWDER, FOR SOLUTION ORAL DAILY PRN
Status: CANCELLED | OUTPATIENT
Start: 2021-02-01

## 2021-02-01 RX ORDER — LACTULOSE 10 G/15ML
20 SOLUTION ORAL 2 TIMES DAILY
Status: DISCONTINUED | OUTPATIENT
Start: 2021-02-01 | End: 2021-02-03

## 2021-02-01 RX ORDER — PROMETHAZINE HYDROCHLORIDE 12.5 MG/1
12.5 TABLET ORAL EVERY 6 HOURS PRN
Status: CANCELLED | OUTPATIENT
Start: 2021-02-01

## 2021-02-01 RX ORDER — POLYETHYLENE GLYCOL 3350 17 G/17G
17 POWDER, FOR SOLUTION ORAL DAILY PRN
Status: DISCONTINUED | OUTPATIENT
Start: 2021-02-01 | End: 2021-02-13 | Stop reason: HOSPADM

## 2021-02-01 RX ORDER — LEVOTHYROXINE SODIUM 0.05 MG/1
25 TABLET ORAL DAILY
Status: DISCONTINUED | OUTPATIENT
Start: 2021-02-02 | End: 2021-02-13 | Stop reason: HOSPADM

## 2021-02-01 RX ORDER — ONDANSETRON 2 MG/ML
4 INJECTION INTRAMUSCULAR; INTRAVENOUS EVERY 6 HOURS PRN
Status: CANCELLED | OUTPATIENT
Start: 2021-02-01

## 2021-02-01 RX ORDER — HEPARIN SODIUM 5000 [USP'U]/ML
30 INJECTION, SOLUTION INTRAVENOUS; SUBCUTANEOUS PRN
Status: CANCELLED | OUTPATIENT
Start: 2021-02-01

## 2021-02-01 RX ORDER — SENNA PLUS 8.6 MG/1
1 TABLET ORAL DAILY PRN
Status: DISCONTINUED | OUTPATIENT
Start: 2021-02-01 | End: 2021-02-13 | Stop reason: HOSPADM

## 2021-02-01 RX ORDER — ASPIRIN 81 MG/1
81 TABLET, CHEWABLE ORAL DAILY
Status: DISCONTINUED | OUTPATIENT
Start: 2021-02-02 | End: 2021-02-13 | Stop reason: HOSPADM

## 2021-02-01 RX ORDER — ATORVASTATIN CALCIUM 40 MG/1
40 TABLET, FILM COATED ORAL NIGHTLY
Status: DISCONTINUED | OUTPATIENT
Start: 2021-02-01 | End: 2021-02-13 | Stop reason: HOSPADM

## 2021-02-01 RX ORDER — URSODIOL 300 MG/1
300 CAPSULE ORAL 2 TIMES DAILY
Status: CANCELLED | OUTPATIENT
Start: 2021-02-01

## 2021-02-01 RX ORDER — WARFARIN SODIUM 5 MG/1
5 TABLET ORAL
Status: CANCELLED | OUTPATIENT
Start: 2021-02-01 | End: 2021-02-01

## 2021-02-01 RX ORDER — HEPARIN SODIUM 5000 [USP'U]/ML
60 INJECTION, SOLUTION INTRAVENOUS; SUBCUTANEOUS PRN
Status: CANCELLED | OUTPATIENT
Start: 2021-02-01

## 2021-02-01 RX ADMIN — ENOXAPARIN SODIUM 80 MG: 80 INJECTION SUBCUTANEOUS at 16:52

## 2021-02-01 RX ADMIN — IPRATROPIUM BROMIDE AND ALBUTEROL SULFATE 3 ML: .5; 3 SOLUTION RESPIRATORY (INHALATION) at 07:33

## 2021-02-01 RX ADMIN — HEPARIN SODIUM AND DEXTROSE 9.02 UNITS/KG/HR: 10000; 5 INJECTION INTRAVENOUS at 05:49

## 2021-02-01 RX ADMIN — PANTOPRAZOLE SODIUM 40 MG: 40 TABLET, DELAYED RELEASE ORAL at 05:48

## 2021-02-01 RX ADMIN — URSODIOL 300 MG: 300 CAPSULE ORAL at 08:28

## 2021-02-01 RX ADMIN — LEVOTHYROXINE SODIUM 25 MCG: 0.03 TABLET ORAL at 05:48

## 2021-02-01 RX ADMIN — WARFARIN SODIUM 7.5 MG: 2.5 TABLET ORAL at 16:49

## 2021-02-01 RX ADMIN — CARVEDILOL 6.25 MG: 6.25 TABLET, FILM COATED ORAL at 08:29

## 2021-02-01 RX ADMIN — INSULIN LISPRO 2 UNITS: 100 INJECTION, SOLUTION INTRAVENOUS; SUBCUTANEOUS at 16:50

## 2021-02-01 RX ADMIN — IPRATROPIUM BROMIDE AND ALBUTEROL SULFATE 3 ML: .5; 3 SOLUTION RESPIRATORY (INHALATION) at 13:07

## 2021-02-01 RX ADMIN — INSULIN LISPRO 2 UNITS: 100 INJECTION, SOLUTION INTRAVENOUS; SUBCUTANEOUS at 21:24

## 2021-02-01 RX ADMIN — ASPIRIN 81 MG: 81 TABLET, CHEWABLE ORAL at 08:28

## 2021-02-01 RX ADMIN — URSODIOL 300 MG: 300 CAPSULE ORAL at 21:22

## 2021-02-01 RX ADMIN — LACTULOSE 20 G: 20 SOLUTION ORAL at 21:22

## 2021-02-01 RX ADMIN — ATORVASTATIN CALCIUM 40 MG: 40 TABLET, FILM COATED ORAL at 21:22

## 2021-02-01 RX ADMIN — CARVEDILOL 6.25 MG: 6.25 TABLET, FILM COATED ORAL at 16:50

## 2021-02-01 ASSESSMENT — PAIN SCALES - GENERAL
PAINLEVEL_OUTOF10: 2
PAINLEVEL_OUTOF10: 0
PAINLEVEL_OUTOF10: 0

## 2021-02-01 NOTE — DISCHARGE SUMMARY
Hospital Medicine Discharge Summary    Suman Feliciano  :  3/5/1929  MRN:  [id number]    Admit date:  2021  Discharge date:  2021    Admitting Physician: Raimundo العلي MD  Primary Care Physician:  Latha Owens DO      Discharge Diagnoses:    Principal Problem:    Femoral artery occlusion [facility])  Active Problems:    HTN (hypertension)    Dyslipidemia    DM (diabetes mellitus) ([address]    Hypothyroidism    CAD (coronary artery disease)    COPD exacerbation (HCC)    Venous thrombosis of leg    Hyponatremia  Resolved Problems:    * No resolved hospital problems. *      Hospital Course:   Suman Feliciano is a 80 y.o. male that was admitted and treated at [facility] for right lower extremity DVT and arterial occlusion. Patient has a history of coronary disease status post CABG with a LIMA as well as pacemaker placement was admitted to the hospital with right lower extremity swelling pain and weakness. Ultrasound showed diffuse right common iliac vein thrombosis for which he underwent thrombectomy  by Dr. Lacey Oconnell. He then underwent stenting on  for severe peripheral artery disease. Repeat procedure is necessary because the extensive stents necessary for his procedure were not available on . Patient was then started on a heparin infusion for anticoagulation and given his unprovoked nature of his DVT will be transitioned to oral Coumadin with subcu Lovenox bridge. Given the patient's persistent deconditioning and ambulatory dysfunction will be discharged to acute rehab for physical therapy after discharge from the hospital.  Patient is encouraged to have outpatient oncology follow-up given unprovoked nature of DVT. Darius Romero     Patient was seen by the following consultants while admitted to [facility]:   Consults:  [id number] [facility] TO CRITICAL CARE  IP CONSULT TO CARDIOLOGY  IP CONSULT TO REHAB/TCU ADMISSION COORDINATOR  IP CONSULT TO PHARMACY    Physical Exam:   General appearance: No apparent distress, appears stated age and cooperative. HEENT: Pupils equal, round, and reactive to light. Conjunctivae/corneas clear. Neck: Supple, with full range of motion. No jugular venous distention. Trachea midline. Respiratory:  Normal respiratory effort. Clear to auscultation, bilaterally without Rales/Wheezes/Rhonchi. Cardiovascular: Regular rate and rhythm with CANDY 1 out of 6. Well-healed midsternal sternotomy scar  Abdomen: Soft, non-tender, non-distended with normal bowel sounds. Musculoskeletal: No clubbing, cyanosis or edema bilaterally. RLE tenderness to palpation. Skin: Skin color, texture, turgor normal.  No rashes or lesions. Neuro: Non Focal. Symetrical motor and tone. Nl Comprehension, Alert,awake and oriented.   Psychiatric: Alert and oriented, thought content appropriate, normal insight  Peripheral Pulses: +2 palpable, equal bilaterally    Significant Diagnostic Studies:  CTA  CTA ABDOMINAL AORTA W BILAT RUNOFF W WO CONTRAST: 1/26/2021       CLINICAL HISTORY:  RLE swelling Pain .       COMPARISON: CT abdomen and pelvis without contrast 12/4/2015.       Spiral enhanced images were obtained of the abdominal aorta through both feet during the infusion of a total of 250 mL of Isovue 300 contrast with runoff CTA protocol.       Delayed imaging was also performed.  Routine and volume rendered images were obtained on a 3-dimensional workstation.        All CT scans at this facility use dose modulation, iterative reconstruction, and/or weight based dosing when appropriate to reduce radiation dose to as low as reasonably achievable.           FINDINGS:       Moderately extensive disease with areas of flow-limiting narrowing are present within the mid to distal right superficial femoral artery, which is abruptly occluded at the abductor hiatus.       Best depicted on the delayed imaging is extensive DVT throughout the right lower extremity

## 2021-02-01 NOTE — PROGRESS NOTES
Clinical Pharmacy Note    Warfarin consult follow-up    Recent Labs     02/01/21  0543   INR 1.1     Recent Labs     01/30/21  1424 01/31/21  0621 02/01/21  0543   HGB 8.8* 8.8* 8.1*   HCT 26.9* 26.8* 24.4*    154 188       Significant drug:drug interactions:  New warfarin drug-drug interactions: lovenox 1mg/kg BID  ASA, APAP, synthroid,      Current diet order/intake: cardiac diet     Notes:  Warfarin Dose       01/30/21 1.3 (1/29) 5 mg   01/31/21 1.1  5 mg   02/01/21  1.1  7.5 mg                                      Patient new start to warfarin, INR 1.1, goal 2-3 indicated for Unprovoked LLE DVT. Also receiving lovenox bridge. .  Will order warfarin 7.5 mg dose tonight, however patient may require less maintenance dose due to advanced age. Daily PT/INR during pharmacy consult to monitor and dose warfarin therapy.      Keira Shi PharmD

## 2021-02-01 NOTE — PLAN OF CARE
Problem: Falls - Risk of:  Goal: Will remain free from falls  Outcome: Ongoing  Goal: Absence of physical injury  Outcome: Ongoing     Problem: IP BALANCE  Goal: LTG - patient will maintain standing balance to allow for completion of daily activities  Outcome: Ongoing

## 2021-02-01 NOTE — PROGRESS NOTES
Occupational Therapy  Facility/Department: [address] MED SURG UNIT  Daily Treatment Note  NAME: Dixie Quintero  : 3/5/1929  MRN: [id number]    Date of Service: 2021    Discharge Recommendations:  Continue to assess pending progress       Assessment      Activity Tolerance  Activity Tolerance: Patient limited by fatigue;Patient limited by pain  Safety Devices  Safety Devices in place: Yes  Type of devices: Call light within reach  Restraints  Initially in place: No         Patient Diagnosis(es): The primary encounter diagnosis was Acute deep vein thrombosis (DVT) of iliac vein of right lower extremity ([facility] [address] Diagnoses of Acute deep vein thrombosis (DVT) of popliteal vein of right lower extremity (HCC) and Femoral artery occlusion, right (HCC) were also pertinent to this visit. has a past medical history of Anemia, CAD (coronary artery disease), DM (diabetes mellitus) ([facility] [address] Dyslipidemia, History of tobacco abuse, HTN (hypertension), Hypothyroidism, Non-ST elevation MI (NSTEMI) ([facility] [address] and Pacemaker. has a past surgical history that includes Cataract removal and Middle ear surgery (Left, ). Restrictions  Restrictions/Precautions  Restrictions/Precautions: Fall Risk(moderate fall risk per lees score)  Subjective \"My leg hurts. \"  Pt reports pain that subsided when returned to bed. Pt severely [place]. General  Chart Reviewed: Yes  Patient assessed for rehabilitation services?: Yes      Orientation     Objective    ADL  Grooming: Supervision; Increased time to complete  UE Dressing: Minimal assistance  LE Dressing: Moderate assistance(To keron bilateral socks with increased time while seated edge of bed)           Bed mobility  Supine to Sit: Moderate assistance  Sit to Supine: Supervision  Scooting: Minimal assistance  Transfers  Sit to stand: Contact guard assistance  Stand to sit: Contact guard assistance         Pt completed grooming and oral hygiene while seated at sink.   Pt completed with increased time.  Pt completed ambulation with HH assist.            Plan   Plan  Times per week: 1-3x  Plan weeks: Length of acute stay  Current Treatment Recommendations: Strengthening, Balance Training, Functional Mobility Training, Endurance Training, Pain Management, Safety Education & Training, Patient/Caregiver Education & Training, Self-Care / ADL, Equipment Evaluation, Education, & procurement, Home Management Training  G-Code     OutComes Score                                                  AM-PAC Score             Goals  Patient Goals   Patient goals : \"I want to get home\"       Therapy Time   Individual Concurrent Group Co-treatment   Time In 1030         Time Out 1055         Minutes 25           ADL trainin minutes       [id number] OTR/L Electronically signed by [id number] OTR/L on 6/3/77 at 12:41 PM EST

## 2021-02-01 NOTE — PROGRESS NOTES
[facility] Respiratory Therapy Evaluation   Current Order:  Duoneb TID      Home Regimen: TID      Ordering Physician: Tex Manuel  Re-evaluation Date:  2/4     Diagnosis: Femoral artery occlusion      Patient Status: Stable / Unstable + Physician notified    The following MDI Criteria must be met in order to convert aerosol to MDI with spacer. If unable to meet, MDI will be converted to aerosol:  []  Patient able to demonstrate the ability to use MDI effectively  []  Patient alert and cooperative  []  Patient able to take deep breath with 5-10 second hold  []  Medication(s) available in this delivery method   []  Peak flow greater than or equal to 200 ml/min            Current Order Substituted To  (same drug, same frequency)   Aerosol to MDI [] Albuterol Sulfate 0.083% unit dose by aerosol Albuterol Sulfate MDI 2 puffs by inhalation with spacer    [] Levalbuterol 1.25 mg unit dose by aerosol Levalbuterol MDI 2 puffs by inhalation with spacer    [] Levalbuterol 0.63 mg unit dose by aerosol Levalbuterol MDI 2 puffs by inhalation with spacer    [] Ipratropium Bromide 0.02% unit dose by aerosol Ipratropium Bromide MDI 2 puffs by inhalation with spacer    [] Duoneb (Ipratropium + Albuterol) unit dose by aerosol Ipratropium MDI + Albuterol MDI 2 puffs by inhalation w/spacer   MDI to Aerosol [] Albuterol Sulfate MDI Albuterol Sulfate 0.083% unit dose by aerosol    [] Levalbuterol MDI 2 puffs by inhalation Levalbuterol 1.25 mg unit dose by aerosol    [] Ipratropium Bromide MDI by inhalation Ipratropium Bromide 0.02% unit dose by aerosol    [] Combivent (Ipratropium + Albuterol) MDI by inhalation Duoneb (Ipratropium + Albuterol) unit dose by aerosol   Treatment Assessment [Frequency/Schedule]:  Change frequency to: ____________No Changes______________________________________per Protocol, P&T, MEC      Points 0 1 2 3 4   Pulmonary Status  Non-Smoker  []   Smoking history   < 20 pack years  []   Smoking history  ?  20 pack years  []   Pulmonary Disorder  (acute or chronic)  [x]   Severe or Chronic w/ Exacerbation  []     Surgical Status No [x]   Surgeries     General []   Surgery Lower []   Abdominal Thoracic or []   Upper Abdominal Thoracic with  PulmonaryDisorder  []     Chest X-ray Clear/Not  Ordered     [x]  Chronic Changes  Results Pending  []  Infiltrates, atelectasis, pleural effusion, or edema  []  Infiltrates in more than one lobe []  Infiltrate + Atelectasis, &/or pleural effusion  []    Respiratory Pattern Regular,  RR = 12-20 [x]  Increased,  RR = 21-25 []  BUTT, irregular,  or RR = 26-30 []  Decreased FEV1  or RR = 31-35 []  Severe SOB, use  of accessory muscles, or RR ? 35  []    Mental Status Alert, oriented,  Cooperative [x]  Confused but Follows commands []  Lethargic or unable to follow commands []  Obtunded  []  Comatose  []    Breath Sounds Clear to  auscultation  []  Decreased unilaterally or  in bases only [x]  Decreased  bilaterally  []  Crackles or intermittent wheezes []  Wheezes []    Cough Strong, Spontan., & nonproductive [x]  Strong,  spontaneous, &  productive []  Weak,  Nonproductive []  Weak, productive or  with wheezes []  No spontaneous  cough or may require suctioning []    Level of Activity Ambulatory []  Ambulatory w/ Assist  [x]  Non-ambulatory []  Paraplegic []  Quadriplegic []    Total    Score:___5____     Triage Score:___5_____      Tri       Triage:     1. (>20) Freq: Q3    2. (16-20) Freq: Q4   3. (11-15) Freq: QID & Albuterol Q2 PRN    4. (6-10) Freq: TID & Albuterol Q2 PRN    5. (0-5) Freq Q4prn

## 2021-02-01 NOTE — PROGRESS NOTES
Report received from [facility]. Pt resting quietly in bed. Heparin drip infusing per orders. Rt leg elevated on pillow. meds per orders. Hourly rounds continue. Call light in reach.

## 2021-02-01 NOTE — PROGRESS NOTES
Patient is alert and oriented times 2-3. Patient does have swelling and discomfort in his right leg and calf area. Dressing from embolectomy is clean and dry. There is bruising at this sight but no increase. Patient is still on heparin drip and coumadin. Heparin is therapeutic. His lung sounds are diminished and he does have a cough at times. No new complaints at this time.

## 2021-02-01 NOTE — H&P
HISTORY & PHYSICAL       DATE OF ADMISSION:  2/1/2021    DATE OF SERVICE:  2/1/21    Subjective:    Chidi Quevedo, 80 y.o. male presents today with:     CHIEF COMPLAINT:  Weakness and cough     HPI     Severe PVD : Femoral artery occlusion, DVT, weakness and pain        The patient has stabilized medically andis able to participate at acute level rehab but is too medically complex for SNF due to need for therapy at the acute level with at least 15 hours a week of PT OT and cognitive and recreational therapy at an acute level with daily medical monitoring. Imaging:    Imaging and other studies reviewed and discussed with patient and staff    Cta Abdominal Aorta W Bilat Runoff W Wo Contrast    Result Date: 1/27/2021  CTA ABDOMINAL AORTA W BILAT RUNOFF W WO CONTRAST: 1/26/2021 CLINICAL HISTORY:  RLE swelling Pain . COMPARISON: CT abdomen and pelvis without contrast 12/4/2015. Spiral enhanced images were obtained of the abdominal aorta through both feet during the infusion of a total of 250 mL of Isovue 300 contrast with runoff CTA protocol. Delayed imaging was also performed. Routine and volume rendered images were obtained on a 3-dimensional workstation. All CT scans at this facility use dose modulation, iterative reconstruction, and/or weight based dosing when appropriate to reduce radiation dose to as low as reasonably achievable. FINDINGS: Moderately extensive disease with areas of flow-limiting narrowing are present within the mid to distal right superficial femoral artery, which is abruptly occluded at the abductor hiatus. Best depicted on the delayed imaging is extensive DVT throughout the right lower extremity extending proximally to the right common iliac vein. At the most inferior aspect of the initial study, filling defects are suspected within the subsegmental left lower lobe pulmonary artery branches, suspicious for pulmonary emboli. No definite emboli are noted on the right.  The visualized lung bases are clear. The abdominal aorta is normal in caliber with mild to moderate calcific plaquing. Both iliac, common femoral, and the left femoral arteries are widely patent with mild disease. Delayed imaging demonstrates patency of the left popliteal artery with extensive calcific disease of the infrapopliteal runoff, which is incompletely visualized. There is no significant reconstitution of the right popliteal or infrapopliteal arteries. The celiac, superior mesenteric and both solitary renal arteries are widely patent. Colonic diverticulosis is again noted, without evidence of diverticulitis. No other significant change from 12/4/2015 is identified. 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 EXTENSIVE RIGHT LOWER EXTREMITY DVT EXTENDING INTO THE RIGHT COMMON ILIAC VEIN. SUSPECT LEFT LOWER LOBE PULMONARY EMBOLI. Xr Chest Portable    Result Date: 1/27/2021  EXAMINATION: Portable AP ERECT view of the chest. CLINICAL HISTORY:  preop  with pain and swelling in right thigh. DATE: 1/26/2021 10:02 PM COMPARISONS: 7/8/2020 FINDINGS: There are multiple sternal sutures and mediastinal clips present. Normal cardiac silhouette. An pacemaker overlies the right hemithorax and has a lead extending into the right atrium and second lead extending into the right ventricle. Lungs are  clear without consolidation. No pleural effusion or pneumothorax. There are mild degenerative changes in spine. NO ACUTE CARDIOPULMONARY ABNORMALITY. NO CHANGE.               Labs:     labs reviewed and discussed with patient and staff    Lab Results   Component Value Date    POCGLU 164 02/01/2021    POCGLU 172 02/01/2021    POCGLU 148 02/01/2021    POCGLU 144 01/31/2021    POCGLU 209 01/31/2021     Lab Results   Component Value Date     02/01/2021    K 4.2 02/01/2021     02/01/2021    CO2 21 02/01/2021    BUN 24 02/01/2021    CREATININE 1.17 02/01/2021 CALCIUM 7.8 02/01/2021    LABALBU 2.8 02/01/2021    BILITOT 0.7 02/01/2021    ALKPHOS 46 02/01/2021    AST 14 02/01/2021    ALT 11 02/01/2021     Lab Results   Component Value Date    WBC 6.9 02/01/2021    RBC 2.63 02/01/2021    HGB 8.1 02/01/2021    HCT 24.4 02/01/2021    MCV 92.8 02/01/2021    MCH 30.7 02/01/2021    MCHC 33.1 02/01/2021    RDW 13.4 02/01/2021     02/01/2021    MPV 9.0 07/29/2014     No results found for: VITD25  Lab Results   Component Value Date    COLORU Yellow 07/06/2019    NITRU Negative 07/06/2019    GLUCOSEU Negative 07/06/2019    KETUA Negative 07/06/2019    UROBILINOGEN 0.2 07/06/2019    BILIRUBINUR Negative 07/06/2019     Lab Results   Component Value Date    PROTIME 14.5 02/01/2021     Lab Results   Component Value Date    INR 1.1 02/01/2021         I discussed results with patient. The patient remains highly medically complex and continues to have severe problems with activities of daily living and mobility. The patient was assessed to be able to tolerate intensive rehabilitation and therefore was admitted to Rehabilitation to address these needs. Prior Function; everyday activities:     Social History     Socioeconomic History    Marital status:       Spouse name: Not on file    Number of children: Not on file    Years of education: Not on file    Highest education level: Not on file   Occupational History    Not on file   Social Needs    Financial resource strain: Not on file    Food insecurity     Worry: Not on file     Inability: Not on file    Transportation needs     Medical: Not on file     Non-medical: Not on file   Tobacco Use    Smoking status: Former Smoker    Smokeless tobacco: Never Used   Substance and Sexual Activity    Alcohol use: No    Drug use: No    Sexual activity: Not on file   Lifestyle    Physical activity     Days per week: Not on file     Minutes per session: Not on file    Stress: Not on file   Relationships    Social connections     Talks on phone: Not on file     Gets together: Not on file     Attends Caodaism service: Not on file     Active member of club or organization: Not on file     Attends meetings of clubs or organizations: Not on file     Relationship status: Not on file    Intimate partner violence     Fear of current or ex partner: Not on file     Emotionally abused: Not on file     Physically abused: Not on file     Forced sexual activity: Not on file   Other Topics Concern    Not on file   Social History Narrative    Not on file     Social supports listed above. Prior Device(s) used:  As above    History of falls:  Rarely falls    In depth analysis of complex functional data; the patient has been:    Current Rehabilitation Assessments:    Physical therapy: FIMS:  Bed Mobility:      Transfers: ,  ,      FIMS:  , ,      Occupational therapy: FIMS:   ,  ,      OCCUPATIONAL THERAPY                   Speech therapy: FIMS:       Prior to admission patient was independent with all ADLs and mobilityand did not require any outside services.        Past Medical History:   Diagnosis Date    Anemia     CAD (coronary artery disease) 4/16/2015    DM [facility]di[facility] mellitus) [facility] [facility]     Dyslipidemia     History of tobacco abuse     HTN (hypertension)     Hypothyroidism     Non-ST elevation MI (NSTEMI) ([facility] [address] [facility]     Pacemaker 4/16/2015       Past Surgical History:   Procedure Laterality Date    CATARACT REMOVAL      MIDDLE EAR SURGERY Left 1998    \"they had to reset the bones\" after an infection       Current Facility-Administered Medications   Medication Dose Route Frequency Provider Last Rate Last Admin    enoxaparin (LOVENOX) injection 80 mg  1 mg/kg Subcutaneous BID Curtis Edmundo Sedar, DO   80 mg at 02/01/21 1652    acetaminophen (TYLENOL) tablet 650 mg  650 mg Oral Q6H PRN Curtis Wyatt Sedar, DO        Or    acetaminophen (TYLENOL) suppository 650 mg  650 mg Rectal Q6H PRN Curtis Wyatt Sedar, DO        polyethylene glycol (GLYCOLAX) packet 17 g  17 g Oral Daily PRN Cleave Fendt Sedar, DO        promethazine (PHENERGAN) tablet 12.5 mg  12.5 mg Oral Q6H PRN Cleave Fendt Sedar, DO        Or    ondansetron (ZOFRAN) injection 4 mg  4 mg Intravenous Q6H PRN Cleave Fendt Sedar, DO        albuterol (PROVENTIL) nebulizer solution 2.5 mg  2.5 mg Nebulization Q6H PRN Cleave Fendt Sedar, DO        aluminum & magnesium hydroxide-simethicone (MAALOX) 200-200-20 MG/5ML suspension 30 mL  30 mL Oral Q6H PRN Cleave Fendt Sedar, DO        [START ON 2/2/2021] aspirin chewable tablet 81 mg  81 mg Oral Daily Melvin WATT Sedar, DO        atorvastatin (LIPITOR) tablet 40 mg  40 mg Oral Nightly Melvin D Sedar, DO        carvedilol (COREG) tablet 6.25 mg  6.25 mg Oral BID  Melvin WATT Sedar, DO   6.25 mg at 02/01/21 1650    glucagon (rDNA) injection 1 mg  1 mg Intramuscular PRN Cleave Fendt Sedar, DO        glucose (GLUTOSE) 40 % oral gel 15 g  15 g Oral PRN Cleave Fendt Sedar, DO        insulin lispro (HUMALOG) injection vial 0-12 Units  0-12 Units Subcutaneous 4x Daily AC & HS Cleave Fendt Sedar, DO   2 Units at 02/01/21 1650    [START ON 2/2/2021] levothyroxine (SYNTHROID) tablet 25 mcg  25 mcg Oral Daily Melvin WATT Sedar, DO        [START ON 2/2/2021] pantoprazole (PROTONIX) tablet 40 mg  40 mg Oral QAM AC Melvin D Sedar, DO        ursodiol (ACTIGALL) capsule 300 mg  300 mg Oral BID Cleave Ikerdt Sedar, DO        [START ON 1/30/2022] warfarin (COUMADIN) daily dosing (placeholder)   Other RX Placeholder Melvin WATT Sedar, DO        lactulose (CHRONULAC) 10 GM/15ML solution 20 g  20 g Oral BID Dena Trivedi MD           No Known Allergies                   FAMILY HISTORY:  Does not pertain tochief complaint. Review of Systems       Objective  /62   Pulse 71   Temp 98.8 °F (37.1 °C) (Oral)   Resp 18   Ht 5' 7\" (1.702 m)   Wt 159 lb 8 oz (72.3 kg)   SpO2 92%   BMI 24.98 kg/m² *    Physical Exam  Ortho Exam  Neurologic Exam         ROS x10:   The patient also complains of severely impaired mobility and activities of daily living. Otherwise no new problems with vision, hearing, nose, mouth, throat, dermal, cardiovascular, GI, , pulmonary, musculoskeletal, psychiatric or neurological. See Rehab H&P on Rehab chart dated . Vital signs:  /71   Pulse 76   Temp 98.4 °F (36.9 °C) (Oral)   Resp 17   Ht 5' 7\" (1.702 m)   Wt 159 lb 8 oz (72.3 kg)   SpO2 95%   BMI 24.98 kg/m²   I/O:   PO/Intake:  fair PO intake, no problems observed or reported. Bowel/Bladder:  continent, no problems noted. General:  Patient is well developed, adequately nourished, non-obese and     well kempt. HEENT:    PERRLA, hearing intact to loud voice, external inspection of ear     and nose benign. Inspection of lips, tongue and gums benign  Musculoskeletal: No significant change in strength or tone. All joints stable. Inspection and palpation of digits and nails show no clubbing,       cyanosis or inflammatory conditions. Neuro/Psychiatric: Affect: flat but pleasant. Alert and oriented to person, place and     Situation with    cues. No significant change in deep tendon reflexes or     sensation  Lungs:  Diminished, CTA-B. Respiration effort is normal at rest.     Heart:   S1 = S2, RRR. No loud murmurs. Abdomen:  Soft, non-tender, no enlargement of liver or spleen. Extremities:  No significant lower extremity edema or tenderness. Skin:   Intact to general survey, no visualized or palpated problems.     Rehabilitation:  Physical therapy:   Bed Mobility:      Transfers: Sit to Stand: Minimal Assistance  Stand to sit: Minimal Assistance  Bed to Chair: Minimal assistance(pivot), Ambulation 1  Surface: level tile  Device: Rolling Walker  Assistance: Stand by assistance, Contact guard assistance  Quality of Gait: flexed posture, decreased WB on RLE with UE support to assist with gait tolerance, step to pattern, short step lengths  Distance: 10 feet x 2  Comments: attempted gait without devices - pt unable to take step with LLE due to poor weight acceptance on RLE,      FIMS:  ,  , Assessment: Pt pain limited, able to work through pain when educated on modification of activties to tolerance. Occupational therapy:    ,  , Assessment: Pt is a 80 yr old male presenting to [facility] with the above functional deficits. Pt would benefit from occupational therapy services to maximize safety and independence with ADL tasks, improve overall strength/endurance and balance for functional tasks. Speech therapy:          After extensive review of the records and above physical exam, I have formulated the followingdiagnoses and plan:      DIAGNOSES:    1. The patient was admitted to the acute rehabilitation unit with the primary rehab diagnoses being severe abnormality of gait and mobility andimpaired self care and ADL's due to  Severe PVD Femoral artery occlusion, DVT, weakness and pain    Compared to Pre-Admission Assessment, patients medical and functional status remain challengingly complex and patient continues to requireintensive therapeutic intervention from multiple therapies, therefore, initiate acute intensive comprehensive inpatient rehabilitation program including PT/OT to improve balance, ambulation, ADLs, and to improve the P/AROM. Functional and medical status reassessed regarding patients ability to participate in therapies and patient found to be able to participate in acute intensivecomprehensive inpatient rehabilitation program.  Therapeutic modifications regarding activities in therapies, place, amount of time per day and intensity of therapy made daily. Enroll in acute course of therapy program to include 1/2 hours per day of PT 5 to 7days per week and 1 1/2 hours per day of OT 5 to 7 days per week, and   SLP and Rec T 1/2 hour per day 3-5 days per week. The patient is stable medically and physically on previous exam.  When necessary therapy will be spread out over a 7-day window.      This patient present with significant new onset decreased mobility andinability to perform activities of daily living skills independently and is at significant risk for prolonged disability  For this reason they have been admitted to [facility]. Thepatient's current functional and medical status are highly complex but the patient is able to participate in intensive rehabilitation. A comprehensive inpatient rehabilitation program is appropriate. The patient Queenie Thomassen initial evaluation by the rehabilitation team and be discussed at regular treatment team meetings to assess progress, mobility, self care, mood and discharge issues. Physical therapy will be consulted for mobilityand endurance issues and should be performed 1 to 2 times per day, 7 days per week for the length of stay. Occupational therapy will be consulted for activities of daily living and should be performed 1 to 2 times per day,7 days per week for the length of stay. Their capacity to participate at an acute level, decision to be treated in the gym, room or on the unit, their activity goals for the day and the number of minutes of activeparticipation will be reassessed and re-prescribed daily. Because this patient is medically complex, I will check a CBC, BMP, UA and orthostatic blood pressures. They will be reassessed daily regarding their ability toparticipate in an acute level rehabilitation program.  Recreational Therapy will be consulted for community re-entry and adjustment to disability. Communication, cognitive and emotional issues will also be addressed duringthis rehabilitation stay by rehabilitation psychologist or speech therapist as appropriate. I reviewed the patient's old and current charts and discussed other rehabilitation options with the rehabilitation teamincluding the rehab RN and the admission team as well as the patient.   I feel that the patient's functional recovery would be best served at an acute inpatient rehabilitation program because the patient needs intense therapy three hours per day, direct RN supervision and daily monitoring by a physician for medical status. This cannot be sufficiently provided by home health care, a skilled nursing facility or in an outpatient setting. I further feel that the patient has the potential to improve functional abilities in an acute intensive rehabilitation program.    Old records were reviewed and summarized. 2.  Other diagnoses which complicate rehabilitation stay include: Active Problems:    * No active hospital problems. *  Resolved Problems:    * No resolved hospital problems. *    Patient Active Problem List   Diagnosis    HTN (hypertension)    Dyslipidemia    [facility] [address]    Hypothyroidism    History of tobacco abuse    Anemia    CAD (coronary artery disease)    Bronchitis    COPD with acute exacerbation (HCC)    SOB (shortness of breath)    Anginal equivalent (HCC)    Hypotension    Chest pain    NSTEMI (non-ST elevated myocardial infarction) ([facility] [address]    COPD exacerbation (HCC)    Syncope and collapse    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HCC)    Venous thrombosis of leg    Hyponatremia    [facility]       I am especially concerned about their recent medical complexities. The patient's high risk medication use includes  See mar. The patient is high risk for urinary tract infection, an admission urinalysis has been ordered. I will have the nurses check post void residual bladder volumes and place acatheter if excessive urine is retained in the bladder after voiding. The patient is risk for deep venous thromosis, complex deep venous thrombosis protocol prophylaxis has been ordered see mar. The patient is high risk for orthostasis and a hydration program and orthostatic blood pressure screening have been ordered.     I will attempt to get old records from the patient's previous hospital stay. Care everywhere on EPIC wasutilized. 3.  Current and previous medications were reviewed and summarized and compared to old medication lists from home and from the acute floor. 4.  Complex labs and x-rays were reviewed. I will review patient's old EKG and labs. 5.  Will provide emotional support for this patient regarding adjustment to their disability. Cognition and mood will be screened daily and addressed by rehabilitationpsychology and/or speech therapy as appropriate. I have encouraged the patient to attend the Rehab Adjustment to Disability Support Group and recreational therapy. 6.  Estimated length of stay is 2-3 weeks. Discharge to home with help from family and home health PT, OT, RN, and aide. Patient should be independent at discharge. 7.  The patient's medical and rehab prognosis are good. [address] regarding the patient's back up to general medical needs. A welcome letter was presented with an explanation of my services, my specialty and what to expect during the rehabilitation process. As well as introducing myself, I also wrote my name on their bedside marker board with their name as well as the names of the other physicians with an explanation of our individual roles in their care, as well as the rehab process.           Rosangela Garcia, SHUKRI.O., F.A.A.P.M.&R.

## 2021-02-01 NOTE — PROGRESS NOTES
Pt very [place], I have been writing on paper to communicate with him which seems to be much more efficient. PT/OT reordered. Pt going to Rehab today room 260. I called his granddaughter to let her know. Called report. Vitals stable, no complaints of pain, new IV started. Will stop heparin gtt prior to him leaving.

## 2021-02-01 NOTE — PROGRESS NOTES
[facility] Respiratory Therapy Evaluation   Current Order:  Q6PRN ALBUTEROL      Home Regimen: TID (PT DOES NOT WANT THEM)      Ordering Physician: FADIA  Re-evaluation Date:       Diagnosis: GAIT ABNORMALITY      Patient Status: Stable / Unstable + Physician notified    The following MDI Criteria must be met in order to convert aerosol to MDI with spacer. If unable to meet, MDI will be converted to aerosol:  []  Patient able to demonstrate the ability to use MDI effectively  []  Patient alert and cooperative  []  Patient able to take deep breath with 5-10 second hold  []  Medication(s) available in this delivery method   []  Peak flow greater than or equal to 200 ml/min            Current Order Substituted To  (same drug, same frequency)   Aerosol to MDI [] Albuterol Sulfate 0.083% unit dose by aerosol Albuterol Sulfate MDI 2 puffs by inhalation with spacer    [] Levalbuterol 1.25 mg unit dose by aerosol Levalbuterol MDI 2 puffs by inhalation with spacer    [] Levalbuterol 0.63 mg unit dose by aerosol Levalbuterol MDI 2 puffs by inhalation with spacer    [] Ipratropium Bromide 0.02% unit dose by aerosol Ipratropium Bromide MDI 2 puffs by inhalation with spacer    [] Duoneb (Ipratropium + Albuterol) unit dose by aerosol Ipratropium MDI + Albuterol MDI 2 puffs by inhalation w/spacer   MDI to Aerosol [] Albuterol Sulfate MDI Albuterol Sulfate 0.083% unit dose by aerosol    [] Levalbuterol MDI 2 puffs by inhalation Levalbuterol 1.25 mg unit dose by aerosol    [] Ipratropium Bromide MDI by inhalation Ipratropium Bromide 0.02% unit dose by aerosol    [] Combivent (Ipratropium + Albuterol) MDI by inhalation Duoneb (Ipratropium + Albuterol) unit dose by aerosol   Treatment Assessment [Frequency/Schedule]:  Change frequency to: ___________Q4PRN_______________________________________per Protocol, P&T, MEC      Points 0 1 2 3 4   Pulmonary Status  Non-Smoker  []   Smoking history   < 20 pack years  []   Smoking history  ? 20 pack years  [x]   Pulmonary Disorder  (acute or chronic)  []   Severe or Chronic w/ Exacerbation  []     Surgical Status No [x]   Surgeries     General []   Surgery Lower []   Abdominal Thoracic or []   Upper Abdominal Thoracic with  PulmonaryDisorder  []     Chest X-ray Clear/Not  Ordered     [x]  Chronic Changes  Results Pending  []  Infiltrates, atelectasis, pleural effusion, or edema  []  Infiltrates in more than one lobe []  Infiltrate + Atelectasis, &/or pleural effusion  []    Respiratory Pattern Regular,  RR = 12-20 [x]  Increased,  RR = 21-25 []  BUTT, irregular,  or RR = 26-30 []  Decreased FEV1  or RR = 31-35 []  Severe SOB, use  of accessory muscles, or RR ? 35  []    Mental Status Alert, oriented,  Cooperative [x]  Confused but Follows commands []  Lethargic or unable to follow commands []  Obtunded  []  Comatose  []    Breath Sounds Clear to  auscultation  []  Decreased unilaterally or  in bases only [x]  Decreased  bilaterally  []  Crackles or intermittent wheezes []  Wheezes []    Cough Strong, Spontan., & nonproductive [x]  Strong,  spontaneous, &  productive []  Weak,  Nonproductive []  Weak, productive or  with wheezes []  No spontaneous  cough or may require suctioning []    Level of Activity Ambulatory []  Ambulatory w/ Assist  [x]  Non-ambulatory []  Paraplegic []  Quadriplegic []    Total    Score:___4____     Triage Score:____5____      Tri       Triage:     1. (>20) Freq: Q3    2. (16-20) Freq: Q4   3. (11-15) Freq: QID & Albuterol Q2 PRN    4. (6-10) Freq: TID & Albuterol Q2 PRN    5. (0-5) Freq Q4prn

## 2021-02-01 NOTE — DISCHARGE INSTR - COC
Continuity of Care Form    Patient Name: Shira Tomlin   :  3/5/1929  MRN:  [id number]    Admit date:  2021  Discharge date:  ***    Code Status Order: Full Code   Advance Directives:   Advance Care Flowsheet Documentation     Date/Time Healthcare Directive Type of Healthcare Directive Copy in [address] Agent's Name Healthcare Agent's Phone Number    21 0504  Yes, patient has an advance directive for healthcare treatment  Durable power of  for health care  No, copy requested from family  Healthcare power of   Tiny   --    21 0240  --  Health care treatment directive  No, copy requested from family  Healthcare power of   Tiny   --    21 0239  Yes, patient has an advance directive for healthcare treatment  Health care treatment directive  No, copy requested from family  [address] [facility] [address]  --          Admitting Physician: Kiley Flower MD  PCP: Jorge Bertrand DO    Discharging Nurse: [facility] Unit/Room#: Y682/X382-61  Discharging Unit Phone Number: ***    Emergency Contact:   Extended Emergency Contact Information  Primary Emergency Contact: [address] [place] [address] Phone: [phone]  Work Phone: [phone]  Mobile Phone: [phone]  Relation: [id number]  Secondary Emergency Contact:  [address] [place] [address] Phone: [phone]  Relation: Child   needed?  No    Past Surgical History:  Past Surgical History:   Procedure Laterality Date    CATARACT REMOVAL      MIDDLE EAR SURGERY Left     \"they had to reset the bones\" after an infection       Immunization History:   Immunization History   Administered Date(s) Administered    Influenza, High Dose (Fluzone 65 yrs and older) 2016, 2016, 10/12/2017    Influenza, Quadv, IM, PF (6 mo and older Fluzone, Flulaval, Fluarix, and 3 yrs and older Afluria) 2020    Pneumococcal Conjugate 13-valent Irvin Laboy) 06/27/2016    Pneumococcal Polysaccharide (Nosagddes97) 08/14/2012    Tdap (Boostrix, Adacel) 03/26/2020    Zoster Live (Zostavax) 08/16/2013       Active Problems:  Patient Active Problem List   Diagnosis Code    HTN (hypertension) I10    Dyslipidemia E78.5    DM (diabetes mellitus) ([facility] [address] E11.9    Hypothyroidism E03.9    History of tobacco abuse Z87.891    Anemia D64.9    CAD (coronary artery disease) I25.10    Bronchitis J40    COPD with acute exacerbation ([facility]) J44.1    SOB (shortness of breath) R06.02    Anginal equivalent ([facility]) I20.8    Hypotension I95.9    Chest pain R07.9    NSTEMI (non-ST elevated myocardial infarction) ([facility]) I21.4    COPD exacerbation ([facility]) J44.1    Syncope and collapse R55    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[facility]) I70.209    Venous thrombosis of leg I82.90    Hyponatremia E87.1       Isolation/Infection:   Isolation          No Isolation        Patient Infection Status     Infection Onset Added Last Indicated Last Indicated By Review Planned Expiration Resolved Resolved By    None active    Resolved    COVID-19 Rule Out 01/26/21 01/26/21 01/26/21 COVID-19 (Ordered)   01/26/21 Rule-Out Test Resulted    COVID-19 Rule Out 07/08/20 07/08/20 07/08/20 Covid-19 Ambulatory (Ordered)   07/10/20 Rule-Out Test Resulted          Nurse Assessment:  Last Vital Signs: BP (!) 112/45   Pulse 68   Temp 99.3 °F (37.4 °C) (Oral)   Resp 18   Ht 5' 7\" (1.702 m)   Wt 179 lb 12.8 oz (81.6 kg)   SpO2 95%   BMI 28.16 kg/m²     Last documented pain score (0-10 scale): Pain Level: 2  Last Weight:   Wt Readings from Last 1 Encounters:   01/31/21 179 lb 12.8 oz (81.6 kg)     Mental Status:  {IP PT MENTAL STATUS:20030}    IV Access:  {[facility] IV ACCESS:498473460}    Nursing Mobility/ADLs:  Walking   {[facility] HMUN:698580672}  Transfer  {[facility] GKWC:272697855}  Bathing  {[facility] DME MFYS:333225290}  Dressing  {[facility] DME PFVH:348316927}  Toileting  {[facility] DME QTBL:013214217}  Feeding {P DME TDTJ:275966829}  Med Admin  {[facility] DME DZCT:865381493}  Med Delivery   { [facility] MED Delivery:247741822}    Wound Care Documentation and Therapy:        Elimination:  Continence:   · Bowel: {YES / BB:28187}  · Bladder: {YES / KM:84037}  Urinary Catheter: {Urinary Catheter:950787589}   Colostomy/Ileostomy/Ileal Conduit: {YES / RJ:77529}       Date of Last BM: ***    Intake/Output Summary (Last 24 hours) at 2021 1048  Last data filed at 2021 0609  Gross per 24 hour   Intake 518 ml   Output 600 ml   Net -82 ml     I/O last 3 completed shifts:   In: 0 [P.O.:560; I.V.:78]  Out: 600 [Urine:600]    Safety Concerns:     [employer] Safety Concerns:786830430}    Impairments/Disabilities:      [employer] Impairments/Disabilities:742594823}    Nutrition Therapy:  Current Nutrition Therapy:   [employer] Diet List:675599726}    Routes of Feeding: {[facility] DME Other Feedings:911322916}  Liquids: {Slp liquid thickness:49055}  Daily Fluid Restriction: {[facility] DME Yes amt example:066625542}  Last Modified Barium Swallow with Video (Video Swallowing Test): {Done Not Done YITY:020495493}    Treatments at the Time of Hospital Discharge:   Respiratory Treatments: ***  Oxygen Therapy:  {Therapy; copd oxygen:88415}  Ventilator:    { CC Vent QAGH:427728384}    Rehab Therapies: {THERAPEUTIC INTERVENTION:9044672779}  Weight Bearing Status/Restrictions: [employer]}  Other Medical Equipment (for information only, NOT a DME order):  {EQUIPMENT:777792575}  Other Treatments: ***    Patient's personal belongings (please select all that are sent with patient):  {[facility] DME Belongings:861223477}    RN SIGNATURE:  {Esignature:216087197}    CASE MANAGEMENT/SOCIAL WORK SECTION    Inpatient Status Date: ***    Readmission Risk Assessment Score:  Readmission Risk              Risk of Unplanned Readmission:        23           Discharging to Facility/ Agency   · Name: Мария Schneider  · Address:  · Phone:  · Fax:    Dialysis Facility (if applicable)   · Name:  · Address:  · Dialysis Schedule:  · Phone:  · Fax:    / signature: Electronically signed by VINCE Castillo on 2/1/21 at 10:48 AM EST    PHYSICIAN SECTION    Prognosis: {Prognosis:6581158472}    Condition at Discharge: Suma Moran Patient Condition:593431067}    Rehab Potential (if transferring to Rehab): {Prognosis:4027956319}    Recommended Labs or Other Treatments After Discharge: ***    Physician Certification: I certify the above information and transfer of Faith Poon  is necessary for the continuing treatment of the diagnosis listed and that he requires {Admit to Appropriate Level of Care:76536} for {GREATER/LESS:048193791} 30 days.      Update Admission H&P: {[facility] DME Changes in QZHBL:072384241}    PHYSICIAN SIGNATURE:  {Esignature:376486047}

## 2021-02-02 ENCOUNTER — APPOINTMENT (OUTPATIENT)
Dept: GENERAL RADIOLOGY | Age: 86
DRG: 092 | End: 2021-02-02
Attending: PHYSICAL MEDICINE & REHABILITATION
Payer: MEDICARE

## 2021-02-02 LAB
GLUCOSE BLD-MCNC: 169 MG/DL (ref 60–115)
GLUCOSE BLD-MCNC: 176 MG/DL (ref 60–115)
GLUCOSE BLD-MCNC: 182 MG/DL (ref 60–115)
GLUCOSE BLD-MCNC: 183 MG/DL (ref 60–115)
GLUCOSE BLD-MCNC: 204 MG/DL (ref 60–115)
INR BLD: 1.5
PERFORMED ON: ABNORMAL
PROTHROMBIN TIME: 18.5 SEC (ref 12.3–14.9)

## 2021-02-02 PROCEDURE — 1180000000 HC REHAB R&B

## 2021-02-02 PROCEDURE — 97535 SELF CARE MNGMENT TRAINING: CPT

## 2021-02-02 PROCEDURE — 97166 OT EVAL MOD COMPLEX 45 MIN: CPT

## 2021-02-02 PROCEDURE — 94761 N-INVAS EAR/PLS OXIMETRY MLT: CPT

## 2021-02-02 PROCEDURE — 36415 COLL VENOUS BLD VENIPUNCTURE: CPT

## 2021-02-02 PROCEDURE — 71046 X-RAY EXAM CHEST 2 VIEWS: CPT

## 2021-02-02 PROCEDURE — 6360000002 HC RX W HCPCS: Performed by: PHYSICAL MEDICINE & REHABILITATION

## 2021-02-02 PROCEDURE — 6370000000 HC RX 637 (ALT 250 FOR IP): Performed by: INTERNAL MEDICINE

## 2021-02-02 PROCEDURE — 85610 PROTHROMBIN TIME: CPT

## 2021-02-02 PROCEDURE — 94640 AIRWAY INHALATION TREATMENT: CPT

## 2021-02-02 PROCEDURE — 6360000002 HC RX W HCPCS: Performed by: INTERNAL MEDICINE

## 2021-02-02 PROCEDURE — 6370000000 HC RX 637 (ALT 250 FOR IP): Performed by: PHYSICAL MEDICINE & REHABILITATION

## 2021-02-02 PROCEDURE — 97530 THERAPEUTIC ACTIVITIES: CPT

## 2021-02-02 PROCEDURE — 97162 PT EVAL MOD COMPLEX 30 MIN: CPT

## 2021-02-02 PROCEDURE — 92610 EVALUATE SWALLOWING FUNCTION: CPT

## 2021-02-02 PROCEDURE — 97110 THERAPEUTIC EXERCISES: CPT

## 2021-02-02 PROCEDURE — 2700000000 HC OXYGEN THERAPY PER DAY

## 2021-02-02 PROCEDURE — 2580000003 HC RX 258: Performed by: PHYSICAL MEDICINE & REHABILITATION

## 2021-02-02 RX ORDER — IPRATROPIUM BROMIDE AND ALBUTEROL SULFATE 2.5; .5 MG/3ML; MG/3ML
1 SOLUTION RESPIRATORY (INHALATION) EVERY 4 HOURS PRN
Status: DISCONTINUED | OUTPATIENT
Start: 2021-02-02 | End: 2021-02-02

## 2021-02-02 RX ORDER — ALBUTEROL SULFATE 0.63 MG/3ML
0.63 SOLUTION RESPIRATORY (INHALATION) EVERY 6 HOURS PRN
Status: DISCONTINUED | OUTPATIENT
Start: 2021-02-02 | End: 2021-02-13 | Stop reason: HOSPADM

## 2021-02-02 RX ADMIN — URSODIOL 300 MG: 300 CAPSULE ORAL at 21:38

## 2021-02-02 RX ADMIN — ASPIRIN 81 MG: 81 TABLET, CHEWABLE ORAL at 07:45

## 2021-02-02 RX ADMIN — ACETAMINOPHEN 650 MG: 325 TABLET ORAL at 07:45

## 2021-02-02 RX ADMIN — ALBUTEROL SULFATE 0.63 MG: 0.63 SOLUTION RESPIRATORY (INHALATION) at 22:25

## 2021-02-02 RX ADMIN — WARFARIN SODIUM 7.5 MG: 2.5 TABLET ORAL at 18:29

## 2021-02-02 RX ADMIN — CARVEDILOL 6.25 MG: 6.25 TABLET, FILM COATED ORAL at 07:45

## 2021-02-02 RX ADMIN — ATORVASTATIN CALCIUM 40 MG: 40 TABLET, FILM COATED ORAL at 21:38

## 2021-02-02 RX ADMIN — CARVEDILOL 6.25 MG: 6.25 TABLET, FILM COATED ORAL at 18:28

## 2021-02-02 RX ADMIN — INSULIN LISPRO 2 UNITS: 100 INJECTION, SOLUTION INTRAVENOUS; SUBCUTANEOUS at 07:47

## 2021-02-02 RX ADMIN — LACTULOSE 20 G: 20 SOLUTION ORAL at 21:38

## 2021-02-02 RX ADMIN — CEFTRIAXONE SODIUM 1000 MG: 1 INJECTION, POWDER, FOR SOLUTION INTRAMUSCULAR; INTRAVENOUS at 21:38

## 2021-02-02 RX ADMIN — ENOXAPARIN SODIUM 80 MG: 80 INJECTION SUBCUTANEOUS at 18:29

## 2021-02-02 RX ADMIN — LEVOTHYROXINE SODIUM 25 MCG: 0.05 TABLET ORAL at 05:17

## 2021-02-02 RX ADMIN — URSODIOL 300 MG: 300 CAPSULE ORAL at 07:45

## 2021-02-02 RX ADMIN — PANTOPRAZOLE SODIUM 40 MG: 40 TABLET, DELAYED RELEASE ORAL at 05:17

## 2021-02-02 RX ADMIN — INSULIN LISPRO 2 UNITS: 100 INJECTION, SOLUTION INTRAVENOUS; SUBCUTANEOUS at 21:47

## 2021-02-02 RX ADMIN — INSULIN LISPRO 2 UNITS: 100 INJECTION, SOLUTION INTRAVENOUS; SUBCUTANEOUS at 12:58

## 2021-02-02 RX ADMIN — INSULIN LISPRO 4 UNITS: 100 INJECTION, SOLUTION INTRAVENOUS; SUBCUTANEOUS at 18:29

## 2021-02-02 RX ADMIN — ENOXAPARIN SODIUM 80 MG: 80 INJECTION SUBCUTANEOUS at 05:17

## 2021-02-02 ASSESSMENT — PAIN DESCRIPTION - ORIENTATION
ORIENTATION: RIGHT

## 2021-02-02 ASSESSMENT — PAIN DESCRIPTION - DESCRIPTORS: DESCRIPTORS: ACHING

## 2021-02-02 ASSESSMENT — PAIN SCALES - GENERAL
PAINLEVEL_OUTOF10: 5
PAINLEVEL_OUTOF10: 6
PAINLEVEL_OUTOF10: 2

## 2021-02-02 ASSESSMENT — PAIN DESCRIPTION - FREQUENCY: FREQUENCY: CONTINUOUS

## 2021-02-02 ASSESSMENT — PAIN DESCRIPTION - LOCATION: LOCATION: LEG

## 2021-02-02 NOTE — PROGRESS NOTES
Right  Pain Descriptors: Aching  Pain Frequency: Continuous  Pre Treatment Pain Screening  Pain at present: 3  Scale Used: Numeric Score  Intervention List: Patient able to continue with treatment;Patient declined any intervention  Vital Signs  Patient Currently in Pain: Yes  Social/Functional History  Social/Functional History  Lives With: Other (comment)(granddaughter and family)  Type of Home: House  Home Layout: Two level, Bed/Bath upstairs  Home Access: Stairs to enter with rails  Entrance Stairs - Number of Steps: 14  Bathroom Shower/Tub: Tub/Shower unit  ADL Assistance: Independent  Homemaking Assistance: Independent(granddaughter completes)  Ambulation Assistance: Independent  Transfer Assistance: Independent  Active : Yes  Additional Comments: pt is significantly [place]; information gathered via chart review and via writing. Objective   Vision: Impaired  Vision Exceptions: Wears glasses at all times  Hearing: Exceptions to [facility]  Hearing Exceptions: Hard of hearing/hearing concerns;Bilateral hearing aid    Orientation  Overall Orientation Status: Impaired  Orientation Level: Oriented to place;Oriented to person     Balance  Sitting Balance: Stand by assistance  Standing Balance: Minimal assistance  Toilet Transfers  Toilet Transfer: Unable to assess  Toilet Transfers Comments: did not need to go  Tub Transfers  Tub Transfers: Not tested  Shower Transfers  Shower Transfers: Not tested  [employer] Transfers Comments: pt requested sponge bath  ADL  Feeding: Setup  Grooming: Supervision; Increased time to complete  UE Bathing: Stand by assistance  LE Bathing: Minimal assistance  UE Dressing: Stand by assistance  LE Dressing: Maximum assistance  Toileting: Unable to assess(comment)(did not need to go)  Additional Comments: pt completed sponge bath ADL seated EOB. Pt indicates pain in RLE due to recent procedure.   Tone RUE  RUE Tone: Normotonic  Tone LUE  LUE Tone: Normotonic  Coordination  Movements Are Fluid And Coordinated: No  Coordination and Movement description: Fine motor impairments;Decreased speed;Decreased accuracy; Right UE;Left UE     Bed mobility  Supine to Sit: Moderate assistance  Sit to Supine: Minimal assistance  Transfers  Sit to stand: Minimal assistance  Stand to sit: Minimal assistance     Cognition  Overall Cognitive Status: Exceptions  Arousal/Alertness: Appropriate responses to stimuli  Following Commands:  Follows one step commands consistently  Attention Span: Appears intact  Memory: Decreased recall of recent events;Decreased short term memory;Decreased long term memory;Decreased recall of precautions  Safety Judgement: Decreased awareness of need for safety;Decreased awareness of need for assistance  Problem Solving: Assistance required to generate solutions;Assistance required to correct errors made;Assistance required to identify errors made;Assistance required to implement solutions  Insights: Decreased awareness of deficits  Initiation: Requires cues for some  Sequencing: Requires cues for some  Cognition Comment: comp: sup express: sup soc int: sup prob solv:Theodore mem: modA        Sensation  Overall Sensation Status: WFL        LUE AROM (degrees)  LUE AROM : WFL  Left Hand AROM (degrees)  Left Hand AROM: WFL  RUE AROM (degrees)  RUE AROM : WFL  Right Hand AROM (degrees)  Right Hand AROM: WFL  LUE Strength  Gross LUE Strength: WFL  L Hand General: 3+/5  LUE Strength Comment: gross assessment  RUE Strength  Gross RUE Strength: WFL  R Hand General: 3+/5  RUE Strength Comment: gross assessment     Hand Dominance  Hand Dominance: Right             Plan   Plan  Times per week: 5-7x/wk  Plan weeks: 1.5  Current Treatment Recommendations: Strengthening, Functional Mobility Training, Cognitive Reorientation, Cognitive/Perceptual Training, Patient/Caregiver Education & Training, Endurance Training, Equipment Evaluation, Education, & procurement, Balance Training, Safety Education & Training, Self-Care / ADL      Goals  Patient Goals   Patient goals : to go home     [x]  Patient will complete self care as followed using the recommended adaptive equipment and/or adaptive techniques as instructed:  Feeding:FATMATA  Grooming: FATMATA  Bathing: SUP  UE Dressing: FATMATA  LE Dressing: FATMATA  Toileting: FATMATA  Toilet Transfers: FATMATA  Tub Transfers: SUP     [x]  Patient will sequence self-care routine with sup verbal/tactile cues. []     [x]  Patient will improve static and dynamic standing balance to complete pants management at FATMATA level     []     []  Patient will improve functional endurance to tolerate/complete 60 minutes of ADLs. [x]  Patient will improve B UE strength and endurance to 4/5 in order to participate in self-care activities as projected. [x]  Patient will improve B hand fine motor coordination to [facility] in order to manage clothing fasteners/self-care containers in a timely manner     []       [x]  Patient will perform kitchen mobility at device level without episodes of LOB and good safety awareness      [x]  Patient will perform basic room mobility at FATMATA level. [x]  Patient will access appropriate D/C site with as few architectural barriers as possible.     []     []     [x]  Patient and/or caregiver will demonstrate understanding of recommended HEP for BUE strengthening.         [x]  Patient's cognition will improve to safely perform ADLs:  Comprehension: SUP  Expression: FATMATA  Social Interaction: FATMATA  Problem Solving: SUP  Memory: SUP      Therapy Time   Individual Concurrent Group Co-treatment   Time In 0930         Time Out 1030         Minutes 60           Eval: 60 minutes       Rita Garcia OT    Electronically signed by Rita Garcia OT on 2/2/2021 at 2:08 PM

## 2021-02-02 NOTE — PROGRESS NOTES
Physical Therapy  Facility/Department: [facility] MED SURG E044/S280-36  Physical Therapy Discharge      NAME: Katharine Kaminski    : 3/5/1929 (80 y.o.)  MRN: [id number]    Account: [de-identified]  Gender: male      Patient has been discharged from acute care hospital. DC patient from current PT program.      Electronically signed by Yoel Moreno PT on 21 at 12:09 PM EST

## 2021-02-02 NOTE — CONSULTS
Hospitalist Progress Note  2/2/2021 9:56 AM    Assessment and Plan:   1. 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 Long-term anticoagulation. Lovenox and bridging to coumadin. Oncology consulted due to unproved DVT and possible family history. Fall precautions. PT OT to evaluate. Maximize nutrition status. Assessing if needs DME at home. RETA on board. Dr. Iman Blount managing rehab plan  2. CAD/CABG/PPM: On ASA, statin, BB  3. HTN: LVEF 60%. Stable. On coreg. 4. DM type II: SSI, POCT ACHS, hypoglycemia protocol. 5. COPD: Stable, Duo nebs PRN. 6. Hypothyroidism: On synthroid  7. CKD: Avoid nephrotoxic agents when able. Monitor BMP. 8. Hypocalcemia: Check Vit D level, PTH  9. Extremely [place]: Speak slowly, write material down. 10. Thick, elongated toe nails: Podiatry following and managing. 11. Bowel Regimen and GI PPx: stool softners PRN ordered with hold parameters for loose stools or diarrhea. On antiacid  12. Diet: DIET CARB CONTROL; Carb Control: 4 carb choices (60 gms)/meal; No Added Salt (3-4 GM)  13. Advance Directive: Full Code   14. Nutrition status: Supplemental Vitamins ordered. Dietitian assessment  15. Vaccinations: Immunization records reviewed. If has not received appropriate vaccinations, will order to be given prior to discharge. 16. DVT prophylaxis: Chemical prophylaxis SQ lovenox with bridge to coumadin  17. Discharge planning: RETA on board. Will discharge once medically stable and cleared by all consultants. 18. High Risk Readmission Screening Tool Score Noted. Additionally, the following hospital problems were addressed: Active Problems:    PVD (peripheral vascular disease) ([facility] [address]  Resolved Problems:    * No resolved hospital problems.  *      ** Total time spent reviewing medical records, evaluating patient, speaking with RN's and consultants where I was focused exclusively on this patient: 35 minutes. This time is excluding time spent performing procedures or significant events occurring earlier or later in the day requiring my attention and focus. Subjective:   Admit Date: 2/1/2021  PCP: Shey Gutierrez, DO    No acute events overnight. Afebrile  No new complaints. Pt denies chest pain, SOB, N/V, fevers or chills. Objective:     Vitals:    02/01/21 2100 02/02/21 0729 02/02/21 0744 02/02/21 0915   BP: 131/62 124/71     Pulse: 62 76     Resp: 20 17     Temp: 98.9 °F (37.2 °C) 101 °F (38.3 °C) 98.9 °F (37.2 °C) 98.4 °F (36.9 °C)   TempSrc: Oral Oral Oral Oral   SpO2: 96% 90% 95%    Weight:       Height:         General appearance:  + O x 2. No conversational dyspnea noted. Answers questions appropriately. Extremely [place]. Able to communicate. Does better if questions are written down  Lungs: Diminished, no exp wheezes, No rales, No retractions; Nouse of accessory muscles. Non-productive cough present  Heart:  S1, S2 normal, RRR, no MRG appreciated  Abdomen: (+) BS, soft, non-tender; non distended no guarding or rigidity. Extremities:  no cyanosis, no edema bilat lower exts, no calf tenderness bilaterally. Dry skin noted.  Scattered areas of ecchymosis present       Medications:      enoxaparin  1 mg/kg Subcutaneous BID    aspirin  81 mg Oral Daily    atorvastatin  40 mg Oral Nightly    carvedilol  6.25 mg Oral BID WC    insulin lispro  0-12 Units Subcutaneous 4x Daily AC & HS    levothyroxine  25 mcg Oral Daily    pantoprazole  40 mg Oral QAM AC    ursodiol  300 mg Oral BID    [START ON 1/30/2022] warfarin (COUMADIN) daily dosing (placeholder)   Other RX Placeholder    lactulose  20 g Oral BID       LABS Reviewed    IMAGING Reviewed    Joshua Pratt CNP  Rounding Hospitalist

## 2021-02-02 NOTE — PROGRESS NOTES
[facility]   Facility/Department: [facility]  Speech Language Pathology  Clinical Bedside Swallow Evaluation    NAME:Chey Lomax  : 3/5/1929 (80 y.o.)   MRN: [id number]  ROOM: [place] [address]  ADMISSION DATE: 2021  PATIENT DIAGNOSIS(ES): PVD (peripheral vascular disease) ([facility] [address] [I73.9]  No chief complaint on file. Patient Active Problem List    Diagnosis Date Noted    Anginal equivalent ([facility] [address]      Priority: High    PVD (peripheral vascular disease) ([facility] [address] 2021    Venous thrombosis of leg 2021    Hyponatremia 2021    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HCC) 2021    Syncope and collapse 2020    COPD exacerbation ([facility] [address] 2020    NSTEMI (non-ST elevated myocardial infarction) ([facility] [address] 2019    Chest pain 2019    Hypotension 2019    SOB (shortness of breath) 2018    COPD with acute exacerbation ([facility] [address] 2018    Bronchitis 2016    CAD (coronary artery disease) 2015    HTN (hypertension)     Dyslipidemia     DM (diabetes mellitus) ([facility] [address]     Hypothyroidism     History of tobacco abuse     Anemia      Past Medical History:   Diagnosis Date    Anemia     CAD (coronary artery disease) 2015    DM (diabetes mellitus) ([facility] [address]     Dyslipidemia     History of tobacco abuse     HTN (hypertension)     Hypothyroidism     Non-ST elevation MI (NSTEMI) ([facility] [address]     Pacemaker 2015     Past Surgical History:   Procedure Laterality Date    CATARACT REMOVAL      MIDDLE EAR SURGERY Left     \"they had to reset the bones\" after an infection     No Known Allergies    DATE ONSET: 2021    Date of Evaluation: 2021   Evaluating Therapist: DALTON Roca    Recommended Diet and Intervention  Diet Solids Recommendation: Regular  Liquid Consistency Recommendation:  Thin  Recommended Form of Meds: PO  Recommendations: Self feed     Compensatory Swallowing Strategies  Compensatory Swallowing Strategies: Upright as possible for all oral intake;Small bites/sips    Reason for Referral  Verona Macdonald was referred for a bedside swallow evaluation to assess the efficiency of his swallow function, identify signs and symptoms of aspiration and make recommendations regarding safe dietary consistencies, effective compensatory strategies, and safe eating environment. General  Chart Reviewed: Yes  Behavior/Cognition: Alert; Cooperative;Pleasant mood  Respiratory Status: O2 via nasual cannula  O2 Device: Nasal cannula  Communication Observation: Functional  Follows Directions: Simple  Dentition: Dentures top; Adequate  Patient Positioning: Upright in bed  Baseline Vocal Quality: Normal  Volitional Cough: Strong  Prior Dysphagia History: None  Consistencies Administered: Reg solid; Dysphagia Pureed (Dysphagia I); Thin - straw    Current Diet level:  Current Liquid Diet : Thin    Oral Motor Deficits  Oral/Motor  Oral Motor: Within functional limits    Oral Phase Dysfunction  Oral Phase  Oral Phase: WFL  Oral Phase  Oral Phase - Comment: WFL. Adequate mastication and oral clearance of bolus in all opportunities independently. No significant residuals post swallow. Indicators of Pharyngeal Phase Dysfunction   Pharyngeal Phase  Pharyngeal Phase: WFL  Pharyngeal Phase   Pharyngeal: WFL. Hyolaryngeal movement is present. No overt s/s of aspiration observed per all consistencies tested including consecutive sips of thin liquid via straaw (3oz total). Impression  Dysphagia Diagnosis: Swallow function appears grossly intact  Dysphagia Impression : WFL  Dysphagia Outcome Severity Scale: Level 6: Within functional limits/Modified independence     Treatment Plan  Requires SLP Intervention: No  Duration/Frequency of Treatment: no f/u  D/C Recommendations:  To be determined     Prognosis  Individuals consulted  Consulted and agree with results and recommendations: Patient;RN(YESI Rizvi to relay to [employer])    Education  Patient Education: Results of BSE  Patient

## 2021-02-02 NOTE — PROGRESS NOTES
Subjective: The patient complains of ,\" moderate acute on chronic progressive fatigue and    partially relieved by rest,medications, Pt, OT, and rest and exacerbated by recent illness. I am concerned about patients medical complexities. ROS x10: The patient also complains of severely impaired mobility and activities of daily living. Otherwise no new problems with vision, hearing, nose, mouth, throat, dermal, cardiovascular, GI, , pulmonary, musculoskeletal, psychiatric or neurological. See Rehab H&P on Rehab chart dated . Vital signs:  /71   Pulse 76   Temp 98.4 °F (36.9 °C) (Oral)   Resp 17   Ht 5' 7\" (1.702 m)   Wt 159 lb 8 oz (72.3 kg)   SpO2 95%   BMI 24.98 kg/m²   I/O:   PO/Intake:  fair PO intake, no problems observed or reported. Bowel/Bladder:  continent, no problems noted. General:  Patient is well developed, adequately nourished, non-obese and     well kempt. HEENT:    PERRLA, hearing intact to loud voice, external inspection of ear     and nose benign. Inspection of lips, tongue and gums benign  Musculoskeletal: No significant change in strength or tone. All joints stable. Inspection and palpation of digits and nails show no clubbing,       cyanosis or inflammatory conditions. Neuro/Psychiatric: Affect: flat but pleasant. Alert and oriented to person, place and     Situation with    cues. No significant change in deep tendon reflexes or     sensation  Lungs:  Diminished, CTA-B. Respiration effort is normal at rest.     Heart:   S1 = S2, RRR. No loud murmurs. Abdomen:  Soft, non-tender, no enlargement of liver or spleen. Extremities:  No significant lower extremity edema or tenderness. Skin:   Intact to general survey, no visualized or palpated problems.     Rehabilitation:  Physical therapy:   Bed Mobility:      Transfers: Sit to Stand: Minimal Assistance  Stand to sit: Minimal Assistance  Bed to Chair: Minimal assistance(pivot), Ambulation 1  Surface: level tile  Device: Rolling Walker  Assistance: Stand by assistance, Contact guard assistance  Quality of Gait: flexed posture, decreased WB on RLE with UE support to assist with gait tolerance, step to pattern, short step lengths  Distance: 10 feet x 2  Comments: attempted gait without devices - pt unable to take step with LLE due to poor weight acceptance on RLE,      FIMS:  ,  ,     Occupational therapy:    ,  , Assessment: Pt is a 80 yr old male presenting to [facility] with the above functional deficits. Pt would benefit from occupational therapy services to maximize safety and independence with ADL tasks, improve overall strength/endurance and balance for functional tasks. Speech therapy:        Lab/X-ray studies reviewed, analyzed and discussed with patient and staff:   Recent Results (from the past 24 hour(s))   POCT Glucose    Collection Time: 02/01/21  9:13 PM   Result Value Ref Range    POC Glucose 179 (H) 60 - 115 mg/dl    Performed on ACCU-CHEK    PROTIME-INR    Collection Time: 02/02/21  5:28 AM   Result Value Ref Range    Protime 18.5 (H) 12.3 - 14.9 sec    INR 1.5    POCT Glucose    Collection Time: 02/02/21  6:15 AM   Result Value Ref Range    POC Glucose 169 (H) 60 - 115 mg/dl    Performed on ACCU-CHEK    POCT Glucose    Collection Time: 02/02/21  9:19 AM   Result Value Ref Range    POC Glucose 182 (H) 60 - 115 mg/dl    Performed on ACCU-CHEK    POCT Glucose    Collection Time: 02/02/21 11:37 AM   Result Value Ref Range    POC Glucose 176 (H) 60 - 115 mg/dl    Performed on ACCU-CHEK    POCT Glucose    Collection Time: 02/02/21  4:05 PM   Result Value Ref Range    POC Glucose 204 (H) 60 - 115 mg/dl    Performed on ACCU-CHEK        Cta Abdominal Aorta W Bilat Runoff W Wo Contrast    Result Date: 1/27/2021  CTA ABDOMINAL AORTA W BILAT RUNOFF W WO CONTRAST: 1/26/2021 CLINICAL HISTORY:  RLE swelling Pain . COMPARISON: CT abdomen and pelvis without contrast 12/4/2015.  Spiral enhanced images were obtained of the abdominal aorta through both feet during the infusion of a total of 250 mL of Isovue 300 contrast with runoff CTA protocol. Delayed imaging was also performed. Routine and volume rendered images were obtained on a 3-dimensional workstation. All CT scans at this facility use dose modulation, iterative reconstruction, and/or weight based dosing when appropriate to reduce radiation dose to as low as reasonably achievable. FINDINGS: Moderately extensive disease with areas of flow-limiting narrowing are present within the mid to distal right superficial femoral artery, which is abruptly occluded at the abductor hiatus. Best depicted on the delayed imaging is extensive DVT throughout the right lower extremity extending proximally to the right common iliac vein. At the most inferior aspect of the initial study, filling defects are suspected within the subsegmental left lower lobe pulmonary artery branches, suspicious for pulmonary emboli. No definite emboli are noted on the right. The visualized lung bases are clear. The abdominal aorta is normal in caliber with mild to moderate calcific plaquing. Both iliac, common femoral, and the left femoral arteries are widely patent with mild disease. Delayed imaging demonstrates patency of the left popliteal artery with extensive calcific disease of the infrapopliteal runoff, which is incompletely visualized. There is no significant reconstitution of the right popliteal or infrapopliteal arteries. The celiac, superior mesenteric and both solitary renal arteries are widely patent. Colonic diverticulosis is again noted, without evidence of diverticulitis. No other significant change from 12/4/2015 is identified. 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 EXTENSIVE RIGHT LOWER EXTREMITY DVT EXTENDING INTO THE RIGHT COMMON ILIAC VEIN. SUSPECT LEFT LOWER LOBE PULMONARY EMBOLI.      Xr Chest Portable    Result Date: 1/27/2021  EXAMINATION: Portable AP ERECT view of the chest. CLINICAL HISTORY:  preop  with pain and swelling in right thigh. DATE: 1/26/2021 10:02 PM COMPARISONS: 7/8/2020 FINDINGS: There are multiple sternal sutures and mediastinal clips present. Normal cardiac silhouette. An pacemaker overlies the right hemithorax and has a lead extending into the right atrium and second lead extending into the right ventricle. Lungs are  clear without consolidation. No pleural effusion or pneumothorax. There are mild degenerative changes in spine. NO ACUTE CARDIOPULMONARY ABNORMALITY. NO CHANGE. Previous extensive, complex labs, notes and diagnostics reviewed and analyzed. ALLERGIES:    Allergies as of 02/01/2021    (No Known Allergies)      (please also verify by checking [facility])     Complex Physical Medicine & Rehab Issues Assess & Plan:   1. Severe abnormality of gait and mobility and impaired self-care and ADL's secondary to progressive severe PVD Femoral artery occlusion, DVT, weakness and pain  . Functional and medical status reassessed regarding patients ability to participate in therapies and patient found to be able to participate in acute intensive comprehensive inpatient rehabilitation program including PT/OT to improve balance, ambulation, ADLs, and to improve the P/AROM. Therapeutic modifications regarding activities in therapies, place, amount of time per day and intensity of therapy made daily. In bed therapies or bedside therapies prn.   2. Bowel and Bladder dysfunction  :  frequent toileting, ambulate to bathroom with assistance, check post void residuals. Check for C.difficile x1 if >2 loose stools in 24 hours, continue bowel & bladder program.  Monitor bowel and bladder function. Lactinex 2 PO every AC. MOM prn, Brown Bomb prn, Glycerin suppository prn, enema prn.   3. Moderate   pain as well as generalized OA pain: reassess pain every shift and prior to and after each therapy session, give prn Tylenol and   See mar, modalities prn in therapy, masage, Lidoderm, K-pad prn. Consider scheduled AM pain meds. 4. Skin healing and breakdown risk:  continue pressure relief program.  Daily skin exams and reports from nursing. 5. Severe fatigue due to nutritional and hydration deficiency: Add and titrate vitamin B12 vitamin D and CoQ10 continue to monitor I&Os, calorie counts prn, dietary consult prn.  6. Acute episodic insomnia with situational adjustment disorder:  prn Ambien, monitor for day time sedation. 7. Falls risk elevated:  patient to use call light to get nursing assistance to get up, bed and chair alarm. 8. Elevated DVT risk: progressive activities in PT, continue prophylaxis BELLE hose, omer and see mar . 9. Complex discharge planning:  Weekly team meeting every Monday Thursday to assess progress towards goals, discuss and address social, psychological and medical comorbidities and to address difficulties they may be having progressing in therapy. Patient and family education is in progress. The patient is to follow-up with their family physician after discharge.         Complex Active General Medical Issues that complicate care Assess & Plan:    Patient Active Problem List   Diagnosis    HTN (hypertension)    Dyslipidemia    DM (diabetes mellitus) ([facility] [address]    Hypothyroidism    History of tobacco abuse    Anemia    CAD (coronary artery disease)    Bronchitis    COPD with acute exacerbation (HCC)    SOB (shortness of breath)    Anginal equivalent (HCC)    Hypotension    Chest pain    NSTEMI (non-ST elevated myocardial infarction) ([facility] [address]    COPD exacerbation (HCC)    Syncope and collapse    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HCC)    Venous thrombosis of leg    Hyponatremia    PVD (peripheral vascular disease) ([facility] [address]     Willard Chavez D.O., PM&R     Attending    [address]

## 2021-02-02 NOTE — PROGRESS NOTES
I was the second nurse for this pt admission assessment, I agree with Princess Herrera RN. Pt skin is intact to pressure areas but has some bruising throughout.  Electronically signed by Christin Velasco RN on 2/1/2021 at 7:56 PM

## 2021-02-02 NOTE — PROGRESS NOTES
Occupational Therapy  Facility/Department: [facility]  Daily Treatment Note  NAME: Madelin Sheets  : 3/5/1929  MRN: [id number]    Date of Service: 2021    Discharge Recommendations:          Assessment      Activity Tolerance  Activity Tolerance: Patient limited by fatigue  Safety Devices  Safety Devices in place: Yes  Type of devices: All fall risk precautions in place         Patient Diagnosis(es): There were no encounter diagnoses. has a past medical history of Anemia, CAD (coronary artery disease), DM (diabetes mellitus) ([facility] Dyslipidemia, History of tobacco abuse, HTN (hypertension), Hypothyroidism, Non-ST elevation MI (NSTEMI) ([facility] and Pacemaker. has a past surgical history that includes Cataract removal and Middle ear surgery (Left, ). Restrictions  Restrictions/Precautions  Restrictions/Precautions: Fall Risk  Subjective   General  Referring Practitioner: Dr. Valerie Worthington  Diagnosis: Impaired mobility and ADL's d/t severe progressive PVD  Pain Assessment  Pain Assessment: 0-10  Pain Level: 6  Pain Type: Acute pain  Pain Location: Leg  Pain Orientation: Right  Pain Descriptors: Aching  Pain Frequency: Continuous  Pre Treatment Pain Screening  Pain at present: 3  Scale Used: Numeric Score  Intervention List: Patient able to continue with treatment;Patient declined any intervention  Vital Signs  Patient Currently in Pain: Yes   Orientation     Objective     Pt. was in bed upon arrival and sleeping. Pt. required therapist to initiate him moving his legs due to patient keeping his eyes closed the whole time. Pt. completed supine to sit eob at Dada Chessman assist and SPT to w/c at Mod assist.   Pt. engaged in B UE endurance and gross motor task to promote independence with ADls. Pt. reached to place the large pegs into the pegboard, alternating hands as needed. Pt. took rest breaks prn due to fatigue. Pt. then removed the pegs and placed back in the container with more frequent breaks.     Pt. was on 1.5 L O2 during treatment.       Plan      Goals          Therapy Time   Individual Concurrent Group Co-treatment   Time In 1300         Time Out 1330         Minutes 30              ADL training: 10 minutes  Therapeutic activities: 20 minutes      LEONCIO Alcala Electronically signed by LEONCIO Alcala on 2/2/2021 at 1:39 PM

## 2021-02-02 NOTE — PROGRESS NOTES
[facility] Assessment: Physical Therapy  Room: R260/R260-01    NAME: Vipul Garner  : 3/5/1929  MRN: [id number]    Date of Service: 2021    Rehab Diagnosis(es):impaired mobility and ADL's d/t severe progressive PVD  Patient Active Problem List    Diagnosis Date Noted    Anginal equivalent ([facility]      Priority: High    PVD (peripheral vascular disease) ([address] 2021    Venous thrombosis of leg 2021    Hyponatremia 2021    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    Syncope and collapse 2020    COPD exacerbation ([address] 2020    NSTEMI (non-ST elevated myocardial infarction) ([address] 2019    Chest pain 2019    Hypotension 2019    SOB (shortness of breath) 2018    COPD with acute exacerbation ([address] 2018    Bronchitis 2016    CAD (coronary artery disease) 2015    HTN (hypertension)     Dyslipidemia     DM (diabetes mellitus) ([address]     Hypothyroidism     History of tobacco abuse     Anemia        Past Medical History:   Diagnosis Date    Anemia     CAD (coronary artery disease) 2015    DM (diabetes mellitus) ([address]     Dyslipidemia     History of tobacco abuse     HTN (hypertension)     Hypothyroidism     Non-ST elevation MI (NSTEMI) ([address]     Pacemaker 2015     Past Surgical History:   Procedure Laterality Date    CATARACT REMOVAL      MIDDLE EAR SURGERY Left     \"they had to reset the bones\" after an infection    -   embolectomy RLE occlusion 21    Chart Reviewed: Yes  Family / Caregiver Present: No  Diagnosis: impaired mobility and ADL's d/t severe progressive PVD    Restrictions:  Restrictions/Precautions: Fall Risk       SUBJECTIVE:      Pre Treatment Pain Screening  Pain at present: 0(at rest)  Intervention List: Patient able to continue with treatment  Comments / Details: Pt reports RLE pain with all movement tasks and at end of session - declined to give number and reports no need for medication    Post Treatment Pain Screening:  Pain Assessment  Pain Level: (no number given)  Pain Type: Acute pain  Pain Location: Leg  Pain Orientation: Right    Prior Level of Function:  Social/Functional History  Lives With: Daughter(Granddaughter and her family)  Type of Home: House  Home Layout: Two level, Bed/Bath upstairs  Home Access: Stairs to enter with rails  Entrance Stairs - Number of Steps: 14  Bathroom Shower/Tub: Tub/Shower unit  ADL Assistance: (Pt. unclear in answer; however, states he is an independent person)  Homemaking Assistance: (Granddaughter completes)  Ambulation Assistance: Independent(No AD)  Transfer Assistance: Independent  Active :  Yes  Additional Comments: Pt. significantly Stebbins; information gathered via writing but some difficulty noted in clarifying questions    OBJECTIVE:   Vision/Hearing:  Vision: Impaired  Vision Exceptions: Wears glasses at all times  Hearing: Exceptions to [facility]  Hearing Exceptions: Hard of hearing/hearing concerns;Bilateral hearing aid    Cognition:  Overall Orientation Status: Impaired  Orientation Level: Oriented to person  Follows Commands: Impaired(significant Stebbins necessitates frequent repetition)       ROM:  RLE General PROM: impaired hip flexor and hamstring flexibilitylacking 20 degrees knee ext as result    Strength:  Strength RLE  Comment: 4/5  Strength LLE  Comment: 4-/5    Neuro:        Balance  Sitting - Static: Good  Sitting - Dynamic: Good  Standing - Static: Fair;-(pt able to stand 1 min with UE support with no assistance, unable to stand without UE support due to RLE pain)  Standing - Dynamic: Fair;-(no LOB with ww - unable to ambulate or reach without BUE support)       Bed mobility  Rolling to Left: Minimal assistance  Rolling to Right: Minimal assistance  Supine to Sit: Moderate assistance  Sit to Supine: Minimal assistance  Comment: no rail with bed flat increased energy required    Transfers  Sit to Stand:  Minimal Assistance  Stand to sit: Minimal Assistance  Bed to Chair: Minimal assistance(pivot)  Comment: intermittent cues for technique required    Ambulation 1  Surface: level tile  Device: Rolling Walker  Assistance: Stand by assistance;Contact guard assistance  Quality of Gait: flexed posture, decreased WB on RLE with UE support to assist with gait tolerance, step to pattern, short step lengths  Distance: 10 feet x 2  Comments: attempted gait without devices - pt unable to take step with LLE due to poor weight acceptance on RLE    Stairs/Curb  Stairs?: No(not safe to test)         Activity Tolerance  Activity Tolerance: Patient limited by pain  Activity Tolerance: RLE pain limits pts tolerance and endurance          Quality Indicators (IRF-ED):  Rolling L and R: Supervision or Touching Assistance - 4  Sit>Supine: Partial/Moderate Assistance (helper does <50%) - 3  Supine>Sit: Partial/Moderate Assistance (helper does <50%) - 3  Sit>Stand: Supervision or Touching Assistance - 4  Chair/Bed>Chair Transfer: Supervision or Touching Assistance - 4  Car Transfers: Not attempted due to Medical Condition or Safety Concerns (I.e. unsafe or physician orders) - 80  Walk 10 ft: Not attempted due to Medical Condition or Safety Concerns (I.e. unsafe or physician orders) - 88  Walk 50 ft with two 90 degree turns: Not attempted due to Medical Condition or Safety Concerns (I.e. unsafe or physician orders) - 80  Walk 150 ft in [address]: Not attempted due to Medical Condition or Safety Concerns (I.e. unsafe or physician orders) - 88  Walking 10 ft on Unlevel Surface: Not attempted due to Medical Condition or Safety Concerns (I.e. unsafe or physician orders) - 80  Picking up Objects from Standing Position: Not attempted due to Medical Condition or Safety Concerns (I.e. unsafe or physician orders) - 80  Stairs: No Not attempted due to medical condition or safety concerns (i.e. unsafe or physician order) - 88  WC Mobility: No Not Applicable (pt did not complete item prior to admission) - 9      ASSESSMENT:  Body structures, Functions, Activity limitations: Decreased functional mobility ; Decreased balance;Decreased strength;Decreased posture;Decreased safe awareness;Decreased ROM  Decision Making: Medium Complexity  History: med  Exam: med  Clinical Presentation: med    PT Education: Goals;PT Role;Plan of Care  Barriers to Learning: Platinum    CLINICAL IMPRESSION: Pt demonstrates deficits as listed. He requires assistance with all mobility at this time. Pt was previously incep.  He would benefit from PT prior to return to home    PLAN OF CARE:  Frequency: 1-2 treatment sessions per day, 5-7 days per week     Current Treatment Recommendations: Strengthening, Functional Mobility Training, Neuromuscular Re-education, Home Exercise Program, Equipment Evaluation, Education, & procurement, Safety Education & Training, Gait Training, Transfer Training, Balance Training, Stair training, Patient/Caregiver Education & Training, Positioning, Manual Therapy - Soft Tissue Mobilization    Patient's Goal:  go home soon    GOALS:  Long term goals  Long term goal 1: Bed mobility with indep  Long term goal 2: Functional transfers with indep  Long term goal 3: indep gait 150 feet with or without device  Long term goal 4: Pt will negotiate >/=12 steps with handrails and SBA to navigate home environment  Long term goal 5: indep with HEP    ELOS:   Plan weeks: 1-2    Therapy Time:    Individual   Time In 1100   Time Out 1130   Minutes [address], 02/02/21 at 11:49 AM

## 2021-02-02 NOTE — CONSULTS
PODIATRIC MEDICINE AND SURGERY       CONSULT HISTORY AND PHYSICAL       Requesting Provider: Dena Trivedi MD  Opinion/advice regarding: Elongated toenails bilaterally  Staff Doctor: Dr. Beltre Safer:  This 80 y.o. male with PMH of anemia, CAD, PAD, history of tobacco use, hypertension, hypothyroidism, NSTEMI with pacemaker who presents with elongated and painful nails in setting of diabetes mellitus. Plan:  Exam and evaluation  Nails 1-5 bilateral debrided in length and thickness without incident using nail nippers  Educated patient on diabetic foot care and importance of checking feet daily. Discussed with patient can follow up with for diabetic nail care as outpatient if desired  Podiatry to sign off at this time. HPI: This very pleasant 80year old  with PMH of anemia, CAD, DM, history of tobacco use, hypertension, hypothyroidism, NSTEMI with pacemaker who presents with elongated and painful nails seen today for diabetic nail care. Patient states that the toenails on both feet are long, painful with ambulation secondary to shoe gear pressure, and difficult to trim. Patient denies nausea, vomiting, diarrhea, fevers, chills, chest pain, shortness of breath, head ache, or calf pain. No other pedal complaints. Past Medical History:   Diagnosis Date    Anemia     CAD (coronary artery disease) 4/16/2015    DM (diabetes mellitus) [facility]     Dyslipidemia     History of tobacco abuse     HTN (hypertension)     Hypothyroidism     Non-ST elevation MI (NSTEMI) ([facility]     Pacemaker 4/16/2015       Past Surgical History:   Procedure Laterality Date    CATARACT REMOVAL      MIDDLE EAR SURGERY Left 1998    \"they had to reset the bones\" after an infection       [unfilled]    No Known Allergies    No family history on file. Social History     Socioeconomic History    Marital status:       Spouse name: Not on file    Number of children: Not on file    Years of education: Not on file    Highest education level: Not on file   Occupational History    Not on file   Social Needs    Financial resource strain: Not on file    Food insecurity     Worry: Not on file     Inability: Not on file    Transportation needs     Medical: Not on file     Non-medical: Not on file   Tobacco Use    Smoking status: Former Smoker    Smokeless tobacco: Never Used   Substance and Sexual Activity    Alcohol use: No    Drug use: No    Sexual activity: Not on file   Lifestyle    Physical activity     Days per week: Not on file     Minutes per session: Not on file    Stress: Not on file   Relationships    Social connections     Talks on phone: Not on file     Gets together: Not on file     Attends Restorationist service: Not on file     Active member of club or organization: Not on file     Attends meetings of clubs or organizations: Not on file     Relationship status: Not on file    Intimate partner violence     Fear of current or ex partner: Not on file     Emotionally abused: Not on file     Physically abused: Not on file     Forced sexual activity: Not on file   Other Topics Concern    Not on file   Social History Narrative    Not on file         REVIEW OF SYSTEMS:  CONSTITUTIONAL:  No fevers, chills, nightsweats, unintended weight loss  HEENT:  Denies frequent or severe headaches, nasal congestion/sinus symptoms, problematic allergy problems. EYES:  No diplopia or blurry vision. CARDIOVASCULAR:  No chest pain, dyspnea, palpitations, orthopnea  PULM:  No dyspnea, unexplained cough. GI:  No dysphagia/odynophagia, problematic reflux, constipation, diarrhea, changes in stool habits, hematochezia, melena. :  No new urinary complaints, including dysuria, gross hematuria or pyuria. NEURO:Gross and light touch are intact bilateral LE. Protective sensation is intact  MUSC-SKEL: see below  PSY:  No concerns regarding depression, anxiety or panic.   INTEGUMENTARY:  No new skin changes (rash, new or changing mole, new growth), toenails are elongated thickened dystrophic      OBJECTIVE:  /71   Pulse 76   Temp 98.9 °F (37.2 °C) (Oral)   Resp 17   Ht 5' 7\" (1.702 m)   Wt 159 lb 8 oz (72.3 kg)   SpO2 95%   BMI 24.98 kg/m²   Patient is alert and oriented x 3 in NAD. Vascular:   non palpable Dorsalis Pedis and Posterior Tibial Pulses B/L  Capillary Fill time < 3 seconds to B/L digits  Skin temperature warm to cool tibial tuberosity to the digits B/L  Hair growth absent to digits  no edema, no varicosities     Neurological:   Epicritic sensation intact B/L  Protective sensation via monofilament testing intact B/L  Negative Babinski B/L    Musculoskeletal/Orthopaedic:   Structural Deformities: none  5/5 muscle strength Dorsiflexion, Plantarflexion, Inversion, Eversion B/L  ROM wnl pedal and ankle joints b/l. Pain on palpation to elongated dystrophic nails 1 through 5 bilaterally. Dermatological:   Skin appears well hydrated and supple with good temperature, texture, turgor. Hyperkeratosis: non. Interspaces 1-4 bilateral are clear and without debris or maceration  No open lesions, ulcerations, verruca appreciated B/L. Nails 1 bilateral are thick, elongated, with subungual debris, and painful to palpation of nail folds. Nails 2 bilateral are thick, elongated, with subungual debris, and painful to palpation of nail folds. Nails 3 bilateral are thick, elongated, with subungual debris, and painful to palpation of nail folds. Nails 4 bilateral are thick, elongated, with subungual debris, and painful to palpation of nail folds. Nails 5 bilateral are thick, elongated, with subungual debris, and painful to palpation of nail folds. Thank you for the consult.      Bjorn Medrano DPM PGY3  Please first page [facility] On [facility], [phone]  February 2, 2021  9:10 AM

## 2021-02-02 NOTE — PROGRESS NOTES
Physical Therapy Rehab Treatment Note  Facility/Department: [facility]  [facility]       NAME: Salvador Viramontes  : 3/5/1929 (80 y.o.)  MRN: [id number]  CODE STATUS: Full Code    Date of Service: 2021  Chart Reviewed: Yes  Family / Caregiver Present: No    Restrictions:  Restrictions/Precautions: Fall Risk       SUBJECTIVE:    Pain Screening  Patient Currently in Pain: Yes       Post Treatment Pain Screenin/10  Pain Assessment  Pain Assessment: 0-10  Pain Level: 5  Pain Type: Acute pain  Pain Location: Leg  Pain Orientation: Right  Pain Descriptors: Aching    OBJECTIVE:   Overall Orientation Status: Impaired  Orientation Level: Oriented to person  Follows Commands: Impaired(significant Robinson necessitates frequent repetition)                                         Activity Tolerance  Activity Tolerance: Patient limited by pain    Exercises  Straight Leg Raise: x 10  Heelslides: 2 x 10  Hip Abduction: x10  Knee Short Arc Quad: 2 x 10  Ankle Pumps: x20  Comments: gentle LE stretching performed to Anatoliy LE.     ASSESSMENT/COMMENTS:  Body structures, Functions, Activity limitations: Decreased functional mobility ; Decreased balance;Decreased strength;Decreased posture;Decreased safe awareness;Decreased ROM  Assessment: Pt pain limited, able to work through pain when educated on modification of activties to tolerance. PLAN OF CARE/Safety:   Safety Devices  Type of devices: Left in bed;Call light within reach; Bed alarm in place      Therapy Time:   Individual   Time In 1130   Time Out 1200   Minutes 30     Minutes:30           Therapeutic ex:30      Mary Oneil 21 at 12:42 PM

## 2021-02-02 NOTE — CARE COORDINATION
Call placed to mirtha Silva to discuss rehab and gather information, message left, awaiting return call. Attempted to meet patient, SLP in room, will try later in the afternoon.  Electronically signed by Kandace Ahuja RN on 2/2/2021 at 10:45 AM

## 2021-02-02 NOTE — CARE COORDINATION
Social/Functional Status:  Social/Functional History  Lives With: Other (comment)(granddaughter and family)  Type of Home: House  Home Layout: Two level, Bed/Bath upstairs  Home Access: Stairs to enter with rails  Entrance Stairs - Number of Steps: 14  Bathroom Shower/Tub: Tub/Shower unit  Home Equipment: Rolling walker, Cane  ADL Assistance: Independent  Homemaking Assistance: Independent(granddaughter completes)  Ambulation Assistance: Independent  Transfer Assistance: Independent  Active : Yes  Additional Comments: pt is significantly [place]; information gathered via chart review and via writing. Spoke with patient and granddaughter and explained role in the team. All  questions answered appropriately. Explained discharge process. All  stated understanding. Patient lives with granddaughter and family. Per granddaughter, patient was independent prior, however, family did medications, cooking, cleaning. Patient bed and bath are upstairs and patient has 24 h care.  Electronically signed by Phillip Jarvis RN on 2/2/2021 at 2:53 PM

## 2021-02-02 NOTE — PROGRESS NOTES
Assessment completed earlier in the shift. VSS. . Snack and insulin given. Pt has no complaints and is in no distress. DSD intact to posterior rt thigh. LBM 1/26/2021. Scheduled Lactulose given this evening. 1.5 L NC on continuous. Bed alarm on. Call light in reach.  Electronically signed by Ez Almonte RN on 2/2/2021 at 12:50 AM

## 2021-02-02 NOTE — PROGRESS NOTES
Physical Therapy Missed Treatment   Facility/Department: [facility] R260/R260-01    NAME: Marilou Lee    : 3/5/1929 (80 y.o.)  MRN: [id number]    Account: [de-identified]  Gender: male      Attempted to see pt for scheduled therapy session, pt sleeping in bed upon my arrival to room, attempted to wake pt with both Verbal cues and physical stimulus with pt briefly opening his eyes and moaning some incoherent words before going back to sleep and snoring. Further attempts to wake pt up with both verbal and tactile stimulus was unsuccessful at this time. Missed Tx time: 30 min - pt un able to be arouse  at this time.        Julien Menezes  21 at 2:16 PM

## 2021-02-03 LAB
ALBUMIN SERPL-MCNC: 3.2 G/DL (ref 3.5–4.6)
ALP BLD-CCNC: 61 U/L (ref 35–104)
ALT SERPL-CCNC: 14 U/L (ref 0–41)
ANION GAP SERPL CALCULATED.3IONS-SCNC: 12 MEQ/L (ref 9–15)
AST SERPL-CCNC: 16 U/L (ref 0–40)
BASOPHILS ABSOLUTE: 0 K/UL (ref 0–0.2)
BASOPHILS RELATIVE PERCENT: 0.4 %
BILIRUB SERPL-MCNC: 0.9 MG/DL (ref 0.2–0.7)
BUN BLDV-MCNC: 31 MG/DL (ref 8–23)
CALCIUM SERPL-MCNC: 8.2 MG/DL (ref 8.5–9.9)
CHLORIDE BLD-SCNC: 100 MEQ/L (ref 95–107)
CO2: 21 MEQ/L (ref 20–31)
CREAT SERPL-MCNC: 1.58 MG/DL (ref 0.7–1.2)
EOSINOPHILS ABSOLUTE: 0.4 K/UL (ref 0–0.7)
EOSINOPHILS RELATIVE PERCENT: 3.8 %
GFR AFRICAN AMERICAN: 49.9
GFR NON-AFRICAN AMERICAN: 41.2
GLOBULIN: 3.3 G/DL (ref 2.3–3.5)
GLUCOSE BLD-MCNC: 138 MG/DL (ref 60–115)
GLUCOSE BLD-MCNC: 151 MG/DL (ref 70–99)
GLUCOSE BLD-MCNC: 156 MG/DL (ref 60–115)
GLUCOSE BLD-MCNC: 187 MG/DL (ref 60–115)
GLUCOSE BLD-MCNC: 271 MG/DL (ref 60–115)
HCT VFR BLD CALC: 24.3 % (ref 42–52)
HEMOGLOBIN: 8.1 G/DL (ref 14–18)
INR BLD: 2.8
LYMPHOCYTES ABSOLUTE: 1.8 K/UL (ref 1–4.8)
LYMPHOCYTES RELATIVE PERCENT: 17.5 %
MCH RBC QN AUTO: 31.4 PG (ref 27–31.3)
MCHC RBC AUTO-ENTMCNC: 33.1 % (ref 33–37)
MCV RBC AUTO: 94.6 FL (ref 80–100)
MONOCYTES ABSOLUTE: 1.1 K/UL (ref 0.2–0.8)
MONOCYTES RELATIVE PERCENT: 10.5 %
NEUTROPHILS ABSOLUTE: 6.8 K/UL (ref 1.4–6.5)
NEUTROPHILS RELATIVE PERCENT: 67.8 %
PARATHYROID HORMONE INTACT: 50.5 PG/ML (ref 15–65)
PDW BLD-RTO: 13.5 % (ref 11.5–14.5)
PERFORMED ON: ABNORMAL
PLATELET # BLD: 299 K/UL (ref 130–400)
POTASSIUM REFLEX MAGNESIUM: 4.2 MEQ/L (ref 3.4–4.9)
PRO-BNP: 859 PG/ML
PROCALCITONIN: 0.28 NG/ML (ref 0–0.15)
PROTHROMBIN TIME: 29.5 SEC (ref 12.3–14.9)
RBC # BLD: 2.57 M/UL (ref 4.7–6.1)
SODIUM BLD-SCNC: 133 MEQ/L (ref 135–144)
TOTAL PROTEIN: 6.5 G/DL (ref 6.3–8)
VITAMIN D 25-HYDROXY: 16.8 NG/ML (ref 30–100)
WBC # BLD: 10 K/UL (ref 4.8–10.8)

## 2021-02-03 PROCEDURE — 2700000000 HC OXYGEN THERAPY PER DAY

## 2021-02-03 PROCEDURE — 6370000000 HC RX 637 (ALT 250 FOR IP): Performed by: PHYSICAL MEDICINE & REHABILITATION

## 2021-02-03 PROCEDURE — 1180000000 HC REHAB R&B

## 2021-02-03 PROCEDURE — 2580000003 HC RX 258: Performed by: PHYSICAL MEDICINE & REHABILITATION

## 2021-02-03 PROCEDURE — 85025 COMPLETE CBC W/AUTO DIFF WBC: CPT

## 2021-02-03 PROCEDURE — 36415 COLL VENOUS BLD VENIPUNCTURE: CPT

## 2021-02-03 PROCEDURE — 6370000000 HC RX 637 (ALT 250 FOR IP): Performed by: INTERNAL MEDICINE

## 2021-02-03 PROCEDURE — 94761 N-INVAS EAR/PLS OXIMETRY MLT: CPT

## 2021-02-03 PROCEDURE — 85610 PROTHROMBIN TIME: CPT

## 2021-02-03 PROCEDURE — 6360000002 HC RX W HCPCS: Performed by: PHYSICAL MEDICINE & REHABILITATION

## 2021-02-03 PROCEDURE — 6370000000 HC RX 637 (ALT 250 FOR IP): Performed by: NURSE PRACTITIONER

## 2021-02-03 PROCEDURE — 97535 SELF CARE MNGMENT TRAINING: CPT

## 2021-02-03 PROCEDURE — 99223 1ST HOSP IP/OBS HIGH 75: CPT | Performed by: INTERNAL MEDICINE

## 2021-02-03 PROCEDURE — 6360000002 HC RX W HCPCS: Performed by: INTERNAL MEDICINE

## 2021-02-03 PROCEDURE — 92523 SPEECH SOUND LANG COMPREHEN: CPT

## 2021-02-03 PROCEDURE — 84145 PROCALCITONIN (PCT): CPT

## 2021-02-03 PROCEDURE — 97110 THERAPEUTIC EXERCISES: CPT

## 2021-02-03 PROCEDURE — 96125 COGNITIVE TEST BY HC PRO: CPT

## 2021-02-03 PROCEDURE — 80053 COMPREHEN METABOLIC PANEL: CPT

## 2021-02-03 PROCEDURE — 83970 ASSAY OF PARATHORMONE: CPT

## 2021-02-03 PROCEDURE — 97530 THERAPEUTIC ACTIVITIES: CPT

## 2021-02-03 PROCEDURE — 83880 ASSAY OF NATRIURETIC PEPTIDE: CPT

## 2021-02-03 PROCEDURE — 97140 MANUAL THERAPY 1/> REGIONS: CPT

## 2021-02-03 PROCEDURE — 82306 VITAMIN D 25 HYDROXY: CPT

## 2021-02-03 PROCEDURE — 94640 AIRWAY INHALATION TREATMENT: CPT

## 2021-02-03 RX ORDER — ERGOCALCIFEROL 1.25 MG/1
50000 CAPSULE ORAL WEEKLY
Status: DISCONTINUED | OUTPATIENT
Start: 2021-02-03 | End: 2021-02-13 | Stop reason: HOSPADM

## 2021-02-03 RX ORDER — PREDNISONE 20 MG/1
40 TABLET ORAL DAILY
Status: COMPLETED | OUTPATIENT
Start: 2021-02-03 | End: 2021-02-07

## 2021-02-03 RX ORDER — LACTULOSE 10 G/15ML
20 SOLUTION ORAL 2 TIMES DAILY PRN
Status: DISCONTINUED | OUTPATIENT
Start: 2021-02-03 | End: 2021-02-13 | Stop reason: HOSPADM

## 2021-02-03 RX ORDER — WARFARIN SODIUM 2 MG/1
2 TABLET ORAL ONCE
Status: COMPLETED | OUTPATIENT
Start: 2021-02-03 | End: 2021-02-03

## 2021-02-03 RX ORDER — IPRATROPIUM BROMIDE AND ALBUTEROL SULFATE 2.5; .5 MG/3ML; MG/3ML
1 SOLUTION RESPIRATORY (INHALATION)
Status: DISCONTINUED | OUTPATIENT
Start: 2021-02-03 | End: 2021-02-06

## 2021-02-03 RX ORDER — BUDESONIDE AND FORMOTEROL FUMARATE DIHYDRATE 160; 4.5 UG/1; UG/1
2 AEROSOL RESPIRATORY (INHALATION) 2 TIMES DAILY
Status: DISCONTINUED | OUTPATIENT
Start: 2021-02-03 | End: 2021-02-13 | Stop reason: HOSPADM

## 2021-02-03 RX ADMIN — LEVOTHYROXINE SODIUM 25 MCG: 0.05 TABLET ORAL at 06:08

## 2021-02-03 RX ADMIN — IPRATROPIUM BROMIDE AND ALBUTEROL SULFATE 1 AMPULE: .5; 3 SOLUTION RESPIRATORY (INHALATION) at 11:29

## 2021-02-03 RX ADMIN — ATORVASTATIN CALCIUM 40 MG: 40 TABLET, FILM COATED ORAL at 21:49

## 2021-02-03 RX ADMIN — PREDNISONE 40 MG: 20 TABLET ORAL at 12:46

## 2021-02-03 RX ADMIN — WARFARIN SODIUM 2 MG: 2 TABLET ORAL at 16:49

## 2021-02-03 RX ADMIN — ENOXAPARIN SODIUM 80 MG: 80 INJECTION SUBCUTANEOUS at 06:08

## 2021-02-03 RX ADMIN — BUDESONIDE AND FORMOTEROL FUMARATE DIHYDRATE 2 PUFF: 160; 4.5 AEROSOL RESPIRATORY (INHALATION) at 16:12

## 2021-02-03 RX ADMIN — CARVEDILOL 6.25 MG: 6.25 TABLET, FILM COATED ORAL at 08:43

## 2021-02-03 RX ADMIN — CARVEDILOL 6.25 MG: 6.25 TABLET, FILM COATED ORAL at 16:48

## 2021-02-03 RX ADMIN — CEFTRIAXONE SODIUM 1000 MG: 1 INJECTION, POWDER, FOR SOLUTION INTRAMUSCULAR; INTRAVENOUS at 21:49

## 2021-02-03 RX ADMIN — IPRATROPIUM BROMIDE AND ALBUTEROL SULFATE 1 AMPULE: .5; 3 SOLUTION RESPIRATORY (INHALATION) at 16:12

## 2021-02-03 RX ADMIN — INSULIN LISPRO 2 UNITS: 100 INJECTION, SOLUTION INTRAVENOUS; SUBCUTANEOUS at 08:44

## 2021-02-03 RX ADMIN — URSODIOL 300 MG: 300 CAPSULE ORAL at 21:49

## 2021-02-03 RX ADMIN — ASPIRIN 81 MG: 81 TABLET, CHEWABLE ORAL at 08:43

## 2021-02-03 RX ADMIN — ACETAMINOPHEN 650 MG: 325 TABLET ORAL at 08:49

## 2021-02-03 RX ADMIN — ERGOCALCIFEROL 50000 UNITS: 1.25 CAPSULE ORAL at 16:47

## 2021-02-03 RX ADMIN — INSULIN LISPRO 6 UNITS: 100 INJECTION, SOLUTION INTRAVENOUS; SUBCUTANEOUS at 21:49

## 2021-02-03 RX ADMIN — PANTOPRAZOLE SODIUM 40 MG: 40 TABLET, DELAYED RELEASE ORAL at 06:08

## 2021-02-03 RX ADMIN — URSODIOL 300 MG: 300 CAPSULE ORAL at 08:43

## 2021-02-03 RX ADMIN — INSULIN LISPRO 2 UNITS: 100 INJECTION, SOLUTION INTRAVENOUS; SUBCUTANEOUS at 12:54

## 2021-02-03 ASSESSMENT — PAIN DESCRIPTION - LOCATION
LOCATION: LEG
LOCATION: LEG

## 2021-02-03 ASSESSMENT — PAIN DESCRIPTION - ORIENTATION: ORIENTATION: RIGHT

## 2021-02-03 ASSESSMENT — PAIN SCALES - GENERAL
PAINLEVEL_OUTOF10: 0
PAINLEVEL_OUTOF10: 7
PAINLEVEL_OUTOF10: 5

## 2021-02-03 ASSESSMENT — PAIN DESCRIPTION - PAIN TYPE: TYPE: ACUTE PAIN

## 2021-02-03 NOTE — CONSULTS
Pulmonary and Critical Care Medicine  Consult Note  Encounter Date: 2/3/2021 3:17 PM    Mr. Katharine Kaminski is a 80 y.o. male  : 3/5/1929  Requesting Provider: Toby Marquis*    Reason for request: COPD            HISTORY OF PRESENT ILLNESS:    Patient is 80 y.o. presents with unprovoked PE admitted on  to [facility], discharged on  to rehab. I was asked to see patient for COPD, patient is hard of hearing, history was obtained with assistance of the nurse, apparently had some wheezing yesterday, however he is on 2 L O2 at home which is currently what he is using at his baseline, he has chronic cough with minimal phlegm with no change from baseline, no fever or chills, no reported pain, he was started on prednisone 40 mg p.o. daily today for wheezing currently seems to be better. Patient had unprovoked DVT and currently on Coumadin. Past Medical History:        Diagnosis Date    Anemia     CAD (coronary artery disease) 2015    [facility]     Dyslipidemia     History of tobacco abuse     HTN (hypertension)     Hypothyroidism     Non-ST elevation MI (NSTEMI) ([facility]     Pacemaker 2015       Past Surgical History:        Procedure Laterality Date    CATARACT REMOVAL      MIDDLE EAR SURGERY Left     \"they had to reset the bones\" after an infection       Social History:     reports that he has quit smoking. He has never used smokeless tobacco. He reports that he does not drink alcohol or use drugs. Family History:   History reviewed. No pertinent family history. Positive for venous thromboembolic disease  Allergies:  Patient has no known allergies.         MEDICATIONS during current hospitalization:    Continuous Infusions:    Scheduled Meds:   ipratropium-albuterol  1 ampule Inhalation Q4H WA    vitamin D  50,000 Units Oral Weekly    predniSONE  40 mg Oral Daily    warfarin  2 mg Oral Once    cefTRIAXone (ROCEPHIN) IV 1,000 mg Intravenous Q24H    aspirin  81 mg Oral Daily    atorvastatin  40 mg Oral Nightly    carvedilol  6.25 mg Oral BID WC    insulin lispro  0-12 Units Subcutaneous 4x Daily AC & HS    levothyroxine  25 mcg Oral Daily    pantoprazole  40 mg Oral QAM AC    ursodiol  300 mg Oral BID    [START ON 1/30/2022] warfarin (COUMADIN) daily dosing (placeholder)   Other RX Placeholder       PRN Meds:lactulose, albuterol, acetaminophen **OR** acetaminophen, polyethylene glycol, promethazine **OR** ondansetron, aluminum & magnesium hydroxide-simethicone, glucagon (rDNA), glucose, senna        REVIEW OF SYSTEMS:  ROS: 10 organs review of system is done including general, psychological, ENT, hematological, endocrine, respiratory, cardiovascular, gastrointestinal, musculoskeletal, neurological,  allergy and Immunology is done and is otherwise negative. PHYSICAL EXAM:    Vitals:  BP (!) 150/69   Pulse 72   Temp 98 °F (36.7 °C) (Oral)   Resp 17   Ht 5' 7\" (1.702 m)   Wt 159 lb 8 oz (72.3 kg)   SpO2 94%   BMI 24.98 kg/m²     General: alert, cooperative, no distress  Head: normocephalic, atraumatic  Eyes:No gross abnormalities. ENT:  MMM no lesions  Neck:  supple and no masses  Chest : Few rhonchi, no wheezing, nontender, tympanic  Heart[de-identified] Heart sounds are normal.  Regular rate and rhythm without murmur, gallop or rub. ABD:  symmetric, soft, non-tender, no guarding or rebound  Musculoskeletal : no cyanosis, no clubbing and no edema  Neuro:  Grossly normal  Skin: No rashes or nodules noted. Lymph node:  no cervical nodes  Urology: No Delacruz   Psychiatric: appropriate        Data Review  Recent Labs     02/01/21  0543 02/03/21  0949   WBC 6.9 10.0   HGB 8.1* 8.1*   HCT 24.4* 24.3*    299      Recent Labs     02/01/21  0543 02/03/21  0949    133*   K 4.2 4.2    100   CO2 21 21   BUN 24* 31*   CREATININE 1.17 1.58*   GLUCOSE 128* 151*       MV Settings:           ABGs: No results for input(s): PHART, UHM0HTK, PO2ART, JGJ7BJR, BEART, B0YADOXX, VZH0SZZ in the last 72 hours.   O2 Device: Nasal cannula  O2 Flow Rate (L/min): 2 L/min  Lab Results   Component Value Date    LACTA 1.7 02/13/2020    LACTA 1.5 12/04/2019    LACTA 2.2 09/26/2019       Radiology  I personally reviewed imaging studies and CT abdomen shows suspected left lower lobe and extensive DVT    Chest x-ray from yesterday reviewed by me, and no infiltrate    Assessment, plan:   Patient is at risk due to    · COPD with mild exacerbation, improving  · Acute bronchitis  · Unprovoked DVT and probable PE  · Slightly increased procalcitonin    Recommendation  · Complete 5 days of prednisone  · Patient currently on Rocephin, will repeat procalcitonin tomorrow and de-escalate    · Repeat procalcitonin tomorrow  · Incentive spirometry and flutter  · Symbicort  · Flutter device  · Incentive spirometer  · O2 to keep sat 88 to 90%    Thank you for consultation    Electronically signed by Juan J Everett MD, [facility],  on 2/3/2021 at 3:17 PM

## 2021-02-03 NOTE — PROGRESS NOTES
[facility] Occupational Therapy      Date: 2/3/2021  Patient Name: Lidia Razo        MRN: [id number]  Account: [de-identified]   : 3/5/1929  (80 y.o.)  Room: [place] [id number]    Chart reviewed, attempted OT at 14:30 for 30 min. Patient not seen 2° to: Other: Pt. was unable to be woken up in his bed. When therapist attempted to wake him up physically and verbally but patient just moaned out. Pt. was on 2L O2. Spoke to [employer], RN aware. Will attempt again when able.     Electronically signed by LEONCIO Walls on 2/3/2021 at 2:48 PM

## 2021-02-03 NOTE — PROGRESS NOTES
Clinical Pharmacy Note    Warfarin consult follow-up    Recent Labs     02/03/21  0537   INR 2.8     Recent Labs     02/01/21  0543 02/03/21  0949   HGB 8.1* 8.1*   HCT 24.4* 24.3*    299         Significant drug:drug interactions:  lovenox 1mg/kg BID, ASA, APAP, synthroid, protonix, prednisone started 2/3 for 5 days     Current diet order/intake: cardiac diet     Notes:  Warfarin Dose       01/30/21 1.3 (1/29) 5 mg   01/31/21 1.1  5 mg   02/01/21  1.1  7.5 mg   02/02/21  1.5  7.5 mg   02/03/21  2.8  2 mg                      INR is therapeutic at 2.8 today. Patient new start to warfarin (unprovoked LLE DVT). INR increased 1.3 points in 24 hours. Pt started on prednisone 40 mg x 5 days. May discontinue lovenox at this time. Will give reduced dose of 2 mg x 1 today due to rapid jump and potential prednisone interaction  Daily PT/INR during pharmacy consult to monitor and dose warfarin therapy.       Narinder Redd [facility]  2/3/2021  11:00 AM

## 2021-02-03 NOTE — PROGRESS NOTES
lovenox with bridge to coumadin  16. Discharge planning: SW on board. Will discharge once medically stable and cleared by all consultants. 17. High Risk Readmission Screening Tool Score Noted. Additionally, the following hospital problems were addressed: Active Problems:    PVD (peripheral vascular disease) ([address]  Resolved Problems:    * No resolved hospital problems. *      ** Total time spent reviewing medical records, evaluating patient, speaking with RN's and consultants where I was focused exclusively on this patient: 35 minutes. This time is excluding time spent performing procedures or significant events occurring earlier or later in the day requiring my attention and focus. Subjective:   Admit Date: 2/1/2021  PCP: Fabby Kuzn, DO    No acute events overnight. Afebrile  No new complaints. Pt denies chest pain, SOB, N/V, fevers or chills. Objective:     Vitals:    02/02/21 0915 02/02/21 1827 02/02/21 2225 02/03/21 0726   BP:  (!) 133/57  113/65   Pulse:  74  64   Resp:  20 18 18   Temp: 98.4 °F (36.9 °C) 98 °F (36.7 °C)  98 °F (36.7 °C)   TempSrc: Oral   Oral   SpO2:  96% 98% 99%   Weight:       Height:         General appearance:  + O x 2. No conversational dyspnea noted. Answers questions appropriately. Extremely [place]. Able to communicate. Does better if questions are written down  Lungs: Diminished,  exp wheezes, No rales, No retractions; No use of accessory muscles. Non-productive cough present  Heart:  S1, S2 normal, RRR, no MRG appreciated  Abdomen: (+) BS, soft, non-tender; non distended no guarding or rigidity. Extremities:  no cyanosis, no edema bilat lower exts, no calf tenderness bilaterally. Dry skin noted.  Scattered areas of ecchymosis present       Medications:      ipratropium-albuterol  1 ampule Inhalation Q4H WA    vitamin D  50,000 Units Oral Weekly    predniSONE  40 mg Oral Daily    cefTRIAXone (ROCEPHIN) IV  1,000 mg Intravenous Q24H    enoxaparin  1 mg/kg Subcutaneous BID    aspirin  81 mg Oral Daily    atorvastatin  40 mg Oral Nightly    carvedilol  6.25 mg Oral BID WC    insulin lispro  0-12 Units Subcutaneous 4x Daily AC & HS    levothyroxine  25 mcg Oral Daily    pantoprazole  40 mg Oral QAM AC    ursodiol  300 mg Oral BID    [START ON 1/30/2022] warfarin (COUMADIN) daily dosing (placeholder)   Other RX Placeholder       LABS Reviewed    IMAGING Reviewed    Yesika Martins CNP  Rounding Hospitalist

## 2021-02-03 NOTE — PROGRESS NOTES
Physical Therapy Rehab Treatment Note  [facility]ility/Department[facility]  [facility]       NAME: Meghan Mratínez  : 3/5/1929 (80 y.o.)  MRN: [id number]  CODE STATUS: Full Code    Date of Service: 2/3/2021  Chart Reviewed: Yes  Family / Caregiver Present: No    Restrictions:  Restrictions/Precautions: Fall Risk       SUBJECTIVE:    Pain Screening  Patient Currently in Pain: Yes       Post Treatment Pain Screenin/10  Pain Assessment  Pain Assessment: 0-10  Pain Level: 5  Pain Type: Acute pain  Pain Location: Leg  Pain Orientation: Right  Pain Descriptors: Aching    OBJECTIVE:                         Transfers  Sit to Stand: Moderate Assistance  Stand to sit: Minimal Assistance  Comment: Pt moaning out in pain with attempted sit-stand, unable to come to full standing position at this time due to Right LE pain, return to sitting before further attempts made. Activity Tolerance  Activity Tolerance: Patient limited by pain    Exercises  Comments: Seated Gastroc stretches performed to Anatoliy LE, pt tender to touch with Right LE, stretches performed in a slow static stretch as tolerated. ASSESSMENT/COMMENTS:  Body structures, Functions, Activity limitations: Decreased functional mobility ; Decreased balance;Decreased strength;Decreased posture;Decreased safe awareness;Decreased ROM  Assessment: Unable to get pt to full standing position at start of tx session due to Right LE pain, focus on LE stretches in Anatoliy LE with long static stretches.     PLAN OF CARE/Safety:   Safety Devices  Type of devices: Left in chair;Chair alarm in place      Therapy Time:   Individual   Time In 1030   Time Out 1045   Minutes 15     Minutes:15      Transfer/Bed mobility trainin      Manual Therapy:Jessica Odom  21 at 12:44 PM

## 2021-02-03 NOTE — PROGRESS NOTES
Occupational Therapy  Facility/Department: [facility]  Daily Treatment Note  NAME: Toya Farr  : 3/5/1929  MRN: [id number]    Date of Service: 2/3/2021    Discharge Recommendations:  Continue to assess pending progress       Assessment      Activity Tolerance  Activity Tolerance: Patient limited by fatigue  Safety Devices  Safety Devices in place: Yes  Type of devices: All fall risk precautions in place         Patient Diagnosis(es): There were no encounter diagnoses. has a past medical history of Anemia, CAD (coronary artery disease), DM (diabetes mellitus) ([facility] [address] Dyslipidemia, History of tobacco abuse, HTN (hypertension), Hypothyroidism, Non-ST elevation MI (NSTEMI) ([facility] [address] and Pacemaker. has a past surgical history that includes Cataract removal and Middle ear surgery (Left, ). Restrictions  Restrictions/Precautions  Restrictions/Precautions: Fall Risk  Subjective   General  Referring Practitioner: Dr. Bessy Mcelroy  Diagnosis: Impaired mobility and ADL's d/t severe progressive PVD  Pain Assessment  Pain Assessment: 0-10  Pain Level: 5  Pain Type: Acute pain  Pain Location: Leg  Pain Orientation: Right  Pain Descriptors: Aching  Pain Frequency: Continuous  Pre Treatment Pain Screening  Pain at present: 5  Scale Used: Numeric Score  Intervention List: Patient able to continue with treatment;Patient declined any intervention  Vital Signs  Patient Currently in Pain: Yes   Orientation     Objective    Pt. was sleeping in bed upon arrival. Pt. required encouragement to get out of the bed and engage in therapy. Pt. transferred supine to side lying at min assist and sitting eob at max assist. Pt. sat on the edge of the bed to complete a sponge bath adl and declined a shower. Pt. stated during therapy \" I don't need any of this shit, I am not leaving. I don't need to get dressed I am not leaving. \" Pt. was able to be redirected when therapist handed him his clothes and he continued with the task.  After the adl patient transferred to the w/c with a ww and stated \" I don't need to go in there, I am not going anywhere. \" Pt. was informed he had more therapy and would be going to the therapy room to complete the therapy. Pt. was on 2L O2 during adl. ADL  Feeding: Setup  Grooming: Supervision;Setup  UE Bathing: Supervision;Setup  LE Bathing: Moderate assistance  UE Dressing: Setup;Supervision  LE Dressing: Maximum assistance  Toileting: Unable to assess(comment)(pt. did not need to go)     Toilet Transfers  Toilet Transfer: Unable to assess  Toilet Transfers Comments: did not need to go  Shower Transfers  Shower Transfers: Not tested  [employer] Transfers Comments: pt. declined a shower and completed a sponge bath at the side of the bed. Cognition  Cognition Comment: comp: SUP exp: SUP soc: MIN A prob: MIN A mem: MIN A      Pt. continued his therapy while engaging in B hand fine motor and strengthening task with assembling a 3 piece pattern with a bolt, nut, and wooden [place]. Pt. had an example on how to assemble the pieces together and put them together. Pt. was able to complete ~8 pieces total with occasional difficulty. Pt. also closed his eyes during therapy after he had the initial thread of the nut on the bolt. Pt. reached to place clothespins on a yard stick and was able to reach slightly above shoulder height for the clothespins. Pt. had no difficulty with placing the clothespins on.       Plan   Plan  Times per week: 5-7x/wk  Plan weeks: 1.5  Current Treatment Recommendations: Strengthening, Functional Mobility Training, Cognitive Reorientation, Cognitive/Perceptual Training, Patient/Caregiver Education & Training, Endurance Training, Equipment Evaluation, Education, & procurement, Balance Training, Safety Education & Training, Self-Care / ADL    Goals  Patient Goals   Patient goals : to go home       Therapy Time   Individual Concurrent Group Co-treatment   Time In 0900         Time Out 1000         Minutes 60 ADL trainin minutes  Therapeutic activities: 20 minutes      LEONCIO Hernandez Electronically signed by LEONCIO Hernandez on 2/3/2021 at 10:24 AM

## 2021-02-03 NOTE — FLOWSHEET NOTE
Pt in bed with eyes closed. Opens eyes with name said close to ear. SSC given. 2LNC in use. Frequent moist cough noted. Pt is very [place]. Respirations even and nonlabored.

## 2021-02-03 NOTE — PROGRESS NOTES
[facility]   Facility/Department: [facility]  Speech Language Pathology  Initial Speech/Language/Cognitive Assessment    NAME:Chey Lomax  : 3/5/1929 (89 y.o.)   MRN: [id number]  ROOM: [place] [id number]-  ADMISSION DATE: 2021  PATIENT DIAGNOSIS(ES): PVD (peripheral vascular disease) ([facility] [address] [I73.9]  No chief complaint on file. Patient Active Problem List    Diagnosis Date Noted    Anginal equivalent ([address]      Priority: High    PVD (peripheral vascular disease) ([address] 2021    Venous thrombosis of leg 2021    Hyponatremia 2021    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HCC) 2021    Syncope and collapse 2020    COPD exacerbation ([address] 2020    NSTEMI (non-ST elevated myocardial infarction) ([address] 2019    Chest pain 2019    Hypotension 2019    SOB (shortness of breath) 2018    COPD with acute exacerbation ([address] 2018    Bronchitis 2016    CAD (coronary artery disease) 2015    HTN (hypertension)     Dyslipidemia     DM (diabetes mellitus) ([address]     Hypothyroidism     History of tobacco abuse     Anemia      Past Medical History:   Diagnosis Date    Anemia     CAD (coronary artery disease) 2015    DM (diabetes mellitus) ([address]     Dyslipidemia     History of tobacco abuse     HTN (hypertension)     Hypothyroidism     Non-ST elevation MI (NSTEMI) ([address]     Pacemaker 2015     Past Surgical History:   Procedure Laterality Date    CATARACT REMOVAL      MIDDLE EAR SURGERY Left     \"they had to reset the bones\" after an infection       DATE ONSET: 2021    Date of Evaluation: 2/3/2021   Evaluating Therapist: DALTON Fitzpatrick    Assessment:      Diagnosis: Pt presents with a moderate cognitive linguistic impairment. It should be noted that a complete formal assessment was unable to be completed this date secondary to pts hearing and participation.  Most responses from pt were oh, I dont know even when given cues and encouragement. Pt exhibited impaired short term and working memory, impaired abstract naming and reasoning, decreased safety awareness and problem solving, decreased insight, decreased comprehension of novel or abstract prompts, delayed initiation, and impaired money management skills. Tx will continue to assess pts cognitive linguistic function. Recommendations:  Requires SLP Intervention: Yes  Duration/Frequency of Treatment: 2-3x/week  D/C Recommendations: To be determined       Goals:  Short-term Goals  Timeframe for Short-term Goals: 2-3x/week of individual and/or group treatment sessions, as applicable, during LOS or until goals met  Goal 1: To increase safety awareness and judgment for safe completion of ADLs secondary to pt's cognitive deficits,  pt will complete mid level problem solving tasks related to ADLs (e.g. personal care, money managment, medications) with 80% accuracy and min cues. Goal 2: To increase safety awareness and judgment for safe completion of ADLs secondary to pt's cognitive deficits, pt will provide reasonable solutions to problems of everyday living with 80% accuracy and min cues. Goal 3: To increase safety awareness and judgment for safe completion of ADLs secondary to pt's cognitive deficits, pt will complete abstract reasoning tasks (i.e. convergent and divergent naming) with 80% accuracy and min cues. Goal 4: To address pt's cognitive deficits and promote orientation, pt will state name of facility, time within 1 hour, reason in hospital, current month and year with 100% accuracy with  use of external aid. Long-term Goals  Timeframe for Long-term Goals: 2-4 weeks of individual and/or group treatment sessions, as applicable, during LOS or until goals met  Goal 1: Pt will demonstrate functional cognitive-linguistic abilities in all opportunities with modified independence in order to safely complete ADLs. Patient's goals:  To go home    Subjective:   Previous level of function and limitations: Pt states he manages his own medication, finances, and helps with household tasks   General  Chart Reviewed: Yes  Patient assessed for rehabilitation services?: Yes  Additional Pertinent Hx: Pt wears hearing aids however is very [place]. Pt repsonds best to written explanation and communication. Family / Caregiver Present: No  Subjective  Subjective: Pt was alert but required mod cues and encouragment to particpate in evaluation. Vision  Vision: Impaired  Vision Exceptions: Wears glasses at all times  Hearing  Hearing: Exceptions to [facility]  Hearing Exceptions: Hard of hearing/hearing concerns;Bilateral hearing aid(SLP att to use amplifier to improve hearing, however, pt c/o that is sounded mumbled and that he could not understand.)       Objective:     Oral/Motor  Oral Motor: Within functional limits    Auditory Comprehension  Comprehension: Exceptions  Yes/No Questions: Mild  Basic Questions: Moderate  Complex Questions: Moderate  Conversation: Moderate  Interfering Components: Hearing  Effective Techniques: Increased volume;Visual/Gestural cues(Whiteboard for notes)    Reading Comprehension  Reading Status: Unable to assess    Expression  Primary Mode of Expression: Verbal    Verbal Expression  Verbal Expression: Exceptions to functional limits  Repetition: Mild  Automatic Speech: Mild  Convergent: Severe  Divergent: Severe  Responsive: Mild  Conversation: Moderate    Written Expression  Written Expression: Unable to assess    Motor Speech  Motor Speech: Within Functional Limits      Cognition:      Orientation  Overall Orientation Status: Impaired  Orientation Level: Oriented to place;Oriented to person;Disoriented to time;Disoriented to situation  Attention  Attention: Within Functional Limits  Memory  Memory: Exceptions to [facility]  Daily Routines: Mild  Long-term Memory: Moderate  People Encountered: Moderate  Prospective Memory: Moderate  Short-term Memory:  Moderate  Working Memory: Moderate  Problem Solving  Problem Solving: Exceptions to [facility]  Complex Functional Tasks: Moderate  Managing Finances: Moderate  Managing Medications: To be assessed in therapy  Simple Functional Tasks: Mild  Verbal Reasoning Skills: Moderate  Numeric Reasoning  Numeric Reasoning: Exceptions to [facility]   Money Management: Mild  Time: To be assessed in therapy  Abstract Reasoning  Abstract Reasoning: Exceptions to [facility]  Convergent Thinking: Severe  Divergent Thinking: Severe  Safety/Judgement  Safety/Judgement: Exceptions to [facility]  Complex Functional Tasks: To be assessed in therapy  Novel Situations: Mild  Unable to Self-monitor and Self-correct Consistently: Mild  Insight: Mild        Prognosis:  Individuals consulted  Consulted and agree with results and recommendations: Patient;RN    Education:  Patient Education: eval results  Patient Education Response: Demonstrated understanding;Needs reinforcement     Type of devices: Chair alarm in place; Left in chair(SLP transported pt to PT.)    Pain Assessment:  Initial Assessment:  Patient c/o: 5    Re-assessment:  Patient c/o: 7  RN informed.     NATIONAL OUTCOMES MEASUREMENT SYSTEM (NOMS):  SPOKEN LANGUAGE COMPREHENSION  Ratin    SPOKEN LANGUAGE EXPRESSION  Ratin/5    MOTOR SPEECH  Ratin    PROBLEM SOLVING  Ratin    MEMORY  Ratin             Therapy Time  SLP Individual Minutes  Time In: 1000  Time Out: 1030  Minutes: 30          Signature: Electronically signed by DALTON Massey on 2/3/2021 at 11:24 AM

## 2021-02-03 NOTE — PROGRESS NOTES
Physical Therapy Missed Treatment   Facility/Department: [address] [place], [place] [address]ab [id number]    NAME: Junito Joy    : 3/5/1929 (80 y.o.)  MRN: [id number]    Account: [de-identified]  Gender: male      Arrived to pt room for scheduled therapy as pt unable to wake up at this time, PCA in room attempting to wake pt. Pt has been sleeping and has not eaten at this time. Pt unable to be aroused. No response to verbal or physical stimulation, Passively moved pt in bed and PROM of [place] LE with pt moaning in pain, pt is either unwilling or unable to open his eyes at this time. Following 10 min in room attempting to arouse pt and unable to get a response at  This time pt is inappropriate for therapy at this time due to safety concerns. Missed Tx time: 60 min. -Medical- Pt unsafe for therapy.        Jurline Hearing  21 at 2:19 PM

## 2021-02-03 NOTE — PLAN OF CARE
Problem: Pain:  Description: Pain management should include both nonpharmacologic and pharmacologic interventions.   Goal: Pain level will decrease  Description: Pain level will decrease  Outcome: Ongoing  Goal: Control of acute pain  Description: Control of acute pain  Outcome: Ongoing  Goal: Control of chronic pain  Description: Control of chronic pain  Outcome: Ongoing     Problem: Falls - Risk of:  Goal: Will remain free from falls  Description: Will remain free from falls  Outcome: Ongoing  Goal: Absence of physical injury  Description: Absence of physical injury  Outcome: Ongoing     Problem: IP SWALLOWING  Goal: LTG - patient will tolerate the least restrictive diet consistency to allow for safe consumption of daily meals  2/3/2021 0006 by Sandra Terrell RN  Outcome: Ongoing  2/2/2021 1106 by DALTON Lopez  Outcome: Ongoing

## 2021-02-03 NOTE — PROGRESS NOTES
Subjective: The patient complains of ,\" moderate acute on chronic progressive fatigue and    partially relieved by rest,medications, Pt, OT, and rest and exacerbated by recent illness. I am concerned about patients medical complexities. ROS x10: The patient also complains of severely impaired mobility and activities of daily living. Otherwise no new problems with vision, hearing, nose, mouth, throat, dermal, cardiovascular, GI, , pulmonary, musculoskeletal, psychiatric or neurological. See Rehab H&P on Rehab chart dated . Vital signs:  /65   Pulse 64   Temp 98 °F (36.7 °C) (Oral)   Resp 18   Ht 5' 7\" (1.702 m)   Wt 159 lb 8 oz (72.3 kg)   SpO2 97%   BMI 24.98 kg/m²   I/O:   PO/Intake:  fair PO intake, no problems observed or reported. Bowel/Bladder:  continent, no problems noted. General:  Patient is well developed, adequately nourished, non-obese and     well kempt. HEENT:    PERRLA, hearing intact to loud voice, external inspection of ear     and nose benign. Inspection of lips, tongue and gums benign  Musculoskeletal: No significant change in strength or tone. All joints stable. Inspection and palpation of digits and nails show no clubbing,       cyanosis or inflammatory conditions. Neuro/Psychiatric: Affect: flat but pleasant. Alert and oriented to person, place and     Situation with    cues. No significant change in deep tendon reflexes or     sensation  Lungs:  Diminished, CTA-B. Respiration effort is normal at rest.     Heart:   S1 = S2, RRR. No loud murmurs. Abdomen:  Soft, non-tender, no enlargement of liver or spleen. Extremities:  No significant lower extremity edema or tenderness. Skin:   Intact to general survey, no visualized or palpated problems. Rehabilitation:  Physical therapy:   Bed Mobility:      Transfers: Sit to Stand:  Moderate Assistance  Stand to sit: Minimal Assistance  Bed to Chair: Minimal assistance(pivot), Ambulation 1  Surface: level tile  Device: Rolling Walker  Assistance: Stand by assistance, Contact guard assistance  Quality of Gait: flexed posture, decreased WB on RLE with UE support to assist with gait tolerance, step to pattern, short step lengths  Distance: 10 feet x 2  Comments: attempted gait without devices - pt unable to take step with LLE due to poor weight acceptance on RLE,      FIMS:  ,  ,     Occupational therapy:    ,  , Assessment: Pt is a 80 yr old male presenting to [facility] with the above functional deficits. Pt would benefit from occupational therapy services to maximize safety and independence with ADL tasks, improve overall strength/endurance and balance for functional tasks.     Speech therapy:        Lab/X-ray studies reviewed, analyzed and discussed with patient and staff:   Recent Results (from the past 24 hour(s))   POCT Glucose    Collection Time: 02/02/21  4:05 PM   Result Value Ref Range    POC Glucose 204 (H) 60 - 115 mg/dl    Performed on ACCU-CHEK    POCT Glucose    Collection Time: 02/02/21  8:20 PM   Result Value Ref Range    POC Glucose 183 (H) 60 - 115 mg/dl    Performed on ACCU-CHEK    PTH, Intact    Collection Time: 02/03/21  5:37 AM   Result Value Ref Range    PTH 50.5 15.0 - 65.0 pg/mL   Vitamin D 25 Hydroxy    Collection Time: 02/03/21  5:37 AM   Result Value Ref Range    Vit D, 25-Hydroxy 16.8 (L) 30.0 - 100.0 ng/mL   PROTIME-INR    Collection Time: 02/03/21  5:37 AM   Result Value Ref Range    Protime 29.5 (H) 12.3 - 14.9 sec    INR 2.8    POCT Glucose    Collection Time: 02/03/21  6:13 AM   Result Value Ref Range    POC Glucose 156 (H) 60 - 115 mg/dl    Performed on ACCU-CHEK    CBC Auto Differential    Collection Time: 02/03/21  9:49 AM   Result Value Ref Range    WBC 10.0 4.8 - 10.8 K/uL    RBC 2.57 (L) 4.70 - 6.10 M/uL    Hemoglobin 8.1 (L) 14.0 - 18.0 g/dL    Hematocrit 24.3 (L) 42.0 - 52.0 %    MCV 94.6 80.0 - 100.0 fL    MCH 31.4 (H) 27.0 - 31.3 pg    MCHC 33.1 33.0 - 37.0 % RDW 13.5 11.5 - 14.5 %    Platelets 136 082 - 513 K/uL    Neutrophils % 67.8 %    Lymphocytes % 17.5 %    Monocytes % 10.5 %    Eosinophils % 3.8 %    Basophils % 0.4 %    Neutrophils Absolute 6.8 (H) 1.4 - 6.5 K/uL    Lymphocytes Absolute 1.8 1.0 - 4.8 K/uL    Monocytes Absolute 1.1 (H) 0.2 - 0.8 K/uL    Eosinophils Absolute 0.4 0.0 - 0.7 K/uL    Basophils Absolute 0.0 0.0 - 0.2 K/uL   PROCALCITONIN    Collection Time: 02/03/21  9:49 AM   Result Value Ref Range    Procalcitonin 0.28 (H) 0.00 - 0.15 ng/mL   Comprehensive Metabolic Panel w/ Reflex to MG    Collection Time: 02/03/21  9:49 AM   Result Value Ref Range    Sodium 133 (L) 135 - 144 mEq/L    Potassium reflex Magnesium 4.2 3.4 - 4.9 mEq/L    Chloride 100 95 - 107 mEq/L    CO2 21 20 - 31 mEq/L    Anion Gap 12 9 - 15 mEq/L    Glucose 151 (H) 70 - 99 mg/dL    BUN 31 (H) 8 - 23 mg/dL    CREATININE 1.58 (H) 0.70 - 1.20 mg/dL    GFR Non- 41.2 (L) >60    GFR  49.9 (L) >60    Calcium 8.2 (L) 8.5 - 9.9 mg/dL    Total Protein 6.5 6.3 - 8.0 g/dL    Albumin 3.2 (L) 3.5 - 4.6 g/dL    Total Bilirubin 0.9 (H) 0.2 - 0.7 mg/dL    Alkaline Phosphatase 61 35 - 104 U/L    ALT 14 0 - 41 U/L    AST 16 0 - 40 U/L    Globulin 3.3 2.3 - 3.5 g/dL   POCT Glucose    Collection Time: 02/03/21 11:13 AM   Result Value Ref Range    POC Glucose 187 (H) 60 - 115 mg/dl    Performed on ACCU-Keep Me Certified[facility]        Cta Abdominal Aorta W Bilat Runoff W Wo Contrast    Result Date: 1/27/2021  CTA ABDOMINAL AORTA W BILAT RUNOFF W WO CONTRAST: 1/26/2021 CLINICAL HISTORY:  RLE swelling Pain . COMPARISON: CT abdomen and pelvis without contrast 12/4/2015. Spiral enhanced images were obtained of the abdominal aorta through both feet during the infusion of a total of 250 mL of Isovue 300 contrast with runoff CTA protocol. Delayed imaging was also performed. Routine and volume rendered images were obtained on a 3-dimensional workstation.  All CT scans at this facility use dose modulation, iterative reconstruction, and/or weight based dosing when appropriate to reduce radiation dose to as low as reasonably achievable. FINDINGS: Moderately extensive disease with areas of flow-limiting narrowing are present within the mid to distal right superficial femoral artery, which is abruptly occluded at the abductor hiatus. Best depicted on the delayed imaging is extensive DVT throughout the right lower extremity extending proximally to the right common iliac vein. At the most inferior aspect of the initial study, filling defects are suspected within the subsegmental left lower lobe pulmonary artery branches, suspicious for pulmonary emboli. No definite emboli are noted on the right. The visualized lung bases are clear. The abdominal aorta is normal in caliber with mild to moderate calcific plaquing. Both iliac, common femoral, and the left femoral arteries are widely patent with mild disease. Delayed imaging demonstrates patency of the left popliteal artery with extensive calcific disease of the infrapopliteal runoff, which is incompletely visualized. There is no significant reconstitution of the right popliteal or infrapopliteal arteries. The celiac, superior mesenteric and both solitary renal arteries are widely patent. Colonic diverticulosis is again noted, without evidence of diverticulitis. No other significant change from 12/4/2015 is identified. 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 EXTENSIVE RIGHT LOWER EXTREMITY DVT EXTENDING INTO THE RIGHT COMMON ILIAC VEIN. SUSPECT LEFT LOWER LOBE PULMONARY EMBOLI. Xr Chest Portable    Result Date: 1/27/2021  EXAMINATION: Portable AP ERECT view of the chest. CLINICAL HISTORY:  preop  with pain and swelling in right thigh. DATE: 1/26/2021 10:02 PM COMPARISONS: 7/8/2020 FINDINGS: There are multiple sternal sutures and mediastinal clips present. Normal cardiac silhouette.  An hydration deficiency: Add and titrate vitamin B12 vitamin D and CoQ10 continue to monitor I&Os, calorie counts prn, dietary consult prn.  6. Acute episodic insomnia with situational adjustment disorder:  prn Ambien, monitor for day time sedation. 7. Falls risk elevated:  patient to use call light to get nursing assistance to get up, bed and chair alarm. 8. Elevated DVT risk: progressive activities in PT, continue prophylaxis BELLE hose, elevation and see mar . 9. Complex discharge planning:  Weekly team meeting every Monday Thursday to assess progress towards goals, discuss and address social, psychological and medical comorbidities and to address difficulties they may be having progressing in therapy. Patient and family education is in progress. The patient is to follow-up with their family physician after discharge.         Complex Active General Medical Issues that complicate care Assess & Plan:    Patient Active Problem List   Diagnosis    HTN (hypertension)    Dyslipidemia    DM (di[facility] mellitus) ([facility] [address]    Hypothyroidism    History of tobacco abuse    Anemia    CAD (coronary artery disease)    Bronchitis    COPD with acute exacerbation (HCC)    SOB (shortness of breath)    Anginal equivalent (HCC)    Hypotension    Chest pain    NSTEMI (non-ST elevated myocardial infarction) ([address]    COPD exacerbation (HCC)    Syncope and collapse    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HCC)    Venous thrombosis of leg    Hyponatremia    PVD (peripheral vascular disease) ([facility] [address]     Elidia Davidson D.O., PM&R     Attending    [address]

## 2021-02-03 NOTE — PROGRESS NOTES
Assume  care of pt at 1630. Pt moaning at times. 02 on at 2 liters n/c. Pt coughing frequently. Moist / coarse cough. Fine crackles noted in left and right base with some wheezing. One touch was 183. Dr Dorie Parker here and ordered chest xray and iv Rocephine. Pt went down to xray at 2030 per w/c and transport. Came back by 2050  Pt repositioned up in bed and respiratory called to give pt a treatment. 02 humidified in room . Pt  moans and groans with movement. Pt refused anything for pain. Pt is very [place]. Refused supper. Call light I reach and bed alarm on. Electronically signed by Gabriel Howe.  Sidney Kuhn on 2/2/2021 at 11:03 PM

## 2021-02-04 LAB
ABO/RH: NORMAL
ALBUMIN SERPL-MCNC: 2.8 G/DL (ref 3.5–4.6)
ALP BLD-CCNC: 58 U/L (ref 35–104)
ALT SERPL-CCNC: 13 U/L (ref 0–41)
ANION GAP SERPL CALCULATED.3IONS-SCNC: 9 MEQ/L (ref 9–15)
ANTIBODY SCREEN: NORMAL
AST SERPL-CCNC: 16 U/L (ref 0–40)
BILIRUB SERPL-MCNC: 0.7 MG/DL (ref 0.2–0.7)
BUN BLDV-MCNC: 34 MG/DL (ref 8–23)
CALCIUM SERPL-MCNC: 7.9 MG/DL (ref 8.5–9.9)
CHLORIDE BLD-SCNC: 102 MEQ/L (ref 95–107)
CO2: 22 MEQ/L (ref 20–31)
CREAT SERPL-MCNC: 1.37 MG/DL (ref 0.7–1.2)
GFR AFRICAN AMERICAN: 58.8
GFR NON-AFRICAN AMERICAN: 48.6
GLOBULIN: 3.2 G/DL (ref 2.3–3.5)
GLUCOSE BLD-MCNC: 194 MG/DL (ref 70–99)
GLUCOSE BLD-MCNC: 225 MG/DL (ref 60–115)
GLUCOSE BLD-MCNC: 241 MG/DL (ref 60–115)
GLUCOSE BLD-MCNC: 293 MG/DL (ref 60–115)
GLUCOSE BLD-MCNC: 346 MG/DL (ref 60–115)
HCT VFR BLD CALC: 21.5 % (ref 42–52)
HCT VFR BLD CALC: 21.7 % (ref 42–52)
HEMOGLOBIN: 7 G/DL (ref 14–18)
HEMOGLOBIN: 7.4 G/DL (ref 14–18)
INR BLD: 3.3
IRON SATURATION: 21 % (ref 11–46)
IRON: 44 UG/DL (ref 59–158)
MCH RBC QN AUTO: 30.3 PG (ref 27–31.3)
MCHC RBC AUTO-ENTMCNC: 32.7 % (ref 33–37)
MCV RBC AUTO: 92.8 FL (ref 80–100)
PDW BLD-RTO: 13.2 % (ref 11.5–14.5)
PERFORMED ON: ABNORMAL
PLATELET # BLD: 317 K/UL (ref 130–400)
POTASSIUM REFLEX MAGNESIUM: 4.7 MEQ/L (ref 3.4–4.9)
PROCALCITONIN: 0.2 NG/ML (ref 0–0.15)
PROTHROMBIN TIME: 33.3 SEC (ref 12.3–14.9)
RBC # BLD: 2.32 M/UL (ref 4.7–6.1)
SODIUM BLD-SCNC: 133 MEQ/L (ref 135–144)
TOTAL IRON BINDING CAPACITY: 211 UG/DL (ref 178–450)
TOTAL PROTEIN: 6 G/DL (ref 6.3–8)
WBC # BLD: 7 K/UL (ref 4.8–10.8)

## 2021-02-04 PROCEDURE — 94640 AIRWAY INHALATION TREATMENT: CPT

## 2021-02-04 PROCEDURE — 83540 ASSAY OF IRON: CPT

## 2021-02-04 PROCEDURE — 94760 N-INVAS EAR/PLS OXIMETRY 1: CPT

## 2021-02-04 PROCEDURE — 97116 GAIT TRAINING THERAPY: CPT

## 2021-02-04 PROCEDURE — 6370000000 HC RX 637 (ALT 250 FOR IP): Performed by: INTERNAL MEDICINE

## 2021-02-04 PROCEDURE — 83550 IRON BINDING TEST: CPT

## 2021-02-04 PROCEDURE — 86901 BLOOD TYPING SEROLOGIC RH(D): CPT

## 2021-02-04 PROCEDURE — 80053 COMPREHEN METABOLIC PANEL: CPT

## 2021-02-04 PROCEDURE — 97110 THERAPEUTIC EXERCISES: CPT

## 2021-02-04 PROCEDURE — 97535 SELF CARE MNGMENT TRAINING: CPT

## 2021-02-04 PROCEDURE — 84145 PROCALCITONIN (PCT): CPT

## 2021-02-04 PROCEDURE — 97129 THER IVNTJ 1ST 15 MIN: CPT

## 2021-02-04 PROCEDURE — 97530 THERAPEUTIC ACTIVITIES: CPT

## 2021-02-04 PROCEDURE — 6370000000 HC RX 637 (ALT 250 FOR IP): Performed by: PHYSICAL MEDICINE & REHABILITATION

## 2021-02-04 PROCEDURE — 97130 THER IVNTJ EA ADDL 15 MIN: CPT

## 2021-02-04 PROCEDURE — 86900 BLOOD TYPING SEROLOGIC ABO: CPT

## 2021-02-04 PROCEDURE — 85014 HEMATOCRIT: CPT

## 2021-02-04 PROCEDURE — 6370000000 HC RX 637 (ALT 250 FOR IP): Performed by: NURSE PRACTITIONER

## 2021-02-04 PROCEDURE — 85610 PROTHROMBIN TIME: CPT

## 2021-02-04 PROCEDURE — 85027 COMPLETE CBC AUTOMATED: CPT

## 2021-02-04 PROCEDURE — 85018 HEMOGLOBIN: CPT

## 2021-02-04 PROCEDURE — 1180000000 HC REHAB R&B

## 2021-02-04 PROCEDURE — 99232 SBSQ HOSP IP/OBS MODERATE 35: CPT | Performed by: INTERNAL MEDICINE

## 2021-02-04 PROCEDURE — 86850 RBC ANTIBODY SCREEN: CPT

## 2021-02-04 PROCEDURE — 86923 COMPATIBILITY TEST ELECTRIC: CPT

## 2021-02-04 PROCEDURE — 36415 COLL VENOUS BLD VENIPUNCTURE: CPT

## 2021-02-04 PROCEDURE — 2700000000 HC OXYGEN THERAPY PER DAY

## 2021-02-04 RX ORDER — WARFARIN SODIUM 2 MG/1
1 TABLET ORAL ONCE
Status: COMPLETED | OUTPATIENT
Start: 2021-02-04 | End: 2021-02-04

## 2021-02-04 RX ORDER — ACETAMINOPHEN 80 MG
TABLET,CHEWABLE ORAL ONCE
Status: DISCONTINUED | OUTPATIENT
Start: 2021-02-04 | End: 2021-02-13 | Stop reason: HOSPADM

## 2021-02-04 RX ADMIN — BUDESONIDE AND FORMOTEROL FUMARATE DIHYDRATE 2 PUFF: 160; 4.5 AEROSOL RESPIRATORY (INHALATION) at 04:24

## 2021-02-04 RX ADMIN — ASPIRIN 81 MG: 81 TABLET, CHEWABLE ORAL at 08:18

## 2021-02-04 RX ADMIN — IPRATROPIUM BROMIDE AND ALBUTEROL SULFATE 1 AMPULE: .5; 3 SOLUTION RESPIRATORY (INHALATION) at 04:24

## 2021-02-04 RX ADMIN — INSULIN LISPRO 8 UNITS: 100 INJECTION, SOLUTION INTRAVENOUS; SUBCUTANEOUS at 12:43

## 2021-02-04 RX ADMIN — IPRATROPIUM BROMIDE AND ALBUTEROL SULFATE 1 AMPULE: .5; 3 SOLUTION RESPIRATORY (INHALATION) at 16:03

## 2021-02-04 RX ADMIN — INSULIN LISPRO 4 UNITS: 100 INJECTION, SOLUTION INTRAVENOUS; SUBCUTANEOUS at 06:09

## 2021-02-04 RX ADMIN — ACETAMINOPHEN 650 MG: 325 TABLET ORAL at 23:17

## 2021-02-04 RX ADMIN — INSULIN LISPRO 6 UNITS: 100 INJECTION, SOLUTION INTRAVENOUS; SUBCUTANEOUS at 21:07

## 2021-02-04 RX ADMIN — PANTOPRAZOLE SODIUM 40 MG: 40 TABLET, DELAYED RELEASE ORAL at 06:07

## 2021-02-04 RX ADMIN — BUDESONIDE AND FORMOTEROL FUMARATE DIHYDRATE 2 PUFF: 160; 4.5 AEROSOL RESPIRATORY (INHALATION) at 16:03

## 2021-02-04 RX ADMIN — WARFARIN SODIUM 1 MG: 2 TABLET ORAL at 12:43

## 2021-02-04 RX ADMIN — URSODIOL 300 MG: 300 CAPSULE ORAL at 08:18

## 2021-02-04 RX ADMIN — IPRATROPIUM BROMIDE AND ALBUTEROL SULFATE 1 AMPULE: .5; 3 SOLUTION RESPIRATORY (INHALATION) at 11:26

## 2021-02-04 RX ADMIN — URSODIOL 300 MG: 300 CAPSULE ORAL at 21:06

## 2021-02-04 RX ADMIN — LEVOTHYROXINE SODIUM 25 MCG: 0.05 TABLET ORAL at 06:07

## 2021-02-04 RX ADMIN — ATORVASTATIN CALCIUM 40 MG: 40 TABLET, FILM COATED ORAL at 21:06

## 2021-02-04 RX ADMIN — ACETAMINOPHEN 650 MG: 325 TABLET ORAL at 09:32

## 2021-02-04 RX ADMIN — INSULIN LISPRO 4 UNITS: 100 INJECTION, SOLUTION INTRAVENOUS; SUBCUTANEOUS at 16:53

## 2021-02-04 RX ADMIN — PREDNISONE 40 MG: 20 TABLET ORAL at 08:18

## 2021-02-04 RX ADMIN — CARVEDILOL 6.25 MG: 6.25 TABLET, FILM COATED ORAL at 08:18

## 2021-02-04 ASSESSMENT — PAIN DESCRIPTION - PAIN TYPE
TYPE: ACUTE PAIN
TYPE: ACUTE PAIN

## 2021-02-04 ASSESSMENT — PAIN SCALES - GENERAL
PAINLEVEL_OUTOF10: 4
PAINLEVEL_OUTOF10: 0
PAINLEVEL_OUTOF10: 5
PAINLEVEL_OUTOF10: 0

## 2021-02-04 ASSESSMENT — PAIN SCALES - WONG BAKER: WONGBAKER_NUMERICALRESPONSE: 6

## 2021-02-04 ASSESSMENT — PAIN DESCRIPTION - LOCATION: LOCATION: LEG

## 2021-02-04 NOTE — PLAN OF CARE
Problem: Pain:  Goal: Pain level will decrease  Description: Pain level will decrease  2/4/2021 1415 by Amy Todd RN  Outcome: Ongoing  2/4/2021 0132 by Kathryn Lozano RN  Outcome: Ongoing  Goal: Control of acute pain  Description: Control of acute pain  2/4/2021 1415 by Amy Todd RN  Outcome: Ongoing  2/4/2021 0132 by Kathryn Lozano RN  Outcome: Ongoing  Goal: Control of chronic pain  Description: Control of chronic pain  2/4/2021 1415 by Amy Todd RN  Outcome: Ongoing  2/4/2021 0132 by Kathryn Lozano RN  Outcome: Ongoing     Problem: Falls - Risk of:  Goal: Will remain free from falls  Description: Will remain free from falls  2/4/2021 1415 by Amy Todd RN  Outcome: Ongoing  2/4/2021 0132 by Kathryn Lozano RN  Outcome: Ongoing  Goal: Absence of physical injury  Description: Absence of physical injury  2/4/2021 1415 by Amy Todd RN  Outcome: Ongoing  2/4/2021 0132 by Kathryn Lozano RN  Outcome: Ongoing     Problem: IP SWALLOWING  Goal: LTG - patient will tolerate the least restrictive diet consistency to allow for safe consumption of daily meals  2/4/2021 1415 by Amy Todd RN  Outcome: Ongoing  2/4/2021 0132 by Kathryn Lozano RN  Outcome: Ongoing     Problem: Skin Integrity:  Goal: Will show no infection signs and symptoms  Description: Will show no infection signs and symptoms  Outcome: Ongoing  Goal: Absence of new skin breakdown  Description: Absence of new skin breakdown  Outcome: Ongoing

## 2021-02-04 NOTE — PROGRESS NOTES
Subjective: The patient complains of ,\" moderate acute on chronic progressive fatigue and    partially relieved by rest,medications, Pt, OT, and rest and exacerbated by recent illness. I am concerned about patients medical complexities. ROS x10: The patient also complains of severely impaired mobility and activities of daily living. Otherwise no new problems with vision, hearing, nose, mouth, throat, dermal, cardiovascular, GI, , pulmonary, musculoskeletal, psychiatric or neurological. See Rehab H&P on Rehab chart dated . Vital signs:  BP (!) 103/56   Pulse 63   Temp 97 °F (36.1 °C)   Resp 14   Ht 5' 7\" (1.702 m)   Wt 159 lb 8 oz (72.3 kg)   SpO2 95%   BMI 24.98 kg/m²   I/O:   PO/Intake:  fair PO intake, no problems observed or reported. Bowel/Bladder:  continent, no problems noted. General:  Patient is well developed, adequately nourished, non-obese and     well kempt. HEENT:    PERRLA, hearing intact to loud voice, external inspection of ear     and nose benign. Inspection of lips, tongue and gums benign  Musculoskeletal: No significant change in strength or tone. All joints stable. Inspection and palpation of digits and nails show no clubbing,       cyanosis or inflammatory conditions. Neuro/Psychiatric: Affect: flat but pleasant. Alert and oriented to person, place and     Situation with    cues. No significant change in deep tendon reflexes or     sensation  Lungs:  Diminished, CTA-B. Respiration effort is normal at rest.     Heart:   S1 = S2, RRR. No loud murmurs. Abdomen:  Soft, non-tender, no enlargement of liver or spleen. Extremities:  No significant lower extremity edema or tenderness. Skin:   Intact to general survey, no visualized or palpated problems. Rehabilitation:  Physical therapy:   Bed Mobility: Scooting: Moderate assistance    Transfers: Sit to Stand:  Moderate Assistance  Stand to sit: Minimal Assistance  Bed to Chair: Minimal - 37.0 %    RDW 13.2 11.5 - 14.5 %    Platelets 568 469 - 422 K/uL   PROTIME-INR    Collection Time: 02/04/21  5:35 AM   Result Value Ref Range    Protime 33.3 (H) 12.3 - 14.9 sec    INR 3.3    Procalcitonin    Collection Time: 02/04/21  5:35 AM   Result Value Ref Range    Procalcitonin 0.20 (H) 0.00 - 0.15 ng/mL   POCT Glucose    Collection Time: 02/04/21  6:07 AM   Result Value Ref Range    POC Glucose 225 (H) 60 - 115 mg/dl    Performed on ACCU-CHEK    POCT Glucose    Collection Time: 02/04/21 11:33 AM   Result Value Ref Range    POC Glucose 346 (H) 60 - 115 mg/dl    Performed on ACCU-CHEK    POCT Glucose    Collection Time: 02/04/21  4:06 PM   Result Value Ref Range    POC Glucose 241 (H) 60 - 115 mg/dl    Performed on ACCU-CHEK    Hemoglobin and Hematocrit, Blood    Collection Time: 02/04/21  4:26 PM   Result Value Ref Range    Hemoglobin 7.4 (L) 14.0 - 18.0 g/dL    Hematocrit 21.7 (L) 42.0 - 52.0 %   TYPE AND SCREEN    Collection Time: 02/04/21  4:26 PM   Result Value Ref Range    ABO/Rh A POS     Antibody Screen NEG        Cta Abdominal Aorta W Bilat Runoff W Wo Contrast    Result Date: 1/27/2021  CTA ABDOMINAL AORTA W BILAT RUNOFF W WO CONTRAST: 1/26/2021 CLINICAL HISTORY:  RLE swelling Pain . COMPARISON: CT abdomen and pelvis without contrast 12/4/2015. Spiral enhanced images were obtained of the abdominal aorta through both feet during the infusion of a total of 250 mL of Isovue 300 contrast with runoff CTA protocol. Delayed imaging was also performed. Routine and volume rendered images were obtained on a 3-dimensional workstation. All CT scans at this facility use dose modulation, iterative reconstruction, and/or weight based dosing when appropriate to reduce radiation dose to as low as reasonably achievable. FINDINGS: Moderately extensive disease with areas of flow-limiting narrowing are present within the mid to distal right superficial femoral artery, which is abruptly occluded at the abductor hiatus. Best depicted on the delayed imaging is extensive DVT throughout the right lower extremity extending proximally to the right common iliac vein. At the most inferior aspect of the initial study, filling defects are suspected within the subsegmental left lower lobe pulmonary artery branches, suspicious for pulmonary emboli. No definite emboli are noted on the right. The visualized lung bases are clear. The abdominal aorta is normal in caliber with mild to moderate calcific plaquing. Both iliac, common femoral, and the left femoral arteries are widely patent with mild disease. Delayed imaging demonstrates patency of the left popliteal artery with extensive calcific disease of the infrapopliteal runoff, which is incompletely visualized. There is no significant reconstitution of the right popliteal or infrapopliteal arteries. The celiac, superior mesenteric and both solitary renal arteries are widely patent. Colonic diverticulosis is again noted, without evidence of diverticulitis. No other significant change from 12/4/2015 is identified. 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 EXTENSIVE RIGHT LOWER EXTREMITY DVT EXTENDING INTO THE RIGHT COMMON ILIAC VEIN. SUSPECT LEFT LOWER LOBE PULMONARY EMBOLI. Xr Chest Portable    Result Date: 1/27/2021  EXAMINATION: Portable AP ERECT view of the chest. CLINICAL HISTORY:  preop  with pain and swelling in right thigh. DATE: 1/26/2021 10:02 PM COMPARISONS: 7/8/2020 FINDINGS: There are multiple sternal sutures and mediastinal clips present. Normal cardiac silhouette. An pacemaker overlies the right hemithorax and has a lead extending into the right atrium and second lead extending into the right ventricle. Lungs are  clear without consolidation. No pleural effusion or pneumothorax. There are mild degenerative changes in spine. NO ACUTE CARDIOPULMONARY ABNORMALITY. NO CHANGE.         Previous extensive, chair alarm. 8. Elevated DVT risk: progressive activities in PT, continue prophylaxis BELLE hose, omer and see mar . 9. Complex discharge planning:  Weekly team meeting every Monday Thursday to assess progress towards goals, discuss and address social, psychological and medical comorbidities and to address difficulties they may be having progressing in therapy. Patient and family education is in progress. The patient is to follow-up with their family physician after discharge.         Complex Active General Medical Issues that complicate care Assess & Plan:    Patient Active Problem List   Diagnosis    HTN (hypertension)    Dyslipidemia    DM [facility]diabetes mellitus) ([facility] [address]    Hypothyroidism    History of tobacco abuse    Anemia    CAD (coronary artery disease)    Bronchitis    COPD with acute exacerbation (HCC)    SOB (shortness of breath)    Anginal equivalent (HCC)    Hypotension    Chest pain    NSTEMI (non-ST elevated myocardial infarction) ([facility] [address]    COPD exacerbation (HCC)    Syncope and collapse    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HCC)    Venous thrombosis of leg    Hyponatremia    PVD (peripheral vascular disease) ([facility] [address]     [place] Howard D.O., PM&R     Attending    [facility] Alka Moura

## 2021-02-04 NOTE — PLAN OF CARE
Problem: Pain:  Goal: Pain level will decrease  Description: Pain level will decrease  Outcome: Ongoing  Goal: Control of acute pain  Description: Control of acute pain  Outcome: Ongoing  Goal: Control of chronic pain  Description: Control of chronic pain  Outcome: Ongoing     Problem: Falls - Risk of:  Goal: Will remain free from falls  Description: Will remain free from falls  Outcome: Ongoing  Goal: Absence of physical injury  Description: Absence of physical injury  Outcome: Ongoing     Problem: IP SWALLOWING  Goal: LTG - patient will tolerate the least restrictive diet consistency to allow for safe consumption of daily meals  Outcome: Ongoing

## 2021-02-04 NOTE — CARE COORDINATION
Spoke with patients granddaughter Betzaida Lofton to discuss discharge date and plan. Stated understanding. questions answered. Electronically signed by Ash Hough RN on 2/4/2021 at 3:09 PM

## 2021-02-04 NOTE — PROGRESS NOTES
INPATIENT PROGRESS NOTES    PATIENT NAME: Tania Zepeda  MRN: [id number]  SERVICE DATE:  February 4, 2021   SERVICE TIME:  12:49 PM      PRIMARY SERVICE: Pulmonary Disease    CHIEF COMPLAINTS: COPD    INTERVAL HPI: Patient seen and examined at bedside, Interval Notes, orders reviewed. Nursing notes noted    Patient report no shortness of breath, no chest pain, no coughing, he is sitting in chair, no distress, on 2 L O2 with saturation 94%, he is hard of hearing and history is difficult to obtain    Review of system:     GI Abdominal pain No  Skin Rash No    Social History     Tobacco Use    Smoking status: Former Smoker    Smokeless tobacco: Never Used   Substance Use Topics    Alcohol use: No     History reviewed. No pertinent family history. OBJECTIVE    Body mass index is 24.98 kg/m². PHYSICAL EXAM:  Vitals:  BP (!) 122/52   Pulse 76   Temp 97 °F (36.1 °C)   Resp 16   Ht 5' 7\" (1.702 m)   Wt 159 lb 8 oz (72.3 kg)   SpO2 94%   BMI 24.98 kg/m²     General: alert, cooperative, no distress  Head: normocephalic, atraumatic  Eyes:No gross abnormalities. ENT:  MMM no lesions  Neck:  supple and no masses  Chest : clear to auscultation bilaterally- no wheezes, rales or rhonchi, normal air movement, no respiratory distress  Heart[de-identified] Heart sounds are normal.  Regular rate and rhythm without murmur, gallop or rub. ABD:  symmetric, soft, non-tender  Musculoskeletal : no cyanosis, no clubbing and no edema  Neuro:  Grossly normal  Skin: No rashes or nodules noted.   Lymph node:  no cervical nodes  Urology: No Delacruz   Psychiatric: appropriate    DATA:   Recent Labs     02/03/21  0949 02/04/21  0535   WBC 10.0 7.0   HGB 8.1* 7.0*   HCT 24.3* 21.5*   MCV 94.6 92.8    317     Recent Labs     02/03/21  0949 02/04/21  0535   * 133*   K 4.2 4.7    102   CO2 21 22   BUN 31* 34*   CREATININE 1.58* 1.37*   GLUCOSE 151* 194*   CALCIUM 8.2* 7.9*   PROT 6.5 6.0*   LABALBU 3.2* 2.8*   BILITOT 0.9* 0.7 ALKPHOS 61 58   AST 16 16   ALT 14 13   LABGLOM 41.2* 48.6*   GFRAA 49.9* 58.8*   GLOB 3.3 3.2       MV Settings:          No results for input(s): PHART, TRJ8WJL, PO2ART, WES8JRV, BEART, B5KEYEPJ in the last 72 hours. O2 Device: Nasal cannula  O2 Flow Rate (L/min): 2 L/min    DIET CARB CONTROL; Carb Control: 4 carb choices (60 gms)/meal; No Added Salt (3-4 GM)     MEDICATIONS during current hospitalization:    Continuous Infusions:    Scheduled Meds:   pill splitter   Does not apply Once    ipratropium-albuterol  1 ampule Inhalation Q4H WA    vitamin D  50,000 Units Oral Weekly    predniSONE  40 mg Oral Daily    budesonide-formoterol  2 puff Inhalation BID    cefTRIAXone (ROCEPHIN) IV  1,000 mg Intravenous Q24H    aspirin  81 mg Oral Daily    atorvastatin  40 mg Oral Nightly    carvedilol  6.25 mg Oral BID WC    insulin lispro  0-12 Units Subcutaneous 4x Daily AC & HS    levothyroxine  25 mcg Oral Daily    pantoprazole  40 mg Oral QAM AC    ursodiol  300 mg Oral BID    [START ON 1/30/2022] warfarin (COUMADIN) daily dosing (placeholder)   Other RX Placeholder       PRN Meds:lactulose, albuterol, acetaminophen **OR** acetaminophen, polyethylene glycol, promethazine **OR** ondansetron, aluminum & magnesium hydroxide-simethicone, glucagon (rDNA), glucose, senna    Radiology  Xr Chest (2 Vw)    Result Date: 2/3/2021  EXAMINATION: XR CHEST (2 VW)  CLINICAL HISTORY:  cough COMPARISONS: None  FINDINGS: Two views of the chest are submitted. There are multiple median sternotomy wires. There is a right-sided CCD device. Leads overlying the cardiac silhouette. The cardiac silhouette is of normal size configuration. The mediastinum is unremarkable. Pulmonary vascular unremarkable. Right sided trachea. No focal infiltrates. No effusions. No Pneumothoraces.                                                                                    NO ACUTE ACTIVE CARDIOPULMONARY PROCESS    Cta Abdominal Aorta W Bilat Runoff W Wo Contrast    Result Date: 1/27/2021  CTA ABDOMINAL AORTA W BILAT RUNOFF W WO CONTRAST: 1/26/2021 CLINICAL HISTORY:  RLE swelling Pain . COMPARISON: CT abdomen and pelvis without contrast 12/4/2015. Spiral enhanced images were obtained of the abdominal aorta through both feet during the infusion of a total of 250 mL of Isovue 300 contrast with runoff CTA protocol. Delayed imaging was also performed. Routine and volume rendered images were obtained on a 3-dimensional workstation. All CT scans at this facility use dose modulation, iterative reconstruction, and/or weight based dosing when appropriate to reduce radiation dose to as low as reasonably achievable. FINDINGS: Moderately extensive disease with areas of flow-limiting narrowing are present within the mid to distal right superficial femoral artery, which is abruptly occluded at the abductor hiatus. Best depicted on the delayed imaging is extensive DVT throughout the right lower extremity extending proximally to the right common iliac vein. At the most inferior aspect of the initial study, filling defects are suspected within the subsegmental left lower lobe pulmonary artery branches, suspicious for pulmonary emboli. No definite emboli are noted on the right. The visualized lung bases are clear. The abdominal aorta is normal in caliber with mild to moderate calcific plaquing. Both iliac, common femoral, and the left femoral arteries are widely patent with mild disease. Delayed imaging demonstrates patency of the left popliteal artery with extensive calcific disease of the infrapopliteal runoff, which is incompletely visualized. There is no significant reconstitution of the right popliteal or infrapopliteal arteries. The celiac, superior mesenteric and both solitary renal arteries are widely patent. Colonic diverticulosis is again noted, without evidence of diverticulitis. No other significant change from 12/4/2015 is identified.      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 EXTENSIVE RIGHT LOWER EXTREMITY DVT EXTENDING INTO THE RIGHT COMMON ILIAC VEIN. SUSPECT LEFT LOWER LOBE PULMONARY EMBOLI. Xr Chest Portable    Result Date: 1/27/2021  EXAMINATION: Portable AP ERECT view of the chest. CLINICAL HISTORY:  preop  with pain and swelling in right thigh. DATE: 1/26/2021 10:02 PM COMPARISONS: 7/8/2020 FINDINGS: There are multiple sternal sutures and mediastinal clips present. Normal cardiac silhouette. An pacemaker overlies the right hemithorax and has a lead extending into the right atrium and second lead extending into the right ventricle. Lungs are  clear without consolidation. No pleural effusion or pneumothorax. There are mild degenerative changes in spine. NO ACUTE CARDIOPULMONARY ABNORMALITY. NO CHANGE.              IMPRESSION AND SUGGESTION:  Patient is at risk due to   · Complete 5 days of prednisone  · DC Rocephin  · Continue pulmonary  · Continue current inhalers    Patient seems to be stable, will sign off, please call for any question or concern       Electronically signed by Saulo Pimentel MD,  FCCP   on 2/4/2021 at 12:49 PM

## 2021-02-04 NOTE — PROGRESS NOTES
this time, pt sleeping before fully back into bed, required assistance to reposition pt in bed. Inappropriate fatigue levels for therapy at this time. PLAN OF CARE/Safety:   Safety Devices  Type of devices: Left in bed;Call light within reach; Bed alarm in place      Therapy Time:   Individual   Time In 1400   Time Out 1430   Minutes 30   Therapy Time   Individual   Time In 1500   Time Out 1515   Minutes 15     Missed Tx Time: 15 min. - Medical- Inappropriate fatigue levels for safe therapy at this time.      Minutes:45      Transfer/Bed mobility training:10      Gait trainin      Neuro re education:0     Therapeutic ex:30      Charlette Barrios  21 at 3:32 PM

## 2021-02-04 NOTE — PROGRESS NOTES
Occupational Therapy  Facility/Department: [facility]  Daily Treatment Note  NAME: Chidi Quevedo  : 3/5/1929  MRN: [id number]    Date of Service: 2021    Discharge Recommendations:  Continue to assess pending progress       Assessment      REQUIRES OT FOLLOW UP: Yes  Activity Tolerance  Activity Tolerance: Patient limited by fatigue  Safety Devices  Safety Devices in place: Yes  Type of devices: All fall risk precautions in place         Patient Diagnosis(es): There were no encounter diagnoses. has a past medical history of Anemia, CAD (coronary artery disease), DM (diabetes mellitus) ([facility] [address] Dyslipidemia, History of tobacco abuse, HTN (hypertension), Hypothyroidism, Non-ST elevation MI (NSTEMI) ([facility] [address] and Pacemaker. has a past surgical history that includes Cataract removal and Middle ear surgery (Left, ). Restrictions  Restrictions/Precautions  Restrictions/Precautions: Fall Risk  Subjective   General  Chart Reviewed: Yes  Patient assessed for rehabilitation services?: Yes  Response to previous treatment: Patient with no complaints from previous session  Family / Caregiver Present: No  Referring Practitioner: Dr. Taurus Hanley  Diagnosis: Impaired mobility and ADL's d/t severe progressive PVD  Pain Assessment  Pain Assessment: 0-10  Pain Level: 0  Pre Treatment Pain Screening  Pain at present: 0  Scale Used: Numeric Score  Intervention List: Patient able to continue with treatment  Vital Signs  Patient Currently in Pain: Denies   Orientation  Orientation  Overall Orientation Status: Impaired  Objective     Upon OT arrival, pt's eyes closed and seated in wheelchair. Pt is very [place] and therapist provided max verbal and tactile cues to awaken pt. Pt very difficult to awaken and therapist wrote out activity directions for pt to regard when he did open his eyes. Pt continued to fall back asleep and therapist transported pt down to his room via wheelchair.  Therapist attempted to have pt complete UE exercises and pt became more awake with this attempt. Pt completed UE exercises as stated below. Type of ROM/Therapeutic Exercise  Type of ROM/Therapeutic Exercise: Free weights  Comment: Pt completed UE exercises using bottle of mouthwash for min resistance to improve strength and endurance during ADLs. Pt completed 10 reps of each exercise and requires verbal and tactile cues to initiate exercises. Pt's eyes remained closed for majority of the exercises but pt completed.   Exercises  Shoulder Flexion: 10  Shoulder Extension: 10  Shoulder ABduction: 10  Shoulder ADduction: 10  Horizontal ABduction: 10  Horizontal ADduction: 10  Elbow Flexion: 10  Elbow Extension: 10  Supination: 10  Pronation: 10  Wrist Flexion: 10  Wrist Extension: 10      Plan   Plan  Times per week: 5-7x/wk  Plan weeks: 1.5  Current Treatment Recommendations: Strengthening, Functional Mobility Training, Cognitive Reorientation, Cognitive/Perceptual Training, Patient/Caregiver Education & Training, Endurance Training, Equipment Evaluation, Education, & procurement, Balance Training, Safety Education & Training, Self-Care / ADL  Plan Comment: Continue per OT plan of care  G-Code     OutComes Score                                                  AM-PAC Score             Goals  Patient Goals   Patient goals : to go home       Therapy Time   Individual Concurrent Group Co-treatment   Time In 1430         Time Out 1500         Minutes 30           Therapeutic activities: 30 minutes     Eddie Palencia OT   Electronically signed by Eddie Palencia OT on 2/4/2021 at 3:54 PM

## 2021-02-04 NOTE — PROGRESS NOTES
Hospitalist Progress Note  2/4/2021 3:56 PM    Assessment and Plan:   Acute COPD exacerbation: Patient was started on rocpehin last night by primary team due to cough. CXR without infiltrates or acute processes. Spoke with radiology no need for CT scan. Recommend check pro-vilma, if negative d/c antibiotics. Pulmonology consult, prednisone, Chest PT, Acapella, Incentive spirometry, Duonebs Q4hr, Supplemental O2 with goal SPO2 of 92% or greater. Wean down O2 if/when possible. If continues to need O2, will order home O2 evaluation prior to discharge to evaluate for home O2 needs. Repeat pro-vilma decreased. Antibiotics discontinued. Plan to complete 5 days prednisone. Pulmonary signed off. Anemia: H/H decreased to 7/21.5 today with no overt s.s bleeding. Repeat H/H 7.4/21.7  Check iron studies  1. 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 Long-term anticoagulation. Lovenox and bridging to coumadin. Oncology consulted due to unproved DVT and possible family history. Fall precautions. PT OT to evaluate. Maximize nutrition status. Assessing if needs DME at home. SW on board. Dr. Breana Garcia managing rehab plan  2. CAD/CABG/PPM: On ASA, statin, BB  3. HTN: LVEF 60%. Stable. On coreg. 4. DM type II: SSI, POCT ACHS, hypoglycemia protocol. 5. Hypothyroidism: On synthroid  6. CKD: Avoid nephrotoxic agents when able. Monitor BMP. 7. Hypocalcemia: Check Vit D level, PTH  8. Extremely [place]: Speak slowly, write material down. 9. Thick, elongated toe nails: Podiatry following and managing. 10. Bowel Regimen and GI PPx: stool softners PRN ordered with hold parameters for loose stools or diarrhea. On antiacid  11. Diet: DIET CARB CONTROL; Carb Control: 4 carb choices (60 gms)/meal; No Added Salt (3-4 GM)  12. Advance Directive: Full Code   13.  Nutrition status: Supplemental Vitamins Problem: Patient/Family Goals  Goal: Patient/Family Long Term Goal  Description  Patient's Long Term Goal: discharge home with adequate resources    Interventions:  - comply with POC  - See additional Care Plan goals for specific interventions   Outcome: activity based on assessment  - Modify environment to reduce risk of injury  - Provide assistive devices as appropriate  - Consider OT/PT consult to assist with strengthening/mobility  - Encourage toileting schedule  Outcome: Progressing     Problem: [facility] ordered. Dietitian assessment  14. Vaccinations: Immunization records reviewed. If has not received appropriate vaccinations, will order to be given prior to discharge. 15. DVT prophylaxis: Chemical prophylaxis SQ lovenox with bridge to coumadin  16. Discharge planning: SW on board. Will discharge once medically stable and cleared by all consultants. 17. High Risk Readmission Screening Tool Score Noted. Additionally, the following hospital problems were addressed: Active Problems:    PVD (peripheral vascular disease) ([address]  Resolved Problems:    * No resolved hospital problems. *      ** Total time spent reviewing medical records, evaluating patient, speaking with RN's and consultants where I was focused exclusively on this patient: 35 minutes. This time is excluding time spent performing procedures or significant events occurring earlier or later in the day requiring my attention and focus. Subjective:   Admit Date: 2/1/2021  PCP: Francois Bill, DO    No acute events overnight. Afebrile  No new complaints. Pt denies chest pain, SOB, N/V, fevers or chills. Objective:     Vitals:    02/03/21 1835 02/04/21 0425 02/04/21 0629 02/04/21 1127   BP: (!) 110/49  (!) 122/52    Pulse: 75  76    Resp: 17 16 15 16   Temp: 98 °F (36.7 °C)  97 °F (36.1 °C)    TempSrc: Oral      SpO2: 94% 95% 93% 94%   Weight:       Height:         General appearance:  + O x 2. No conversational dyspnea noted. Answers questions appropriately. Extremely [place]. Able to communicate. Does better if questions are written down  Lungs: Diminished,  exp wheezes, No rales, No retractions; No use of accessory muscles. Non-productive cough present  Heart:  S1, S2 normal, RRR, no MRG appreciated  Abdomen: (+) BS, soft, non-tender; non distended no guarding or rigidity. Extremities:  no cyanosis, no edema bilat lower exts, no calf tenderness bilaterally. Dry skin noted.  Scattered areas of ecchymosis present       Medications:      pill splitter   Does not apply Once    ipratropium-albuterol  1 ampule Inhalation Q4H WA    vitamin D  50,000 Units Oral Weekly    predniSONE  40 mg Oral Daily    budesonide-formoterol  2 puff Inhalation BID    aspirin  81 mg Oral Daily    atorvastatin  40 mg Oral Nightly    carvedilol  6.25 mg Oral BID WC    insulin lispro  0-12 Units Subcutaneous 4x Daily AC & HS    levothyroxine  25 mcg Oral Daily    pantoprazole  40 mg Oral QAM AC    ursodiol  300 mg Oral BID    [START ON 1/30/2022] warfarin (COUMADIN) daily dosing (placeholder)   Other RX Placeholder       LABS Reviewed    IMAGING Reviewed    Diana Mao CNP  Rounding Hospitalist symptoms of hyperglycemia and hypoglycemia  - Administer ordered medications to maintain glucose within target range  - Assess barriers to adequate nutritional intake and initiate nutrition consult as needed  - Instruct patient on self management of diabet

## 2021-02-04 NOTE — PROGRESS NOTES
Clinical Pharmacy Note    Warfarin consult follow-up    Recent Labs     02/04/21  0535   INR 3.3     Recent Labs     02/03/21  0949 02/04/21  0535   HGB 8.1* 7.0*   HCT 24.3* 21.5*    317       Significant drug:drug interactions:  ASA, APAP, synthroid, protonix, prednisone started 2/3 for 5 days      Notes:    Warfarin Dose       01/30/21 1.3 (1/29) 5 mg   01/31/21 1.1  5 mg   02/01/21  1.1  7.5 mg   02/02/21  1.5  7.5 mg   02/03/21  2.8  2 mg    02/04/21  3.3  1 mg                                      INR of 3.3 is SUPRA-therapeutic, goal range of 2-3 (DVT). Will give 1 mg warfarin today in an effort to stem continued rise in INR. Daily PT/INR during pharmacy consult to monitor and dose warfarin therapy. SADIE Bradley Ph.  2/4/2021  11:29 AM

## 2021-02-04 NOTE — PROGRESS NOTES
reasonable solutions to problems of everyday living with 80% accuracy and min cues. Pt initially stated \"I don't know\" to problems asked. After much encouragement, pt provided solutions to ADL problems with 56% accuracy independently, cues did not increase his accuracy. Goal 3: To increase safety awareness and judgment for safe completion of ADLs secondary to pt's cognitive deficits, pt will complete abstract reasoning tasks (i.e. convergent and divergent naming) with 80% accuracy and min cues. Pt completed abstract divergent naming task with 40% accuracy with max encouragement to participate. Goal 4: To address pt's cognitive deficits and promote orientation, pt will state name of facility, time within 1 hour, reason in hospital, current month and year with 100% accuracy with  use of external aid. Pt able to state month, year, and name of facility independently. Pt required verbal cues to state reason for being in the hospital and verbal cue to state the time. SLP to contact pt's wife to determine baseline level of functioning to determine if ongoing, skilled ST services are warranted. Hossein Felder SLP spoke with pt's granddaughter this date regarding the patient's cognitive status. Pt's granddaughter reported some mild, longstanding memory deficits but reports no cognitive decline since being hospitalized. She has no cognitive concerns for him at this time and does not feel that he needs continued skilled ST services. Pt to be d/c from ST at this time. Treatment/Activity Tolerance:  Other: pt limited by hearing and vision status    Plan:  Discharge    Pain Assessment:  Initial Assessment:  Patient c/o: 4/10 lower back pain, denies need for intervention  Patient reported an acceptable level for treatment. Re-assessment:  Same as initial.       Patient/Caregiver Education:  Patient educated on session and progression towards goals. Education needs reinforcement.     Safety Devices:  Call light within reach and Chair alarm in place    [id number] [address] (NOMS):    SPOKEN LANGUAGE COMPREHENSION  Ratin    SPOKEN LANGUAGE EXPRESSION  Ratin    PROBLEM SOLVING  Ratin    MEMORY  Ratin      Therapy Time  SLP Individual Minutes  Time In: 1100  Time Out: 36  Minutes: 30       Signature: Electronically signed by DALTON Prieto on 2021 at 12:04 PM

## 2021-02-04 NOTE — PROGRESS NOTES
Physical Therapy Rehab Treatment Note  [facility]  [facility]       NAME: Eric Atkinson  : 3/5/1929 (40 y.o.)  MRN: [id number]  CODE STATUS: Full Code    Date of Service: 2021  Chart Reviewed: Yes    Restrictions:  Restrictions/Precautions: Fall Risk       SUBJECTIVE: Subjective: It hurts, It's so tight, I want to go back to bed. Pain Screening  Patient Currently in Pain: Yes       Post Treatment Pain Screenin/10       OBJECTIVE:                         Transfers  Sit to Stand: Contact guard assistance  Stand to sit: Contact guard assistance    Ambulation  Ambulation?: Yes  Ambulation 1  Surface: carpet  Device: Rolling Walker  Assistance: Stand by assistance;Contact guard assistance  Quality of Gait: Antalgic gait pattern with decreased stance phase on Right LE, slow pace that improved slightly with each attempt. Distance: 30ft x 2, 10ft x 1    Stairs/Curb  Stairs?: No         Activity Tolerance  Activity Tolerance: Patient limited by pain    Exercises  Hip Flexion: x10  Knee Long Arc Quad: x10  Ankle Pumps: x10  Comments: Seated Gastroc stretches performed to Anatoliy LE, STM of Right thigh to decrease tissue tension and reduce pain levels, pt tolerated gentle pressure only at this time. ASSESSMENT/COMMENTS:  Assessment: Difficulty to communicate with pt due to [facility], pt able to complete transfers today and ambualte at slow pace, improved step length and pace with each attempt. Pt struggles to actively move Right LE at start of tx due to pain and tenderness of Right LE.     PLAN OF CARE/Safety:   Safety Devices  Type of devices: Left in chair;Chair alarm in place      Therapy Time:   Individual   Time In 1030   Time Out 1100   Minutes 30     Minutes:30      Transfer/Bed mobility trainin      Gait training:15      Neuro re education:0     Therapeutic ex:15      Javi Patel  21 at 12:39 PM

## 2021-02-04 NOTE — CARE COORDINATION
[address] [place] [address] NOTE  Room: R260/R260-01  Admit Date: 2021       Date: 2021  Patient Name: Laverne Wilson        MRN: [id number]    : 3/5/1929  (80 y.o.)  Gender: male        REHAB DIAGNOSIS:   Diagnosis: impaired mobility and ADL's d/t severe progressive PVD    CO MORBIDITIES:      Past Medical History:   Diagnosis Date    Anemia     CAD (coronary artery disease) 2015    [facility]     Dyslipidemia     History of tobacco abuse     HTN (hypertension)     Hypothyroidism     Non-ST elevation MI (NSTEMI) ([facility]     Pacemaker 2015     Past Surgical History:   Procedure Laterality Date    CATARACT REMOVAL      MIDDLE EAR SURGERY Left     \"they had to reset the bones\" after an infection        Restrictions  Restrictions/Precautions: Fall Risk  CASE MANAGEMENT    Social/Functional History  Social/Functional History  Lives With: Other (comment)(granddaughter and family)  Type of Home: House  Home Layout: Two level, Bed/Bath upstairs  Home Access: Stairs to enter with rails  Entrance Stairs - Number of Steps: 14  Bathroom Shower/Tub: Tub/Shower unit  Home Equipment: Rolling walker, Cane  ADL Assistance: Independent  Homemaking Assistance: Independent(granddaughter completes)  Ambulation Assistance: Independent  Transfer Assistance: Independent  Active : Yes  Additional Comments: pt is significantly [place]; information gathered via chart review and via writing. Pts personal preferences: n/a    Pts assets/resources/support system: granddaughter    COVERAGE INFORMATION:Payor: Clare Negro / Plan: Rose Hawkins PPO / Product Type: Medicare /       NURSING  Weight: 159 lb 8 oz (72.3 kg) / Body mass index is 24.98 kg/m².     DIET CARB CONTROL; Carb Control: 4 carb choices (60 gms)/meal; No Added Salt (3-4 GM)    SpO2: 93 % (21)  O2 Flow Rate (L/min): 1 L/min (21 0425)  No active isolations    Skin Issues: Yes and surgical    Pain Managed: Yes and increased on movement    Bladder continence: Yes    Bowel continence: Yes      Other: [place], H/H 7.0-21.9 ,on rochephin q24h      PHYSICAL THERAPY  Bed mobility:  Supine to Sit: Moderate assistance (02/02/21 1123)  Sit to Supine: Minimal assistance (02/02/21 1123)  Transfers:  Sit to Stand: Moderate Assistance (02/03/21 1239)  Bed to Chair: Minimal assistance(pivot) (02/02/21 1136)  Gait:   Device: Rolling Walker (02/02/21 1137)  Assistance: Stand by assistance;Contact guard assistance (02/02/21 1137)  Distance: 10 feet x 2 (02/02/21 1137)  Quality of Gait: flexed posture, decreased WB on RLE with UE support to assist with gait tolerance, step to pattern, short step lengths (02/02/21 1137)  Comments: attempted gait without devices - pt unable to take step with LLE due to poor weight acceptance on RLE (02/02/21 1137)  Stairs:     W/C mobility:     LTG:  Long term goal 1: Bed mobility with indep  Long term goal 2: Functional transfers with indep  Long term goal 3: indep gait 150 feet with or without device  Long term goal 4: Pt will negotiate >/=12 steps with handrails and SBA to navigate home environment  Long term goal 5: indep with HEP  PT Treatment Time:  1.5 hrs      OCCUPATIONAL THERAPY  Hand Dominance: Right  ADL  Feeding: Setup (02/03/21 1014)  Grooming: Supervision;Setup (02/03/21 1014)  UE Bathing: Supervision;Setup (02/03/21 1014)  LE Bathing: Moderate assistance (02/03/21 1014)  UE Dressing: Setup;Supervision (02/03/21 1014)  LE Dressing: Maximum assistance (02/03/21 1014)  Toileting: Unable to assess(comment)(pt. did not need to go) (02/03/21 1014)  Additional Comments: pt completed sponge bath ADL seated EOB. Pt indicates pain in RLE due to recent procedure.  (02/02/21 1319)  Toilet Transfers  Toilet Transfer: Unable to assess (02/03/21 1015)  Toilet Transfers Comments: did not need to go (02/03/21 1015)  Tub Transfers  Tub Transfers: Not tested (02/02/21 1324)  Shower Transfers  Shower Transfers: Not tested (02/03/21 1015)  Shower Transfers Comments: pt. declined a shower and completed a sponge bath at the side of the bed. (02/03/21 1015)  LTG:  Eating  Assistance Needed: Setup or clean-up assistance  CARE Score: 5  Discharge Goal: Independent, Oral Hygiene  Assistance Needed: Setup or clean-up assistance  CARE Score: 5  Discharge Goal: Independent, Toileting Hygiene  Comment: did not need to go  Reason if not Attempted: Patient refused  CARE Score: 7, Shower/Bathe Self  Assistance Needed: Partial/moderate assistance  CARE Score: 3  Discharge Goal: Supervision or touching assistance  Upper Body Dressing  Assistance Needed: Supervision or touching assistance  CARE Score: 4  Discharge Goal: Independent, Lower Body Dressing  Assistance Needed: Substantial/maximal assistance  CARE Score: 2  Discharge Goal: Independent, Putting On/Taking Off Footwear  Assistance Needed: Dependent  CARE Score: 1  Discharge Goal: Independent, Toilet Transfer  Comment: did not need to go  Reason if not Attempted: Patient refused  CARE Score: 7  Discharge Goal: Independent  OT Treatment Time: 1.5 hrs      SPEECH THERAPY    Motor Speech: Within Functional Limits  Comprehension: Exceptions(very [place] with no success with amplification; pt benefits from written information with cues)  Verbal Expression: Exceptions to functional limits(min assist to supervision)      Diet/Swallow:  Diet Solids Recommendation: Regular  Liquid Consistency Recommendation:  Thin  Dysphagia Outcome Severity Scale: Level 6: Within functional limits/Modified independence    Compensatory Swallowing Strategies: Upright as possible for all oral intake, Small bites/sips         LTG:  Long-term Goals  Timeframe for Long-term Goals: 2-4 weeks of individual and/or group treatment sessions, as applicable, during LOS or until goals met  Goal 1: Pt will demonstrate functional cognitive-linguistic abilities in all opportunities with modified independence in order to safely complete ADLs. COGNITION  OT: Cognition Comment: comp: SUP exp: SUP soc: MIN A prob: MIN A mem: MIN A  SP:Memory: Exceptions to WFL(min assist)  Problem Solving: Exceptions to WFL(min assist)    RECREATIONAL THERAPY  Attendance to recreational therapy programs:    []  Pet Therapy  [] Music Therapy  [] Art Therapy    [] Recreation Therapy Group [] Support Group           Patient social interaction (mood, participation): good, pain      Patient strengths: good support    Patients goal: to go home    Problems/Barriers: pain limiting with movement        1. Safety:          - Intervention / Plan:    [x]  falls protocol     [x]  PT/OT    [x]  SP        - Results:         2. Potential DME needs:         - Intervention / Plan:  [x]  PT/OT     [x]  Assess equipment needs/access       - Results:         3. Weakness:          - Intervention / Plan:  [x]  PT/OT      []  Other:         - Results:         4. Discharge planning needs:          - Intervention / Plan:  [x]  Weekly team conference      [x]  family training        - Results:         5.            - Intervention / Plan:          - Results:         6.            - Intervention / Plan:         - Results:         7.            - Intervention / Plan:         - Results:           Discharge Plan   Estimated Length of Stay: 13 days    Tentative Discharge date: 2/13/21      Anticipated Discharge Destination:  Home      Team recommendations:    1. Follow up Therapy :    PT  OT  RN  Social Work  [facility]    2. Home Health    Other:     Equipment needed at Discharge:  Other: TBD      Team Members Present at Conference:    Physician: Dr. Nikia Marquis  : Slcik Gamino RN  RN: Casi Cheney RN  Physical Therapist: Hamilton Johnston PT  Occupational [employer]  Speech Therapist: Jaquan Mcdermott, SLP  Nurse Manager: Yves Matos RN    Electronically signed by Kate Jones Fara Canas on 2/4/2021 at 9:34 AM

## 2021-02-04 NOTE — FLOWSHEET NOTE
Thu Mnceil made aware of hgb was 7.  Electronically signed by Ashley Ruano RN on 2/4/2021 at 11:33 AM

## 2021-02-04 NOTE — PROGRESS NOTES
Pt denied any pain. Very [place]. No adverse reactions noted to IV Rocephin- need stop date. CXR negative. Moist cough noted. Pacemaker right chest. Walks with a walker. LBM 2/2/21. Incontinent at times. Blood sugar at HS was 271, 6 units of Humalog coverage given and a snack. Pt slept well.  Electronically signed by Ernestina Latham RN on 2/4/2021 at 5:46 AM

## 2021-02-04 NOTE — DISCHARGE SUMMARY
[facility]   Facility/Department: [facility]  Speech Language Pathology  Discharge Report      Patient: Meghan Martínez  : 3/5/1929    Date: 2021    NATIONAL OUTCOMES MEASUREMENT SYSTEM (NOMS):    STATUS AT INITIATION OF THERAPY:  DIET: regular/thin      SWALLOWING  Ratin    SPOKEN LANGUAGE COMPREHENSION  Ratin    SPOKEN LANGUAGE EXPRESSION  Ratin-5    MOTOR SPEECH  Ratin    PROBLEM SOLVING  Ratin    MEMORY  Ratin      Treatment Area(s):  Cognition    Progress made:  Pt participate in one speech therapy treatment session with the below listed goals. Pt was limited with participation due to hearing status, vision status, and lack of motivation to participate. Pt worked towards the below listed goals but goals were not met. After discussion with family, it was determined that the pt is at his baseline level of functioning and would not benefit from skilled ST services at this time. Short-term Goals  Timeframe for Short-term Goals: 2-3x/week of individual and/or group treatment sessions, as applicable, during LOS or until goals met  Goal 1: To increase safety awareness and judgment for safe completion of ADLs secondary to pt's cognitive deficits,  pt will complete mid level problem solving tasks related to ADLs (e.g. personal care, money managment, medications) with 80% accuracy and min cues. Goal 2: To increase safety awareness and judgment for safe completion of ADLs secondary to pt's cognitive deficits, pt will provide reasonable solutions to problems of everyday living with 80% accuracy and min cues. Goal 3: To increase safety awareness and judgment for safe completion of ADLs secondary to pt's cognitive deficits, pt will complete abstract reasoning tasks (i.e. convergent and divergent naming) with 80% accuracy and min cues. Goal 4:  To address pt's cognitive deficits and promote orientation, pt will state name of facility, time within 1 hour, reason in hospital, current month and year with 100% accuracy with  use of external aid. Long-term Goals  Timeframe for Long-term Goals: 2-4 weeks of individual and/or group treatment sessions, as applicable, during LOS or until goals met  Goal 1: Pt will demonstrate functional cognitive-linguistic abilities in all opportunities with modified independence in order to safely complete ADLs. Compensatory Swallowing Strategies: Upright as possible for all oral intake, Small bites/sips     STATUS AT DISCHARGE:  DIET: regular/thin    SWALLOWING  Ratin    SPOKEN LANGUAGE COMPREHENSION  Ratin    SPOKEN LANGUAGE EXPRESSION  Ratin    MOTOR SPEECH  Ratin    PROBLEM SOLVING  Ratin    MEMORY  Ratin      Functional Status at time of Discharge:    · Cognition: Patient demonstrates mild-moderate cognitive deficits. · Communication: Patient demonstrates mild-moderate communication deficits. · Language: Patient demonstrates mild language deficits. · Motor Speech: Patient demonstrates no motor speech deficits. · Swallow: Patient demonstrates no dysphagia. Patient is discharged to Rehab. Pt remains in acute rehab for skilled PT and OT.                 [x] Speech Therapy is no longer warranted      Signature:  Electronically signed by DALTON Barone on 2021 at 3:27 PM

## 2021-02-04 NOTE — PROGRESS NOTES
Occupational Therapy  Facility/Department: [facility]  Daily Treatment Note  NAME: Delores Jaimes  : 3/5/1929  MRN: [id number]    Date of Service: 2021    Discharge Recommendations:  Continue to assess pending progress       Assessment      Activity Tolerance  Activity Tolerance: Patient limited by fatigue;Patient limited by pain  Safety Devices  Safety Devices in place: Yes  Type of devices: All fall risk precautions in place         Patient Diagnosis(es): There were no encounter diagnoses. has a past medical history of Anemia, CAD (coronary artery disease), DM (diabetes mellitus) ([facility] [address] Dyslipidemia, History of tobacco abuse, HTN (hypertension), Hypothyroidism, Non-ST elevation MI (NSTEMI) ([facility] [address] and Pacemaker. has a past surgical history that includes Cataract removal and Middle ear surgery (Left, ). Restrictions  Restrictions/Precautions  Restrictions/Precautions: Fall Risk  Subjective   General  Patient assessed for rehabilitation services?: Yes  Referring Practitioner: Dr. Savannah Salazar  Diagnosis: Impaired mobility and ADL's d/t severe progressive PVD  Pain Assessment  Pain Assessment: 0-10  Pain Level: 4  Pain Type: Acute pain  Pain Location: Leg  Pain Orientation: Right  Pain Descriptors: Aching  Pain Frequency: Continuous  Pre Treatment Pain Screening  Pain at present: 4  Scale Used: Numeric Score  Intervention List: Patient able to continue with treatment;Nurse called to administer meds  Vital Signs  Patient Currently in Pain: Yes   Orientation     Objective     Pt. was in bed upon arrival due to PCA stating patient didn't want to get out of bed after being dressed due to pain level. Pt. was agreeable to get out of bed with encouragement. Pt. transferred supine to sitting eob at [place] campus assist. Pt. transferred sit to stand at [facility] assist and therapist pulled up his hospital pants and tied them. Pt. transferred to the w/c with a couple of steps with the ww at [place] [id number].  Pt. required visual cues for safety due to patient being severely [place]. Pt. engaged in B UE strengthening and endurance task to promote independence with transfers during adls. Pt. had 1 lb wrist weights on B wrists. Pt. picked up golf tees from the container with his R hand and placed them into the board. Pt. had no difficulty manipulating the tees but had fatigue noted and required rest breaks. Pt. then picked up marbles with his L hand and placed on top of each analy with occasional difficulty. Pt. took rest breaks due to fatigue in B shoulders. Pt. removed the marbles alternating hands with min difficulty and removed the tees with his R hand per his choice. Pt. took several rest breaks due to fatigue level. Pt. engaged in a task at a lower level but with the weights on. Pt. picked up pennies from the dycem mat and placed them into the slit in the container lid. Pt. was able to  the pennies with either hand with occasional difficulty. Pt. engaged in B hand fine motor task with assembling together nuts and bolts. Pt. required some visual cues for which parts went together despite being given one completed sample of each set. Pt. completed at a slow pace. Pt. was on 2L O2 during treatment.       Plan   Plan  Times per week: 5-7x/wk  Plan weeks: 1.5  Current Treatment Recommendations: Strengthening, Functional Mobility Training, Cognitive Reorientation, Cognitive/Perceptual Training, Patient/Caregiver Education & Training, Endurance Training, Equipment Evaluation, Education, & procurement, Balance Training, Safety Education & Training, Self-Care / ADL    Goals  Patient Goals   Patient goals : to go home       Therapy Time   Individual Concurrent Group Co-treatment   Time In 0900         Time Out 1000         Minutes 60              ADL training: 15 minutes  Therapeutic activities: 45 minutes      LEONCIO Nelson Electronically signed by LEONCIO Nelson on 2/4/2021 at 10:15 AM

## 2021-02-05 LAB
FOLATE: 7.1 NG/ML (ref 7.3–26.1)
GLUCOSE BLD-MCNC: 291 MG/DL (ref 60–115)
GLUCOSE BLD-MCNC: 304 MG/DL (ref 60–115)
GLUCOSE BLD-MCNC: 309 MG/DL (ref 60–115)
GLUCOSE BLD-MCNC: 318 MG/DL (ref 60–115)
INR BLD: 3.4
PERFORMED ON: ABNORMAL
PROTHROMBIN TIME: 33.8 SEC (ref 12.3–14.9)
VITAMIN B-12: 294 PG/ML (ref 232–1245)

## 2021-02-05 PROCEDURE — 6370000000 HC RX 637 (ALT 250 FOR IP): Performed by: INTERNAL MEDICINE

## 2021-02-05 PROCEDURE — 97116 GAIT TRAINING THERAPY: CPT

## 2021-02-05 PROCEDURE — 94761 N-INVAS EAR/PLS OXIMETRY MLT: CPT

## 2021-02-05 PROCEDURE — 1180000000 HC REHAB R&B

## 2021-02-05 PROCEDURE — 2700000000 HC OXYGEN THERAPY PER DAY

## 2021-02-05 PROCEDURE — 97140 MANUAL THERAPY 1/> REGIONS: CPT

## 2021-02-05 PROCEDURE — 6370000000 HC RX 637 (ALT 250 FOR IP): Performed by: NURSE PRACTITIONER

## 2021-02-05 PROCEDURE — 36415 COLL VENOUS BLD VENIPUNCTURE: CPT

## 2021-02-05 PROCEDURE — 97530 THERAPEUTIC ACTIVITIES: CPT

## 2021-02-05 PROCEDURE — 97110 THERAPEUTIC EXERCISES: CPT

## 2021-02-05 PROCEDURE — 82607 VITAMIN B-12: CPT

## 2021-02-05 PROCEDURE — 94640 AIRWAY INHALATION TREATMENT: CPT

## 2021-02-05 PROCEDURE — 82746 ASSAY OF FOLIC ACID SERUM: CPT

## 2021-02-05 PROCEDURE — 85610 PROTHROMBIN TIME: CPT

## 2021-02-05 RX ORDER — FERROUS SULFATE 325(65) MG
325 TABLET ORAL 2 TIMES DAILY WITH MEALS
Status: DISCONTINUED | OUTPATIENT
Start: 2021-02-05 | End: 2021-02-13 | Stop reason: HOSPADM

## 2021-02-05 RX ORDER — FOLIC ACID 1 MG/1
1 TABLET ORAL DAILY
Status: DISCONTINUED | OUTPATIENT
Start: 2021-02-05 | End: 2021-02-13 | Stop reason: HOSPADM

## 2021-02-05 RX ADMIN — BUDESONIDE AND FORMOTEROL FUMARATE DIHYDRATE 2 PUFF: 160; 4.5 AEROSOL RESPIRATORY (INHALATION) at 16:45

## 2021-02-05 RX ADMIN — CARVEDILOL 6.25 MG: 6.25 TABLET, FILM COATED ORAL at 17:15

## 2021-02-05 RX ADMIN — INSULIN LISPRO 6 UNITS: 100 INJECTION, SOLUTION INTRAVENOUS; SUBCUTANEOUS at 11:45

## 2021-02-05 RX ADMIN — IPRATROPIUM BROMIDE AND ALBUTEROL SULFATE 1 AMPULE: .5; 3 SOLUTION RESPIRATORY (INHALATION) at 04:43

## 2021-02-05 RX ADMIN — PANTOPRAZOLE SODIUM 40 MG: 40 TABLET, DELAYED RELEASE ORAL at 05:27

## 2021-02-05 RX ADMIN — FERROUS SULFATE TAB 325 MG (65 MG ELEMENTAL FE) 325 MG: 325 (65 FE) TAB at 17:15

## 2021-02-05 RX ADMIN — IPRATROPIUM BROMIDE AND ALBUTEROL SULFATE 1 AMPULE: .5; 3 SOLUTION RESPIRATORY (INHALATION) at 20:19

## 2021-02-05 RX ADMIN — CARVEDILOL 6.25 MG: 6.25 TABLET, FILM COATED ORAL at 08:48

## 2021-02-05 RX ADMIN — FOLIC ACID 1 MG: 1 TABLET ORAL at 17:15

## 2021-02-05 RX ADMIN — PREDNISONE 40 MG: 20 TABLET ORAL at 08:48

## 2021-02-05 RX ADMIN — URSODIOL 300 MG: 300 CAPSULE ORAL at 20:53

## 2021-02-05 RX ADMIN — IPRATROPIUM BROMIDE AND ALBUTEROL SULFATE 1 AMPULE: .5; 3 SOLUTION RESPIRATORY (INHALATION) at 11:40

## 2021-02-05 RX ADMIN — LEVOTHYROXINE SODIUM 25 MCG: 0.05 TABLET ORAL at 05:27

## 2021-02-05 RX ADMIN — BUDESONIDE AND FORMOTEROL FUMARATE DIHYDRATE 2 PUFF: 160; 4.5 AEROSOL RESPIRATORY (INHALATION) at 04:43

## 2021-02-05 RX ADMIN — INSULIN LISPRO 8 UNITS: 100 INJECTION, SOLUTION INTRAVENOUS; SUBCUTANEOUS at 08:53

## 2021-02-05 RX ADMIN — ASPIRIN 81 MG: 81 TABLET, CHEWABLE ORAL at 08:48

## 2021-02-05 RX ADMIN — INSULIN LISPRO 8 UNITS: 100 INJECTION, SOLUTION INTRAVENOUS; SUBCUTANEOUS at 20:56

## 2021-02-05 RX ADMIN — IPRATROPIUM BROMIDE AND ALBUTEROL SULFATE 1 AMPULE: .5; 3 SOLUTION RESPIRATORY (INHALATION) at 16:46

## 2021-02-05 RX ADMIN — INSULIN LISPRO 8 UNITS: 100 INJECTION, SOLUTION INTRAVENOUS; SUBCUTANEOUS at 17:17

## 2021-02-05 RX ADMIN — URSODIOL 300 MG: 300 CAPSULE ORAL at 08:48

## 2021-02-05 RX ADMIN — ATORVASTATIN CALCIUM 40 MG: 40 TABLET, FILM COATED ORAL at 20:53

## 2021-02-05 ASSESSMENT — PAIN SCALES - GENERAL: PAINLEVEL_OUTOF10: 0

## 2021-02-05 NOTE — PROGRESS NOTES
Pt very [place]. Pt follows commands but can be impulsive getting up without using call light . Pt moaning and groining when getting up to bathroom with walker and one assist. Pt had large bm. Pt was given Tylenol 650 mg po for pain to right thigh and hip area. 02 on at 2 liters per nasal cannula. One touch at hs was 293. 6 units of humalog given subcut in abdomen. All light at side in reach and bed alarm on. Pt drank cup of juice at hs. Electronically signed by Crispin Johnson.  Kiley Rivera on 2/5/2021 at 3:11 AM

## 2021-02-05 NOTE — PROGRESS NOTES
Physical Therapy Rehab Treatment Note  Facility/Department: [facility]  Room: R260/R260-01       NAME: Renuka uS  : 3/5/1929 (89 y.o.)  MRN: [id number]  CODE STATUS: Full Code    Date of Service: 2021  Chart Reviewed: Yes  Family / Caregiver Present: No  General Comment  Comments: Pt awake and more alert during this tx session. Pt reports no pain when sitting still, while moan in pain with transfers. Restrictions:  Restrictions/Precautions: Fall Risk       SUBJECTIVE:    Pain Screening  Patient Currently in Pain: Denies       Post Treatment Pain Screenin/10 Right LE with transfers and ambulation. OBJECTIVE:                         Transfers  Sit to Stand: Contact guard assistance  Stand to sit: Contact guard assistance    Ambulation  Ambulation?: Yes  Ambulation 1  Surface: level tile;carpet;uneven  Device: Rolling Walker  Distance: 25ft x 2, 50 ft x 1    Stairs/Curb  Stairs?: Yes  Stairs  # Steps : 4  Stairs Height: 6\"  Rails: Bilateral  Assistance: Contact guard assistance;Stand by assistance  Comment: Non-reciprocal pattern, slow steady pace. Activity Tolerance  Activity Tolerance: Patient Tolerated treatment well    Exercises  Hip Flexion: x15  Knee Long Arc Quad: x15  Ankle Pumps: x15  Comments: Seated stretches of Right LE, STM of Posterior thigh as tolerated to decreased pain and tissue tension. ASSESSMENT/COMMENTS:  Assessment: Increase participation with therapy for this tx session, pt reports no pain while sitting at rest, moans in pain with transfers.     PLAN OF CARE/Safety:   Safety Devices  Type of devices: Left in chair;Chair alarm in place      Therapy Time:   Individual   Time In 1030   Time Out 1100   Minutes 30     Minutes:30      Transfer/Bed mobility trainin      Gait trainin      Neuro re education:0     Therapeutic ex:12      Loraine Aparicio  21 at 12:21 PM

## 2021-02-05 NOTE — PROGRESS NOTES
Supervision;Setup (02/03/21 1014)  UE Bathing: Supervision;Setup (02/03/21 1014)  LE Bathing: Moderate assistance (02/03/21 1014)  UE Dressing: Setup;Supervision (02/03/21 1014)  LE Dressing: Maximum assistance (02/03/21 1014)  Toileting: Unable to assess(comment)(pt. did not need to go) (02/03/21 1014)  Additional Comments: pt completed sponge bath ADL seated EOB. Pt indicates pain in RLE due to recent procedure. (02/02/21 1319)  Toilet Transfers  Toilet Transfer: Unable to assess (02/03/21 1015)  Toilet Transfers Comments: did not need to go (02/03/21 1015)  Tub Transfers  Tub Transfers: Not tested (02/02/21 1324)  Shower Transfers  Shower Transfers: Not tested (02/03/21 1015)  Shower Transfers Comments: pt. declined a shower and completed a sponge bath at the side of the bed. (02/03/21 1015)      SPEECH THERAPY  Motor Speech: Within Functional Limits  Comprehension: Exceptions(very [place] with no success with amplification; pt benefits from written information with cues)  Verbal Expression: Exceptions to functional limits(min assist to supervision)      Diet/Swallow:  Diet Solids Recommendation: Regular  Liquid Consistency Recommendation: Thin  Dysphagia Outcome Severity Scale: Level 6: Within functional limits/Modified independence    Compensatory Swallowing Strategies: Upright as possible for all oral intake, Small bites/sips             COGNITION  OT: Cognition Comment: comp: SUP exp: SUP soc: MIN A prob: MIN A mem: MIN A  SP:Memory: Exceptions to WFL(min assist)  Problem Solving: Exceptions to WFL(min assist)        THERAPY, MEDICAL AND NURSING COORDINATION:    []  Pain medication before therapies     []  Check orthostatic BP      [x]  Ambulate to the bathroom in room    [x]  Add scheduled rest beaks     []  In room therapies      Discharge date set for:              2/13/21      Home with:    fam  [facility]   [facility]            And:      Home Health Care:     [x]  PT    []  OT    []  ST   [x]  Aide   []  SW Low BP's     []  COPD flare-up   []  Uncontrolled blood sugar     []  Progressive anemia         []  Severe pain exacerbation     []  Impaired mental status    []  Urinary incontinence    []  Bowel incontinence           Plan to correct barriers to functional progress: Add scheduled rest breaks, control pain by using ice Lidoderm rest and massage as well as pain medications prior to therapy. Based on a comprehensive evaluation of the above, the individualized therapy and Discharge plan will be:    -Times stated are an average that will be varied based on the patient's daily need. PT  1 1/2  hrs/day 5-7 days per week           OT  1 1/2hrs per day 5-7 days per week ST ____1/2__ 1_hrs /day 3-5 days per week       Estimated LOS 2 week(s)    - Overall functional prognosis:     [x]  Good    []  Fair    []  Poor -Medical Prognosis:   [x]  Good    []  Fair    []  Poor    This patient was made aware of the discussion of Plan of Care, their projected dicharge date and their projected function at discharge.        Lorenzo Bianchi MD

## 2021-02-05 NOTE — PROGRESS NOTES
Supination/Pronation: 2 sets x 10 reps  Wrist Flexion: 2 sets x 10 reps   Wrist Extension: 2 sets x 10 reps   Pt tolerated fair with rest breaks prn. To increase BUE strength and endurance for improved transfers. Educated pt in exercise technique and provided cues to maintain. Patient required extended time to complete secondary to fatigue. After tx session told patient therapy was finished. Patient responded \"get lost bitch\". Patient did not appear agitated during tx session.       Plan   Plan  Times per week: 5-7x/wk  Plan weeks: 1.5  Current Treatment Recommendations: Strengthening, Functional Mobility Training, Cognitive Reorientation, Cognitive/Perceptual Training, Patient/Caregiver Education & Training, Endurance Training, Equipment Evaluation, Education, & procurement, Balance Training, Safety Education & Training, Self-Care / ADL  Plan Comment: Continue per OT plan of care    Goals  Patient Goals   Patient goals : to go home       Therapy Time   Individual Concurrent Group Co-treatment   Time In 0117         Time Out 1600         Minutes 30              Therapeutic activities: 30 minutes     LEONCIO Luevano    Electronically signed by LEONCIO Luevano on 2/5/2021 at 4:07 PM

## 2021-02-05 NOTE — PLAN OF CARE
Problem: Pain:  Goal: Pain level will decrease  Description: Pain level will decrease  2/5/2021 1012 by Tex Resendiz, RN  Outcome: Ongoing  2/5/2021 0313 by Gonzalez Rubin. Adrian Garvey RN  Outcome: Ongoing  Goal: Control of acute pain  Description: Control of acute pain  2/5/2021 1012 by Tex Resendiz, RN  Outcome: Ongoing  2/5/2021 0313 by Gonzalez Rubin. Adrian Garvey RN  Outcome: Ongoing  Goal: Control of chronic pain  Description: Control of chronic pain  2/5/2021 1012 by Tex Resendiz, RN  Outcome: Ongoing  2/5/2021 0313 by Gonzalez Rubin. Adrian Garvey RN  Outcome: Ongoing     Problem: Falls - Risk of:  Goal: Will remain free from falls  Description: Will remain free from falls  2/5/2021 1012 by Tex Resendiz, RN  Outcome: Ongoing  2/5/2021 0313 by Gonzalez Rubin. Adrian Garvey RN  Outcome: Ongoing  Goal: Absence of physical injury  Description: Absence of physical injury  2/5/2021 1012 by Tex Resendiz, RN  Outcome: Ongoing  2/5/2021 0313 by Gonzalez Rubin. Adrian Garvey RN  Outcome: Ongoing     Problem: IP SWALLOWING  Goal: LTG - patient will tolerate the least restrictive diet consistency to allow for safe consumption of daily meals  2/5/2021 1012 by Tex Resendiz, RN  Outcome: Ongoing  2/5/2021 0313 by Gonzalez Rubin. Adrian Garvey RN  Outcome: Ongoing     Problem: Skin Integrity:  Goal: Will show no infection signs and symptoms  Description: Will show no infection signs and symptoms  2/5/2021 1012 by Tex Resendiz, RN  Outcome: Ongoing  2/5/2021 0313 by Gonzalez Rubin. Adrian Garvey RN  Outcome: Ongoing  Goal: Absence of new skin breakdown  Description: Absence of new skin breakdown  2/5/2021 1012 by Tex Resendiz, RN  Outcome: Ongoing  2/5/2021 0313 by Gonzalez Rubin.  Adrian Garvey RN  Outcome: Ongoing

## 2021-02-05 NOTE — PROGRESS NOTES
Occupational Therapy  Facility/Department: [facility]  Daily Treatment Note  NAME: Juany Vale  : 3/5/1929  MRN: [id number]    Date of Service: 2021    Discharge Recommendations:  Continue to assess pending progress       Assessment    Patient tolerated treatment session well with written communication for prompts. Activity Tolerance  Activity Tolerance: Patient Tolerated treatment well  Safety Devices  Safety Devices in place: Yes  Type of devices: All fall risk precautions in place         Patient Diagnosis(es): There were no encounter diagnoses. has a past medical history of Anemia, CAD (coronary artery disease), DM (diabetes mellitus) ([facility] [address] Dyslipidemia, History of tobacco abuse, HTN (hypertension), Hypothyroidism, Non-ST elevation MI (NSTEMI) ([facility] [address] and Pacemaker. has a past surgical history that includes Cataract removal and Middle ear surgery (Left, ). Restrictions  Restrictions/Precautions  Restrictions/Precautions: Fall Risk     Subjective   General  Chart Reviewed: Yes  Patient assessed for rehabilitation services?: Yes  Response to previous treatment: Patient with no complaints from previous session  Family / Caregiver Present: No  Referring Practitioner: Dr. Halle Araujo  Diagnosis: Impaired mobility and ADL's d/t severe progressive PVD  Pain Assessment  Pain Assessment: 0-10  Pain Level: 0  Pre Treatment Pain Screening  Pain at present: 5  Scale Used: Numeric Score  Intervention List: Patient able to continue with treatment  Vital Signs  Patient Currently in Pain: Denies     Objective    Coordination  Fine Motor: Patient engages in bilateral UE fine motor coordination for increased ease with ADL and IADL tasks. Patient alternates hands to place golf tees into wooden peg board, requiring minimal visual cues to locate all open spaces. Patient then balances a ana maria on top of each golf analy with minimal difficulty noted and multiple drops.     Upper Extremity Function  UE Strengthing: Patient engages in right hand strengthening activity for increased ease with ADL tasks. Patient utilizes resistive clips to balance 25 small pop beads onto peg board. Patient initiates task with 4# resistive clip and then transitions down to 2# clip and eventually 1# clip to complete task.      Plan   Plan  Times per week: 5-7x/wk  Plan weeks: 1.5  Current Treatment Recommendations: Strengthening, Functional Mobility Training, Cognitive Reorientation, Cognitive/Perceptual Training, Patient/Caregiver Education & Training, Endurance Training, Equipment Evaluation, Education, & procurement, Balance Training, Safety Education & Training, Self-Care / ADL  Plan Comment: Continue per OT plan of care    Goals  Patient Goals   Patient goals : to go home       Therapy Time   Individual Concurrent Group Co-treatment   Time In 0930         Time Out 1000         Minutes 30               Therapeutic activities: 30 minutes     Electronically signed by CHETNA Bose/L on 2/5/2021 at 12:45 PM  CHETNA Bose/TONY

## 2021-02-05 NOTE — PROGRESS NOTES
Clinical Pharmacy Note    Warfarin consult follow-up    Recent Labs     02/04/21  0535   INR 3.3     Recent Labs     02/03/21  0949 02/04/21  0535 02/04/21  1626   HGB 8.1* 7.0* 7.4*   HCT 24.3* 21.5* 21.7*    317  --        Significant drug:drug interactions:  ASA, APAP, synthroid, protonix, prednisone started 2/3 for 5 days        Notes:     Warfarin Dose       01/30/21 1.3 (1/29) 5 mg   01/31/21 1.1  5 mg   02/01/21  1.1  7.5 mg   02/02/21  1.5  7.5 mg   02/03/21  2.8  2 mg    02/04/21  3.3  1 mg    02/05/21  3.3  HOLD                                              INR of 3.3 is SUPRA-therapeutic, goal range of 2-3 (DVT). Will HOLD warfarin today. Prednisone therapy started 2/3 likely contributing to elevated INR  Daily PT/INR during pharmacy consult to monitor and dose warfarin therapy. Thank you for consult. Will continue to monitor.    Keira Shi PharmD

## 2021-02-05 NOTE — PROGRESS NOTES
Occupational Therapy  Facility/Department: [facility]  Daily Treatment Note  NAME: Tania Zepeda  : 3/5/1929  MRN: [id number]    Date of Service: 2021    Discharge Recommendations:  Continue to assess pending progress       Assessment      Activity Tolerance  Activity Tolerance: Patient limited by pain  Safety Devices  Type of devices: All fall risk precautions in place         Patient Diagnosis(es): There were no encounter diagnoses. has a past medical history of Anemia, CAD (coronary artery disease), DM (diabetes mellitus) ([facility] [address] Dyslipidemia, History of tobacco abuse, HTN (hypertension), Hypothyroidism, Non-ST elevation MI (NSTEMI) ([facility] [address] and Pacemaker. has a past surgical history that includes Cataract removal and Middle ear surgery (Left, ). Restrictions  Restrictions/Precautions  Restrictions/Precautions: Fall Risk  Subjective   General  Chart Reviewed: Yes  Patient assessed for rehabilitation services?: Yes  Response to previous treatment: Patient with no complaints from previous session  Family / Caregiver Present: No  Referring Practitioner: Dr. Sami Guzman  Diagnosis: Impaired mobility and ADL's d/t severe progressive PVD    Patient was noted to have extreme pain in the back of his right upper leg when the leg was removed from the leg rest. Patient was unable to perform sit to stand due to pain so the rest of the session was completed in sitting. Patient had a large sigh when leg was back in a supported position. Orientation     Objective        Patient completed reaching task to move clothespins from one area of the clothesline tree to another area using primarily his right hand. Patient exhibited min SOB and was encouraged to take a rest break. O2 saturation was difficult to obtain but was 97% and HR was 61 when able to be read. Patient was issued green theraputty for hand strengthening and was educated in 3 exercises that he could use the putty for.  Patient was also educate in how to get the putty out of the container and was provided a written note with large print that stated that he should not get the putty on fabric due to staining.          Plan   Plan  Times per week: 5-7x/wk  Plan weeks: 1.5  Current Treatment Recommendations: Strengthening, Functional Mobility Training, Cognitive Reorientation, Cognitive/Perceptual Training, Patient/Caregiver Education & Training, Endurance Training, Equipment Evaluation, Education, & procurement, Balance Training, Safety Education & Training, Self-Care / ADL  Plan Comment: Continue per OT plan of care    Goals  Patient Goals   Patient goals : to go home       Therapy Time   Individual Concurrent Group Co-treatment   Time In 1000         Time Out 1030         Minutes 30              Therapeutic activities: 30 minutes    CHETNA Morgan/L    Electronically signed by GIN Morgan on 2/5/2021 at 12:48 PM

## 2021-02-05 NOTE — PROGRESS NOTES
Subjective: The patient complains of ,\" moderate acute on chronic progressive fatigue and    partially relieved by rest,medications, Pt, OT, and rest and exacerbated by recent illness. I am concerned about patients medical complexities. ROS x10: The patient also complains of severely impaired mobility and activities of daily living. Otherwise no new problems with vision, hearing, nose, mouth, throat, dermal, cardiovascular, GI, , pulmonary, musculoskeletal, psychiatric or neurological. See Rehab H&P on Rehab chart dated . Vital signs:  BP (!) 145/62   Pulse 92   Temp 97 °F (36.1 °C) (Oral)   Resp 18   Ht 5' 7\" (1.702 m)   Wt 159 lb 8 oz (72.3 kg)   SpO2 95%   BMI 24.98 kg/m²   I/O:   PO/Intake:  fair PO intake, no problems observed or reported. Bowel/Bladder:  continent, no problems noted. General:  Patient is well developed, adequately nourished, non-obese and     well kempt. HEENT:    PERRLA, hearing intact to loud voice, external inspection of ear     and nose benign. Inspection of lips, tongue and gums benign  Musculoskeletal: No significant change in strength or tone. All joints stable. Inspection and palpation of digits and nails show no clubbing,       cyanosis or inflammatory conditions. Neuro/Psychiatric: Affect: flat but pleasant. Alert and oriented to person, place and     Situation with    cues. No significant change in deep tendon reflexes or     sensation  Lungs:  Diminished, CTA-B. Respiration effort is normal at rest.     Heart:   S1 = S2, RRR. No loud murmurs. Abdomen:  Soft, non-tender, no enlargement of liver or spleen. Extremities:  No significant lower extremity edema or tenderness. Skin:   Intact to general survey, no visualized or palpated problems. Rehabilitation:  Physical therapy:   Bed Mobility: Scooting:  Moderate assistance    Transfers: Sit to Stand: Contact guard assistance  Stand to sit: Contact guard assistance  Bed to 11:10 AM   Result Value Ref Range    POC Glucose 291 (H) 60 - 115 mg/dl    Performed on ACCU-CHEK        Cta Abdominal Aorta W Bilat Runoff W Wo Contrast    Result Date: 1/27/2021  CTA ABDOMINAL AORTA W BILAT RUNOFF W WO CONTRAST: 1/26/2021 CLINICAL HISTORY:  RLE swelling Pain . COMPARISON: CT abdomen and pelvis without contrast 12/4/2015. Spiral enhanced images were obtained of the abdominal aorta through both feet during the infusion of a total of 250 mL of Isovue 300 contrast with runoff CTA protocol. Delayed imaging was also performed. Routine and volume rendered images were obtained on a 3-dimensional workstation. All CT scans at this facility use dose modulation, iterative reconstruction, and/or weight based dosing when appropriate to reduce radiation dose to as low as reasonably achievable. FINDINGS: Moderately extensive disease with areas of flow-limiting narrowing are present within the mid to distal right superficial femoral artery, which is abruptly occluded at the abductor hiatus. Best depicted on the delayed imaging is extensive DVT throughout the right lower extremity extending proximally to the right common iliac vein. At the most inferior aspect of the initial study, filling defects are suspected within the subsegmental left lower lobe pulmonary artery branches, suspicious for pulmonary emboli. No definite emboli are noted on the right. The visualized lung bases are clear. The abdominal aorta is normal in caliber with mild to moderate calcific plaquing. Both iliac, common femoral, and the left femoral arteries are widely patent with mild disease. Delayed imaging demonstrates patency of the left popliteal artery with extensive calcific disease of the infrapopliteal runoff, which is incompletely visualized. There is no significant reconstitution of the right popliteal or infrapopliteal arteries. The celiac, superior mesenteric and both solitary renal arteries are widely patent.  Colonic diverticulosis is again noted, without evidence of diverticulitis. No other significant change from 12/4/2015 is identified. 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 EXTENSIVE RIGHT LOWER EXTREMITY DVT EXTENDING INTO THE RIGHT COMMON ILIAC VEIN. SUSPECT LEFT LOWER LOBE PULMONARY EMBOLI. Xr Chest Portable    Result Date: 1/27/2021  EXAMINATION: Portable AP ERECT view of the chest. CLINICAL HISTORY:  preop  with pain and swelling in right thigh. DATE: 1/26/2021 10:02 PM COMPARISONS: 7/8/2020 FINDINGS: There are multiple sternal sutures and mediastinal clips present. Normal cardiac silhouette. An pacemaker overlies the right hemithorax and has a lead extending into the right atrium and second lead extending into the right ventricle. Lungs are  clear without consolidation. No pleural effusion or pneumothorax. There are mild degenerative changes in spine. NO ACUTE CARDIOPULMONARY ABNORMALITY. NO CHANGE. Previous extensive, complex labs, notes and diagnostics reviewed and analyzed. ALLERGIES:    Allergies as of 02/01/2021    (No Known Allergies)      (please also verify by checking [facility])     Complex Physical Medicine & Rehab Issues Assess & Plan:   1. Severe abnormality of gait and mobility and impaired self-care and ADL's secondary to progressive severe PVD Femoral artery occlusion, DVT, weakness and pain  . Functional and medical status reassessed regarding patients ability to participate in therapies and patient found to be able to participate in acute intensive comprehensive inpatient rehabilitation program including PT/OT to improve balance, ambulation, ADLs, and to improve the P/AROM. Therapeutic modifications regarding activities in therapies, place, amount of time per day and intensity of therapy made daily.   In bed therapies or bedside therapies prn.   2. Bowel and Bladder dysfunction  :  frequent toileting, ambulate to bathroom with assistance, check post void residuals. Check for C.difficile x1 if >2 loose stools in 24 hours, continue bowel & bladder program.  Monitor bowel and bladder function. Lactinex 2 PO every AC. MOM prn, Brown Bomb prn, Glycerin suppository prn, enema prn. 3. Moderate   pain as well as generalized OA pain: reassess pain every shift and prior to and after each therapy session, give prn Tylenol and   See mar, modalities prn in therapy, masage, Lidoderm, K-pad prn. Consider scheduled AM pain meds. 4. Skin healing and breakdown risk:  continue pressure relief program.  Daily skin exams and reports from nursing. 5. Severe fatigue due to nutritional and hydration deficiency: Add and titrate vitamin B12 vitamin D and CoQ10 continue to monitor I&Os, calorie counts prn, dietary consult prn.  6. Acute episodic insomnia with situational adjustment disorder:  prn Ambien, monitor for day time sedation. 7. Falls risk elevated:  patient to use call light to get nursing assistance to get up, bed and chair alarm. 8. Elevated DVT risk: progressive activities in PT, continue prophylaxis BELLE hose, elevation and see mar . 9. Complex discharge planning:  Weekly team meeting every Monday Thursday to assess progress towards goals, discuss and address social, psychological and medical comorbidities and to address difficulties they may be having progressing in therapy. Patient and family education is in progress. The patient is to follow-up with their family physician after discharge.         Complex Active General Medical Issues that complicate care Assess & Plan:    Patient Active Problem List   Diagnosis    HTN (hypertension)    Dyslipidemia    [facility] mellitus) [facility] [address]    Hypothyroidism    History of tobacco abuse    Anemia    CAD (coronary artery disease)    Bronchitis    COPD with acute exacerbation (HCC)    SOB (shortness of breath)    Anginal equivalent (HCC)    Hypotension    Chest pain    [facility]STEM[facility] (non-ST elevated [facility] infarction) [facility] [address]    COPD exacerbation ([facility] [address]    Syncope and collapse    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HCC)    Venous thrombosis of leg    Hyponatremia    PVD (peripheral vascular disease) ([facility])     Ginette Parekh D.O., PM&R     Attending    [facility] [place]

## 2021-02-05 NOTE — PLAN OF CARE
Problem: Pain:  Goal: Pain level will decrease  Description: Pain level will decrease  2/5/2021 0313 by Kip Gunter. Jeimy Samaniego RN  Outcome: Ongoing  2/4/2021 1415 by Cherie Villanueva RN  Outcome: Ongoing  Goal: Control of acute pain  Description: Control of acute pain  2/5/2021 0313 by Kip Gunter. Jeimy Samaniego RN  Outcome: Ongoing  2/4/2021 1415 by Cherie Villanueva RN  Outcome: Ongoing  Goal: Control of chronic pain  Description: Control of chronic pain  2/5/2021 0313 by Kip Gunter. Jeimy Samaniego RN  Outcome: Ongoing  2/4/2021 1415 by Cherie Villanueva RN  Outcome: Ongoing     Problem: Falls - Risk of:  Goal: Will remain free from falls  Description: Will remain free from falls  2/5/2021 0313 by Kip Gunter. Karen Canavan RN  Outcome: Ongoing  2/4/2021 1415 by Cherie Villanueva RN  Outcome: Ongoing  Goal: Absence of physical injury  Description: Absence of physical injury  2/5/2021 0313 by Kip Gunter. Jeimy Samaniego RN  Outcome: Ongoing  2/4/2021 1415 by Cherie Villanueva RN  Outcome: Ongoing     Problem: IP SWALLOWING  Goal: LTG - patient will tolerate the least restrictive diet consistency to allow for safe consumption of daily meals  2/5/2021 0313 by Kip Gunter. Jemiy Samaniego RN  Outcome: Ongoing  2/4/2021 1415 by Cherie Villanueva RN  Outcome: Ongoing     Problem: Skin Integrity:  Goal: Will show no infection signs and symptoms  Description: Will show no infection signs and symptoms  2/5/2021 0313 by Kip Gunter. Karen Canavan RN  Outcome: Ongoing  2/4/2021 1415 by Cherie Villanueva RN  Outcome: Ongoing  Goal: Absence of new skin breakdown  Description: Absence of new skin breakdown  2/5/2021 0313 by Kip Gunter.  Karen Canavan RN  Outcome: Ongoing  2/4/2021 1415 by Cherie Villanueva RN  Outcome: Ongoing

## 2021-02-05 NOTE — PROGRESS NOTES
Physical Therapy Rehab Treatment Note  Facility/Department: [facility]  Room: R260R260-01       NAME: Chidi Quevedo  : 3/5/1929 (89 y.o.)  MRN: [id number]  CODE STATUS: Full Code    Date of Service: 2021  Chart Reviewed: Yes  Family / Caregiver Present: No  General Comment  Comments: Pt awake and more alert during this tx session. Pt reports no pain when sitting still, while moan in pain with transfers. Restrictions:  Restrictions/Precautions: Fall Risk       SUBJECTIVE:    Pain Screening  Patient Currently in Pain: Denies       Post Treatment Pain Screenin/10       OBJECTIVE:                    Bed mobility  Supine to Sit: Stand by assistance  Sit to Supine: Stand by assistance  Comment: Performed at [employer] [facility]    Transfers  Sit to Stand: Stand by assistance;Contact guard assistance  Stand to sit: Stand by assistance  Bed to Chair: Contact guard assistance    Ambulation  Ambulation?: Yes  Ambulation 1  Surface: level tile;carpet;uneven  Device: Rolling Walker  Quality of Gait: Slow step to gait pattern with decreased stance phase on Right LE, no heel strike on Right as pt currently unable to fully extend Right Knee due to pain and tissue tightness. Distance: 25ft x 4 125ft x 1    Stairs/Curb  Stairs?: Yes  Stairs  # Steps : 4  Stairs Height: 6\"  Rails: Bilateral  Assistance: Contact guard assistance;Stand by assistance  Comment: Non-reciprocal pattern, slow steady pace. Activity Tolerance  Activity Tolerance: Patient Tolerated treatment well  Activity Tolerance: Able to complete 60 min. tx session this PM    Exercises  Straight Leg Raise: x 10  Heelslides: x15  Hip Flexion: x15  Hip Abduction: Supine x 15  Knee Long Arc Quad: x15  Knee Short Arc Quad: x15  Ankle Pumps: x15  Comments: Seated stretches of Right LE, STM / crossfriction of Posterior thigh as tolerated to decreased pain and tissue tension.   Other exercises  Other exercises?: Yes  Other exercises 1: HL Hip ABD/ADD with YTB/BAll x 15 homero.     ASSESSMENT/COMMENTS:  Body structures, Functions, Activity limitations: Decreased functional mobility ; Decreased balance;Decreased strength;Decreased posture;Decreased safe awareness;Decreased ROM  Assessment: Pt able to complete full 60 min tx session this PM, increased tolerance to activity, able to stay awake and enguaged throughout the tx session.  Pt difficult to communicate with due to extreme [facility]    PLAN OF CARE/Safety:   Safety Devices  Type of devices: Left in chair;Chair alarm in place      Therapy Time:   Individual   Time In 1400   Time Out 1500   Minutes 60     Minutes:60      Transfer/Bed mobility trainin      Gait training:15      Manual Therapy: 10     Therapeutic ex:30      Hossein Slates  21 at 3:54 PM

## 2021-02-06 LAB
GLUCOSE BLD-MCNC: 228 MG/DL (ref 60–115)
GLUCOSE BLD-MCNC: 268 MG/DL (ref 60–115)
GLUCOSE BLD-MCNC: 319 MG/DL (ref 60–115)
GLUCOSE BLD-MCNC: 362 MG/DL (ref 60–115)
HCT VFR BLD CALC: 22.9 % (ref 42–52)
HEMOGLOBIN: 7.7 G/DL (ref 14–18)
INR BLD: 3.3
PERFORMED ON: ABNORMAL
PROTHROMBIN TIME: 33.1 SEC (ref 12.3–14.9)

## 2021-02-06 PROCEDURE — 1180000000 HC REHAB R&B

## 2021-02-06 PROCEDURE — 85014 HEMATOCRIT: CPT

## 2021-02-06 PROCEDURE — 36415 COLL VENOUS BLD VENIPUNCTURE: CPT

## 2021-02-06 PROCEDURE — 85610 PROTHROMBIN TIME: CPT

## 2021-02-06 PROCEDURE — 6370000000 HC RX 637 (ALT 250 FOR IP): Performed by: NURSE PRACTITIONER

## 2021-02-06 PROCEDURE — 97110 THERAPEUTIC EXERCISES: CPT

## 2021-02-06 PROCEDURE — 94640 AIRWAY INHALATION TREATMENT: CPT

## 2021-02-06 PROCEDURE — 97116 GAIT TRAINING THERAPY: CPT

## 2021-02-06 PROCEDURE — 6370000000 HC RX 637 (ALT 250 FOR IP): Performed by: INTERNAL MEDICINE

## 2021-02-06 PROCEDURE — 6370000000 HC RX 637 (ALT 250 FOR IP): Performed by: PHYSICAL MEDICINE & REHABILITATION

## 2021-02-06 PROCEDURE — 94761 N-INVAS EAR/PLS OXIMETRY MLT: CPT

## 2021-02-06 PROCEDURE — 2700000000 HC OXYGEN THERAPY PER DAY

## 2021-02-06 PROCEDURE — 97535 SELF CARE MNGMENT TRAINING: CPT

## 2021-02-06 PROCEDURE — 94664 DEMO&/EVAL PT USE INHALER: CPT

## 2021-02-06 PROCEDURE — 85018 HEMOGLOBIN: CPT

## 2021-02-06 PROCEDURE — 97530 THERAPEUTIC ACTIVITIES: CPT

## 2021-02-06 RX ORDER — INSULIN GLARGINE 100 [IU]/ML
10 INJECTION, SOLUTION SUBCUTANEOUS NIGHTLY
Status: DISCONTINUED | OUTPATIENT
Start: 2021-02-06 | End: 2021-02-13 | Stop reason: HOSPADM

## 2021-02-06 RX ORDER — IPRATROPIUM BROMIDE AND ALBUTEROL SULFATE 2.5; .5 MG/3ML; MG/3ML
1 SOLUTION RESPIRATORY (INHALATION) 3 TIMES DAILY
Status: DISCONTINUED | OUTPATIENT
Start: 2021-02-06 | End: 2021-02-09

## 2021-02-06 RX ADMIN — ACETAMINOPHEN 650 MG: 325 TABLET ORAL at 08:57

## 2021-02-06 RX ADMIN — BUDESONIDE AND FORMOTEROL FUMARATE DIHYDRATE 2 PUFF: 160; 4.5 AEROSOL RESPIRATORY (INHALATION) at 04:08

## 2021-02-06 RX ADMIN — FERROUS SULFATE TAB 325 MG (65 MG ELEMENTAL FE) 325 MG: 325 (65 FE) TAB at 16:52

## 2021-02-06 RX ADMIN — INSULIN LISPRO 8 UNITS: 100 INJECTION, SOLUTION INTRAVENOUS; SUBCUTANEOUS at 16:53

## 2021-02-06 RX ADMIN — URSODIOL 300 MG: 300 CAPSULE ORAL at 08:57

## 2021-02-06 RX ADMIN — FOLIC ACID 1 MG: 1 TABLET ORAL at 08:57

## 2021-02-06 RX ADMIN — CARVEDILOL 6.25 MG: 6.25 TABLET, FILM COATED ORAL at 08:57

## 2021-02-06 RX ADMIN — CARVEDILOL 6.25 MG: 6.25 TABLET, FILM COATED ORAL at 16:52

## 2021-02-06 RX ADMIN — INSULIN LISPRO 4 UNITS: 100 INJECTION, SOLUTION INTRAVENOUS; SUBCUTANEOUS at 13:23

## 2021-02-06 RX ADMIN — PANTOPRAZOLE SODIUM 40 MG: 40 TABLET, DELAYED RELEASE ORAL at 05:30

## 2021-02-06 RX ADMIN — INSULIN GLARGINE 10 UNITS: 100 INJECTION, SOLUTION SUBCUTANEOUS at 21:52

## 2021-02-06 RX ADMIN — URSODIOL 300 MG: 300 CAPSULE ORAL at 21:52

## 2021-02-06 RX ADMIN — INSULIN LISPRO 8 UNITS: 100 INJECTION, SOLUTION INTRAVENOUS; SUBCUTANEOUS at 08:57

## 2021-02-06 RX ADMIN — INSULIN LISPRO 6 UNITS: 100 INJECTION, SOLUTION INTRAVENOUS; SUBCUTANEOUS at 21:52

## 2021-02-06 RX ADMIN — ASPIRIN 81 MG: 81 TABLET, CHEWABLE ORAL at 08:57

## 2021-02-06 RX ADMIN — ATORVASTATIN CALCIUM 40 MG: 40 TABLET, FILM COATED ORAL at 21:52

## 2021-02-06 RX ADMIN — ACETAMINOPHEN 650 MG: 325 TABLET ORAL at 16:52

## 2021-02-06 RX ADMIN — PREDNISONE 40 MG: 20 TABLET ORAL at 08:57

## 2021-02-06 RX ADMIN — FERROUS SULFATE TAB 325 MG (65 MG ELEMENTAL FE) 325 MG: 325 (65 FE) TAB at 08:57

## 2021-02-06 RX ADMIN — IPRATROPIUM BROMIDE AND ALBUTEROL SULFATE 1 AMPULE: .5; 3 SOLUTION RESPIRATORY (INHALATION) at 04:08

## 2021-02-06 RX ADMIN — LEVOTHYROXINE SODIUM 25 MCG: 0.05 TABLET ORAL at 05:30

## 2021-02-06 ASSESSMENT — PAIN DESCRIPTION - PAIN TYPE: TYPE: ACUTE PAIN

## 2021-02-06 ASSESSMENT — PAIN DESCRIPTION - LOCATION: LOCATION: LEG

## 2021-02-06 ASSESSMENT — PAIN DESCRIPTION - DESCRIPTORS: DESCRIPTORS: ACHING;CRAMPING

## 2021-02-06 ASSESSMENT — PAIN DESCRIPTION - ORIENTATION: ORIENTATION: RIGHT;POSTERIOR

## 2021-02-06 ASSESSMENT — PAIN SCALES - GENERAL
PAINLEVEL_OUTOF10: 5
PAINLEVEL_OUTOF10: 7

## 2021-02-06 NOTE — PROGRESS NOTES
Seated/supine stretches of RLE/ STM and MFR to hamstring as tolerated to decrease pain and tightness     ASSESSMENT/COMMENTS:  Assessment: Gait training limited this tx by pain. Stretching and manual techniques utilized to relieve pain and tightness. Decreased safety exhibited through trsfs this session, multipe demonstrations and vc's given to improve. Fair carryover by end of tx. PLAN OF CARE/Safety:   Plan Comment: Cont.  POC      Therapy Time:   Individual   Time In 0900   Time Out 1000   Minutes 60     Minutes:      Transfer/Bed mobility training: 15      Gait trainin      Neuro re education: 5     Therapeutic ex: 5app, DEL, 21 at 12:49 PM

## 2021-02-06 NOTE — PROGRESS NOTES
assessment  15. Vaccinations: Immunization records reviewed. If has not received appropriate vaccinations, will order to be given prior to discharge. 16. DVT prophylaxis: Chemical prophylaxis SQ lovenox with bridge to coumadin  17. Discharge planning: SW on board. Will discharge once medically stable and cleared by all consultants. 18. High Risk Readmission Screening Tool Score Noted. Additionally, the following hospital problems were addressed: Active Problems:    PVD (peripheral vascular disease) ([facility] [address]  Resolved Problems:    * No resolved hospital problems. *      ** Total time spent reviewing medical records, evaluating patient, speaking with RN's and consultants where I was focused exclusively on this patient: 35 minutes. This time is excluding time spent performing procedures or significant events occurring earlier or later in the day requiring my attention and focus. Subjective:   Admit Date: 2/1/2021  PCP: Onofre Raymundo, DO    No acute events overnight. Afebrile  No new complaints. Pt denies chest pain, SOB, N/V, fevers or chills. Objective:     Vitals:    02/05/21 2130 02/06/21 0408 02/06/21 0901 02/06/21 0930   BP: (!) 154/77  (!) 148/57    Pulse: 69  76 76   Resp:  18 18    Temp:   98 °F (36.7 °C)    TempSrc:   Oral    SpO2:  98% 97%    Weight:       Height:         General appearance: A + O x 2. No conversational dyspnea noted. Answers questions appropriately. Extremely [place]. Able to communicate. Does better if questions are written down  Lungs: Diminished,  exp wheezes, No rales, No retractions; No use of accessory muscles. Non-productive cough present  Heart:  S1, S2 normal, RRR, no MRG appreciated  Abdomen: (+) BS, soft, non-tender; non distended no guarding or rigidity. Extremities:  no cyanosis, no edema bilat lower exts, no calf tenderness bilaterally. Dry skin noted.  Scattered areas of ecchymosis present       Medications:      ipratropium-albuterol  1 ampule Inhalation TID    ferrous sulfate  325 mg Oral BID WC    folic acid  1 mg Oral Daily    pill splitter   Does not apply Once    vitamin D  50,000 Units Oral Weekly    predniSONE  40 mg Oral Daily    budesonide-formoterol  2 puff Inhalation BID    aspirin  81 mg Oral Daily    atorvastatin  40 mg Oral Nightly    carvedilol  6.25 mg Oral BID WC    insulin lispro  0-12 Units Subcutaneous 4x Daily AC & HS    levothyroxine  25 mcg Oral Daily    pantoprazole  40 mg Oral QAM AC    ursodiol  300 mg Oral BID    [START ON 1/30/2022] warfarin (COUMADIN) daily dosing (placeholder)   Other RX Placeholder       LABS Reviewed    IMAGING Reviewed    Alex Coyle CNP  [facility] Hospitalist

## 2021-02-06 NOTE — PLAN OF CARE
Problem: Pain:  Description: Pain management should include both nonpharmacologic and pharmacologic interventions.   Goal: Pain level will decrease  Description: Pain level will decrease  2/5/2021 2352 by Stacia Weiss RN  Outcome: Ongoing  2/5/2021 1012 by Delores Hilario RN  Outcome: Ongoing  Goal: Control of acute pain  Description: Control of acute pain  2/5/2021 2352 by Stacia Weiss RN  Outcome: Ongoing  2/5/2021 1012 by Delores Hilario RN  Outcome: Ongoing  Goal: Control of chronic pain  Description: Control of chronic pain  2/5/2021 2352 by Stacia Weiss RN  Outcome: Ongoing  2/5/2021 1012 by Delores Hilario RN  Outcome: Ongoing     Problem: Falls - Risk of:  Goal: Will remain free from falls  Description: Will remain free from falls  2/5/2021 2352 by Stacia Weiss RN  Outcome: Ongoing  2/5/2021 1012 by Delores Hilario RN  Outcome: Ongoing  Goal: Absence of physical injury  Description: Absence of physical injury  2/5/2021 2352 by Stacia Weiss RN  Outcome: Ongoing  2/5/2021 1012 by Delores Hilario RN  Outcome: Ongoing     Problem: IP SWALLOWING  Goal: LTG - patient will tolerate the least restrictive diet consistency to allow for safe consumption of daily meals  2/5/2021 2352 by Stacia Weiss RN  Outcome: Ongoing  2/5/2021 1012 by Delores Hilario RN  Outcome: Ongoing     Problem: Skin Integrity:  Goal: Will show no infection signs and symptoms  Description: Will show no infection signs and symptoms  2/5/2021 2352 by Stacia Weiss RN  Outcome: Ongoing  2/5/2021 1012 by Delores Hilario RN  Outcome: Ongoing  Goal: Absence of new skin breakdown  Description: Absence of new skin breakdown  2/5/2021 2352 by Stacia Weiss RN  Outcome: Ongoing  2/5/2021 1012 by Delores Hilario RN  Outcome: Ongoing

## 2021-02-06 NOTE — PROGRESS NOTES
Occupational Therapy  Facility/Department: [facility]  Daily Treatment Note  NAME: Faith Poon  : 3/5/1929  MRN: [id number]    Date of Service: 2021    Discharge Recommendations:  Continue to assess pending progress    Assessment  Activity Tolerance  Activity Tolerance: Patient Tolerated treatment well  Safety Devices  Safety Devices in place: Yes  Type of devices: All fall risk precautions in place       Patient Diagnosis(es): There were no encounter diagnoses. has a past medical history of Anemia, CAD (coronary artery disease), DM (diabetes mellitus) ([facility] [address] Dyslipidemia, History of tobacco abuse, HTN (hypertension), Hypothyroidism, Non-ST elevation MI (NSTEMI) ([facility] [address] and Pacemaker. has a past surgical history that includes Cataract removal and Middle ear surgery (Left, ). Restrictions  Restrictions/Precautions  Restrictions/Precautions: Fall Risk     Subjective   General  Chart Reviewed: Yes  Patient assessed for rehabilitation services?: Yes  Response to previous treatment: Patient with no complaints from previous session  Family / Caregiver Present: No  Referring Practitioner: Dr. Ryder Del Toro  Diagnosis: Impaired mobility and ADL's d/t severe progressive PVD  General Comment  Comments: Pt on 2L O2 via NC  Pre Treatment Pain Screening  Pain at present: 0  Scale Used: Numeric Score  Intervention List: Patient able to continue with treatment  Vital Signs  Patient Currently in Pain: No     Objective     Coordination  Fine Motor:    Pennies: Pt used bilateral hands (alternating as they became fatigued) to pick pennies up from various locations across the tabletop and insert them into a coin slot in a small plastic container. Pt had Min difficulty with activity and worked at a slow and steady pace with occasional rest breaks. Pt completed task to improve hand fine motor coordination for mgmt of clothing fasteners/ADL containers in a timely manner.      Upper Extremity Function  UE Strengthing:

## 2021-02-06 NOTE — PROGRESS NOTES
Clinical Pharmacy Note    Warfarin consult follow-up    Recent Labs     02/06/21  0536   INR 3.3     Recent Labs     02/04/21  0535 02/04/21  1626 02/06/21  1317   HGB 7.0* 7.4* 7.7*   HCT 21.5* 21.7* 22.9*     --   --        Significant drug:drug interactions:  Acetaminophen, aspirin, lactulose, levothyroxine, prednisone    Notes:  Warfarin Dose       01/30/21 1.3 (1/29) 5 mg   01/31/21 1.1  5 mg   02/01/21 1.1 7.5 mg   02/02/21 1.5 7.5 mg   02/03/21 2.8 2 mg   02/04/21 3.3 1 mg   02/05/21 3.3 HOLD   02/06/21 3.3 HOLD                                    Today's INR at 3.3 is SUPRAtherapeutic for LLE DVT goal INR range of 2 - 3. Will HOLD today's warfarin dose and continue to monitor INR. Daily PT/INR until stable within therapeutic range.

## 2021-02-06 NOTE — PROGRESS NOTES
[facility] [place] Respiratory Therapy Evaluation   Current Order:  Hiram Edmar Q4 W/A      Home Regimen: TID      Ordering Physician: Robert Saenz  Re-evaluation Date:  2/9     Diagnosis: PVD     Patient Status: Stable / Unstable + Physician notified    The following MDI Criteria must be met in order to convert aerosol to MDI with spacer. If unable to meet, MDI will be converted to aerosol:  []  Patient able to demonstrate the ability to use MDI effectively  []  Patient alert and cooperative  []  Patient able to take deep breath with 5-10 second hold  []  Medication(s) available in this delivery method   []  Peak flow greater than or equal to 200 ml/min            Current Order Substituted To  (same drug, same frequency)   Aerosol to MDI [] Albuterol Sulfate 0.083% unit dose by aerosol Albuterol Sulfate MDI 2 puffs by inhalation with spacer    [] Levalbuterol 1.25 mg unit dose by aerosol Levalbuterol MDI 2 puffs by inhalation with spacer    [] Levalbuterol 0.63 mg unit dose by aerosol Levalbuterol MDI 2 puffs by inhalation with spacer    [] Ipratropium Bromide 0.02% unit dose by aerosol Ipratropium Bromide MDI 2 puffs by inhalation with spacer    [] Duoneb (Ipratropium + Albuterol) unit dose by aerosol Ipratropium MDI + Albuterol MDI 2 puffs by inhalation w/spacer   MDI to Aerosol [] Albuterol Sulfate MDI Albuterol Sulfate 0.083% unit dose by aerosol    [] Levalbuterol MDI 2 puffs by inhalation Levalbuterol 1.25 mg unit dose by aerosol    [] Ipratropium Bromide MDI by inhalation Ipratropium Bromide 0.02% unit dose by aerosol    [] Combivent (Ipratropium + Albuterol) MDI by inhalation Duoneb (Ipratropium + Albuterol) unit dose by aerosol   Treatment Assessment [Frequency/Schedule]:  Change frequency to: __________Duoneb TID________________________________________per Protocol, P&T, MEC      Points 0 1 2 3 4   Pulmonary Status  Non-Smoker  []   Smoking history   < 20 pack years  []   Smoking history  ?  20 pack years  [x] Pulmonary Disorder  (acute or chronic)  []   Severe or Chronic w/ Exacerbation  []     Surgical Status No [x]   Surgeries     General []   Surgery Lower []   Abdominal Thoracic or []   Upper Abdominal Thoracic with  PulmonaryDisorder  []     Chest X-ray Clear/Not  Ordered     [x]  Chronic Changes  Results Pending  []  Infiltrates, atelectasis, pleural effusion, or edema  []  Infiltrates in more than one lobe []  Infiltrate + Atelectasis, &/or pleural effusion  []    Respiratory Pattern Regular,  RR = 12-20 [x]  Increased,  RR = 21-25 []  BUTT, irregular,  or RR = 26-30 []  Decreased FEV1  or RR = 31-35 []  Severe SOB, use  of accessory muscles, or RR ? 35  []    Mental Status Alert, oriented,  Cooperative [x]  Confused but Follows commands []  Lethargic or unable to follow commands []  Obtunded  []  Comatose  []    Breath Sounds Clear to  auscultation  []  Decreased unilaterally or  in bases only []  Decreased  bilaterally  [x]  Crackles or intermittent wheezes []  Wheezes []    Cough Strong, Spontan., & nonproductive [x]  Strong,  spontaneous, &  productive []  Weak,  Nonproductive []  Weak, productive or  with wheezes []  No spontaneous  cough or may require suctioning []    Level of Activity Ambulatory []  Ambulatory w/ Assist  [x]  Non-ambulatory []  Paraplegic []  Quadriplegic []    Total    Score:___5____     Triage Score:___5____      Tri       Triage:     1. (>20) Freq: Q3    2. (16-20) Freq: Q4   3. (11-15) Freq: QID & Albuterol Q2 PRN    4. (6-10) Freq: TID & Albuterol Q2 PRN    5. (0-5) Freq Q4prn

## 2021-02-06 NOTE — PROGRESS NOTES
Assessment completed earlier in the shift. VSS. Pt has no complaints and is in no distress. . Snack and insulin given. 2L NC on continuous. DSD in place to posterior rt leg. No drainage or odor noted. Jennifer wound- bruising noted. LBM 2/5/2021. Bed alarm on. Call light in reach.  Electronically signed by Ez Almonte RN on 2/6/2021 at 1:51 AM

## 2021-02-06 NOTE — PROGRESS NOTES
Occupational Therapy  Facility/Department: [facility]  Daily Treatment Note  NAME: Chidi Quevedo  : 3/5/1929  MRN: [id number]    Date of Service: 2021    Discharge Recommendations:  Continue to assess pending progress       Assessment      Activity Tolerance  Activity Tolerance: Patient Tolerated treatment well  Safety Devices  Safety Devices in place: Yes  Type of devices: All fall risk precautions in place         Patient Diagnosis(es): There were no encounter diagnoses. has a past medical history of Anemia, CAD (coronary artery disease), DM (diabetes mellitus) ([facility] [address] Dyslipidemia, History of tobacco abuse, HTN (hypertension), Hypothyroidism, Non-ST elevation MI (NSTEMI) ([facility] [address] and Pacemaker. has a past surgical history that includes Cataract removal and Middle ear surgery (Left, ). Restrictions  Restrictions/Precautions  Restrictions/Precautions: Fall Risk  Subjective   General  Chart Reviewed: Yes  Patient assessed for rehabilitation services?: Yes  Response to previous treatment: Patient with no complaints from previous session  Family / Caregiver Present: No  Referring Practitioner: Dr. Taurus Hanley  Diagnosis: Impaired mobility and ADL's d/t severe progressive PVD  Pain Assessment  Pain Assessment: 0-10  Pain Level: 5  Pain Location: Leg  Pain Orientation: Right;Posterior  Pain Descriptors: Aching;Cramping  Pre Treatment Pain Screening  Pain at present: 0  Scale Used: Numeric Score  Vital Signs  Patient Currently in Pain: Yes   Orientation     Objective    ADL  Grooming: Setup  UE Bathing: Supervision;Setup  LE Bathing: Minimal assistance  UE Dressing: Setup;Supervision  LE Dressing:  Moderate assistance(assist to thread LLE, able to complete thread of RLE without assist, however with increased time to complete and able to complete hiking pants over hips while standing at [facility] for safety while standing with good posterior reach)    Transfers  Sit to stand: Contact guard assistance  Stand to sit: Contact guard assistance  Transfer Comments: Increased time and effort    Cognition  Overall Cognitive Status: Exceptions  Arousal/Alertness: Appropriate responses to stimuli  Cognition Comment: comp: SUP exp: SUP soc: MIN A prob: MIN A mem: MIN A     Mod resistance putty with techs for various grasping patterns including pincer, finger opposition, grasping/squeezing for palm strength and good carryover. Verbal and visual cues provided for initiation and tech. Tasks completed to promote increased strength in wilemr hands and dexterity for completing ADLs that include FM skills, witting and completing crosswords. Pt with minimal difficulty and good carryover.       pt responds well to low tones       Plan   Plan  Times per week: 5-7x/wk  Plan weeks: 1.5  Current Treatment Recommendations: Strengthening, Functional Mobility Training, Cognitive Reorientation, Cognitive/Perceptual Training, Patient/Caregiver Education & Training, Endurance Training, Equipment Evaluation, Education, & procurement, Balance Training, Safety Education & Training, Self-Care / ADL  Plan Comment: Continue per OT plan of care    Goals  Patient Goals   Patient goals : to go home       Therapy Time   Individual Concurrent Group Co-treatment   Time In 1100         Time Out 1200         Minutes 60              ADL trainin minutes  Therapeutic activities: 15 minutes      LEONCIO Garcia Electronically signed by LEONCIO Garcia on 2021 at 11:58 AM

## 2021-02-06 NOTE — PROGRESS NOTES
Physical Therapy Rehab Treatment Note  Facility/Department: [facility]  Room: R260/R260[facility]       NAME: Lidia Razo  : 3/5/1929 (58 y.o.)  MRN: [id number]  CODE STATUS: Full Code    Date of Service: 2021  Chart Reviewed: Yes    Restrictions:  Restrictions/Precautions: Fall Risk       SUBJECTIVE: Subjective: Pt stated hes doing good. Has a little bit of pain in his RLE but none when he doesnt move. Pain Screening  Patient Currently in Pain: No       Post Treatment Pain Screening:\"tolerable\"       OBJECTIVE:                                                  Exercises  Hip Flexion: x10  Hip Extension/Leg Presses: x10 into ball b/l  Hip Abduction: x10 with YTB ; add x15  Knee Long Arc Quad: x10  Ankle Pumps: x15  Comments: seated strech and STM to R HS to dec pain and tightness     ASSESSMENT/COMMENTS:  Assessment: Pt''s R hamstring extremely tight. Much groaning throughout stretch and STM but ded in tightness noted with STM and stretch. Pt with dec ROM in RLE secondary to tightness. PLAN OF CARE/Safety:   Plan Comment: Cont.  POC  Safety Devices  Type of devices: Left in chair;Chair alarm in place      Therapy Time:   Individual   Time In 1430   Time Out 1500   Minutes 30     Minutes:30      Transfer/Bed mobility trainin      Gait trainin      Neuro re education:0     Therapeutic ex:30      Alessia Tuttle PTA, 21 at 2:58 PM

## 2021-02-07 LAB
GLUCOSE BLD-MCNC: 207 MG/DL (ref 60–115)
GLUCOSE BLD-MCNC: 248 MG/DL (ref 60–115)
GLUCOSE BLD-MCNC: 382 MG/DL (ref 60–115)
GLUCOSE BLD-MCNC: 409 MG/DL (ref 60–115)
INR BLD: 2.8
PERFORMED ON: ABNORMAL
PROTHROMBIN TIME: 29.3 SEC (ref 12.3–14.9)

## 2021-02-07 PROCEDURE — 2700000000 HC OXYGEN THERAPY PER DAY

## 2021-02-07 PROCEDURE — 94761 N-INVAS EAR/PLS OXIMETRY MLT: CPT

## 2021-02-07 PROCEDURE — 94640 AIRWAY INHALATION TREATMENT: CPT

## 2021-02-07 PROCEDURE — 85610 PROTHROMBIN TIME: CPT

## 2021-02-07 PROCEDURE — 6370000000 HC RX 637 (ALT 250 FOR IP): Performed by: INTERNAL MEDICINE

## 2021-02-07 PROCEDURE — 6370000000 HC RX 637 (ALT 250 FOR IP): Performed by: PHYSICAL MEDICINE & REHABILITATION

## 2021-02-07 PROCEDURE — 97116 GAIT TRAINING THERAPY: CPT

## 2021-02-07 PROCEDURE — 1180000000 HC REHAB R&B

## 2021-02-07 PROCEDURE — 36415 COLL VENOUS BLD VENIPUNCTURE: CPT

## 2021-02-07 PROCEDURE — 97535 SELF CARE MNGMENT TRAINING: CPT

## 2021-02-07 PROCEDURE — 6370000000 HC RX 637 (ALT 250 FOR IP): Performed by: NURSE PRACTITIONER

## 2021-02-07 RX ORDER — FUROSEMIDE 20 MG/1
20 TABLET ORAL DAILY
Status: DISCONTINUED | OUTPATIENT
Start: 2021-02-08 | End: 2021-02-10

## 2021-02-07 RX ORDER — WARFARIN SODIUM 2.5 MG/1
2.5 TABLET ORAL
Status: COMPLETED | OUTPATIENT
Start: 2021-02-07 | End: 2021-02-07

## 2021-02-07 RX ADMIN — INSULIN LISPRO 4 UNITS: 100 INJECTION, SOLUTION INTRAVENOUS; SUBCUTANEOUS at 06:53

## 2021-02-07 RX ADMIN — PANTOPRAZOLE SODIUM 40 MG: 40 TABLET, DELAYED RELEASE ORAL at 06:53

## 2021-02-07 RX ADMIN — INSULIN GLARGINE 10 UNITS: 100 INJECTION, SOLUTION SUBCUTANEOUS at 20:52

## 2021-02-07 RX ADMIN — IPRATROPIUM BROMIDE AND ALBUTEROL SULFATE 1 AMPULE: .5; 3 SOLUTION RESPIRATORY (INHALATION) at 11:43

## 2021-02-07 RX ADMIN — LEVOTHYROXINE SODIUM 25 MCG: 0.05 TABLET ORAL at 06:53

## 2021-02-07 RX ADMIN — IPRATROPIUM BROMIDE AND ALBUTEROL SULFATE 1 AMPULE: .5; 3 SOLUTION RESPIRATORY (INHALATION) at 04:39

## 2021-02-07 RX ADMIN — WARFARIN SODIUM 2.5 MG: 2.5 TABLET ORAL at 17:12

## 2021-02-07 RX ADMIN — FERROUS SULFATE TAB 325 MG (65 MG ELEMENTAL FE) 325 MG: 325 (65 FE) TAB at 17:13

## 2021-02-07 RX ADMIN — INSULIN LISPRO 4 UNITS: 100 INJECTION, SOLUTION INTRAVENOUS; SUBCUTANEOUS at 13:12

## 2021-02-07 RX ADMIN — ATORVASTATIN CALCIUM 40 MG: 40 TABLET, FILM COATED ORAL at 20:41

## 2021-02-07 RX ADMIN — FERROUS SULFATE TAB 325 MG (65 MG ELEMENTAL FE) 325 MG: 325 (65 FE) TAB at 09:43

## 2021-02-07 RX ADMIN — PREDNISONE 40 MG: 20 TABLET ORAL at 09:37

## 2021-02-07 RX ADMIN — IPRATROPIUM BROMIDE AND ALBUTEROL SULFATE 1 AMPULE: .5; 3 SOLUTION RESPIRATORY (INHALATION) at 16:06

## 2021-02-07 RX ADMIN — URSODIOL 300 MG: 300 CAPSULE ORAL at 09:36

## 2021-02-07 RX ADMIN — URSODIOL 300 MG: 300 CAPSULE ORAL at 20:42

## 2021-02-07 RX ADMIN — INSULIN LISPRO 10 UNITS: 100 INJECTION, SOLUTION INTRAVENOUS; SUBCUTANEOUS at 17:14

## 2021-02-07 RX ADMIN — CARVEDILOL 6.25 MG: 6.25 TABLET, FILM COATED ORAL at 17:13

## 2021-02-07 RX ADMIN — BUDESONIDE AND FORMOTEROL FUMARATE DIHYDRATE 2 PUFF: 160; 4.5 AEROSOL RESPIRATORY (INHALATION) at 04:39

## 2021-02-07 RX ADMIN — CARVEDILOL 6.25 MG: 6.25 TABLET, FILM COATED ORAL at 09:39

## 2021-02-07 RX ADMIN — FOLIC ACID 1 MG: 1 TABLET ORAL at 09:37

## 2021-02-07 RX ADMIN — BUDESONIDE AND FORMOTEROL FUMARATE DIHYDRATE 2 PUFF: 160; 4.5 AEROSOL RESPIRATORY (INHALATION) at 16:07

## 2021-02-07 RX ADMIN — ASPIRIN 81 MG: 81 TABLET, CHEWABLE ORAL at 09:36

## 2021-02-07 RX ADMIN — INSULIN LISPRO 12 UNITS: 100 INJECTION, SOLUTION INTRAVENOUS; SUBCUTANEOUS at 20:42

## 2021-02-07 ASSESSMENT — PAIN DESCRIPTION - LOCATION: LOCATION: LEG

## 2021-02-07 ASSESSMENT — PAIN DESCRIPTION - ORIENTATION: ORIENTATION: POSTERIOR;RIGHT

## 2021-02-07 ASSESSMENT — PAIN DESCRIPTION - DESCRIPTORS: DESCRIPTORS: ACHING

## 2021-02-07 NOTE — PROGRESS NOTES
Assumed care of this pt at 1300. Pt in bed. PT reports session had to be ended early d/t safety concerns as they felt vision was impaired and pt was needing more verbal cues than usual to avoid ambulating into furniture. Assessed pt, pt aware RN in room and can see that I am standing at bedside and signaled w/ arm to move closer. Pt can visualize that I am trying to speak to pt and he voices (w/ hand behind ear) that he is ZAKIYA [facility].) Per prior RN that had care of this pt prior to 1300h, granddaughter at bedside and reported eye sight has not been well since admission. A different RN that had been in pt's room reported that granddaughter voiced to her that pt's eyesight seems different (did not specify when it began or what is different) and that \"cheaters\" help some but not a lot. Will notify MD on rounds. No distress noted. Electronically signed by Vivian Lo RN on 2/7/2021 at 3:44 PM    Pt, again, able to see my entrance into the room. Giving meds to pt, pt able to follow the med cup and put hands out in acceptance of the medication. Pt follows me w/ eyes. Pt found fork and able to feed self w/o difficulty. Electronically signed by Vivian Lo RN on 2/7/2021 at 5:21 PM    MD aware of blood sugar and reports per family and tx of vision changes. Pt continues to demonstrate ability to see w/o cueing. More awake than earlier.   Electronically signed by Vivian Lo RN on 2/7/2021 at 9:04 PM

## 2021-02-07 NOTE — PROGRESS NOTES
Subjective: The patient complains of ,\" moderate acute on chronic progressive fatigue and    partially relieved by rest,medications, Pt, OT, and rest and exacerbated by recent illness. I am concerned about patients medical complexities. ROS x10: The patient also complains of severely impaired mobility and activities of daily living. Otherwise no new problems with vision, hearing, nose, mouth, throat, dermal, cardiovascular, GI, , pulmonary, musculoskeletal, psychiatric or neurological. See Rehab H&P on Rehab chart dated . Vital signs:  BP (!) 137/56   Pulse 64   Temp 98 °F (36.7 °C) (Oral)   Resp 17   Ht 5' 7\" (1.702 m)   Wt 159 lb 8 oz (72.3 kg)   SpO2 99%   BMI 24.98 kg/m²   I/O:   PO/Intake:  fair PO intake, no problems observed or reported. Bowel/Bladder:  continent, no problems noted. General:  Patient is well developed, adequately nourished, non-obese and     well kempt. HEENT:    PERRLA, hearing intact to loud voice, external inspection of ear     and nose benign. Inspection of lips, tongue and gums benign  Musculoskeletal: No significant change in strength or tone. All joints stable. Inspection and palpation of digits and nails show no clubbing,       cyanosis or inflammatory conditions. Neuro/Psychiatric: Affect: flat but pleasant. Alert and oriented to person, place and     Situation with    cues. No significant change in deep tendon reflexes or     sensation  Lungs:  Diminished, CTA-B. Respiration effort is normal at rest.     Heart:   S1 = S2, RRR. No loud murmurs. Abdomen:  Soft, non-tender, no enlargement of liver or spleen. Extremities:  No significant lower extremity edema or tenderness. Skin:   Intact to general survey, no visualized or palpated problems.     Rehabilitation:  Physical therapy:   Bed Mobility: Scooting: Contact guard assistance    Transfers: Sit to Stand: Contact guard assistance  Stand to sit: Minimal Assistance  Bed to Chair: Minimal assistance(pt passing up w/c then needing assist manuevering ww to back up to chair), Ambulation 1  Surface: carpet  Device: Rolling Walker  Other Apparatus: O2  Assistance: Minimal assistance  Quality of Gait: slow step to pattern, decreased stance phase on RLE, decreased wilmer heel strike, ff posture  Gait Deviations: Decreased step length, Shuffles, Decreased step height  Distance: 12' x 2  Comments: Limited by pain and vision. Pt unable to avoid obstacles with [address], Stairs  # Steps : 4  Stairs Height: 6\"  Rails: Bilateral  Assistance: Contact guard assistance, Stand by assistance  Comment: Non-reciprocal pattern, slow steady pace. FIMS:  ,  ,     Occupational therapy:    ,  , Assessment: Pt is a 80 yr old male presenting to [facility] with the above functional deficits. Pt would benefit from occupational therapy services to maximize safety and independence with ADL tasks, improve overall strength/endurance and balance for functional tasks.     Speech therapy:        Lab/X-ray studies reviewed, analyzed and discussed with patient and staff:   Recent Results (from the past 24 hour(s))   POCT Glucose    Collection Time: 02/06/21  8:36 PM   Result Value Ref Range    POC Glucose 268 (H) 60 - 115 mg/dl    Performed on ACCU-CHEK    PROTIME-INR    Collection Time: 02/07/21  5:58 AM   Result Value Ref Range    Protime 29.3 (H) 12.3 - 14.9 sec    INR 2.8    POCT Glucose    Collection Time: 02/07/21  6:07 AM   Result Value Ref Range    POC Glucose 207 (H) 60 - 115 mg/dl    Performed on ACCU-CHEK    POCT Glucose    Collection Time: 02/07/21 11:19 AM   Result Value Ref Range    POC Glucose 248 (H) 60 - 115 mg/dl    Performed on ACCU-CHEK    POCT Glucose    Collection Time: 02/07/21  4:14 PM   Result Value Ref Range    POC Glucose 382 (H) 60 - 115 mg/dl    Performed on ACCU-CHEK        Cta Abdominal Aorta W Bilat Runoff W Wo Contrast    Result Date: 1/27/2021  CTA ABDOMINAL AORTA W BILAT RUNOFF W WO CONTRAST: 1/26/2021 CLINICAL HISTORY:  RLE swelling Pain . COMPARISON: CT abdomen and pelvis without contrast 12/4/2015. Spiral enhanced images were obtained of the abdominal aorta through both feet during the infusion of a total of 250 mL of Isovue 300 contrast with runoff CTA protocol. Delayed imaging was also performed. Routine and volume rendered images were obtained on a 3-dimensional workstation. All CT scans at this facility use dose modulation, iterative reconstruction, and/or weight based dosing when appropriate to reduce radiation dose to as low as reasonably achievable. FINDINGS: Moderately extensive disease with areas of flow-limiting narrowing are present within the mid to distal right superficial femoral artery, which is abruptly occluded at the abductor hiatus. Best depicted on the delayed imaging is extensive DVT throughout the right lower extremity extending proximally to the right common iliac vein. At the most inferior aspect of the initial study, filling defects are suspected within the subsegmental left lower lobe pulmonary artery branches, suspicious for pulmonary emboli. No definite emboli are noted on the right. The visualized lung bases are clear. The abdominal aorta is normal in caliber with mild to moderate calcific plaquing. Both iliac, common femoral, and the left femoral arteries are widely patent with mild disease. Delayed imaging demonstrates patency of the left popliteal artery with extensive calcific disease of the infrapopliteal runoff, which is incompletely visualized. There is no significant reconstitution of the right popliteal or infrapopliteal arteries. The celiac, superior mesenteric and both solitary renal arteries are widely patent. Colonic diverticulosis is again noted, without evidence of diverticulitis. No other significant change from 12/4/2015 is identified. 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 EXTENSIVE RIGHT LOWER EXTREMITY DVT EXTENDING INTO THE RIGHT COMMON ILIAC VEIN. SUSPECT LEFT LOWER LOBE PULMONARY EMBOLI. Xr Chest Portable    Result Date: 1/27/2021  EXAMINATION: Portable AP ERECT view of the chest. CLINICAL HISTORY:  preop  with pain and swelling in right thigh. DATE: 1/26/2021 10:02 PM COMPARISONS: 7/8/2020 FINDINGS: There are multiple sternal sutures and mediastinal clips present. Normal cardiac silhouette. An pacemaker overlies the right hemithorax and has a lead extending into the right atrium and second lead extending into the right ventricle. Lungs are  clear without consolidation. No pleural effusion or pneumothorax. There are mild degenerative changes in spine. NO ACUTE CARDIOPULMONARY ABNORMALITY. NO CHANGE. Previous extensive, complex labs, notes and diagnostics reviewed and analyzed. ALLERGIES:    Allergies as of 02/01/2021    (No Known Allergies)      (please also verify by checking [facility])     Complex Physical Medicine & Rehab Issues Assess & Plan:   1. Severe abnormality of gait and mobility and impaired self-care and ADL's secondary to progressive severe PVD Femoral artery occlusion, DVT, weakness and pain  . Functional and medical status reassessed regarding patients ability to participate in therapies and patient found to be able to participate in acute intensive comprehensive inpatient rehabilitation program including PT/OT to improve balance, ambulation, ADLs, and to improve the P/AROM. Therapeutic modifications regarding activities in therapies, place, amount of time per day and intensity of therapy made daily. In bed therapies or bedside therapies prn.   2. Bowel and Bladder dysfunction  :  frequent toileting, ambulate to bathroom with assistance, check post void residuals. Check for C.difficile x1 if >2 loose stools in 24 hours, continue bowel & bladder program.  Monitor bowel and bladder function. Lactinex 2 PO every AC.   MOM prn, [employer] prn, Oscar Orellana, D.O., PM&R     Attending    [address] [facility]

## 2021-02-07 NOTE — PROGRESS NOTES
Subjective: The patient complains of ,\" moderate acute on chronic progressive fatigue and    partially relieved by rest,medications, Pt, OT, and rest and exacerbated by recent illness. I am concerned about patients medical complexities. ROS x10: The patient also complains of severely impaired mobility and activities of daily living. Otherwise no new problems with vision, hearing, nose, mouth, throat, dermal, cardiovascular, GI, , pulmonary, musculoskeletal, psychiatric or neurological. See Rehab H&P on Rehab chart dated . Vital signs:  /61   Pulse 61   Temp 97 °F (36.1 °C) (Oral)   Resp 18   Ht 5' 7\" (1.702 m)   Wt 159 lb 8 oz (72.3 kg)   SpO2 94%   BMI 24.98 kg/m²   I/O:   PO/Intake:  fair PO intake, no problems observed or reported. Bowel/Bladder:  continent, no problems noted. General:  Patient is well developed, adequately nourished, non-obese and     well kempt. HEENT:    PERRLA, hearing intact to loud voice, external inspection of ear     and nose benign. Inspection of lips, tongue and gums benign  Musculoskeletal: No significant change in strength or tone. All joints stable. Inspection and palpation of digits and nails show no clubbing,       cyanosis or inflammatory conditions. Neuro/Psychiatric: Affect: flat but pleasant. Alert and oriented to person, place and     Situation with    cues. No significant change in deep tendon reflexes or     sensation  Lungs:  Diminished, CTA-B. Respiration effort is normal at rest.     Heart:   S1 = S2, RRR. No loud murmurs. Abdomen:  Soft, non-tender, no enlargement of liver or spleen. Extremities:  No significant lower extremity edema or tenderness. Skin:   Intact to general survey, no visualized or palpated problems.     Rehabilitation:  Physical therapy:   Bed Mobility: Scooting: Stand by assistance    Transfers: Sit to Stand: Stand by assistance, Contact guard assistance  Stand to sit: Contact guard assistance, Minimal Assistance  Bed to Chair: Contact guard assistance, Minimal assistance, Ambulation 1  Surface: carpet  Device: Rolling Walker  Other Apparatus: O2  Assistance: Contact guard assistance  Quality of Gait: slow step to pattern, decreased stance phase on RLE, decreased wilmer heel strike, ff posture  Gait Deviations: Decreased step length, Shuffles, Decreased step height  Distance: 20' x 4  Comments: gait limited by pain with movement of RLE this session, Stairs  # Steps : 4  Stairs Height: 6\"  Rails: Bilateral  Assistance: Contact guard assistance, Stand by assistance  Comment: Non-reciprocal pattern, slow steady pace. FIMS:  ,  ,     Occupational therapy:    ,  , Assessment: Pt is a 80 yr old male presenting to [facility] with the above functional deficits. Pt would benefit from occupational therapy services to maximize safety and independence with ADL tasks, improve overall strength/endurance and balance for functional tasks.     Speech therapy:        Lab/X-ray studies reviewed, analyzed and discussed with patient and staff:   Recent Results (from the past 24 hour(s))   PROTIME-INR    Collection Time: 02/06/21  5:36 AM   Result Value Ref Range    Protime 33.1 (H) 12.3 - 14.9 sec    INR 3.3    POCT Glucose    Collection Time: 02/06/21  6:30 AM   Result Value Ref Range    POC Glucose 362 (H) 60 - 115 mg/dl    Performed on ACCU-CHEK    POCT Glucose    Collection Time: 02/06/21 10:37 AM   Result Value Ref Range    POC Glucose 228 (H) 60 - 115 mg/dl    Performed on ACCU-CHEK    Hemoglobin and Hematocrit, Blood    Collection Time: 02/06/21  1:17 PM   Result Value Ref Range    Hemoglobin 7.7 (L) 14.0 - 18.0 g/dL    Hematocrit 22.9 (L) 42.0 - 52.0 %   POCT Glucose    Collection Time: 02/06/21  4:13 PM   Result Value Ref Range    POC Glucose 319 (H) 60 - 115 mg/dl    Performed on ACCU-CHEK    POCT Glucose    Collection Time: 02/06/21  8:36 PM   Result Value Ref Range    POC Glucose 268 (H) 60 - 115 mg/dl Performed on [facility]        Cta Abdominal Aorta W Bilat Runoff W Wo Contrast    Result Date: 1/27/2021  CTA ABDOMINAL AORTA W BILAT RUNOFF W WO CONTRAST: 1/26/2021 CLINICAL HISTORY:  RLE swelling Pain . COMPARISON: CT abdomen and pelvis without contrast 12/4/2015. Spiral enhanced images were obtained of the abdominal aorta through both feet during the infusion of a total of 250 mL of Isovue 300 contrast with runoff CTA protocol. Delayed imaging was also performed. Routine and volume rendered images were obtained on a 3-dimensional workstation. All CT scans at this facility use dose modulation, iterative reconstruction, and/or weight based dosing when appropriate to reduce radiation dose to as low as reasonably achievable. FINDINGS: Moderately extensive disease with areas of flow-limiting narrowing are present within the mid to distal right superficial femoral artery, which is abruptly occluded at the abductor hiatus. Best depicted on the delayed imaging is extensive DVT throughout the right lower extremity extending proximally to the right common iliac vein. At the most inferior aspect of the initial study, filling defects are suspected within the subsegmental left lower lobe pulmonary artery branches, suspicious for pulmonary emboli. No definite emboli are noted on the right. The visualized lung bases are clear. The abdominal aorta is normal in caliber with mild to moderate calcific plaquing. Both iliac, common femoral, and the left femoral arteries are widely patent with mild disease. Delayed imaging demonstrates patency of the left popliteal artery with extensive calcific disease of the infrapopliteal runoff, which is incompletely visualized. There is no significant reconstitution of the right popliteal or infrapopliteal arteries. The celiac, superior mesenteric and both solitary renal arteries are widely patent. Colonic diverticulosis is again noted, without evidence of diverticulitis.  No other significant change from 12/4/2015 is identified. 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 EXTENSIVE RIGHT LOWER EXTREMITY DVT EXTENDING INTO THE RIGHT COMMON ILIAC VEIN. SUSPECT LEFT LOWER LOBE PULMONARY EMBOLI. Xr Chest Portable    Result Date: 1/27/2021  EXAMINATION: Portable AP ERECT view of the chest. CLINICAL HISTORY:  preop  with pain and swelling in right thigh. DATE: 1/26/2021 10:02 PM COMPARISONS: 7/8/2020 FINDINGS: There are multiple sternal sutures and mediastinal clips present. Normal cardiac silhouette. An pacemaker overlies the right hemithorax and has a lead extending into the right atrium and second lead extending into the right ventricle. Lungs are  clear without consolidation. No pleural effusion or pneumothorax. There are mild degenerative changes in spine. NO ACUTE CARDIOPULMONARY ABNORMALITY. NO CHANGE. Previous extensive, complex labs, notes and diagnostics reviewed and analyzed. ALLERGIES:    Allergies as of 02/01/2021    (No Known Allergies)      (please also verify by checking [facility])     Complex Physical Medicine & Rehab Issues Assess & Plan:   1. Severe abnormality of gait and mobility and impaired self-care and ADL's secondary to progressive severe PVD Femoral artery occlusion, DVT, weakness and pain  . Functional and medical status reassessed regarding patients ability to participate in therapies and patient found to be able to participate in acute intensive comprehensive inpatient rehabilitation program including PT/OT to improve balance, ambulation, ADLs, and to improve the P/AROM. Therapeutic modifications regarding activities in therapies, place, amount of time per day and intensity of therapy made daily. In bed therapies or bedside therapies prn.   2. Bowel and Bladder dysfunction  :  frequent toileting, ambulate to bathroom with assistance, check post void residuals.   Check for C.difficile x1 if >2 Syncope and collapse    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HCC)    Venous thrombosis of leg    Hyponatremia    PVD (peripheral vascular disease) (HCC)     Danniel Records MD Corinne Bors, D.O., PM&R     Attending    [facility] Alka Moura

## 2021-02-07 NOTE — PROGRESS NOTES
Physical Therapy Rehab Treatment Note  Facility/Department: [facility]  [facility]       NAME: Shira Tomlin  : 3/5/1929 (89 y.o.)  MRN: [id number]  CODE STATUS: Full Code    Date of Service: 2021  Chart Reviewed: Yes  Family / Caregiver Present: Yes(granddaughter)  General Comment  Comments: Pt having trouble seeing obstacles and therapist this session. NSG notified. Restrictions:  Restrictions/Precautions: Fall Risk       SUBJECTIVE: Subjective: Pt states he is okay but tired  Pain Screening  Patient Currently in Pain: Yes  Pre Treatment Pain Screening  Pain at present: 4  Intervention List: Patient able to continue with treatment;Patient declined any intervention;Nurse/physician notified    Post Treatment Pain Screening:  Pain Assessment  [place] Pain Rating: Hurts little more  Pain Type: Acute pain  Pain Location: Leg  Pain Orientation: Posterior;Right  Pain Descriptors: Aching    OBJECTIVE:     Neuromuscular Education  Neuromuscular Comments: Static sitting EOB with BUE support    Bed mobility  Rolling to Left: Contact guard assistance  Supine to Sit: Contact guard assistance;Minimal assistance  Sit to Supine: Minimal assistance(assist for BLE into bed)  Scooting: Contact guard assistance  Comment: Pt needing increased assist for trunk this session. HOB slightly elevated HR's utilized. Transfers  Sit to Stand: Contact guard assistance  Stand to sit: Minimal Assistance  Bed to Chair: Minimal assistance(pt passing up w/c then needing assist manuevering ww to back up to chair)  Comment: TC's for proper hand placement, min A for approach to chair. Pt needing assist with WW secondary to catching [employer] on chair leg. Unable to manuever with vc's for correction.     Ambulation  Ambulation?: Yes  Ambulation 1  Surface: carpet  Device: Rolling Walker  Assistance: Minimal assistance  Quality of Gait: slow step to pattern, decreased stance phase on RLE, decreased wilmer heel strike, ff posture  Gait

## 2021-02-07 NOTE — PROGRESS NOTES
Clinical Pharmacy Note    Warfarin consult follow-up    Recent Labs     02/07/21  0558   INR 2.8     Recent Labs     02/04/21  1626 02/06/21  1317   HGB 7.4* 7.7*   HCT 21.7* 22.9*       Significant drug:drug interactions:  Acetaminophen, aspirin, lactulose, levothyroxine, prednisone      Notes:  Warfarin Dose       01/30/21 1.3 (1/29) 5 mg   01/31/21 1.1  5 mg   02/01/21 1.1 7.5 mg   02/02/21 1.5 7.5 mg   02/03/21 2.8 2 mg   02/04/21 3.3 1 mg   02/05/21 3.3 HOLD   02/06/21 3.3 HOLD   02/07/21 2.8 2.5 mg                               INR of 2.8 is within goal range of 2-3 for unprovoked LLR DVT. Will give warfarin 2.5mg today only, as 5mg and 7.5mg combination spiked INR to supra-therapeutic range (TWD from past 7 days = warfarin 23mg TWD). Continue with daily PT/INR during pharmacy consult to monitor and dose warfarin therapy.     Malorie Em, PharmD   2/7/2021 2:25 PM

## 2021-02-07 NOTE — PROGRESS NOTES
Patient assessment complete. Patient bed check in place and patient reminded of safety policies. 2-3L NC and patient denies SOB although he is wheezy on exertion. HS accu check 268 and covered as ordered. Last BM 2/5.      Electronically signed by Chioma Dotson RN on 2/7/2021 at 12:55 AM

## 2021-02-07 NOTE — FLOWSHEET NOTE
Patient working with therapy at the bedside. Patients grand daughter at the bedside. She says shes concerned because patient told her he cannot see to read or do crossword puzzles anymore and these eyesight issues are new and started after admission into the hospital. She brought him in an over the counter eye drop for irritated eyes which he is not using. He is very [place]. He denies any pain and appears to be in no distress.    Electronically signed by Sandhya Zhou RN on 2/7/2021 at 3:36 PM

## 2021-02-08 PROBLEM — R26.9 GAIT ABNORMALITY: Status: ACTIVE | Noted: 2021-02-08

## 2021-02-08 PROBLEM — G89.29 CHRONIC RIGHT SHOULDER PAIN: Status: ACTIVE | Noted: 2018-05-11

## 2021-02-08 PROBLEM — N18.30 CKD (CHRONIC KIDNEY DISEASE) STAGE 3, GFR 30-59 ML/MIN (HCC): Status: ACTIVE | Noted: 2021-02-08

## 2021-02-08 PROBLEM — M25.511 CHRONIC RIGHT SHOULDER PAIN: Status: ACTIVE | Noted: 2018-05-11

## 2021-02-08 LAB
ABO/RH: NORMAL
ALBUMIN SERPL-MCNC: 2.8 G/DL (ref 3.5–4.6)
ALP BLD-CCNC: 57 U/L (ref 35–104)
ALT SERPL-CCNC: 15 U/L (ref 0–41)
ANION GAP SERPL CALCULATED.3IONS-SCNC: 11 MEQ/L (ref 9–15)
ANTIBODY SCREEN: NORMAL
AST SERPL-CCNC: 17 U/L (ref 0–40)
BILIRUB SERPL-MCNC: 0.8 MG/DL (ref 0.2–0.7)
BLOOD BANK DISPENSE STATUS: NORMAL
BLOOD BANK PRODUCT CODE: NORMAL
BPU ID: NORMAL
BUN BLDV-MCNC: 34 MG/DL (ref 8–23)
CALCIUM SERPL-MCNC: 7.2 MG/DL (ref 8.5–9.9)
CHLORIDE BLD-SCNC: 101 MEQ/L (ref 95–107)
CO2: 21 MEQ/L (ref 20–31)
CREAT SERPL-MCNC: 1.17 MG/DL (ref 0.7–1.2)
DESCRIPTION BLOOD BANK: NORMAL
GFR AFRICAN AMERICAN: >60
GFR NON-AFRICAN AMERICAN: 58.3
GLOBULIN: 2.8 G/DL (ref 2.3–3.5)
GLUCOSE BLD-MCNC: 204 MG/DL (ref 60–115)
GLUCOSE BLD-MCNC: 207 MG/DL (ref 60–115)
GLUCOSE BLD-MCNC: 270 MG/DL (ref 60–115)
GLUCOSE BLD-MCNC: 280 MG/DL (ref 60–115)
GLUCOSE BLD-MCNC: 293 MG/DL (ref 70–99)
HCT VFR BLD CALC: 21.9 % (ref 42–52)
HCT VFR BLD CALC: 23.9 % (ref 42–52)
HEMOGLOBIN: 7.1 G/DL (ref 14–18)
HEMOGLOBIN: 7.7 G/DL (ref 14–18)
INR BLD: 3.9
MCH RBC QN AUTO: 30.6 PG (ref 27–31.3)
MCHC RBC AUTO-ENTMCNC: 32.3 % (ref 33–37)
MCV RBC AUTO: 94.6 FL (ref 80–100)
PDW BLD-RTO: 13.8 % (ref 11.5–14.5)
PERFORMED ON: ABNORMAL
PLATELET # BLD: 506 K/UL (ref 130–400)
POTASSIUM REFLEX MAGNESIUM: 4.3 MEQ/L (ref 3.4–4.9)
PROTHROMBIN TIME: 38 SEC (ref 12.3–14.9)
RBC # BLD: 2.53 M/UL (ref 4.7–6.1)
SODIUM BLD-SCNC: 133 MEQ/L (ref 135–144)
TOTAL PROTEIN: 5.6 G/DL (ref 6.3–8)
WBC # BLD: 12.9 K/UL (ref 4.8–10.8)

## 2021-02-08 PROCEDURE — P9016 RBC LEUKOCYTES REDUCED: HCPCS

## 2021-02-08 PROCEDURE — 6370000000 HC RX 637 (ALT 250 FOR IP): Performed by: INTERNAL MEDICINE

## 2021-02-08 PROCEDURE — 85610 PROTHROMBIN TIME: CPT

## 2021-02-08 PROCEDURE — 97129 THER IVNTJ 1ST 15 MIN: CPT

## 2021-02-08 PROCEDURE — 86850 RBC ANTIBODY SCREEN: CPT

## 2021-02-08 PROCEDURE — 97110 THERAPEUTIC EXERCISES: CPT

## 2021-02-08 PROCEDURE — 86901 BLOOD TYPING SEROLOGIC RH(D): CPT

## 2021-02-08 PROCEDURE — 36415 COLL VENOUS BLD VENIPUNCTURE: CPT

## 2021-02-08 PROCEDURE — 1180000000 HC REHAB R&B

## 2021-02-08 PROCEDURE — 85018 HEMOGLOBIN: CPT

## 2021-02-08 PROCEDURE — 6370000000 HC RX 637 (ALT 250 FOR IP): Performed by: NURSE PRACTITIONER

## 2021-02-08 PROCEDURE — 2700000000 HC OXYGEN THERAPY PER DAY

## 2021-02-08 PROCEDURE — 97530 THERAPEUTIC ACTIVITIES: CPT

## 2021-02-08 PROCEDURE — 94640 AIRWAY INHALATION TREATMENT: CPT

## 2021-02-08 PROCEDURE — 86900 BLOOD TYPING SEROLOGIC ABO: CPT

## 2021-02-08 PROCEDURE — 85027 COMPLETE CBC AUTOMATED: CPT

## 2021-02-08 PROCEDURE — 86923 COMPATIBILITY TEST ELECTRIC: CPT

## 2021-02-08 PROCEDURE — 94761 N-INVAS EAR/PLS OXIMETRY MLT: CPT

## 2021-02-08 PROCEDURE — 6370000000 HC RX 637 (ALT 250 FOR IP): Performed by: PHYSICAL MEDICINE & REHABILITATION

## 2021-02-08 PROCEDURE — 85014 HEMATOCRIT: CPT

## 2021-02-08 PROCEDURE — 99233 SBSQ HOSP IP/OBS HIGH 50: CPT | Performed by: PHYSICAL MEDICINE & REHABILITATION

## 2021-02-08 PROCEDURE — 6360000002 HC RX W HCPCS: Performed by: PHYSICAL MEDICINE & REHABILITATION

## 2021-02-08 PROCEDURE — 97535 SELF CARE MNGMENT TRAINING: CPT

## 2021-02-08 PROCEDURE — 97116 GAIT TRAINING THERAPY: CPT

## 2021-02-08 PROCEDURE — 80053 COMPREHEN METABOLIC PANEL: CPT

## 2021-02-08 RX ORDER — HYDROCODONE BITARTRATE AND ACETAMINOPHEN 5; 325 MG/1; MG/1
1 TABLET ORAL EVERY 4 HOURS PRN
Status: DISCONTINUED | OUTPATIENT
Start: 2021-02-08 | End: 2021-02-13 | Stop reason: HOSPADM

## 2021-02-08 RX ORDER — UBIDECARENONE 100 MG
100 CAPSULE ORAL DAILY
Status: DISCONTINUED | OUTPATIENT
Start: 2021-02-08 | End: 2021-02-13 | Stop reason: HOSPADM

## 2021-02-08 RX ORDER — ACETAMINOPHEN 650 MG/1
650 SUPPOSITORY RECTAL EVERY 4 HOURS PRN
Status: DISCONTINUED | OUTPATIENT
Start: 2021-02-08 | End: 2021-02-13 | Stop reason: HOSPADM

## 2021-02-08 RX ORDER — VITAMIN B COMPLEX
2000 TABLET ORAL
Status: DISCONTINUED | OUTPATIENT
Start: 2021-02-08 | End: 2021-02-13 | Stop reason: HOSPADM

## 2021-02-08 RX ORDER — ACETAMINOPHEN 325 MG/1
650 TABLET ORAL EVERY 6 HOURS PRN
Status: DISCONTINUED | OUTPATIENT
Start: 2021-02-08 | End: 2021-02-08

## 2021-02-08 RX ORDER — LIDOCAINE 4 G/G
3 PATCH TOPICAL DAILY
Status: DISCONTINUED | OUTPATIENT
Start: 2021-02-08 | End: 2021-02-13 | Stop reason: HOSPADM

## 2021-02-08 RX ORDER — CYANOCOBALAMIN 1000 UG/ML
1000 INJECTION INTRAMUSCULAR; SUBCUTANEOUS WEEKLY
Status: DISCONTINUED | OUTPATIENT
Start: 2021-02-08 | End: 2021-02-13 | Stop reason: HOSPADM

## 2021-02-08 RX ORDER — ACETAMINOPHEN 650 MG/1
650 SUPPOSITORY RECTAL EVERY 6 HOURS PRN
Status: DISCONTINUED | OUTPATIENT
Start: 2021-02-08 | End: 2021-02-08

## 2021-02-08 RX ADMIN — CARVEDILOL 6.25 MG: 6.25 TABLET, FILM COATED ORAL at 08:42

## 2021-02-08 RX ADMIN — Medication 100 MG: at 17:08

## 2021-02-08 RX ADMIN — Medication 2000 UNITS: at 17:08

## 2021-02-08 RX ADMIN — ASPIRIN 81 MG: 81 TABLET, CHEWABLE ORAL at 08:42

## 2021-02-08 RX ADMIN — LEVOTHYROXINE SODIUM 25 MCG: 0.05 TABLET ORAL at 05:31

## 2021-02-08 RX ADMIN — FOLIC ACID 1 MG: 1 TABLET ORAL at 08:42

## 2021-02-08 RX ADMIN — INSULIN LISPRO 6 UNITS: 100 INJECTION, SOLUTION INTRAVENOUS; SUBCUTANEOUS at 08:45

## 2021-02-08 RX ADMIN — INSULIN GLARGINE 10 UNITS: 100 INJECTION, SOLUTION SUBCUTANEOUS at 21:10

## 2021-02-08 RX ADMIN — INSULIN LISPRO 6 UNITS: 100 INJECTION, SOLUTION INTRAVENOUS; SUBCUTANEOUS at 11:52

## 2021-02-08 RX ADMIN — BUDESONIDE AND FORMOTEROL FUMARATE DIHYDRATE 2 PUFF: 160; 4.5 AEROSOL RESPIRATORY (INHALATION) at 04:17

## 2021-02-08 RX ADMIN — INSULIN LISPRO 4 UNITS: 100 INJECTION, SOLUTION INTRAVENOUS; SUBCUTANEOUS at 21:10

## 2021-02-08 RX ADMIN — BUDESONIDE AND FORMOTEROL FUMARATE DIHYDRATE 2 PUFF: 160; 4.5 AEROSOL RESPIRATORY (INHALATION) at 18:01

## 2021-02-08 RX ADMIN — INSULIN LISPRO 4 UNITS: 100 INJECTION, SOLUTION INTRAVENOUS; SUBCUTANEOUS at 17:08

## 2021-02-08 RX ADMIN — CYANOCOBALAMIN 1000 MCG: 1000 INJECTION, SOLUTION INTRAMUSCULAR; SUBCUTANEOUS at 17:07

## 2021-02-08 RX ADMIN — IPRATROPIUM BROMIDE AND ALBUTEROL SULFATE 1 AMPULE: .5; 3 SOLUTION RESPIRATORY (INHALATION) at 10:37

## 2021-02-08 RX ADMIN — FERROUS SULFATE TAB 325 MG (65 MG ELEMENTAL FE) 325 MG: 325 (65 FE) TAB at 08:42

## 2021-02-08 RX ADMIN — FERROUS SULFATE TAB 325 MG (65 MG ELEMENTAL FE) 325 MG: 325 (65 FE) TAB at 17:08

## 2021-02-08 RX ADMIN — FUROSEMIDE 20 MG: 20 TABLET ORAL at 08:42

## 2021-02-08 RX ADMIN — IPRATROPIUM BROMIDE AND ALBUTEROL SULFATE 1 AMPULE: .5; 3 SOLUTION RESPIRATORY (INHALATION) at 04:17

## 2021-02-08 RX ADMIN — URSODIOL 300 MG: 300 CAPSULE ORAL at 08:42

## 2021-02-08 RX ADMIN — ATORVASTATIN CALCIUM 40 MG: 40 TABLET, FILM COATED ORAL at 21:06

## 2021-02-08 RX ADMIN — IPRATROPIUM BROMIDE AND ALBUTEROL SULFATE 1 AMPULE: .5; 3 SOLUTION RESPIRATORY (INHALATION) at 18:00

## 2021-02-08 RX ADMIN — URSODIOL 300 MG: 300 CAPSULE ORAL at 21:05

## 2021-02-08 RX ADMIN — PANTOPRAZOLE SODIUM 40 MG: 40 TABLET, DELAYED RELEASE ORAL at 05:31

## 2021-02-08 ASSESSMENT — PAIN DESCRIPTION - ORIENTATION: ORIENTATION: RIGHT;POSTERIOR

## 2021-02-08 ASSESSMENT — PAIN SCALES - GENERAL
PAINLEVEL_OUTOF10: 2
PAINLEVEL_OUTOF10: 0
PAINLEVEL_OUTOF10: 4

## 2021-02-08 ASSESSMENT — PAIN DESCRIPTION - FREQUENCY: FREQUENCY: CONTINUOUS

## 2021-02-08 ASSESSMENT — PAIN DESCRIPTION - LOCATION
LOCATION: LEG
LOCATION: LEG

## 2021-02-08 ASSESSMENT — PAIN DESCRIPTION - DESCRIPTORS: DESCRIPTORS: ACHING;SORE

## 2021-02-08 NOTE — PROGRESS NOTES
Physical Therapy Rehab Treatment Note  [place] [facility]  [facility]       NAME: Renuka Su  : 3/5/1929 (80 y.o.)  MRN: [id number]  CODE STATUS: Full Code    Date of Service: 2021  Chart Reviewed: Yes  Family / Caregiver Present: No    Restrictions:  Restrictions/Precautions: Fall Risk       SUBJECTIVE:    Pain Screening  Patient Currently in Pain: Yes       Post Treatment Pain Screenin/10  Pain Assessment  Pain Assessment: 0-10  Pain Level: 2  Pain Location: Leg  Pain Orientation: Right;Posterior  Pain Descriptors: Aching; Sore  Pain Frequency: Continuous    OBJECTIVE:                    Bed mobility  Rolling to Left: Stand by assistance  Supine to Sit: Stand by assistance  Sit to Supine: Stand by assistance  Scooting: Stand by assistance    Transfers  Sit to Stand: Stand by assistance  Stand to sit: Stand by assistance  Bed to Chair: Stand by assistance    Ambulation  Ambulation?: Yes  Ambulation 1  Surface: level tile;carpet;uneven  Device: Rolling Walker  Other Apparatus: O2(3L)  Assistance: Contact guard assistance  Quality of Gait: Slow reciprocal gait pattern with decreased stance phase on Right LE, fwd flexed psoture. Distance: 175'  Comments: Fatigue, c/o SOB with ambualtion. Required VCs and hand gestures for directions. Activity Tolerance  Activity Tolerance: Patient Tolerated treatment well    Exercises  Knee Long Arc Quad: 2 x 10  Knee Short Arc Quad: 2# 2 x 10  Comments: HS stretches performed in supine and sitting to improved Right KNee extension, Manual STM and crossfriction to decrease tissue tension on posterior Right thigh. Other exercises  Other exercises 2: Static HS stretch with heel prop x 3 min.      ASSESSMENT/COMMENTS:  Assessment: Pt struggles with hearing issues, able to assist with speaking loudly into Right ear and use of hand gestures to direct pt to tasks, increased ambualtion distance wtih pt reporting fatigue and mild SOB at current distance of 175'    PLAN OF CARE/Safety:   Safety Devices  Type of devices: Left in bed;Call light within reach; Bed alarm in place      Therapy Time:   Individual   Time In 1100   Time Out 1130   Minutes 30     Minutes:30      Transfer/Bed mobility training:10      Gait training:10      Neuro re education:0     Therapeutic ex:Jessica Oneil  02/08/21 at 12:13 PM

## 2021-02-08 NOTE — PROGRESS NOTES
Occupational Therapy  Facility/Department: [facility]  Daily Treatment Note  NAME: Renuka Su  : 3/5/1929  MRN: [id number]    Date of Service: 2021    Discharge Recommendations:  Continue to assess pending progress       Assessment      Activity Tolerance  Activity Tolerance: Patient Tolerated treatment well  Safety Devices  Safety Devices in place: Yes  Type of devices: All fall risk precautions in place  Restraints  Initially in place: No         Patient Diagnosis(es): There were no encounter diagnoses. has a past medical history of Anemia, CAD (coronary artery disease), DM (diabetes mellitus) ([facility] [address] Dyslipidemia, History of tobacco abuse, HTN (hypertension), Hypothyroidism, Non-ST elevation MI (NSTEMI) ([facility] [address] and Pacemaker. has a past surgical history that includes Cataract removal and Middle ear surgery (Left, ). Restrictions  Restrictions/Precautions  Restrictions/Precautions: Fall Risk  Subjective \"These don't help. (Glasses)\"  Pt reports 0/10 pain during session. General  Chart Reviewed: Yes  Patient assessed for rehabilitation services?: Yes  Response to previous treatment: Patient with no complaints from previous session  Family / Caregiver Present: No  Referring Practitioner: Dr. Bhat Officer  Diagnosis: Impaired mobility and ADL's d/t severe progressive PVD  General Comment  Comments: Pt on 2L O2 via NC      Orientation     Objective  Pt completed acts at table top to increase bilateral UE function for task completion. Pt completed large block design and take down and placement and removal of marbles from 100 hole peg board. Pt completed placement of marbles with bilateral hands and removal using red graded clothespins bilaterally to increase pinch strength for acts completion. Pt required rest breaks PRN.             Plan   Plan  Times per week: 5-7x/wk  Plan weeks: 1.5  Current Treatment Recommendations: Strengthening, Functional Mobility Training, Cognitive Reorientation, Cognitive/Perceptual Training, Patient/Caregiver Education & Training, Endurance Training, Equipment Evaluation, Education, & procurement, Balance Training, Safety Education & Training, Self-Care / ADL  Plan Comment: Continue per OT plan of care  G-Code     OutComes Score                                                  AM-PAC Score             Goals  Patient Goals   Patient goals : to go home       Therapy Time   Individual Concurrent Group Co-treatment   Time In 1430         Time Out 1500         Minutes 30              Therapeutic activities: 30 minutes    [id number] OTR/L Electronically signed by [id number] OTR/L on 7/9/82 at 4:09 PM EST

## 2021-02-08 NOTE — PROGRESS NOTES
Subjective: The patient complains of  moderate to severe acute  Lower extremity PVD with occlusion and recent surgery partially relieved by rest, PT, OT,   and exacerbated by recent illness and exertion. I am concerned about patients right lower extremity pain and healing. I am concerned about his elevated blood sugars into the 400s will consult medical.  Also hemoglobin has been low and he may require transfusion. 1 unit of packed red blood cells had been prescribed. Hospitalist consulted because of risk for CHF. According to recent nursing note, \" Assessment completed earlier in the shift. VSS. Denies pain. LBM 2/5/21. HS OT- 409. 12 units of insulin administered in addition to 10 units of Lantus. 40mg prednisone completed- last dose 2/7/21. INR 2.8- pharmacy dosed 2.5mg of coumadin. CBC, CMP, PT/INR and type+screen. Please see prior notes in regards to pt's vision and blood transfusion. No distress noted. Call light within reach and bed alarm activated. Dr. Salas Lasso into to evaluate pt at this time. To resume w/ ASA and coumadin. Pt walked to bathroom requiring NO verbal cues. Lg BM 2/8/21. Assisted back to bed. Pt got into bed w/ no verbal cues and was even able to lift own legs \". ROS x10: The patient also complains of severely impaired mobility and activities of daily living. Otherwise no new problems with vision, hearing, nose, mouth, throat, dermal, cardiovascular, GI, , pulmonary, musculoskeletal, psychiatric or neurological. See Rehab H&P on Rehab chart dated . Vital signs:  BP (!) 146/59   Pulse 66   Temp 98 °F (36.7 °C) (Oral)   Resp 16   Ht 5' 7\" (1.702 m)   Wt 159 lb 8 oz (72.3 kg)   SpO2 98%   BMI 24.98 kg/m²   I/O:   PO/Intake:  fair PO intake, no problems observed or reported. Bowel/Bladder:  continent, constipation and urinary urgency. General:  Patient is well developed, adequately nourished, non-obese and     well kempt.      HEENT:    PERRLA, hearing intact to loud voice, external inspection of ear     and nose benign. Inspection of lips, tongue and gums benign  Musculoskeletal: No significant change in strength or tone. All joints stable. Inspection and palpation of digits and nails show no clubbing,       cyanosis or inflammatory conditions. Neuro/Psychiatric: Affect: flat. Alert and oriented to person, place and     situation. No significant change in deep tendon reflexes or     sensation  Lungs:  Diminished, CTA-B. Respiration effort is normal at rest.     Heart:   S1 = S2, RRR. No loud murmurs. Abdomen:  Soft, non-tender, no enlargement of liver or spleen. Extremities:  No significant lower extremity edema or tenderness. Skin:   Intact to general survey, no visualized or palpated problems. Rehabilitation:  Physical therapy: FIMS:  Bed Mobility: Scooting: Contact guard assistance    Transfers: Sit to Stand: Contact guard assistance  Stand to sit: Minimal Assistance  Bed to Chair: Minimal assistance(pt passing up w/c then needing assist manuevering ww to back up to chair), Ambulation 1  Surface: carpet  Device: Rolling Walker  Other Apparatus: O2  Assistance: Minimal assistance  Quality of Gait: slow step to pattern, decreased stance phase on RLE, decreased wilmer heel strike, ff posture  Gait Deviations: Decreased step length, Shuffles, Decreased step height  Distance: 12' x 2  Comments: Limited by pain and vision. Pt unable to avoid obstacles with [employer], Stairs  # Steps : 4  Stairs Height: 6\"  Rails: Bilateral  Assistance: Contact guard assistance, Stand by assistance  Comment: Non-reciprocal pattern, slow steady pace. FIMS:  ,  , Assessment: Pt tx limited by pain and vision this date. This PTA instructing pt on ambulating to w/c from bed, pt passing up w/c, PTA giving VC/TC's to assist pt to find w/c. Pt following vc's to get to w/c then getting [employer] stuck un wheel. Min A to [employer] away from wheel on w/c.  This PTA also instructing pt to g/dL    Albumin 2.8 (L) 3.5 - 4.6 g/dL    Total Bilirubin 0.8 (H) 0.2 - 0.7 mg/dL    Alkaline Phosphatase 57 35 - 104 U/L    ALT 15 0 - 41 U/L    AST 17 0 - 40 U/L    Globulin 2.8 2.3 - 3.5 g/dL   CBC    Collection Time: 02/08/21  5:26 AM   Result Value Ref Range    WBC 12.9 (H) 4.8 - 10.8 K/uL    RBC 2.53 (L) 4.70 - 6.10 M/uL    Hemoglobin 7.7 (L) 14.0 - 18.0 g/dL    Hematocrit 23.9 (L) 42.0 - 52.0 %    MCV 94.6 80.0 - 100.0 fL    MCH 30.6 27.0 - 31.3 pg    MCHC 32.3 (L) 33.0 - 37.0 %    RDW 13.8 11.5 - 14.5 %    Platelets 646 (H) 744 - 400 K/uL   PROTIME-INR    Collection Time: 02/08/21  5:26 AM   Result Value Ref Range    Protime 38.0 (H) 12.3 - 14.9 sec    INR 3.9    POCT Glucose    Collection Time: 02/08/21  5:26 AM   Result Value Ref Range    POC Glucose 280 (H) 60 - 115 mg/dl    Performed on ACCU-CHEK    TYPE AND SCREEN    Collection Time: 02/08/21  7:02 AM   Result Value Ref Range    ABO/Rh A POS     Antibody Screen NEG        Previous extensive, complex labs, notes and diagnostics reviewed and analyzed. ALLERGIES:    Allergies as of 02/01/2021    (No Known Allergies)      (please also verify by checking MAR)     Today I evaluated this patient for periodic reassessment of medical and functional status. The patient was discussed in detail at the treatment team meeting focusing on current medical issues, progress in therapies, social issues, psychological issues, barriers to progress and strategies to address these barriers, and discharge planning. See the addendum to rehab progress note-as a second progress note in the chart. The patient continues to be high risk for future disability and their medical and rehabilitation prognosis continue to be good and therefore, we will continue the patient's rehabilitation course as planned. The patient's tentative discharge date was set. Patient and family education was discussed.   The patient was made aware of the team discussion regarding their progress. Complex Physical Medicine & Rehab Issues Assess & Plan:   1. Severe abnormality of gait and mobility and impaired self-care and ADL's secondary to progressive weakness dt   right lower extremity PVD. Functional and medical status reassessed regarding patients ability to participate in therapies and patient found to be able to participate in acute intensive comprehensive inpatient rehabilitation program including PT/OT to improve balance, ambulation, ADLs, and to improve the P/AROM. Therapeutic modifications regarding activities in therapies, place, amount of time per day and intensity of therapy made daily. In bed therapies or bedside therapies prn.   2. Bowel progressive constipation, and Bladder dysfunction monitoring neurogenic bladder:  frequent toileting, ambulate to bathroom with assistance, check post void residuals. Check for C.difficile x1 if >2 loose stools in 24 hours, continue bowel & bladder program.  Monitor bowel and bladder function. Lactinex 2 PO every AC. MOM prn, Brown Bomb prn, Glycerin suppository prn, enema prn.  3. Severe lower extremity postop pain pain as well as generalized OA pain: reassess pain every shift and prior to and after each therapy session, give prn medications, modalities prn in therapy, Lidoderm, K-pad prn.   4. Skin healing and breakdown risk:  continue pressure relief program.  Daily skin exams and reports from nursing. 5. Severe fatigue due to nutritional and hydration deficiency: Add vitamin B12 vitamin D and CoQ10 continue to monitor I&Os, calorie counts prn, dietary consult prn. Add healthy HS snack. 6. Acute episodic insomnia with situational adjustment disorder:  prn Ambien, monitor for day time sedation. Add HS \"Tuck In\"  7. Falls risk elevated:  patient to use call light to get nursing assistance to get up, bed and chair alarm.   8. Elevated DVT risk: progressive activities in PT, continue prophylaxis BELLE hose, elevation and Coumadin. 9. Complex discharge planning: Patient is for discharge February 13, 2021 home with her family and home health care. Patient is status post weekly team meeting every Monday to assess progress towards goals, discuss and address social, psychological and medical comorbidities and to address difficulties they may be having progressing in therapy. Patient and family education is in progress. The patient is to follow-up with their family physician after discharge. Complex Active General Medical Issues that complicate care Assess & Plan:     1. Principal Problem:    Gait abnormality dt PVD--right femoral artery occlusion  Active Problems:  1. HTN (hypertension),   HypotensionDyslipidemia-continue blood signs every shift focusing on heart rate and blood pressure checks, consult hospitalist for backup medical and adjust/add medications (aspirin, Lipitor, Coumadin, Coreg, Lasix)    DM (diabetes mellitus) -  Continue blood sugar checks every shift, diet, add diabetic add dietary Ed restrict carbohydrates to lowest effective and safe carb count per meal advising 4 carbs per meal, add at bedtime snack to prevent a.m. hypoglycemia, adjust/add medications (Lantus, Humalog  Recent Labs     02/07/21  1119 02/07/21  1614 02/07/21 2027 02/08/21  0526 02/08/21  1139   POCGLU 248* 382* 409* 280* 270*   2. Anemia-iron and B12  3. Hypothyroidism-titrate Synthroid  4. History of tobacco abuse, CAD (coronary artery disease)  5. COPD with acute exacerbation -Pulse oximeter checks to shift dose and titrate oxygen and aerosol treatments monitor for nocturnal hypoxemia, monitor vital signs, oxygen prn.   6.   Femoral artery occlusion-coumadinization with consult for INR dosing with pharmacy  7. Hyponatremia-liberalize salt in diet  8. PVD (peripheral vascular disease)   9. Chronic right shoulder pain-add Lidoderm and heat  10.  GERD and constipation-scheduled Senokot and add Protonix           Pat NOEL Dominic Jung D.O., PM&R     Attending    [address]

## 2021-02-08 NOTE — PLAN OF CARE
Problem: Pain:  Goal: Pain level will decrease  Description: Pain level will decrease  2/8/2021 1038 by Minh Barker RN  Outcome: Ongoing  2/8/2021 0224 by Anatoliy Sandoval RN  Outcome: Ongoing  2/8/2021 0223 by Anatoliy Sandoval RN  Outcome: Ongoing  Goal: Control of acute pain  Description: Control of acute pain  2/8/2021 1038 by Minh Barker RN  Outcome: Ongoing  2/8/2021 0224 by Anatoliy Sandoval RN  Outcome: Ongoing  2/8/2021 0223 by Anatoliy Sandoval RN  Outcome: Ongoing  Goal: Control of chronic pain  Description: Control of chronic pain  2/8/2021 1038 by Minh Barker RN  Outcome: Ongoing  2/8/2021 0224 by Anatoliy Sandoval RN  Outcome: Ongoing  2/8/2021 0223 by Anatoliy Sandoval RN  Outcome: Ongoing     Problem: Falls - Risk of:  Goal: Will remain free from falls  Description: Will remain free from falls  2/8/2021 1038 by Minh Barker RN  Outcome: Ongoing  2/8/2021 0224 by Anatoliy Sandoval RN  Outcome: Ongoing  2/8/2021 0223 by Anatoliy Sandoval RN  Outcome: Ongoing  Goal: Absence of physical injury  Description: Absence of physical injury  2/8/2021 1038 by Minh Barker RN  Outcome: Ongoing  2/8/2021 0224 by Anatoliy Sandoval RN  Outcome: Ongoing  2/8/2021 0223 by Anatoliy Sandoval RN  Outcome: Ongoing     Problem: IP SWALLOWING  Goal: LTG - patient will tolerate the least restrictive diet consistency to allow for safe consumption of daily meals  2/8/2021 1038 by Minh Barker RN  Outcome: Ongoing  2/8/2021 0224 by Anatoliy Sandoval RN  Outcome: Ongoing  2/8/2021 0223 by Anatoliy Sandoval RN  Outcome: Ongoing     Problem: Skin Integrity:  Goal: Will show no infection signs and symptoms  Description: Will show no infection signs and symptoms  2/8/2021 1038 by Minh Barker RN  Outcome: Ongoing  2/8/2021 0224 by Anatoliy Sandoval RN  Outcome: Ongoing  2/8/2021 0223 by Anatoliy Sandoval RN  Outcome: Ongoing  Goal: Absence of new skin

## 2021-02-08 NOTE — PROGRESS NOTES
Per Dr. Kalyani Rodgers, PT TO GET BLOOD Monday 2/8/2021 ONE unit over four hrs.   Electronically signed by Trent Osei RN on 2/7/2021 at 9:42 PM

## 2021-02-08 NOTE — CONSULTS
Number of children: Not on file    Years of education: Not on file    Highest education level: Not on file   Occupational History    Not on file   Social Needs    Financial resource strain: Not on file    Food insecurity     Worry: Not on file     Inability: Not on file    Transportation needs     Medical: Not on file     Non-medical: Not on file   Tobacco Use    Smoking status: Former Smoker    Smokeless tobacco: Never Used   Substance and Sexual Activity    Alcohol use: No    Drug use: No    Sexual activity: Not on file   Lifestyle    Physical activity     Days per week: Not on file     Minutes per session: Not on file    Stress: Not on file   Relationships    Social connections     Talks on phone: Not on file     Gets together: Not on file     Attends Latter-day service: Not on file     Active member of club or organization: Not on file     Attends meetings of clubs or organizations: Not on file     Relationship status: Not on file    Intimate partner violence     Fear of current or ex partner: Not on file     Emotionally abused: Not on file     Physically abused: Not on file     Forced sexual activity: Not on file   Other Topics Concern    Not on file   Social History Narrative    Not on file          Current Facility-Administered Medications   Medication Dose Route Frequency Provider Last Rate Last Admin    [START ON 2/8/2021] furosemide (LASIX) tablet 20 mg  20 mg Oral Daily Ilya Thomas MD        ipratropium-albuterol (DUONEB) nebulizer solution 1 ampule  1 ampule Inhalation TID Ilya Thomas MD   1 ampule at 02/07/21 1606    insulin glargine (LANTUS) injection vial 10 Units  10 Units Subcutaneous Nightly CHRISTINE Raymond NP   10 Units at 02/07/21 2052    ferrous sulfate (IRON 325) tablet 325 mg  325 mg Oral BID  CHRISTINE Raymond NP   325 mg at 79/68/97 0833    folic acid (FOLVITE) tablet 1 mg  1 mg Oral Daily CHRISTINE Raymond NP   1 mg at 02/07/21 0937    pill splitter   Does not apply Once Nimco Fair MD        lactulose (CHRONULAC) 10 GM/15ML solution 20 g  20 g Oral BID PRN Nimco Fair MD        vitamin D (ERGOCALCIFEROL) capsule 50,000 Units  50,000 Units Oral Weekly Yudy Barry, CHRISTINE - NP   50,000 Units at 02/03/21 1647    budesonide-formoterol (SYMBICORT) 160-4.5 MCG/ACT inhaler 2 puff  2 puff Inhalation BID Monica Marie MD   2 puff at 02/07/21 1607    albuterol (ACCUNEB) nebulizer solution 0.63 mg  0.63 mg Nebulization Q6H PRN Nimco Fair MD   0.63 mg at 02/02/21 2225    acetaminophen (TYLENOL) tablet 650 mg  650 mg Oral Q6H PRN Kirti Dial Sedar, DO   650 mg at 02/06/21 1652    Or    acetaminophen (TYLENOL) suppository 650 mg  650 mg Rectal Q6H PRN Kirti Dial Sedar, DO        polyethylene glycol (GLYCOLAX) packet 17 g  17 g Oral Daily PRN Kirti Dial Sedar, DO        promethazine (PHENERGAN) tablet 12.5 mg  12.5 mg Oral Q6H PRN Kirti Dial Sedar, DO        Or    ondansetron (ZOFRAN) injection 4 mg  4 mg Intravenous Q6H PRN Kirti Dial Sedar, DO        aluminum & magnesium hydroxide-simethicone (MAALOX) 800-162-27 MG/5ML suspension 30 mL  30 mL Oral Q6H PRN Kirti Dial Sedar, DO        aspirin chewable tablet 81 mg  81 mg Oral Daily Melvin D Sedar, DO   81 mg at 02/07/21 0936    atorvastatin (LIPITOR) tablet 40 mg  40 mg Oral Nightly Melvin D Sedar, DO   40 mg at 02/07/21 2041    carvedilol (COREG) tablet 6.25 mg  6.25 mg Oral BID WC Melvin D Sedar, DO   6.25 mg at 02/07/21 1713    glucagon (rDNA) injection 1 mg  1 mg Intramuscular PRN Kirti Dial Sedar, DO        glucose (GLUTOSE) 40 % oral gel 15 g  15 g Oral PRN Kirti Dial Sedar, DO        insulin lispro (HUMALOG) injection vial 0-12 Units  0-12 Units Subcutaneous 4x Daily AC & HS Kirti Dial Sedar, DO   12 Units at 02/07/21 2042    levothyroxine (SYNTHROID) tablet 25 mcg  25 mcg Oral Daily Kirti Dial Sedar, DO   25 mcg at 02/07/21 0653    pantoprazole (PROTONIX) tablet 40 °C) (Oral)   Resp 17   Ht 5' 7\" (1.702 m)   Wt 159 lb 8 oz (72.3 kg)   SpO2 94%   BMI 24.98 kg/m²       GENERAL: In no acute distress, well- nourished, well- developed, alert and oriented to person place and time. SKIN: Warm and dry, without jaundice, ecchymoses, or petechiae. HEENT: Normocephalic, sclera anicteric, oral mucosa moist without lesion or exudate in the visible oral cavity or oropharynx, tongue mid-line with good mobility and no deviation with extension. NODES: No palpable adenopathy in the neck Levels I-V, bilateral supraclavicular fossae, axillary chains, or inguinal regions. LUNGS: Good inspiratory effort, no accessory muscle use, clear bilaterally, no focal wheeze, rales or rhonchi. CARDIAC: Normal HS; regular rate and rhythm  ABDOMINAL: soft, non-tender, no mass or organomegaly. MUSKL: no tenderness over ribs  EXTREMITIES: no pedal edema; + tenderness RLE   NEUROLOGIC: Gait not tested. No grossly apparent focal deficits. LAB RESULTS:  Recent Results (from the past 24 hour(s))   PROTIME-INR    Collection Time: 02/07/21  5:58 AM   Result Value Ref Range    Protime 29.3 (H) 12.3 - 14.9 sec    INR 2.8    POCT Glucose    Collection Time: 02/07/21  6:07 AM   Result Value Ref Range    POC Glucose 207 (H) 60 - 115 mg/dl    Performed on ACCU-CHEK    POCT Glucose    Collection Time: 02/07/21 11:19 AM   Result Value Ref Range    POC Glucose 248 (H) 60 - 115 mg/dl    Performed on ACCU-CHEK    POCT Glucose    Collection Time: 02/07/21  4:14 PM   Result Value Ref Range    POC Glucose 382 (H) 60 - 115 mg/dl    Performed on ACCU-CHEK    POCT Glucose    Collection Time: 02/07/21  8:27 PM   Result Value Ref Range    POC Glucose 409 (HH) 60 - 115 mg/dl    Performed on ACCU-CHEK      No results for input(s): COLORU, PHUR, LABCAST, WBCUA, RBCUA, MUCUS, TRICHOMONAS, YEAST, BACTERIA, CLARITYU, SPECGRAV, LEUKOCYTESUR, UROBILINOGEN, BILIRUBINUR, BLOODU, GLUCOSE in the last 72 hours.     Invalid input(s): NITRATE, UKETONES, UAMORPHOUS         RADIOLOGY RESULTS:  Xr Chest (2 Vw)    Result Date: 2/3/2021  EXAMINATION: XR CHEST (2 VW)  CLINICAL HISTORY:  cough COMPARISONS: None  FINDINGS: Two views of the chest are submitted. There are multiple median sternotomy wires. There is a right-sided CCD device. Leads overlying the cardiac silhouette. The cardiac silhouette is of normal size configuration. The mediastinum is unremarkable. Pulmonary vascular unremarkable. Right sided trachea. No focal infiltrates. No effusions. No Pneumothoraces. NO ACUTE ACTIVE CARDIOPULMONARY PROCESS    Cta Abdominal Aorta W Bilat Runoff W Wo Contrast    Result Date: 1/27/2021  CTA ABDOMINAL AORTA W BILAT RUNOFF W WO CONTRAST: 1/26/2021 CLINICAL HISTORY:  RLE swelling Pain . COMPARISON: CT abdomen and pelvis without contrast 12/4/2015. Spiral enhanced images were obtained of the abdominal aorta through both feet during the infusion of a total of 250 mL of Isovue 300 contrast with runoff CTA protocol. Delayed imaging was also performed. Routine and volume rendered images were obtained on a 3-dimensional workstation. All CT scans at this facility use dose modulation, iterative reconstruction, and/or weight based dosing when appropriate to reduce radiation dose to as low as reasonably achievable. FINDINGS: Moderately extensive disease with areas of flow-limiting narrowing are present within the mid to distal right superficial femoral artery, which is abruptly occluded at the abductor hiatus. Best depicted on the delayed imaging is extensive DVT throughout the right lower extremity extending proximally to the right common iliac vein. At the most inferior aspect of the initial study, filling defects are suspected within the subsegmental left lower lobe pulmonary artery branches, suspicious for pulmonary emboli. No definite emboli are noted on the right.  The visualized lung bases are clear. The abdominal aorta is normal in caliber with mild to moderate calcific plaquing. Both iliac, common femoral, and the left femoral arteries are widely patent with mild disease. Delayed imaging demonstrates patency of the left popliteal artery with extensive calcific disease of the infrapopliteal runoff, which is incompletely visualized. There is no significant reconstitution of the right popliteal or infrapopliteal arteries. The celiac, superior mesenteric and both solitary renal arteries are widely patent. Colonic diverticulosis is again noted, without evidence of diverticulitis. No other significant change from 12/4/2015 is identified. 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 EXTENSIVE RIGHT LOWER EXTREMITY DVT EXTENDING INTO THE RIGHT COMMON ILIAC VEIN. SUSPECT LEFT LOWER LOBE PULMONARY EMBOLI. Xr Chest Portable    Result Date: 1/27/2021  EXAMINATION: Portable AP ERECT view of the chest. CLINICAL HISTORY:  preop  with pain and swelling in right thigh. DATE: 1/26/2021 10:02 PM COMPARISONS: 7/8/2020 FINDINGS: There are multiple sternal sutures and mediastinal clips present. Normal cardiac silhouette. An pacemaker overlies the right hemithorax and has a lead extending into the right atrium and second lead extending into the right ventricle. Lungs are  clear without consolidation. No pleural effusion or pneumothorax. There are mild degenerative changes in spine. NO ACUTE CARDIOPULMONARY ABNORMALITY. NO CHANGE. [facility] ASSESSMENT AND PLAN  1. The pt has anemia due to iron and mild folate deficiency r/o possible concurrent Myelodysplastic syndrome. He has symptomatic anemia with fatigue which is preventing him from fully benefiting from the physical therapy program. . He is currently on oral Fe Tx. He will also take folate supplement.   The pt's H/H will be rechecked and he will be given  RBC transfusion if there is inadequate response to the oral Fe tx. . He will also be given IV Venofer . 2. RLE DVT s/p thrombectomy  3. Peripheral Artery Disease s/p stent  4. CAD s/p CABG      Thank you, Dr. Garrett Gutierrez, for this consultation.     Electronically signed by Petra Noble MD on 2/7/2021 at 11:54 PM

## 2021-02-08 NOTE — PROGRESS NOTES
Clinical Pharmacy Note    Warfarin consult follow-up    Recent Labs     02/08/21  0526   INR 3.9     Recent Labs     02/06/21  1317 02/08/21  0526   HGB 7.7* 7.7*   HCT 22.9* 23.9*   PLT  --  506*       Significant drug:drug interactions:  Discontinued drug-drug interactions: prednisone    Significant drug:drug interactions:  Acetaminophen, aspirin, lactulose, levothyroxine        Notes:  Warfarin Dose       01/30/21 1.3 (1/29) 5 mg   01/31/21 1.1  5 mg   02/01/21 1.1 7.5 mg   02/02/21 1.5 7.5 mg   02/03/21 2.8 2 mg   02/04/21 3.3 1 mg   02/05/21 3.3 HOLD   02/06/21 3.3 HOLD   02/07/21 2.8 2.5 mg     02/08/21  3.9  HOLD                         INR of 3.9 is supra-therapeutic for goal range of 2-3 for unprovoked LLR DVT. Will HOLD warfarin today. INR increase of 1.1 from yesterday. Continue with daily PT/INR during pharmacy consult to monitor and dose warfarin therapy.     Savannah Maciel PharmD

## 2021-02-08 NOTE — CARE COORDINATION
Continued Stay Review Note    21  [facility])  Clinical Case Management Department  Written by: Rachael Heard RN    Patient Name: Laverne Wilson  Attending Provider: Bess Marquis*      Admit Date: 2021  MRN: [id number]                           : 3/5/1929    Patient Visit Status: Inpatient  Level of Care:Patient admitted to acute inpatient rehab on 21    Anticipated Discharge Plan: At this time, patient is scheduled to discharge on 21 home. We feel that patient would benefit from staying this time to aide in a safe discharge. Last set of vitals:  Temp: 98 °F (36.7 °C) (21)   Pulse: 66 (21)  Resp: 16 (21)  BP: (!) 146/59 (21)  SpO2: 98 % (21)    Current Treatments:    Results/Findings:     Labs:    Lab Results   Component Value Date     2021    K 4.3 2021     2021    CO2 21 2021    BUN 34 2021    CREATININE 1.17 2021    GLUCOSE 293 2021    CALCIUM 7.2 2021      Lab Results   Component Value Date    WBC 12.9 (H) 2021    HGB 7.1 (L) 2021    HCT 21.9 (L) 2021    MCV 94.6 2021     (H) 2021     Lab Results   Component Value Date    CKTOTAL 67 2021    CKMB 3.4 2021    CKMBINDEX 4.3 (H) 2021       Other Labs/Urinalysis/Pathology:    Diagnostics/Imaging:  No results found.     [facility]  [facility]ility/Department: [facility]  Rehabilitation Initial Assessment: Physical Therapy  Room: R260/R260-01     NAME: Laverne Wilson  : 3/5/1929  MRN: [id number]     Date of Service: 2021     Rehab Diagnosis(es):impaired mobility and ADL's d/t severe progressive PVD    SUBJECTIVE:       Pre Treatment Pain Screening  Pain at present: 0(at rest)  Intervention List: Patient able to continue with treatment  Comments / Details: Pt reports RLE pain with all movement tasks and at end of session - declined to give number and reports no need for medication     Post Treatment Pain Screening:  Pain Assessment  Pain Level: (no number given)  Pain Type: Acute pain  Pain Location: Leg  Pain Orientation: Right     Prior Level of Function:  Social/Functional History  Lives With: Daughter(Granddaughter and her family)  Type of Home: House  Home Layout: Two level, Bed/Bath upstairs  Home Access: Stairs to enter with rails  Entrance Stairs - Number of Steps: 14  Bathroom Shower/Tub: Tub/Shower unit  ADL Assistance: (Pt. unclear in answer; however, states he is an independent person)  Homemaking Assistance: (Granddaughter completes)  Ambulation Assistance: Independent(No AD)  Transfer Assistance: Independent  Active :  Yes  Additional Comments: Pt. significantly [place]; information gathered via writing but some difficulty noted in clarifying questions     OBJECTIVE:   Vision/Hearing:  Vision: Impaired  Vision Exceptions: Wears glasses at all times  Hearing: Exceptions to [facility]  Hearing Exceptions: Hard of hearing/hearing concerns;Bilateral hearing aid     Cognition:  Overall Orientation Status: Impaired  Orientation Level: Oriented to person  Follows Commands: Impaired(significant [place] necessitates frequent repetition)     ROM:  RLE General PROM: impaired hip flexor and hamstring flexibilitylacking 20 degrees knee ext as result     Strength:  Strength RLE  Comment: 4/5  Strength LLE  Comment: 4-/5     Neuro:  Balance  Sitting - Static: Good  Sitting - Dynamic: Good  Standing - Static: Fair;-(pt able to stand 1 min with UE support with no assistance, unable to stand without UE support due to RLE pain)  Standing - Dynamic: Fair;-(no LOB with ww - unable to ambulate or reach without BUE support)     Bed mobility  Rolling to Left: Minimal assistance  Rolling to Right: Minimal assistance  Supine to Sit: Moderate assistance  Sit to Supine: Minimal assistance  Comment: no rail with bed flat increased energy required     Transfers  Sit to Stand: Minimal Assistance  Stand to sit: Minimal Assistance  Bed to Chair: Minimal assistance(pivot)  Comment: intermittent cues for technique required     Ambulation 1  Surface: level tile  Device: Rolling Walker  Assistance: Stand by assistance;Contact guard assistance  Quality of Gait: flexed posture, decreased WB on RLE with UE support to assist with gait tolerance, step to pattern, short step lengths  Distance: 10 feet x 2  Comments: attempted gait without devices - pt unable to take step with LLE due to poor weight acceptance on RLE     Stairs/Curb  Stairs?: No(not safe to test)        Activity Tolerance  Activity Tolerance: Patient limited by pain  Activity Tolerance: RLE pain limits pts tolerance and endurance            Quality Indicators (IRF-ED):  Rolling L and R: Supervision or Touching Assistance - 4  Sit>Supine: Partial/Moderate Assistance (helper does <50%) - 3  Supine>Sit: Partial/Moderate Assistance (helper does <50%) - 3  Sit>Stand: Supervision or Touching Assistance - 4  Chair/Bed>Chair Transfer: Supervision or Touching Assistance - 4  Car Transfers: Not attempted due to Medical Condition or Safety Concerns (I.e. unsafe or physician orders) - 80  Walk 10 ft: Not attempted due to Medical Condition or Safety Concerns (I.e. unsafe or physician orders) - 88  Walk 50 ft with two 90 degree turns: Not attempted due to Medical Condition or Safety Concerns (I.e. unsafe or physician orders) - 80  Walk 150 ft in [address]: Not attempted due to Medical Condition or Safety Concerns (I.e. unsafe or physician orders) - 88  Walking 10 ft on Unlevel Surface: Not attempted due to Medical Condition or Safety Concerns (I.e. unsafe or physician orders) - 80  Picking up Objects from Standing Position: Not attempted due to Medical Condition or Safety Concerns (I.e. unsafe or physician orders) - 80  Stairs: No Not attempted due to medical condition or safety concerns (i.e. unsafe or physician order) - 80  WC Mobility: No Not Applicable (pt did not complete item prior to admission) - 9        ASSESSMENT:  Body structures, Functions, Activity limitations: Decreased functional mobility ; Decreased balance;Decreased strength;Decreased posture;Decreased safe awareness;Decreased ROM  Decision Making: Medium Complexity  History: med  Exam: med  Clinical Presentation: med     PT Education: Goals;PT Role;Plan of Care  Barriers to Learning: [facility]     CLINICAL IMPRESSION: Pt demonstrates deficits as listed. He requires assistance with all mobility at this time. Pt was previously incep.  He would benefit from PT prior to return to home     PLAN OF CARE:  Frequency: 1-2 treatment sessions per day, 5-7 days per week  Current Treatment Recommendations: Strengthening, Functional Mobility Training, Neuromuscular Re-education, Home Exercise Program, Equipment Evaluation, Education, & procurement, Safety Education & Training, Gait Training, Transfer Training, Balance Training, Stair training, Patient/Caregiver Education & Training, Positioning, Manual Therapy - Soft Tissue Mobilization     Patient's Goal:  go home soon     GOALS:  Long term goals  Long term goal 1: Bed mobility with indep  Long term goal 2: Functional transfers with indep  Long term goal 3: indep gait 150 feet with or without device  Long term goal 4: Pt will negotiate >/=12 steps with handrails and SBA to navigate home environment  Long term goal 5: indep with HEP     ELOS:   Plan weeks: 1-2          Garrett Olguin PT, 21 at 11:49 AM                [facility] AT [place]   Facility/Department: [facility]  Speech Language Pathology  Clinical Bedside Swallow Evaluation     NAME:Chey Lomax  : 3/5/1929 (80 y.o.)   MRN: [id number]  ROOM: [place] [address]  ADMISSION DATE: 2021  PATIENT DIAGNOSIS(ES): PVD (peripheral vascular disease) ([facility] [I73.9]  No chief complaint on file.           Patient Active Problem List     Diagnosis Date Noted    Anginal equivalent [facility])         Priority: High    PVD (peripheral vascular disease) ([facility] 02/01/2021    Venous thrombosis of leg 01/27/2021    Hyponatremia 01/27/2021    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HCC) 01/26/2021    Syncope and collapse 03/26/2020    COPD exacerbation ([facility] 02/13/2020    NSTEMI (non-ST elevated myocardial infarction) ([facility] 09/20/2019    Chest pain 09/19/2019    Hypotension 07/06/2019    SOB (shortness of breath) 09/17/2018    COPD with acute exacerbation ([facility] 02/16/2018    Bronchitis 12/23/2016    CAD (coronary artery disease) 04/16/2015    HTN (hypertension)      Dyslipidemia      DM (diabetes mellitus) ([facility]      Hypothyroidism      History of tobacco abuse      Anemia        Past Medical History        Past Medical History:   Diagnosis Date    Anemia      CAD (coronary artery disease) 4/16/2015    DM (diabetes mellitus) ([facility]      Dyslipidemia      History of tobacco abuse      HTN (hypertension)      Hypothyroidism      Non-ST elevation MI (NSTEMI) ([facility]      Pacemaker 4/16/2015         Past Surgical History         Past Surgical History:   Procedure Laterality Date    CATARACT REMOVAL        MIDDLE EAR SURGERY Left 1998     \"they had to reset the bones\" after an infection         No Known Allergies     DATE ONSET: 2/1/2021     Date of Evaluation: 2/2/2021   Evaluating Therapist: DALTON Dalh     Recommended Diet and Intervention  Diet Solids Recommendation: Regular  Liquid Consistency Recommendation:  Thin  Recommended Form of Meds: PO  Recommendations: Self feed  Compensatory Swallowing Strategies  Compensatory Swallowing Strategies: Upright as possible for all oral intake;Small bites/sips     Reason for Referral  Chapincito Petty was referred for a bedside swallow evaluation to assess the efficiency of his swallow function, identify signs and symptoms of aspiration and make recommendations regarding safe dietary consistencies, effective compensatory strategies, and safe eating environment.     General  Chart Reviewed: Yes  Behavior/Cognition: Alert; Cooperative;Pleasant mood  Respiratory Status: O2 via nasual cannula  O2 Device: Nasal cannula  Communication Observation: Functional  Follows Directions: Simple  Dentition: Dentures top; Adequate  Patient Positioning: Upright in bed  Baseline Vocal Quality: Normal  Volitional Cough: Strong  Prior Dysphagia History: None  Consistencies Administered: Reg solid; Dysphagia Pureed (Dysphagia I); Thin - straw     Current Diet level:  Current Liquid Diet : Thin     Oral Motor Deficits  Oral/Motor  Oral Motor: Within functional limits     Oral Phase Dysfunction  Oral Phase  Oral Phase: WFL  Oral Phase  Oral Phase - Comment: WFL. Adequate mastication and oral clearance of bolus in all opportunities independently. No significant residuals post swallow. Indicators of Pharyngeal Phase Dysfunction   Pharyngeal Phase  Pharyngeal Phase: WFL  Pharyngeal Phase   Pharyngeal: WFL. Hyolaryngeal movement is present. No overt s/s of aspiration observed per all consistencies tested including consecutive sips of thin liquid via straaw (3oz total).    Impression  Dysphagia Diagnosis: Swallow function appears grossly intact  Dysphagia Impression : WFL  Dysphagia Outcome Severity Scale: Level 6: Within functional limits/Modified independence      Treatment Plan  Requires SLP Intervention: No  Duration/Frequency of Treatment: no f/u  D/C Recommendations: To be determined  Prognosis  Individuals consulted  Consulted and agree with results and recommendations: Patient;RN(RN [place] to relay to [employer])     Education  Patient Education: Results of BSE  Patient Education Response: Demonstrated understanding  Safety Devices in place: Yes  Type of devices: Bed alarm in place;Call light within reach     [x]? Sandy Tobar RN notified   [x]?   Dr. Moncho Hernandez notified         Pain Assessment:  Initial Assessment:  Patient denies pain.     Re-assessment:  Patient denies pain.       NATIONAL OUTCOMES MEASUREMENT SYSTEM (NOMS):     SWALLOWING  Ratin       Signature: Electronically signed by DALTON Whitaker on 2021 at 11:06 AM                    [facility] AT [place]   Facility/Department: [facility][note 3]  Speech Language Pathology  Initial Speech/Language/Cognitive Assessment     NAME:Chey Lomax  : 3/5/1929 (80 y.o.)   MRN: [id number]  ROOM: [place] [address]  ADMISSION DATE: 2021  PATIENT DIAGNOSIS(ES): PVD (peripheral vascular disease) ([address] [I73.9]  No chief complaint on file.           Patient Active Problem List     Diagnosis Date Noted    Anginal equivalent ([address]         Priority: High    PVD (peripheral vascular disease) ([address] 2021    Venous thrombosis of leg 2021    Hyponatremia 2021    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    Syncope and collapse 2020    COPD exacerbation ([address] 2020    NSTEMI (non-ST elevated myocardial infarction) ([address] 2019    Chest pain 2019    Hypotension 2019    SOB (shortness of breath) 2018    COPD with acute exacerbation ([address] 2018    Bronchitis 2016    CAD (coronary artery disease) 2015    HTN (hypertension)      Dyslipidemia      DM (diabetes mellitus) ([address]      Hypothyroidism      History of tobacco abuse      Anemia        Past Medical History        Past Medical History:   Diagnosis Date    Anemia      CAD (coronary artery disease) 2015    DM (diabetes mellitus) ([address]      Dyslipidemia      History of tobacco abuse      HTN (hypertension)      Hypothyroidism      Non-ST elevation MI (NSTEMI) ([address]      Pacemaker 2015         Past Surgical History         Past Surgical History:   Procedure Laterality Date    CATARACT REMOVAL        MIDDLE EAR SURGERY Left      \"they had to reset the bones\" after an infection            DATE ONSET: 2021     Date of Evaluation: 2/3/2021   Evaluating Therapist: Layla Friedman SLP     Assessment:      Diagnosis: Pt presents with a moderate cognitive linguistic impairment. It should be noted that a complete formal assessment was unable to be completed this date secondary to pts hearing and participation. Most responses from pt were oh, I dont know even when given cues and encouragement. Pt exhibited impaired short term and working memory, impaired abstract naming and reasoning, decreased safety awareness and problem solving, decreased insight, decreased comprehension of novel or abstract prompts, delayed initiation, and impaired money management skills. Tx will continue to assess pts cognitive linguistic function.     Recommendations:  Requires SLP Intervention: Yes  Duration/Frequency of Treatment: 2-3x/week  D/C Recommendations: To be determined     Goals:  Short-term Goals  Timeframe for Short-term Goals: 2-3x/week of individual and/or group treatment sessions, as applicable, during LOS or until goals met  Goal 1: To increase safety awareness and judgment for safe completion of ADLs secondary to pt's cognitive deficits,  pt will complete mid level problem solving tasks related to ADLs (e.g. personal care, money managment, medications) with 80% accuracy and min cues. Goal 2: To increase safety awareness and judgment for safe completion of ADLs secondary to pt's cognitive deficits, pt will provide reasonable solutions to problems of everyday living with 80% accuracy and min cues. Goal 3: To increase safety awareness and judgment for safe completion of ADLs secondary to pt's cognitive deficits, pt will complete abstract reasoning tasks (i.e. convergent and divergent naming) with 80% accuracy and min cues. Goal 4: To address pt's cognitive deficits and promote orientation, pt will state name of facility, time within 1 hour, reason in hospital, current month and year with 100% accuracy with  use of external aid.   Long-term Goals  Timeframe for Long-term Goals: 2-4 weeks of individual and/or group treatment sessions, as applicable, during LOS or until goals met  Goal 1: Pt will demonstrate functional cognitive-linguistic abilities in all opportunities with modified independence in order to safely complete ADLs. Patient's goals: To go home     Subjective:   Previous level of function and limitations: Pt states he manages his own medication, finances, and helps with household tasks   General  Chart Reviewed: Yes  Patient assessed for rehabilitation services?: Yes  Additional Pertinent Hx: Pt wears hearing aids however is very [place]. Pt repsonds best to written explanation and communication. Family / Caregiver Present: No  Subjective  Subjective: Pt was alert but required mod cues and encouragment to particpate in evaluation. Vision  Vision: Impaired  Vision Exceptions: Wears glasses at all times  Hearing  Hearing: Exceptions to [facility]  Hearing Exceptions: Hard of hearing/hearing concerns;Bilateral hearing aid(SLP att to use amplifier to improve hearing, however, pt c/o that is sounded mumbled and that he could not understand.)     Objective:  Oral/Motor  Oral Motor: Within functional limits     Auditory Comprehension  Comprehension: Exceptions  Yes/No Questions: Mild  Basic Questions: Moderate  Complex Questions: Moderate  Conversation: Moderate  Interfering Components: Hearing  Effective Techniques: Increased volume;Visual/Gestural cues(Whiteboard for notes)     Reading Comprehension  Reading Status: Unable to assess     Expression  Primary Mode of Expression: Verbal     Verbal Expression  Verbal Expression: Exceptions to functional limits  Repetition: Mild  Automatic Speech: Mild  Convergent: Severe  Divergent: Severe  Responsive: Mild  Conversation: Moderate     Written Expression  Written Expression: Unable to assess     Motor Speech  Motor Speech:  Within Functional Limits        Cognition:   Orientation  Overall Orientation Status: Impaired  Orientation Level: Oriented to place;Oriented to person;Disoriented to time;Disoriented to situation  Attention  Attention: Within Functional Limits  Memory  Memory: Exceptions to [facility]  Daily Routines: Mild  Long-term Memory: Moderate  People Encountered: Moderate  Prospective Memory: Moderate  Short-term Memory: Moderate  Working Memory: Moderate  Problem Solving  Problem Solving: Exceptions to [facility]  Complex Functional Tasks: Moderate  Managing Finances: Moderate  Managing Medications: To be assessed in therapy  Simple Functional Tasks: Mild  Verbal Reasoning Skills: Moderate  Numeric Reasoning  Numeric Reasoning: Exceptions to [facility]   Money Management: Mild  Time: To be assessed in therapy  Abstract Reasoning  Abstract Reasoning: Exceptions to [facility]  Convergent Thinking: Severe  Divergent Thinking: Severe  Safety/Judgement  Safety/Judgement: Exceptions to [facility]  Complex Functional Tasks: To be assessed in therapy  Novel Situations: Mild  Unable to Self-monitor and Self-correct Consistently: Mild  Insight: Mild          Prognosis:  Individuals consulted  Consulted and agree with results and recommendations: Patient;RN     Education:  Patient Education: eval results  Patient Education Response: Demonstrated understanding;Needs reinforcement  Type of devices: Chair alarm in place; Left in chair(SLP transported pt to PT.)     Pain Assessment:  Initial Assessment:  Patient c/o: 5     Re-assessment:  Patient c/o: 7  RN informed.     NATIONAL OUTCOMES MEASUREMENT SYSTEM (NOMS):  SPOKEN LANGUAGE COMPREHENSION  Ratin     SPOKEN LANGUAGE EXPRESSION  Ratin/5     MOTOR SPEECH  Ratin     PROBLEM SOLVING  Ratin     MEMORY  Ratin                                                   Signature: Electronically signed by DALTON Dillon on 2/3/2021 at 11:24 AM                    Occupational Therapy   Occupational Therapy Initial Assessment  Date: 2021              Patient Name: Bobbi Donahue  MRN:   [id number]     : 3/5/1929     Date of Service: 2/2/2021     Discharge Recommendations:  Continue to assess pending progress  OT Equipment Recommendations  Other: Continue to assess     Assessment   Performance deficits / Impairments: Decreased functional mobility ; Decreased strength;Decreased endurance;Decreased coordination;Decreased fine motor control;Decreased balance;Decreased ADL status; Decreased safe awareness;Decreased cognition  Assessment: Pt is a 80 yr old male presenting to [facility] with the above functional deficits. Pt would benefit from occupational therapy services to maximize safety and independence with ADL tasks, improve overall strength/endurance and balance for functional tasks. Prognosis: Good  Decision Making: Medium Complexity  History: mod complex  Exam: 9 perf deficits  Assistance / Modification: maxA  OT Education: OT Role;Plan of Care  REQUIRES OT FOLLOW UP: Yes  Activity Tolerance  Activity Tolerance: Patient limited by pain  Safety Devices  Safety Devices in place: Yes  Type of devices: All fall risk precautions in place            Patient Diagnosis(es): There were no encounter diagnoses.      has a past medical history of Anemia, CAD (coronary artery disease), DM (diabetes mellitus) ([facility] Dyslipidemia, History of tobacco abuse, HTN (hypertension), Hypothyroidism, Non-ST elevation MI (NSTEMI) ([facility] and Pacemaker. has a past surgical history that includes Cataract removal and Middle ear surgery (Left, 1998).                                   Restrictions  Restrictions/Precautions  Restrictions/Precautions: Fall Risk     Subjective   General  Referring Practitioner: Dr. Taurus Hanley  Diagnosis: Impaired mobility and ADL's d/t severe progressive PVD  Patient Currently in Pain: Yes  Pain Assessment  Pain Assessment: 0-10  Pain Level: 6  Pain Type: Acute pain  Pain Location: Leg  Pain Orientation: Right  Pain Descriptors: Aching  Pain Frequency: Continuous  Pre Treatment Pain Screening  Pain at present: 3  Scale Used: Numeric Score  Intervention List: Patient able to continue with treatment;Patient declined any intervention  Vital Signs  Patient Currently in Pain: Yes  Social/Functional History  Social/Functional History  Lives With: Other (comment)(granddaughter and family)  Type of Home: House  Home Layout: Two level, Bed/Bath upstairs  Home Access: Stairs to enter with rails  Entrance Stairs - Number of Steps: 14  Bathroom Shower/Tub: Tub/Shower unit  ADL Assistance: Independent  Homemaking Assistance: Independent(granddaughter completes)  Ambulation Assistance: Independent  Transfer Assistance: Independent  Active : Yes  Additional Comments: pt is significantly [place]; information gathered via chart review and via writing.     Objective   Vision: Impaired  Vision Exceptions: Wears glasses at all times  Hearing: Exceptions to [facility]  Hearing Exceptions: Hard of hearing/hearing concerns;Bilateral hearing aid    Orientation  Overall Orientation Status: Impaired  Orientation Level: Oriented to place;Oriented to person  Balance  Sitting Balance: Stand by assistance  Standing Balance: Minimal assistance  Toilet Transfers  Toilet Transfer: Unable to assess  Toilet Transfers Comments: did not need to go  Tub Transfers  Tub Transfers: Not tested  Shower Transfers  Shower Transfers: Not tested  [employer] Transfers Comments: pt requested sponge bath  ADL  Feeding: Setup  Grooming: Supervision; Increased time to complete  UE Bathing: Stand by assistance  LE Bathing: Minimal assistance  UE Dressing: Stand by assistance  LE Dressing: Maximum assistance  Toileting: Unable to assess(comment)(did not need to go)  Additional Comments: pt completed sponge bath ADL seated EOB. Pt indicates pain in RLE due to recent procedure. Tone RUE  RUE Tone: Normotonic  Tone LUE  LUE Tone: Normotonic  Coordination  Movements Are Fluid And Coordinated: No  Coordination and Movement description: Fine motor impairments;Decreased speed;Decreased accuracy; Right UE;Left UE  Bed mobility  Supine to Sit: Moderate assistance  Sit to Supine: Minimal assistance  Transfers  Sit to stand: Minimal assistance  Stand to sit: Minimal assistance  Cognition  Overall Cognitive Status: Exceptions  Arousal/Alertness: Appropriate responses to stimuli  Following Commands: Follows one step commands consistently  Attention Span: Appears intact  Memory: Decreased recall of recent events;Decreased short term memory;Decreased long term memory;Decreased recall of precautions  Safety Judgement: Decreased awareness of need for safety;Decreased awareness of need for assistance  Problem Solving: Assistance required to generate solutions;Assistance required to correct errors made;Assistance required to identify errors made;Assistance required to implement solutions  Insights: Decreased awareness of deficits  Initiation: Requires cues for some  Sequencing: Requires cues for some  Cognition Comment: comp: sup express: sup soc int: sup prob solv:Theodore mem: modA  Sensation  Overall Sensation Status: WFL         LUE AROM (degrees)  LUE AROM : WFL  Left Hand AROM (degrees)  Left Hand AROM: WFL  RUE AROM (degrees)  RUE AROM : WFL  Right Hand AROM (degrees)  Right Hand AROM: WFL  LUE Strength  Gross LUE Strength: WFL  L Hand General: 3+/5  LUE Strength Comment: gross assessment  RUE Strength  Gross RUE Strength: WFL  R Hand General: 3+/5  RUE Strength Comment: gross assessment  Hand Dominance  Hand Dominance: Right        Plan   Plan  Times per week: 5-7x/wk  Plan weeks: 1.5  Current Treatment Recommendations: Strengthening, Functional Mobility Training, Cognitive Reorientation, Cognitive/Perceptual Training, Patient/Caregiver Education & Training, Endurance Training, Equipment Evaluation, Education, & procurement, Balance Training, Safety Education & Training, Self-Care / ADL        Goals  Patient Goals   Patient goals : to go home  [x]?   Patient will complete self care as followed using the recommended adaptive to complete thread of RLE without assist, however with increased time to complete and able to complete hiking pants over hips while standing at [facility] and CGA for safety while standing with good posterior reach)  Toileting - Patient did not need to go and it was not assessed at this time. Toilet transfer - Min assist     Short term goals for one week:  1. LE dressing with set up and supervision  2. Toilet transfer consistently with supervision        Electronically signed by CHETNA Raphael/L on 2/8/2021 at 3:30 PM  Lyn  In-Patient Rehabilitation Physical Therapy Update Note     Christ Herrera  3/5/1929  R260/R260-01      Current Level of Function:  Restrictions  Restrictions/Precautions  Restrictions/Precautions: Fall Risk     Objective     Bed mobility  Rolling to Left: Stand by assistance  Rolling to Right: Stand by assistance  Supine to Sit: Stand by assistance  Sit to Supine: Stand by assistance  Scooting: Stand by assistance  Comment: Hospital bed, flat position with use of bed rails.     Transfers  Sit to Stand: Stand by assistance  Stand to sit: Stand by assistance  Bed to Chair: Stand by assistance  Car Transfer: Contact guard assistance, Minimal Assistance  Comment: Mild LOB with standing up from car seat position that requried Min A to correct.     Ambulation  Ambulation?: Yes  Ambulation 1  Surface: level tile, carpet, uneven  Device: Rolling Walker  Other Apparatus: O2(3L)  Assistance: Stand by assistance, Contact guard assistance  Quality of Gait: Slow reciprocal gait pattern with decreased stance phase on Right LE, fwd flexed psoture. slight increase in pace this PM tx session compared to AM.  Gait Deviations: Decreased step length, Shuffles, Decreased step height  Distance: 160'  Comments: Fatigue, c/o SOB with ambualtion.  Required VCs and hand gestures for directions.     Stairs/Curb  Stairs?: Yes  Stairs  # Steps : 8  Stairs Height: 6\"  Rails: Bilateral  Curbs: 4\"  Device: Rolling walker  Assistance: Stand by assistance, Contact guard assistance  Comment: Mix of reciprocal and non-reciprocal pattern. Slow steady, SBA for stairs and CGA with curb step, no cues provided to test pt's safety with curb step, pt able to complete without LOB.             Assessment: Pt has demonstrated good gains overall. He is requiring assistance with mobility at this time. Pt is in need of continued PT prior to safe return to home           Long term goals  Long term goal 1: Bed mobility with indep  Long term goal 2: Functional transfers with indep  Long term goal 3: indep gait 150 feet with or without device  Long term goal 4: Pt will negotiate >/=12 steps with handrails and SBA to navigate home environment  Long term goal 5: indep with HEP              Electronically signed by Mirella Benitez PT on 2021 at 4:05 PM                    [facility]   Facility/Department: [facility]  Speech Language Pathology  Discharge Report        Patient: Shira Mahan  : 3/5/1929     Date: 2021  [address] Renee (NOMS):     STATUS AT INITIATION OF THERAPY:  DIET: regular/thin        SWALLOWING  Ratin     SPOKEN LANGUAGE COMPREHENSION  Ratin     SPOKEN LANGUAGE EXPRESSION  Ratin-5     MOTOR SPEECH  Ratin     PROBLEM SOLVING  Ratin     MEMORY  Ratin        Treatment Area(s):  Cognition     Progress made:  Pt participate in one speech therapy treatment session with the below listed goals. Pt was limited with participation due to hearing status, vision status, and lack of motivation to participate. Pt worked towards the below listed goals but goals were not met.  After discussion with family, it was determined that the pt is at his baseline level of functioning and would not benefit from skilled ST services at this time.                 Short-term Goals  Timeframe for Short-term Goals: 2-3x/week of individual and/or group treatment sessions, as applicable, during LOS or until goals met  Goal 1: To increase safety awareness and judgment for safe completion of ADLs secondary to pt's cognitive deficits,  pt will complete mid level problem solving tasks related to ADLs (e.g. personal care, money managment, medications) with 80% accuracy and min cues. Goal 2: To increase safety awareness and judgment for safe completion of ADLs secondary to pt's cognitive deficits, pt will provide reasonable solutions to problems of everyday living with 80% accuracy and min cues. Goal 3: To increase safety awareness and judgment for safe completion of ADLs secondary to pt's cognitive deficits, pt will complete abstract reasoning tasks (i.e. convergent and divergent naming) with 80% accuracy and min cues. Goal 4: To address pt's cognitive deficits and promote orientation, pt will state name of facility, time within 1 hour, reason in hospital, current month and year with 100% accuracy with  use of external aid. Long-term Goals  Timeframe for Long-term Goals: 2-4 weeks of individual and/or group treatment sessions, as applicable, during LOS or until goals met  Goal 1: Pt will demonstrate functional cognitive-linguistic abilities in all opportunities with modified independence in order to safely complete ADLs.   Compensatory Swallowing Strategies: Upright as possible for all oral intake, Small bites/sips      STATUS AT DISCHARGE:  DIET: regular/thin     SWALLOWING  Ratin     SPOKEN LANGUAGE COMPREHENSION  Ratin     SPOKEN LANGUAGE EXPRESSION  Ratin     MOTOR SPEECH  Ratin     PROBLEM SOLVING  Ratin     MEMORY  Ratin        Functional Status at time of Discharge:     · Cognition: Patient demonstrates mild-moderate cognitive deficits.     · Communication: Patient demonstrates mild-moderate communication deficits.     · Language: Patient demonstrates mild language deficits.     · Motor Speech: Patient demonstrates no motor speech deficits.     · Swallow: Patient demonstrates no dysphagia.                                  Patient is discharged to Rehab. Pt remains in acute rehab for skilled PT and OT. [x]?  Speech Therapy is no longer warranted        Signature:  Electronically signed by DALTON Odonnell on 2/4/2021 at 3:27 PM            Mercedes Herring   Electronically signed by Farhana Kline RN on 2/8/2021 at 4:24 PM

## 2021-02-08 NOTE — PROGRESS NOTES
Hospitalist Progress Note  2/8/2021 2:34 PM    Assessment and Plan:   Acute COPD with mild exacerbation:  CXR without infiltrates or acute processes. Completed 5-day course of prednisone. Continue chest PT, Acapella, Incentive spirometry, Duonebs Q4hr, Supplemental O2 with goal SPO2 of 92% or greater. 1. Anemia: on warfarin. Monitor H/H. Now on ferrous sulfate and folic acid. Hemoglobin currently 7.7, no signs of bleeding or hematoma. Will only transfuse if hemoglobin is below 7.  2. 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 Long-term anticoagulation. Fall precautions. PT OT to evaluate. Maximize nutrition status. Assessing if needs DME at home. SW on board. Dr. Vitaliy Rodriguez managing rehab plan  3. CAD/CABG/PPM: On ASA, statin, BB  4. HTN: LVEF 60%. Stable. On coreg. 5. DM type II: SSI, POCT ACHS, hypoglycemia protocol. Lantus added. 6. Hypothyroidism: On synthroid  7. CKD: Avoid nephrotoxic agents when able. Monitor BMP. 8. Hypocalcemia: Check Vit D level, PTH  9. Extremely [place]: Speak slowly, write material down. 10. Thick, elongated toe nails: Podiatry following and managing. 11. Bowel Regimen and GI PPx: stool softners PRN ordered with hold parameters for loose stools or diarrhea. On antiacid  12. Diet: DIET CARB CONTROL; Carb Control: 4 carb choices (60 gms)/meal; No Added Salt (3-4 GM)  13. Advance Directive: Full Code   14. Nutrition status: Supplemental Vitamins ordered. Dietitian assessment  15. Vaccinations: Immunization records reviewed. If has not received appropriate vaccinations, will order to be given prior to discharge. 16. DVT prophylaxis: on coumadin  17. Discharge planning: RETA on board. 18. High Risk Readmission Screening Tool Score Noted.      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 *      ** Total time spent reviewing medical records, evaluating patient, speaking with RN's and consultants where I was focused exclusively on this patient: 35 minutes. This time is excluding time spent performing procedures or significant events occurring earlier or later in the day requiring my attention and focus. Subjective:   Admit Date: 2/1/2021  PCP: Francois Bill, DO    No acute events overnight. Afebrile  No new complaints. Pt denies chest pain, SOB, N/V, fevers or chills. Objective:     Vitals:    02/07/21 1712 02/07/21 1818 02/08/21 0418 02/08/21 0819   BP: (!) 137/56 (!) 130/57  (!) 146/59   Pulse: 64 66  66   Resp:  17 16 16   Temp:  98 °F (36.7 °C)  98 °F (36.7 °C)   TempSrc:  Oral  Oral   SpO2:  94% 97% 98%   Weight:       Height:         General appearance: A + O x 2. No conversational dyspnea noted. Answers questions appropriately. Extremely [place]. Able to communicate. Does better if questions are written down  Lungs: Diminished,  exp wheezes, No rales, No retractions; No use of accessory muscles. Non-productive cough present  Heart:  S1, S2 normal, RRR, no MRG appreciated  Abdomen: (+) BS, soft, non-tender; non distended no guarding or rigidity. Extremities:  no cyanosis, no edema bilat lower exts, no calf tenderness bilaterally. Dry skin noted.  Scattered areas of ecchymosis present       Medications:      furosemide  20 mg Oral Daily    ipratropium-albuterol  1 ampule Inhalation TID    insulin glargine  10 Units Subcutaneous Nightly    ferrous sulfate  325 mg Oral BID WC    folic acid  1 mg Oral Daily    pill splitter   Does not apply Once    vitamin D  50,000 Units Oral Weekly    budesonide-formoterol  2 puff Inhalation BID    aspirin  81 mg Oral Daily    atorvastatin  40 mg Oral Nightly    carvedilol  6.25 mg Oral BID WC    insulin lispro  0-12 Units Subcutaneous 4x Daily AC & HS    levothyroxine  25 mcg Oral Daily    pantoprazole  40 mg Oral QAM AC    ursodiol  300 mg Oral BID    [START ON 1/30/2022] warfarin (COUMADIN) daily dosing (placeholder)   Other RX Placeholder       LABS Reviewed    IMAGING Reviewed    Osvaldo Leblanc CNP  Rounding Hospitalist

## 2021-02-08 NOTE — PROGRESS NOTES
INDIVIDUALIZED OVERALL REHAB PLAN OF CARE  ADDENDUM TO REHAB PROGRESS NOTE-for audit purposes must also refer to this day's clinical note and combine the information      Date: 2021  Patient Name: Sarah Randolph   Room: I351/Q610-04    MRN: [id number]    : 3/5/1929  (80 y.o.)  Gender: male       Today 2021 during weekly team meeting, I reviewed the patient Sarah Randolph in detail with the therapists and nurses involved in patient's care gathering complex physiatric data regarding current medical issues, progress in therapies, factors limiting progress, social issues, psychological issues, ongoing therapeutic plans and discharge planning. Legend:  I= independent Im =Modified independent  S=Supervised SB=stand by DE SOUZA=set up CG=contact ricardo Min= minimal Mod=Moderate Max=maximal Max of 2 =maximal assist of 2 people      CURRENT FUNCTIONAL STATUS:    NURSING ISSUES:       Assessment completed earlier in the shift. VSS. Denies pain. LBM 21. HS OT- 409. 12 units of insulin administered in addition to 10 units of Lantus. 40mg prednisone completed- last dose 21. INR 2.8- pharmacy dosed 2.5mg of coumadin. CBC, CMP, PT/INR and type+screen. Please see prior notes in regards to pt's vision and blood transfusion  Nursing will continue to focus on bowel and bladder continence transitioning toward independence by time of discharge. Monitoring post void residuals monitoring for severe constipation and bowel obstruction. Focus on achieving ADL goals with co-treating with OT when possible.     PHYSICAL THERAPY  Bed mobility:  Supine to Sit: Stand by assistance (21 1116)  Sit to Supine: Stand by assistance (21 1116)  Transfers:  Sit to Stand: Stand by assistance (21 1201)  Bed to Chair: Stand by assistance (21 120)  Gait:   Device: Rolling Walker (21 120)  Other Apparatus: O2(3L) (21 1201)  Assistance: Contact guard assistance (21 120)  Distance: 175' (21 1201)  Quality of Gait: Slow reciprocal gait pattern with decreased stance phase on Right LE, fwd flexed psoture. (02/08/21 1201)  Comments: Fatigue, c/o SOB with ambualtion. Required VCs and hand gestures for directions. (02/08/21 1201)  Stairs:  # Steps : 4 (02/05/21 1216)  Rails: Bilateral (02/05/21 1216)  Assistance: Contact guard assistance;Stand by assistance (02/05/21 1216)  Comment: Non-reciprocal pattern, slow steady pace. (02/05/21 1216)  W/C mobility:         OCCUPATIONAL THERAPY  Hand Dominance: Right  ADL  Feeding: Setup (02/03/21 1014)  Grooming: Setup (02/06/21 1135)  UE Bathing: Supervision;Setup (02/06/21 1135)  LE Bathing: Minimal assistance (02/06/21 1135)  UE Dressing: Setup;Supervision (02/06/21 1135)  LE Dressing: Moderate assistance(assist to thread LLE, able to complete thread of RLE without assist, however with increased time to complete and able to complete hiking pants over hips while standing at [facility] and CGA for safety while standing with good posterior reach) (02/06/21 1135)  Toileting: Unable to assess(comment)(pt. did not need to go) (02/03/21 1014)  Additional Comments: pt completed sponge bath ADL seated EOB. Pt indicates pain in RLE due to recent procedure. (02/02/21 1319)  Toilet Transfers  Toilet Transfer: Unable to assess (02/03/21 1015)  Toilet Transfers Comments: did not need to go (02/03/21 1015)  Tub Transfers  Tub Transfers: Not tested (02/02/21 1324)  Shower Transfers  Shower Transfers: Not tested (02/03/21 1015)  Shower Transfers Comments: pt. declined a shower and completed a sponge bath at the side of the bed. (02/03/21 1015)      SPEECH THERAPY  Motor Speech:  Within Functional Limits  Comprehension: Exceptions(very [place] with no success with amplification; pt benefits from written information with cues)  Verbal Expression: Exceptions to functional limits(min assist to supervision)      Diet/Swallow:  Diet Solids Recommendation: Regular  Liquid Consistency Recommendation: Thin  Dysphagia Outcome Severity Scale: Level 6: Within functional limits/Modified independence    Compensatory Swallowing Strategies: Upright as possible for all oral intake, Small bites/sips             COGNITION  OT: Cognition Comment: comp: SUP exp: SUP soc: MIN A prob: MIN A mem: MIN A  SP:Memory: Exceptions to WFL(min assist)  Problem Solving: Exceptions to WFL(min assist)        THERAPY, MEDICAL AND NURSING COORDINATION:    []  Pain medication before therapies     []  Check orthostatic BP      [x]  Ambulate to the bathroom in room    [x]  Add scheduled rest beaks     []  In room therapies      Discharge date set for:              2/13/21      Home with:   granddaughter Michaelyn Seip  and family  with help from   granddaughter Michaelyn Seip  and family            And:      Home Health Care:     [x]  PT    []  OT    []  ST   [x]  Aide   []  SW    [x]  RN                    Outpatient Therapy:  []  PT    []  OT    []  ST   []  Rehab Psych                 Equipment:  [employer]      At D/C their function is goaled at:   PT:Long term goal 1: Bed mobility with indep  Long term goal 2: Functional transfers with indep  Long term goal 3: indep gait 150 feet with or without device  Long term goal 4: Pt will negotiate >/=12 steps with handrails and SBA to navigate home environment  Long term goal 5: indep with HEP  OT:Eating  Assistance Needed: Setup or clean-up assistance  CARE Score: 5  Discharge Goal: Independent, Oral Hygiene  Assistance Needed: Setup or clean-up assistance  CARE Score: 5  Discharge Goal: Independent, Toileting Hygiene  Comment: did not need to go  Reason if not Attempted: Patient refused  CARE Score: 7, Shower/Bathe Self  Assistance Needed: Partial/moderate assistance  CARE Score: 3  Discharge Goal: Supervision or touching assistance  Upper Body Dressing  Assistance Needed: Supervision or touching assistance  CARE Score: 4  Discharge Goal: Independent, Lower Body Dressing  Assistance Needed: Substantial/maximal assistance  CARE Score: 2  Discharge Goal: Independent, Putting On/Taking Off Footwear  Assistance Needed: Dependent  CARE Score: 1  Discharge Goal: Independent, Toilet Transfer  Comment: did not need to go  Reason if not Attempted: Patient refused  CARE Score: 7  Discharge Goal: Independent  SP:Long-term Goals  Timeframe for Long-term Goals: 2-4 weeks of individual and/or group treatment sessions, as applicable, during LOS or until goals met  Goal 1: Pt will demonstrate functional cognitive-linguistic abilities in all opportunities with modified independence in order to safely complete ADLs. From a cognitive standpoint they will need:        24 hr supervision  --progress to occasional           Significant problems/ barriers to functional progress include: Pt is at a high risk for functional loss,    [x]  Acute infection/UTI    []  Low BP's     []  COPD flare-up   []  Uncontrolled blood sugar     []  Progressive anemia         [x]  Severe pain exacerbation--RLE     []  Impaired mental status    []  Urinary incontinence    []  Bowel incontinence           Plan to correct barriers to functional progress: Add scheduled rest breaks, control pain by using ice Lidoderm rest and massage as well as pain medications prior to therapy. Based on a comprehensive evaluation of the above, the individualized therapy and Discharge plan will be:    -Times stated are an average that will be varied based on the patient's daily need. PT  1 1/2  hrs/day 5-7 days per week           OT  1 1/2hrs per day 5-7 days per week ST ____1/2__ _hrs /day 3-5 days per week       Estimated LOS 2 week(s)    - Overall functional prognosis:     [x]  Good    []  Fair    []  Poor -Medical Prognosis:   [x]  Good    []  Fair    []  Poor    This patient was made aware of the discussion of Plan of Care, their projected dicharge date and their projected function at discharge.        Nicky Clayton DO

## 2021-02-08 NOTE — PROGRESS NOTES
[facility]  In-Patient Rehabilitation Physical Therapy Update Note      Tania Zepeda  3/5/1929  R260/R260-01     Current Level of Function:  Restrictions  Restrictions/Precautions  Restrictions/Precautions: Fall Risk    Objective    Bed mobility  Rolling to Left: Stand by assistance  Rolling to Right: Stand by assistance  Supine to Sit: Stand by assistance  Sit to Supine: Stand by assistance  Scooting: Stand by assistance  Comment: Hospital bed, flat position with use of bed rails. Transfers  Sit to Stand: Stand by assistance  Stand to sit: Stand by assistance  Bed to Chair: Stand by assistance  Car Transfer: Contact guard assistance, Minimal Assistance  Comment: Mild LOB with standing up from car seat position that requried Min A to correct. Ambulation  Ambulation?: Yes  Ambulation 1  Surface: level tile, carpet, uneven  Device: Rolling Walker  Other Apparatus: O2(3L)  Assistance: Stand by assistance, Contact guard assistance  Quality of Gait: Slow reciprocal gait pattern with decreased stance phase on Right LE, fwd flexed psoture. slight increase in pace this PM tx session compared to AM.  Gait Deviations: Decreased step length, Shuffles, Decreased step height  Distance: 160'  Comments: Fatigue, c/o SOB with ambualtion. Required VCs and hand gestures for directions. Stairs/Curb  Stairs?: Yes  Stairs  # Steps : 8  Stairs Height: 6\"  Rails: Bilateral  Curbs: 4\"  Device: Rolling walker  Assistance: Stand by assistance, Contact guard assistance  Comment: Mix of reciprocal and non-reciprocal pattern. Slow steady, SBA for stairs and CGA with curb step, no cues provided to test pt's safety with curb step, pt able to complete without LOB. Assessment: Pt has demonstrated good gains overall. He is requiring assistance with mobility at this time.  Pt is in need of continued PT prior to safe return to home        Long term goals  Long term goal 1: Bed mobility with indep  Long term goal 2: Functional transfers with indep  Long term goal 3: indep gait 150 feet with or without device  Long term goal 4: Pt will negotiate >/=12 steps with handrails and SBA to navigate home environment  Long term goal 5: indep with HEP             Electronically signed by Richardson Iniguez PT on 2/8/2021 at 4:05 PM

## 2021-02-08 NOTE — PLAN OF CARE
Problem: Pain:  Goal: Pain level will decrease  Description: Pain level will decrease  Outcome: Ongoing  Goal: Control of acute pain  Description: Control of acute pain  Outcome: Ongoing  Goal: Control of chronic pain  Description: Control of chronic pain  Outcome: Ongoing     Problem: Falls - Risk of:  Goal: Will remain free from falls  Description: Will remain free from falls  Outcome: Ongoing  Goal: Absence of physical injury  Description: Absence of physical injury  Outcome: Ongoing     Problem: IP SWALLOWING  Goal: LTG - patient will tolerate the least restrictive diet consistency to allow for safe consumption of daily meals  Outcome: Ongoing     Problem: Skin Integrity:  Goal: Will show no infection signs and symptoms  Description: Will show no infection signs and symptoms  Outcome: Ongoing  Goal: Absence of new skin breakdown  Description: Absence of new skin breakdown  Outcome: Ongoing

## 2021-02-08 NOTE — PROGRESS NOTES
[facility] Occupational Therapy      Date: 2021  Patient Name: Madelin Sheets        MRN: [id number]  Account: [de-identified]   : 3/5/1929  (80 y.o.)  Room: [place] [address]    Insurance update:    Current functional status:   Feeding- set up   Grooming: Setup  UE Bathing: Supervision;Setup  LE Bathing: Minimal assistance  UE Dressing: Setup;Supervision  LE Dressing: Moderate assistance(assist to thread LLE, able to complete thread of RLE without assist, however with increased time to complete and able to complete hiking pants over hips while standing at [facility] and CGA for safety while standing with good posterior reach)  Toileting - Patient did not need to go and it was not assessed at this time. Toilet transfer - Min assist    Short term goals for one week:  1. LE dressing with set up and supervision  2.  Toilet transfer consistently with supervision      Electronically signed by GIN Bahena on 2021 at 3:30 PM

## 2021-02-08 NOTE — PROGRESS NOTES
Comprehensive Nutrition Assessment    Type and Reason for Visit:  Initial, RD Nutrition Re-Screen/LOS    Nutrition Recommendations/Plan: Continue Current Diet    Nutrition Assessment:  Pt presents with fair intake at meals. To monitor continued adequacy of intake. Please obtain updated weight to assess trend of weight status. Malnutrition Assessment:  Malnutrition Status:  Insufficient data    Context:  Chronic Illness       Estimated Daily Nutrient Needs:  Energy (kcal):  1417-7049 (kg x 24-26); Weight Used for Energy Requirements:  Admission     Protein (g):  81-87 (kg x 1.2-1.3); Weight Used for Protein Requirements:           Fluid (ml/day):  ~1800; Method Used for Fluid Requirements:  1 ml/kcal      Nutrition Related Findings:  PMH-DB, htn, hld, PVD, [place]. Gluc-207-407 x last 3 days. +1 BLE edema noted. Last BM 2/8. Pt eating lunch when visited with no nutritional complaints. Intake usually noted fair at meals. Wounds:  None       Current Nutrition Therapies:    DIET CARB CONTROL; Carb Control: 4 carb choices (60 gms)/meal; No Added Salt (3-4 GM)    Anthropometric Measures:  · Height: 5' 7\" (170.2 cm)  · Current Body Weight: (N/A)   · Admission Body Weight: 159 lb 8 oz (72.3 kg)    · Usual Body Weight: 160 lb (72.6 kg)(stated 2/13/20 & 3/26/20)     · Ideal Body Weight: 148 lbs; % Ideal Body Weight   >100%  · BMI:  25  · BMI Categories: Normal Weight (BMI 22.0 to 24.9) age over 72       Nutrition Diagnosis:   · Altered nutrition-related lab values related to endocrine dysfuntion as evidenced by lab values    Nutrition Interventions:   Food and/or Nutrient Delivery:  Continue Current Diet  Nutrition Education/Counseling:  No recommendation at this time   Coordination of Nutrition Care:  Continue to monitor while inpatient    Goals:  Intake >50% of meals consistantly. stable weight.        Nutrition Monitoring and Evaluation:   Food/Nutrient Intake Outcomes:  Food and Nutrient Intake  Physical Signs/Symptoms Outcomes:  Weight, Biochemical Data     Electronically signed by Nikky Joy RD, LD on 2/8/21 at 3:07 PM EST

## 2021-02-08 NOTE — PROGRESS NOTES
Assessment completed earlier in the shift. VSS. Denies pain. LBM 2/5/21. HS OT- 409. 12 units of insulin administered in addition to 10 units of Lantus. 40mg prednisone completed- last dose 2/7/21. INR 2.8- pharmacy dosed 2.5mg of coumadin. CBC, CMP, PT/INR and type+screen. Please see prior notes in regards to pt's vision and blood transfusion. No distress noted. Call light within reach and bed alarm activated. Electronically signed by Anatoliy Sandoval RN on 2/8/2021 at 12:01 AM    Dr. Madan Stubbs into to evaluate pt at this time. To resume w/ ASA and coumadin. Electronically signed by Anatoliy Sandoval RN on 2/8/2021 at 12:46 AM    Pt walked to bathroom requiring NO verbal cues. Lg BM 2/8/21. Assisted back to bed. Pt got into bed w/ no verbal cues and was even able to lift own legs.   Electronically signed by Anatoliy Sandoval RN on 2/8/2021 at 4:19 AM
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
## 2021-02-08 NOTE — PROGRESS NOTES
Physical Therapy Rehab Treatment Note  Facility/Department: [facility]  Room: 60R260-01       NAME: Lidia Razo  : 3/5/1929 (89 y.o.)  MRN: [id number]  CODE STATUS: Full Code    Date of Service: 2021  Chart Reviewed: Yes  Family / Caregiver Present: No    Restrictions:  Restrictions/Precautions: Fall Risk       SUBJECTIVE: Subjective: It hurts back there, as pt grabs the back of his right thigh. Pain Screening  Patient Currently in Pain: Yes       Post Treatment Pain Screenin/10  Pain Assessment  Pain Assessment: 0-10  Pain Level: 4  Pain Location: Leg  Pain Orientation: Right;Posterior  Pain Descriptors: Aching; Sore  Pain Frequency: Continuous    OBJECTIVE:                    Bed mobility  Rolling to Left: Stand by assistance  Rolling to Right: Stand by assistance  Supine to Sit: Stand by assistance  Sit to Supine: Stand by assistance  Scooting: Stand by assistance  Comment: Hospital bed, flat position with use of bed rails. Transfers  Sit to Stand: Stand by assistance  Stand to sit: Stand by assistance  Bed to Chair: Stand by assistance  Car Transfer: Contact guard assistance;Minimal Assistance  Comment: Mild LOB with standing up from car seat position that requried Min A to correct. Ambulation  Ambulation?: Yes  Ambulation 1  Surface: level tile;carpet;uneven  Device: Rolling Walker  Other Apparatus: O2(3L)  Assistance: Stand by assistance;Contact guard assistance  Quality of Gait: Slow reciprocal gait pattern with decreased stance phase on Right LE, fwd flexed psoture. slight increase in pace this PM tx session compared to AM.  Distance: 160'  Comments: Fatigue, c/o SOB with ambualtion. Required VCs and hand gestures for directions. Stairs/Curb  Stairs?: Yes  Stairs  # Steps : 8  Stairs Height: 6\"  Rails: Bilateral  Curbs: 4\"  Device: Rolling walker  Assistance: Stand by assistance;Contact guard assistance  Comment: Mix of reciprocal and non-reciprocal pattern.  Slow steady, SBA for stairs

## 2021-02-09 LAB
BLOOD BANK DISPENSE STATUS: NORMAL
BLOOD BANK PRODUCT CODE: NORMAL
BPU ID: NORMAL
DESCRIPTION BLOOD BANK: NORMAL
GLUCOSE BLD-MCNC: 131 MG/DL (ref 60–115)
GLUCOSE BLD-MCNC: 183 MG/DL (ref 60–115)
GLUCOSE BLD-MCNC: 210 MG/DL (ref 60–115)
GLUCOSE BLD-MCNC: 223 MG/DL (ref 60–115)
INR BLD: 4.1
PERFORMED ON: ABNORMAL
PROTHROMBIN TIME: 39.3 SEC (ref 12.3–14.9)
SARS-COV-2, PCR: NOT DETECTED

## 2021-02-09 PROCEDURE — 6370000000 HC RX 637 (ALT 250 FOR IP): Performed by: INTERNAL MEDICINE

## 2021-02-09 PROCEDURE — P9016 RBC LEUKOCYTES REDUCED: HCPCS

## 2021-02-09 PROCEDURE — 6370000000 HC RX 637 (ALT 250 FOR IP): Performed by: PHYSICAL MEDICINE & REHABILITATION

## 2021-02-09 PROCEDURE — 6360000002 HC RX W HCPCS: Performed by: INTERNAL MEDICINE

## 2021-02-09 PROCEDURE — 97535 SELF CARE MNGMENT TRAINING: CPT

## 2021-02-09 PROCEDURE — 94761 N-INVAS EAR/PLS OXIMETRY MLT: CPT

## 2021-02-09 PROCEDURE — 85610 PROTHROMBIN TIME: CPT

## 2021-02-09 PROCEDURE — 97140 MANUAL THERAPY 1/> REGIONS: CPT

## 2021-02-09 PROCEDURE — 97110 THERAPEUTIC EXERCISES: CPT

## 2021-02-09 PROCEDURE — 94664 DEMO&/EVAL PT USE INHALER: CPT

## 2021-02-09 PROCEDURE — 99232 SBSQ HOSP IP/OBS MODERATE 35: CPT | Performed by: PHYSICAL MEDICINE & REHABILITATION

## 2021-02-09 PROCEDURE — 2580000003 HC RX 258: Performed by: INTERNAL MEDICINE

## 2021-02-09 PROCEDURE — 2700000000 HC OXYGEN THERAPY PER DAY

## 2021-02-09 PROCEDURE — 97116 GAIT TRAINING THERAPY: CPT

## 2021-02-09 PROCEDURE — 36430 TRANSFUSION BLD/BLD COMPNT: CPT

## 2021-02-09 PROCEDURE — 94640 AIRWAY INHALATION TREATMENT: CPT

## 2021-02-09 PROCEDURE — U0002 COVID-19 LAB TEST NON-CDC: HCPCS

## 2021-02-09 PROCEDURE — 6370000000 HC RX 637 (ALT 250 FOR IP): Performed by: NURSE PRACTITIONER

## 2021-02-09 PROCEDURE — 1180000000 HC REHAB R&B

## 2021-02-09 PROCEDURE — 97530 THERAPEUTIC ACTIVITIES: CPT

## 2021-02-09 PROCEDURE — 36415 COLL VENOUS BLD VENIPUNCTURE: CPT

## 2021-02-09 RX ORDER — IPRATROPIUM BROMIDE AND ALBUTEROL SULFATE 2.5; .5 MG/3ML; MG/3ML
1 SOLUTION RESPIRATORY (INHALATION) EVERY 4 HOURS PRN
Status: DISCONTINUED | OUTPATIENT
Start: 2021-02-09 | End: 2021-02-13 | Stop reason: HOSPADM

## 2021-02-09 RX ORDER — ACETAMINOPHEN 325 MG/1
650 TABLET ORAL ONCE
Status: COMPLETED | OUTPATIENT
Start: 2021-02-09 | End: 2021-02-09

## 2021-02-09 RX ORDER — FUROSEMIDE 10 MG/ML
20 INJECTION INTRAMUSCULAR; INTRAVENOUS ONCE
Status: COMPLETED | OUTPATIENT
Start: 2021-02-09 | End: 2021-02-09

## 2021-02-09 RX ORDER — DIPHENHYDRAMINE HCL 25 MG
25 TABLET ORAL ONCE
Status: COMPLETED | OUTPATIENT
Start: 2021-02-09 | End: 2021-02-09

## 2021-02-09 RX ORDER — SODIUM CHLORIDE 9 MG/ML
INJECTION, SOLUTION INTRAVENOUS PRN
Status: COMPLETED | OUTPATIENT
Start: 2021-02-09 | End: 2021-02-09

## 2021-02-09 RX ORDER — FUROSEMIDE 20 MG/1
20 TABLET ORAL ONCE
Status: COMPLETED | OUTPATIENT
Start: 2021-02-09 | End: 2021-02-09

## 2021-02-09 RX ADMIN — Medication 100 MG: at 08:46

## 2021-02-09 RX ADMIN — PANTOPRAZOLE SODIUM 40 MG: 40 TABLET, DELAYED RELEASE ORAL at 05:23

## 2021-02-09 RX ADMIN — FOLIC ACID 1 MG: 1 TABLET ORAL at 08:46

## 2021-02-09 RX ADMIN — BUDESONIDE AND FORMOTEROL FUMARATE DIHYDRATE 2 PUFF: 160; 4.5 AEROSOL RESPIRATORY (INHALATION) at 04:33

## 2021-02-09 RX ADMIN — Medication 2000 UNITS: at 17:22

## 2021-02-09 RX ADMIN — LEVOTHYROXINE SODIUM 25 MCG: 0.05 TABLET ORAL at 05:23

## 2021-02-09 RX ADMIN — DIPHENHYDRAMINE HCL 25 MG: 25 TABLET ORAL at 08:46

## 2021-02-09 RX ADMIN — CARVEDILOL 6.25 MG: 6.25 TABLET, FILM COATED ORAL at 17:22

## 2021-02-09 RX ADMIN — CARVEDILOL 6.25 MG: 6.25 TABLET, FILM COATED ORAL at 08:46

## 2021-02-09 RX ADMIN — URSODIOL 300 MG: 300 CAPSULE ORAL at 19:23

## 2021-02-09 RX ADMIN — BUDESONIDE AND FORMOTEROL FUMARATE DIHYDRATE 2 PUFF: 160; 4.5 AEROSOL RESPIRATORY (INHALATION) at 17:50

## 2021-02-09 RX ADMIN — FERROUS SULFATE TAB 325 MG (65 MG ELEMENTAL FE) 325 MG: 325 (65 FE) TAB at 08:46

## 2021-02-09 RX ADMIN — FERROUS SULFATE TAB 325 MG (65 MG ELEMENTAL FE) 325 MG: 325 (65 FE) TAB at 17:22

## 2021-02-09 RX ADMIN — FUROSEMIDE 20 MG: 20 TABLET ORAL at 08:46

## 2021-02-09 RX ADMIN — INSULIN LISPRO 2 UNITS: 100 INJECTION, SOLUTION INTRAVENOUS; SUBCUTANEOUS at 17:22

## 2021-02-09 RX ADMIN — IRON SUCROSE 200 MG: 20 INJECTION, SOLUTION INTRAVENOUS at 19:23

## 2021-02-09 RX ADMIN — FUROSEMIDE 20 MG: 10 INJECTION, SOLUTION INTRAVENOUS at 16:19

## 2021-02-09 RX ADMIN — INSULIN LISPRO 4 UNITS: 100 INJECTION, SOLUTION INTRAVENOUS; SUBCUTANEOUS at 12:32

## 2021-02-09 RX ADMIN — ATORVASTATIN CALCIUM 40 MG: 40 TABLET, FILM COATED ORAL at 19:23

## 2021-02-09 RX ADMIN — URSODIOL 300 MG: 300 CAPSULE ORAL at 08:46

## 2021-02-09 RX ADMIN — SODIUM CHLORIDE: 9 INJECTION, SOLUTION INTRAVENOUS at 08:52

## 2021-02-09 RX ADMIN — INSULIN LISPRO 4 UNITS: 100 INJECTION, SOLUTION INTRAVENOUS; SUBCUTANEOUS at 21:58

## 2021-02-09 RX ADMIN — INSULIN GLARGINE 10 UNITS: 100 INJECTION, SOLUTION SUBCUTANEOUS at 21:58

## 2021-02-09 RX ADMIN — ASPIRIN 81 MG: 81 TABLET, CHEWABLE ORAL at 08:46

## 2021-02-09 RX ADMIN — ACETAMINOPHEN 650 MG: 325 TABLET ORAL at 08:46

## 2021-02-09 ASSESSMENT — PAIN SCALES - GENERAL
PAINLEVEL_OUTOF10: 0
PAINLEVEL_OUTOF10: 0

## 2021-02-09 NOTE — PROGRESS NOTES
Subjective: The patient complains of  moderate to severe acute  Lower extremity PVD with occlusion and recent surgery partially relieved by rest, PT, OT,   and exacerbated by recent illness and exertion. I am concerned about patients right lower extremity pain and healing. I am concerned about his anemia and CHF risk he will of after I will get blood today with Lasix in between. According to recent nursing note, \" Dr Thomasine Litten called writer to give orders for Blood. Pt's last hgb was 7.1 Orders for 1 unit of blood to be given today. Benadryl 25mg, Tylenol 650 mg po and Lasix 20 mg po prior to blood being given . After  1 unit of blood is infused, give lasix 20 mg IV. Venofer 200 mg IV daily x 2 days. Give 1st dose of venofer today after blood has been given. CBC in am. Pt agreed and signed consent for blood. \".    ROS x10: The patient also complains of severely impaired mobility and activities of daily living. Otherwise no new problems with vision, hearing, nose, mouth, throat, dermal, cardiovascular, GI, , pulmonary, musculoskeletal, psychiatric or neurological. See Rehab H&P on Rehab chart dated . Vital signs:  BP (!) 158/72   Pulse 76   Temp 98 °F (36.7 °C) (Oral)   Resp 14   Ht 5' 7\" (1.702 m)   Wt 159 lb 8 oz (72.3 kg)   SpO2 98%   BMI 24.98 kg/m²   I/O:   PO/Intake:  fair PO intake, no problems observed or reported. Bowel/Bladder:  continent, constipation and urinary urgency. General:  Patient is well developed, adequately nourished, non-obese and     well kempt. HEENT:    PERRLA, hearing intact to loud voice, external inspection of ear     and nose benign. Inspection of lips, tongue and gums benign  Musculoskeletal: No significant change in strength or tone. All joints stable. Inspection and palpation of digits and nails show no clubbing,       cyanosis or inflammatory conditions. Neuro/Psychiatric: Affect: flat.   Alert and oriented to person, place and     situation. No significant change in deep tendon reflexes or     sensation  Lungs:  Diminished, CTA-B. Respiration effort is normal at rest.     Heart:   S1 = S2, RRR. No loud murmurs. Abdomen:  Soft, non-tender, no enlargement of liver or spleen. Extremities:  No significant lower extremity edema or tenderness. Skin:   Intact to general survey, no visualized or palpated problems. Rehabilitation:  Physical therapy: FIMS:  Bed Mobility: Scooting: Stand by assistance    Transfers: Sit to Stand: Stand by assistance  Stand to sit: Stand by assistance  Bed to Chair: Stand by assistance, Ambulation 1  Surface: level tile, carpet, uneven  Device: Rolling Walker  Other Apparatus: O2(3L)  Assistance: Stand by assistance, Contact guard assistance  Quality of Gait: Slow reciprocal gait pattern with decreased stance phase on Right LE, fwd flexed psoture. slight increase in pace this PM tx session compared to AM.  Gait Deviations: Decreased step length, Shuffles, Decreased step height  Distance: 160'  Comments: Fatigue, c/o SOB with ambualtion. Required VCs and hand gestures for directions. , Stairs  # Steps : 8  Stairs Height: 6\"  Rails: Bilateral  Curbs: 4\"  Device: Rolling walker  Assistance: Stand by assistance, Contact guard assistance  Comment: Mix of reciprocal and non-reciprocal pattern. Slow steady, SBA for stairs and CGA with curb step, no cues provided to test pt's safety with curb step, pt able to complete without LOB. FIMS:  ,  , Assessment: Increased steps to x8 with pt able to perform in a reciprocal pattern with use of [facility] Handrails for support. Mild fatigue that requires intermitten rest breaks. Pt continues to work hard and reports pain only with pressure applied to posterior Right thigh and standing activities, pain reported as 0/10 while at rest.    Occupational therapy: FIMS:   ,  , Assessment: Pt is a 80 yr old male presenting to [facility] with the above functional deficits.  Pt would benefit from occupational therapy services to maximize safety and independence with ADL tasks, improve overall strength/endurance and balance for functional tasks. Speech therapy: FIMS:        Lab/X-ray studies reviewed, analyzed and discussed with patient and staff:   Recent Results (from the past 24 hour(s))   POCT Glucose    Collection Time: 02/08/21 11:39 AM   Result Value Ref Range    POC Glucose 270 (H) 60 - 115 mg/dl    Performed on ACCU-CHEK    Hemoglobin and Hematocrit, Blood, Post Transfusion    Collection Time: 02/08/21  3:22 PM   Result Value Ref Range    Hemoglobin 7.1 (L) 14.0 - 18.0 g/dL    Hematocrit 21.9 (L) 42.0 - 52.0 %   POCT Glucose    Collection Time: 02/08/21  4:13 PM   Result Value Ref Range    POC Glucose 204 (H) 60 - 115 mg/dl    Performed on ACCU-CHEK    POCT Glucose    Collection Time: 02/08/21  8:33 PM   Result Value Ref Range    POC Glucose 207 (H) 60 - 115 mg/dl    Performed on ACCU-CHEK    PROTIME-INR    Collection Time: 02/09/21  5:49 AM   Result Value Ref Range    Protime 39.3 (H) 12.3 - 14.9 sec    INR 4.1    POCT Glucose    Collection Time: 02/09/21  6:17 AM   Result Value Ref Range    POC Glucose 131 (H) 60 - 115 mg/dl    Performed on ACCU-CHEK        Previous extensive, complex labs, notes and diagnostics reviewed and analyzed. ALLERGIES:    Allergies as of 02/01/2021    (No Known Allergies)      (please also verify by checking MAR)      Yesterday I evaluated this patient for periodic reassessment of medical and functional status. The patient was discussed in detail at the treatment team meeting focusing on current medical issues, progress in therapies, social issues, psychological issues, barriers to progress and strategies to address these barriers, and discharge planning. See the hand written addendum to rehab progress note.   The patient continues to be high risk for future disability and their medical and rehabilitation prognosis continue to be good and therefore, we will continue the patient's rehabilitation course as planned. The patient's tentative discharge date was set. Patient and family education was discussed. The patient was made aware of the team discussion regarding their progress. Discharge plans were discussed along with barriers to progress and strategies to address these barriers, patient encouraged to continue to discuss discharge plans with . Complex Physical Medicine & Rehab Issues Assess & Plan:   1. Severe abnormality of gait and mobility and impaired self-care and ADL's secondary to progressive weakness dt   right lower extremity PVD. Functional and medical status reassessed regarding patients ability to participate in therapies and patient found to be able to participate in acute intensive comprehensive inpatient rehabilitation program including PT/OT to improve balance, ambulation, ADLs, and to improve the P/AROM. Therapeutic modifications regarding activities in therapies, place, amount of time per day and intensity of therapy made daily. In bed therapies or bedside therapies prn.   2. Bowel progressive constipation, and Bladder dysfunction monitoring neurogenic bladder:  frequent toileting, ambulate to bathroom with assistance, check post void residuals. Check for C.difficile x1 if >2 loose stools in 24 hours, continue bowel & bladder program.  Monitor bowel and bladder function. Lactinex 2 PO every AC. MOM prn, Brown Bomb prn, Glycerin suppository prn, enema prn.  3. Severe lower extremity postop pain pain as well as generalized OA pain: reassess pain every shift and prior to and after each therapy session, give prn medications, modalities prn in therapy, Lidoderm, K-pad prn.   4. Skin healing and breakdown risk:  continue pressure relief program.  Daily skin exams and reports from nursing.   5. Severe fatigue due to nutritional and hydration deficiency: Add vitamin B12 vitamin D and CoQ10 continue to monitor I&Os, calorie counts titrate oxygen and aerosol treatments monitor for nocturnal hypoxemia, monitor vital signs, oxygen prn.   6.   Femoral artery occlusion-coumadinization with consult for INR dosing with pharmacy  7. Hyponatremia-liberalize salt in diet  8. PVD (peripheral vascular disease)   9. Chronic right shoulder pain-add Lidoderm and heat  10. GERD and constipation-scheduled Senokot and add Protonix-monitor stools for blood patient is for transfusion to 921.            Montse Keating D.O., PM&R     Attending    [address]

## 2021-02-09 NOTE — PROGRESS NOTES
Physical Therapy Rehab Treatment Note  Facility/Department[facility]  [facility]       NAME: Dixie Quintero  : 3/5/1929 (80 y.o.)  MRN: [id number]  CODE STATUS: Full Code    Date of Service: 2021  Chart Reviewed: Yes  Family / Caregiver Present: No    Restrictions:  Restrictions/Precautions: Fall Risk       SUBJECTIVE:    Pain Screening  Patient Currently in Pain: No       Post Treatment Pain Screenin/10       OBJECTIVE:                                             Activity Tolerance  Activity Tolerance: Treatment limited secondary to medical complications (free text)  Activity Tolerance: Pt receiving blood. Manual therapy  Soft Tissue Mobalization: Posterior Right thigh, to decrease tissue tension. Manual HS stretches of Right LE to improve mobility. ASSESSMENT/COMMENTS:  Assessment: Manual Stretching and STM of Posterior Right thigh to decrease muscle tension and improve mobiltiy of Right LE. Therapy limited due to pt receiving blood at time of tx session. PLAN OF CARE/Safety:   Safety Devices  Type of devices: All fall risk precautions in place      Therapy Time:   Individual   Time In 1045   Time Out 1100   Minutes 15   Missed Tx Time: 45 min - Medical - Pt receiving blood. Minutes:15      Manual therapy: 15 min.        Yocasta Vegas  21 at 11:01 AM

## 2021-02-09 NOTE — CARE COORDINATION
Spoke with patients daughter Luzma Vaughn to update of discharge, will follow up on Thursday following team to verify d/c date. Luzma Vaughn stated understanding.  Electronically signed by Vilma Hernandez RN on 2/9/2021 at 3:26 PM

## 2021-02-09 NOTE — PROGRESS NOTES
Occupational Therapy  Facility/Department: [facility]  Daily Treatment Note  NAME: Cullen Delgado  : 3/5/1929  MRN: [id number]    Date of Service: 2021    Discharge Recommendations:  Continue to assess pending progress       Assessment      Activity Tolerance  Activity Tolerance: Patient limited by fatigue  Safety Devices  Safety Devices in place: Yes  Type of devices: All fall risk precautions in place         Patient Diagnosis(es): There were no encounter diagnoses. has a past medical history of Anemia, CAD (coronary artery disease), DM (diabetes mellitus) ([facility] [address] Dyslipidemia, History of tobacco abuse, HTN (hypertension), Hypothyroidism, Non-ST elevation MI (NSTEMI) ([facility] [address] and Pacemaker. has a past surgical history that includes Cataract removal and Middle ear surgery (Left, ). Restrictions  Restrictions/Precautions  Restrictions/Precautions: Fall Risk  Subjective   General  Chart Reviewed: Yes  Patient assessed for rehabilitation services?: Yes  Response to previous treatment: Patient with no complaints from previous session  Family / Caregiver Present: No  Referring Practitioner: Dr. Vik Fontenot  Diagnosis: Impaired mobility and ADL's d/t severe progressive PVD  General Comment  Comments: Pt on 3L O2 via NC  Pain Assessment  Pain Assessment: 0-10  Pain Level: 0  Pre Treatment Pain Screening  Pain at present: 0  Scale Used: Numeric Score  Intervention List: Patient able to continue with treatment  Vital Signs  Patient Currently in Pain: No   Orientation     Objective     Pt. engaged in B UE strengthening and endurance tasks to promote independence and tolerance for ADLs. Pt. had 1 lb wrist weights on B wrists and reached to place golf tees into the board. Pt. alternated hands when placing the tees into the board. Pt. took a short break and then placed pop beads on top of the tees with both hands. Pt. then removed the beads from the tees and placed them in the container.  Pt. then placed the tees into the board, grabbing a couple at a time. Pt. reached to place rings on various height poles with both hands. Pt. was able to reach all 4 poles with both hands. Pt. then reached to remove the rings from one side at a time with rest breaks needed but half way through required the weights to be removed due to fatigue. Pt. engaged in fine motor task with picking up small pegs and placing them in the pegboard. Pt. was encouraged to alternate hands. Pt. was able to  the pegs but had occasional difficulty with placing them in the board with both hands. Pt. was able to remove the pegs with both hands with no difficulty. Pt. was on 3L O2 during task.       Plan   Plan  Times per week: 5-7x/wk  Plan weeks: 1.5  Current Treatment Recommendations: Strengthening, Functional Mobility Training, Cognitive Reorientation, Cognitive/Perceptual Training, Patient/Caregiver Education & Training, Endurance Training, Equipment Evaluation, Education, & procurement, Balance Training, Safety Education & Training, Self-Care / ADL  Plan Comment: Continue per OT plan of care    Goals  Patient Goals   Patient goals : to go home       Therapy Time   Individual Concurrent Group Co-treatment   Time In 1430         Time Out 1530         Minutes 60              Therapeutic activities: 60 minutes      Rito Klinefelter, OTA Electronically signed by Rito Klinefelter, OTA on 2/9/2021 at 3:37 PM

## 2021-02-09 NOTE — PROGRESS NOTES
Occupational Therapy  Facility/Department: [facility]  Daily Treatment Note  NAME: Loraine Chowdhury  : 3/5/1929  MRN: [id number]    Date of Service: 2021    Discharge Recommendations:  Continue to assess pending progress  OT Equipment Recommendations  Other: Continue to assess    Assessment      Activity Tolerance  Activity Tolerance: Patient Tolerated treatment well  Safety Devices  Safety Devices in place: Yes  Type of devices: All fall risk precautions in place         Patient Diagnosis(es): There were no encounter diagnoses. has a past medical history of Anemia, CAD (coronary artery disease), DM (diabetes mellitus) ([facility] [address] Dyslipidemia, History of tobacco abuse, HTN (hypertension), Hypothyroidism, Non-ST elevation MI (NSTEMI) ([facility] [address] and Pacemaker. has a past surgical history that includes Cataract removal and Middle ear surgery (Left, ). Restrictions  Restrictions/Precautions  Restrictions/Precautions: Fall Risk  Subjective   General  Chart Reviewed: Yes  Patient assessed for rehabilitation services?: Yes  Response to previous treatment: Patient with no complaints from previous session  Family / Caregiver Present: No  Referring Practitioner: Dr. Marisela Seth  Diagnosis: Impaired mobility and ADL's d/t severe progressive PVD  Subjective  Subjective: \"Take them off?\" - nuts and bolts from [employer]  General Comment  Comments: Pt on 3L O2 via NC  Pre Treatment Pain Screening  Pain at present: 0  Scale Used: Numeric Score  Intervention List: Patient able to continue with treatment  Vital Signs  Patient Currently in Pain: Denies   Orientation     Objective           Pt agreeable to make up min from earlier this date. Pt completed therapeutic activities table top replicating 12 piece design from visual model. Pt required max cues and increased time to assemble design correctly. Pt demonstrated min difficulty manipulating wing nuts. Pt disassembled structure and placed pieces in desired location correctly. Plan   Plan  Times per week: 5-7x/wk  Plan weeks: 1.5  Current Treatment Recommendations: Strengthening, Functional Mobility Training, Cognitive Reorientation, Cognitive/Perceptual Training, Patient/Caregiver Education & Training, Endurance Training, Equipment Evaluation, Education, & procurement, Balance Training, Safety Education & Training, Self-Care / ADL  Plan Comment: Continue per OT plan of care    Goals  Patient Goals   Patient goals : to go home       Therapy Time   Individual Concurrent Group Co-treatment   Time In 1530         Time Out 1545         Minutes 15              Therapeutic activities: 15 minutes   Patient participated in above/following treatment session to complete previously scheduled minutes from this date.     James Perla OT    Electronically signed by James Perla OT on 2/9/2021 at 4:29 PM

## 2021-02-09 NOTE — FLOWSHEET NOTE
Patient's blood transfusion was started.  Electronically signed by Hetal Kaur RN on 2/9/2021 at 9:55 AM

## 2021-02-09 NOTE — PROGRESS NOTES
[facility] Occupational Therapy      Date: 2021  Patient Name: Carolina Leon        MRN: [id number]  Account: [de-identified]   : 3/5/1929  (80 y.o.)  Room: [place] [address]    Chart reviewed, attempted OT at 11:30 for 30 min. Patient not seen 2° to:    Hold per nursing request due to: Pt. is receving blood at this time    Spoke to YESI VALERO aware. Will attempt again when able.     Electronically signed by LEONCIO Ferraro on 2021 at 11:39 AM

## 2021-02-09 NOTE — PROGRESS NOTES
[facility] Respiratory Therapy Evaluation   Current Order:  Duoneb TID      Home Regimen: PRN      Ordering Physician: Mali Carbajal  Re-evaluation Date:  N/A     Diagnosis: PVD      Patient Status: Stable / Unstable + Physician notified    The following MDI Criteria must be met in order to convert aerosol to MDI with spacer. If unable to meet, MDI will be converted to aerosol:  []  Patient able to demonstrate the ability to use MDI effectively  []  Patient alert and cooperative  []  Patient able to take deep breath with 5-10 second hold  []  Medication(s) available in this delivery method   []  Peak flow greater than or equal to 200 ml/min            Current Order Substituted To  (same drug, same frequency)   Aerosol to MDI [] Albuterol Sulfate 0.083% unit dose by aerosol Albuterol Sulfate MDI 2 puffs by inhalation with spacer    [] Levalbuterol 1.25 mg unit dose by aerosol Levalbuterol MDI 2 puffs by inhalation with spacer    [] Levalbuterol 0.63 mg unit dose by aerosol Levalbuterol MDI 2 puffs by inhalation with spacer    [] Ipratropium Bromide 0.02% unit dose by aerosol Ipratropium Bromide MDI 2 puffs by inhalation with spacer    [] Duoneb (Ipratropium + Albuterol) unit dose by aerosol Ipratropium MDI + Albuterol MDI 2 puffs by inhalation w/spacer   MDI to Aerosol [] Albuterol Sulfate MDI Albuterol Sulfate 0.083% unit dose by aerosol    [] Levalbuterol MDI 2 puffs by inhalation Levalbuterol 1.25 mg unit dose by aerosol    [] Ipratropium Bromide MDI by inhalation Ipratropium Bromide 0.02% unit dose by aerosol    [] Combivent (Ipratropium + Albuterol) MDI by inhalation Duoneb (Ipratropium + Albuterol) unit dose by aerosol   Treatment Assessment [Frequency/Schedule]:  Change frequency to: ______Duoneb Q4 PRN____________________________________________per Protocol, P&T, MEC      Points 0 1 2 3 4   Pulmonary Status  Non-Smoker  []   Smoking history   < 20 pack years  []   Smoking history  ?  20 pack years  [x] Pulmonary Disorder  (acute or chronic)  []   Severe or Chronic w/ Exacerbation  []     Surgical Status No [x]   Surgeries     General []   Surgery Lower []   Abdominal Thoracic or []   Upper Abdominal Thoracic with  PulmonaryDisorder  []     Chest X-ray Clear/Not  Ordered     [x]  Chronic Changes  Results Pending  []  Infiltrates, atelectasis, pleural effusion, or edema  []  Infiltrates in more than one lobe []  Infiltrate + Atelectasis, &/or pleural effusion  []    Respiratory Pattern Regular,  RR = 12-20 [x]  Increased,  RR = 21-25 []  BUTT, irregular,  or RR = 26-30 []  Decreased FEV1  or RR = 31-35 []  Severe SOB, use  of accessory muscles, or RR ? 35  []    Mental Status Alert, oriented,  Cooperative [x]  Confused but Follows commands []  Lethargic or unable to follow commands []  Obtunded  []  Comatose  []    Breath Sounds Clear to  auscultation  []  Decreased unilaterally or  in bases only []  Decreased  bilaterally  [x]  Crackles or intermittent wheezes []  Wheezes []    Cough Strong, Spontan., & nonproductive [x]  Strong,  spontaneous, &  productive []  Weak,  Nonproductive []  Weak, productive or  with wheezes []  No spontaneous  cough or may require suctioning []    Level of Activity Ambulatory []  Ambulatory w/ Assist  [x]  Non-ambulatory []  Paraplegic []  Quadriplegic []    Total    Score:___5____     Triage Score:____5____      Tri       Triage:     1. (>20) Freq: Q3    2. (16-20) Freq: Q4   3. (11-15) Freq: QID & Albuterol Q2 PRN    4. (6-10) Freq: TID & Albuterol Q2 PRN    5. (0-5) Freq Q4prn

## 2021-02-09 NOTE — PROGRESS NOTES
Clinical Pharmacy Note    Warfarin consult follow-up    Recent Labs     02/09/21  0549   INR 4.1     Recent Labs     02/06/21  1317 02/08/21  0526 02/08/21  1522   HGB 7.7* 7.7* 7.1*   HCT 22.9* 23.9* 21.9*   PLT  --  506*  --        Significant drug:drug interactions:  New warfarin drug-drug interactions: None  Discontinued drug-drug interactions: None  Current warfarin-drug interactions: Acetaminophen, Maalox, aspirin, coenzyme Q10, Norco, lactulose, levothyroxine, pantoprazole    Notes:  Date      INR  Warfarin Dose   01/30/21 1.3 (1/29) 5 mg   01/31/21 1.1  5 mg   02/01/21 1.1 7.5 mg   02/02/21 1.5 7.5 mg   02/03/21 2.8 2 mg   02/04/21 3.3 1 mg   02/05/21 3.3 HOLD   02/06/21 3.3 HOLD   02/07/21 2.8 2.5 mg    02/08/21 3.9 HOLD   02/09/21 4.1 HOLD              INR remains supratherapeutic today at 4.1. Continue to HOLD warfarin today to reach INR goal range of 2-3 for unprovoked LLE DVT. Daily PT/INR until stable within therapeutic range.     Yared Martin, PharmD   2/9/2021 10:06 AM

## 2021-02-09 NOTE — PROGRESS NOTES
Physical Therapy Rehab Treatment Note  [place]  [facility]       NAME: Faith Poon  : 3/5/1929 (80 y.o.)  MRN: [id number]  CODE STATUS: Full Code    Date of Service: 2021  Chart Reviewed: Yes  Family / Caregiver Present: No    Restrictions:  Restrictions/Precautions: Fall Risk       SUBJECTIVE:    Pain Screening  Patient Currently in Pain: Denies       Post Treatment Pain Screenin/10       OBJECTIVE:                    Bed mobility  Rolling to Left: Stand by assistance  Rolling to Right: Stand by assistance  Supine to Sit: Stand by assistance  Sit to Supine: Stand by assistance    Transfers  Sit to Stand: Stand by assistance  Stand to sit: Stand by assistance    Ambulation  Ambulation?: Yes  Ambulation 1  Surface: level tile;carpet;uneven  Device: Rolling Walker  Other Apparatus: O2(3L)  Assistance: Stand by assistance  Quality of Gait: Reciprocal gait pattern, slow maria l. Distance: 150'    Stairs/Curb  Stairs?: No         Activity Tolerance  Activity Tolerance: Patient limited by fatigue  Activity Tolerance: Pt receiving blood. Exercises  Straight Leg Raise: x 10  Hamstring Sets: HS Curls YTB  Heelslides: x15  Hip Flexion: x15  Hip Abduction: x15 with YTB ; add x15 with ball  Knee Long Arc Quad: x15  Knee Short Arc Quad: x15  Ankle Pumps: x15 PF/DF Ea. Other exercises  Other exercises 1: HL Hip ABD/ADD with YTB/BAll x 15 ea. Other exercises 2: Static HS stretch with heel prop x 3 min. Manual therapy  Soft Tissue Mobalization: Posterior Right thigh, to decrease tissue tension. Manual HS stretches of Right LE to improve mobility. ASSESSMENT/COMMENTS:  Assessment: Pt able to complete extra time this tx session to assist with making up missed tx time from AM, increased fatigue noted during this tx session. Pt requried increased rest breaks with c/o difficulty breathing, SpO2 of 98% while on 3L of O2. PLAN OF CARE/Safety:   Safety Devices  Type of devices:  All fall risk precautions in place        Therapy Time:   Individual   Time In 1330   Time Out 1425   Minutes 55     Minutes:55      Transfer/Bed mobility training:10      Gait training:15      Neuro re education:0     Therapeutic ex:Niraj Hale  02/09/21 at 4:16 PM

## 2021-02-10 LAB
GLUCOSE BLD-MCNC: 128 MG/DL (ref 60–115)
GLUCOSE BLD-MCNC: 166 MG/DL (ref 60–115)
GLUCOSE BLD-MCNC: 190 MG/DL (ref 60–115)
GLUCOSE BLD-MCNC: 215 MG/DL (ref 60–115)
HCT VFR BLD CALC: 28.8 % (ref 42–52)
HEMOGLOBIN: 9.6 G/DL (ref 14–18)
INR BLD: 3.2
MCH RBC QN AUTO: 31.2 PG (ref 27–31.3)
MCHC RBC AUTO-ENTMCNC: 33.1 % (ref 33–37)
MCV RBC AUTO: 94 FL (ref 80–100)
PDW BLD-RTO: 14.5 % (ref 11.5–14.5)
PERFORMED ON: ABNORMAL
PLATELET # BLD: 496 K/UL (ref 130–400)
PROTHROMBIN TIME: 32.2 SEC (ref 12.3–14.9)
RBC # BLD: 3.07 M/UL (ref 4.7–6.1)
WBC # BLD: 10.5 K/UL (ref 4.8–10.8)

## 2021-02-10 PROCEDURE — 2700000000 HC OXYGEN THERAPY PER DAY

## 2021-02-10 PROCEDURE — 2580000003 HC RX 258: Performed by: INTERNAL MEDICINE

## 2021-02-10 PROCEDURE — 6370000000 HC RX 637 (ALT 250 FOR IP): Performed by: PHYSICAL MEDICINE & REHABILITATION

## 2021-02-10 PROCEDURE — 97129 THER IVNTJ 1ST 15 MIN: CPT

## 2021-02-10 PROCEDURE — 99232 SBSQ HOSP IP/OBS MODERATE 35: CPT | Performed by: PHYSICAL MEDICINE & REHABILITATION

## 2021-02-10 PROCEDURE — 85027 COMPLETE CBC AUTOMATED: CPT

## 2021-02-10 PROCEDURE — 6370000000 HC RX 637 (ALT 250 FOR IP): Performed by: NURSE PRACTITIONER

## 2021-02-10 PROCEDURE — 36415 COLL VENOUS BLD VENIPUNCTURE: CPT

## 2021-02-10 PROCEDURE — 94640 AIRWAY INHALATION TREATMENT: CPT

## 2021-02-10 PROCEDURE — 97110 THERAPEUTIC EXERCISES: CPT

## 2021-02-10 PROCEDURE — 6370000000 HC RX 637 (ALT 250 FOR IP): Performed by: INTERNAL MEDICINE

## 2021-02-10 PROCEDURE — 97116 GAIT TRAINING THERAPY: CPT

## 2021-02-10 PROCEDURE — 94760 N-INVAS EAR/PLS OXIMETRY 1: CPT

## 2021-02-10 PROCEDURE — 85610 PROTHROMBIN TIME: CPT

## 2021-02-10 PROCEDURE — 6360000002 HC RX W HCPCS: Performed by: INTERNAL MEDICINE

## 2021-02-10 PROCEDURE — 97535 SELF CARE MNGMENT TRAINING: CPT

## 2021-02-10 PROCEDURE — 97530 THERAPEUTIC ACTIVITIES: CPT

## 2021-02-10 PROCEDURE — 1180000000 HC REHAB R&B

## 2021-02-10 RX ORDER — ACETAMINOPHEN 325 MG/1
650 TABLET ORAL EVERY 4 HOURS PRN
Status: DISCONTINUED | OUTPATIENT
Start: 2021-02-10 | End: 2021-02-13 | Stop reason: HOSPADM

## 2021-02-10 RX ADMIN — INSULIN LISPRO 4 UNITS: 100 INJECTION, SOLUTION INTRAVENOUS; SUBCUTANEOUS at 21:01

## 2021-02-10 RX ADMIN — Medication 100 MG: at 09:18

## 2021-02-10 RX ADMIN — LEVOTHYROXINE SODIUM 25 MCG: 0.05 TABLET ORAL at 06:10

## 2021-02-10 RX ADMIN — ATORVASTATIN CALCIUM 40 MG: 40 TABLET, FILM COATED ORAL at 21:00

## 2021-02-10 RX ADMIN — BUDESONIDE AND FORMOTEROL FUMARATE DIHYDRATE 2 PUFF: 160; 4.5 AEROSOL RESPIRATORY (INHALATION) at 05:03

## 2021-02-10 RX ADMIN — PANTOPRAZOLE SODIUM 40 MG: 40 TABLET, DELAYED RELEASE ORAL at 06:10

## 2021-02-10 RX ADMIN — URSODIOL 300 MG: 300 CAPSULE ORAL at 09:18

## 2021-02-10 RX ADMIN — FERROUS SULFATE TAB 325 MG (65 MG ELEMENTAL FE) 325 MG: 325 (65 FE) TAB at 17:17

## 2021-02-10 RX ADMIN — INSULIN LISPRO 2 UNITS: 100 INJECTION, SOLUTION INTRAVENOUS; SUBCUTANEOUS at 09:19

## 2021-02-10 RX ADMIN — ACETAMINOPHEN 650 MG: 325 TABLET ORAL at 18:01

## 2021-02-10 RX ADMIN — URSODIOL 300 MG: 300 CAPSULE ORAL at 21:00

## 2021-02-10 RX ADMIN — FOLIC ACID 1 MG: 1 TABLET ORAL at 09:18

## 2021-02-10 RX ADMIN — IRON SUCROSE 200 MG: 20 INJECTION, SOLUTION INTRAVENOUS at 09:23

## 2021-02-10 RX ADMIN — INSULIN LISPRO 2 UNITS: 100 INJECTION, SOLUTION INTRAVENOUS; SUBCUTANEOUS at 17:19

## 2021-02-10 RX ADMIN — FERROUS SULFATE TAB 325 MG (65 MG ELEMENTAL FE) 325 MG: 325 (65 FE) TAB at 09:18

## 2021-02-10 RX ADMIN — ASPIRIN 81 MG: 81 TABLET, CHEWABLE ORAL at 09:18

## 2021-02-10 RX ADMIN — ERGOCALCIFEROL 50000 UNITS: 1.25 CAPSULE ORAL at 12:09

## 2021-02-10 RX ADMIN — CARVEDILOL 6.25 MG: 6.25 TABLET, FILM COATED ORAL at 09:18

## 2021-02-10 RX ADMIN — Medication 2000 UNITS: at 17:17

## 2021-02-10 RX ADMIN — BUDESONIDE AND FORMOTEROL FUMARATE DIHYDRATE 2 PUFF: 160; 4.5 AEROSOL RESPIRATORY (INHALATION) at 16:00

## 2021-02-10 RX ADMIN — CARVEDILOL 6.25 MG: 6.25 TABLET, FILM COATED ORAL at 17:17

## 2021-02-10 RX ADMIN — INSULIN GLARGINE 10 UNITS: 100 INJECTION, SOLUTION SUBCUTANEOUS at 21:01

## 2021-02-10 ASSESSMENT — PAIN SCALES - GENERAL
PAINLEVEL_OUTOF10: 0

## 2021-02-10 NOTE — PROGRESS NOTES
Clinical Pharmacy Note    Warfarin consult follow-up    Recent Labs     02/10/21  0541   INR 3.2     Recent Labs     02/08/21  0526 02/08/21  1522 02/10/21  0541   HGB 7.7* 7.1* 9.6*   HCT 23.9* 21.9* 28.8*   *  --  496*       Significant drug:drug interactions:    Current warfarin-drug interactions: Acetaminophen, Maalox, aspirin, coenzyme Q10, Norco, lactulose, levothyroxine, pantoprazole     Notes:  Date      INR  Warfarin Dose   01/30/21 1.3 (1/29) 5 mg   01/31/21 1.1  5 mg   02/01/21 1.1 7.5 mg   02/02/21 1.5 7.5 mg   02/03/21 2.8 2 mg   02/04/21 3.3 1 mg   02/05/21 3.3 HOLD   02/06/21 3.3 HOLD   02/07/21 2.8 2.5 mg    02/08/21 3.9 HOLD   02/09/21 4.1 HOLD    02/10/21  3.2  HOLD      INR remains supratherapeutic today at 3.2. Continue to HOLD warfarin today to reach INR goal range of 2-3 for unprovoked LLE DVT. Significant reduction in INR from yesterday hope to resume therapy tomorrow. Daily PT/INR until stable within therapeutic range.   Savannah Maciel PharmD

## 2021-02-10 NOTE — PROGRESS NOTES
Occupational Therapy  Facility/Department: [facility]  Daily Treatment Note  NAME: Lane Keenan  : 3/5/1929  MRN: [id number]    Date of Service: 2/10/2021    Discharge Recommendations:  Continue to assess pending progress       Assessment      Activity Tolerance  Activity Tolerance: Patient limited by fatigue  Safety Devices  Safety Devices in place: Yes  Type of devices: All fall risk precautions in place         Patient Diagnosis(es): There were no encounter diagnoses. has a past medical history of Anemia, CAD (coronary artery disease), DM (diabetes mellitus) ([facility] [address] Dyslipidemia, History of tobacco abuse, HTN (hypertension), Hypothyroidism, Non-ST elevation MI (NSTEMI) ([facility] [address] and Pacemaker. has a past surgical history that includes Cataract removal and Middle ear surgery (Left, ). Restrictions  Restrictions/Precautions  Restrictions/Precautions: Fall Risk  Subjective   General  Chart Reviewed: Yes  Patient assessed for rehabilitation services?: Yes  Response to previous treatment: Patient with no complaints from previous session  Family / Caregiver Present: No  Referring Practitioner: Dr. Hoang Munguia  Diagnosis: Impaired mobility and ADL's d/t severe progressive PVD  General Comment  Comments: Pt on 3L O2 via NC  Pain Assessment  Pain Assessment: 0-10  Pain Level: 0  Pre Treatment Pain Screening  Pain at present: 0  Scale Used: Numeric Score  Intervention List: Patient able to continue with treatment  Vital Signs  Patient Currently in Pain: No   Orientation     Objective     Pt. engaged in UE strengthening, endurance, and problem solving task to promote safety and independence with ADLs. Pt. reached to  cards from the table and scan a large board to find the match by number and suit. Pt. had mod difficulty with scanning to find the correct matches. Pt. reached to place rubber washers on dowels that were varied by two heights and had to match the washers to the size dowel.  Pt. had minimal errors with sorting the washers. Pt. then removed the washers from the dowels and placed them back in the trays. Pt. alternated hands when placing them on and taking them off. Pt. reached to place large pegs into the pegboard alternating hands as well. Pt. then placed pop beads on top of the pegs with either hand. Pt. had occasional difficulty with releasing them on the pegs. Pt. was able to tolerate the weights for the tasks as well. Pt. was on 3L O2.       Plan   Plan  Times per week: 5-7x/wk  Plan weeks: 1.5  Current Treatment Recommendations: Strengthening, Functional Mobility Training, Cognitive Reorientation, Cognitive/Perceptual Training, Patient/Caregiver Education & Training, Endurance Training, Equipment Evaluation, Education, & procurement, Balance Training, Safety Education & Training, Self-Care / ADL  Plan Comment: Continue per OT plan of care    Goals  Patient Goals   Patient goals : to go home       Therapy Time   Individual Concurrent Group Co-treatment   Time In 1430         Time Out 1530         Minutes 60              Therapeutic activities: 45 minutes  Cognitive Retraining: 15 minutes      LEONCIO Nelson Electronically signed by LEONCIO Nelson on 2/10/2021 at 3:35 PM

## 2021-02-10 NOTE — PLAN OF CARE
Problem: Pain:  Goal: Pain level will decrease  Description: Pain level will decrease  2/10/2021 1320 by Ivana Richardson RN  Outcome: Ongoing  2/10/2021 0134 by Mayela Cuevas. Caren Walker RN  Outcome: Ongoing  Goal: Control of acute pain  Description: Control of acute pain  2/10/2021 1320 by Ivana Richardson RN  Outcome: Ongoing  2/10/2021 0134 by Mayela Cuevas. Caren Walker RN  Outcome: Ongoing  Goal: Control of chronic pain  Description: Control of chronic pain  2/10/2021 1320 by Ivana Richardson RN  Outcome: Ongoing  2/10/2021 0134 by Mayela Cuevas. Caren Walker RN  Outcome: Ongoing     Problem: Falls - Risk of:  Goal: Will remain free from falls  Description: Will remain free from falls  2/10/2021 1320 by Ivana Richardson RN  Outcome: Ongoing  2/10/2021 0134 by Mayela Cuevas. Caren Walker RN  Outcome: Ongoing  Goal: Absence of physical injury  Description: Absence of physical injury  2/10/2021 1320 by Ivana Richardson RN  Outcome: Ongoing  2/10/2021 0134 by Mayela Cuevas. Caren Walker RN  Outcome: Ongoing     Problem: IP SWALLOWING  Goal: LTG - patient will tolerate the least restrictive diet consistency to allow for safe consumption of daily meals  2/10/2021 1320 by Ivana Richardson RN  Outcome: Ongoing  2/10/2021 0134 by Mayela Cuevas. Caren Walker RN  Outcome: Ongoing     Problem: Skin Integrity:  Goal: Will show no infection signs and symptoms  Description: Will show no infection signs and symptoms  2/10/2021 1320 by Ivana Richardson RN  Outcome: Ongoing  2/10/2021 0134 by Mayela Cuevas. Caren Walker RN  Outcome: Ongoing  Goal: Absence of new skin breakdown  Description: Absence of new skin breakdown  2/10/2021 1320 by Ivana Richardson RN  Outcome: Ongoing  2/10/2021 0134 by Mayela Cuevas. Caren Walker RN  Outcome: Ongoing     Problem: Nutrition  Goal: Optimal nutrition therapy  2/10/2021 1320 by Ivana Richardson RN  Outcome: Ongoing  2/10/2021 0134 by Mayela Cuevas.  Caren Walker RN  Outcome: Ongoing

## 2021-02-10 NOTE — PROGRESS NOTES
Subjective: The patient complains of  moderate to severe acute  Lower extremity PVD with occlusion and recent surgery partially relieved by rest, PT, OT,   and exacerbated by recent illness and exertion. I am concerned about patients right lower extremity pain and healing. He seemed to do really well after his transfusion and IV Venofer here. That will really help his overall cardiac function as he will be able to carry oxygen better through his tissues. According to recent nursing note, \"  Pt in bed with eyes closed most of evening. Pt awakens when arm is touched or when speaking loudly to him. Ist dose of venofer 200 mg iv  given tonight over 1 hour. Pt tolerated venofer with no negative reaction. Pt cooperative. Pt using urinal at bedside. One touch tonight was  210. PT received  Lantus 10 units and humalong 4 units subcut as ordered. Pt's family member called to check on pt with correct hippa code. Call light in reach and bed alarm on.  \".    ROS x10: The patient also complains of severely impaired mobility and activities of daily living. Otherwise no new problems with vision, hearing, nose, mouth, throat, dermal, cardiovascular, GI, , pulmonary, musculoskeletal, psychiatric or neurological. See Rehab H&P on Rehab chart dated . Vital signs:  BP (!) 149/61   Pulse 70   Temp 97 °F (36.1 °C)   Resp 18   Ht 5' 7\" (1.702 m)   Wt 159 lb 8 oz (72.3 kg)   SpO2 98%   BMI 24.98 kg/m²   I/O:   PO/Intake:  fair PO intake, no problems observed or reported. Bowel/Bladder:  continent, constipation and urinary urgency. General:  Patient is well developed, adequately nourished, non-obese and     well kempt. HEENT:    PERRLA, hearing intact to loud voice, external inspection of ear     and nose benign. Inspection of lips, tongue and gums benign  Musculoskeletal: No significant change in strength or tone. All joints stable.       Inspection and palpation of digits and nails show no 2021 home with her family and home health care. Patient is status post weekly team meeting every Monday to assess progress towards goals, discuss and address social, psychological and medical comorbidities and to address difficulties they may be having progressing in therapy. Patient and family education is in progress. The patient is to follow-up with their family physician after discharge. Complex Active General Medical Issues that complicate care Assess & Plan:     1. Principal Problem:    Gait abnormality dt PVD--right femoral artery occlusion  Active Problems:  1. HTN (hypertension),   HypotensionDyslipidemia-continue blood signs every shift focusing on heart rate and blood pressure checks, consult hospitalist for backup medical and adjust/add medications (aspirin, Lipitor, Coumadin, Coreg, Lasix)    DM (diabetes mellitus) -  Continue blood sugar checks every shift, diet, add diabetic add dietary Ed restrict carbohydrates to lowest effective and safe carb count per meal advising 4 carbs per meal, add at bedtime snack to prevent a.m. hypoglycemia, adjust/add medications (Lantus, Humalog  Recent Labs     02/09/21  0617 02/09/21  1117 02/09/21  1628 02/09/21  2109 02/10/21  0709   POCGLU 131* 223* 183* 210* 166*   2. Severe progressive anemia-iron and [id number] hematology transfused 2/9/2021. Patient tolerated transfusion well with IV Lasix and is also tolerating Venofer here. 3.   Hypothyroidism-titrate Synthroid  4. History of tobacco abuse, CAD (coronary artery disease)  5. COPD with acute exacerbation -Pulse oximeter checks to shift dose and titrate oxygen and aerosol treatments monitor for nocturnal hypoxemia, monitor vital signs, oxygen prn.   6.   Femoral artery occlusion-coumadinization with consult for INR dosing with pharmacy  7. Hyponatremia-liberalize salt in diet  8.    PVD (peripheral vascular disease) -status post coumadinization for right femoral artery occlusion  9. Immunosuppression during a pandemic-add immune enhancing vitamins and screen for Covid weekly Covid 19 - on 2/9/2021. 10.   Chronic right shoulder pain-add Lidoderm and heat  11. GERD progressive anemia and constipation-scheduled Senokot and add Protonix-monitor stools for blood patient is for transfusion to 921.            Isabella Ureña D.O., PM&R     Attending    [address]

## 2021-02-10 NOTE — PROGRESS NOTES
hands to place all the clothespins on and then was able to remove them. Pt. engaged in B hand strengthening task with manipulating through green theraputty to find the beads in there and remove them. Pt. was able to manipulate through the putty to find all the beads with increased time. Pt. did not drop any of the beads. Pt. was on 3L O2.       Plan   Plan  Times per week: 5-7x/wk  Plan weeks: 1.5  Current Treatment Recommendations: Strengthening, Functional Mobility Training, Cognitive Reorientation, Cognitive/Perceptual Training, Patient/Caregiver Education & Training, Endurance Training, Equipment Evaluation, Education, & procurement, Balance Training, Safety Education & Training, Self-Care / ADL  Plan Comment: Continue per OT plan of care    Goals  Patient Goals   Patient goals : to go home       Therapy Time   Individual Concurrent Group Co-treatment   Time In 1130         Time Out 1200         Minutes 30              Therapeutic activities: 30 minutes      LEONCIO Nelson Electronically signed by LEONCIO Nelson on 2/10/2021 at 1:19 PM

## 2021-02-10 NOTE — PROGRESS NOTES
Physical Therapy Rehab Treatment Note  [facility]ility/Department: [facility]  [facility]       NAME: Jai Miguel  : 3/5/1929 (89 y.o.)  MRN: [id number]  CODE STATUS: Full Code    Date of Service: 2/10/2021  Chart Reviewed: Yes  Family / Caregiver Present: No  General Comment  Comments: Per Nursing report, pt expressed he dose not want to do therapy this afternoon. Pt agreeable to  work with this therapist in room at start of his scheduled therapy session. Restrictions:  Restrictions/Precautions: Fall Risk       SUBJECTIVE: Subjective: \"Do I have to it's so cold\"  Pain Screening  Patient Currently in Pain: No       Post Treatment Pain Screenin/10       OBJECTIVE:                    Bed mobility  Rolling to Left: Modified independent  Supine to Sit: Supervision  Sit to Supine: Modified independent  Scooting: Modified independent  Comment: Hospital bed flat position with minimal use of bedrails to complete    Transfers  Sit to Stand: Supervision;Modified independent  Stand to sit: Supervision;Modified independent  Bed to Chair: Supervision    Ambulation  Ambulation?: Yes  More Ambulation?: No  Ambulation 1  Surface: level tile;carpet;uneven  Device: Rolling Walker  Other Apparatus: O2(3L)  Assistance: Supervision  Quality of Gait: Reciprocal gait pattern, slow maria l, No LOB  Distance: 150'                Activity Tolerance  Activity Tolerance: Patient Tolerated treatment well  Activity Tolerance: Pt required increased incouragement for particiaption in therapy. Exercises  Straight Leg Raise: 2 x 10    Heelslides: 2x10    Hip Abduction: Supine 2 x 10    Knee Short Arc Quad: x20  Ankle Pumps: x20    Other exercises    Other exercises 2: Static HS stretch with heel prop x 3 min. ASSESSMENT/COMMENTS:  Assessment: Pt agreeable to do therapy in room, able to ambulate in hallway this PM with pt requesting to go back to bed at end of tx session.     PLAN OF CARE/Safety:   Safety Devices  Type of devices:

## 2021-02-10 NOTE — PROGRESS NOTES
Occupational Therapy  Facility/Department: [facility]  Daily Treatment Note  NAME: Eric Atkinson  : 3/5/1929  MRN: [id number]    Date of Service: 2/10/2021    Discharge Recommendations:  Continue to assess pending progress       Assessment      Activity Tolerance  Activity Tolerance: Patient Tolerated treatment well  Safety Devices  Safety Devices in place: Yes  Type of devices: All fall risk precautions in place         Patient Diagnosis(es): There were no encounter diagnoses. has a past medical history of Anemia, CAD (coronary artery disease), DM (diabetes mellitus) ([facility] [address] Dyslipidemia, History of tobacco abuse, HTN (hypertension), Hypothyroidism, Non-ST elevation MI (NSTEMI) ([facility] [address] and Pacemaker. has a past surgical history that includes Cataract removal and Middle ear surgery (Left, ). Restrictions  Restrictions/Precautions  Restrictions/Precautions: Fall Risk     Subjective   General  Chart Reviewed: Yes  Patient assessed for rehabilitation services?: Yes  Response to previous treatment: Patient with no complaints from previous session  Family / Caregiver Present: No  Referring Practitioner: Dr. Brodie Davila  Diagnosis: Impaired mobility and ADL's d/t severe progressive PVD    Subjective  Subjective: \"I'm cold. \"    General Comment  Comments: Pt on 3L O2 via NC    Pain Assessment  Pain Level: 0  Pre Treatment Pain Screening  Pain at present: 0     Orientation  Orientation  Orientation Level: Oriented to place;Oriented to person     Objective      Bed mobility  Supine to Sit: Supervision  Scooting: Supervision  Comment: bed flat, bed rails utilized    Patient noted to be soiled with wet urine upon sitting EOB. ADL    Grooming: Independent    UE Bathing: Setup    LE Bathing: Stand by assistance    UE Dressing: Setup    LE Dressing: Minimal assistance(don B socks)    Shower Transfers  Shower - Transfer From: Pearly Hammed - Transfer Type: To and From  Shower - Transfer To:  Shower seat with

## 2021-02-10 NOTE — PROGRESS NOTES
Physical Therapy Rehab Treatment Note  Facility/Department: [facility]  Room: [id number]       NAME: Delores Jamies  : 3/5/1929 (80 y.o.)  MRN: [id number]  CODE STATUS: Full Code    Date of Service: 2/10/2021  Chart Reviewed: Yes  Family / Caregiver Present: No  General Comment  Comments: Pt resistant to getting out of bed for therapy at this time, encouraged pt's participation, pt agreeable to start with supine exercises and stretching. Restrictions:  Restrictions/Precautions: Fall Risk       SUBJECTIVE: Subjective: \"Do I have to it's so cold\"  Pain Screening  Patient Currently in Pain: Denies       Post Treatment Pain Screenin/10       OBJECTIVE:                    Bed mobility  Rolling to Left: Modified independent  Sit to Supine: Modified independent  Scooting: Modified independent  Comment: Hospital bed flat position with minimal use of bedrails to complete    Transfers  Sit to Stand: Supervision  Stand to sit: Supervision  Bed to Chair: Supervision    Ambulation  Ambulation?: Yes  More Ambulation?: Yes  Ambulation 1  Surface: level tile;carpet;uneven  Device: Rolling Walker  Other Apparatus: O2(3L)  Assistance: Supervision  Distance: 120'  Ambulation 2  Surface - 2: carpet  Device 2: No device  Other Apparatus 2: O2  Assistance 2: Contact guard assistance  Quality of Gait 2: Slower pace, mildly unsteady, reciprocal gait with Right LE flexed knee throughout ambualtion. Activity Tolerance  Activity Tolerance: Patient Tolerated treatment well    Exercises  Straight Leg Raise: 2 x 10  Hamstring Sets: HS Curls RTB x 20  Heelslides: 2x10  Hip Flexion: 1# x 20  Hip Abduction: Supine 2 x 10  Knee Long Arc Quad: 1# x20  Knee Short Arc Quad: x20  Ankle Pumps: x20  Comments: Progression of exericses with increased reps and addition of weights, increased resistance with T-bands for progression of strength and endurance levels.   Other exercises  Other exercises 1: HL Hip ABD/ADD with RTB/BAll x 20

## 2021-02-11 LAB
ALBUMIN SERPL-MCNC: 2.9 G/DL (ref 3.5–4.6)
ALP BLD-CCNC: 49 U/L (ref 35–104)
ALT SERPL-CCNC: 13 U/L (ref 0–41)
ANION GAP SERPL CALCULATED.3IONS-SCNC: 8 MEQ/L (ref 9–15)
AST SERPL-CCNC: 17 U/L (ref 0–40)
BILIRUB SERPL-MCNC: 1.1 MG/DL (ref 0.2–0.7)
BUN BLDV-MCNC: 29 MG/DL (ref 8–23)
CALCIUM SERPL-MCNC: 8 MG/DL (ref 8.5–9.9)
CHLORIDE BLD-SCNC: 104 MEQ/L (ref 95–107)
CO2: 26 MEQ/L (ref 20–31)
CREAT SERPL-MCNC: 1.22 MG/DL (ref 0.7–1.2)
GFR AFRICAN AMERICAN: >60
GFR NON-AFRICAN AMERICAN: 55.6
GLOBULIN: 2.8 G/DL (ref 2.3–3.5)
GLUCOSE BLD-MCNC: 107 MG/DL (ref 60–115)
GLUCOSE BLD-MCNC: 171 MG/DL (ref 70–99)
GLUCOSE BLD-MCNC: 173 MG/DL (ref 60–115)
GLUCOSE BLD-MCNC: 185 MG/DL (ref 60–115)
GLUCOSE BLD-MCNC: 187 MG/DL (ref 60–115)
HCT VFR BLD CALC: 26.3 % (ref 42–52)
HEMOGLOBIN: 8.8 G/DL (ref 14–18)
INR BLD: 2.8
MCH RBC QN AUTO: 31.3 PG (ref 27–31.3)
MCHC RBC AUTO-ENTMCNC: 33.3 % (ref 33–37)
MCV RBC AUTO: 94.1 FL (ref 80–100)
PDW BLD-RTO: 14.5 % (ref 11.5–14.5)
PERFORMED ON: ABNORMAL
PERFORMED ON: NORMAL
PLATELET # BLD: 464 K/UL (ref 130–400)
POTASSIUM REFLEX MAGNESIUM: 4.2 MEQ/L (ref 3.4–4.9)
PROTHROMBIN TIME: 29.2 SEC (ref 12.3–14.9)
RBC # BLD: 2.8 M/UL (ref 4.7–6.1)
SODIUM BLD-SCNC: 138 MEQ/L (ref 135–144)
TOTAL PROTEIN: 5.7 G/DL (ref 6.3–8)
WBC # BLD: 9 K/UL (ref 4.8–10.8)

## 2021-02-11 PROCEDURE — 6370000000 HC RX 637 (ALT 250 FOR IP): Performed by: NURSE PRACTITIONER

## 2021-02-11 PROCEDURE — 6370000000 HC RX 637 (ALT 250 FOR IP): Performed by: INTERNAL MEDICINE

## 2021-02-11 PROCEDURE — 97110 THERAPEUTIC EXERCISES: CPT

## 2021-02-11 PROCEDURE — 85610 PROTHROMBIN TIME: CPT

## 2021-02-11 PROCEDURE — 1180000000 HC REHAB R&B

## 2021-02-11 PROCEDURE — 36415 COLL VENOUS BLD VENIPUNCTURE: CPT

## 2021-02-11 PROCEDURE — 97530 THERAPEUTIC ACTIVITIES: CPT

## 2021-02-11 PROCEDURE — 94640 AIRWAY INHALATION TREATMENT: CPT

## 2021-02-11 PROCEDURE — 2700000000 HC OXYGEN THERAPY PER DAY

## 2021-02-11 PROCEDURE — 85027 COMPLETE CBC AUTOMATED: CPT

## 2021-02-11 PROCEDURE — 80053 COMPREHEN METABOLIC PANEL: CPT

## 2021-02-11 PROCEDURE — 97116 GAIT TRAINING THERAPY: CPT

## 2021-02-11 PROCEDURE — 97140 MANUAL THERAPY 1/> REGIONS: CPT

## 2021-02-11 PROCEDURE — 6370000000 HC RX 637 (ALT 250 FOR IP): Performed by: PHYSICAL MEDICINE & REHABILITATION

## 2021-02-11 PROCEDURE — 94761 N-INVAS EAR/PLS OXIMETRY MLT: CPT

## 2021-02-11 PROCEDURE — 99233 SBSQ HOSP IP/OBS HIGH 50: CPT | Performed by: PHYSICAL MEDICINE & REHABILITATION

## 2021-02-11 RX ORDER — WARFARIN SODIUM 2 MG/1
1 TABLET ORAL
Status: COMPLETED | OUTPATIENT
Start: 2021-02-11 | End: 2021-02-11

## 2021-02-11 RX ADMIN — BUDESONIDE AND FORMOTEROL FUMARATE DIHYDRATE 2 PUFF: 160; 4.5 AEROSOL RESPIRATORY (INHALATION) at 05:36

## 2021-02-11 RX ADMIN — INSULIN LISPRO 2 UNITS: 100 INJECTION, SOLUTION INTRAVENOUS; SUBCUTANEOUS at 12:15

## 2021-02-11 RX ADMIN — CARVEDILOL 6.25 MG: 6.25 TABLET, FILM COATED ORAL at 08:20

## 2021-02-11 RX ADMIN — URSODIOL 300 MG: 300 CAPSULE ORAL at 08:20

## 2021-02-11 RX ADMIN — ASPIRIN 81 MG: 81 TABLET, CHEWABLE ORAL at 08:19

## 2021-02-11 RX ADMIN — Medication 100 MG: at 08:20

## 2021-02-11 RX ADMIN — CARVEDILOL 6.25 MG: 6.25 TABLET, FILM COATED ORAL at 16:53

## 2021-02-11 RX ADMIN — INSULIN LISPRO 2 UNITS: 100 INJECTION, SOLUTION INTRAVENOUS; SUBCUTANEOUS at 22:00

## 2021-02-11 RX ADMIN — FOLIC ACID 1 MG: 1 TABLET ORAL at 08:20

## 2021-02-11 RX ADMIN — INSULIN GLARGINE 10 UNITS: 100 INJECTION, SOLUTION SUBCUTANEOUS at 22:10

## 2021-02-11 RX ADMIN — LEVOTHYROXINE SODIUM 25 MCG: 0.05 TABLET ORAL at 05:33

## 2021-02-11 RX ADMIN — WARFARIN SODIUM 1 MG: 2 TABLET ORAL at 16:53

## 2021-02-11 RX ADMIN — INSULIN LISPRO 2 UNITS: 100 INJECTION, SOLUTION INTRAVENOUS; SUBCUTANEOUS at 08:21

## 2021-02-11 RX ADMIN — FERROUS SULFATE TAB 325 MG (65 MG ELEMENTAL FE) 325 MG: 325 (65 FE) TAB at 08:20

## 2021-02-11 RX ADMIN — URSODIOL 300 MG: 300 CAPSULE ORAL at 22:15

## 2021-02-11 RX ADMIN — Medication 2000 UNITS: at 16:53

## 2021-02-11 RX ADMIN — ATORVASTATIN CALCIUM 40 MG: 40 TABLET, FILM COATED ORAL at 22:15

## 2021-02-11 RX ADMIN — PANTOPRAZOLE SODIUM 40 MG: 40 TABLET, DELAYED RELEASE ORAL at 05:33

## 2021-02-11 RX ADMIN — FERROUS SULFATE TAB 325 MG (65 MG ELEMENTAL FE) 325 MG: 325 (65 FE) TAB at 16:53

## 2021-02-11 ASSESSMENT — PAIN SCALES - GENERAL: PAINLEVEL_OUTOF10: 0

## 2021-02-11 NOTE — PROGRESS NOTES
Subjective: The patient complains of  moderate to severe acute  Lower extremity PVD with occlusion and recent surgery partially relieved by rest, PT, OT,   and exacerbated by recent illness and exertion. I am concerned about patients right lower extremity pain and healing. He seemed to do really well after his transfusion and IV Venofer here. That will really help his overall cardiac function as he will be able to carry oxygen better through his tissues. Coumadin on hold--Hgb improving SP transfusion. According to recent nursing note, \"  Pt resting in bed. Assessment complete. VSS. . Snack and insulin given. Pt has no complaints and is in no distress. 3L NC on continuous. Rt lower extremity has bruising. DSD intact to rt leg surgical site. LBM 2/8/2021. Bed alarm on.   \".    ROS x10: The patient also complains of severely impaired mobility and activities of daily living. Otherwise no new problems with vision, hearing, nose, mouth, throat, dermal, cardiovascular, GI, , pulmonary, musculoskeletal, psychiatric or neurological. See Rehab H&P on Rehab chart dated . Vital signs:  /65   Pulse 61   Temp 98 °F (36.7 °C)   Resp 15   Ht 5' 7\" (1.702 m)   Wt 159 lb 8 oz (72.3 kg)   SpO2 96%   BMI 24.98 kg/m²   I/O:   PO/Intake:  fair PO intake, no problems observed or reported. Bowel/Bladder:  continent, constipation and urinary urgency. General:  Patient is well developed, adequately nourished, non-obese and     well kempt. HEENT:    PERRLA, [place] hearing intact to loud voice, external inspection of ear     and nose benign. Inspection of lips, tongue and gums benign  Musculoskeletal: No significant change in strength or tone. All joints stable. Inspection and palpation of digits and nails show no clubbing,       cyanosis or inflammatory conditions. Neuro/Psychiatric: Affect: flat. Alert and oriented to person, place and     situation.   No significant change in deep tendon reflexes or     sensation  Lungs:  Diminished, CTA-B. Respiration effort is normal at rest.     Heart:   S1 = S2, RRR. No loud murmurs. Abdomen:  Soft, non-tender, no enlargement of liver or spleen. Extremities:  No significant lower extremity edema or tenderness. Skin:   Intact to general survey, no visualized or palpated problems. Rehabilitation:  Physical therapy: FIMS:  Bed Mobility: Scooting: Modified independent    Transfers: Sit to Stand: Supervision, Modified independent  Stand to sit: Supervision, Modified independent  Bed to Chair: Supervision, Ambulation 1  Surface: level tile, carpet, uneven  Device: Rolling Walker  Other Apparatus: O2(3L)  Assistance: Supervision  Quality of Gait: Reciprocal gait pattern, slow maria l, No LOB  Gait Deviations: Decreased step length, Shuffles, Decreased step height  Distance: 150'  Comments: Fatigue, c/o SOB with ambualtion. Required VCs and hand gestures for directions. , Stairs  # Steps : 8  Stairs Height: 6\"  Rails: Bilateral  Curbs: 4\"  Device: Rolling walker  Assistance: Stand by assistance, Contact guard assistance  Comment: Mix of reciprocal and non-reciprocal pattern. Slow steady, SBA for stairs and CGA with curb step, no cues provided to test pt's safety with curb step, pt able to complete without LOB. FIMS:  ,  , Assessment: Pt agreeable to do therapy in room, able to ambulate in hallway this PM with pt requesting to go back to bed at end of tx session. Occupational therapy: FIMS:   ,  , Assessment: Pt is a 80 yr old male presenting to [facility] with the above functional deficits. Pt would benefit from occupational therapy services to maximize safety and independence with ADL tasks, improve overall strength/endurance and balance for functional tasks.     Speech therapy: FIMS:        Lab/X-ray studies reviewed, analyzed and discussed with patient and staff:   Recent Results (from the past 24 hour(s))   POCT Glucose    Collection Time: 02/10/21 12:04 PM   Result Value Ref Range    POC Glucose 128 (H) 60 - 115 mg/dl    Performed on ACCU-CHEK    POCT Glucose    Collection Time: 02/10/21  3:56 PM   Result Value Ref Range    POC Glucose 190 (H) 60 - 115 mg/dl    Performed on ACCU-CHEK    POCT Glucose    Collection Time: 02/10/21  8:04 PM   Result Value Ref Range    POC Glucose 215 (H) 60 - 115 mg/dl    Performed on ACCU-CHEK    Comprehensive Metabolic Panel w/ Reflex to MG    Collection Time: 02/11/21  6:03 AM   Result Value Ref Range    Sodium 138 135 - 144 mEq/L    Potassium reflex Magnesium 4.2 3.4 - 4.9 mEq/L    Chloride 104 95 - 107 mEq/L    CO2 26 20 - 31 mEq/L    Anion Gap 8 (L) 9 - 15 mEq/L    Glucose 171 (H) 70 - 99 mg/dL    BUN 29 (H) 8 - 23 mg/dL    CREATININE 1.22 (H) 0.70 - 1.20 mg/dL    GFR Non-African American 55.6 (L) >60    GFR  >60.0 >60    Calcium 8.0 (L) 8.5 - 9.9 mg/dL    Total Protein 5.7 (L) 6.3 - 8.0 g/dL    Albumin 2.9 (L) 3.5 - 4.6 g/dL    Total Bilirubin 1.1 (H) 0.2 - 0.7 mg/dL    Alkaline Phosphatase 49 35 - 104 U/L    ALT 13 0 - 41 U/L    AST 17 0 - 40 U/L    Globulin 2.8 2.3 - 3.5 g/dL   CBC    Collection Time: 02/11/21  6:03 AM   Result Value Ref Range    WBC 9.0 4.8 - 10.8 K/uL    RBC 2.80 (L) 4.70 - 6.10 M/uL    Hemoglobin 8.8 (L) 14.0 - 18.0 g/dL    Hematocrit 26.3 (L) 42.0 - 52.0 %    MCV 94.1 80.0 - 100.0 fL    MCH 31.3 27.0 - 31.3 pg    MCHC 33.3 33.0 - 37.0 %    RDW 14.5 11.5 - 14.5 %    Platelets 460 (H) 273 - 400 K/uL   PROTIME-INR    Collection Time: 02/11/21  6:03 AM   Result Value Ref Range    Protime 29.2 (H) 12.3 - 14.9 sec    INR 2.8    POCT Glucose    Collection Time: 02/11/21  6:13 AM   Result Value Ref Range    POC Glucose 173 (H) 60 - 115 mg/dl    Performed on ACCU-CHEK        Previous extensive, complex labs, notes and diagnostics reviewed and analyzed.      ALLERGIES:    Allergies as of 02/01/2021    (No Known Allergies)      (please also verify by checking MAR)      Today I evaluated this patient for periodic reassessment of medical and functional status. The patient was discussed in detail at the treatment team meeting focusing on current medical issues, progress in therapies, social issues, psychological issues, barriers to progress and strategies to address these barriers, and discharge planning. See the addendum to rehab progress note-as a second progress note in the chart. The patient continues to be high risk for future disability and their medical and rehabilitation prognosis continue to be good and therefore, we will continue the patient's rehabilitation course as planned. The patient's tentative discharge date was set. Patient and family education was discussed. The patient was made aware of the team discussion regarding their progress. Complex Physical Medicine & Rehab Issues Assess & Plan:   1. Severe abnormality of gait and mobility and impaired self-care and ADL's secondary to progressive weakness dt   right lower extremity PVD. Functional and medical status reassessed regarding patients ability to participate in therapies and patient found to be able to participate in acute intensive comprehensive inpatient rehabilitation program including PT/OT to improve balance, ambulation, ADLs, and to improve the P/AROM. Therapeutic modifications regarding activities in therapies, place, amount of time per day and intensity of therapy made daily. In bed therapies or bedside therapies prn.   2. Bowel progressive constipation, and Bladder dysfunction monitoring neurogenic bladder:  frequent toileting, ambulate to bathroom with assistance, check post void residuals. Check for C.difficile x1 if >2 loose stools in 24 hours, continue bowel & bladder program.  Monitor bowel and bladder function. Lactinex 2 PO every AC.   MOM prn, Brown Bomb prn, Glycerin suppository prn, enema prn.  3. Severe ischemic  left lower extremity postop pain pain as well as generalized OA pain: reassess pain every shift and prior to and after each therapy session, give prn  Norco , modalities prn in therapy, Lidoderm, K-pad prn.   4. Skin healing and breakdown risk:  continue pressure relief program.  Daily skin exams and reports from nursing. 5. Severe fatigue due to nutritional and hydration deficiency: Add vitamin B12 vitamin D and CoQ10 continue to monitor I&Os, calorie counts prn, dietary consult prn. Add healthy HS snack. 6. Acute episodic insomnia with situational adjustment disorder:  prn Ambien, monitor for day time sedation. Add HS \"Tuck In\"  7. Falls risk elevated:  patient to use call light to get nursing assistance to get up, bed and chair alarm. 8. Elevated DVT risk: progressive activities in PT, continue prophylaxis BELLE hose, elevation and Coumadin-hold prn.  9. Complex discharge planning: Need to work on stairs with O2. Patient is for discharge February 13, 2021 home with her family and home health care. Patient is status post weekly team meeting Thursday to assess progress towards goals, discuss and address social, psychological and medical comorbidities and to address difficulties they may be having progressing in therapy. Patient and family education is in progress. The patient is to follow-up with their family physician after discharge. Complex Active General Medical Issues that complicate care Assess & Plan:     1. Principal Problem:    Gait abnormality dt PVD--right femoral artery occlusion  Active Problems:  1.    HTN (hypertension),   History of tobacco abuse, CAD (coronary artery disease)  HypotensionDyslipidemia-continue blood signs every shift focusing on heart rate and blood pressure checks, consult hospitalist for backup medical and adjust/add medications (aspirin, Lipitor, Coumadin, Coreg, Lasix)    DM (diabetes mellitus) -  Continue blood sugar checks every shift, diet, add diabetic add dietary Ed restrict carbohydrates to lowest effective and safe carb count per meal advising 4 carbs per meal, add at bedtime snack to prevent a.m. hypoglycemia, adjust/add medications (Lantus, Humalog  Recent Labs     02/10/21  0709 02/10/21  1204 02/10/21  1556 02/10/21  2004 02/11/21  0613   POCGLU 166* 128* 190* 215* 173*   2. Severe progressive anemia-iron and Q27-tombrve hematology transfused 2/9/2021. Patient tolerated transfusion well with IV Lasix and is also tolerating Venofer here. 3.   Hypothyroidism-titrate Synthroid  4. COPD with acute exacerbation -Pulse oximeter checks to shift dose and titrate oxygen and aerosol treatments monitor for nocturnal hypoxemia, monitor vital signs, oxygen prn.   5.   Femoral artery occlusion-coumadinization with consult for INR dosing with pharmacy  6. Hyponatremia-liberalize salt in diet  7. PVD (peripheral vascular disease) -status post coumadinization for right femoral artery occlusion  8. Immunosuppression during a pandemic-add immune enhancing vitamins and screen for Covid weekly Covid 19 - on 2/9/2021.  9.   Chronic right shoulder pain-add Lidoderm and heat  10. GERD progressive anemia and constipation-scheduled Senokot and add Protonix-monitor stools for blood patient is for transfusion to 921.            Zachery Guerrero D.O., PM&R     Attending    [address]

## 2021-02-11 NOTE — PROGRESS NOTES
5942)  Comment: Mix of reciprocal and non-reciprocal pattern. Slow steady, SBA for stairs and CGA with curb step, no cues provided to test pt's safety with curb step, pt able to complete without LOB. (02/08/21 1409)  W/C mobility:         OCCUPATIONAL THERAPY  Hand Dominance: Right  ADL  Feeding: Modified independent (Patient was able to open packets and eat without assist for breakfast this am.) (02/09/21 0738)  Grooming: Independent (02/10/21 0842)  UE Bathing: Setup (02/10/21 7849)  LE Bathing: Stand by assistance (02/10/21 0968)  UE Dressing: Setup (02/10/21 8470)  LE Dressing: Minimal assistance(don B socks) (02/10/21 0706)  Toileting: Unable to assess(comment)(pt. did not need to go) (02/03/21 1014)  Additional Comments: patient declined a need to toilet at this time (02/09/21 8843)  Toilet Transfers  Toilet Transfer: Unable to assess (02/03/21 1015)  Toilet Transfers Comments: did not need to go (02/03/21 1015)  Tub Transfers  Tub Transfers: Not tested (02/02/21 1324)  Shower Transfers  Shower - Transfer From: Leonardo Carbajal (02/10/21 4127)  Shower - Transfer Type: To and From (02/10/21 0844)  Shower - Transfer To: Shower seat with back (02/10/21 9652)  Shower - Technique: Ambulating (02/10/21 0844)  Shower Transfers: Stand by assistance (02/10/21 1035)  Shower Transfers Comments: grab bars (02/10/21 0844)      SPEECH THERAPY  Motor Speech: Within Functional Limits  Comprehension: Exceptions(very [place] with no success with amplification; pt benefits from written information with cues)  Verbal Expression: Exceptions to functional limits(min assist to supervision)      Diet/Swallow:  Diet Solids Recommendation: Regular  Liquid Consistency Recommendation:  Thin  Dysphagia Outcome Severity Scale: Level 6: Within functional limits/Modified independence    Compensatory Swallowing Strategies: Upright as possible for all oral intake, Small bites/sips             COGNITION  OT: Cognition Comment: comp: SUP exp: SUP soc: MIN A prob: MIN A mem: MIN A  SP:Memory: Exceptions to WFL(min assist)  Problem Solving: Exceptions to WFL(min assist)        THERAPY, MEDICAL AND NURSING COORDINATION:    []  Pain medication before therapies     []  Check orthostatic BP      [x]  Ambulate to the bathroom in room    [x]  Add scheduled rest beaks     []  In room therapies      Discharge date set for:              2/13/21      Home with:   granddaughter Freddie Javier  and family  with help from   granddacarolyne Javier  and family            And:      Home Health Care:     [x]  PT    [x]  OT    []  ST   [x]  Aide   []  SW    [x]  RN                    Outpatient Therapy:  []  PT    []  OT    []  ST   []  Rehab Psych                 Equipment:  [facility], [place] O2 (has now)      At D/C their function is goaled at:   PT:Long term goal 1: Bed mobility with indep  Long term goal 2: Functional transfers with indep  Long term goal 3: indep gait 150 feet with or without device  Long term goal 4: Pt will negotiate >/=12 steps with handrails and SBA to navigate home environment  Long term goal 5: indep with HEP  OT:Eating  Assistance Needed: Setup or clean-up assistance  CARE Score: 5  Discharge Goal: Independent, Oral Hygiene  Assistance Needed: Setup or clean-up assistance  CARE Score: 5  Discharge Goal: Independent, Toileting Hygiene  Comment: did not need to go  Reason if not Attempted: Patient refused  CARE Score: 7, Shower/Bathe Self  Assistance Needed: Partial/moderate assistance  CARE Score: 3  Discharge Goal: Supervision or touching assistance  Upper Body Dressing  Assistance Needed: Supervision or touching assistance  CARE Score: 4  Discharge Goal: Independent, Lower Body Dressing  Assistance Needed: Substantial/maximal assistance  CARE Score: 2  Discharge Goal: Independent, Putting On/Taking Off Footwear  Assistance Needed: Dependent  CARE Score: 1  Discharge Goal: Independent, Toilet Transfer  Comment: did not need to go  Reason if not Attempted: Patient refused  CARE Score: 7  Discharge Goal: Independent  SP:Long-term Goals  Timeframe for Long-term Goals: /  Goal 1: /              From a cognitive standpoint they will need:        24 hr supervision  --progress to occasional           Significant problems/ barriers to functional progress include: Pt is at a high risk for functional loss,    [x]  Acute infection/UTI    []  Low BP's     [x]  COPD flare-up   []  Uncontrolled blood sugar     []  Progressive anemia         [x]  Severe pain exacerbation--RLE     [x]  Impaired mental status    []  Urinary incontinence    []  Bowel incontinence           Plan to correct barriers to functional progress: Add scheduled rest breaks, control pain by using ice Lidoderm rest and massage as well as pain medications prior to therapy. Add pall care at PA. Based on a comprehensive evaluation of the above, the individualized therapy and Discharge plan will be:    -Times stated are an average that will be varied based on the patient's daily need. PT  1 1/2  hrs/day 5-7 days per week           OT  1 1/2hrs per day 5-7 days per week ST ____1/2__ _hrs /day 3-5 days per week       Estimated LOS 2 week(s)    - Overall functional prognosis:     [x]  Good    []  Fair    []  Poor -Medical Prognosis:   [x]  Good    []  Fair    []  Poor    This patient was made aware of the discussion of Plan of Care, their projected dicharge date and their projected function at discharge.        Arturo Parekh DO

## 2021-02-11 NOTE — PROGRESS NOTES
Occupational Therapy  Facility/Department: [facility]  Daily Treatment Note  NAME: Toya Farr  : 3/5/1929  MRN: [id number]    Date of Service: 2021    Discharge Recommendations:  Continue to assess pending progress       Assessment      Activity Tolerance  Activity Tolerance: Patient limited by fatigue  Safety Devices  Safety Devices in place: Yes  Type of devices: All fall risk precautions in place         Patient Diagnosis(es): There were no encounter diagnoses. has a past medical history of Anemia, CAD (coronary artery disease), DM (diabetes mellitus) ([facility] [address] Dyslipidemia, History of tobacco abuse, HTN (hypertension), Hypothyroidism, Non-ST elevation MI (NSTEMI) ([facility] [address] and Pacemaker. has a past surgical history that includes Cataract removal and Middle ear surgery (Left, ). Restrictions  Restrictions/Precautions  Restrictions/Precautions: Fall Risk  Subjective   General  Chart Reviewed: Yes  Patient assessed for rehabilitation services?: Yes  Response to previous treatment: Patient with no complaints from previous session  Family / Caregiver Present: No  Referring Practitioner: Dr. Bessy Mcelroy  Diagnosis: Impaired mobility and ADL's d/t severe progressive PVD  Patient reported no pain at the beginning and the end of the session. Orientation     Objective     Patient completed multiple activities for endurance and UE strengthening. Patient was able to locate beads in orange theraputty, perform arm bike task for 2 minutes with multiple rest breaks, elbow and distal exercises with a one pound weight and graded clothespins on and off. Patient stood with supervision and placed rings from one side to the other side of the shoulder arc. At the end of the session patient transferred back to bed with supervision at  level.          Plan   Plan  Times per week: 5-7x/wk  Plan weeks: 1.5  Current Treatment Recommendations: Strengthening, Functional Mobility Training, Cognitive Reorientation, Cognitive/Perceptual Training, Patient/Caregiver Education & Training, Endurance Training, Equipment Evaluation, Education, & procurement, Balance Training, Safety Education & Training, Self-Care / ADL  Plan Comment: Continue per OT plan of care    Goals  Patient Goals   Patient goals : to go home       Therapy Time   Individual Concurrent Group Co-treatment   Time In 1430         Time Out 1530         Minutes 60              Therapeutic activities: 60 minutes    CHETNA Sarmiento/L    Electronically signed by CHETNA Sarmiento/L on 2/11/2021 at 4:30 PM

## 2021-02-11 NOTE — PROGRESS NOTES
Hospitalist Progress Note  2/11/2021 1:33 PM    Assessment and Plan:   COPD with mild exacerbation: Improved, completed 5-day course of prednisone and Rocephin. Continue chest PT, Acapella, Incentive spirometry, Duonebs Q4hr as needed, Supplemental O2 with goal SPO2 of 92% or greater. Anemia: on warfarin. Monitor H/H. Now on ferrous sulfate and folic acid. Hemoglobin has improved to 8.8. 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 Long-term anticoagulation. Fall precautions. PT OT to evaluate. Maximize nutrition status. Assessing if needs DME at home. SW on board. Dr. Dandy Eastman managing rehab plan  CAD/CABG/PPM: On ASA, statin, BB  HTN: LVEF 60%. Stable. On coreg. DM type II: SSI, POCT ACHS, hypoglycemia protocol. Lantus added. Hypothyroidism: On synthroid  CKD: Avoid nephrotoxic agents when able. Monitor BMP. Extremely [place]: Speak slowly, write material down. Thick, elongated toe nails: Podiatry following and managing. Bowel Regimen and GI PPx: stool softners PRN ordered with hold parameters for loose stools or diarrhea. On antiacid  Diet: DIET CARB CONTROL; Carb Control: 4 carb choices (60 gms)/meal; No Added Salt (3-4 GM)   Advance Directive: Full Code   Nutrition status: Supplemental Vitamins ordered. Dietitian assessment  Vaccinations: Immunization records reviewed. If has not received appropriate vaccinations, will order to be given prior to discharge. DVT prophylaxis: on coumadin  Discharge planning: RETA on board. High Risk Readmission Screening Tool Score Noted.      Additionally, the following hospital problems were addressed:  Principal Problem:    Gait abnormality dt PVD--right femoral artery occlusion  Active Problems:    HTN (hypertension)    Dyslipidemia    DM (diabetes mellitus) ([facility] [address]    Hypothyroidism    History of tobacco abuse    CAD (coronary artery disease)    COPD with acute exacerbation (HCC)    Hypotension    COPD exacerbation (HCC)    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HCC)    Hyponatremia    PVD (peripheral vascular disease) (HCC)    Chronic right shoulder pain  Resolved Problems:    * No resolved hospital problems. *      ** Total time spent reviewing medical records, evaluating patient, speaking with RN's and consultants where I was focused exclusively on this patient: 35 minutes. This time is excluding time spent performing procedures or significant events occurring earlier or later in the day requiring my attention and focus. Subjective:   Admit Date: 2/1/2021  PCP: Madelin Mari, DO    No acute events overnight. Afebrile  No new complaints. Pt denies chest pain, SOB, N/V, fevers or chills. Objective:     Vitals:    02/10/21 1717 02/10/21 1828 02/11/21 0536 02/11/21 0617   BP: (!) 143/59 (!) 113/53  125/65   Pulse: 62 64  61   Resp:  16  15   Temp:  98 °F (36.7 °C)  98 °F (36.7 °C)   TempSrc:       SpO2:  96% 99% 96%   Weight:       Height:         General appearance: A + O x 2. No conversational dyspnea noted. Answers questions appropriately. Extremely [place]. Able to communicate. Does better if questions are written down  Lungs: Diminished,  exp wheezes, No rales, No retractions; No use of accessory muscles. Non-productive cough present  Heart:  S1, S2 normal, RRR, no MRG appreciated  Abdomen: (+) BS, soft, non-tender; non distended no guarding or rigidity. Extremities:  no cyanosis, no edema bilat lower exts, no calf tenderness bilaterally. Dry skin noted.  Scattered areas of ecchymosis present       Medications:      Vitamin D  2,000 Units Oral Dinner    cyanocobalamin  1,000 mcg Intramuscular Weekly    coenzyme Q10  100 mg Oral Daily    lidocaine  3 patch Transdermal Daily    insulin glargine  10 Units Subcutaneous Nightly    ferrous sulfate  325 mg Oral BID WC    folic acid  1 mg Oral Daily    pill splitter   Does not apply Once   

## 2021-02-11 NOTE — PROGRESS NOTES
Occupational Therapy  Facility/Department: [facility]  Daily Treatment Note  NAME: Ruth Donaldson  : 3/5/1929  MRN: [id number]    Date of Service: 2021    Discharge Recommendations:  Continue to assess pending progress       Assessment      Activity Tolerance  Activity Tolerance: Patient Tolerated treatment well  Safety Devices  Safety Devices in place: Yes  Type of devices: All fall risk precautions in place         Patient Diagnosis(es): There were no encounter diagnoses. has a past medical history of Anemia, CAD (coronary artery disease), DM (diabetes mellitus) ([facility] [address] Dyslipidemia, History of tobacco abuse, HTN (hypertension), Hypothyroidism, Non-ST elevation MI (NSTEMI) ([facility] [address] and Pacemaker. has a past surgical history that includes Cataract removal and Middle ear surgery (Left, ). Restrictions  Restrictions/Precautions  Restrictions/Precautions: Fall Risk     Subjective   General  Chart Reviewed: Yes  Patient assessed for rehabilitation services?: Yes  Response to previous treatment: Patient with no complaints from previous session  Family / Caregiver Present: No  Referring Practitioner: Dr. Mateo Olguin  Diagnosis: Impaired mobility and ADL's d/t severe progressive PVD    Subjective  Subjective: Nancy Osana paula. \" - when patient read therapists name tag    General Comment  Comments: Pt on 3L O2 via NC    Pain Assessment  Pain Level: 0  Pre Treatment Pain Screening  Pain at present: 0     Orientation : Patient completed orientation worksheet at the following levels. Patient with 100% legibility with R FM handwriting. Orientation  Overall Orientation Status: Impaired  Orientation Level: Oriented to person     Objective      Patient engaged in therapeutic activity to increase B FM coordination/strengthening, B UE ROM/strengthening and cognition to increase I with ADL's, IADL's and transfers. Patient able to reach in lateral planes with 0 difficulty.   Patient able to reach in forward planes with MIN

## 2021-02-11 NOTE — PROGRESS NOTES
Pt resting in bed. Assessment complete. VSS. . Snack and insulin given. Pt has no complaints and is in no distress. 3L NC on continuous. Rt lower extremity has bruising. DSD intact to rt leg surgical site. LBM 2/8/2021. Bed alarm on. Call light in reach.  Electronically signed by Marquis Thomas RN on 2/11/2021 at 1:26 AM

## 2021-02-11 NOTE — PROGRESS NOTES
Physical Therapy Rehab Treatment Note  Facility/Department: [facility]  Room: [place] [address]       NAME: Bobbi Donahue  : 3/5/1929 (80 y.o.)  MRN: [id number]  CODE STATUS: Full Code    Date of Service: 2021  Chart Reviewed: Yes    Restrictions:  Restrictions/Precautions: Fall Risk       SUBJECTIVE: Subjective: I'm alright  Pain Screening  Patient Currently in Pain: Denies       Post Treatment Pain Screenin/10       OBJECTIVE:                    Bed mobility  Supine to Sit: Modified independent  Sit to Supine: Modified independent  Comment: Performed at mat table    Transfers  Sit to Stand: Modified independent  Stand to sit: Modified independent  Bed to Chair: Supervision    Ambulation  Ambulation?: Yes  Ambulation 1  Surface: level tile;carpet;uneven  Device: Rolling Walker  Other Apparatus: O2(1.5L)  Assistance: Modified Independent  Quality of Gait: Slow steady maria l, reciprocal gait pattern with decreased step length and minimal foot clearnace. Distance: 175'  Ambulation 2  Surface - 2: carpet  Device 2: No device  Other Apparatus 2: O2  Assistance 2: Stand by assistance  Quality of Gait 2: Slower pace, shuffling pattern, reciprocal gait  Distance: 20ft x 2  limited by destination. Stairs/Curb  Stairs?: Yes  Stairs  # Steps : 12  Stairs Height: 6\"  Rails: Bilateral  Assistance: Supervision  Comment: Reciprocal pattern, minimal use of UE support on handrails. Activity Tolerance  Activity Tolerance: Patient Tolerated treatment well    Exercises  Straight Leg Raise: 2 x 10  Hamstring Sets: HS Curls RTB x 20  Heelslides: 2x10  Hip Flexion: 1# x 20  Knee Long Arc Quad: 1# x20  Ankle Pumps: x20  Other exercises  Other exercises 1: HL Hip ABD/ADD with RTB/BAll x 20 ea. Other exercises 2: Static HS stretch with heel prop x 3 min. Other exercises 3: DKTC/ Straight Leg LTR [place] P-Ball 2 x10 ea.   Other exercises 4: Bridging x 10  Manual therapy  Soft Tissue Mobalization: Posterior Right thigh, to decrease tissue tension. Manual HS stretches of Right LE to improve mobility. ASSESSMENT/COMMENTS:  Assessment: Pt performing at Supervision Mod I level with all transfers and ambualtion at this time, pt maintain SpO2 levels of 96% while on 1.5L O2 during this tx session. communication a struggle today as pt arrived to therapy without hearing aids, one found on floor after standing up, 2nd hearing aid not found.     PLAN OF CARE/Safety:   Safety Devices  Type of devices: Left in chair;Chair alarm in place      Therapy Time:   Individual   Time In 1030   Time Out 1130   Minutes 60     Minutes:60      Transfer/Bed mobility trainin      Gait trainin      Neuro re education:0     Therapeutic ex:25      Manual Therapy: Andie Woodard 33  21 at 12:48 PM

## 2021-02-11 NOTE — PROGRESS NOTES
Physical Therapy Rehab Treatment Note  Facility/Department: [facility] [place] [place] [facility]  [facility]       NAME: Marilou Lee  : 3/5/1929 (80 y.o.)  MRN: [id number]  CODE STATUS: Full Code    Date of Service: 2021  Chart Reviewed: Yes  Family / Caregiver Present: No    Restrictions:  Restrictions/Precautions: Fall Risk       SUBJECTIVE: Subjective: I'm alright  Pain Screening  Patient Currently in Pain: Denies       Post Treatment Pain Screenin/10       OBJECTIVE:                    Bed mobility  Supine to Sit: Modified independent  Sit to Supine: Modified independent  Comment: Performed at mat table    Transfers  Sit to Stand: Modified independent  Stand to sit: Modified independent  Bed to Chair: Supervision    Ambulation  Ambulation?: Yes  Ambulation 1  Surface: level tile;carpet;uneven  Device: Rolling Walker;Single point cane  Other Apparatus: O2  Assistance: Modified Independent  Quality of Gait: Slow steady maria l, reciprocal gait pattern with decreased step length and minimal foot clearnace. Distance: 140ft x 1 with WW, 25ft x 1 with SPC  Comments: Attempted ambualtion with SPC, pt reports preference with [employer]. Activity Tolerance  Activity Tolerance: Patient Tolerated treatment well    Exercises    Hamstring Sets: HS Curls RTB x 20  Heelslides: 2x10  Hip Flexion: 2#  2 x 10  Knee Long Arc Quad: 2# 2 x 10  Ankle Pumps: x20  Comments: Manual stretching of Right LE hamstrings pre gait. Manual therapy  Soft Tissue Mobalization: Posterior Right thigh, to decrease tissue tension. Manual HS stretches of Right LE to improve mobility. ASSESSMENT/COMMENTS:  Assessment: Improved Right knee extension with decreased tissue resistance noted in Right LE today, improved reciprocal gait pattern and decreased c/o pain in seated position with pt able to place Right foot on floor without complaints. PLAN OF CARE/Safety:   Safety Devices  Type of devices: Left in bed;Call light within reach; Bed alarm in place      Therapy Time:   Individual   Time In 1325   Time Out 1355   Minutes 30     Minutes:30      Transfer/Bed mobility trainin      Gait training:15      Neuro re education:0     Therapeutic ex:15      Jackie Navarrete  21 at 4:50 PM

## 2021-02-11 NOTE — PROGRESS NOTES
Clinical Pharmacy Note    Warfarin consult follow-up    Recent Labs     02/11/21  0603   INR 2.8     Recent Labs     02/08/21  1522 02/10/21  0541 02/11/21  0603   HGB 7.1* 9.6* 8.8*   HCT 21.9* 28.8* 26.3*   PLT  --  496* 464*       Significant drug:drug interactions:    Current warfarin-drug interactions: Acetaminophen, Maalox, aspirin, coenzyme Q10, Norco, lactulose, levothyroxine, pantoprazole     Notes:  Date      INR  Warfarin Dose   01/30/21 1.3 (1/29) 5 mg   01/31/21 1.1  5 mg   02/01/21 1.1 7.5 mg   02/02/21 1.5 7.5 mg   02/03/21 2.8 2 mg   02/04/21 3.3 1 mg   02/05/21 3.3 HOLD   02/06/21 3.3 HOLD   02/07/21 2.8 2.5 mg    02/08/21 3.9 HOLD   02/09/21 4.1 HOLD    02/10/21  3.2  HOLD   02/11/21 2.8 1 mg                     INR therapeutic today at 2.8. Will resume warfarin at reduced dose today warfarin 1 mg today to reach INR goal range of 2-3 for unprovoked LLE DVT. Recent supra therapeutic INR held x 3 days will be conservative in resuming therapy. Daily PT/INR until stable within therapeutic range.   Navya Romo PharmD

## 2021-02-11 NOTE — PLAN OF CARE
Problem: Pain:  Description: Pain management should include both nonpharmacologic and pharmacologic interventions.   Goal: Pain level will decrease  Description: Pain level will decrease  2/10/2021 2149 by Selene Dakins, RN  Outcome: Ongoing  2/10/2021 1320 by Ivana Richardson RN  Outcome: Ongoing  Goal: Control of acute pain  Description: Control of acute pain  2/10/2021 2149 by Selene Dakins, RN  Outcome: Ongoing  2/10/2021 1320 by Ivana Richardson RN  Outcome: Ongoing  Goal: Control of chronic pain  Description: Control of chronic pain  2/10/2021 2149 by Selene Dakins, RN  Outcome: Ongoing  2/10/2021 1320 by Ivana Richardson RN  Outcome: Ongoing     Problem: Falls - Risk of:  Goal: Will remain free from falls  Description: Will remain free from falls  2/10/2021 2149 by Selene Dakins, RN  Outcome: Ongoing  2/10/2021 1320 by Ivana Richardson RN  Outcome: Ongoing  Goal: Absence of physical injury  Description: Absence of physical injury  2/10/2021 2149 by Selene Dakins, RN  Outcome: Ongoing  2/10/2021 1320 by Ivana Richardson RN  Outcome: Ongoing     Problem: IP SWALLOWING  Goal: LTG - patient will tolerate the least restrictive diet consistency to allow for safe consumption of daily meals  2/10/2021 2149 by Selene Dakins, RN  Outcome: Ongoing  2/10/2021 1320 by Ivana Richardson RN  Outcome: Ongoing     Problem: Skin Integrity:  Goal: Will show no infection signs and symptoms  Description: Will show no infection signs and symptoms  2/10/2021 2149 by Selene Dakins, RN  Outcome: Ongoing  2/10/2021 1320 by Ivana Richardson RN  Outcome: Ongoing  Goal: Absence of new skin breakdown  Description: Absence of new skin breakdown  2/10/2021 2149 by Selene Dakins, RN  Outcome: Ongoing  2/10/2021 1320 by Ivana Richardson RN  Outcome: Ongoing     Problem: Nutrition  Goal: Optimal nutrition therapy  2/10/2021 2149 by Selene Dakins, RN  Outcome: Ongoing  2/10/2021 1320 by Stephen Petty Cata Feliciano RN  Outcome: Ongoing

## 2021-02-11 NOTE — CARE COORDINATION
[facility]  INPATIENT REHABILITATION  TEAM CONFERENCE NOTE  Room: R260/R260-01  Admit Date: 2021       Date: 2021  Patient Name: Suman Feliciano        MRN: [id number]    : 3/5/1929  (80 y.o.)  Gender: male        REHAB DIAGNOSIS:   Diagnosis: impaired mobility and ADL's d/t severe progressive PVD    CO MORBIDITIES:      Past Medical History:   Diagnosis Date    Anemia     CAD (coronary artery disease) 2015    [facility] mellitus) ([facility] [address]     Dyslipidemia     History of tobacco abuse     HTN (hypertension)     Hypothyroidism     Non-ST elevation MI (NSTEMI) ([facility] [address]     Pacemaker 2015     Past Surgical History:   Procedure Laterality Date    CATARACT REMOVAL      MIDDLE EAR SURGERY Left     \"they had to reset the bones\" after an infection        Restrictions  Restrictions/Precautions: Fall Risk  CASE MANAGEMENT    Social/Functional History  Social/Functional History  Lives With: Other (comment)(granddaughter and family)  Type of Home: House  Home Layout: Two level, Bed/Bath upstairs  Home Access: Stairs to enter with rails  Entrance Stairs - Number of Steps: 14  Bathroom Shower/Tub: Tub/Shower unit  Home Equipment: Rolling walker, Cane  ADL Assistance: Independent  Homemaking Assistance: Independent(granddaughter completes)  Ambulation Assistance: Independent  Transfer Assistance: Independent  Active : Yes  Additional Comments: pt is significantly [place]; information gathered via chart review and via writing. Pts personal preferences: n/a    Pts assets/resources/support system: family    COVERAGE INFORMATION:Payor: [facility] / Plan: [facility] PPO / Product Type: Medicare /       NURSING  Weight: 159 lb 8 oz (72.3 kg) / Body mass index is 24.98 kg/m².     DIET CARB CONTROL; Carb Control: 4 carb choices (60 gms)/meal; No Added Salt (3-4 GM)    SpO2: 96 % (21)  O2 Flow Rate (L/min): 3 L/min (21 0536)  No active isolations    Skin Issues: No    Pain Managed: Yes    Bladder continence: Yes    Bowel continence: Yes      Other: [place], O2 has at home    PHYSICAL THERAPY  Bed mobility:  Supine to Sit: Supervision (02/10/21 1615)  Sit to Supine: Modified independent (02/10/21 1615)  Transfers:  Sit to Stand: Supervision;Modified independent (02/10/21 1616)  Bed to Chair: Supervision (02/10/21 1111)  Gait:   Device: Rolling Walker (02/10/21 1616)  Other Apparatus: O2(3L) (02/10/21 1616)  Assistance: Supervision (02/10/21 1126)  Distance: 150' (02/10/21 1616)  Quality of Gait: Reciprocal gait pattern, slow maria l, No LOB (02/10/21 1616)  Comments: Fatigue, c/o SOB with ambualtion. Required VCs and hand gestures for directions. (02/08/21 1201)  Stairs:  # Steps : 8 (02/08/21 1409)  Curbs: 4\" (02/08/21 1409)  Rails: Bilateral (02/08/21 1409)  Assistance: Stand by assistance;Contact guard assistance (02/08/21 1409)  Comment: Mix of reciprocal and non-reciprocal pattern. Slow steady, SBA for stairs and CGA with curb step, no cues provided to test pt's safety with curb step, pt able to complete without LOB.  (02/08/21 1409)  W/C mobility:     LTG:  Long term goal 1: Bed mobility with indep  Long term goal 2: Functional transfers with indep  Long term goal 3: indep gait 150 feet with or without device  Long term goal 4: Pt will negotiate >/=12 steps with handrails and SBA to navigate home environment  Long term goal 5: indep with HEP  PT Treatment Time:  1.5 hrs      OCCUPATIONAL THERAPY  Hand Dominance: Right  ADL  Feeding: Modified independent (Patient was able to open packets and eat without assist for breakfast this am.) (02/09/21 0738)  Grooming: Independent (02/10/21 0842)  UE Bathing: Setup (02/10/21 2103)  LE Bathing: Stand by assistance (02/10/21 1593)  UE Dressing: Setup (02/10/21 5984)  LE Dressing: Minimal assistance(don B socks) (02/10/21 5262)  Toileting: Unable to assess(comment)(pt. did not need to go) (02/03/21 1014)  Additional Comments: patient declined a need to toilet at this time (02/09/21 0738)  Toilet Transfers  Toilet Transfer: Unable to assess (02/03/21 1015)  Toilet Transfers Comments: did not need to go (02/03/21 1015)  Tub Transfers  Tub Transfers: Not tested (02/02/21 1324)  Shower Transfers  Shower - Transfer From: Tanna Bryant (02/10/21 9957)  Shower - Transfer Type: To and From (02/10/21 0844)  Shower - Transfer To: Shower seat with back (02/10/21 0844)  Shower - Technique: Ambulating (02/10/21 0844)  Shower Transfers: Stand by assistance (02/10/21 6851)  Shower Transfers Comments: grab bars (02/10/21 0844)  LTG:  Eating  Assistance Needed: Setup or clean-up assistance  CARE Score: 5  Discharge Goal: Independent, Oral Hygiene  Assistance Needed: Setup or clean-up assistance  CARE Score: 5  Discharge Goal: Independent, Toileting Hygiene  Comment: did not need to go  Reason if not Attempted: Patient refused  CARE Score: 7, Shower/Bathe Self  Assistance Needed: Partial/moderate assistance  CARE Score: 3  Discharge Goal: Supervision or touching assistance  Upper Body Dressing  Assistance Needed: Supervision or touching assistance  CARE Score: 4  Discharge Goal: Independent, Lower Body Dressing  Assistance Needed: Substantial/maximal assistance  CARE Score: 2  Discharge Goal: Independent, Putting On/Taking Off Footwear  Assistance Needed: Dependent  CARE Score: 1  Discharge Goal: Independent, Toilet Transfer  Comment: did not need to go  Reason if not Attempted: Patient refused  CARE Score: 7  Discharge Goal: Independent  OT Treatment Time: 1.5 hrs      SPEECH THERAPY    Motor Speech: Within Functional Limits  Comprehension: Exceptions(very [place] with no success with amplification; pt benefits from written information with cues)  Verbal Expression: Exceptions to functional limits(min assist to supervision)      Diet/Swallow:  Diet Solids Recommendation: Regular  Liquid Consistency Recommendation:  Thin  Dysphagia Outcome Severity Scale: Level 6: Within functional limits/Modified independence    Compensatory Swallowing Strategies: Upright as possible for all oral intake, Small bites/sips         LTG:  Long-term Goals  Timeframe for Long-term Goals: /  Goal 1: /             COGNITION  OT: Cognition Comment: comp: SUP exp: SUP soc: MIN A prob: MIN A mem: MIN A  SP:Memory: Exceptions to WFL(min assist)  Problem Solving: Exceptions to WFL(min assist)    RECREATIONAL THERAPY  Attendance to recreational therapy programs:    []  Pet Therapy  [] Music Therapy  [] Art Therapy    [] Recreation Therapy Group [] Support Group           Patient social interaction (mood, participation): good      Patient strengths: good support    Patients goal: to go home    Problems/Barriers: Hearing concerns        1. Safety:          - Intervention / Plan:    [x]  falls protocol     [x]  PT/OT    []  SP        - Results:         2. Potential DME needs:         - Intervention / Plan:  [x]  PT/OT     [x]  Assess equipment needs/access       - Results:         3. Weakness:          - Intervention / Plan:  [x]  PT/OT      []  Other:         - Results:         4. Discharge planning needs:          - Intervention / Plan:  [x]  Weekly team conference      [x]  family training        - Results:         5.            - Intervention / Plan:          - Results:         6.            - Intervention / Plan:         - Results:         7.            - Intervention / Plan:         - Results:           Discharge Plan   Estimated Length of Stay: 13 days    Tentative Discharge date: 2/13/21      Anticipated Discharge Destination:  Home      Team recommendations:    1. Follow up Therapy :    PT  OT  RN  [facility] Aide    2.  Home Health    Other:     Equipment needed at Discharge: [employer]      Team Members Present at Conference:    Physician: Dr. Loreto Dyson  : Aretta Frankel, RN  RN: Rashid Liao RN  Physical Therapist: Davey Blizzard, [address] [place]  Speech Therapist: Cullen Delgado, SLP  Berhane Aranda, [address]     Electronically signed by Waldemar Cabello RN on 2/11/2021 at 8:48 AM

## 2021-02-11 NOTE — CARE COORDINATION
Message left for granddaughter to discuss discharge and [place]. Also spoke with daughter [facility] to update.  Electronically signed by Onelia Lane RN on 2/11/2021 at 4:00 PM

## 2021-02-12 LAB
GLUCOSE BLD-MCNC: 112 MG/DL (ref 60–115)
GLUCOSE BLD-MCNC: 129 MG/DL (ref 60–115)
GLUCOSE BLD-MCNC: 142 MG/DL (ref 60–115)
GLUCOSE BLD-MCNC: 181 MG/DL (ref 60–115)
INR BLD: 2.3
PERFORMED ON: ABNORMAL
PERFORMED ON: NORMAL
PROTHROMBIN TIME: 25.1 SEC (ref 12.3–14.9)

## 2021-02-12 PROCEDURE — 6370000000 HC RX 637 (ALT 250 FOR IP): Performed by: NURSE PRACTITIONER

## 2021-02-12 PROCEDURE — 85610 PROTHROMBIN TIME: CPT

## 2021-02-12 PROCEDURE — 94640 AIRWAY INHALATION TREATMENT: CPT

## 2021-02-12 PROCEDURE — 1180000000 HC REHAB R&B

## 2021-02-12 PROCEDURE — 2700000000 HC OXYGEN THERAPY PER DAY

## 2021-02-12 PROCEDURE — 97535 SELF CARE MNGMENT TRAINING: CPT

## 2021-02-12 PROCEDURE — 6370000000 HC RX 637 (ALT 250 FOR IP): Performed by: INTERNAL MEDICINE

## 2021-02-12 PROCEDURE — 6370000000 HC RX 637 (ALT 250 FOR IP): Performed by: PHYSICAL MEDICINE & REHABILITATION

## 2021-02-12 PROCEDURE — 94761 N-INVAS EAR/PLS OXIMETRY MLT: CPT

## 2021-02-12 PROCEDURE — 97116 GAIT TRAINING THERAPY: CPT

## 2021-02-12 PROCEDURE — 36415 COLL VENOUS BLD VENIPUNCTURE: CPT

## 2021-02-12 PROCEDURE — 99232 SBSQ HOSP IP/OBS MODERATE 35: CPT | Performed by: PHYSICAL MEDICINE & REHABILITATION

## 2021-02-12 PROCEDURE — 97129 THER IVNTJ 1ST 15 MIN: CPT

## 2021-02-12 PROCEDURE — 97110 THERAPEUTIC EXERCISES: CPT

## 2021-02-12 RX ORDER — WARFARIN SODIUM 3 MG/1
1.5 TABLET ORAL
Status: COMPLETED | OUTPATIENT
Start: 2021-02-12 | End: 2021-02-12

## 2021-02-12 RX ADMIN — CARVEDILOL 6.25 MG: 6.25 TABLET, FILM COATED ORAL at 16:49

## 2021-02-12 RX ADMIN — BUDESONIDE AND FORMOTEROL FUMARATE DIHYDRATE 2 PUFF: 160; 4.5 AEROSOL RESPIRATORY (INHALATION) at 04:30

## 2021-02-12 RX ADMIN — LEVOTHYROXINE SODIUM 25 MCG: 0.05 TABLET ORAL at 06:54

## 2021-02-12 RX ADMIN — Medication 2000 UNITS: at 16:49

## 2021-02-12 RX ADMIN — URSODIOL 300 MG: 300 CAPSULE ORAL at 20:49

## 2021-02-12 RX ADMIN — INSULIN GLARGINE 10 UNITS: 100 INJECTION, SOLUTION SUBCUTANEOUS at 21:02

## 2021-02-12 RX ADMIN — INSULIN LISPRO 2 UNITS: 100 INJECTION, SOLUTION INTRAVENOUS; SUBCUTANEOUS at 16:54

## 2021-02-12 RX ADMIN — Medication 100 MG: at 08:49

## 2021-02-12 RX ADMIN — ATORVASTATIN CALCIUM 40 MG: 40 TABLET, FILM COATED ORAL at 20:49

## 2021-02-12 RX ADMIN — INSULIN LISPRO 2 UNITS: 100 INJECTION, SOLUTION INTRAVENOUS; SUBCUTANEOUS at 21:02

## 2021-02-12 RX ADMIN — BUDESONIDE AND FORMOTEROL FUMARATE DIHYDRATE 2 PUFF: 160; 4.5 AEROSOL RESPIRATORY (INHALATION) at 17:34

## 2021-02-12 RX ADMIN — FERROUS SULFATE TAB 325 MG (65 MG ELEMENTAL FE) 325 MG: 325 (65 FE) TAB at 16:49

## 2021-02-12 RX ADMIN — WARFARIN SODIUM 1.5 MG: 3 TABLET ORAL at 17:37

## 2021-02-12 RX ADMIN — FOLIC ACID 1 MG: 1 TABLET ORAL at 08:49

## 2021-02-12 RX ADMIN — PANTOPRAZOLE SODIUM 40 MG: 40 TABLET, DELAYED RELEASE ORAL at 06:55

## 2021-02-12 RX ADMIN — FERROUS SULFATE TAB 325 MG (65 MG ELEMENTAL FE) 325 MG: 325 (65 FE) TAB at 08:49

## 2021-02-12 RX ADMIN — CARVEDILOL 6.25 MG: 6.25 TABLET, FILM COATED ORAL at 08:48

## 2021-02-12 RX ADMIN — ASPIRIN 81 MG: 81 TABLET, CHEWABLE ORAL at 08:48

## 2021-02-12 RX ADMIN — URSODIOL 300 MG: 300 CAPSULE ORAL at 08:49

## 2021-02-12 ASSESSMENT — PAIN SCALES - GENERAL: PAINLEVEL_OUTOF10: 0

## 2021-02-12 NOTE — PLAN OF CARE
Problem: Pain:  Goal: Pain level will decrease  Description: Pain level will decrease  Outcome: Ongoing  Goal: Control of acute pain  Description: Control of acute pain  Outcome: Ongoing  Goal: Control of chronic pain  Description: Control of chronic pain  Outcome: Ongoing     Problem: Falls - Risk of:  Goal: Will remain free from falls  Description: Will remain free from falls  Outcome: Ongoing  Goal: Absence of physical injury  Description: Absence of physical injury  Outcome: Ongoing     Problem: IP SWALLOWING  Goal: LTG - patient will tolerate the least restrictive diet consistency to allow for safe consumption of daily meals  Outcome: Ongoing     Problem: Skin Integrity:  Goal: Will show no infection signs and symptoms  Description: Will show no infection signs and symptoms  Outcome: Ongoing  Goal: Absence of new skin breakdown  Description: Absence of new skin breakdown  Outcome: Ongoing     Problem: Nutrition  Goal: Optimal nutrition therapy  Outcome: Ongoing

## 2021-02-12 NOTE — PROGRESS NOTES
Clinical Pharmacy Note    Warfarin consult follow-up    Recent Labs     02/12/21  0514   INR 2.3     Recent Labs     02/10/21  0541 02/11/21  0603   HGB 9.6* 8.8*   HCT 28.8* 26.3*   * 464*       Significant drug:drug interactions:  New warfarin drug-drug interactions: n/a  Discontinued drug-drug interactions: n/a  Other warfarin drug-drug interactions:   Acetaminophen, Maalox, aspirin, coenzyme Q10, Norco, lactulose, levothyroxine, pantoprazole, atorvastatin     Notes:  Date      INR  Warfarin Dose   01/30/21 1.3 (1/29) 5 mg   01/31/21 1.1  5 mg   02/01/21 1.1 7.5 mg   02/02/21 1.5 7.5 mg   02/03/21 2.8 2 mg   02/04/21 3.3 1 mg   02/05/21 3.3 HOLD   02/06/21 3.3 HOLD   02/07/21 2.8 2.5 mg    02/08/21 3.9 HOLD   02/09/21 4.1 HOLD    02/10/21  3.2  HOLD   02/11/21 2.8 1 mg   02/12/21 2.3 1.5 mg                INR is 2.3 today which is therapeutic for target goal 2-3 (DVT). INR decreased by 0.5 from 2/11. Will increase warfarin to 1.5 mg today in attempt to keep in target range. Recent supra therapeutic INR held x 3 days, will be conservative in resuming therapy. Daily PT/INR until stable within therapeutic range.

## 2021-02-12 NOTE — PROGRESS NOTES
[facility] OCCUPATIONAL THERAPY DISCHARGE SUMMARY- REHAB     Date: 2021  Patient Name: Kristine Lofton        MRN: [id number]  Account: [de-identified]   : 3/5/1929  (80 y.o.)  Room: [place] [address]    Diagnosis:  Impaired mobility and ADL's d/t severe progressive PVD    Past Medical History:   Diagnosis Date    Anemia     CAD (coronary artery disease) 2015    DM (diabetes mellitus) ([place] [address].)     Dyslipidemia     History of tobacco abuse     HTN (hypertension)     Hypothyroidism     Non-ST [facility]     Pacemaker 2015     Past Surgical History:   Procedure Laterality Date    CATARACT REMOVAL      MIDDLE EAR SURGERY Left     \"they had to reset the bones\" after an infection       Precautions:   Restrictions/Precautions: Fall Risk     Social/Functional History:  Social/Functional History  Lives With: Other (comment)(granddaughter and family)  Type of Home: House  Home Layout: Two level, Bed/Bath upstairs  Home Access: Stairs to enter with rails  Entrance Stairs - Number of Steps: 14  Bathroom Shower/Tub: Tub/Shower unit  Home Equipment: Rolling walker, Cane  ADL Assistance: Independent  Homemaking Assistance: Independent(granddaughter completes)  Ambulation Assistance: Independent  Transfer Assistance: Independent  Active : Yes  Additional Comments: pt is significantly [place]; information gathered via chart review and via writing.     Current Functional Status:  ADL  Feeding: Modified independent   Grooming: Independent  UE Bathing: Supervision, Setup  LE Bathing: Minimal assistance(B feet)  UE Dressing: Setup  LE Dressing: Minimal assistance(Left sock)  Toileting: Unable to assess(comment)(pt. did not need to go)  Additional Comments: patient declined a need to toilet at this time  Toilet Transfers  Toilet - Technique: Ambulating  Equipment Used: Grab bars  Toilet Transfer: Stand by assistance  Toilet Transfers Comments: did not need to go  Tub Transfers  Tub Transfers: Not tested  Shower Transfers  Shower - Transfer From: Mandy Garibay - Transfer Type: To and From  Shower - Transfer To: Shower seat with back  Shower - Technique: Ambulating  Shower Transfers: Stand by assistance  Shower Transfers Comments: shaun henderson    Orientation Status:  Orientation  Overall Orientation Status: Impaired  Orientation Level: Oriented to person    Cognition Status:  Cognition  Overall Cognitive Status: Exceptions  Arousal/Alertness: Appropriate responses to stimuli  Following Commands:  Follows one step commands consistently  Attention Span: Appears intact  Memory: Decreased recall of recent events, Decreased short term memory, Decreased long term memory, Decreased recall of precautions  Safety Judgement: Decreased awareness of need for safety, Decreased awareness of need for assistance  Problem Solving: Assistance required to generate solutions, Assistance required to correct errors made, Assistance required to identify errors made, Assistance required to implement solutions  Insights: Decreased awareness of deficits  Initiation: Requires cues for some  Sequencing: Requires cues for some  Cognition Comment: comp: SUP exp: MOD I soc: MOD I prob: SUP mem: SUP    Perception Status:   Patient was able to read larger print if he moved the paper around to get it to an optimal position    Sensation Status:  Sensation  Overall Sensation Status: [facility]    Vision and Hearing Status:  Vision  Vision: Impaired  Vision Exceptions: Wears glasses for reading  Hearing  Hearing: Exceptions to [facility]  Hearing Exceptions: Bilateral hearing aid, Hard of hearing/hearing concerns     UE Function Status:    ROM:   LUE AROM (degrees)  LUE AROM : WFL  Left Hand AROM (degrees)  Left Hand AROM: WFL  RUE AROM (degrees)  RUE AROM : WFL  Right Hand AROM (degrees)  Right Hand AROM: WFL    Strength:  LUE Strength  Gross LUE Strength: WFL  L Hand General: 3+/5  LUE Strength Comment: gross assessment  RUE Strength  Gross RUE Strength: WFL  R Hand General: 3+/5  RUE Strength Comment: gross assessment    Coordination, Tone, Quality of Movement:    Tone RUE  RUE Tone: Normotonic  Tone LUE  LUE Tone: Normotonic  Coordination  Movements Are Fluid And Coordinated: No  Coordination and Movement description: Fine motor impairments, Decreased speed, Decreased accuracy, Right UE, Left UE    D/C Recommendations:    Equipment Recommendations:  OT D/C Equipment  Equipment Needed: Yes  Equipment Recommended: Shower seat with back    OT Follow Up:  OT D/C RECOMMENDATIONS  REQUIRES OT FOLLOW UP: Yes  Type: Home OT, Home with Assist, [facility] Exercise Program Provided: [] Yes [x] No      Electronically signed by:    GIN Fortune  2/12/2021, 11:46 AM

## 2021-02-12 NOTE — PROGRESS NOTES
Occupational Therapy  Facility/Department: [facility]  Daily Treatment Note  NAME: Kristine Lofton  : 3/5/1929  MRN: [id number]    Date of Service: 2021    Discharge Recommendations:  Continue to assess pending progress       Assessment      REQUIRES OT FOLLOW UP: Yes  Activity Tolerance  Activity Tolerance: Patient Tolerated treatment well  Safety Devices  Safety Devices in place: Yes  Type of devices: All fall risk precautions in place         Patient Diagnosis(es): There were no encounter diagnoses. has a past medical history of Anemia, CAD (coronary artery disease), DM (diabetes mellitus) ([facility] Dyslipidemia, History of tobacco abuse, HTN (hypertension), Hypothyroidism, Non-ST elevation MI (NSTEMI) ([facility] and Pacemaker. has a past surgical history that includes Cataract removal and Middle ear surgery (Left, ). Restrictions  Restrictions/Precautions  Restrictions/Precautions: Fall Risk  Subjective   General  Chart Reviewed: Yes  Patient assessed for rehabilitation services?: Yes  Response to previous treatment: Patient with no complaints from previous session  Family / Caregiver Present: No  Referring Practitioner: Dr. Naveed Guzmán  Diagnosis: Impaired mobility and ADL's d/t severe progressive PVD  Subjective  Subjective: Vance Moreno. \" - when patient read therapists name tag  General Comment  Comments: Pt on 3L O2 via NC  Pain Assessment  Pain Assessment: 0-10  Pain Level: 0  Pre Treatment Pain Screening  Pain at present: 0  Scale Used: Numeric Score  Intervention List: Patient able to continue with treatment  Vital Signs  Patient Currently in Pain: Denies   Orientation  Orientation  Overall Orientation Status: Impaired  Orientation Level: Oriented to person  Objective    Upon OT arrival, pt was in bed asleep and pt's lunch tray was situated next to pt's bed. Pt did not know his lunch was present and requested to eat. Pt transferred into wheelchair to complete feeding.  Pt able to open all of his

## 2021-02-12 NOTE — PROGRESS NOTES
Physical Therapy Rehab Treatment Note  Facility/Department: [facility] REHAB  [facility]       NAME: Meghan Martínez  : 3/5/1929 (80 y.o.)  MRN: [id number]  CODE STATUS: Full Code    Date of Service: 2021  Chart Reviewed: Yes  Family / Caregiver Present: No    Restrictions:  Restrictions/Precautions: Fall Risk       SUBJECTIVE: Subjective: I'm fine  Pain Screening  Patient Currently in Pain: No       Post Treatment Pain Screenin/10       OBJECTIVE:                    Bed mobility  Rolling to Left: Modified independent  Rolling to Right: Modified independent  Supine to Sit: Modified independent  Sit to Supine: Modified independent  Scooting: Modified independent    Transfers  Sit to Stand: Modified independent;Supervision  Stand to sit: Modified independent;Supervision  Car Transfer: Modified independent  Comment: Pt tends to turn and step away from AD with return to sitting position, able to do safely without additional support to complete. Ambulation  Ambulation?: Yes  Ambulation 1  Surface: level tile;carpet;uneven;ramp  Device: Rolling Walker  Other Apparatus: O2(2L)  Quality of Gait: Slow steady maria l, mild change of speed with descending of ramp, no LOB, pt able to maintain control with uneven surface. Distance: 250ft, multiple short distance limited by desitnations. Stairs/Curb  Stairs?: Yes  Stairs  # Steps : 12  Stairs Height: 6\"  Rails: Bilateral  Curbs: 6\"  Device: Rolling walker  Assistance: Supervision  Comment: Reciprocal pattern with stairs, Supervison levels due to O2 line management, safe with curb step with proper management of [employer] with supervion. Activity Tolerance  Activity Tolerance: Patient Tolerated treatment well    Exercises  Straight Leg Raise: 2 x 10  Hamstring Sets: HS Curls RTB x 20  Hip Flexion: 2#  2 x 10  Hip Abduction: Supine 2 x 10  Knee Long Arc Quad: 2# 2 x 10  Ankle Pumps: x20  Comments: Manual stretching of Right LE hamstrings pre gait.   Other exercises  Other exercises 1: HL Hip ABD/ADD with RTB/BAll x 20 ea. Other exercises 2: Static HS stretch with heel prop x 3 min. Other exercises 3: DKTC/ Straight Leg LTR [place] P-Ball 2 x15 ea. Other exercises 4: Bridging x 10     ASSESSMENT/COMMENTS:  Assessment: Pt deneis any pain during this tx session, pt stated he feels he is ready to go home, No questions reguarding exercises as pt voiced understanding to continue at home with instructions to \"Keep it moving\". Pt performed at Mod I to supervison level, limited to pt's [facility] and difficulty following/understanding insturctions at time due to hear complications. PLAN OF CARE/Safety:   Safety Devices  Type of devices: Left in bed;Call light within reach; Bed alarm in place      Therapy Time:   Individual   Time In 1030   Time Out 1130   Minutes 60     Minutes:60      Transfer/Bed mobility training:15      Gait trainin      Neuro re education:0     Therapeutic ex:25      Jack Abt  21 at 11:38 AM

## 2021-02-12 NOTE — PROGRESS NOTES
Physical Therapy Rehab Treatment Note  [facility]  [facility]       NAME: Delores Jaimes  : 3/5/1929 (80 y.o.)  MRN: [id number]  CODE STATUS: Full Code    Date of Service: 2021  Chart Reviewed: Yes  Patient assessed for rehabilitation services?: Yes  Family / Caregiver Present: No    Restrictions:  Restrictions/Precautions: Fall Risk       SUBJECTIVE: Subjective: Fine  Response To Previous Treatment: Patient with no complaints from previous session. Pain Screening  Patient Currently in Pain: No  Pre Treatment Pain Screening  Pain at present: 0  Scale Used: Numeric Score  Intervention List: Patient able to continue with treatment    Post Treatment Pain Screening:  Pain Assessment  Pain Assessment: 0-10  Pain Level: 0    OBJECTIVE:   Follows Commands: Within Functional Limits    Bed mobility  Rolling to Left: Modified independent  Rolling to Right: Modified independent  Supine to Sit: Modified independent  Sit to Supine: Modified independent  Scooting: Modified independent    Transfers  Sit to Stand: Modified independent  Stand to sit: Modified independent    Ambulation  Ambulation?: Yes  More Ambulation?: No  Ambulation 1  Surface: level tile;uneven;carpet  Device: Rolling Walker  Other Apparatus: O2  Assistance: Modified Independent  Quality of Gait: Slow steady maria l, mild change of speed with descending of ramp, no LOB, pt able to maintain control with uneven surface. Distance: 200'    Exercises  Straight Leg Raise: 2 x 10  Heelslides: 2x10  Gluteal Sets: x 10  Hip Abduction: Supine 2 x 10  Ankle Pumps: x20     ASSESSMENT/COMMENTS:  Body structures, Functions, Activity limitations: Decreased functional mobility ; Decreased balance;Decreased strength;Decreased posture;Decreased safe awareness;Decreased ROM  Assessment: Patient shows good technique with supine exercises, good safety noted with all transfers gait and bed mobility    PLAN OF CARE/Safety:   Safety Devices  Type of

## 2021-02-12 NOTE — PROGRESS NOTES
Comprehensive Nutrition Assessment    Type and Reason for Visit:  Reassess    Nutrition Recommendations/Plan: Continue Current Diet, Start Oral Nutrition Supplement     Nutrition Assessment:  Pt with variable intake at meals, generally <50%. To provide diabetic supplement tid with meals. Please obtain updated weight, after bed is zeroed, to assess trend of wieght status. Malnutrition Assessment:  Malnutrition Status: At risk for malnutrition (Comment)    Context:  Chronic Illness       Estimated Daily Nutrient Needs:  Energy (kcal):  2616-6313 (kg x 24-26); Weight Used for Energy Requirements:  Admission     Protein (g):  81-87 (kg x 1.2-1.3); Weight Used for Protein Requirements:   Admission      Fluid (ml/day):  ~1800; Method Used for Fluid Requirements:  1 ml/kcal      Nutrition Related Findings:  PMH-DB, htn, hld, PVD, [place]. Gluc-112-223 x last 3 days. +1 BLE edema noted. Last BM 2/8? Suggest laxative/ bowel regimen. Pt sleeping when visited at lunch. Intake variable at meals, ~50% per nsg.       Wounds:  None       Current Nutrition Therapies:    DIET CARB CONTROL; Carb Control: 4 carb choices (60 gms)/meal; No Added Salt (3-4 GM)    Anthropometric Measures:  · Height: 5' 7\" (170.2 cm)  · Current Body Weight: (n/a-please obtain updated weight)   · Admission Body Weight: 159 lb 8 oz (72.3 kg)    · Usual Body Weight: 160 lb (72.6 kg)(stated 2/13/20 & 3/26/20)     · Ideal Body Weight: 148 lbs; % Ideal Body Weight   >100%  · BMI:  25  · BMI Categories: Normal Weight (BMI 22.0 to 24.9) age over 72       Nutrition Diagnosis:   · Altered nutrition-related lab values related to endocrine dysfuntion as evidenced by lab values  · Inadequate oral intake related to (chronic illness, advanced age) as evidenced by intake 26-50%    Nutrition Interventions:   Food and/or Nutrient Delivery:  Continue Current Diet, Start Oral Nutrition Supplement  Nutrition Education/Counseling:  No recommendation at this time Coordination of Nutrition Care:  Continue to monitor while inpatient    Goals:  Intake >50% of meals consistantly. stable weight.        Nutrition Monitoring and Evaluation:   Food/Nutrient Intake Outcomes:  Food and Nutrient Intake, Supplement Intake  Physical Signs/Symptoms Outcomes:  Weight, Biochemical Data     Electronically signed by aNty Walker RD, LD on 2/12/21 at 1:44 PM EST

## 2021-02-12 NOTE — PLAN OF CARE
Nutrition Problem #1: Altered nutrition-related lab values  Intervention: Food and/or Nutrient Delivery: Continue Current Diet, Start Oral Nutrition Supplement  Nutritional Goals: Intake >50% of meals consistantly. stable weight.

## 2021-02-12 NOTE — PROGRESS NOTES
Occupational Therapy  Facility/Department: [facility]  Daily Treatment Note  NAME: Lane Keenan  : 3/5/1929  MRN: [id number]    Date of Service: 2021    Discharge Recommendations:  Continue to assess pending progress       Assessment      Activity Tolerance  Activity Tolerance: Patient Tolerated treatment well  Safety Devices  Safety Devices in place: Yes  Type of devices: All fall risk precautions in place         Patient Diagnosis(es): There were no encounter diagnoses. has a past medical history of Anemia, CAD (coronary artery disease), DM (diabetes mellitus) ([facility] Dyslipidemia, History of tobacco abuse, HTN (hypertension), Hypothyroidism, Non-ST elevation MI (NSTEMI) ([facility] and Pacemaker. has a past surgical history that includes Cataract removal and Middle ear surgery (Left, ). Restrictions  Restrictions/Precautions  Restrictions/Precautions: Fall Risk  Subjective   General  Chart Reviewed: Yes  Patient assessed for rehabilitation services?: Yes  Response to previous treatment: Patient with no complaints from previous session  Family / Caregiver Present: No  Referring Practitioner: Dr. Hoang Munguia  Diagnosis: Impaired mobility and ADL's d/t severe progressive PVD  Subjective  Subjective: Taty Crespo. \" - when patient read therapists name tag  General Comment  Comments: Pt on 3L O2 via NC  Pain Assessment  Pain Assessment: 0-10  Pain Level: 0  Pre Treatment Pain Screening  Pain at present: 0  Scale Used: Numeric Score  Intervention List: Patient able to continue with treatment  Vital Signs  Patient Currently in Pain: No   Orientation     Objective    Pt. completed supine to sit eob at SBA and sit to stand with the ww at SBA with verbal cues for hand placement. Pt. ambulated to the bathroom to complete a shower adl at below status. Pt. was on 2L O2. Therapist utilized a large white board for communication with the patient that appeared to work well for the patient.    ADL  Feeding: Modified independent   Grooming: Independent  UE Bathing: Supervision;Setup  LE Bathing: Minimal assistance(B feet)  UE Dressing: Setup  LE Dressing: Minimal assistance(Left sock)  Toileting: Unable to assess(comment)(pt. did not need to go)     Toilet Transfers  Toilet - Technique: Ambulating  Equipment Used: Grab bars  Toilet Transfer: Stand by assistance  Shower Transfers  Shower - Transfer From: Carla Beka - Transfer Type: To and From  Shower - Transfer To:  Shower seat with back  Shower - Technique: Ambulating  Shower Transfers: Stand by assistance     Cognition  Cognition Comment: comp: SUP exp: MOD I soc: MOD I prob: SUP mem: SUP        Plan   Plan  Times per week: 5-7x/wk  Plan weeks: 1.5  Current Treatment Recommendations: Strengthening, Functional Mobility Training, Cognitive Reorientation, Cognitive/Perceptual Training, Patient/Caregiver Education & Training, Endurance Training, Equipment Evaluation, Education, & procurement, Balance Training, Safety Education & Training, Self-Care / ADL  Plan Comment: Continue per OT plan of care    Goals  Patient Goals   Patient goals : to go home       Therapy Time   Individual Concurrent Group Co-treatment   Time In 0800         Time Out 0900         Minutes 60              ADL trainin minutes      LEONCIO Moore Electronically signed by LEONCIO Moore on 2021 at 10:07 AM

## 2021-02-13 VITALS
SYSTOLIC BLOOD PRESSURE: 154 MMHG | WEIGHT: 159.5 LBS | HEART RATE: 60 BPM | DIASTOLIC BLOOD PRESSURE: 96 MMHG | HEIGHT: 67 IN | RESPIRATION RATE: 16 BRPM | OXYGEN SATURATION: 98 % | BODY MASS INDEX: 25.03 KG/M2 | TEMPERATURE: 98 F

## 2021-02-13 LAB
GLUCOSE BLD-MCNC: 121 MG/DL (ref 60–115)
GLUCOSE BLD-MCNC: 138 MG/DL (ref 60–115)
GLUCOSE BLD-MCNC: 175 MG/DL (ref 60–115)
INR BLD: 2
PERFORMED ON: ABNORMAL
PROTHROMBIN TIME: 22.8 SEC (ref 12.3–14.9)

## 2021-02-13 PROCEDURE — 99239 HOSP IP/OBS DSCHRG MGMT >30: CPT | Performed by: PHYSICAL MEDICINE & REHABILITATION

## 2021-02-13 PROCEDURE — 6370000000 HC RX 637 (ALT 250 FOR IP): Performed by: PHYSICAL MEDICINE & REHABILITATION

## 2021-02-13 PROCEDURE — 94640 AIRWAY INHALATION TREATMENT: CPT

## 2021-02-13 PROCEDURE — 94761 N-INVAS EAR/PLS OXIMETRY MLT: CPT

## 2021-02-13 PROCEDURE — 6370000000 HC RX 637 (ALT 250 FOR IP): Performed by: INTERNAL MEDICINE

## 2021-02-13 PROCEDURE — 2700000000 HC OXYGEN THERAPY PER DAY

## 2021-02-13 PROCEDURE — 6370000000 HC RX 637 (ALT 250 FOR IP): Performed by: NURSE PRACTITIONER

## 2021-02-13 PROCEDURE — 36415 COLL VENOUS BLD VENIPUNCTURE: CPT

## 2021-02-13 PROCEDURE — 85610 PROTHROMBIN TIME: CPT

## 2021-02-13 RX ORDER — FOLIC ACID 1 MG/1
1 TABLET ORAL DAILY
Qty: 30 TABLET | Refills: 3 | Status: SHIPPED | OUTPATIENT
Start: 2021-02-14

## 2021-02-13 RX ORDER — CHOLECALCIFEROL (VITAMIN D3) 50 MCG
2000 TABLET ORAL
Qty: 60 TABLET | Refills: 3 | Status: SHIPPED | OUTPATIENT
Start: 2021-02-13

## 2021-02-13 RX ORDER — WARFARIN SODIUM 2 MG/1
2 TABLET ORAL
Status: DISCONTINUED | OUTPATIENT
Start: 2021-02-13 | End: 2021-02-13 | Stop reason: HOSPADM

## 2021-02-13 RX ORDER — FERROUS SULFATE 325(65) MG
325 TABLET ORAL 2 TIMES DAILY WITH MEALS
Qty: 30 TABLET | Refills: 3 | Status: SHIPPED | OUTPATIENT
Start: 2021-02-13

## 2021-02-13 RX ORDER — HYDROCODONE BITARTRATE AND ACETAMINOPHEN 5; 325 MG/1; MG/1
1 TABLET ORAL EVERY 8 HOURS PRN
Qty: 20 TABLET | Refills: 0 | Status: SHIPPED | OUTPATIENT
Start: 2021-02-13 | End: 2021-02-27

## 2021-02-13 RX ORDER — BISACODYL 10 MG
10 SUPPOSITORY, RECTAL RECTAL DAILY PRN
Status: DISCONTINUED | OUTPATIENT
Start: 2021-02-13 | End: 2021-02-13 | Stop reason: HOSPADM

## 2021-02-13 RX ADMIN — ASPIRIN 81 MG: 81 TABLET, CHEWABLE ORAL at 09:24

## 2021-02-13 RX ADMIN — LACTULOSE 20 G: 20 SOLUTION ORAL at 09:33

## 2021-02-13 RX ADMIN — PANTOPRAZOLE SODIUM 40 MG: 40 TABLET, DELAYED RELEASE ORAL at 06:32

## 2021-02-13 RX ADMIN — Medication 100 MG: at 09:24

## 2021-02-13 RX ADMIN — INSULIN LISPRO 2 UNITS: 100 INJECTION, SOLUTION INTRAVENOUS; SUBCUTANEOUS at 11:41

## 2021-02-13 RX ADMIN — FOLIC ACID 1 MG: 1 TABLET ORAL at 09:24

## 2021-02-13 RX ADMIN — FERROUS SULFATE TAB 325 MG (65 MG ELEMENTAL FE) 325 MG: 325 (65 FE) TAB at 09:24

## 2021-02-13 RX ADMIN — BUDESONIDE AND FORMOTEROL FUMARATE DIHYDRATE 2 PUFF: 160; 4.5 AEROSOL RESPIRATORY (INHALATION) at 15:25

## 2021-02-13 RX ADMIN — URSODIOL 300 MG: 300 CAPSULE ORAL at 09:24

## 2021-02-13 RX ADMIN — LEVOTHYROXINE SODIUM 25 MCG: 0.05 TABLET ORAL at 06:32

## 2021-02-13 RX ADMIN — BUDESONIDE AND FORMOTEROL FUMARATE DIHYDRATE 2 PUFF: 160; 4.5 AEROSOL RESPIRATORY (INHALATION) at 04:15

## 2021-02-13 RX ADMIN — CARVEDILOL 6.25 MG: 6.25 TABLET, FILM COATED ORAL at 09:24

## 2021-02-13 NOTE — DISCHARGE SUMMARY
Subjective: The patient complains of  moderate to severe acute  Lower extremity PVD with occlusion and recent surgery partially relieved by rest, PT, OT,   and exacerbated by recent illness and exertion. I am concerned about patients right lower extremity pain and healing. He seemed to do really well after his transfusion and IV Venofer here. That will really help his overall cardiac function as he will be able to carry oxygen better through his tissues. Coumadin on hold--Hgb improving SP transfusion. [address] Course: The patient was admitted to the Rehabilitation Unit to address ADL and mobility deficits. The patient was enrolled in acute PT, OT program.  Weekly team meetings were held to assess functional progress toward their goals. The patient's medical issues were addressed. The patient progressed in the rehab program and is now ready for discharge. Refer to FIM scores summary report for detailed functional status. Greater than 35 minutes was spent on coordinating patients discharge including follow-up care, medications and patient/family education. Extended time needed because of the potential use of opiate medications are high risk medications and a high risk population individual.  Patient and family were instructed to use lowest effective dose of these medications and slowly titrate off over the next 2 to 4 weeks. They are not to combine opiates with sedatives. I reviewed her [facility] prescription monitoring service data sheets in hopes of eliminating polypharmacy and weaning to the lowest effective dose of pain medications and eliminating the concomitant use of benzodiazepines. I see no medications of concern. I see no habits of combining sedatives and narcotics. According to recent nursing note, \"  Pt resting in bed. Assessment complete. VSS. . Snack and insulin given. Pt has no complaints and is in no distress. 3L NC on continuous.  Rt lower extremity has bruising. DSD intact to rt leg surgical site. LBM 2/8/2021. Bed alarm on. Call light in reach. \".    ROS x10: The patient also complains of severely impaired mobility and activities of daily living. Otherwise no new problems with vision, hearing, nose, mouth, throat, dermal, cardiovascular, GI, , pulmonary, musculoskeletal, psychiatric or neurological. See Rehab H&P on Rehab chart dated . Vital signs:  BP (!) 154/96   Pulse 63   Temp 98 °F (36.7 °C) (Oral)   Resp 18   Ht 5' 7\" (1.702 m)   Wt 159 lb 8 oz (72.3 kg)   SpO2 92%   BMI 24.98 kg/m²   I/O:   PO/Intake:  fair PO intake, no problems observed or reported. Bowel/Bladder:  continent, constipation and urinary urgency. General:  Patient is well developed, adequately nourished, non-obese and     well kempt. HEENT:    PERRLA, Teller hearing intact to loud voice, external inspection of ear     and nose benign. Inspection of lips, tongue and gums benign  Musculoskeletal: No significant change in strength or tone. All joints stable. Inspection and palpation of digits and nails show no clubbing,       cyanosis or inflammatory conditions. Neuro/Psychiatric: Affect: flat. Alert and oriented to person, place and     situation. No significant change in deep tendon reflexes or     sensation  Lungs:  Diminished, CTA-B. Respiration effort is normal at rest.     Heart:   S1 = S2, RRR. No loud murmurs. Abdomen:  Soft, non-tender, no enlargement of liver or spleen. Extremities:  No significant lower extremity edema or tenderness. Skin:   Intact to general survey, no visualized or palpated problems.     Rehabilitation:  Physical therapy: FIMS:  Bed Mobility: Scooting: Modified independent    Transfers: Sit to Stand: Modified independent  Stand to sit: Modified independent  Bed to Chair: Supervision, Ambulation 1  Surface: level tile, uneven, carpet  Device: Rolling Walker  Other Apparatus: O2  Assistance: Modified Independent  Quality of Gait: Slow steady maria l, mild change of speed with descending of ramp, no LOB, pt able to maintain control with uneven surface. Gait Deviations: Decreased step length, Shuffles, Decreased step height  Distance: 200'  Comments: Attempted ambualtion with SPC, pt reports preference with [employer] Stairs  # Steps : 12  Stairs Height: 6\"  Rails: Bilateral  Curbs: 6\"  Device: Rolling walker  Assistance: Supervision  Comment: Reciprocal pattern with stairs, Supervison levels due to O2 line management, safe with curb step with proper management of [employer] with supervion. FIMS:  ,  , Assessment: Patient shows good technique with supine exercises, good safety noted with all transfers gait and bed mobility    Occupational therapy: FIMS:   ,  , Assessment: Pt is a 80 yr old male [facility] with the above functional deficits. Pt would benefit from occupational therapy services to maximize safety and independence with ADL tasks, improve overall strength/endurance and balance for functional tasks.     Speech therapy: FIMS:        Lab/X-ray studies reviewed, analyzed and discussed with patient and staff:   Recent Results (from the past 24 hour(s))   POCT Glucose    Collection Time: 02/12/21 11:34 AM   Result Value Ref Range    POC Glucose 129 (H) 60 - 115 mg/dl    Performed on ACCU-CHEK    POCT Glucose    Collection Time: 02/12/21  4:10 PM   Result Value Ref Range    POC Glucose 181 (H) 60 - 115 mg/dl    Performed on ACCU-CHEK    POCT Glucose    Collection Time: 02/12/21  8:57 PM   Result Value Ref Range    POC Glucose 142 (H) 60 - 115 mg/dl    Performed on ACCU-CHEK    POCT Glucose    Collection Time: 02/13/21  6:37 AM   Result Value Ref Range    POC Glucose 121 (H) 60 - 115 mg/dl    Performed on ACCU-CHEK    POCT Glucose    Collection Time: 02/13/21  7:18 AM   Result Value Ref Range    POC Glucose 138 (H) 60 - 115 mg/dl    Performed on ACCU-CHEK    PROTIME-INR    Collection Time: 02/13/21  7:34 AM Result Value Ref Range    Protime 22.8 (H) 12.3 - 14.9 sec    INR 2.0        Previous extensive, complex labs, notes and diagnostics reviewed and analyzed. ALLERGIES:    Allergies as of 02/01/2021    (No Known Allergies)      (please also verify by checking MAR)         I reviewed her [facility] prescription monitoring service data sheets in hopes of eliminating polypharmacy and weaning to the lowest effective dose of pain medications and eliminating the concomitant use of benzodiazepines. I see no medications of concern. I see no habits of combining sedatives and narcotics. He will transition to lowest effective dose of Norco with supervision of family. Complex Physical Medicine & Rehab Issues Assess & Plan:   1. Severe abnormality of gait and mobility and impaired self-care and ADL's secondary to progressive weakness dt   right lower extremity PVD. Functional and medical status have improved greatly status post acute rehab at [facility].  2. Bowel progressive constipation, and Bladder dysfunction monitoring neurogenic bladder:  frequent toileting, ambulate to bathroom with assistance, check post void residuals. Check for C.difficile x1 if >2 loose stools in 24 hours, continue bowel & bladder program.  Monitor bowel and bladder function. Lactinex 2 PO every AC. MOM prn, Brown Bomb prn, Glycerin suppository prn, enema prn.  3. Severe ischemic left lower extremity postop pain pain as well as generalized OA pain: reassess pain every shift and prior to and after each therapy session, give prn  Norco , modalities prn in therapy, Lidoderm, K-pad prn.   4. Skin healing and breakdown risk:  continue pressure relief program.  Daily skin exams and reports from nursing. 5. Severe fatigue due to nutritional and hydration deficiency: Add vitamin B12 vitamin D and CoQ10 continue to monitor I&Os, calorie counts prn, dietary consult prn. Add healthy HS snack.   6. Acute episodic insomnia with situational adjustment disorder:  prn Ambien, monitor for day time sedation. Add HS \"Tuck In\"  7. Falls risk elevated:  patient to use call light to get nursing assistance to get up, bed and chair alarm. 8. Elevated DVT risk: progressive activities in PT, continue prophylaxis BELLE hose, elevation and Coumadin-hold prn.  9. Complex discharge planning: Begin medication reconciliation and patient and family education as I prepare for discharge this weekend. Need to work on stairs with O2. Patient is for discharge February 13, 2021 home with her family and home health care. Patient is status post weekly team meeting Thursday to assess progress towards goals, discuss and address social, psychological and medical comorbidities and to address difficulties they may be having progressing in therapy. Patient and family education is in progress. The patient is to follow-up with their family physician after discharge. Complex Active General Medical Issues that complicated care Assess & Plan:     1. Principal Problem:    Gait abnormality dt PVD--right femoral artery occlusion  Active Problems:  1. HTN (hypertension),   History of tobacco abuse, CAD (coronary artery disease)  HypotensionDyslipidemia-continue blood signs every shift focusing on heart rate and blood pressure checks, consult hospitalist for backup medical and adjust/add medications (aspirin, Lipitor, Coumadin, Coreg, Lasix)    DM (diabetes mellitus) -  Continue blood sugar checks every shift, diet, add diabetic add dietary Ed restrict carbohydrates to lowest effective and safe carb count per meal advising 4 carbs per meal, add at bedtime snack to prevent a.m. hypoglycemia, adjust/add medications (Lantus, Humalog  Recent Labs     02/12/21  1134 02/12/21  1610 02/12/21  2057 02/13/21  0637 02/13/21  0718   POCGLU 129* 181* 142* 121* 138*   2. Severe progressive anemia-iron and S05-mwsrvvs hematology transfused 2/9/2021.   Patient tolerated transfusion well with IV

## 2021-02-13 NOTE — DISCHARGE INSTR - DIET

## 2021-02-13 NOTE — PLAN OF CARE
Problem: Pain:  Goal: Pain level will decrease  Description: Pain level will decrease  Outcome: Completed  Goal: Control of acute pain  Description: Control of acute pain  Outcome: Completed  Goal: Control of chronic pain  Description: Control of chronic pain  Outcome: Completed     Problem: Falls - Risk of:  Goal: Will remain free from falls  Description: Will remain free from falls  Outcome: Completed  Goal: Absence of physical injury  Description: Absence of physical injury  Outcome: Completed     Problem: IP SWALLOWING  Goal: LTG - patient will tolerate the least restrictive diet consistency to allow for safe consumption of daily meals  Outcome: Completed     Problem: Skin Integrity:  Goal: Will show no infection signs and symptoms  Description: Will show no infection signs and symptoms  Outcome: Completed  Goal: Absence of new skin breakdown  Description: Absence of new skin breakdown  Outcome: Completed     Problem: Nutrition  Goal: Optimal nutrition therapy  Outcome: Completed

## 2021-02-13 NOTE — PROGRESS NOTES
Hospitalist Progress Note  2/13/2021 1:15 PM    Assessment and Plan:   COPD with mild exacerbation: Improved, completed 5-day course of prednisone and Rocephin. Continue chest PT, Acapella, Incentive spirometry, Duonebs Q4hr as needed, Supplemental O2 with goal SPO2 of 92% or greater. Anemia: on warfarin. Monitor H/H. Now on ferrous sulfate and folic acid. Hemoglobin has improved to 8.8. 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 Long-term anticoagulation. Fall precautions. PT OT to evaluate. Maximize nutrition status. Assessing if needs DME at home. SW on board. Dr. Stevan Rodriguez managing rehab plan  CAD/CABG/PPM: On ASA, statin, BB  HTN: LVEF 60%. Stable. On coreg. DM type II: SSI, POCT ACHS, hypoglycemia protocol. Lantus added. Hypothyroidism: On synthroid  CKD: Avoid nephrotoxic agents when able. Monitor BMP. Discharge planning: SW on board. High Risk Readmission Screening Tool Score Noted. Additionally, the following hospital problems were addressed:  Principal Problem:    Gait abnormality dt PVD--right femoral artery occlusion  Active Problems:    HTN (hypertension)    Dyslipidemia    DM (diabetes mellitus) (HCC)    Hypothyroidism    History of tobacco abuse    CAD (coronary artery disease)    COPD with acute exacerbation (HCC)    Hypotension    COPD exacerbation (HCC)    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HCC)    Hyponatremia    PVD (peripheral vascular disease) (HCC)    Chronic right shoulder pain  Resolved Problems:    * No resolved hospital problems. *      ** Total time spent reviewing medical records, evaluating patient, speaking with RN's and consultants where I was focused exclusively on this patient: 35 minutes.    This time is excluding time spent performing procedures or significant events occurring earlier or later in the day requiring my attention and focus.      Subjective:   Admit Date: 2/1/2021  PCP: Mira Thompson, DO    No acute events overnight. Afebrile  No new complaints. Pt denies chest pain, SOB, N/V, fevers or chills. Objective:     Vitals:    02/12/21 1735 02/12/21 1940 02/13/21 0416 02/13/21 0810   BP:  104/61  (!) 154/96   Pulse:  67  63   Resp:  17  18   Temp:  99 °F (37.2 °C)  98 °F (36.7 °C)   TempSrc:  Oral  Oral   SpO2: 95% 96% 95% 92%   Weight:       Height:         General appearance: A + O x 2. No conversational dyspnea noted. Answers questions appropriately. Extremely [place]. Able to communicate. Does better if questions are written down  Lungs: Diminished,  exp wheezes, No rales, No retractions; No use of accessory muscles. Non-productive cough present  Heart:  S1, S2 normal, RRR, no MRG appreciated  Abdomen: (+) BS, soft, non-tender; non distended no guarding or rigidity. Extremities:  no cyanosis, no edema bilat lower exts, no calf tenderness bilaterally. Dry skin noted.  Scattered areas of ecchymosis present       Medications:      Vitamin D  2,000 Units Oral Dinner    cyanocobalamin  1,000 mcg Intramuscular Weekly    coenzyme Q10  100 mg Oral Daily    lidocaine  3 patch Transdermal Daily    insulin glargine  10 Units Subcutaneous Nightly    ferrous sulfate  325 mg Oral BID WC    folic acid  1 mg Oral Daily    pill splitter   Does not apply Once    vitamin D  50,000 Units Oral Weekly    budesonide-formoterol  2 puff Inhalation BID    aspirin  81 mg Oral Daily    atorvastatin  40 mg Oral Nightly    carvedilol  6.25 mg Oral BID WC    insulin lispro  0-12 Units Subcutaneous 4x Daily AC & HS    levothyroxine  25 mcg Oral Daily    pantoprazole  40 mg Oral QAM AC    ursodiol  300 mg Oral BID    [START ON 1/30/2022] warfarin (COUMADIN) daily dosing (placeholder)   Other RX Placeholder       LABS Reviewed    IMAGING Reviewed    Juan Lehman CNP  [facility] Hospitalist

## 2021-02-13 NOTE — PROGRESS NOTES
Clinical Pharmacy Note    Warfarin consult follow-up    Recent Labs     02/13/21  0734   INR 2.0     Recent Labs     02/11/21  0603   HGB 8.8*   HCT 26.3*   *       Significant drug:drug interactions:  New warfarin drug-drug interactions: n/a  Discontinued drug-drug interactions: n/a  Other warfarin drug-drug interactions:   Acetaminophen, Maalox, aspirin, coenzyme Q10, Norco, lactulose, levothyroxine, pantoprazole, atorvastatin     Notes:  Date      INR  Warfarin Dose   01/30/21 1.3 (1/29) 5 mg   01/31/21 1.1  5 mg   02/01/21 1.1 7.5 mg   02/02/21 1.5 7.5 mg   02/03/21 2.8 2 mg   02/04/21 3.3 1 mg   02/05/21 3.3 HOLD   02/06/21 3.3 HOLD   02/07/21 2.8 2.5 mg    02/08/21 3.9 HOLD   02/09/21 4.1 HOLD    02/10/21  3.2  HOLD   02/11/21 2.8 1 mg   02/12/21 2.3 1.5 mg   02/13/21 2.0 2 mg           INR is 2.0 today which is therapeutic for target goal 2-3 (DVT). INR decreased by 0.8 from 2/11. (0.3 decrease). Will increase warfarin to 2 mg today in attempt to keep in target range. Recent supra therapeutic INR held x 3 days, will be conservative in resuming therapy. Daily PT/INR until stable within therapeutic range.

## 2021-02-13 NOTE — PROGRESS NOTES
Patient to d/c today. Message left with niece. Patient given Lactulose for LBM noted 2/8. Had large bowel movement on toilet this afternoon.  Electronically signed by Yanci Bianchi RN on 2/13/21 at 1:32 PM EST

## 2021-02-15 ENCOUNTER — CARE COORDINATION (OUTPATIENT)
Dept: CASE MANAGEMENT | Age: 86
End: 2021-02-15

## 2021-02-15 NOTE — CARE COORDINATION
[facility] Transitions Initial Follow Up Call    Call within 2 business days of discharge: Yes    Patient: Katharine Hensley Patient : 3/5/1929   MRN: [id number]  Reason for Admission: - [address] Right lower extremity DVT and arterial occlusion. Discharge Date: 21 RARS: Readmission Risk Score: 30  Care Transitions    Last Discharge [employer]       Complaint Diagnosis Description Type Department Provider    21  PVD (peripheral vascular disease) ([facility] [address] . .. Admission (Discharged) [facility] RE[facility] Cleta Spurling, MD        Care Transitions request call back to P: [phone] for initial CT outreach. Care Transitions 24 Hour Call    Care Transitions Interventions         Follow Up  No future appointments.     Sara Soto RN

## 2021-02-15 NOTE — PROGRESS NOTES
Physical Therapy  Facility/Department: [facility]  Rehabilitation Discharge note    NAME: Chapincito Petty  : 3/5/1929  MRN: [id number]    Date of discharge: 21        Past Medical History:   Diagnosis Date    Anemia     CAD (coronary artery disease) 2015    DM [facility]diabetes mellitus) [facility] [address]     Dyslipidemia     History of tobacco abuse     HTN (hypertension)     Hypothyroidism     Non-ST elevation MI (NSTEMI) ([facility] [address]     Pacemaker 2015     Past Surgical History:   Procedure Laterality Date    CATARACT REMOVAL      MIDDLE EAR SURGERY Left     \"they had to reset the bones\" after an infection       Restrictions  Restrictions/Precautions  Restrictions/Precautions: Fall Risk    Objective    Bed mobility  Rolling to Left: Modified independent  Rolling to Right: Modified independent  Supine to Sit: Modified independent  Sit to Supine: Modified independent  Scooting: Modified independent  Comment: Performed at mat table    Transfers  Sit to Stand: Modified independent  Stand to sit: Modified independent  Bed to Chair: Supervision  Car Transfer: Modified independent  Comment: Pt tends to turn and step away from AD with return to sitting position, able to do safely without additional support to complete. Ambulation  Ambulation?: Yes  More Ambulation?: No  Ambulation 1  Surface: level tile, uneven, carpet  Device: Rolling Walker  Other Apparatus: O2  Assistance: Modified Independent  Quality of Gait: Slow steady maria l, mild change of speed with descending of ramp, no LOB, pt able to maintain control with uneven surface. Gait Deviations: Decreased step length, Shuffles, Decreased step height  Distance: 200'  Comments: Attempted ambualtion with SPC, pt reports preference with [employer].   Ambulation 2  Surface - 2: carpet  Device 2: No device  Other Apparatus 2: O2  Assistance 2: Stand by assistance  Quality of Gait 2: Slower pace, shuffling pattern, reciprocal gait  Distance: 20ft x 2  limited by

## 2021-02-16 ENCOUNTER — CARE COORDINATION (OUTPATIENT)
Dept: CARE COORDINATION | Age: 86
End: 2021-02-16

## 2021-02-16 DIAGNOSIS — I82.90 VENOUS THROMBOSIS OF LEG: ICD-10-CM

## 2021-02-16 NOTE — CARE COORDINATION
[facility] Transitions Initial Follow Up Call    Call within 2 business days of discharge: No    Patient: Adelina Age Patient    MRN: [id number]  Reason for Admission: IP REHAB Gait Abnormality d/t PVD -Right Femoral Artery Occlusion  Discharge Date: 21 RARS: Readmission Risk Score: 30      Last Discharge [facility]       Complaint Diagnosis Description Type Department Provider    21  PVD (peripheral vascular disease) ([facility] [address] . .. Admission (Discharged) [facility] Irvin Linder MD           Spoke with: Patients granddaughter/caregiver Gissell Mccrary 178: [facility]    Shira states that Rodolfo Strong is doing really well since discharge. Pt ambullting around the house with wheeled walker. No complaints of Right Leg pain or swelling at this time. Pt is taking PRN Tylenol for some Right Leg discomfort and stiffness. Shira reports the patients appetite is good. Pt has Appt today with [employer] [facility] for hearing aides, Dr Robbi Luu 21 for Vascular F/U, and Dr Ferna Severin PCP  for  [facility] F/U. Medication Reconcillliation complete. Anne Solorio denies any Medication or DME needs at this time. Jazmyne Freeman LPN  [address] [facility]  [phone]      Non-face-to-face services provided:  Obtained and reviewed discharge summary and/or continuity of care documents    Care Transitions 24 Hour Call    Patient DME: Karina West  Patient Home Equipment: Oxygen (Comment: 2.5L per NC while in bed)  Do you have support at home?: Grandchild  Are you an active caregiver in your home?: No  Care Transitions Interventions         Follow Up  No future appointments.     Lennox Martins LPN

## 2021-02-17 LAB
HCT VFR BLD CALC: 33.9 % (ref 42–52)
HEMOGLOBIN: 11.3 G/DL (ref 14–18)
INR BLD: 1.4
MCH RBC QN AUTO: 31.7 PG (ref 27–31.3)
MCHC RBC AUTO-ENTMCNC: 33.2 % (ref 33–37)
MCV RBC AUTO: 95.5 FL (ref 80–100)
PDW BLD-RTO: 16.2 % (ref 11.5–14.5)
PLATELET # BLD: 259 K/UL (ref 130–400)
PROTHROMBIN TIME: 16.8 SEC (ref 12.3–14.9)
RBC # BLD: 3.55 M/UL (ref 4.7–6.1)
WBC # BLD: 7.6 K/UL (ref 4.8–10.8)

## 2021-02-19 ENCOUNTER — CARE COORDINATION (OUTPATIENT)
Dept: CARE COORDINATION | Age: 86
End: 2021-02-19

## 2021-02-19 DIAGNOSIS — I82.90 VENOUS THROMBOSIS OF LEG: ICD-10-CM

## 2021-02-19 NOTE — CARE COORDINATION
[facility] Transitions Follow Up Call    2021    Patient: Meghan Martínez  Patient : 3/5/1929   MRN: [id number]  Reason for Admission: IP REHAB Gait Abnormality d/t PVD -Right Femoral Artery Occlusion  Discharge Date: 21 RARS: Readmission Risk Score: 27         Spoke with: Attempted to contact patient for follow up Care Transition call. Unable to leave HIPPA compliant message. Voicemail is full. Will attempt to reach again. GREG Dubois / [address]  [phone]      Care Transitions Subsequent and Final Call    Subsequent and Final Calls  Care Transitions Interventions  Other Interventions: Follow Up  No future appointments.     Pratima Lundy LPN

## 2021-02-22 ENCOUNTER — CARE COORDINATION (OUTPATIENT)
Dept: CARE COORDINATION | Age: 86
End: 2021-02-22

## 2021-02-22 DIAGNOSIS — I82.90 VENOUS THROMBOSIS OF LEG: ICD-10-CM

## 2021-02-22 LAB
INR BLD: 1.3
PROTHROMBIN TIME: 16.3 SEC (ref 12.3–14.9)

## 2021-02-22 NOTE — CARE COORDINATION
[facility] Transitions Follow Up Call    2021    Patient: Chidi Quevedo  Patient : 3/5/1929   MRN: [id number]  Reason for Admission: IP REHAB Gait Abnormality d/t PVD -Right Femoral Artery Occlusion  Discharge Date: 21 RARS: Readmission Risk Score: 27         Spoke with: Second attempt made to contact patient's granddaughter for follow up Care Transition call. Unable to leave HIPPA compliant message. Voicemail is full. Will attempt to reach again    Care Transitions Subsequent and Final Call    Subsequent and Final Calls  Care Transitions Interventions  Other Interventions: Follow Up  No future appointments.     Loreta Cesar LPN

## 2021-06-22 ENCOUNTER — APPOINTMENT (OUTPATIENT)
Dept: GENERAL RADIOLOGY | Age: 86
DRG: 300 | End: 2021-06-22
Payer: MEDICARE

## 2021-06-22 ENCOUNTER — APPOINTMENT (OUTPATIENT)
Dept: ULTRASOUND IMAGING | Age: 86
DRG: 300 | End: 2021-06-22
Payer: MEDICARE

## 2021-06-22 ENCOUNTER — APPOINTMENT (OUTPATIENT)
Dept: CT IMAGING | Age: 86
DRG: 300 | End: 2021-06-22
Payer: MEDICARE

## 2021-06-22 ENCOUNTER — HOSPITAL ENCOUNTER (INPATIENT)
Age: 86
LOS: 4 days | Discharge: HOME HEALTH | DRG: 300 | End: 2021-06-26
Attending: EMERGENCY MEDICINE | Admitting: INTERNAL MEDICINE
Payer: MEDICARE

## 2021-06-22 DIAGNOSIS — I82.401 ACUTE DEEP VEIN THROMBOSIS (DVT) OF RIGHT LOWER EXTREMITY, UNSPECIFIED VEIN (HCC): Primary | ICD-10-CM

## 2021-06-22 DIAGNOSIS — I70.90 ARTERIAL OCCLUSION: ICD-10-CM

## 2021-06-22 LAB
ALBUMIN SERPL-MCNC: 4.5 G/DL (ref 3.5–4.6)
ALP BLD-CCNC: 89 U/L (ref 35–104)
ALT SERPL-CCNC: 14 U/L (ref 0–41)
ANION GAP SERPL CALCULATED.3IONS-SCNC: 18 MEQ/L (ref 9–15)
APTT: 33.4 SEC (ref 24.4–36.8)
AST SERPL-CCNC: 22 U/L (ref 0–40)
BASOPHILS ABSOLUTE: 0.1 K/UL (ref 0–0.2)
BASOPHILS RELATIVE PERCENT: 0.6 %
BILIRUB SERPL-MCNC: 1.7 MG/DL (ref 0.2–0.7)
BUN BLDV-MCNC: 41 MG/DL (ref 8–23)
CALCIUM SERPL-MCNC: 10 MG/DL (ref 8.5–9.9)
CHLORIDE BLD-SCNC: 95 MEQ/L (ref 95–107)
CO2: 22 MEQ/L (ref 20–31)
CREAT SERPL-MCNC: 1.9 MG/DL (ref 0.7–1.2)
EOSINOPHILS ABSOLUTE: 0.2 K/UL (ref 0–0.7)
EOSINOPHILS RELATIVE PERCENT: 1.8 %
GFR AFRICAN AMERICAN: 40.3
GFR NON-AFRICAN AMERICAN: 33.3
GLOBULIN: 3.6 G/DL (ref 2.3–3.5)
GLUCOSE BLD-MCNC: 191 MG/DL (ref 70–99)
HCT VFR BLD CALC: 45.6 % (ref 42–52)
HEMOGLOBIN: 15.7 G/DL (ref 14–18)
INR BLD: 1
LACTIC ACID: 2.9 MMOL/L (ref 0.5–2.2)
LIPASE: 28 U/L (ref 12–95)
LYMPHOCYTES ABSOLUTE: 2.7 K/UL (ref 1–4.8)
LYMPHOCYTES RELATIVE PERCENT: 27.7 %
MAGNESIUM: 1.9 MG/DL (ref 1.7–2.4)
MCH RBC QN AUTO: 31.8 PG (ref 27–31.3)
MCHC RBC AUTO-ENTMCNC: 34.3 % (ref 33–37)
MCV RBC AUTO: 92.8 FL (ref 80–100)
MONOCYTES ABSOLUTE: 1 K/UL (ref 0.2–0.8)
MONOCYTES RELATIVE PERCENT: 10.1 %
NEUTROPHILS ABSOLUTE: 5.8 K/UL (ref 1.4–6.5)
NEUTROPHILS RELATIVE PERCENT: 59.8 %
PDW BLD-RTO: 13.6 % (ref 11.5–14.5)
PLATELET # BLD: 196 K/UL (ref 130–400)
POC CREATININE WHOLE BLOOD: 2.1
POTASSIUM SERPL-SCNC: 4 MEQ/L (ref 3.4–4.9)
PROTHROMBIN TIME: 13.6 SEC (ref 12.3–14.9)
RBC # BLD: 4.92 M/UL (ref 4.7–6.1)
SODIUM BLD-SCNC: 135 MEQ/L (ref 135–144)
T4 FREE: 1.51 NG/DL (ref 0.84–1.68)
TOTAL CK: 73 U/L (ref 0–190)
TOTAL PROTEIN: 8.1 G/DL (ref 6.3–8)
TROPONIN: 0.07 NG/ML (ref 0–0.01)
TSH SERPL DL<=0.05 MIU/L-ACNC: 4.21 UIU/ML (ref 0.44–3.86)
WBC # BLD: 9.7 K/UL (ref 4.8–10.8)

## 2021-06-22 PROCEDURE — 93005 ELECTROCARDIOGRAM TRACING: CPT | Performed by: EMERGENCY MEDICINE

## 2021-06-22 PROCEDURE — 93970 EXTREMITY STUDY: CPT

## 2021-06-22 PROCEDURE — 83605 ASSAY OF LACTIC ACID: CPT

## 2021-06-22 PROCEDURE — 83735 ASSAY OF MAGNESIUM: CPT

## 2021-06-22 PROCEDURE — 99285 EMERGENCY DEPT VISIT HI MDM: CPT

## 2021-06-22 PROCEDURE — 84439 ASSAY OF FREE THYROXINE: CPT

## 2021-06-22 PROCEDURE — 93925 LOWER EXTREMITY STUDY: CPT

## 2021-06-22 PROCEDURE — 36415 COLL VENOUS BLD VENIPUNCTURE: CPT

## 2021-06-22 PROCEDURE — 85610 PROTHROMBIN TIME: CPT

## 2021-06-22 PROCEDURE — 75635 CT ANGIO ABDOMINAL ARTERIES: CPT

## 2021-06-22 PROCEDURE — 2580000003 HC RX 258: Performed by: EMERGENCY MEDICINE

## 2021-06-22 PROCEDURE — 71045 X-RAY EXAM CHEST 1 VIEW: CPT

## 2021-06-22 PROCEDURE — 84443 ASSAY THYROID STIM HORMONE: CPT

## 2021-06-22 PROCEDURE — 82550 ASSAY OF CK (CPK): CPT

## 2021-06-22 PROCEDURE — 6360000004 HC RX CONTRAST MEDICATION: Performed by: EMERGENCY MEDICINE

## 2021-06-22 PROCEDURE — 85730 THROMBOPLASTIN TIME PARTIAL: CPT

## 2021-06-22 PROCEDURE — 80053 COMPREHEN METABOLIC PANEL: CPT

## 2021-06-22 PROCEDURE — 96374 THER/PROPH/DIAG INJ IV PUSH: CPT

## 2021-06-22 PROCEDURE — 6360000002 HC RX W HCPCS: Performed by: EMERGENCY MEDICINE

## 2021-06-22 PROCEDURE — 84484 ASSAY OF TROPONIN QUANT: CPT

## 2021-06-22 PROCEDURE — 1210000000 HC MED SURG R&B

## 2021-06-22 PROCEDURE — 85025 COMPLETE CBC W/AUTO DIFF WBC: CPT

## 2021-06-22 PROCEDURE — 87040 BLOOD CULTURE FOR BACTERIA: CPT

## 2021-06-22 PROCEDURE — 83690 ASSAY OF LIPASE: CPT

## 2021-06-22 RX ORDER — HEPARIN SODIUM 1000 [USP'U]/ML
40 INJECTION, SOLUTION INTRAVENOUS; SUBCUTANEOUS PRN
Status: DISCONTINUED | OUTPATIENT
Start: 2021-06-22 | End: 2021-06-23

## 2021-06-22 RX ORDER — SODIUM CHLORIDE, SODIUM LACTATE, POTASSIUM CHLORIDE, AND CALCIUM CHLORIDE .6; .31; .03; .02 G/100ML; G/100ML; G/100ML; G/100ML
1000 INJECTION, SOLUTION INTRAVENOUS ONCE
Status: COMPLETED | OUTPATIENT
Start: 2021-06-23 | End: 2021-06-23

## 2021-06-22 RX ORDER — ACETAMINOPHEN 325 MG/1
650 TABLET ORAL EVERY 6 HOURS PRN
Status: DISCONTINUED | OUTPATIENT
Start: 2021-06-22 | End: 2021-06-26 | Stop reason: HOSPADM

## 2021-06-22 RX ORDER — HEPARIN SODIUM 10000 [USP'U]/100ML
5-30 INJECTION, SOLUTION INTRAVENOUS CONTINUOUS
Status: DISCONTINUED | OUTPATIENT
Start: 2021-06-23 | End: 2021-06-23

## 2021-06-22 RX ORDER — SODIUM CHLORIDE 9 MG/ML
INJECTION, SOLUTION INTRAVENOUS CONTINUOUS
Status: DISCONTINUED | OUTPATIENT
Start: 2021-06-22 | End: 2021-06-24

## 2021-06-22 RX ORDER — 0.9 % SODIUM CHLORIDE 0.9 %
1000 INTRAVENOUS SOLUTION INTRAVENOUS ONCE
Status: COMPLETED | OUTPATIENT
Start: 2021-06-22 | End: 2021-06-23

## 2021-06-22 RX ORDER — 0.9 % SODIUM CHLORIDE 0.9 %
100 INTRAVENOUS SOLUTION INTRAVENOUS ONCE
Status: COMPLETED | OUTPATIENT
Start: 2021-06-22 | End: 2021-06-22

## 2021-06-22 RX ORDER — HEPARIN SODIUM 1000 [USP'U]/ML
80 INJECTION, SOLUTION INTRAVENOUS; SUBCUTANEOUS PRN
Status: DISCONTINUED | OUTPATIENT
Start: 2021-06-22 | End: 2021-06-22

## 2021-06-22 RX ORDER — ONDANSETRON 4 MG/1
4 TABLET, ORALLY DISINTEGRATING ORAL EVERY 8 HOURS PRN
Status: DISCONTINUED | OUTPATIENT
Start: 2021-06-22 | End: 2021-06-26 | Stop reason: HOSPADM

## 2021-06-22 RX ORDER — ACETAMINOPHEN 650 MG/1
650 SUPPOSITORY RECTAL EVERY 6 HOURS PRN
Status: DISCONTINUED | OUTPATIENT
Start: 2021-06-22 | End: 2021-06-26 | Stop reason: HOSPADM

## 2021-06-22 RX ORDER — HEPARIN SODIUM 1000 [USP'U]/ML
80 INJECTION, SOLUTION INTRAVENOUS; SUBCUTANEOUS ONCE
Status: COMPLETED | OUTPATIENT
Start: 2021-06-22 | End: 2021-06-22

## 2021-06-22 RX ORDER — HEPARIN SODIUM 10000 [USP'U]/100ML
5-30 INJECTION, SOLUTION INTRAVENOUS CONTINUOUS
Status: DISCONTINUED | OUTPATIENT
Start: 2021-06-22 | End: 2021-06-23 | Stop reason: SDUPTHER

## 2021-06-22 RX ORDER — POLYETHYLENE GLYCOL 3350 17 G/17G
17 POWDER, FOR SOLUTION ORAL DAILY PRN
Status: DISCONTINUED | OUTPATIENT
Start: 2021-06-22 | End: 2021-06-26 | Stop reason: HOSPADM

## 2021-06-22 RX ORDER — WARFARIN SODIUM 3 MG/1
3 TABLET ORAL DAILY
Status: ON HOLD | COMMUNITY
End: 2021-06-26 | Stop reason: HOSPADM

## 2021-06-22 RX ORDER — HEPARIN SODIUM 1000 [USP'U]/ML
80 INJECTION, SOLUTION INTRAVENOUS; SUBCUTANEOUS PRN
Status: DISCONTINUED | OUTPATIENT
Start: 2021-06-22 | End: 2021-06-23

## 2021-06-22 RX ORDER — ONDANSETRON 2 MG/ML
4 INJECTION INTRAMUSCULAR; INTRAVENOUS EVERY 6 HOURS PRN
Status: DISCONTINUED | OUTPATIENT
Start: 2021-06-22 | End: 2021-06-26 | Stop reason: HOSPADM

## 2021-06-22 RX ORDER — HEPARIN SODIUM 1000 [USP'U]/ML
40 INJECTION, SOLUTION INTRAVENOUS; SUBCUTANEOUS PRN
Status: DISCONTINUED | OUTPATIENT
Start: 2021-06-22 | End: 2021-06-23 | Stop reason: SDUPTHER

## 2021-06-22 RX ORDER — RAMIPRIL 5 MG/1
5 CAPSULE ORAL DAILY
Status: ON HOLD | COMMUNITY
End: 2021-12-28 | Stop reason: HOSPADM

## 2021-06-22 RX ORDER — HEPARIN SODIUM 1000 [USP'U]/ML
80 INJECTION, SOLUTION INTRAVENOUS; SUBCUTANEOUS PRN
Status: DISCONTINUED | OUTPATIENT
Start: 2021-06-22 | End: 2021-06-23 | Stop reason: SDUPTHER

## 2021-06-22 RX ORDER — HEPARIN SODIUM 1000 [USP'U]/ML
80 INJECTION, SOLUTION INTRAVENOUS; SUBCUTANEOUS ONCE
Status: DISCONTINUED | OUTPATIENT
Start: 2021-06-22 | End: 2021-06-22

## 2021-06-22 RX ADMIN — SODIUM CHLORIDE: 9 INJECTION, SOLUTION INTRAVENOUS at 21:28

## 2021-06-22 RX ADMIN — HEPARIN SODIUM AND DEXTROSE 18 UNITS/KG/HR: 10000; 5 INJECTION INTRAVENOUS at 19:28

## 2021-06-22 RX ADMIN — IOPAMIDOL 100 ML: 755 INJECTION, SOLUTION INTRAVENOUS at 20:53

## 2021-06-22 RX ADMIN — SODIUM CHLORIDE 1000 ML: 9 INJECTION, SOLUTION INTRAVENOUS at 19:18

## 2021-06-22 RX ADMIN — SODIUM CHLORIDE 100 ML: 9 INJECTION, SOLUTION INTRAVENOUS at 20:54

## 2021-06-22 RX ADMIN — HEPARIN SODIUM 5810 UNITS: 1000 INJECTION INTRAVENOUS; SUBCUTANEOUS at 19:18

## 2021-06-22 ASSESSMENT — ENCOUNTER SYMPTOMS
GASTROINTESTINAL NEGATIVE: 1
EYES NEGATIVE: 1
RESPIRATORY NEGATIVE: 1
ALLERGIC/IMMUNOLOGIC NEGATIVE: 1

## 2021-06-22 ASSESSMENT — PAIN DESCRIPTION - ORIENTATION: ORIENTATION: RIGHT

## 2021-06-22 ASSESSMENT — PAIN DESCRIPTION - PAIN TYPE: TYPE: ACUTE PAIN

## 2021-06-22 ASSESSMENT — PAIN SCALES - GENERAL: PAINLEVEL_OUTOF10: 5

## 2021-06-22 ASSESSMENT — PAIN DESCRIPTION - LOCATION: LOCATION: LEG

## 2021-06-22 NOTE — ED PROVIDER NOTES
[address] [facility] ED  eMERGENCY dEPARTMENT eNCOUnter      Pt Name: Goran Boyer  MRN: [id number]  [place] 3/5/1929  Date of evaluation: 6/22/2021  Provider: Andrews Gomez MD    CHIEF COMPLAINT       Chief Complaint   Patient presents with    Leg Pain     Right leg turned discolored today, painful and swollen. Hx of blood clot in that leg 3-4 months ago         HISTORY OF PRESENT ILLNESS   (Location/Symptom, Timing/Onset,Context/Setting, Quality, Duration, Modifying Factors, Severity)  Note limiting factors. Goran Boyer is a 80 y.o. male who presents to the emergency department with history of leg pain and turning purple-blue today. Patient states that the pain started today. That the different color started earlier today is not clear of when it started. Pain is mild nothing makes it worse or better dull and achy in nature. Patient denies any fever chills headache chest pain shortness of breath nausea vomiting diarrhea diaphoresis fever chills or any other complaint or concern. Pain does not radiate. Patient does have a history of previous deep vein thrombosis in that leg. HPI    NursingNotes were reviewed. REVIEW OF SYSTEMS    (2-9 systems for level 4, 10 or more for level 5)     Review of Systems   Constitutional: Negative. HENT: Negative. Eyes: Negative. Respiratory: Negative. Cardiovascular: Negative. Gastrointestinal: Negative. Endocrine: Negative. Genitourinary: Negative. Musculoskeletal: Positive for myalgias. Skin: Negative. Allergic/Immunologic: Negative. Neurological: Negative. Psychiatric/Behavioral: Negative. Except as noted above the remainder of the review of systems was reviewed and negative.        PAST MEDICAL HISTORY     Past Medical History:   Diagnosis Date    Anemia     CAD (coronary artery disease) 4/16/2015    DM [facility]di[facility] mellitus[facility] [address]     Dyslipidemia     History of tobacco abuse     HTN (hypertension)     Hypothyroidism     Non-ST elevation MI (NSTEMI) [facility] [address]     Pacemaker 4/16/2015         SURGICALHISTORY       Past Surgical History:   Procedure Laterality Date    CATARACT REMOVAL      MIDDLE EAR SURGERY Left 1998    \"they had to reset the bones\" after an infection         CURRENT MEDICATIONS       Previous Medications    ALBUTEROL SULFATE HFA (PROVENTIL HFA) 108 (90 BASE) MCG/ACT INHALER    Inhale 2 puffs into the lungs every 6 hours as needed for Wheezing    ASPIRIN 81 MG TABLET    Take 81 mg by mouth daily. ATORVASTATIN (LIPITOR) 40 MG TABLET    Take 40 mg by mouth daily    BUDESONIDE-FORMOTEROL (SYMBICORT) 80-4.5 MCG/ACT AERO    Inhale 2 puffs into the lungs 2 times daily. CARVEDILOL (COREG) 12.5 MG TABLET    Take 0.5 tablets by mouth 2 times daily (with meals)    ESOMEPRAZOLE MAGNESIUM (NEXIUM) 40 MG PACK    Take 40 mg by mouth daily    FERROUS SULFATE (IRON 325) 325 (65 FE) MG TABLET    Take 1 tablet by mouth 2 times daily (with meals)    FOLIC ACID (FOLVITE) 1 MG TABLET    Take 1 tablet by mouth daily    GUAIFENESIN (MUCINEX) 600 MG EXTENDED RELEASE TABLET    Take 1 tablet by mouth 2 times daily    IPRATROPIUM-ALBUTEROL (DUONEB) 0.5-2.5 (3) MG/3ML SOLN NEBULIZER SOLUTION    Inhale 3 mLs into the lungs three times daily    NITROGLYCERIN (NITROSTAT) 0.4 MG SL TABLET    Place 1 tablet under the tongue every 5 minutes as needed for Chest pain    SITAGLIPTIN (JANUVIA) 50 MG TABLET    Take 50 mg by mouth daily    SODIUM CHLORIDE (OCEAN) 0.65 % NASAL SPRAY    1 spray by Nasal route 4 times daily as needed for Congestion    SYNTHROID 25 MCG TABLET        URSODIOL (ACTIGALL) 300 MG CAPSULE    Take 300 mg by mouth 2 times daily    VITAMIN D (CHOLECALCIFEROL) 50 MCG (2000 UT) TABS TABLET    Take 1 tablet by mouth Daily with supper       ALLERGIES     Patient has no known allergies. FAMILY HISTORY     History reviewed. No pertinent family history.        SOCIAL HISTORY       Social History     Socioeconomic History    Marital status:      Spouse name: None    Number of children: None    Years of education: None    Highest education level: None   Occupational History    None   Tobacco Use    Smoking status: Former Smoker    Smokeless tobacco: Never Used   Vaping Use    Vaping Use: Never used   Substance and Sexual Activity    Alcohol use: No    Drug use: No    Sexual activity: None   Other Topics Concern    None   Social History Narrative    Lives With: dacarolyne Zafar Pack  and family    Type of Home: House  Two level, Bed/Bath upstairs-[address] Access: Stairs to enter with rails    Entrance Stairs - Number of Steps: 14    Bathroom Shower/Tub: Tub/Shower unit    Home Equipment: Rolling walker, Cane    ADL Assistance: Independent    Homemaking Assistance: Independent(granddaughter completes)    Ambulation Assistance: Independent    Transfer Assistance: Independent    Active : Yes    Additional Comments: pt is significantly Native; information gathered via chart review and via writing. Social Determinants of Health     Financial Resource Strain:     Difficulty of Paying Living Expenses:    Food Insecurity:     Worried About Running Out of Food in the Last Year:     [address] in the Last Year:    Transportation Needs:     Lack of Transportation (Medical):      Lack of Transportation (Non-Medical):    Physical Activity:     Days of Exercise per Week:     Minutes of Exercise per Session:    Stress:     Feeling of Stress :    Social Connections:     Frequency of Communication with Friends and Family:     Frequency of Social Gatherings with Friends and Family:     Attends Rastafarian Services:     Active Member of Clubs or Organizations:     Attends Club or Organization Meetings:     Marital Status:    Intimate Partner Violence:     Fear of Current or Ex-Partner:     Emotionally Abused:     Physically Abused:     Sexually Abused:            PHYSICAL EXAM    (up to 7 for level 4, 8 or more for level 5)     ED Triage Vitals [06/22/21 1832]   BP Temp Temp Source Pulse Resp SpO2 Height Weight   (!) 142/80 97.5 °F (36.4 °C) Oral 80 16 97 % 5' 6.5\" (1.689 m) 160 lb (72.6 kg)       Physical Exam  Constitutional:       Appearance: Normal appearance. HENT:      Head: Normocephalic and atraumatic. Mouth/Throat:      Mouth: Mucous membranes are dry. Eyes:      Extraocular Movements: Extraocular movements intact. Conjunctiva/sclera: Conjunctivae normal.   Cardiovascular:      Rate and Rhythm: Normal rate. Pulses: Normal pulses. Pulmonary:      Effort: Pulmonary effort is normal.      Breath sounds: Normal breath sounds. Musculoskeletal:         General: Tenderness present. Normal range of motion. Cervical back: Normal range of motion. Comments: There is good dorsalis pedis pulse on the left foot however the right foot is no dorsalis pedis pulses palpable or can be found with Doppler device. From above the knee down to the toes the patient's right lower extremity is purplish in color. It seems cooler than compared to the left lower extremity. Skin:     General: Skin is dry. Capillary Refill: Capillary refill takes less than 2 seconds. Neurological:      Mental Status: He is alert. Psychiatric:         Mood and Affect: Mood normal.         Behavior: Behavior normal.         DIAGNOSTIC RESULTS     EKG: All EKG's are interpreted by the Emergency Department Physician who either signs or Co-signsthis chart in the absence of a cardiologist.    EKG shows normal sinus rhythm 71 bpm 166 ms CA interval QRS duration 104 ms QT is 436 ms no acute ST elevations or depressions no acute ischemic changes noted.     RADIOLOGY:   Non-plain filmimages such as CT, Ultrasound and MRI are read by the radiologist. Plain radiographic images are visualized and preliminarily interpreted by the emergency physician with the below findings:        Interpretation per the Radiologist below, if available at the time ofthis note:    XR CHEST PORTABLE    (Results Pending)   US DUP LOWER EXTREMITIES BILATERAL VENOUS    (Results Pending)   US DUP LOWER ART/BYPASS GRAFTS BILATERAL COMPLETE    (Results Pending)   CTA ABDOMINAL AORTA W BILAT RUNOFF W WO CONTRAST    (Results Pending)         ED BEDSIDE ULTRASOUND:   Performed by ED Physician - none    LABS:  Labs Reviewed   COMPREHENSIVE METABOLIC PANEL - Abnormal; Notable for the following components:       Result Value    Anion Gap 18 (*)     Glucose 191 (*)     BUN 41 (*)     CREATININE 1.90 (*)     GFR Non- 33.3 (*)     GFR  40.3 (*)     Calcium 10.0 (*)     Total Protein 8.1 (*)     Total Bilirubin 1.7 (*)     Globulin 3.6 (*)     All other components within normal limits   CBC WITH AUTO DIFFERENTIAL - Abnormal; Notable for the following components:    MCH 31.8 (*)     Monocytes Absolute 1.0 (*)     All other components within normal limits   LACTIC ACID, PLASMA - Abnormal; Notable for the following components:    Lactic Acid 2.9 (*)     All other components within normal limits   TROPONIN - Abnormal; Notable for the following components:    Troponin 0.069 (*)     All other components within normal limits    Narrative:     Jesse Higgins tel. [phone],  Chemistry results called to and read back by YESI LAGOS, 06/22/2021 19:48, by  Ariel Black   TSH WITHOUT REFLEX - Abnormal; Notable for the following components:    TSH 4.210 (*)     All other components within normal limits    Narrative:     Jesse Higgins tel. [phone],  Chemistry results called to and read back by YESI LAGOS, 06/22/2021 19:48, by  [address] - Normal   CULTURE, BLOOD 1   CULTURE, BLOOD 2   LIPASE   MAGNESIUM   PROTIME-INR   CK   T4, FREE    Narrative:     Jesse Higgins tel. [phone],  Chemistry results called to and read back by YESI Crawford, 06/22/2021 19:48, by  Ariel Black APTT   CBC   APTT   PROTIME-INR   HEPARIN LEVEL/ANTI-XA   HEPARIN LEVEL/ANTI-XA       All other labs were within normal range or not returned as of this dictation. EMERGENCY DEPARTMENT COURSE and DIFFERENTIAL DIAGNOSIS/MDM:   Vitals:    Vitals:    06/22/21 1832 06/22/21 1930   BP: (!) 142/80 (!) 138/56   Pulse: 80 68   Resp: 16 14   Temp: 97.5 °F (36.4 °C)    TempSrc: Oral    SpO2: 97% 94%   Weight: 160 lb (72.6 kg)    Height: 5' 6.5\" (1.689 m)            MDM     Patient I believe has an arterial occlusion in the right lower extremity. I did talk to Dr. Marc Benavidez who requested a CT angiogram with runoffs. Patient's creatinine is 2.1 initially will wait for the official BUN and creatinine on the lab chemistry. Patient was given 1 L bolus normal saline IV and will be started on 100 mL an hour IV drip. Patient also was started on high-dose heparin bolus and drip. I discussed this case with Dr. Marc Benavidez again and he stated that he looked at the old CT angio with runoff and there appeared to be really chronic occlusions on this patient and that the patient without pain no further action is needed at this time and he will evaluate the patient in self in the morning. Patient will be placed in hospital on heparin drip for further evaluation and definitive treatment. CRITICAL CARE TIME   Total Critical Care time was 39  minutes, excluding separately reportableprocedures. There was a high probability of clinicallysignificant/life threatening deterioration in the patient's condition which required my urgent intervention. CONSULTS:  None    PROCEDURES:  Unless otherwise noted below, none     Procedures    FINAL IMPRESSION      1. Acute deep vein thrombosis (DVT) of right lower extremity, unspecified vein (HCC)    2. Arterial occlusion          DISPOSITION/PLAN   DISPOSITION        PATIENT REFERRED TO:  No follow-up provider specified.     DISCHARGE MEDICATIONS:  New Prescriptions    No medications on file (Please note that portions of this note were completed with a voice recognition program.  Efforts were made to edit the dictations but occasionally words are mis-transcribed.)    Rylan Rainey MD (electronically signed)  Attending Emergency Physician        Rylan Rainey MD  06/22/21 1957

## 2021-06-22 NOTE — ED TRIAGE NOTES
A & Ox4. Skin pink warm and dry. Pts right leg appears a little dusky compared to left leg as well as a little edematous. States entire leg hurts. Denies CP/SOB.

## 2021-06-22 NOTE — Clinical Note
Patient Class: Inpatient [101]   REQUIRED: Diagnosis: Arterial occlusion [618318]   Estimated Length of Stay: Estimated stay of more than 2 midnights   Admitting Provider: Sophie Blank [[id number]]   Telemetry/Cardiac Monitoring Required?: Yes

## 2021-06-23 LAB
ANION GAP SERPL CALCULATED.3IONS-SCNC: 15 MEQ/L (ref 9–15)
ANTI-XA UNFRAC HEPARIN: 0.52 IU/ML
ANTI-XA UNFRAC HEPARIN: 0.59 IU/ML
ANTI-XA UNFRAC HEPARIN: 1.69 IU/ML
ANTI-XA UNFRAC HEPARIN: 1.72 IU/ML
APTT: 91.3 SEC (ref 24.4–36.8)
BASOPHILS ABSOLUTE: 0 K/UL (ref 0–0.2)
BASOPHILS RELATIVE PERCENT: 0.6 %
BILIRUBIN URINE: NEGATIVE
BLOOD, URINE: NEGATIVE
BUN BLDV-MCNC: 35 MG/DL (ref 8–23)
CALCIUM SERPL-MCNC: 8.9 MG/DL (ref 8.5–9.9)
CHLORIDE BLD-SCNC: 99 MEQ/L (ref 95–107)
CK MB: 5.1 NG/ML (ref 0–6.7)
CLARITY: CLEAR
CO2: 20 MEQ/L (ref 20–31)
COLOR: YELLOW
CREAT SERPL-MCNC: 1.5 MG/DL (ref 0.7–1.2)
CREATINE KINASE-MB INDEX: 1.9 % (ref 0–3.5)
CREATININE URINE: 44.2 MG/DL
EOSINOPHILS ABSOLUTE: 0.2 K/UL (ref 0–0.7)
EOSINOPHILS RELATIVE PERCENT: 2.4 %
GFR AFRICAN AMERICAN: 52.9
GFR NON-AFRICAN AMERICAN: 43.7
GLUCOSE BLD-MCNC: 100 MG/DL (ref 60–115)
GLUCOSE BLD-MCNC: 105 MG/DL (ref 70–99)
GLUCOSE BLD-MCNC: 145 MG/DL (ref 60–115)
GLUCOSE BLD-MCNC: 178 MG/DL (ref 60–115)
GLUCOSE BLD-MCNC: 82 MG/DL (ref 60–115)
GLUCOSE BLD-MCNC: 93 MG/DL (ref 60–115)
GLUCOSE URINE: NEGATIVE MG/DL
HBA1C MFR BLD: 8.3 % (ref 4.8–5.9)
HCT VFR BLD CALC: 41.3 % (ref 42–52)
HEMOGLOBIN: 13.8 G/DL (ref 14–18)
INR BLD: 1.2
INR BLD: 1.2
KETONES, URINE: ABNORMAL MG/DL
LEUKOCYTE ESTERASE, URINE: NEGATIVE
LYMPHOCYTES ABSOLUTE: 2.1 K/UL (ref 1–4.8)
LYMPHOCYTES RELATIVE PERCENT: 31.2 %
MCH RBC QN AUTO: 31.1 PG (ref 27–31.3)
MCHC RBC AUTO-ENTMCNC: 33.4 % (ref 33–37)
MCV RBC AUTO: 93.3 FL (ref 80–100)
MONOCYTES ABSOLUTE: 0.7 K/UL (ref 0.2–0.8)
MONOCYTES RELATIVE PERCENT: 10.9 %
NEUTROPHILS ABSOLUTE: 3.7 K/UL (ref 1.4–6.5)
NEUTROPHILS RELATIVE PERCENT: 54.9 %
NITRITE, URINE: NEGATIVE
PDW BLD-RTO: 13.8 % (ref 11.5–14.5)
PERFORMED ON: ABNORMAL
PERFORMED ON: ABNORMAL
PERFORMED ON: NORMAL
PH UA: 5 (ref 5–9)
PLATELET # BLD: 171 K/UL (ref 130–400)
POTASSIUM REFLEX MAGNESIUM: 4.2 MEQ/L (ref 3.4–4.9)
PROTEIN UA: NEGATIVE MG/DL
PROTHROMBIN TIME: 14.8 SEC (ref 12.3–14.9)
PROTHROMBIN TIME: 14.9 SEC (ref 12.3–14.9)
RBC # BLD: 4.43 M/UL (ref 4.7–6.1)
SODIUM BLD-SCNC: 134 MEQ/L (ref 135–144)
SPECIFIC GRAVITY UA: 1.05 (ref 1–1.03)
TOTAL CK: 271 U/L (ref 0–190)
TROPONIN: 0.05 NG/ML (ref 0–0.01)
TROPONIN: 0.06 NG/ML (ref 0–0.01)
UREA NITROGEN, UR: 381 MG/DL
UROBILINOGEN, URINE: 1 E.U./DL
WBC # BLD: 6.7 K/UL (ref 4.8–10.8)

## 2021-06-23 PROCEDURE — 2580000003 HC RX 258: Performed by: INTERNAL MEDICINE

## 2021-06-23 PROCEDURE — 6370000000 HC RX 637 (ALT 250 FOR IP): Performed by: INTERNAL MEDICINE

## 2021-06-23 PROCEDURE — 36415 COLL VENOUS BLD VENIPUNCTURE: CPT

## 2021-06-23 PROCEDURE — 6370000000 HC RX 637 (ALT 250 FOR IP): Performed by: SURGERY

## 2021-06-23 PROCEDURE — 82570 ASSAY OF URINE CREATININE: CPT

## 2021-06-23 PROCEDURE — 94761 N-INVAS EAR/PLS OXIMETRY MLT: CPT

## 2021-06-23 PROCEDURE — 85610 PROTHROMBIN TIME: CPT

## 2021-06-23 PROCEDURE — 81003 URINALYSIS AUTO W/O SCOPE: CPT

## 2021-06-23 PROCEDURE — 80048 BASIC METABOLIC PNL TOTAL CA: CPT

## 2021-06-23 PROCEDURE — 85520 HEPARIN ASSAY: CPT

## 2021-06-23 PROCEDURE — 94664 DEMO&/EVAL PT USE INHALER: CPT

## 2021-06-23 PROCEDURE — 2580000003 HC RX 258: Performed by: EMERGENCY MEDICINE

## 2021-06-23 PROCEDURE — 82550 ASSAY OF CK (CPK): CPT

## 2021-06-23 PROCEDURE — 85025 COMPLETE CBC W/AUTO DIFF WBC: CPT

## 2021-06-23 PROCEDURE — 6360000002 HC RX W HCPCS: Performed by: INTERNAL MEDICINE

## 2021-06-23 PROCEDURE — 83036 HEMOGLOBIN GLYCOSYLATED A1C: CPT

## 2021-06-23 PROCEDURE — 82553 CREATINE MB FRACTION: CPT

## 2021-06-23 PROCEDURE — 84484 ASSAY OF TROPONIN QUANT: CPT

## 2021-06-23 PROCEDURE — 85730 THROMBOPLASTIN TIME PARTIAL: CPT

## 2021-06-23 PROCEDURE — 94640 AIRWAY INHALATION TREATMENT: CPT

## 2021-06-23 PROCEDURE — 84540 ASSAY OF URINE/UREA-N: CPT

## 2021-06-23 PROCEDURE — 1210000000 HC MED SURG R&B

## 2021-06-23 RX ORDER — CARVEDILOL 6.25 MG/1
6.25 TABLET ORAL 2 TIMES DAILY WITH MEALS
Status: DISCONTINUED | OUTPATIENT
Start: 2021-06-23 | End: 2021-06-26 | Stop reason: HOSPADM

## 2021-06-23 RX ORDER — HEPARIN SODIUM 10000 [USP'U]/100ML
5-30 INJECTION, SOLUTION INTRAVENOUS CONTINUOUS
Status: DISCONTINUED | OUTPATIENT
Start: 2021-06-23 | End: 2021-06-25

## 2021-06-23 RX ORDER — BUDESONIDE AND FORMOTEROL FUMARATE DIHYDRATE 80; 4.5 UG/1; UG/1
2 AEROSOL RESPIRATORY (INHALATION) 2 TIMES DAILY
Status: DISCONTINUED | OUTPATIENT
Start: 2021-06-23 | End: 2021-06-26 | Stop reason: HOSPADM

## 2021-06-23 RX ORDER — WARFARIN SODIUM 5 MG/1
5 TABLET ORAL
Status: COMPLETED | OUTPATIENT
Start: 2021-06-23 | End: 2021-06-23

## 2021-06-23 RX ORDER — NICOTINE POLACRILEX 4 MG
15 LOZENGE BUCCAL PRN
Status: DISCONTINUED | OUTPATIENT
Start: 2021-06-23 | End: 2021-06-26 | Stop reason: HOSPADM

## 2021-06-23 RX ORDER — HYDRALAZINE HYDROCHLORIDE 20 MG/ML
10 INJECTION INTRAMUSCULAR; INTRAVENOUS EVERY 4 HOURS PRN
Status: DISCONTINUED | OUTPATIENT
Start: 2021-06-23 | End: 2021-06-26 | Stop reason: HOSPADM

## 2021-06-23 RX ORDER — DEXTROSE MONOHYDRATE 25 G/50ML
12.5 INJECTION, SOLUTION INTRAVENOUS PRN
Status: DISCONTINUED | OUTPATIENT
Start: 2021-06-23 | End: 2021-06-26 | Stop reason: HOSPADM

## 2021-06-23 RX ORDER — HEPARIN SODIUM 1000 [USP'U]/ML
40 INJECTION, SOLUTION INTRAVENOUS; SUBCUTANEOUS PRN
Status: DISCONTINUED | OUTPATIENT
Start: 2021-06-23 | End: 2021-06-25

## 2021-06-23 RX ORDER — ALBUTEROL SULFATE 90 UG/1
2 AEROSOL, METERED RESPIRATORY (INHALATION) EVERY 6 HOURS PRN
Status: DISCONTINUED | OUTPATIENT
Start: 2021-06-23 | End: 2021-06-26 | Stop reason: HOSPADM

## 2021-06-23 RX ORDER — PANTOPRAZOLE SODIUM 40 MG/1
40 TABLET, DELAYED RELEASE ORAL
Status: DISCONTINUED | OUTPATIENT
Start: 2021-06-23 | End: 2021-06-26 | Stop reason: HOSPADM

## 2021-06-23 RX ORDER — IPRATROPIUM BROMIDE AND ALBUTEROL SULFATE 2.5; .5 MG/3ML; MG/3ML
3 SOLUTION RESPIRATORY (INHALATION) EVERY 4 HOURS PRN
Status: DISCONTINUED | OUTPATIENT
Start: 2021-06-23 | End: 2021-06-26 | Stop reason: HOSPADM

## 2021-06-23 RX ORDER — HEPARIN SODIUM 1000 [USP'U]/ML
80 INJECTION, SOLUTION INTRAVENOUS; SUBCUTANEOUS PRN
Status: DISCONTINUED | OUTPATIENT
Start: 2021-06-23 | End: 2021-06-25

## 2021-06-23 RX ORDER — IPRATROPIUM BROMIDE AND ALBUTEROL SULFATE 2.5; .5 MG/3ML; MG/3ML
3 SOLUTION RESPIRATORY (INHALATION) 3 TIMES DAILY
Status: DISCONTINUED | OUTPATIENT
Start: 2021-06-23 | End: 2021-06-23

## 2021-06-23 RX ORDER — GABAPENTIN 300 MG/1
300 CAPSULE ORAL ONCE
Status: COMPLETED | OUTPATIENT
Start: 2021-06-23 | End: 2021-06-23

## 2021-06-23 RX ORDER — URSODIOL 300 MG/1
300 CAPSULE ORAL 2 TIMES DAILY
Status: DISCONTINUED | OUTPATIENT
Start: 2021-06-23 | End: 2021-06-26 | Stop reason: HOSPADM

## 2021-06-23 RX ORDER — ATORVASTATIN CALCIUM 40 MG/1
40 TABLET, FILM COATED ORAL DAILY
Status: DISCONTINUED | OUTPATIENT
Start: 2021-06-23 | End: 2021-06-26 | Stop reason: HOSPADM

## 2021-06-23 RX ORDER — VITAMIN B COMPLEX
2000 TABLET ORAL
Status: DISCONTINUED | OUTPATIENT
Start: 2021-06-23 | End: 2021-06-26 | Stop reason: HOSPADM

## 2021-06-23 RX ORDER — DEXTROSE MONOHYDRATE 50 MG/ML
100 INJECTION, SOLUTION INTRAVENOUS PRN
Status: DISCONTINUED | OUTPATIENT
Start: 2021-06-23 | End: 2021-06-26 | Stop reason: HOSPADM

## 2021-06-23 RX ORDER — ESOMEPRAZOLE MAGNESIUM 40 MG/1
40 FOR SUSPENSION ORAL DAILY
Status: DISCONTINUED | OUTPATIENT
Start: 2021-06-23 | End: 2021-06-23 | Stop reason: CLARIF

## 2021-06-23 RX ADMIN — HEPARIN SODIUM 15 UNITS/KG/HR: 10000 INJECTION, SOLUTION INTRAVENOUS at 03:49

## 2021-06-23 RX ADMIN — Medication 2000 UNITS: at 17:44

## 2021-06-23 RX ADMIN — GABAPENTIN 300 MG: 300 CAPSULE ORAL at 22:47

## 2021-06-23 RX ADMIN — SODIUM CHLORIDE: 9 INJECTION, SOLUTION INTRAVENOUS at 22:47

## 2021-06-23 RX ADMIN — CARVEDILOL 6.25 MG: 6.25 TABLET, FILM COATED ORAL at 17:44

## 2021-06-23 RX ADMIN — WARFARIN SODIUM 5 MG: 5 TABLET ORAL at 17:44

## 2021-06-23 RX ADMIN — BUDESONIDE AND FORMOTEROL FUMARATE DIHYDRATE 2 PUFF: 80; 4.5 AEROSOL RESPIRATORY (INHALATION) at 08:19

## 2021-06-23 RX ADMIN — BUDESONIDE AND FORMOTEROL FUMARATE DIHYDRATE 2 PUFF: 80; 4.5 AEROSOL RESPIRATORY (INHALATION) at 19:26

## 2021-06-23 RX ADMIN — URSODIOL 300 MG: 300 CAPSULE ORAL at 22:47

## 2021-06-23 RX ADMIN — ATORVASTATIN CALCIUM 40 MG: 40 TABLET, FILM COATED ORAL at 22:47

## 2021-06-23 RX ADMIN — SODIUM CHLORIDE, POTASSIUM CHLORIDE, SODIUM LACTATE AND CALCIUM CHLORIDE 1000 ML: 600; 310; 30; 20 INJECTION, SOLUTION INTRAVENOUS at 01:23

## 2021-06-23 ASSESSMENT — PAIN SCALES - GENERAL: PAINLEVEL_OUTOF10: 0

## 2021-06-23 NOTE — PROGRESS NOTES
VASC SURG CONSULT CALLED TO DR Camacho Aranda VIA PERFECT SERVE Electronically signed by Vivian Jacob on 6/23/2021 at 8:35 AM

## 2021-06-23 NOTE — PROGRESS NOTES
Assessment complete. [place]-- from home with granddaughter, Kane [place]. Meds reconciled with help of granddaughter. According to pt and family, pt is up independently at home. O2 PRN at home on 2L-- currently RA here at 95%. Heparin drip adjusted accordingly with next XA (Q6) to be drawn at 0830. Resting comfortably. Bed alarm on. Safety maintained. Call light within reach.     Electronically signed by Eric Nation RN on 6/23/2021 at 1:50 AM

## 2021-06-23 NOTE — H&P
Take 5 mg by mouth daily   Yes Historical Provider, MD   Vitamin D (CHOLECALCIFEROL) 50 MCG (2000 UT) TABS tablet Take 1 tablet by mouth Daily with supper 2/13/21  Yes Pat Mirza DO   ferrous sulfate (IRON 325) 325 (65 Fe) MG tablet Take 1 tablet by mouth 2 times daily (with meals) 2/13/21  Yes CHRISTINE Clark CNP   folic acid (FOLVITE) 1 MG tablet Take 1 tablet by mouth daily 2/14/21  Yes CHRISTINE Clark CNP   carvedilol (COREG) 12.5 MG tablet Take 0.5 tablets by mouth 2 times daily (with meals) 7/9/19  Yes Alric Opitz, MD   albuterol sulfate HFA (PROVENTIL HFA) 108 (90 Base) MCG/ACT inhaler Inhale 2 puffs into the lungs every 6 hours as needed for Wheezing 12/29/18  Yes Simone Bales MD   atorvastatin (LIPITOR) 40 MG tablet Take 40 mg by mouth daily   Yes Historical Provider, MD   ursodiol (ACTIGALL) 300 MG capsule Take 300 mg by mouth 2 times daily   Yes Historical Provider, MD   esomeprazole Magnesium (NEXIUM) 40 MG PACK Take 40 mg by mouth daily   Yes Historical Provider, MD   nitroGLYCERIN (NITROSTAT) 0.4 MG SL tablet Place 1 tablet under the tongue every 5 minutes as needed for Chest pain 9/21/19 2/1/21  CHRISTINE Gilbert CNP   ipratropium-albuterol (DUONEB) 0.5-2.5 (3) MG/3ML SOLN nebulizer solution Inhale 3 mLs into the lungs three times daily 2/17/18   Sonal Minaya MD   budesonide-formoterol (SYMBICORT) 80-4.5 MCG/ACT AERO Inhale 2 puffs into the lungs 2 times daily. Historical Provider, MD       Allergies:  Patient has no known allergies. Social History:   TOBACCO:   reports that he has quit smoking. He has never used smokeless tobacco.  ETOH:   reports no history of alcohol use. OCCUPATION:  none    Family History:   History reviewed. No pertinent family history. REVIEW OF SYSTEMS:  Ten systems reviewed and negative except for as above.      Physical Exam:    Vitals: BP (!) 176/89   Pulse 63   Temp 98.1 °F (36.7 °C)   Resp 11   Ht 5' 6.5\" (1.689 m)   Wt

## 2021-06-23 NOTE — PROGRESS NOTES
[facility] Respiratory Therapy Evaluation   Current Order:  Duoneb TID      Home Regimen: PRN      Ordering Physician: Candy  Re-evaluation Date:  N/A     Diagnosis: Arterial occlusion      Patient Status: Stable / Unstable + Physician notified    The following MDI Criteria must be met in order to convert aerosol to MDI with spacer. If unable to meet, MDI will be converted to aerosol:  []  Patient able to demonstrate the ability to use MDI effectively  []  Patient alert and cooperative  []  Patient able to take deep breath with 5-10 second hold  []  Medication(s) available in this delivery method   []  Peak flow greater than or equal to 200 ml/min            Current Order Substituted To  (same drug, same frequency)   Aerosol to MDI [] Albuterol Sulfate 0.083% unit dose by aerosol Albuterol Sulfate MDI 2 puffs by inhalation with spacer    [] Levalbuterol 1.25 mg unit dose by aerosol Levalbuterol MDI 2 puffs by inhalation with spacer    [] Levalbuterol 0.63 mg unit dose by aerosol Levalbuterol MDI 2 puffs by inhalation with spacer    [] Ipratropium Bromide 0.02% unit dose by aerosol Ipratropium Bromide MDI 2 puffs by inhalation with spacer    [] Duoneb (Ipratropium + Albuterol) unit dose by aerosol Ipratropium MDI + Albuterol MDI 2 puffs by inhalation w/spacer   MDI to Aerosol [] Albuterol Sulfate MDI Albuterol Sulfate 0.083% unit dose by aerosol    [] Levalbuterol MDI 2 puffs by inhalation Levalbuterol 1.25 mg unit dose by aerosol    [] Ipratropium Bromide MDI by inhalation Ipratropium Bromide 0.02% unit dose by aerosol    [] Combivent (Ipratropium + Albuterol) MDI by inhalation Duoneb (Ipratropium + Albuterol) unit dose by aerosol       Treatment Assessment [Frequency/Schedule]:  Change frequency to: ___Duoneb Q4 PRN_______________________________________________per Protocol, P&T, [facility]      Points 0 1 2 3 4   Pulmonary Status  Non-Smoker  []   Smoking history   < 20 pack years  []   Smoking history  ?  20 pack years  [x]   Pulmonary Disorder  (acute or chronic)  []   Severe or Chronic w/ Exacerbation  []     Surgical Status No [x]   Surgeries     General []   Surgery Lower []   Abdominal Thoracic or []   Upper Abdominal Thoracic with  PulmonaryDisorder  []     Chest X-ray Clear/Not  Ordered     []  Chronic Changes  Results Pending  [x]  Infiltrates, atelectasis, pleural effusion, or edema  []  Infiltrates in more than one lobe []  Infiltrate + Atelectasis, &/or pleural effusion  []    Respiratory Pattern Regular,  RR = 12-20 [x]  Increased,  RR = 21-25 []  BUTT, irregular,  or RR = 26-30 []  Decreased FEV1  or RR = 31-35 []  Severe SOB, use  of accessory muscles, or RR ? 35  []    Mental Status Alert, oriented,  Cooperative [x]  Confused but Follows commands []  Lethargic or unable to follow commands []  Obtunded  []  Comatose  []    Breath Sounds Clear to  auscultation  []  Decreased unilaterally or  in bases only [x]  Decreased  bilaterally  []  Crackles or intermittent wheezes []  Wheezes []    Cough Strong, Spontan., & nonproductive [x]  Strong,  spontaneous, &  productive []  Weak,  Nonproductive []  Weak, productive or  with wheezes []  No spontaneous  cough or may require suctioning []    Level of Activity Ambulatory []  Ambulatory w/ Assist  [x]  Non-ambulatory []  Paraplegic []  Quadriplegic []    Total    Score:____5___     Triage Score:____5____      Tri       Triage:     1. (>20) Freq: Q3    2. (16-20) Freq: Q4   3. (11-15) Freq: QID & Albuterol Q2 PRN    4. (6-10) Freq: TID & Albuterol Q2 PRN    5. (0-5) Freq Q4prn

## 2021-06-23 NOTE — PROGRESS NOTES
Hospitalist Progress Note      PCP: Jeremias Johns MD, MD    Date of Admission: 6/22/2021    Chief Complaint:  No acute events, afebrile, stable HD    Medications:  Reviewed    Infusion Medications    dextrose      heparin (PORCINE) Infusion 12 Units/kg/hr (06/23/21 1008)    sodium chloride 100 mL/hr at 06/22/21 2128     Scheduled Medications    carvedilol  6.25 mg Oral BID WC    budesonide-formoterol  2 puff Inhalation BID    atorvastatin  40 mg Oral Daily    Vitamin D  2,000 Units Oral Dinner    ursodiol  300 mg Oral BID    insulin lispro  0-6 Units Subcutaneous TID WC    insulin lispro  0-3 Units Subcutaneous Nightly    pantoprazole  40 mg Oral QAM AC     PRN Meds: hydrALAZINE, albuterol sulfate HFA, glucose, dextrose, glucagon (rDNA), dextrose, ipratropium-albuterol, heparin (porcine), heparin (porcine), ondansetron **OR** ondansetron, polyethylene glycol, acetaminophen **OR** acetaminophen      Intake/Output Summary (Last 24 hours) at 6/23/2021 1208  Last data filed at 6/23/2021 0534  Gross per 24 hour   Intake 443 ml   Output 1100 ml   Net -657 ml       Exam:    BP (!) 179/70   Pulse 76   Temp 98 °F (36.7 °C) (Oral)   Resp 14   Ht 5' 6.5\" (1.689 m)   Wt 148 lb 1.6 oz (67.2 kg)   SpO2 98%   BMI 23.55 kg/m²     General appearance: appears stated age and cooperative. Respiratory:  clear to auscultation, bilaterally. Cardiovascular: Regular rate and rhythm, S1S2. Abdomen: Soft, active bowel sounds. Musculoskeletal: mild edema/erythema of the RLE,  peripheral pulses are dopplerable.      Labs:   Recent Labs     06/22/21  1845 06/23/21  0759   WBC 9.7 6.7   HGB 15.7 13.8*   HCT 45.6 41.3*    171     Recent Labs     06/22/21  1845 06/23/21  0759    134*   K 4.0 4.2   CL 95 99   CO2 22 20   BUN 41* 35*   CREATININE 1.90* 1.50*   CALCIUM 10.0* 8.9     Recent Labs     06/22/21  1845   AST 22   ALT 14   BILITOT 1.7*   ALKPHOS 89     Recent Labs     06/22/21 1845   INR 1.0     Recent Labs     06/22/21  1845 06/23/21  0030 06/23/21  0759   CKTOTAL 73  --  271*   TROPONINI 0.069* 0.057* 0.049*       Urinalysis:      Lab Results   Component Value Date    NITRU Negative 06/23/2021    WBCUA 3-5 12/23/2016    BACTERIA Few 12/23/2016    RBCUA 3-5 12/23/2016    BLOODU Negative 06/23/2021    SPECGRAV 1.046 06/23/2021    GLUCOSEU Negative 06/23/2021       Radiology:  CTA ABDOMINAL AORTA W BILAT RUNOFF W WO CONTRAST   Final Result      Occlusion right mid and distal superficial femoral artery, and right popliteal artery, with reconstitution within the proximal right anterior tibial artery. Origin right anterior tibial artery not opacified. Proximal right anterior tibial artery    opacified and runs in continuity to right ankle. Intermittent obstruction right posterior tibial and right peroneal arteries. Occlusion proximal left anterior tibial artery, and origin of left peroneal and posterior tibial arteries as described. Vascular stent, right common and right external iliac vein. Sigmoid diverticulosis. All CT scans at this facility use dose modulation, iterative reconstruction, and/or weight based dosing when appropriate to reduce radiation dose to as low as reasonably achievable. US DUP LOWER EXTREMITIES BILATERAL VENOUS   Final Result   ACUTE DEEP VEIN THROMBOSIS, RIGHT LOWER EXTREMITY, EXTENDING FROM RIGHT COMMON FEMORAL VEIN THROUGH RIGHT POPLITEAL VEIN.       XR CHEST PORTABLE   Final Result   NO ACUTE CARDIOPULMONARY DISEASE.      US DUP LOWER ART/BYPASS GRAFTS BILATERAL COMPLETE    (Results Pending)           Assessment/Plan:    79 y/o M with history of CAD, PAD, RLE DVT s/p right iliac vein stent in 1/2021on warfarin, DM2, HTN, hearing loss, who presented with:     Right LE ischemia  - CTA of the LE showed occlusion right SFA, right popliteal artery and occlusion proximal left anterior tibial artery per report  - doppler u/s showed acute proximal DVT of RLE  - on Heparin infusion  - discussed with vascular surgery, plan is for conservative management  - resume Warfarin, will bridge until INR>2    Elevated troponins  - in the setting of FATEMEH  - trending down    FATEMEH  - improving with IVFs  - monitor renal function    HTN  - continue Coreg  - holding ACEI due to FATEMEH  - IV hydralazine as needed    DM2  - ISS           Electronically signed by Cruz Sutton MD on 6/23/2021 at 12:08 PM

## 2021-06-23 NOTE — PROGRESS NOTES
Clinical Pharmacy Note    Hal Ramos is a 80 y.o. male for whom pharmacy has been asked to manage warfarin therapy. Reason for Admission: leg pain/DVT    Consulting Physician: Mariana Mosquera  Warfarin dose prior to admission: 3 mg Daily  Warfarin Indication: DVT  Target INR range: 2-3  Order for concomitant anticoagulant therapy: heparin drip    Outpatient warfarin provider: Dr. 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     Height: 5' 6.5\" (168.9 cm), Weight: 148 lb 1.6 oz (67.2 kg)    Recent Labs     06/22/21  1845   INR 1.0     Recent Labs     06/22/21  1845 06/23/21  0759   HGB 15.7 13.8*   HCT 45.6 41.3*    171     Diet:  Diet Orders (From admission, onward) Comment       Start     Ordered    06/23/21 1200  ADULT DIET; Regular  DIET EFFECTIVE NOW      06/23/21 1148    06/23/21 0000  Diet NPO  DIET EFFECTIVE NOW,   Status:  Canceled      06/22/21 9644                  Recent diet/intake:       Current warfarin drug-drug interactions: heparin    Notes:  Date INR Warfarin Dose Administered   06/22/21 1  []    06/23/21  5 mg []      []      []      []      []      []      []     INR at 1 last night. Heparin drip running for bridge for now. Dr. Dilan Hoover notes that it is ok to bridge with lovenox and to increase warfarin dose. Will give 5 mg tonight as boost.  Daily PT/INR during pharmacy consult to monitor and dose warfarin therapy. Thank you for the consult.      Melony Millan, PharmD, BCPS, [facility]  Staff Pharmacist  6/23/2021 3:24 PM

## 2021-06-23 NOTE — CONSULTS
Vascular Consult      Name: Orlando Donohue  MRN: [id number]       Physician Requesting Consult:  Dr. Yesica Lorenzo    Reason for Consult:   Right calf pain    Chief Complaint:      Right calf pain    History of Present Illness:      Orlando Donohue is a 80 y.o.  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 Patient was treated with embolectomy and stenting of the iliac vein. Patient presented with right calf pain to the ER last night and was found to have recurrence of his ilio-popliteal vein dvt. He was started on a heparin gtt. This morning he has no complaints of pain in the right lower extremity. He has chronic arterial occlusive disease noted again on his CTA which is not significantly changed from his prior CTA in January. He has no symptoms of claudication and has no vascular rest pain or tissue loss in the foot. 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  Authorizing Provider   warfarin (COUMADIN) 3 MG tablet Take 3 mg by mouth daily Once daily in AM   Yes Historical Provider, MD   ramipril (ALTACE) 5 MG capsule Take 5 mg by mouth daily   Yes Historical Provider, MD   Vitamin D (CHOLECALCIFEROL) 50 MCG (2000 UT) TABS tablet Take 1 tablet by mouth Daily with supper 2/13/21  Yes Pat Mirza DO   ferrous sulfate (IRON 325) 325 (65 Fe) MG tablet Take 1 tablet by mouth 2 times daily (with meals) 2/13/21 Yes CHRISTINE Crow CNP   folic acid (FOLVITE) 1 MG tablet Take 1 tablet by mouth daily 2/14/21  Yes CHRISTINE Crow CNP   carvedilol (COREG) 12.5 MG tablet Take 0.5 tablets by mouth 2 times daily (with meals) 7/9/19  Yes Nanda Scherer MD   albuterol sulfate HFA (PROVENTIL HFA) 108 (90 Base) MCG/ACT inhaler Inhale 2 puffs into the lungs every 6 hours as needed for Wheezing 12/29/18  Yes Jordi Bassett MD   atorvastatin (LIPITOR) 40 MG tablet Take 40 mg by mouth daily   Yes Historical Provider, MD   ursodiol (ACTIGALL) 300 MG capsule Take 300 mg by mouth 2 times daily   Yes Historical Provider, MD   esomeprazole Magnesium (NEXIUM) 40 MG PACK Take 40 mg by mouth daily   Yes Historical Provider, MD   nitroGLYCERIN (NITROSTAT) 0.4 MG SL tablet Place 1 tablet under the tongue every 5 minutes as needed for Chest pain 9/21/19 2/1/21  CHRISTINE Hernandez CNP   ipratropium-albuterol (DUONEB) 0.5-2.5 (3) MG/3ML SOLN nebulizer solution Inhale 3 mLs into the lungs three times daily 2/17/18   Dominic Harmon MD   budesonide-formoterol (SYMBICORT) 80-4.5 MCG/ACT AERO Inhale 2 puffs into the lungs 2 times daily. Historical Provider, MD        Allergies:       Patient has no known allergies. Social History:     Tobacco:    reports that he has quit smoking. He has never used smokeless tobacco.  Alcohol:      reports no history of alcohol use. Drug Use:  reports no history of drug use. Family History:     History reviewed. No pertinent family history.     Review of Systems:     Positive and Negative as described in HPI    Constitutional:  negative for  fevers, chills, sweats, fatigue, and weight loss  HEENT:  Very hard of hearing   Respiratory:  negative for shortness of breath, cough, or congestion  Cardiovascular:  negative for  chest pain, palpitations  Gastrointestinal:  negative for nausea, vomiting, diarrhea, constipation, abdominal pain  Genitourinary:  negative for frequency, dysuria  Integument:  negative for rash, skin lesions  Chest/Breast:  No painful inspiration or expiration, no rib sternal pain  Musculoskeletal:  negative for muscle aches or joint pain  Neurological:  negative for headaches, dizziness, lightheadedness, numbness, pain and tingling extremities  Lymphatics: no lymphadenopathy or painful masses  Behavior/Psych:  negative for depression and anxiety    Physical Exam:     Vitals:  BP (!) 179/70   Pulse 76   Temp 98 °F (36.7 °C) (Oral)   Resp 14   Ht 5' 6.5\" (1.689 m)   Wt 148 lb 1.6 oz (67.2 kg)   SpO2 98%   BMI 23.55 kg/m²     General appearance - alert, well appearing and in no acute distress  Mental status - oriented to person, place and time with normal affect  Head - normocephalic and atraumatic  Eyes - pupils equal and reactive, extraocular eye movements intact, conjunctiva clear  Ears - hearing appears to be intact  Nose - no drainage noted  Mouth - mucous membranes moist  Neck - supple, no carotid bruits, thyroid not palpable, no JVD  Chest - clear to auscultation, normal effort  Heart - normal rate, regular rhythm, no murmurs  Abdomen - soft, non-tender, non-distended, bowel sounds present all four quadrants, no masses, hepatomegaly, splenomegaly or aortic enlargement  Neurological - normal speech, no focal findings or movement disorder noted, cranial nerves II through XII grossly intact  Extremities - signals in the pt/dp bilateral lower extremities mono to biphasic. Trace edema right lower extremity.   Skin - no gross lesions, rashes, or induration noted           Labs     6/22/2021  7:24 PM - Nestor, Chpo Incoming Lab Results From Soft    Component Value Ref Range & Units Status Collected Lab   WBC 9.7  4.8 - 10.8 K/uL Final 06/22/2021  6:45 PM  - [facility] Lab   RBC 4.92  4.70 - 6.10 M/uL Final 06/22/2021  6:45 PM [address] [facility]   Hemoglobin 15.7  14.0 - 18.0 g/dL Final 06/22/2021  6:45 PM [address] [facility]   Hematocrit 45.6 42.0 - 52.0 % Final 06/22/2021  6:45 PM  - [facility] Lab   MCV 92.8  80.0 - 100.0 fL Final 06/22/2021  6:45 PM [address] [facility]   [facility] 31.8High   27.0 - 31.3 pg Final 06/22/2021  6:45 PM [address] [facility]   MCHC 34.3  33.0 - 37.0 % Final 06/22/2021  6:45 PM [address] [facility]   RDW 13.6  11.5 - 14.5 % Final 06/22/2021  6:45 PM [address] [facility]   Platelets 114  136 - 400 K/uL Final 06/22/2021  6:45 PM [address] [facility]         6/22/2021  7:49 PM - Nestor, Chpo Incoming Lab Results From Soft    Component Value Ref Range & Units Status Collected Lab   Protime 13.6  12.3 - 14.9 sec Final 06/22/2021  6:45 PM [address] [facility]   INR 1.0   Final 06/22/2021  6:45 PM [address] [facility]         Imaging:   I reviewed the ultrasound images of the right lower extremity which are consistent with a acute femoral to popliteal vein dvt. I reviewed the CTA images which are unchanged from prior CTA in January and again showed the chronic right sfa and popliteal occlusion with distal tibial reconstitution. Assessment:     Mana Jiménez is a 80 y.o.  male with recurrent right lower extremity dvt. Who was subtherapeutic on coumadin. Plan:     1. Continue on heparin gtt. Increase coumadin dose. OK to bridge with lovenox. Will need teaching for injections if bridged. Discussed plan with grand daughter whom the patient lives with and she understands and agrees with the plan. All question answered. Please call with questions.

## 2021-06-24 LAB
ALBUMIN SERPL-MCNC: 3.8 G/DL (ref 3.5–4.6)
ANION GAP SERPL CALCULATED.3IONS-SCNC: 14 MEQ/L (ref 9–15)
ANTI-XA UNFRAC HEPARIN: 0.6 IU/ML
BUN BLDV-MCNC: 27 MG/DL (ref 8–23)
CALCIUM SERPL-MCNC: 9.1 MG/DL (ref 8.5–9.9)
CHLORIDE BLD-SCNC: 105 MEQ/L (ref 95–107)
CO2: 19 MEQ/L (ref 20–31)
CREAT SERPL-MCNC: 1.11 MG/DL (ref 0.7–1.2)
GFR AFRICAN AMERICAN: 36
GFR AFRICAN AMERICAN: >60
GFR NON-AFRICAN AMERICAN: 30
GFR NON-AFRICAN AMERICAN: >60
GLUCOSE BLD-MCNC: 131 MG/DL (ref 60–115)
GLUCOSE BLD-MCNC: 132 MG/DL (ref 70–99)
GLUCOSE BLD-MCNC: 155 MG/DL (ref 60–115)
GLUCOSE BLD-MCNC: 192 MG/DL (ref 60–115)
GLUCOSE BLD-MCNC: 196 MG/DL (ref 60–115)
HCT VFR BLD CALC: 42.3 % (ref 42–52)
HEMOGLOBIN: 14.3 G/DL (ref 14–18)
INR BLD: 1.1
MAGNESIUM: 2.1 MG/DL (ref 1.7–2.4)
MCH RBC QN AUTO: 31.6 PG (ref 27–31.3)
MCHC RBC AUTO-ENTMCNC: 33.8 % (ref 33–37)
MCV RBC AUTO: 93.3 FL (ref 80–100)
PDW BLD-RTO: 14.2 % (ref 11.5–14.5)
PERFORMED ON: ABNORMAL
PHOSPHORUS: 3.2 MG/DL (ref 2.3–4.8)
PLATELET # BLD: 166 K/UL (ref 130–400)
POC CREATININE: 2.1 MG/DL (ref 0.8–1.3)
POC SAMPLE TYPE: ABNORMAL
POTASSIUM SERPL-SCNC: 4.5 MEQ/L (ref 3.4–4.9)
PROTHROMBIN TIME: 14.2 SEC (ref 12.3–14.9)
RBC # BLD: 4.53 M/UL (ref 4.7–6.1)
SODIUM BLD-SCNC: 138 MEQ/L (ref 135–144)
WBC # BLD: 6.6 K/UL (ref 4.8–10.8)

## 2021-06-24 PROCEDURE — 36415 COLL VENOUS BLD VENIPUNCTURE: CPT

## 2021-06-24 PROCEDURE — 94761 N-INVAS EAR/PLS OXIMETRY MLT: CPT

## 2021-06-24 PROCEDURE — 1210000000 HC MED SURG R&B

## 2021-06-24 PROCEDURE — 85027 COMPLETE CBC AUTOMATED: CPT

## 2021-06-24 PROCEDURE — 85520 HEPARIN ASSAY: CPT

## 2021-06-24 PROCEDURE — 6370000000 HC RX 637 (ALT 250 FOR IP): Performed by: SURGERY

## 2021-06-24 PROCEDURE — 85610 PROTHROMBIN TIME: CPT

## 2021-06-24 PROCEDURE — 94640 AIRWAY INHALATION TREATMENT: CPT

## 2021-06-24 PROCEDURE — 6370000000 HC RX 637 (ALT 250 FOR IP): Performed by: INTERNAL MEDICINE

## 2021-06-24 PROCEDURE — 83735 ASSAY OF MAGNESIUM: CPT

## 2021-06-24 PROCEDURE — 80069 RENAL FUNCTION PANEL: CPT

## 2021-06-24 PROCEDURE — 2580000003 HC RX 258: Performed by: EMERGENCY MEDICINE

## 2021-06-24 RX ORDER — WARFARIN SODIUM 5 MG/1
5 TABLET ORAL
Status: COMPLETED | OUTPATIENT
Start: 2021-06-24 | End: 2021-06-24

## 2021-06-24 RX ADMIN — SODIUM CHLORIDE: 9 INJECTION, SOLUTION INTRAVENOUS at 05:45

## 2021-06-24 RX ADMIN — URSODIOL 300 MG: 300 CAPSULE ORAL at 20:34

## 2021-06-24 RX ADMIN — ATORVASTATIN CALCIUM 40 MG: 40 TABLET, FILM COATED ORAL at 20:37

## 2021-06-24 RX ADMIN — BUDESONIDE AND FORMOTEROL FUMARATE DIHYDRATE 2 PUFF: 80; 4.5 AEROSOL RESPIRATORY (INHALATION) at 20:30

## 2021-06-24 RX ADMIN — URSODIOL 300 MG: 300 CAPSULE ORAL at 10:35

## 2021-06-24 RX ADMIN — BUDESONIDE AND FORMOTEROL FUMARATE DIHYDRATE 2 PUFF: 80; 4.5 AEROSOL RESPIRATORY (INHALATION) at 07:47

## 2021-06-24 RX ADMIN — PANTOPRAZOLE SODIUM 40 MG: 40 TABLET, DELAYED RELEASE ORAL at 05:45

## 2021-06-24 RX ADMIN — INSULIN LISPRO 1 UNITS: 100 INJECTION, SOLUTION INTRAVENOUS; SUBCUTANEOUS at 17:09

## 2021-06-24 RX ADMIN — CARVEDILOL 6.25 MG: 6.25 TABLET, FILM COATED ORAL at 10:35

## 2021-06-24 RX ADMIN — WARFARIN SODIUM 5 MG: 5 TABLET ORAL at 17:07

## 2021-06-24 RX ADMIN — CARVEDILOL 6.25 MG: 6.25 TABLET, FILM COATED ORAL at 17:07

## 2021-06-24 NOTE — PROGRESS NOTES
Shift assessment complete. Meds given per mar. Pt stated his feet were burning, I messaged [facility] and got a one time dose of gabapentin. I have also spoken with his family multiple times on the phone and updated them. Pt is resting in room at this time. His last XA was therapeutic, so he changes to a daily lap draw. Call light within reach. Will continue to monitor.      Electronically signed by Yola Miller RN on 6/23/2021 at 11:08 PM

## 2021-06-24 NOTE — PROGRESS NOTES
Spoke with Randa Tiwari, granddaughter, who lives with patient. She stated to her knowledge he continues to take his medicine as ordered. She verbalized understanding of how important it was and stated she would be able to get it ready for him. I let her know of the possible plan for eliquis and was agreeable. Heparin still running, and it has been therapeutic for 24 hours. Patient up x1 assist. Denies any dizziness or pain through shift.      Electronically signed by Radha Simon RN on 6/24/2021 at 3:02 PM

## 2021-06-24 NOTE — PROGRESS NOTES
Hospitalist Progress Note      PCP: Arlene Mariano MD, MD    Date of Admission: 6/22/2021    Chief Complaint:  No acute events, afebrile, stable HD    Medications:  Reviewed    Infusion Medications    dextrose      heparin (PORCINE) Infusion 12.054 Units/kg/hr (06/23/21 9648)    sodium chloride 100 mL/hr at 06/24/21 0545     Scheduled Medications    carvedilol  6.25 mg Oral BID WC    budesonide-formoterol  2 puff Inhalation BID    atorvastatin  40 mg Oral Daily    Vitamin D  2,000 Units Oral Dinner    ursodiol  300 mg Oral BID    insulin lispro  0-6 Units Subcutaneous TID WC    insulin lispro  0-3 Units Subcutaneous Nightly    pantoprazole  40 mg Oral QAM AC    warfarin (COUMADIN) daily dosing (placeholder)   Other RX Placeholder     PRN Meds: hydrALAZINE, albuterol sulfate HFA, glucose, dextrose, glucagon (rDNA), dextrose, ipratropium-albuterol, heparin (porcine), heparin (porcine), ondansetron **OR** ondansetron, polyethylene glycol, acetaminophen **OR** acetaminophen      Intake/Output Summary (Last 24 hours) at 6/24/2021 1210  Last data filed at 6/24/2021 0741  Gross per 24 hour   Intake 1811 ml   Output 1000 ml   Net 811 ml       Exam:    BP (!) 123/59   Pulse 67   Temp 97.5 °F (36.4 °C) (Oral)   Resp 16   Ht 5' 6.5\" (1.689 m)   Wt 148 lb 1.6 oz (67.2 kg)   SpO2 96%   BMI 23.55 kg/m²     General appearance: appears stated age and cooperative. Respiratory:  clear to auscultation, bilaterally. Cardiovascular: Regular rate and rhythm, S1S2. Abdomen: Soft, active bowel sounds. Musculoskeletal: mild edema/erythema of the RLE,  peripheral pulses are dopplerable.      Labs:   Recent Labs     06/22/21  1845 06/23/21  0759 06/24/21  0819   WBC 9.7 6.7 6.6   HGB 15.7 13.8* 14.3   HCT 45.6 41.3* 42.3    171 166     Recent Labs     06/22/21  1845 06/22/21  1903 06/23/21  0759 06/24/21  0819     --  134* 138   K 4.0  --  4.2 4.5   CL 95  --  99 105   CO2 22  --  20 19*   BUN 41*  --  35* 27*   CREATININE 1.90* 2.1* 1.50* 1.11   CALCIUM 10.0*  --  8.9 9.1   PHOS  --   --   --  3.2     Recent Labs     06/22/21  1845   AST 22   ALT 14   BILITOT 1.7*   ALKPHOS 89     Recent Labs     06/22/21  1845 06/23/21  1447 06/24/21  0819   INR 1.0 1.2  1.2 1.1     Recent Labs     06/22/21  1845 06/23/21  0030 06/23/21  0759   CKTOTAL 73  --  271*   TROPONINI 0.069* 0.057* 0.049*       Urinalysis:      Lab Results   Component Value Date    NITRU Negative 06/23/2021    WBCUA 3-5 12/23/2016    BACTERIA Few 12/23/2016    RBCUA 3-5 12/23/2016    BLOODU Negative 06/23/2021    SPECGRAV 1.046 06/23/2021    GLUCOSEU Negative 06/23/2021       Radiology:  CTA ABDOMINAL AORTA W BILAT RUNOFF W WO CONTRAST   Final Result      Occlusion right mid and distal superficial femoral artery, and right popliteal artery, with reconstitution within the proximal right anterior tibial artery. Origin right anterior tibial artery not opacified. Proximal right anterior tibial artery    opacified and runs in continuity to right ankle. Intermittent obstruction right posterior tibial and right peroneal arteries. Occlusion proximal left anterior tibial artery, and origin of left peroneal and posterior tibial arteries as described. Vascular stent, right common and right external iliac vein. Sigmoid diverticulosis. All CT scans at this facility use dose modulation, iterative reconstruction, and/or weight based dosing when appropriate to reduce radiation dose to as low as reasonably achievable. US DUP LOWER ART/BYPASS GRAFTS BILATERAL COMPLETE   Final Result      OCCLUSION OF THE RIGHT FEMORAL ARTERY DISTALLY AND OCCLUSION OF THE RIGHT POPLITEAL ARTERY. THERE ARE COLLATERAL VESSELS IN THE RIGHT LEG WITH SOME FLOW WITHIN THE ANTERIOR TIBIAL AND POSTERIOR TIBIAL AND PERONEAL ARTERY DISTALLY. SUSPECTED ARTERIAL    RUNOFF DISEASE. IN THE LEFT LEG THE COMMON FEMORAL, FEMORAL AND POPLITEAL ARTERIES ARE PATENT. HOWEVER FLOW TO THE RUNOFF VESSELS IS MINIMAL WITH FLOW VISUALIZED WITHIN THE ANTERIOR TIBIAL ARTERY. SEE ABOVE.         US DUP LOWER EXTREMITIES BILATERAL VENOUS   Final Result   ACUTE DEEP VEIN THROMBOSIS, RIGHT LOWER EXTREMITY, EXTENDING FROM RIGHT COMMON FEMORAL VEIN THROUGH RIGHT POPLITEAL VEIN. XR CHEST PORTABLE   Final Result   NO ACUTE CARDIOPULMONARY DISEASE.               Assessment/Plan:    81 y/o M with history of CAD, PAD, RLE DVT s/p right iliac vein stent in 1/2021on warfarin, DM2, HTN, hearing loss, who presented with:     Right LE ischemia  - CTA of the LE showed occlusion right SFA, right popliteal artery and occlusion proximal left anterior tibial artery per report  - doppler u/s showed acute proximal DVT of RLE  - treated with Heparin infusion  - discussed with vascular surgery, plan to switch from warfarin to apixaban given difficulty achieving therapeutic levels with Warfarin,     Elevated troponins  - in the setting of FATEMEH  - trending down    FATEMEH  - improving with IVFs  - monitor renal function    HTN  - continue Coreg  - holding ACEI due to FATEMEH  - IV hydralazine as needed    DM2  - ISS     Disposition - home      Electronically signed by Anjana Cortes MD on 6/24/2021 at 12:10 PM

## 2021-06-24 NOTE — PROGRESS NOTES
Clinical Pharmacy Note    Warfarin consult follow-up    Recent Labs     06/24/21  0819   INR 1.1     Recent Labs     06/22/21  1845 06/23/21  0759 06/24/21  0819   HGB 15.7 13.8* 14.3   HCT 45.6 41.3* 42.3    171 166     Diet:  Diet Orders (From admission, onward) Comment       Start     Ordered    06/23/21 1200  ADULT DIET; Regular  DIET EFFECTIVE NOW      06/23/21 1148    06/23/21 0000  Diet NPO  DIET EFFECTIVE NOW,   Status:  Canceled      06/22/21 2354                   Recent diet/intake: PO Meals Eaten (%): 26 - 50%     Significant drug:drug interactions:  heparin    Notes:  Date INR Warfarin Dose Administered   06/22/21 1   []    06/23/21   5 mg [x]    06/24/21 1.1 5 mg []          []          []          []          []          []      INR still subtherapeutic at 1.1. Will give another 5 mg tonight and continue heparin drip. Daily PT/INR during pharmacy consult to monitor and dose warfarin therapy.     Thank you,    Dasha Frost, PharmD, BCPS, [facility]  Staff Pharmacist  6/24/2021 3:19 PM

## 2021-06-24 NOTE — PROGRESS NOTES
Fe) MG tablet Take 1 tablet by mouth 2 times daily (with meals) 2/13/21  Yes CHRISTINE Khalil CNP   folic acid (FOLVITE) 1 MG tablet Take 1 tablet by mouth daily 2/14/21  Yes CHRISTINE Khalil CNP   carvedilol (COREG) 12.5 MG tablet Take 0.5 tablets by mouth 2 times daily (with meals) 7/9/19  Yes Live Bunn MD   albuterol sulfate HFA (PROVENTIL HFA) 108 (90 Base) MCG/ACT inhaler Inhale 2 puffs into the lungs every 6 hours as needed for Wheezing 12/29/18  Yes Zuhair Rubin MD   atorvastatin (LIPITOR) 40 MG tablet Take 40 mg by mouth daily   Yes Historical Provider, MD   ursodiol (ACTIGALL) 300 MG capsule Take 300 mg by mouth 2 times daily   Yes Historical Provider, MD   esomeprazole Magnesium (NEXIUM) 40 MG PACK Take 40 mg by mouth daily   Yes Historical Provider, MD   nitroGLYCERIN (NITROSTAT) 0.4 MG SL tablet Place 1 tablet under the tongue every 5 minutes as needed for Chest pain 9/21/19 2/1/21  Jeanmarie January, APRN - CNP   ipratropium-albuterol (DUONEB) 0.5-2.5 (3) MG/3ML SOLN nebulizer solution Inhale 3 mLs into the lungs three times daily 2/17/18   Anai Iglesias MD   budesonide-formoterol (SYMBICORT) 80-4.5 MCG/ACT AERO Inhale 2 puffs into the lungs 2 times daily. Historical Provider, MD        Allergies:       Patient has no known allergies. Social History:     Tobacco:    reports that he has quit smoking. He has never used smokeless tobacco.  Alcohol:      reports no history of alcohol use. Drug Use:  reports no history of drug use. Family History:     History reviewed. No pertinent family history.     Review of Systems:     Positive and Negative as described in HPI    Constitutional:  negative for  fevers, chills, sweats, fatigue, and weight loss  HEENT:  Very hard of hearing   Respiratory:  negative for shortness of breath, cough, or congestion  Cardiovascular:  negative for  chest pain, palpitations  Gastrointestinal:  negative for nausea, vomiting, diarrhea, constipation, abdominal pain  Genitourinary:  negative for frequency, dysuria  Integument:  negative for rash, skin lesions  Chest/Breast:  No painful inspiration or expiration, no rib sternal pain  Musculoskeletal:  negative for muscle aches or joint pain  Neurological:  negative for headaches, dizziness, lightheadedness, numbness, pain and tingling extremities  Lymphatics: no lymphadenopathy or painful masses  Behavior/Psych:  negative for depression and anxiety    Physical Exam:     Vitals:  BP (!) 123/59   Pulse 67   Temp 97.5 °F (36.4 °C) (Oral)   Resp 16   Ht 5' 6.5\" (1.689 m)   Wt 148 lb 1.6 oz (67.2 kg)   SpO2 96%   BMI 23.55 kg/m²     General appearance - alert, well appearing and in no acute distress  Mental status - oriented to person, place and time with normal affect  Head - normocephalic and atraumatic  Eyes - pupils equal and reactive, extraocular eye movements intact, conjunctiva clear  Ears - hearing appears to be intact  Nose - no drainage noted  Mouth - mucous membranes moist  Neck - supple, no carotid bruits, thyroid not palpable, no JVD  Chest - clear to auscultation, normal effort  Heart - normal rate, regular rhythm, no murmurs  Abdomen - soft, non-tender, non-distended, bowel sounds present all four quadrants, no masses, hepatomegaly, splenomegaly or aortic enlargement  Neurological - normal speech, no focal findings or movement disorder noted, cranial nerves II through XII grossly intact  Extremities - signals in the pt/dp bilateral lower extremities mono to biphasic. Trace edema right lower extremity.   Skin - no gross lesions, rashes, or induration noted           Labs     6/22/2021  7:24 PM - Nestor, Chpo Incoming Lab Results From Soft    Component Value Ref Range & Units Status Collected Lab   WBC 9.7  4.8 - 10.8 K/uL Final 06/22/2021  6:45 PM MH - [facility] Lab   RBC 4.92  4.70 - 6.10 M/uL Final 06/22/2021  6:45 PM MH - [facility] Lab   Hemoglobin 15.7  14.0 - 18.0 g/dL Final 06/22/2021  6:45 PM [address]   Hematocrit 45.6  42.0 - 52.0 % Final 06/22/2021  6:45 PM MH - [facility] Lab   MCV 92.8  80.0 - 100.0 fL Final 06/22/2021  6:45 PM MH - [facility] Lab [facility] 31.8High   27.0 - 31.3 pg Final 06/22/2021  6:45 PM MH - [facility] Lab   MCHC 34.3  33.0 - 37.0 % Final 06/22/2021  6:45 PM MH - [facility] Lab   RDW 13.6  11.5 - 14.5 % Final 06/22/2021  6:45 PM [address]   Platelets 397  588 - 400 K/uL Final 06/22/2021  6:45 PM [address]         6/22/2021  7:49 PM - Nestor, Chpo Incoming Lab Results From Soft    Component Value Ref Range & Units Status Collected Lab   Protime 13.6  12.3 - 14.9 sec Final 06/22/2021  6:45 PM [address]   INR 1.0   Final 06/22/2021  6:45 PM [address]         Imaging:   I reviewed the ultrasound images of the right lower extremity which are consistent with a acute femoral to popliteal vein dvt. I reviewed the CTA images which are unchanged from prior CTA in January and again showed the chronic right sfa and popliteal occlusion with distal tibial reconstitution. Assessment:     David Matson is a 80 y.o.  male with recurrent right lower extremity dvt. Who was subtherapeutic on coumadin. Plan:     Continue on heparin gtt. Increase coumadin dose. OK to bridge with lovenox. Will need teaching for injections if bridged. Discussed plan with grand daughter whom the patient lives with and she understands and agrees with the plan. All question answered. Spoke with Dr. Adria Spatz after conversation with Dr. Lisa James. Dr. Adria Spatz sees no reason currently to not start Eliquis. Heparin to be stopped for one hour prior to Eliquis.

## 2021-06-25 LAB
ALBUMIN SERPL-MCNC: 3.6 G/DL (ref 3.5–4.6)
ANION GAP SERPL CALCULATED.3IONS-SCNC: 10 MEQ/L (ref 9–15)
ANTI-XA UNFRAC HEPARIN: 1 IU/ML
BUN BLDV-MCNC: 22 MG/DL (ref 8–23)
CALCIUM SERPL-MCNC: 8.8 MG/DL (ref 8.5–9.9)
CHLORIDE BLD-SCNC: 108 MEQ/L (ref 95–107)
CO2: 24 MEQ/L (ref 20–31)
CREAT SERPL-MCNC: 1.27 MG/DL (ref 0.7–1.2)
EKG ATRIAL RATE: 71 BPM
EKG P AXIS: 95 DEGREES
EKG P-R INTERVAL: 166 MS
EKG Q-T INTERVAL: 436 MS
EKG QRS DURATION: 104 MS
EKG QTC CALCULATION (BAZETT): 473 MS
EKG R AXIS: -76 DEGREES
EKG T AXIS: -3 DEGREES
EKG VENTRICULAR RATE: 71 BPM
GFR AFRICAN AMERICAN: >60
GFR NON-AFRICAN AMERICAN: 53
GLUCOSE BLD-MCNC: 131 MG/DL (ref 60–115)
GLUCOSE BLD-MCNC: 134 MG/DL (ref 60–115)
GLUCOSE BLD-MCNC: 136 MG/DL (ref 70–99)
GLUCOSE BLD-MCNC: 174 MG/DL (ref 60–115)
GLUCOSE BLD-MCNC: 214 MG/DL (ref 60–115)
HCT VFR BLD CALC: 37.1 % (ref 42–52)
HEMOGLOBIN: 12.5 G/DL (ref 14–18)
INR BLD: 1.2
MAGNESIUM: 1.9 MG/DL (ref 1.7–2.4)
MCH RBC QN AUTO: 31.4 PG (ref 27–31.3)
MCHC RBC AUTO-ENTMCNC: 33.6 % (ref 33–37)
MCV RBC AUTO: 93.3 FL (ref 80–100)
PDW BLD-RTO: 14.2 % (ref 11.5–14.5)
PERFORMED ON: ABNORMAL
PHOSPHORUS: 2.5 MG/DL (ref 2.3–4.8)
PLATELET # BLD: 188 K/UL (ref 130–400)
POTASSIUM SERPL-SCNC: 4.1 MEQ/L (ref 3.4–4.9)
PROTHROMBIN TIME: 15.6 SEC (ref 12.3–14.9)
RBC # BLD: 3.97 M/UL (ref 4.7–6.1)
SODIUM BLD-SCNC: 142 MEQ/L (ref 135–144)
WBC # BLD: 6.7 K/UL (ref 4.8–10.8)

## 2021-06-25 PROCEDURE — 6360000002 HC RX W HCPCS: Performed by: INTERNAL MEDICINE

## 2021-06-25 PROCEDURE — 83735 ASSAY OF MAGNESIUM: CPT

## 2021-06-25 PROCEDURE — 6370000000 HC RX 637 (ALT 250 FOR IP): Performed by: INTERNAL MEDICINE

## 2021-06-25 PROCEDURE — 94761 N-INVAS EAR/PLS OXIMETRY MLT: CPT

## 2021-06-25 PROCEDURE — 36415 COLL VENOUS BLD VENIPUNCTURE: CPT

## 2021-06-25 PROCEDURE — 80069 RENAL FUNCTION PANEL: CPT

## 2021-06-25 PROCEDURE — 85520 HEPARIN ASSAY: CPT

## 2021-06-25 PROCEDURE — 1210000000 HC MED SURG R&B

## 2021-06-25 PROCEDURE — 94640 AIRWAY INHALATION TREATMENT: CPT

## 2021-06-25 PROCEDURE — 93010 ELECTROCARDIOGRAM REPORT: CPT | Performed by: INTERNAL MEDICINE

## 2021-06-25 PROCEDURE — 85027 COMPLETE CBC AUTOMATED: CPT

## 2021-06-25 PROCEDURE — 85610 PROTHROMBIN TIME: CPT

## 2021-06-25 RX ORDER — WARFARIN SODIUM 2 MG/1
6 TABLET ORAL
Status: DISCONTINUED | OUTPATIENT
Start: 2021-06-25 | End: 2021-06-25

## 2021-06-25 RX ADMIN — INSULIN LISPRO 1 UNITS: 100 INJECTION, SOLUTION INTRAVENOUS; SUBCUTANEOUS at 18:48

## 2021-06-25 RX ADMIN — URSODIOL 300 MG: 300 CAPSULE ORAL at 21:22

## 2021-06-25 RX ADMIN — CARVEDILOL 6.25 MG: 6.25 TABLET, FILM COATED ORAL at 09:21

## 2021-06-25 RX ADMIN — ATORVASTATIN CALCIUM 40 MG: 40 TABLET, FILM COATED ORAL at 21:22

## 2021-06-25 RX ADMIN — APIXABAN 5 MG: 5 TABLET, FILM COATED ORAL at 21:22

## 2021-06-25 RX ADMIN — Medication 2000 UNITS: at 21:22

## 2021-06-25 RX ADMIN — PANTOPRAZOLE SODIUM 40 MG: 40 TABLET, DELAYED RELEASE ORAL at 05:23

## 2021-06-25 RX ADMIN — HEPARIN SODIUM 12.05 UNITS/KG/HR: 10000 INJECTION, SOLUTION INTRAVENOUS at 05:24

## 2021-06-25 RX ADMIN — BUDESONIDE AND FORMOTEROL FUMARATE DIHYDRATE 2 PUFF: 80; 4.5 AEROSOL RESPIRATORY (INHALATION) at 07:24

## 2021-06-25 RX ADMIN — BUDESONIDE AND FORMOTEROL FUMARATE DIHYDRATE 2 PUFF: 80; 4.5 AEROSOL RESPIRATORY (INHALATION) at 20:08

## 2021-06-25 RX ADMIN — URSODIOL 300 MG: 300 CAPSULE ORAL at 09:22

## 2021-06-25 RX ADMIN — CARVEDILOL 6.25 MG: 6.25 TABLET, FILM COATED ORAL at 18:50

## 2021-06-25 ASSESSMENT — PAIN SCALES - GENERAL
PAINLEVEL_OUTOF10: 0

## 2021-06-25 NOTE — FLOWSHEET NOTE
Patient awake and just had lunch, appetite good. Heparin drip still infusing at 8.1 ml./hour; Assessment completed but is very hard of hearing but communicating through notes. Alert and oriented. Lungs clear. Has 1+ edema to right lower leg noted. Patient watching TV.  Voiding well of clear yellow urine in urinal.

## 2021-06-25 NOTE — PROGRESS NOTES
Clinical Pharmacy Note    Warfarin consult follow-up    Recent Labs     06/25/21  1229   INR 1.2     Recent Labs     06/23/21  0759 06/24/21  0819 06/25/21  1229   HGB 13.8* 14.3 12.5*   HCT 41.3* 42.3 37.1*    166 188     Diet:  Diet Orders (From admission, onward) Comment       Start     Ordered    06/23/21 1200  ADULT DIET; Regular  DIET EFFECTIVE NOW      06/23/21 1148    06/23/21 0000  Diet NPO  DIET EFFECTIVE NOW,   Status:  Canceled      06/22/21 2354                   Recent diet/intake: PO Meals Eaten (%): 76 - 100%     Significant drug:drug interactions:  heparin    Notes:  Date INR Warfarin Dose Administered   06/22/21 1   []    06/23/21   5 mg [x]    06/24/21 1.1 5 mg [x]    06/25/21 1.2 6 mg []          []          []          []          []      INR subtherapeutic at 1.2. Will increase dose to 6 mg tonight. Continue heparin drip for bridging. Daily PT/INR during pharmacy consult to monitor and dose warfarin therapy.     Thank you,    Ramiro Traylor, PharmD, BCPS, [facility]  Staff Pharmacist  6/25/2021 2:14 PM

## 2021-06-25 NOTE — CARE COORDINATION
MET WITH PATIENT GRANDDAUGHTER SHEA. DC PLAN REMAINS HOME WITH HER. WOULD [address] PT/OT/SN. Mackenzie Chavarria [facility]. [phone] INTAKE. SPOKE TO INTAKE. HAVE PT/OT AND SN SERVICES AND ACCEPT PT. INSURANCE. NEED [facility] ORDER. WILL CONTINUE TO FOLLOW FOR DC. PT REMAINS ON HEP. GTT AT THIS TIME.

## 2021-06-25 NOTE — PROGRESS NOTES
Hospitalist Progress Note      PCP: Elodia Alba MD, MD    Date of Admission: 6/22/2021    Chief Complaint:  No acute events, afebrile, stable HD    Medications:  Reviewed    Infusion Medications    dextrose      heparin (PORCINE) Infusion 12.054 Units/kg/hr (06/25/21 0524)     Scheduled Medications    carvedilol  6.25 mg Oral BID WC    budesonide-formoterol  2 puff Inhalation BID    atorvastatin  40 mg Oral Daily    Vitamin D  2,000 Units Oral Dinner    ursodiol  300 mg Oral BID    insulin lispro  0-6 Units Subcutaneous TID WC    insulin lispro  0-3 Units Subcutaneous Nightly    pantoprazole  40 mg Oral QAM AC    warfarin (COUMADIN) daily dosing (placeholder)   Other RX Placeholder     PRN Meds: hydrALAZINE, albuterol sulfate HFA, glucose, dextrose, glucagon (rDNA), dextrose, ipratropium-albuterol, heparin (porcine), heparin (porcine), ondansetron **OR** ondansetron, polyethylene glycol, acetaminophen **OR** acetaminophen      Intake/Output Summary (Last 24 hours) at 6/25/2021 1322  Last data filed at 6/25/2021 1218  Gross per 24 hour   Intake 600 ml   Output 550 ml   Net 50 ml       Exam:    /76   Pulse 74   Temp 97.9 °F (36.6 °C) (Oral)   Resp 17   Ht 5' 6.5\" (1.689 m)   Wt 148 lb 1.6 oz (67.2 kg)   SpO2 95%   BMI 23.55 kg/m²     General appearance: appears stated age and cooperative. Respiratory:  clear to auscultation, bilaterally. Cardiovascular: Regular rate and rhythm, S1S2. Abdomen: Soft, active bowel sounds. Musculoskeletal: mild edema/erythema of the RLE,  peripheral pulses are dopplerable.      Labs:   Recent Labs     06/23/21  0759 06/24/21  0819 06/25/21  1229   WBC 6.7 6.6 6.7   HGB 13.8* 14.3 12.5*   HCT 41.3* 42.3 37.1*    166 188     Recent Labs     06/23/21  0759 06/24/21  0819 06/25/21  1229   * 138 142   K 4.2 4.5 4.1   CL 99 105 108*   CO2 20 19* 24   BUN 35* 27* 22   CREATININE 1.50* 1.11 1.27*   CALCIUM 8.9 9.1 8.8   PHOS  --  3.2 2.5     Recent [FreeTextEntry1] : Mr. CABRERA PIERRE, 75 year old male, former smoker, w/ hx of HTN, HLD, LBBB, BPH, DMII (diet control), CAD, CKD, PAD s/p right groin stent on Plavix and ASA, OA, COPD, who had recent right knee arthroscopy, had CXR done as part of pre-surgical testing, then CT chest revealed lung nodules. \par \par CT chest on 02/19/2020:\par - irregular 5.0 x 5.0 x 4.0 cm consolidation/mass RLL. Lesion has thickened pleural attachment posteriorly and anteriorly to large calcified pleural peridiaphragmatic plaque. \par - 11 mm groundglass density within 5 mm possible developing solid element FARZAD posteriorly near fissure (image 121)\par - calcified granuloma LLL\par -scattered calcified pleural plaques\par - pulmonary emphysema, scattered pulmonary cysts and small bulla. Largest bulla LLL 3.7 cm. \par \par PFTs on 03/03/2020: FVC 4.41 (91%), FEV1 2.71 (71%), DLCO 23.3 (92%). \par \par PET/CT on 03/10/2020:\par - 5.5 x 4.4 cm right lung base opacity with SUV 7.0 (2: 143), previously 5.3 x 3.6 cm on 02/19/2020. The opacity extends to prominent pleural calcification at the right lung base. \par - previously seen 5 mm groundglass nodule in the posterior FARZAD (2: 30) is unchanged and not FDG avid. \par - there are a few nonenlarged mediastinal LNs with low-grade FDG uptake similar to mediastinal blood pool. For example, a prevascular LN measuring 9 x 6 mm (2: 113) and a right upper paratracheal LN measuring 1.2 x 1.0 cm (2: 11)\par - mild bilateral hilar uptake (SUV 3.0 on the right and SUV 3.0 on the left), likely corresponding to underlying LNs\par - mild fatty infiltration. \par - mild asymmetric increased FDG uptake in the left peripheral apex of the prostate gland with SUV 4.6.\par - bilateral calcified pleural plaques, for example in the anterior lingula and bilateral lung bases.\par \par Of note, patient had asbestos exposure. Works as construction/Painting. \par \par I have reviewed the patient's medical records and diagnostic images at time of this office consultation and have made the following recommendation:\par 1. CT and PET/CT reviewed and explained to patient, I recommended a Flexible Bronchoscopy, mediastinoscopy to evaluate mediastinal lymphadenopathy. \par 2. If finding is unremarkable, two days after the mediastinoscopy, I recommended a Flexible Bronchoscopy, Right VATS, lung resection. Risks and benefits and alternatives explained to patient, all questions answered, patient agreed the above plans and agreed to proceed with surgery.\par 3. MRI brain w/w/out contrast\par 4. Cardiac and PST\par 5. Hold Plavix for 5 days prior to the surgery and continue Aspirin 81 mg.  \par \par I personally performed the services described in the documentation, reviewed the documentation recorded by the scribe in my presence and it accurately and completely records my words and actions.\par \par I, Mariia Blount NP, am scribing for and the presence of NATHAN Carrillo, the following sections HISTORY OF PRESENT ILLNESS, PAST MEDICAL/FAMILY/SOCIAL HISTORY; REVIEW OF SYSTEMS; VITAL SIGNS; PHYSICAL EXAM; DISPOSITION.  Labs     06/22/21  1845   AST 22   ALT 14   BILITOT 1.7*   ALKPHOS 89     Recent Labs     06/23/21  1447 06/24/21  0819 06/25/21  1229   INR 1.2  1.2 1.1 1.2     Recent Labs     06/22/21  1845 06/23/21  0030 06/23/21  0759   CKTOTAL 73  --  271*   TROPONINI 0.069* 0.057* 0.049*       Urinalysis:      Lab Results   Component Value Date    NITRU Negative 06/23/2021    WBCUA 3-5 12/23/2016    BACTERIA Few 12/23/2016    RBCUA 3-5 12/23/2016    BLOODU Negative 06/23/2021    SPECGRAV 1.046 06/23/2021    GLUCOSEU Negative 06/23/2021       Radiology:  CTA ABDOMINAL AORTA W BILAT RUNOFF W WO CONTRAST   Final Result      Occlusion right mid and distal superficial femoral artery, and right popliteal artery, with reconstitution within the proximal right anterior tibial artery. Origin right anterior tibial artery not opacified. Proximal right anterior tibial artery    opacified and runs in continuity to right ankle. Intermittent obstruction right posterior tibial and right peroneal arteries. Occlusion proximal left anterior tibial artery, and origin of left peroneal and posterior tibial arteries as described. Vascular stent, right common and right external iliac vein. Sigmoid diverticulosis. All CT scans at this facility use dose modulation, iterative reconstruction, and/or weight based dosing when appropriate to reduce radiation dose to as low as reasonably achievable. US DUP LOWER ART/BYPASS GRAFTS BILATERAL COMPLETE   Final Result      OCCLUSION OF THE RIGHT FEMORAL ARTERY DISTALLY AND OCCLUSION OF THE RIGHT POPLITEAL ARTERY. THERE ARE COLLATERAL VESSELS IN THE RIGHT LEG WITH SOME FLOW WITHIN THE ANTERIOR TIBIAL AND POSTERIOR TIBIAL AND PERONEAL ARTERY DISTALLY. SUSPECTED ARTERIAL    RUNOFF DISEASE. IN THE LEFT LEG THE COMMON FEMORAL, FEMORAL AND POPLITEAL ARTERIES ARE PATENT. HOWEVER FLOW TO THE RUNOFF VESSELS IS MINIMAL WITH FLOW VISUALIZED WITHIN THE ANTERIOR TIBIAL ARTERY.  SEE ABOVE.         US DUP LOWER EXTREMITIES BILATERAL VENOUS   Final Result   ACUTE DEEP VEIN THROMBOSIS, RIGHT LOWER EXTREMITY, EXTENDING FROM RIGHT COMMON FEMORAL VEIN THROUGH RIGHT POPLITEAL VEIN. XR CHEST PORTABLE   Final Result   NO ACUTE CARDIOPULMONARY DISEASE.               Assessment/Plan:    81 y/o M with history of CAD, PAD, RLE DVT s/p right iliac vein stent in 1/2021on warfarin, DM2, HTN, hearing loss, who presented with:     Right LE ischemia  - CTA of the LE showed occlusion right SFA, right popliteal artery and occlusion proximal left anterior tibial artery per report  - doppler u/s showed acute proximal DVT of RLE  - treated with Heparin infusion  - discussed with vascular surgery and his granddaughter, switched from warfarin to apixaban given difficulty achieving therapeutic levels with Warfarin,     Elevated troponins  - in the setting of FATEMEH  - trending down    FATEMEH  - improved with IVFs  - monitor renal function    HTN  - continue Coreg  - holding ACEI due to FATEMEH  - IV hydralazine as needed    DM2  - ISS       Disposition - home with [facility] tomorrow      Electronically signed by Nomi Felix MD on 6/25/2021 at 1:22 PM

## 2021-06-26 VITALS
OXYGEN SATURATION: 96 % | TEMPERATURE: 98.1 F | HEIGHT: 67 IN | HEART RATE: 81 BPM | WEIGHT: 148.1 LBS | SYSTOLIC BLOOD PRESSURE: 149 MMHG | RESPIRATION RATE: 16 BRPM | DIASTOLIC BLOOD PRESSURE: 76 MMHG | BODY MASS INDEX: 23.25 KG/M2

## 2021-06-26 LAB
ALBUMIN SERPL-MCNC: 3.6 G/DL (ref 3.5–4.6)
ANION GAP SERPL CALCULATED.3IONS-SCNC: 10 MEQ/L (ref 9–15)
ANTI-XA UNFRAC HEPARIN: 1.85 IU/ML
BUN BLDV-MCNC: 22 MG/DL (ref 8–23)
CALCIUM SERPL-MCNC: 8.9 MG/DL (ref 8.5–9.9)
CHLORIDE BLD-SCNC: 107 MEQ/L (ref 95–107)
CO2: 24 MEQ/L (ref 20–31)
CREAT SERPL-MCNC: 1.21 MG/DL (ref 0.7–1.2)
GFR AFRICAN AMERICAN: >60
GFR NON-AFRICAN AMERICAN: 56
GLUCOSE BLD-MCNC: 140 MG/DL (ref 70–99)
GLUCOSE BLD-MCNC: 150 MG/DL (ref 60–115)
GLUCOSE BLD-MCNC: 164 MG/DL (ref 60–115)
HCT VFR BLD CALC: 35.7 % (ref 42–52)
HEMOGLOBIN: 12.1 G/DL (ref 14–18)
MAGNESIUM: 1.8 MG/DL (ref 1.7–2.4)
MCH RBC QN AUTO: 31.1 PG (ref 27–31.3)
MCHC RBC AUTO-ENTMCNC: 33.8 % (ref 33–37)
MCV RBC AUTO: 92 FL (ref 80–100)
PDW BLD-RTO: 14.4 % (ref 11.5–14.5)
PERFORMED ON: ABNORMAL
PERFORMED ON: ABNORMAL
PHOSPHORUS: 2.6 MG/DL (ref 2.3–4.8)
PLATELET # BLD: 171 K/UL (ref 130–400)
POTASSIUM SERPL-SCNC: 4.7 MEQ/L (ref 3.4–4.9)
RBC # BLD: 3.88 M/UL (ref 4.7–6.1)
SODIUM BLD-SCNC: 141 MEQ/L (ref 135–144)
WBC # BLD: 6.3 K/UL (ref 4.8–10.8)

## 2021-06-26 PROCEDURE — 36415 COLL VENOUS BLD VENIPUNCTURE: CPT

## 2021-06-26 PROCEDURE — 6370000000 HC RX 637 (ALT 250 FOR IP): Performed by: INTERNAL MEDICINE

## 2021-06-26 PROCEDURE — 94761 N-INVAS EAR/PLS OXIMETRY MLT: CPT

## 2021-06-26 PROCEDURE — 83735 ASSAY OF MAGNESIUM: CPT

## 2021-06-26 PROCEDURE — 94640 AIRWAY INHALATION TREATMENT: CPT

## 2021-06-26 PROCEDURE — 80069 RENAL FUNCTION PANEL: CPT

## 2021-06-26 PROCEDURE — 85520 HEPARIN ASSAY: CPT

## 2021-06-26 PROCEDURE — 85027 COMPLETE CBC AUTOMATED: CPT

## 2021-06-26 RX ADMIN — BUDESONIDE AND FORMOTEROL FUMARATE DIHYDRATE 2 PUFF: 80; 4.5 AEROSOL RESPIRATORY (INHALATION) at 08:32

## 2021-06-26 RX ADMIN — CARVEDILOL 6.25 MG: 6.25 TABLET, FILM COATED ORAL at 09:01

## 2021-06-26 RX ADMIN — ATORVASTATIN CALCIUM 40 MG: 40 TABLET, FILM COATED ORAL at 09:01

## 2021-06-26 RX ADMIN — URSODIOL 300 MG: 300 CAPSULE ORAL at 09:01

## 2021-06-26 RX ADMIN — INSULIN LISPRO 1 UNITS: 100 INJECTION, SOLUTION INTRAVENOUS; SUBCUTANEOUS at 13:11

## 2021-06-26 RX ADMIN — INSULIN LISPRO 1 UNITS: 100 INJECTION, SOLUTION INTRAVENOUS; SUBCUTANEOUS at 09:03

## 2021-06-26 RX ADMIN — PANTOPRAZOLE SODIUM 40 MG: 40 TABLET, DELAYED RELEASE ORAL at 06:13

## 2021-06-26 RX ADMIN — APIXABAN 5 MG: 5 TABLET, FILM COATED ORAL at 09:01

## 2021-06-26 ASSESSMENT — PAIN SCALES - GENERAL: PAINLEVEL_OUTOF10: 0

## 2021-06-26 NOTE — CARE COORDINATION
SPOKE WITH SHEA. FREEDOM OF CHOICE GIVEN. GRAND DAUGHTER ELECTS [facility]. INFO FAXED. AWAITING APPROVAL.

## 2021-06-26 NOTE — FLOWSHEET NOTE
Patient awake and resting quietly in bed; was up to the bathroom per self with steady gait noted; some redness noted on rectal area. Denies any complaints of pain; right lower leg edema is improved. Alert and oriented x4. Lungs clear.

## 2021-06-26 NOTE — DISCHARGE INSTR - DIET

## 2021-06-26 NOTE — PROGRESS NOTES
Assessment complete. He denies any SOB, N/V, dizziness or pain at this time. Denies any needs at this time and resting comfortably in bed. Will continue to monitor and call light is within reach.   Electronically signed by Francis Gonzalez RN on 6/25/2021 at 9:22 PM

## 2021-06-26 NOTE — DISCHARGE INSTR - COC
Continuity of Care Form    Patient Name: Remington Reddy   :  3/5/1929  MRN:  [id number]    Admit date:  2021  Discharge date:  21    Code Status Order: Full Code   Advance Directives:   [facility] Documentation     Date/Time Healthcare Directive Type of Healthcare Directive Copy in [address] Agent's Name Healthcare Agent's Phone Number    [phone]  No, patient does not have an advance directive for healthcare treatment -- -- -- -- --          Admitting Physician:  Willi Simms DO  PCP: Mike Urena MD, MD    Discharging Nurse: [facility] Unit/Room#: M772/N757-81  Discharging Unit Phone Number: [phone]    Emergency Contact:   Extended Emergency Contact Information  Primary Emergency Contact: Mihai Jones [address] Phone: [phone]  Work Phone: [phone]  Mobile Phone: [phone]  [facility]  Secondary Emergency Contact:  [address] [place] [address] Phone: [phone]  Relation: Child   needed?  No    Past Surgical History:  Past Surgical History:   Procedure Laterality Date    CATARACT REMOVAL      MIDDLE EAR SURGERY Left     \"they had to reset the bones\" after an infection       Immunization History:   Immunization History   Administered Date(s) Administered    Influenza, High Dose (Fluzone 65 yrs and older) 2016, 2016, 10/12/2017    Influenza, Quadv, IM, PF (6 mo and older Fluzone, Flulaval, Fluarix, and 3 yrs and older Afluria) 2020    Pneumococcal Conjugate 13-valent (Ijvbovj63) 2016    Pneumococcal Polysaccharide (Jdkjwwmoh59) 2012    Tdap (Boostrix, Adacel) 2020    Zoster Live (Zostavax) 2013       Active Problems:  Patient Active Problem List   Diagnosis Code    HTN (hypertension) I10    Dyslipidemia E78.5    DM (diabetes mellitus) ([facility] [address] E11.9    Hypothyroidism E03.9    History of tobacco abuse Z87.891    Anemia Assisted  Med Delivery   whole    Wound Care Documentation and Therapy:        Elimination:  Continence:   · Bowel: Yes  · Bladder: Yes  Urinary Catheter: None   Colostomy/Ileostomy/Ileal Conduit: No       Date of Last BM: 6/26/21    Intake/Output Summary (Last 24 hours) at 6/26/2021 1557  Last data filed at 6/26/2021 1334  Gross per 24 hour   Intake 920 ml   Output 950 ml   Net -30 ml     I/O last 3 completed shifts: In: 36 [P.O.:920]  Out: 950 [Urine:950]    Safety Concerns:     None    Impairments/Disabilities:      Hearing    Nutrition Therapy:  Current Nutrition Therapy:   - Oral Diet:  Carb Control 4 carbs/meal (1800kcals/day)    Routes of Feeding: Oral  Liquids: Thin Liquids  Daily Fluid Restriction: no  Last Modified Barium Swallow with Video (Video Swallowing Test): not done    Treatments at the Time of Hospital Discharge:   Respiratory Treatments: ***  Oxygen Therapy:  is not on home oxygen therapy.   Ventilator:    - No ventilator support    Rehab Therapies: Physical Therapy and Occupational Therapy  Weight Bearing Status/Restrictions: No weight bearing restirctions  Other Medical Equipment (for information only, NOT a DME order):  {EQUIPMENT:369466380}  Other Treatments: ***    Patient's personal belongings (please select all that are sent with patient):  Hearing Aides bilateral    RN SIGNATURE:  Electronically signed by Shira Oro RN on 6/26/21 at 4:01 PM EDT    CASE MANAGEMENT/SOCIAL WORK SECTION    Inpatient Status Date: ***    Readmission Risk Assessment Score:  Readmission Risk              Risk of Unplanned Readmission:  25           Discharging to Facility/ Agency   · Name: [address] [place] [address]  · Address:  · Phone:[phone]  · Fax:    Dialysis Facility (if applicable)   · Name:  · Address:  · Dialysis Schedule:  · Phone:  · Fax:    / signature: Electronically signed by Zoran Justice RN on 6/26/21 at 3:58 PM EDT    PHYSICIAN SECTION    Prognosis: {Prognosis:9570122205}    Condition at Discharge: Suma Moran Patient Condition:810067582}    Rehab Potential (if transferring to Rehab): {Prognosis:3625768549}    Recommended Labs or Other Treatments After Discharge: ***    Physician Certification: I certify the above information and transfer of Ace Rodrigo  is necessary for the continuing treatment of the diagnosis listed and that he requires {Admit to Appropriate Level of Care:57779} for {GREATER/LESS:746185476} 30 days.      Update Admission H&P: {CHP DME Changes in TYIJL:510716946}    PHYSICIAN SIGNATURE:  {Esignature:041979482}

## 2021-06-26 NOTE — DISCHARGE SUMMARY
sonography, June 22, 2021. Findings: Lungs: Lung bases are clear. Pacemaker wires right atrium and right ventricle. Liver:  Normal in size, shape, and attenuation. Bile Ducts:  Normal in caliber. Gallbladder:  No stones or wall thickening. Pancreas:  Normal without masses, cysts, ductal dilatation or calcification. Spleen:  Normal in size without masses or calcifications. No splenules. Kidneys:  Normal in size and enhancement. No hydronephrosis, masses, or stones. Adrenals:  Normal. Small bowel:  Normal in caliber. Appendix:  Normal. Colon:  Normal in caliber. Diverticular change, sigmoid colon. Peritoneum:  No ascites, free air, or fluid collections. Lymph nodes:  Retroperitoneal:  No enlarged retroperitoneal lymph nodes. Mesenteric:  No enlarged mesenteric lymph nodes. Pelvic: No enlarged pelvic lymph nodes. Ureters: Normal in course and caliber. No calcifications. Bladder: No wall thickening. Reproductive organs: No pelvic masses. Abdominal Wall: Fat identified, bilateral inguinal canals. Bones:  No bone lesions. Prominent anterior osteophytes T12-L4. Calcification anterior longitudinal ligament T7-T12. No post operative changes. Vessels: Aorta normal in course and caliber. [place] of the superior and inferior mesenteric arteries, celiac artery, and bilateral main renal arteries. Accessory artery, lower pole right kidney identified. Interval placement of vascular stent, right, and, and right iliac vein. Portal vein, splenic vein, superior mesenteric vein are patent. On the right side, the right common, internal, and external iliac arteries are patent. The right common femoral and profunda femoral arteries are patent. Diffuse atherosclerotic change, right superficial femoral artery with occlusion identified at level of mid right femur the remainder of the superficial femoral artery and right popliteal artery are occluded.  Reconstitution occurs at the level of proximal right anterior tibial artery which courses to the level the right ankle. Intermittent flow, obstruction, and reconstitution is found within the right posterior tibial and peroneal arteries. On the left side, left common, internal, and external iliac arteries patent. Left common femoral and profunda femoral arteries patent. Diffuse atherosclerotic change, patent left superficial femoral artery. Proximal left popliteal artery patent, with diffuse atherosclerotic change. Anterior tibial artery patent at origin but occludes shortly thereafter. No identified within proximal posterior tibial/peroneal trunk, which includes distally. Occlusion right mid and distal superficial femoral artery, and right popliteal artery, with reconstitution within the proximal right anterior tibial artery. Origin right anterior tibial artery not opacified. Proximal right anterior tibial artery opacified and runs in continuity to right ankle. Intermittent obstruction right posterior tibial and right peroneal arteries. Occlusion proximal left anterior tibial artery, and origin of left peroneal and posterior tibial arteries as described. Vascular stent, right common and right external iliac vein. Sigmoid diverticulosis. All CT scans at this facility use dose modulation, iterative reconstruction, and/or weight based dosing when appropriate to reduce radiation dose to as low as reasonably achievable. XR CHEST PORTABLE    Result Date: 6/23/2021  EXAMINATION: XR CHEST PORTABLE CLINICAL HISTORY: RIGHT LEG DISCOLORED, PAINFUL, SWOLLEN COMPARISONS: CHEST RADIOGRAPH, FEBRUARY 2000 2021 FINDINGS: Median sternotomy. Pacemaker generator and wires unchanged. Cardiopericardial silhouette normal. Aorta calcified. Lungs clear. NO ACUTE CARDIOPULMONARY DISEASE.    US DUP LOWER ART/BYPASS GRAFTS BILATERAL COMPLETE    Result Date: 6/23/2021  ULTRASOUND LOWER EXTREMITY ARTERIAL DUPLEX: REASON FOR EXAMINATION: Right lower extremity discoloration today. Leg pain. History of CAD.  History of 0 Post Tib Dst                  7 Peroneal Prx                 0 Peroneal Mid                0 Peroneal Dist                0     OCCLUSION OF THE RIGHT FEMORAL ARTERY DISTALLY AND OCCLUSION OF THE RIGHT POPLITEAL ARTERY. THERE ARE COLLATERAL VESSELS IN THE RIGHT LEG WITH SOME FLOW WITHIN THE ANTERIOR TIBIAL AND POSTERIOR TIBIAL AND PERONEAL ARTERY DISTALLY. SUSPECTED ARTERIAL RUNOFF DISEASE. IN THE LEFT LEG THE COMMON FEMORAL, FEMORAL AND POPLITEAL ARTERIES ARE PATENT. HOWEVER FLOW TO THE RUNOFF VESSELS IS MINIMAL WITH FLOW VISUALIZED WITHIN THE ANTERIOR TIBIAL ARTERY. SEE ABOVE.     US DUP LOWER EXTREMITIES BILATERAL VENOUS    Result Date: 6/23/2021  EXAMINATION: US DUP LOWER EXTREMITIES BILATERAL VENOUS CLINICAL HISTORY: LOWER EXTREMITY PAIN. FINDINGS: Right leg shows no compression, augmentation, and no color flow within the right common femoral vein, proximal, mid, and distal superficial femoral vein, and popliteal vein. Compression and color flow is visualized within the right lower extremity infrapopliteal venous vasculature. Left lung shows compression, augmentation, and color flow within left common femoral vein, proximal, mid, and distal left superficial femoral vein, and left popliteal vein. Compression, flow is visualized within left lower extremity infrapopliteal venous  vasculature. ACUTE DEEP VEIN THROMBOSIS, RIGHT LOWER EXTREMITY, EXTENDING FROM RIGHT COMMON FEMORAL VEIN THROUGH RIGHT POPLITEAL VEIN.       Discharge Medications:       Oralee Eveline   Home Medication Instructions DKQ:[id number]    Printed on:06/26/21 1401   Medication Information                      albuterol sulfate HFA (PROVENTIL HFA) 108 (90 Base) MCG/ACT inhaler  Inhale 2 puffs into the lungs every 6 hours as needed for Wheezing             apixaban (ELIQUIS) 5 MG TABS tablet  Take 1 tablet by mouth 2 times daily             atorvastatin (LIPITOR) 40 MG tablet  Take 40 mg by mouth daily budesonide-formoterol (SYMBICORT) 80-4.5 MCG/ACT AERO  Inhale 2 puffs into the lungs 2 times daily. carvedilol (COREG) 12.5 MG tablet  Take 0.5 tablets by mouth 2 times daily (with meals)             esomeprazole Magnesium (NEXIUM) 40 MG PACK  Take 40 mg by mouth daily             ferrous sulfate (IRON 325) 325 (65 Fe) MG tablet  Take 1 tablet by mouth 2 times daily (with meals)             folic acid (FOLVITE) 1 MG tablet  Take 1 tablet by mouth daily             ipratropium-albuterol (DUONEB) 0.5-2.5 (3) MG/3ML SOLN nebulizer solution  Inhale 3 mLs into the lungs three times daily             nitroGLYCERIN (NITROSTAT) 0.4 MG SL tablet  Place 1 tablet under the tongue every 5 minutes as needed for Chest pain             ramipril (ALTACE) 5 MG capsule  Take 5 mg by mouth daily             ursodiol (ACTIGALL) 300 MG capsule  Take 300 mg by mouth 2 times daily             Vitamin D (CHOLECALCIFEROL) 50 MCG (2000 UT) TABS tablet  Take 1 tablet by mouth Daily with supper                 Disposition:   Discharged to Home. Any [facility] needs that were indicated and/or required as been addressed and set up by Social Work. Condition at discharge: Pt was medically stable at the time of discharge. Activity: activity as tolerated, fall precautions. Total time taken for discharging this patient: 40 minutes. Greater than 70% of time was spent focused exclusively on this patient. Time was taken to review chart, discuss plans with consultants, reconciling medications, discussing plan answering questions with patient. Signed:  William Wall MD  6/26/2021, 2:01 PM  ----------------------------------------------------------------------------------------------------------------------    Ace Rodrigo,     Please return to ER or call 911 if you develop any significant signs or symptoms.      I may not have addressed all of your medical illnesses or the abnormal blood work or imaging therefore please ask your PCP Jenny Dickson MD, MD and other out patient specialists and providers  to obtain [facility] record entirely to follow up on all of the abnormal labs, imaging and findings that I have and have not addressed during your hospitalization. Discharging you from the hospital does not mean that your medical care ends here and now. You may still need additional work up, investigation, monitoring, and treatment to be handled from this point on by outside providers including your PCP, Jenny Dickson MD, MD , Specialists and other healthcare providers. Please review your list of discharge medications prior to resuming medications you might still have at home, as the medications you need to be taking, dosages or how often you must take them may have changed. For medication questions, contact your retail pharmacy and your PCP, Jenny Dickson MD, MD .     ** I STRONGLY RECOMMEND that you follow up with Jenny Dickson MD, MD within 3 to 5 days for a post hospitalization evaluation. This specific office visit is covered by your insurance, and is not the same as your annual doctor visit/ check up. This office visit is important, as it may prevent need for repeat and/or future hospitalizations. **    Your medical team at [facility] ([facility]) appreciates the opportunity to work with you to get well!     Sincerely,  Cheryl Felix MD

## 2021-06-26 NOTE — DISCHARGE SUMMARY
Patient will be discharge today, so discharge instructions given to granddaughter Shira with understanding. SL removed from left arm. Patient packed. Nephew willpick him up. Tele. Atrial paced, taken off and sent to [facility]WT.

## 2021-06-26 NOTE — PROGRESS NOTES
Hospitalist Progress Note      PCP: Saida Ontiveros MD, MD    Date of Admission: 6/22/2021    Chief Complaint:  No acute events, afebrile, stable HD    Medications:  Reviewed    Infusion Medications    dextrose       Scheduled Medications    apixaban  5 mg Oral BID    carvedilol  6.25 mg Oral BID     budesonide-formoterol  2 puff Inhalation BID    atorvastatin  40 mg Oral Daily    Vitamin D  2,000 Units Oral Dinner    ursodiol  300 mg Oral BID    insulin lispro  0-6 Units Subcutaneous TID     insulin lispro  0-3 Units Subcutaneous Nightly    pantoprazole  40 mg Oral QAM AC     PRN Meds: hydrALAZINE, albuterol sulfate HFA, glucose, dextrose, glucagon (rDNA), dextrose, ipratropium-albuterol, ondansetron **OR** ondansetron, polyethylene glycol, acetaminophen **OR** acetaminophen      Intake/Output Summary (Last 24 hours) at 6/26/2021 1355  Last data filed at 6/26/2021 1334  Gross per 24 hour   Intake 920 ml   Output 950 ml   Net -30 ml       Exam:    BP (!) 149/76   Pulse 81   Temp 98.1 °F (36.7 °C) (Oral)   Resp 16   Ht 5' 6.5\" (1.689 m)   Wt 148 lb 1.6 oz (67.2 kg)   SpO2 96%   BMI 23.55 kg/m²     General appearance: appears stated age and cooperative. Respiratory:  clear to auscultation, bilaterally. Cardiovascular: Regular rate and rhythm, S1S2. Abdomen: Soft, active bowel sounds. Musculoskeletal: mild edema/erythema of the RLE,  peripheral pulses are dopplerable. Labs:   Recent Labs     06/24/21  0819 06/25/21  1229 06/26/21  0712   WBC 6.6 6.7 6.3   HGB 14.3 12.5* 12.1*   HCT 42.3 37.1* 35.7*    188 171     Recent Labs     06/24/21  0819 06/25/21  1229 06/26/21  0712    142 141   K 4.5 4.1 4.7    108* 107   CO2 19* 24 24   BUN 27* 22 22   CREATININE 1.11 1.27* 1.21*   CALCIUM 9.1 8.8 8.9   PHOS 3.2 2.5 2.6     No results for input(s): AST, ALT, BILIDIR, BILITOT, ALKPHOS in the last 72 hours.   Recent Labs     06/23/21  1447 06/24/21  0819 06/25/21  1229   INR 1.2 1. 2 1.1 1.2     No results for input(s): Khris Tolliver in the last 72 hours. Urinalysis:      Lab Results   Component Value Date    NITRU Negative 06/23/2021    WBCUA 3-5 12/23/2016    BACTERIA Few 12/23/2016    RBCUA 3-5 12/23/2016    BLOODU Negative 06/23/2021    SPECGRAV 1.046 06/23/2021    GLUCOSEU Negative 06/23/2021       Radiology:  CTA ABDOMINAL AORTA W BILAT RUNOFF W WO CONTRAST   Final Result      Occlusion right mid and distal superficial femoral artery, and right popliteal artery, with reconstitution within the proximal right anterior tibial artery. Origin right anterior tibial artery not opacified. Proximal right anterior tibial artery    opacified and runs in continuity to right ankle. Intermittent obstruction right posterior tibial and right peroneal arteries. Occlusion proximal left anterior tibial artery, and origin of left peroneal and posterior tibial arteries as described. Vascular stent, right common and right external iliac vein. Sigmoid diverticulosis. All CT scans at this facility use dose modulation, iterative reconstruction, and/or weight based dosing when appropriate to reduce radiation dose to as low as reasonably achievable. US DUP LOWER ART/BYPASS GRAFTS BILATERAL COMPLETE   Final Result      OCCLUSION OF THE RIGHT FEMORAL ARTERY DISTALLY AND OCCLUSION OF THE RIGHT POPLITEAL ARTERY. THERE ARE COLLATERAL VESSELS IN THE RIGHT LEG WITH SOME FLOW WITHIN THE ANTERIOR TIBIAL AND POSTERIOR TIBIAL AND PERONEAL ARTERY DISTALLY. SUSPECTED ARTERIAL    RUNOFF DISEASE. IN THE LEFT LEG THE COMMON FEMORAL, FEMORAL AND POPLITEAL ARTERIES ARE PATENT. HOWEVER FLOW TO THE RUNOFF VESSELS IS MINIMAL WITH FLOW VISUALIZED WITHIN THE ANTERIOR TIBIAL ARTERY. SEE ABOVE.         US DUP LOWER EXTREMITIES BILATERAL VENOUS   Final Result   ACUTE DEEP VEIN THROMBOSIS, RIGHT LOWER EXTREMITY, EXTENDING FROM RIGHT COMMON FEMORAL VEIN THROUGH RIGHT POPLITEAL VEIN.       XR CHEST PORTABLE   Final Result   NO ACUTE CARDIOPULMONARY DISEASE.               Assessment/Plan:    79 y/o M with history of CAD, PAD, RLE DVT s/p right iliac vein stent in 1/2021on warfarin, DM2, HTN, hearing loss, who presented with:     Right LE ischemia  - CTA of the LE showed occlusion right SFA, right popliteal artery and occlusion proximal left anterior tibial artery per report  - doppler u/s showed acute proximal DVT of RLE  - treated with Heparin infusion  - discussed with vascular surgery and his granddaughter,  - switched from warfarin to apixaban given difficulty achieving therapeutic levels with Warfarin,     Elevated troponins  - in the setting of AFTEMEH  - trending down    FATEMEH  - improved with IVFs  - monitor renal function    HTN  - continue Coreg  - held ACEI due to FATEMEH, will resume on d/c    DM2  - ISS       Disposition - home with [facility] today        Electronically signed by Ludmila Felix MD on 6/26/2021 at 1:55 PM

## 2021-06-27 LAB
BLOOD CULTURE, ROUTINE: NORMAL
CULTURE, BLOOD 2: NORMAL

## 2021-09-14 ENCOUNTER — APPOINTMENT (OUTPATIENT)
Dept: CT IMAGING | Age: 86
DRG: 641 | End: 2021-09-14
Payer: MEDICARE

## 2021-09-14 ENCOUNTER — HOSPITAL ENCOUNTER (INPATIENT)
Age: 86
LOS: 1 days | Discharge: HOME | DRG: 641 | End: 2021-09-15
Attending: STUDENT IN AN ORGANIZED HEALTH CARE EDUCATION/TRAINING PROGRAM | Admitting: INTERNAL MEDICINE
Payer: MEDICARE

## 2021-09-14 ENCOUNTER — APPOINTMENT (OUTPATIENT)
Dept: GENERAL RADIOLOGY | Age: 86
DRG: 641 | End: 2021-09-14
Payer: MEDICARE

## 2021-09-14 DIAGNOSIS — N17.9 AKI (ACUTE KIDNEY INJURY) (HCC): ICD-10-CM

## 2021-09-14 DIAGNOSIS — R53.1 GENERALIZED WEAKNESS: Primary | ICD-10-CM

## 2021-09-14 DIAGNOSIS — R42 DIZZINESS: ICD-10-CM

## 2021-09-14 LAB
ALBUMIN SERPL-MCNC: 4.2 G/DL (ref 3.5–4.6)
ALP BLD-CCNC: 73 U/L (ref 35–104)
ALT SERPL-CCNC: 16 U/L (ref 0–41)
ANION GAP SERPL CALCULATED.3IONS-SCNC: 13 MEQ/L (ref 9–15)
APTT: 47.2 SEC (ref 24.4–36.8)
AST SERPL-CCNC: 17 U/L (ref 0–40)
BASOPHILS ABSOLUTE: 0.1 K/UL (ref 0–0.2)
BASOPHILS RELATIVE PERCENT: 0.8 %
BILIRUB SERPL-MCNC: 1 MG/DL (ref 0.2–0.7)
BUN BLDV-MCNC: 45 MG/DL (ref 8–23)
CALCIUM SERPL-MCNC: 9.1 MG/DL (ref 8.5–9.9)
CHLORIDE BLD-SCNC: 101 MEQ/L (ref 95–107)
CO2: 21 MEQ/L (ref 20–31)
CREAT SERPL-MCNC: 1.34 MG/DL (ref 0.7–1.2)
EOSINOPHILS ABSOLUTE: 0.2 K/UL (ref 0–0.7)
EOSINOPHILS RELATIVE PERCENT: 2.9 %
GFR AFRICAN AMERICAN: >60
GFR NON-AFRICAN AMERICAN: 49.8
GLOBULIN: 3 G/DL (ref 2.3–3.5)
GLUCOSE BLD-MCNC: 203 MG/DL (ref 70–99)
HCT VFR BLD CALC: 41.9 % (ref 42–52)
HEMOGLOBIN: 14 G/DL (ref 14–18)
INR BLD: 1.3
LACTIC ACID: 1.8 MMOL/L (ref 0.5–2.2)
LYMPHOCYTES ABSOLUTE: 1.8 K/UL (ref 1–4.8)
LYMPHOCYTES RELATIVE PERCENT: 27.8 %
MAGNESIUM: 1.8 MG/DL (ref 1.7–2.4)
MCH RBC QN AUTO: 31.7 PG (ref 27–31.3)
MCHC RBC AUTO-ENTMCNC: 33.5 % (ref 33–37)
MCV RBC AUTO: 94.7 FL (ref 80–100)
MONOCYTES ABSOLUTE: 0.6 K/UL (ref 0.2–0.8)
MONOCYTES RELATIVE PERCENT: 10.1 %
NEUTROPHILS ABSOLUTE: 3.7 K/UL (ref 1.4–6.5)
NEUTROPHILS RELATIVE PERCENT: 58.4 %
PDW BLD-RTO: 14.2 % (ref 11.5–14.5)
PLATELET # BLD: 240 K/UL (ref 130–400)
POTASSIUM SERPL-SCNC: 5.5 MEQ/L (ref 3.4–4.9)
PRO-BNP: 157 PG/ML
PROTHROMBIN TIME: 16.3 SEC (ref 12.3–14.9)
RBC # BLD: 4.42 M/UL (ref 4.7–6.1)
SARS-COV-2, NAAT: NOT DETECTED
SODIUM BLD-SCNC: 135 MEQ/L (ref 135–144)
TOTAL PROTEIN: 7.2 G/DL (ref 6.3–8)
TROPONIN: 0.05 NG/ML (ref 0–0.01)
TSH REFLEX: 3.24 UIU/ML (ref 0.44–3.86)
WBC # BLD: 6.3 K/UL (ref 4.8–10.8)

## 2021-09-14 PROCEDURE — 99284 EMERGENCY DEPT VISIT MOD MDM: CPT

## 2021-09-14 PROCEDURE — 85610 PROTHROMBIN TIME: CPT

## 2021-09-14 PROCEDURE — 80053 COMPREHEN METABOLIC PANEL: CPT

## 2021-09-14 PROCEDURE — 87635 SARS-COV-2 COVID-19 AMP PRB: CPT

## 2021-09-14 PROCEDURE — 2580000003 HC RX 258: Performed by: STUDENT IN AN ORGANIZED HEALTH CARE EDUCATION/TRAINING PROGRAM

## 2021-09-14 PROCEDURE — 71045 X-RAY EXAM CHEST 1 VIEW: CPT

## 2021-09-14 PROCEDURE — 85730 THROMBOPLASTIN TIME PARTIAL: CPT

## 2021-09-14 PROCEDURE — 96360 HYDRATION IV INFUSION INIT: CPT

## 2021-09-14 PROCEDURE — 70450 CT HEAD/BRAIN W/O DYE: CPT

## 2021-09-14 PROCEDURE — 93005 ELECTROCARDIOGRAM TRACING: CPT | Performed by: STUDENT IN AN ORGANIZED HEALTH CARE EDUCATION/TRAINING PROGRAM

## 2021-09-14 PROCEDURE — 96361 HYDRATE IV INFUSION ADD-ON: CPT

## 2021-09-14 PROCEDURE — 85025 COMPLETE CBC W/AUTO DIFF WBC: CPT

## 2021-09-14 PROCEDURE — 81003 URINALYSIS AUTO W/O SCOPE: CPT

## 2021-09-14 PROCEDURE — 84443 ASSAY THYROID STIM HORMONE: CPT

## 2021-09-14 PROCEDURE — 83605 ASSAY OF LACTIC ACID: CPT

## 2021-09-14 PROCEDURE — 83036 HEMOGLOBIN GLYCOSYLATED A1C: CPT

## 2021-09-14 PROCEDURE — 36415 COLL VENOUS BLD VENIPUNCTURE: CPT

## 2021-09-14 PROCEDURE — 83880 ASSAY OF NATRIURETIC PEPTIDE: CPT

## 2021-09-14 PROCEDURE — 83735 ASSAY OF MAGNESIUM: CPT

## 2021-09-14 PROCEDURE — 84484 ASSAY OF TROPONIN QUANT: CPT

## 2021-09-14 RX ORDER — 0.9 % SODIUM CHLORIDE 0.9 %
1000 INTRAVENOUS SOLUTION INTRAVENOUS ONCE
Status: COMPLETED | OUTPATIENT
Start: 2021-09-14 | End: 2021-09-15

## 2021-09-14 RX ADMIN — SODIUM CHLORIDE 1000 ML: 9 INJECTION, SOLUTION INTRAVENOUS at 21:39

## 2021-09-14 ASSESSMENT — ENCOUNTER SYMPTOMS
NAUSEA: 0
COUGH: 0
ABDOMINAL PAIN: 0
BACK PAIN: 0
DIARRHEA: 0
EYE PAIN: 0
RHINORRHEA: 0
SHORTNESS OF BREATH: 0
SORE THROAT: 0
VOMITING: 0

## 2021-09-15 VITALS
RESPIRATION RATE: 16 BRPM | TEMPERATURE: 98.1 F | OXYGEN SATURATION: 98 % | SYSTOLIC BLOOD PRESSURE: 125 MMHG | DIASTOLIC BLOOD PRESSURE: 50 MMHG | BODY MASS INDEX: 24.11 KG/M2 | WEIGHT: 150 LBS | HEART RATE: 67 BPM | HEIGHT: 66 IN

## 2021-09-15 PROBLEM — R79.89 ELEVATED TROPONIN: Status: ACTIVE | Noted: 2021-09-15

## 2021-09-15 PROBLEM — Z79.01 ANTICOAGULATED: Status: ACTIVE | Noted: 2021-09-15

## 2021-09-15 PROBLEM — E87.5 HYPERKALEMIA: Status: ACTIVE | Noted: 2021-09-15

## 2021-09-15 PROBLEM — R77.8 ELEVATED TROPONIN: Status: ACTIVE | Noted: 2021-09-15

## 2021-09-15 PROBLEM — R53.1 GENERAL WEAKNESS: Status: ACTIVE | Noted: 2021-09-15

## 2021-09-15 PROBLEM — N18.9 CKD (CHRONIC KIDNEY DISEASE): Status: ACTIVE | Noted: 2021-02-08

## 2021-09-15 LAB
BILIRUBIN URINE: NEGATIVE
BLOOD, URINE: NEGATIVE
CLARITY: CLEAR
COLOR: YELLOW
EKG ATRIAL RATE: 70 BPM
EKG P-R INTERVAL: 224 MS
EKG Q-T INTERVAL: 402 MS
EKG QRS DURATION: 100 MS
EKG QTC CALCULATION (BAZETT): 434 MS
EKG R AXIS: -56 DEGREES
EKG T AXIS: -26 DEGREES
EKG VENTRICULAR RATE: 70 BPM
FOLATE: >20 NG/ML (ref 7.3–26.1)
GLUCOSE URINE: NEGATIVE MG/DL
HBA1C MFR BLD: 7.4 % (ref 4.8–5.9)
KETONES, URINE: NEGATIVE MG/DL
LEUKOCYTE ESTERASE, URINE: NEGATIVE
NITRITE, URINE: NEGATIVE
PH UA: 5 (ref 5–9)
PROTEIN UA: NEGATIVE MG/DL
SPECIFIC GRAVITY UA: 1.01 (ref 1–1.03)
TROPONIN: 0.03 NG/ML (ref 0–0.01)
TROPONIN: 0.04 NG/ML (ref 0–0.01)
URINE REFLEX TO CULTURE: NORMAL
UROBILINOGEN, URINE: 0.2 E.U./DL
VITAMIN B-12: 520 PG/ML (ref 232–1245)

## 2021-09-15 PROCEDURE — 97166 OT EVAL MOD COMPLEX 45 MIN: CPT

## 2021-09-15 PROCEDURE — G0378 HOSPITAL OBSERVATION PER HR: HCPCS

## 2021-09-15 PROCEDURE — 94761 N-INVAS EAR/PLS OXIMETRY MLT: CPT

## 2021-09-15 PROCEDURE — 1210000000 HC MED SURG R&B

## 2021-09-15 PROCEDURE — 6370000000 HC RX 637 (ALT 250 FOR IP): Performed by: NURSE PRACTITIONER

## 2021-09-15 PROCEDURE — 84484 ASSAY OF TROPONIN QUANT: CPT

## 2021-09-15 PROCEDURE — 93010 ELECTROCARDIOGRAM REPORT: CPT | Performed by: INTERNAL MEDICINE

## 2021-09-15 PROCEDURE — 94664 DEMO&/EVAL PT USE INHALER: CPT

## 2021-09-15 PROCEDURE — 82607 VITAMIN B-12: CPT

## 2021-09-15 PROCEDURE — 36415 COLL VENOUS BLD VENIPUNCTURE: CPT

## 2021-09-15 PROCEDURE — 82746 ASSAY OF FOLIC ACID SERUM: CPT

## 2021-09-15 PROCEDURE — 2580000003 HC RX 258: Performed by: NURSE PRACTITIONER

## 2021-09-15 PROCEDURE — 94640 AIRWAY INHALATION TREATMENT: CPT

## 2021-09-15 PROCEDURE — 96361 HYDRATE IV INFUSION ADD-ON: CPT

## 2021-09-15 PROCEDURE — 2580000003 HC RX 258: Performed by: STUDENT IN AN ORGANIZED HEALTH CARE EDUCATION/TRAINING PROGRAM

## 2021-09-15 RX ORDER — ONDANSETRON 2 MG/ML
4 INJECTION INTRAMUSCULAR; INTRAVENOUS EVERY 6 HOURS PRN
Status: DISCONTINUED | OUTPATIENT
Start: 2021-09-15 | End: 2021-09-15 | Stop reason: HOSPADM

## 2021-09-15 RX ORDER — SODIUM CHLORIDE 0.9 % (FLUSH) 0.9 %
5-40 SYRINGE (ML) INJECTION PRN
Status: DISCONTINUED | OUTPATIENT
Start: 2021-09-15 | End: 2021-09-15 | Stop reason: HOSPADM

## 2021-09-15 RX ORDER — ACETAMINOPHEN 325 MG/1
650 TABLET ORAL EVERY 6 HOURS PRN
Status: DISCONTINUED | OUTPATIENT
Start: 2021-09-15 | End: 2021-09-15 | Stop reason: HOSPADM

## 2021-09-15 RX ORDER — POLYETHYLENE GLYCOL 3350 17 G/17G
17 POWDER, FOR SOLUTION ORAL DAILY PRN
Status: DISCONTINUED | OUTPATIENT
Start: 2021-09-15 | End: 2021-09-15 | Stop reason: HOSPADM

## 2021-09-15 RX ORDER — 0.9 % SODIUM CHLORIDE 0.9 %
1000 INTRAVENOUS SOLUTION INTRAVENOUS ONCE
Status: COMPLETED | OUTPATIENT
Start: 2021-09-15 | End: 2021-09-15

## 2021-09-15 RX ORDER — SODIUM CHLORIDE 9 MG/ML
25 INJECTION, SOLUTION INTRAVENOUS PRN
Status: DISCONTINUED | OUTPATIENT
Start: 2021-09-15 | End: 2021-09-15 | Stop reason: HOSPADM

## 2021-09-15 RX ORDER — SODIUM CHLORIDE 9 MG/ML
INJECTION, SOLUTION INTRAVENOUS CONTINUOUS
Status: DISPENSED | OUTPATIENT
Start: 2021-09-15 | End: 2021-09-15

## 2021-09-15 RX ORDER — BUDESONIDE AND FORMOTEROL FUMARATE DIHYDRATE 80; 4.5 UG/1; UG/1
2 AEROSOL RESPIRATORY (INHALATION) 2 TIMES DAILY
Status: DISCONTINUED | OUTPATIENT
Start: 2021-09-15 | End: 2021-09-15 | Stop reason: HOSPADM

## 2021-09-15 RX ORDER — IPRATROPIUM BROMIDE AND ALBUTEROL SULFATE 2.5; .5 MG/3ML; MG/3ML
1 SOLUTION RESPIRATORY (INHALATION) EVERY 4 HOURS PRN
Status: DISCONTINUED | OUTPATIENT
Start: 2021-09-15 | End: 2021-09-15 | Stop reason: HOSPADM

## 2021-09-15 RX ORDER — SODIUM CHLORIDE 0.9 % (FLUSH) 0.9 %
5-40 SYRINGE (ML) INJECTION EVERY 12 HOURS SCHEDULED
Status: DISCONTINUED | OUTPATIENT
Start: 2021-09-15 | End: 2021-09-15 | Stop reason: HOSPADM

## 2021-09-15 RX ORDER — ACETAMINOPHEN 650 MG/1
650 SUPPOSITORY RECTAL EVERY 6 HOURS PRN
Status: DISCONTINUED | OUTPATIENT
Start: 2021-09-15 | End: 2021-09-15 | Stop reason: HOSPADM

## 2021-09-15 RX ORDER — ONDANSETRON 4 MG/1
4 TABLET, ORALLY DISINTEGRATING ORAL EVERY 8 HOURS PRN
Status: DISCONTINUED | OUTPATIENT
Start: 2021-09-15 | End: 2021-09-15 | Stop reason: HOSPADM

## 2021-09-15 RX ADMIN — BUDESONIDE AND FORMOTEROL FUMARATE DIHYDRATE 2 PUFF: 80; 4.5 AEROSOL RESPIRATORY (INHALATION) at 09:26

## 2021-09-15 RX ADMIN — SODIUM CHLORIDE: 9 INJECTION, SOLUTION INTRAVENOUS at 06:52

## 2021-09-15 RX ADMIN — SODIUM CHLORIDE, PRESERVATIVE FREE 10 ML: 5 INJECTION INTRAVENOUS at 10:51

## 2021-09-15 RX ADMIN — SODIUM CHLORIDE 1000 ML: 9 INJECTION, SOLUTION INTRAVENOUS at 02:17

## 2021-09-15 RX ADMIN — APIXABAN 5 MG: 5 TABLET, FILM COATED ORAL at 10:48

## 2021-09-15 ASSESSMENT — PAIN SCALES - GENERAL
PAINLEVEL_OUTOF10: 0
PAINLEVEL_OUTOF10: 0

## 2021-09-15 NOTE — PROGRESS NOTES
Meseret;Decreased step length  Distance: 60ft with turns in room and bathroom environment  Comments: limited in command follow d/t hard of hearing              Activity Tolerance  Activity Tolerance: Patient Tolerated treatment well          PT Education  PT Education: PT Role;Goals;Plan of Care;General Safety    ASSESSMENT:   Body structures, Functions, Activity limitations: Decreased functional mobility ; Decreased strength; Increased pain  Decision Making: Medium Complexity  History: med  Exam: med  Clinical Presentation: med    Prognosis: Good  Barriers to Learning: none    DISCHARGE RECOMMENDATIONS:  Discharge Recommendations: Continue to assess pending progress    Assessment: Pt demonstrates the above deficits and decline in functional mobility status placing them at increased risk for falls. Pt would benefit from physical therapy to address above deficits and allow for safe return home at highest level of function, decrease risk for falls, and improve QOL.     REQUIRES PT FOLLOW UP: Yes      PLAN OF CARE:  Plan  Times per week: 3-6  Current Treatment Recommendations: Strengthening, Functional Mobility Training, Manual Therapy - Joint Manipulation, Neuromuscular Re-education, Home Exercise Program, Equipment Evaluation, Education, & procurement, Modalities, Safety Education & Training, Gait Training, Transfer Training, Balance Training, Manual Lymphatic Drainage, Stair training, Patient/Caregiver Education & Training, Positioning  Safety Devices  Type of devices: Left in bed, Call light within reach    Goals:  Patient goals : \"go home\"  Long term goals  Long term goal 1: Bed mobility with indep  Long term goal 2: Functional transfers with indep  Long term goal 3: Amb 75ft with indep    [facility] (6 CLICK) [address] Mobility Raw Score : 18     Therapy Time:   Individual   Time In [address]   Time Out 1340   Minutes Simon Gary, [address] [facility], 09/15/21 at 1:53 PM         Definitions for assistance levels  Independent = pt does not require any physical supervision or assistance from another person for activity completion. Device may be needed.   Stand by assistance = pt requires verbal cues or instructions from another person, close to but not touching, to perform the activity  Minimal assistance= pt performs 75% or more of the activity; assistance is required to complete the activity  Moderate assistance= pt performs 50% of the activity; assistance is required to complete the activity  Maximal assistance = pt performs 25% of the activity; assistance is required to complete the activity  Dependent = pt requires total physical assistance to accomplish the task

## 2021-09-15 NOTE — PROGRESS NOTES
[facility] OCCUPATIONAL THERAPY EVALUATION - ACUTE     NAME: German Richard  : 3/5/1929 (80 y.o.)  MRN: [id number]  CODE STATUS: Full Code  Room: [place] [address]    Date of Service: 9/15/2021    Patient Diagnosis(es): Dizziness [R42]  General weakness [R53.1]  Generalized weakness [R53.1]  FATEMEH (acute kidney injury) [facility]) [N17.9]   Chief Complaint   Patient presents with    Fatigue     Patient Active Problem List    Diagnosis Date Noted    Anginal equivalent ([address]     General weakness 09/15/2021    Elevated troponin 09/15/2021    Hyperkalemia 09/15/2021    Anticoagulated 09/15/2021    Arterial occlusion 2021    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    CKD (chronic kidney disease) 2021    PVD (peripheral vascular disease) ([address] 2021    Venous thrombosis of leg 2021    Hyponatremia 2021    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    Syncope and collapse 2020    COPD exacerbation ([address] 2020    NSTEMI (non-ST elevated myocardial infarction) ([address] 2019    Chest pain 2019    Hypotension 2019    SOB (shortness of breath) 2018    Chronic right shoulder pain 2018    COPD with acute exacerbation ([address] 2018    Bronchitis 2016    CAD (coronary artery disease) 2015    HTN (hypertension)     Dyslipidemia     DM (diabetes mellitus) ([address]     Hypothyroidism     History of tobacco abuse     Anemia         Past Medical History:   Diagnosis Date    Anemia     CAD (coronary artery disease) 2015    DM (diabetes mellitus) ([address]     Dyslipidemia     History of tobacco abuse     HTN (hypertension)     Hypothyroidism     Non-ST elevation MI (NSTEMI) ([address]     Pacemaker 2015     Past Surgical History:   Procedure Laterality Date    CATARACT REMOVAL      MIDDLE EAR SURGERY Left     \"they had to reset the bones\" after an infection Exceptions: Bilateral hearing aid, Hard of hearing/hearing concerns     ROM:   LUE AROM (degrees)  LUE AROM : WFL  Left Hand AROM (degrees)  Left Hand AROM: WFL  RUE AROM (degrees)  RUE AROM : WFL  Right Hand AROM (degrees)  Right Hand AROM: WFL    Strength:  LUE Strength  Gross LUE Strength: WFL  L Hand General: 3+/5  LUE Strength Comment: gross asessment  RUE Strength  Gross RUE Strength: WFL  R Hand General: 3+/5  RUE Strength Comment: gross assessment    Coordination, Tone, Quality of Movement: Tone RUE  RUE Tone: Normotonic  Tone LUE  LUE Tone: Normotonic  Coordination  Movements Are Fluid And Coordinated: Yes    Hand Dominance:  Hand Dominance  Hand Dominance: Right    ADL Status:  ADL  Feeding: Setup, Supervision  Grooming: Supervision, Setup  UE Bathing: Setup, Supervision  LE Bathing: Contact guard assistance  UE Dressing: Stand by assistance  LE Dressing: Contact guard assistance  Toileting: Stand by assistance  Additional Comments: simulated ADL seated EOB.  levels as anticipated  Toilet Transfers  Toilet - Technique: Ambulating  Equipment Used: Grab bars  Toilet Transfer: Supervision  Tub Transfers  Tub Transfers: Not tested    Therapy key for assistance levels -   Independent = Pt. is able to perform task with no assistance but may require a device   Stand by assistance = Pt. does not perform task at an independent level but does not need physical assistance, requires verbal cues  Minimal, Moderate, Maximal Assistance = Pt. requires physical assistance (25%, 50%, 75% assist from helper) for task but is able to actively participate in task   Dependent = Pt. requires total assistance with task and is not able to actively participate with task completion     Functional Mobility:  Functional Mobility  Functional - Mobility Device: Rolling Walker  Activity: To/from bathroom  Assist Level: Stand by assistance  Transfers  Sit to stand: Supervision  Stand to sit: Supervision    Bed Mobility  Bed mobility  Supine to Sit: Modified independent  Sit to Supine: Modified independent    Seated and Standing Balance:  Balance  Sitting Balance: Supervision  Standing Balance: Contact guard assistance    Functional Endurance:  Activity Tolerance  Activity Tolerance: Patient Tolerated treatment well    D/C Recommendations:  OT D/C RECOMMENDATIONS  REQUIRES OT FOLLOW UP: Yes    Equipment Recommendations:  OT Equipment Recommendations  Other: continue to assess    OT Education:   OT Education  OT Education: OT Role, Plan of Care    OT Follow Up:  OT D/C RECOMMENDATIONS  REQUIRES OT FOLLOW UP: Yes       Assessment/Discharge Disposition:  Assessment: Pt is a 80 yr old male presenting to [facility] with the above functional deficits. Pt would benefit from occupational therapy services to maximize safety and independence with ADL tasks, improve overall strength/endurance and balance for functional tasks.   Performance deficits / Impairments: Decreased functional mobility , Decreased endurance, Decreased strength, Decreased ADL status, Decreased balance  Prognosis: Good  Discharge Recommendations: Continue to assess pending progress  Decision Making: Medium Complexity  History: multiple  Exam: 5 perf deficits  Assistance / Modification: CGA    Six Click Score   How much help for putting on and taking off regular lower body clothing?: A Little  How much help for Bathing?: A Little  How much help for Toileting?: A Little  How much help for putting on and taking off regular upper body clothing?: None  How much help for taking care of personal grooming?: None  How much help for eating meals?: None  AM-PAC Inpatient Daily Activity Raw Score: 21  AM-PAC Inpatient ADL T-Scale Score : 44.27  ADL Inpatient CMS 0-100% Score: 32.79    Plan:  Plan  Times per week: 1-3x/wk  Plan weeks: length of acute stay  Current Treatment Recommendations: Strengthening, Endurance Training, Patient/Caregiver Education & Training, Equipment Evaluation, Education, & procurement, Self-Care / ADL, Balance Training, Functional Mobility Training, Safety Education & Training    Goals:   Patient will:    - Improve functional endurance to tolerate/complete 30 mins of ADL's  - Be Fatmata in UB ADLs   - Be FATMATA in LB ADLs  - Be FATMATA in ADL transfers without LOB  - Be FATMATA in toileting tasks  - Improve B UE strength and endurance to 4/5 in order to participate in self-care activities as projected. - Access appropriate D/C site with as few architectural barriers as possible.     Patient Goal: Patient goals : to go home    Discussed and agreed upon: Yes Comments:     Therapy Time:   OT Individual Minutes  Time In: 1140  Time Out: 1200  Minutes: 20    Eval: 20 minutes     Electronically signed by:    Nick Hernández OT, OTR/L  9/15/2021, 2:15 PM

## 2021-09-15 NOTE — PLAN OF CARE
See OT evaluation for all goals and OT POC.  Electronically signed by Nick Hernández OT on 9/15/2021 at 2:16 PM

## 2021-09-15 NOTE — DISCHARGE SUMMARY
[facility] HOSPITAL Medicine Discharge Summary    Lequita Dubin  :  3/5/1929  MRN:  [id number]    Admit date:  2021  Discharge date:  9/15/2021    Admitting Physician: Tashi London MD  Primary Care Physician:  Devyn Pedersen MD, MD    Discharge Diagnoses:    Principal Problem:    General weakness  Active Problems:    CKD (chronic kidney disease)    Elevated troponin    Hyperkalemia    Anticoagulated  Resolved Problems:    * No resolved hospital problems. *    Chief Complaint   Patient presents with    Fatigue       Condition: improved  Activity: no strenuous activity   Diet: diabetic  Disposition: home  Functional Status: ambulatory    Significant Findings:     Labs Renal Latest Ref Rng & Units 2021   BUN 8 - 23 mg/dL 45(H) 22 22 27(H) 35(H)   Cr 0.70 - 1.20 mg/dL 1.34(H) 1.21(H) 1.27(H) 1.11 1.50(H)   K 3.4 - 4.9 mEq/L 5.5(H) 4.7 4.1 4.5 4.2   Na 135 - 144 mEq/L 135 141 142 138 134(L)         Hospital Course:   20-year-old male with history of CAD (history of CABG), hypertension, diabetes hypothyroidism, PAD, DVT on Eliquis, pacemaker presented from home after his granddaughter found him laying in bed and unable to ambulate. He was found to have dehydration and improved with IV fluid. No other reversible cause was found- no signs of infection, thyroid dysfunction, new cardiac abnormality, CVA. After IV hydration, he had returned to his baseline and was able to ambulate without complication. He will be discharged with close follow-up with his PCP. A1c was pending at the time of discharge. Exam on Discharge:   BP (!) 130/53   Pulse 61   Temp 97.7 °F (36.5 °C) (Oral)   Resp 16   Ht 5' 6\" (1.676 m)   Wt 150 lb (68 kg)   SpO2 98%   BMI 24.21 kg/m²   General appearance: Elderly. Very hard of hearing. Needs to read lips. Observed walking to the bathroom without a walker. Answers questions and follows commands.   Not capable of complex discussion due to hearing deficit  Lungs: clear to auscultation bilaterally, normal effort  Heart: regular rate and rhythm, no murmur  Abdomen: soft, nontender, nondistended, bowel sounds present, no masses  Extremities: no edema, redness, tenderness in the calves  Skin: no gross lesions, rashes  Neuro: 5 out of 5 strength in upper and lower extremities bilaterally. No facial asymmetry. Cranial nerves II through XII intact. No sensory deficits. Discharge Medication List:   Dorie Carey   Home Medication Instructions [facility]:[id number]    Printed on:09/15/21 9267   Medication Information                      albuterol sulfate HFA (PROVENTIL HFA) 108 (90 Base) MCG/ACT inhaler  Inhale 2 puffs into the lungs every 6 hours as needed for Wheezing             apixaban (ELIQUIS) 5 MG TABS tablet  Take 1 tablet by mouth 2 times daily             atorvastatin (LIPITOR) 40 MG tablet  Take 40 mg by mouth daily             budesonide-formoterol (SYMBICORT) 80-4.5 MCG/ACT AERO  Inhale 2 puffs into the lungs 2 times daily.              carvedilol (COREG) 12.5 MG tablet  Take 0.5 tablets by mouth 2 times daily (with meals)             esomeprazole Magnesium (NEXIUM) 40 MG PACK  Take 40 mg by mouth daily             ferrous sulfate (IRON 325) 325 (65 Fe) MG tablet  Take 1 tablet by mouth 2 times daily (with meals)             folic acid (FOLVITE) 1 MG tablet  Take 1 tablet by mouth daily             ipratropium-albuterol (DUONEB) 0.5-2.5 (3) MG/3ML SOLN nebulizer solution  Inhale 3 mLs into the lungs three times daily             nitroGLYCERIN (NITROSTAT) 0.4 MG SL tablet  Place 1 tablet under the tongue every 5 minutes as needed for Chest pain             ramipril (ALTACE) 5 MG capsule  Take 5 mg by mouth daily             ursodiol (ACTIGALL) 300 MG capsule  Take 300 mg by mouth 2 times daily             Vitamin D (CHOLECALCIFEROL) 50 MCG (2000 UT) TABS tablet  Take 1 tablet by mouth Daily with supper DC time 37 minutes    Signed:  Muna Robles DO  9/15/2021, 3:35 PM

## 2021-09-15 NOTE — CARE COORDINATION
[facility] Case Management Initial Discharge Assessment    Met with Patient and Family to discuss discharge plan. PCP: Avinash Goldman MD, MD                                Date of Last Visit:     If no PCP, list provided? N/A    Discharge Planning    Living Arrangements: at home dependent on family care    Who do you live with? GRANDDAUGHTER    Who helps you with your care:  family    If lives at home:     Do you have any barriers navigating in your home? no    Patient can perform ADL? Yes    Current Services (outpatient and in home) :  None    Dialysis: No    Is transportation available to get to your appointments? Yes, GRANDDAUGHTER DRIVES HIM TO APPTS. DME Equipment:  yes - HAS A WALKER AND A CANE BUT DOES NOT USE THEM AT HOME    Respiratory equipment: PRN/HS Oxygen 2 Liters    Respiratory provider:  yes -      Pharmacy:  yes - Via [facility] with Medication Assistance Program?  No      Patient agreeable to [facility]? No    Patient agreeable to SNF/Rehab? No    Other discharge needs identified? N/A    Does Patient Have a High-Risk for Readmission Diagnosis (CHF, PN, MI, COPD)? Yes, see care coordinator assessment    If Yes,    e Plan? (Note: please see concurrent daily documentation for any updates after initial note). FROM HOME WITH GRANDDAUGHTER. PLAN IS TO RETURN HOME WITH NO NEEDS AT [place].  SYMPTOMS HAVE RESOLVED AND PT SEEMS TO BE BACK TO BASELINE.       Readmission Risk              Risk of Unplanned Readmission:  24         Electronically signed by VINCE Lima on 9/15/2021 at 10:30 AM

## 2021-09-15 NOTE — ED TRIAGE NOTES
Granddaughter states that patient lives with her and she's noticed that today he has been laying around more than normal and was unable to walk short distances, like he normally can. Patient complained of a HA and was given tylenol PTA, no complaints at this time.

## 2021-09-15 NOTE — ED PROVIDER NOTES
[address] [facility]  eMERGENCY dEPARTMENT eNCOUnter      Pt Name: Chioma Ackerman  MRN: [id number]  [place] 3/5/1929  Date of evaluation: 9/14/2021  Provider: Rush Bernard MD        HISTORY OF PRESENT ILLNESS      The history is provided by the Patient and EMS. Chioma Ackerman is a 80 y.o. male with a PMH clinically significant for HTN, HLD, DMII, Hypothyroidism, CKD, CAD, Complete heart block s/p PPM, Anemia, COPD, DVT, PVD, PAD presenting to the ED via EMS c/o generalized weakness/fatigue, dizziness and difficulty ambulating first noticed by the family approximately 1hr ago after they found the pt lying in bed. Patient is usually very active and self-sufficient. Last seen acting at his baseline at 10 AM this morning. Complained of dizziness that was worse with standing. Had difficulty ambulating with walker which is unusual for the patient. Patient denies any dizziness currently, states only when he gets up and moves. Denies any recent falls. Unable to characterize the dizziness further. EMS reporting intermittently paced rhythms on EKG mixed with atrial fibrillation with normal ventricular response. Blood glucose of 210. Vital signs otherwise stable. [place] stroke scale negative. Characterizes generalized weakness/fatigue as diffuse, constant. Denies any associated headache, fevers, chills, diaphoresis, chest pain, S OB, cough, abdominal pain, N/V/D/C, urinary symptoms. States symptoms worse with nothing. Improved with nothing, nothing tried PTA. Taking all medications as indicated. Most recent hospitalization in 06/2021 appreciated. Admitted for RLE ischemia with occlusion of the right SFA, popliteal artery and proximal left anterior tibial artery. Initiated on Eliquis instead of warfarin due to difficulties achieving therapeutic levels. Is able to be discharged home. -Most recent echo in 03/2020 showing normal LV and RV. No significant valvular disease.   Abnormal diastolic filling pattern. PAST MEDICAL HISTORY     Past Medical History:   Diagnosis Date    Anemia     CAD (coronary artery disease) 4/16/2015    DM [facility]di[facility] mellitus[facility] [address]     Dyslipidemia     History of tobacco abuse     HTN (hypertension)     Hypothyroidism     Non-ST elevation MI (NSTEMI) ([facility] [address]     Pacemaker 4/16/2015       SURGICAL HISTORY       Past Surgical History:   Procedure Laterality Date    CATARACT REMOVAL      MIDDLE EAR SURGERY Left 1998    \"they had to reset the bones\" after an infection       FAMILY HISTORY     History reviewed. No pertinent family history. SOCIAL HISTORY       Social History     Socioeconomic History    Marital status:      Spouse name: None    Number of children: None    Years of education: None    Highest education level: None   Occupational History    None   Tobacco Use    Smoking status: Former Smoker    Smokeless tobacco: Never Used   Vaping Use    Vaping Use: Never used   Substance and Sexual Activity    Alcohol use: No    Drug use: No    Sexual activity: None   Other Topics Concern    None   Social History Narrative    Lives With: granddaughter  [facility]  and family    Type of Home: House  Two level, Bed/Bath upstairs-[address] Access: Stairs to enter with rails    Entrance Stairs - Number of Steps: 14    Bathroom Shower/Tub: Tub/Shower unit    Home Equipment: Rolling walker, Cane    ADL Assistance: Independent    Homemaking Assistance: Independent(granddaughter completes)    Ambulation Assistance: Independent    Transfer Assistance: Independent    Active : Yes    Additional Comments: pt is significantly [place]; information gathered via chart review and via writing.          Social Determinants of Health     Financial Resource Strain:     Difficulty of Paying Living Expenses:    Food Insecurity:     Worried About Running Out of Food in the Last Year:     [address] in the Last Year:    Transportation Needs:     Lack of Transportation (Medical):  Lack of Transportation (Non-Medical):    Physical Activity:     Days of Exercise per Week:     Minutes of Exercise per Session:    Stress:     Feeling of Stress :    Social Connections:     Frequency of Communication with Friends and Family:     Frequency of Social Gatherings with Friends and Family:     Attends Anabaptism Services:     Active Member of Clubs or Organizations:     Attends Club or Organization Meetings:     Marital Status:    Intimate Partner Violence:     Fear of Current or Ex-Partner:     Emotionally Abused:     Physically Abused:     Sexually Abused:        CURRENT MEDICATIONS       Discharge Medication List as of 9/15/2021  4:08 PM      CONTINUE these medications which have NOT CHANGED    Details   apixaban (ELIQUIS) 5 MG TABS tablet Take 1 tablet by mouth 2 times daily, Disp-60 tablet, R-5Normal      ramipril (ALTACE) 5 MG capsule Take 5 mg by mouth dailyHistorical Med      Vitamin D (CHOLECALCIFEROL) 50 MCG (2000 UT) TABS tablet Take 1 tablet by mouth Daily with supper, Disp-60 tablet, R-3Labeling may look different. 25 mcg=1000 Units. Please double check dosages. Normal      ferrous sulfate (IRON 325) 325 (65 Fe) MG tablet Take 1 tablet by mouth 2 times daily (with meals), Disp-30 tablet, [id number]      folic acid (FOLVITE) 1 MG tablet Take 1 tablet by mouth daily, Disp-30 tablet, R-3Normal      carvedilol (COREG) 12.5 MG tablet Take 0.5 tablets by mouth 2 times daily (with meals), Disp-180 tablet, R-0**Patient requests 90 days supply**Adjust Sig      albuterol sulfate HFA (PROVENTIL HFA) 108 (90 Base) MCG/ACT inhaler Inhale 2 puffs into the lungs every 6 hours as needed for Wheezing, Disp-1 Inhaler, R-3Print      ipratropium-albuterol (DUONEB) 0.5-2.5 (3) MG/3ML SOLN nebulizer solution Inhale 3 mLs into the lungs three times daily, Disp-100 mL, R-0Print      atorvastatin (LIPITOR) 40 MG tablet Take 40 mg by mouth daily      ursodiol (ACTIGALL) 300 MG capsule Take 300 mg by mouth 2 times daily      esomeprazole Magnesium (NEXIUM) 40 MG PACK Take 40 mg by mouth daily      budesonide-formoterol (SYMBICORT) 80-4.5 MCG/ACT AERO Inhale 2 puffs into the lungs 2 times daily. nitroGLYCERIN (NITROSTAT) 0.4 MG SL tablet Place 1 tablet under the tongue every 5 minutes as needed for Chest pain, Disp-25 tablet, R-0Normal             ALLERGIES     Patient has no known allergies. REVIEW OF SYSTEMS       Review of Systems   Constitutional: Positive for fatigue. Negative for chills and fever. HENT: Negative for rhinorrhea and sore throat. Eyes: Negative for pain and visual disturbance. Respiratory: Negative for cough and shortness of breath. Cardiovascular: Negative for chest pain and palpitations. Gastrointestinal: Negative for abdominal pain, diarrhea, nausea and vomiting. Genitourinary: Negative for dysuria. Musculoskeletal: Negative for back pain and neck pain. Skin: Negative for rash. Neurological: Positive for dizziness and weakness (generalized). Negative for numbness and headaches. PHYSICAL EXAM       ED Triage Vitals   BP Temp Temp src Pulse Resp SpO2 Height Weight   -- -- -- -- -- -- -- --       Physical Exam  Vitals and nursing note reviewed. Constitutional:       General: He is not in acute distress. Appearance: Normal appearance. He is ill-appearing (Fatigued appearing). He is not toxic-appearing or diaphoretic. HENT:      Head: Normocephalic and atraumatic. Mouth/Throat:      Mouth: Mucous membranes are dry. Pharynx: Oropharynx is clear. Eyes:      Extraocular Movements: Extraocular movements intact. Pupils: Pupils are equal, round, and reactive to light. Cardiovascular:      Rate and Rhythm: Normal rate. Rhythm irregularly irregular. Pulses:           Radial pulses are 1+ on the right side and 1+ on the left side. Popliteal pulses are 1+ on the right side and 1+ on the left side. following components:    RBC 4.42 (*)     Hematocrit 41.9 (*)     MCH 31.7 (*)     All other components within normal limits   TROPONIN - Abnormal; Notable for the following components:    Troponin 0.052 (*)     All other components within normal limits    Narrative:     Dianne Servin tel. [phone],  TROP results called to and read back by Steffi Delgado RN, 09/14/2021 22:01,  by Stevenson Matute - Abnormal; Notable for the following components:    Protime 16.3 (*)     All other components within normal limits   APTT - Abnormal; Notable for the following components:    aPTT 47.2 (*)     All other components within normal limits   TROPONIN - Abnormal; Notable for the following components:    Troponin 0.039 (*)     All other components within normal limits    Narrative:     CALL  Walker  LC2N tel. [phone],  TROP results called to and read back by [facility] LETY, 09/15/2021 08:09, by  Duran Byrd   TROPONIN - Abnormal; Notable for the following components:    Troponin 0.028 (*)     All other components within normal limits    Narrative:     Valerio Murphy tel. [phone],  TROP results called to and read back by Rodríguez Olson, 09/15/2021 12:53, by  Duran Byrd   COVID-19, [address]    Narrative:     Dianne Servin tel. [phone],  TROP results called to and read back by Steffi Delgado RN, 09/14/2021 22:01,  by Mendoza Wilkerson   TSH WITH REFLEX    Narrative:     Dianne Servin tel. [phone],  TROP results called to and read back by Steffi Delgado RN, 09/14/2021 22:01,  by Mendoza Wilkerson   URINE RT REFLEX TO CULTURE   LACTIC ACID, PLASMA   VITAMIN B12 & FOLATE   URINE RT REFLEX TO CULTURE   HEMOGLOBIN A1C   COMPREHENSIVE METABOLIC PANEL W/ REFLEX TO MG FOR LOW K   CBC WITH AUTO DIFFERENTIAL       ED Course as of Sep 15 1908   Tue Sep 14, 2021   2039 EKG showing atrial paced rhythm with prolonged AV conduction, rate of 70 bpm.  Otherwise normal intervals. Left axis deviation.   T wave inversions in aVF, otherwise no acute ST-T wave abnormalities. EKG 12 Lead [NA]   2146 FATEMEH on CKD. Elevated BUN to creatinine ratio likely prerenal contribution. Creatinine(!): 1.34 [NA]   2146 Elevated in the setting of Eliquis, nonspecific. aPTT(!): 47.2 [NA]   2146 WNL, lower suspicion for hypoperfusion. Lactic Acid: 1.8 [NA]   2146 WNL   Magnesium: 1.8 [NA]   2146 Mild hyperkalemia   Potassium(!): 5.5 [NA]   2146 Hyperglycemia in the setting of known diabetes mellitus type 2. Glucose(!): 203 [NA]   2147 CBC largely unremarkable. CBC Auto Differential(!):    WBC 6.3   RBC 4.42(!)   Hemoglobin Quant 14.0   Hematocrit 41.9(!)   MCV 94.7   MCH 31.7(!)   MCHC 33.5   RDW 14.2   Platelet Count 844   Neutrophils % 58.4   Lymphocyte % 27.8   Monocytes % 10.1   Eosinophils % 2.9   Basophils % 0.8   Neutrophils Absolute 3.7   Lymphocytes Absolute 1.8   Monocytes Absolute 0.6   Eosinophils Absolute 0.2   Basophils Absolute 0.1 [NA]   2147 IMPRESSION:  NO ACUTE ACTIVE CARDIOPULMONARY PROCESS   XR CHEST PORTABLE [NA]   2211 Chronic troponin anemia minimally changed from most recent baseline. Troponin(!!): 0.052 [NA]   2211 SARS-CoV-2, NAAT: Not Detected [NA]   4146 WNL, lower suspicion for thyroid abnormalities. TSH: 3.240 [NA]   2212 WNL, lower suspicion for acute decompensated heart failure. Pro-BNP: 157 [NA]   Wed Sep 15, 2021   0049 CT head preliminary read showing no acute intracranial hemorrhage or abnormal extra-axial fluid collection. No acute stroke. 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 Remainder of findings as noted in radiology read.    CT Head WO Contrast [NA]   0056 EKG 12 Lead [NA]      ED Course User Index  [NA] Armando Cancino MD       80 y.o. male with a PMH clinically significant for HTN, HLD, DMII, Hypothyroidism, CKD, CAD, Complete heart block s/p PPM, Anemia, COPD, DVT, PVD, PAD presenting to the ED via EMS c/o generalized weakness/fatigue, dizziness and difficulty ambulating first noticed by the family approximately 1hr ago after they found the pt lying in bed. Upon initial evaluation, Pt Afebrile, HDS and in NAD. PE as noted above. Labs, , EKG, and Imaging as noted above. Given findings, clinical presentation most likely consistent w/ generalized weakness, fatigue, dizziness and altered mental status possibly secondary to metabolic versus infectious etiology. Urine currently pending at time of admission, cannot rule out UTI causing the patient's symptoms. Dehydration also thought likely contributing given the patient's renal function studies and dizziness with movement and standing. Although considered, lower suspicion for acute intracranial abnormalities given the patient's negative CT head, no additional neuro deficits on exam.  Also with low suspicion that past cerebellar infarcts are contributing to the patient's new ataxia given that symptoms should have manifested earlier. Will be admitted to medicine for further evaluation and management.    Pt was administered   Medications   sodium chloride flush 0.9 % injection 5-40 mL (10 mLs IntraVENous Given 9/15/21 1051)   sodium chloride flush 0.9 % injection 5-40 mL (has no administration in time range)   0.9 % sodium chloride infusion (has no administration in time range)   ondansetron (ZOFRAN-ODT) disintegrating tablet 4 mg (has no administration in time range)     Or   ondansetron (ZOFRAN) injection 4 mg (has no administration in time range)   polyethylene glycol (GLYCOLAX) packet 17 g (has no administration in time range)   acetaminophen (TYLENOL) tablet 650 mg (has no administration in time range)     Or   acetaminophen (TYLENOL) suppository 650 mg (has no administration in time range)   0.9 % sodium chloride infusion ( IntraVENous New Bag 9/15/21 6719)   apixaban (ELIQUIS) tablet 5 mg (5 mg Oral Given 9/15/21 1048)   ipratropium-albuterol (DUONEB) nebulizer solution 1 ampule (has no administration in time range)   budesonide-formoterol (SYMBICORT) 80-4.5 MCG/ACT inhaler 2 puff (2 puffs Inhalation Given 9/15/21 0926)   0.9 % sodium chloride bolus (0 mLs IntraVENous Stopped 9/15/21 0219)   0.9 % sodium chloride bolus (0 mLs IntraVENous Stopped 9/15/21 0528)       Plan: Admit to IM for further evaluation and management. Report given to Dr. Caio Arango. and Patient understanding and amenable to the POC. CRITICAL CARE TIME   Total CriticalCare time was 0 minutes, excluding separately reportable procedures. There was a high probability of clinically significant/life threatening deterioration in the patient's condition which required my urgent intervention. FINAL IMPRESSION      1. Generalized weakness    2. Dizziness    3.  FATEMEH (acute kidney injury) [facility])          DISPOSITION/PLAN   DISPOSITION Admitted 09/15/2021 12:52:16 AM      Discharge Medication List as of 9/15/2021  4:08 PM           MD Elaine Cho MD  09/15/21 Gen Jesus

## 2021-09-15 NOTE — H&P
[place] [address] MEDICINE    HISTORY AND PHYSICAL EXAM    PATIENT NAME:  Hal Ramos    MRN:  [id number]  SERVICE DATE:  9/15/2021   SERVICE TIME:  4:55 AM    Primary Care Physician: Storm Schaumann, MD, MD     SUBJECTIVE  CHIEF COMPLAINT:  Generalized weakness/fatigue. HPI:  Hal Ramos is a 80 y.o., , male who  has a past medical history of Anemia, CAD (coronary artery disease), DM (diabetes mellitus) ([facility] [address] Dyslipidemia, History of tobacco abuse, HTN (hypertension), Hypothyroidism, Non-ST elevation MI (NSTEMI) ([facility] [address] and Pacemaker. that is hospitalized for generalized weakness. HPI    Presented after found lying in bed by his family w complaint of generalized weakness, fatigue and difficulty ambulating. Apparently he is very active and this was unusual to his family. He was last seen well 10A Wednesday morning. He can ROMERO x4,  Does not answer questions for me. VS are normal, O2 98% on 3L. He is admitted for further eval and treatmentl    PAST MEDICAL HISTORY:    Past Medical History:   Diagnosis Date    Anemia     CAD (coronary artery disease) 4/16/2015    DM (diabetes mellitus) ([facility] [address]     Dyslipidemia     History of tobacco abuse     HTN (hypertension)     Hypothyroidism     Non-ST elevation MI (NSTEMI) ([facility] [address]     Pacemaker 4/16/2015     PAST SURGICAL HISTORY:    Past Surgical History:   Procedure Laterality Date    CATARACT REMOVAL      MIDDLE EAR SURGERY Left 1998    \"they had to reset the bones\" after an infection     FAMILY HISTORY:  History reviewed. No pertinent family history. SOCIAL HISTORY:    Social History     Socioeconomic History    Marital status:       Spouse name: Not on file    Number of children: Not on file    Years of education: Not on file    Highest education level: Not on file   Occupational History    Not on file   Tobacco Use    Smoking status: Former Smoker    Smokeless tobacco: Never Used   Vaping Use    Vaping Use: Never used   Substance and Sexual Activity    Alcohol use: No    Drug use: No    Sexual activity: Not on file   Other Topics Concern    Not on file   Social History Narrative    Lives With: granddaughter Edwar Raw  and family    Type of Home: House  Two level, Bed/Bath upstairs-[address] Access: Stairs to enter with rails    Entrance Stairs - Number of Steps: 14    Bathroom Shower/Tub: Tub/Shower unit    Home Equipment: Rolling walker, Cane    ADL Assistance: Independent    Homemaking Assistance: Independent(granddaughter completes)    Ambulation Assistance: Independent    Transfer Assistance: Independent    Active : Yes    Additional Comments: pt is significantly [place]; information gathered via chart review and via writing. Social Determinants of Health     Financial Resource Strain:     Difficulty of Paying Living Expenses:    Food Insecurity:     Worried About Running Out of Food in the Last Year:     [address] in the Last Year:    Transportation Needs:     Lack of Transportation (Medical):  Lack of Transportation (Non-Medical):    Physical Activity:     Days of Exercise per Week:     Minutes of Exercise per Session:    Stress:     Feeling of Stress :    Social Connections:     Frequency of Communication with Friends and Family:     Frequency of Social Gatherings with Friends and Family:     Attends Zoroastrian Services:     Active Member of Clubs or Organizations:     Attends Club or Organization Meetings:     Marital Status:    Intimate Partner Violence:     Fear of Current or Ex-Partner:     Emotionally Abused:     Physically Abused:     Sexually Abused:      MEDICATIONS:   Prior to Admission medications    Medication Sig Start Date End Date Taking?  Authorizing Provider   apixaban (ELIQUIS) 5 MG TABS tablet Take 1 tablet by mouth 2 times daily 6/26/21   Abbi Plaza MD   ramipril (ALTACE) 5 MG capsule Take 5 mg by mouth daily    Historical Provider, MD Vitamin D (CHOLECALCIFEROL) 50 MCG (2000 UT) TABS tablet Take 1 tablet by mouth Daily with supper 2/13/21   Pat Mirza DO   ferrous sulfate (IRON 325) 325 (65 Fe) MG tablet Take 1 tablet by mouth 2 times daily (with meals) 2/13/21   CHRISTINE Coppola - CNP   folic acid (FOLVITE) 1 MG tablet Take 1 tablet by mouth daily 2/14/21 Autumn CHRISTINE Shea - CNP   nitroGLYCERIN (NITROSTAT) 0.4 MG SL tablet Place 1 tablet under the tongue every 5 minutes as needed for Chest pain 9/21/19 2/1/21  CHRISTINE Garnett - CNP   carvedilol (COREG) 12.5 MG tablet Take 0.5 tablets by mouth 2 times daily (with meals) 7/9/19   Garrett Daniel MD   albuterol sulfate HFA (PROVENTIL HFA) 108 (90 Base) MCG/ACT inhaler Inhale 2 puffs into the lungs every 6 hours as needed for Wheezing 12/29/18   Sanjeev Nichole MD   ipratropium-albuterol (DUONEB) 0.5-2.5 (3) MG/3ML SOLN nebulizer solution Inhale 3 mLs into the lungs three times daily 2/17/18   Frances Cano MD   atorvastatin (LIPITOR) 40 MG tablet Take 40 mg by mouth daily    Historical Provider, MD   ursodiol (ACTIGALL) 300 MG capsule Take 300 mg by mouth 2 times daily    Historical Provider, MD   esomeprazole Magnesium (NEXIUM) 40 MG PACK Take 40 mg by mouth daily    Historical Provider, MD   budesonide-formoterol (SYMBICORT) 80-4.5 MCG/ACT AERO Inhale 2 puffs into the lungs 2 times daily. Historical Provider, MD       ALLERGIES: Patient has no known allergies. REVIEW OF SYSTEM:   Review of Systems   Unable to perform ROS: Patient unresponsive    He is sleepy but arousable  Does not respond to my questions but does talk. OBJECTIVE  PHYSICAL EXAM:   Physical Exam  Vitals and nursing note reviewed. Constitutional:       General: He is not in acute distress. Appearance: Normal appearance. He is normal weight. Comments: Frail  elderly   HENT:      Head: Normocephalic and atraumatic.       Nose: Nose normal.      Mouth/Throat:      Mouth: Mucous membranes are moist.      Pharynx: Oropharynx is clear. Eyes:      Conjunctiva/sclera: Conjunctivae normal.   Cardiovascular:      Rate and Rhythm: Normal rate and regular rhythm. Pulses: Normal pulses. Heart sounds: Normal heart sounds. Pulmonary:      Effort: Pulmonary effort is normal.      Breath sounds: Normal breath sounds. Abdominal:      General: Bowel sounds are normal.      Palpations: Abdomen is soft. Musculoskeletal:         General: Normal range of motion. Cervical back: Normal range of motion and neck supple. Skin:     General: Skin is warm and dry. Capillary Refill: Capillary refill takes less than 2 seconds. Neurological:      Mental Status: He is alert and oriented to person, place, and time. Psychiatric:         Mood and Affect: Mood normal.         Behavior: Behavior normal.          /71   Pulse 65   Temp 98.2 °F (36.8 °C) (Oral)   Resp 18   Wt 150 lb (68 kg)   SpO2 98%   BMI 23.85 kg/m²     DATA:     Diagnostic tests reviewed for today's visit:    Most recent labs and imaging results reviewed.      LABS:    Recent Results (from the past 24 hour(s))   Magnesium    Collection Time: 09/14/21  8:45 PM   Result Value Ref Range    Magnesium 1.8 1.7 - 2.4 mg/dL   Comprehensive Metabolic Panel    Collection Time: 09/14/21  8:45 PM   Result Value Ref Range    Sodium 135 135 - 144 mEq/L    Potassium 5.5 (H) 3.4 - 4.9 mEq/L    Chloride 101 95 - 107 mEq/L    CO2 21 20 - 31 mEq/L    Anion Gap 13 9 - 15 mEq/L    Glucose 203 (H) 70 - 99 mg/dL    BUN 45 (H) 8 - 23 mg/dL    CREATININE 1.34 (H) 0.70 - 1.20 mg/dL    GFR Non-African American 49.8 (L) >60    GFR  >60.0 >60    Calcium 9.1 8.5 - 9.9 mg/dL    Total Protein 7.2 6.3 - 8.0 g/dL    Albumin 4.2 3.5 - 4.6 g/dL    Total Bilirubin 1.0 (H) 0.2 - 0.7 mg/dL    Alkaline Phosphatase 73 35 - 104 U/L    ALT 16 0 - 41 U/L    AST 17 0 - 40 U/L    Globulin 3.0 2.3 - 3.5 g/dL   CBC Auto Differential Collection Time: 09/14/21  8:45 PM   Result Value Ref Range    WBC 6.3 4.8 - 10.8 K/uL    RBC 4.42 (L) 4.70 - 6.10 M/uL    Hemoglobin 14.0 14.0 - 18.0 g/dL    Hematocrit 41.9 (L) 42.0 - 52.0 %    MCV 94.7 80.0 - 100.0 fL    MCH 31.7 (H) 27.0 - 31.3 pg    MCHC 33.5 33.0 - 37.0 %    RDW 14.2 11.5 - 14.5 %    Platelets 343 465 - 989 K/uL    Neutrophils % 58.4 %    Lymphocytes % 27.8 %    Monocytes % 10.1 %    Eosinophils % 2.9 %    Basophils % 0.8 %    Neutrophils Absolute 3.7 1.4 - 6.5 K/uL    Lymphocytes Absolute 1.8 1.0 - 4.8 K/uL    Monocytes Absolute 0.6 0.2 - 0.8 K/uL    Eosinophils Absolute 0.2 0.0 - 0.7 K/uL    Basophils Absolute 0.1 0.0 - 0.2 K/uL   Troponin    Collection Time: 09/14/21  8:45 PM   Result Value Ref Range    Troponin 0.052 (HH) 0.000 - 0.010 ng/mL   Brain Natriuretic Peptide    Collection Time: 09/14/21  8:45 PM   Result Value Ref Range    Pro- pg/mL   TSH with Reflex    Collection Time: 09/14/21  8:45 PM   Result Value Ref Range    TSH 3.240 0.440 - 3.860 uIU/mL   Protime-INR    Collection Time: 09/14/21  8:45 PM   Result Value Ref Range    Protime 16.3 (H) 12.3 - 14.9 sec    INR 1.3    APTT    Collection Time: 09/14/21  8:45 PM   Result Value Ref Range    aPTT 47.2 (H) 24.4 - 36.8 sec   Lactic Acid, Plasma    Collection Time: 09/14/21  8:45 PM   Result Value Ref Range    Lactic Acid 1.8 0.5 - 2.2 mmol/L   COVID-19, Rapid    Collection Time: 09/14/21  9:35 PM    Specimen: Nasopharyngeal Swab   Result Value Ref Range    SARS-CoV-2, NAAT Not Detected Not Detected       IMAGING:  XR CHEST PORTABLE    Result Date: 9/14/2021  EXAMINATION: CHEST PORTABLE VIEW  CLINICAL HISTORY: Weakness, fatigue COMPARISONS: June 22, 2021  FINDINGS: Single  views of the chest is submitted. Right-sided CCD device. Leads overlying the cardiac silhouette. There are multiple median sternotomy wires overlying the cardiac silhouette. The cardiac silhouette is of normal size configuration.  Pulmonary vascular unremarkable. Right sided trachea. No focal infiltrates. No Pneumothoraces. NO ACUTE ACTIVE CARDIOPULMONARY PROCESS      VTE Prophylaxis: anticoagulated - eliquis     ASSESSMENT AND PLAN  Principal Problem:    General weakness  Found lying in bed by his family which is unusual for him. Complained of generalized weakness,  Trouble walking. Plan: admit   PT  OT   Monitor labs. Send urine. Active Problems:    CKD   B/ Scr 46 / 1.34  Appears dry    Baseline appears to be around 1.1 but he has had elevation from 1.5 - 1.9 earlier this year. Received 2L fluid in ER. Plan: monitor labs,  Avoid nephrotoxins. Elevated troponin 0.052    EKG reportedly OK, I cannot pull up images. He is NSR on tele. Has not complained of chest pain while here. Plan: serial troponin,       Hyperkalemia  K 5.5     Plan: recheck. Treat as needed for level 3.5 - 5.0 \    Anticoagulated  eliquis for DVT,   Plan: continue.         Plan of care discussed with: patient    SIGNATURE: CHRISTINE Wells - CNP  DATE: September 15, 2021  TIME: 4:55 AM   Cathy Marte MD  - supervising physician

## 2021-09-15 NOTE — FLOWSHEET NOTE
[id number] Patient arrived to [facility], room 222 via cot. Patient transferred to bed using slide sheet with out incident. Patient is extremely hard of hearing and is unable to understand anything being said. Will attempt admission questions. Assessment complete for most part. Ambulated patient to restroom using front wheeled walker and tolerated well. Call light with in reach. 0630 awaiting patients hearing aide from ED, will attempt admission questions then. When asked specific questions patient shakes head in response to not understanding/hearing properly. 3326 ED security brought bilateral hearing aides to floor, this RN handed to patient.

## 2021-09-15 NOTE — PROGRESS NOTES
Pulmonary Disorder  (acute or chronic)  [x]   Severe or Chronic w/ Exacerbation  []     Surgical Status No [x]   Surgeries     General []   Surgery Lower []   Abdominal Thoracic or []   Upper Abdominal Thoracic with  PulmonaryDisorder  []     Chest X-ray Clear/Not  Ordered     [x]  Chronic Changes  Results Pending  []  Infiltrates, atelectasis, pleural effusion, or edema  []  Infiltrates in more than one lobe []  Infiltrate + Atelectasis, &/or pleural effusion  []    Respiratory Pattern Regular,  RR = 12-20 [x]  Increased,  RR = 21-25 []  BUTT, irregular,  or RR = 26-30 []  Decreased FEV1  or RR = 31-35 []  Severe SOB, use  of accessory muscles, or RR ? 35  []    Mental Status Alert, oriented,  Cooperative [x]  Confused but Follows commands []  Lethargic or unable to follow commands []  Obtunded  []  Comatose  []    Breath Sounds Clear to  auscultation  []  Decreased unilaterally or  in bases only [x]  Decreased  bilaterally  []  Crackles or intermittent wheezes []  Wheezes []    Cough Strong, Spontan., & nonproductive [x]  Strong,  spontaneous, &  productive []  Weak,  Nonproductive []  Weak, productive or  with wheezes []  No spontaneous  cough or may require suctioning []    Level of Activity Ambulatory []  Ambulatory w/ Assist  []  Non-ambulatory []  Paraplegic []  Quadriplegic []    Total    Score:__5_____     Spring Emmanueller Score:________      Tri       Triage:     1. (>20) Freq: Q3    2. (16-20) Freq: Q4   3. (11-15) Freq: QID & Albuterol Q2 PRN    4. (6-10) Freq: TID & Albuterol Q2 PRN    5. (0-5) Freq Q4prn

## 2021-09-15 NOTE — FLOWSHEET NOTE
1056: Assessment and VS completed. Patient is hard of hearing, alert, seems to be oriented x4. Patient has been up to bathroom and had a BM this morning, documented in chart. Viki Salazar confirmed home meds with STEVIE Silva.  Electronically signed by Naa Butler RN on 9/15/2021 at 11:07 AM

## 2021-09-16 ENCOUNTER — APPOINTMENT (OUTPATIENT)
Dept: CT IMAGING | Age: 86
DRG: 065 | End: 2021-09-16
Payer: MEDICARE

## 2021-09-16 ENCOUNTER — APPOINTMENT (OUTPATIENT)
Dept: ULTRASOUND IMAGING | Age: 86
DRG: 065 | End: 2021-09-16
Payer: MEDICARE

## 2021-09-16 ENCOUNTER — APPOINTMENT (OUTPATIENT)
Dept: GENERAL RADIOLOGY | Age: 86
DRG: 065 | End: 2021-09-16
Payer: MEDICARE

## 2021-09-16 ENCOUNTER — HOSPITAL ENCOUNTER (INPATIENT)
Age: 86
LOS: 7 days | Discharge: NURSING, REHAB OR LONG-TERM CARE | DRG: 065 | End: 2021-09-23
Attending: EMERGENCY MEDICINE
Payer: MEDICARE

## 2021-09-16 DIAGNOSIS — R77.8 ELEVATED TROPONIN: ICD-10-CM

## 2021-09-16 DIAGNOSIS — I63.9 CEREBROVASCULAR ACCIDENT (CVA), UNSPECIFIED MECHANISM (HCC): Primary | ICD-10-CM

## 2021-09-16 LAB
ALBUMIN SERPL-MCNC: 4.1 G/DL (ref 3.5–4.6)
ALP BLD-CCNC: 76 U/L (ref 35–104)
ALT SERPL-CCNC: 16 U/L (ref 0–41)
ANION GAP SERPL CALCULATED.3IONS-SCNC: 11 MEQ/L (ref 9–15)
AST SERPL-CCNC: 19 U/L (ref 0–40)
BASOPHILS ABSOLUTE: 0 K/UL (ref 0–0.2)
BASOPHILS RELATIVE PERCENT: 0.6 %
BILIRUB SERPL-MCNC: 0.8 MG/DL (ref 0.2–0.7)
BUN BLDV-MCNC: 29 MG/DL (ref 8–23)
CALCIUM SERPL-MCNC: 8.9 MG/DL (ref 8.5–9.9)
CHLORIDE BLD-SCNC: 104 MEQ/L (ref 95–107)
CHOLESTEROL, TOTAL: 230 MG/DL (ref 0–199)
CO2: 23 MEQ/L (ref 20–31)
CREAT SERPL-MCNC: 1.11 MG/DL (ref 0.7–1.2)
EOSINOPHILS ABSOLUTE: 0.2 K/UL (ref 0–0.7)
EOSINOPHILS RELATIVE PERCENT: 2.3 %
GFR AFRICAN AMERICAN: >60
GFR NON-AFRICAN AMERICAN: >60
GLOBULIN: 2.8 G/DL (ref 2.3–3.5)
GLUCOSE BLD-MCNC: 142 MG/DL (ref 70–99)
HCT VFR BLD CALC: 41.2 % (ref 42–52)
HDLC SERPL-MCNC: 41 MG/DL (ref 40–59)
HEMOGLOBIN: 13.8 G/DL (ref 14–18)
LDL CHOLESTEROL CALCULATED: 148 MG/DL (ref 0–129)
LYMPHOCYTES ABSOLUTE: 1.8 K/UL (ref 1–4.8)
LYMPHOCYTES RELATIVE PERCENT: 27.2 %
MAGNESIUM: 2.1 MG/DL (ref 1.7–2.4)
MCH RBC QN AUTO: 32.2 PG (ref 27–31.3)
MCHC RBC AUTO-ENTMCNC: 33.5 % (ref 33–37)
MCV RBC AUTO: 96 FL (ref 80–100)
MONOCYTES ABSOLUTE: 0.7 K/UL (ref 0.2–0.8)
MONOCYTES RELATIVE PERCENT: 10.2 %
NEUTROPHILS ABSOLUTE: 4 K/UL (ref 1.4–6.5)
NEUTROPHILS RELATIVE PERCENT: 59.7 %
PDW BLD-RTO: 14.2 % (ref 11.5–14.5)
PLATELET # BLD: 226 K/UL (ref 130–400)
POTASSIUM SERPL-SCNC: 4.4 MEQ/L (ref 3.4–4.9)
RBC # BLD: 4.29 M/UL (ref 4.7–6.1)
SODIUM BLD-SCNC: 138 MEQ/L (ref 135–144)
TOTAL CK: 79 U/L (ref 0–190)
TOTAL PROTEIN: 6.9 G/DL (ref 6.3–8)
TRIGL SERPL-MCNC: 206 MG/DL (ref 0–150)
TROPONIN: 0.03 NG/ML (ref 0–0.01)
TSH SERPL DL<=0.05 MIU/L-ACNC: 3.7 UIU/ML (ref 0.44–3.86)
WBC # BLD: 6.7 K/UL (ref 4.8–10.8)

## 2021-09-16 PROCEDURE — 2580000003 HC RX 258: Performed by: EMERGENCY MEDICINE

## 2021-09-16 PROCEDURE — 82550 ASSAY OF CK (CPK): CPT

## 2021-09-16 PROCEDURE — 36415 COLL VENOUS BLD VENIPUNCTURE: CPT

## 2021-09-16 PROCEDURE — 80061 LIPID PANEL: CPT

## 2021-09-16 PROCEDURE — 1210000000 HC MED SURG R&B

## 2021-09-16 PROCEDURE — 84443 ASSAY THYROID STIM HORMONE: CPT

## 2021-09-16 PROCEDURE — 70450 CT HEAD/BRAIN W/O DYE: CPT

## 2021-09-16 PROCEDURE — 99285 EMERGENCY DEPT VISIT HI MDM: CPT

## 2021-09-16 PROCEDURE — 85025 COMPLETE CBC W/AUTO DIFF WBC: CPT

## 2021-09-16 PROCEDURE — 93880 EXTRACRANIAL BILAT STUDY: CPT

## 2021-09-16 PROCEDURE — 6370000000 HC RX 637 (ALT 250 FOR IP): Performed by: EMERGENCY MEDICINE

## 2021-09-16 PROCEDURE — 83735 ASSAY OF MAGNESIUM: CPT

## 2021-09-16 PROCEDURE — 93005 ELECTROCARDIOGRAM TRACING: CPT | Performed by: EMERGENCY MEDICINE

## 2021-09-16 PROCEDURE — 81003 URINALYSIS AUTO W/O SCOPE: CPT

## 2021-09-16 PROCEDURE — 80053 COMPREHEN METABOLIC PANEL: CPT

## 2021-09-16 PROCEDURE — 71045 X-RAY EXAM CHEST 1 VIEW: CPT

## 2021-09-16 PROCEDURE — 87040 BLOOD CULTURE FOR BACTERIA: CPT

## 2021-09-16 PROCEDURE — 84484 ASSAY OF TROPONIN QUANT: CPT

## 2021-09-16 RX ORDER — SODIUM CHLORIDE 0.9 % (FLUSH) 0.9 %
5-40 SYRINGE (ML) INJECTION PRN
Status: DISCONTINUED | OUTPATIENT
Start: 2021-09-16 | End: 2021-09-23 | Stop reason: HOSPADM

## 2021-09-16 RX ORDER — BUDESONIDE AND FORMOTEROL FUMARATE DIHYDRATE 80; 4.5 UG/1; UG/1
2 AEROSOL RESPIRATORY (INHALATION) 2 TIMES DAILY
Status: DISCONTINUED | OUTPATIENT
Start: 2021-09-16 | End: 2021-09-23 | Stop reason: HOSPADM

## 2021-09-16 RX ORDER — FOLIC ACID 1 MG/1
1 TABLET ORAL DAILY
Status: DISCONTINUED | OUTPATIENT
Start: 2021-09-17 | End: 2021-09-23 | Stop reason: HOSPADM

## 2021-09-16 RX ORDER — DEXTROSE MONOHYDRATE 50 MG/ML
100 INJECTION, SOLUTION INTRAVENOUS PRN
Status: DISCONTINUED | OUTPATIENT
Start: 2021-09-16 | End: 2021-09-23 | Stop reason: HOSPADM

## 2021-09-16 RX ORDER — SODIUM CHLORIDE 0.9 % (FLUSH) 0.9 %
5-40 SYRINGE (ML) INJECTION EVERY 12 HOURS SCHEDULED
Status: DISCONTINUED | OUTPATIENT
Start: 2021-09-16 | End: 2021-09-23 | Stop reason: HOSPADM

## 2021-09-16 RX ORDER — 0.9 % SODIUM CHLORIDE 0.9 %
1000 INTRAVENOUS SOLUTION INTRAVENOUS ONCE
Status: COMPLETED | OUTPATIENT
Start: 2021-09-16 | End: 2021-09-16

## 2021-09-16 RX ORDER — ALBUTEROL SULFATE 90 UG/1
2 AEROSOL, METERED RESPIRATORY (INHALATION) EVERY 4 HOURS PRN
Status: DISCONTINUED | OUTPATIENT
Start: 2021-09-16 | End: 2021-09-23 | Stop reason: HOSPADM

## 2021-09-16 RX ORDER — SODIUM CHLORIDE 9 MG/ML
25 INJECTION, SOLUTION INTRAVENOUS PRN
Status: DISCONTINUED | OUTPATIENT
Start: 2021-09-16 | End: 2021-09-23 | Stop reason: HOSPADM

## 2021-09-16 RX ORDER — ASPIRIN 81 MG/1
81 TABLET ORAL DAILY
Status: DISCONTINUED | OUTPATIENT
Start: 2021-09-17 | End: 2021-09-23 | Stop reason: HOSPADM

## 2021-09-16 RX ORDER — ATORVASTATIN CALCIUM 40 MG/1
40 TABLET, FILM COATED ORAL NIGHTLY
Status: DISCONTINUED | OUTPATIENT
Start: 2021-09-16 | End: 2021-09-23 | Stop reason: HOSPADM

## 2021-09-16 RX ORDER — ONDANSETRON 2 MG/ML
4 INJECTION INTRAMUSCULAR; INTRAVENOUS EVERY 6 HOURS PRN
Status: DISCONTINUED | OUTPATIENT
Start: 2021-09-16 | End: 2021-09-23 | Stop reason: HOSPADM

## 2021-09-16 RX ORDER — ONDANSETRON 4 MG/1
4 TABLET, ORALLY DISINTEGRATING ORAL EVERY 8 HOURS PRN
Status: DISCONTINUED | OUTPATIENT
Start: 2021-09-16 | End: 2021-09-23 | Stop reason: HOSPADM

## 2021-09-16 RX ORDER — LANSOPRAZOLE
30 KIT DAILY
Status: DISCONTINUED | OUTPATIENT
Start: 2021-09-17 | End: 2021-09-23 | Stop reason: HOSPADM

## 2021-09-16 RX ORDER — DEXTROSE MONOHYDRATE 25 G/50ML
12.5 INJECTION, SOLUTION INTRAVENOUS PRN
Status: DISCONTINUED | OUTPATIENT
Start: 2021-09-16 | End: 2021-09-23 | Stop reason: HOSPADM

## 2021-09-16 RX ORDER — ASPIRIN 81 MG/1
324 TABLET, CHEWABLE ORAL ONCE
Status: COMPLETED | OUTPATIENT
Start: 2021-09-16 | End: 2021-09-16

## 2021-09-16 RX ORDER — CARVEDILOL 6.25 MG/1
6.25 TABLET ORAL 2 TIMES DAILY WITH MEALS
Status: DISCONTINUED | OUTPATIENT
Start: 2021-09-17 | End: 2021-09-23 | Stop reason: HOSPADM

## 2021-09-16 RX ORDER — POLYETHYLENE GLYCOL 3350 17 G/17G
17 POWDER, FOR SOLUTION ORAL DAILY PRN
Status: DISCONTINUED | OUTPATIENT
Start: 2021-09-16 | End: 2021-09-23 | Stop reason: HOSPADM

## 2021-09-16 RX ORDER — ESOMEPRAZOLE MAGNESIUM 40 MG/1
40 FOR SUSPENSION ORAL DAILY
Status: DISCONTINUED | OUTPATIENT
Start: 2021-09-17 | End: 2021-09-16 | Stop reason: CLARIF

## 2021-09-16 RX ORDER — ASPIRIN 300 MG/1
300 SUPPOSITORY RECTAL DAILY
Status: DISCONTINUED | OUTPATIENT
Start: 2021-09-17 | End: 2021-09-23 | Stop reason: HOSPADM

## 2021-09-16 RX ORDER — NICOTINE POLACRILEX 4 MG
15 LOZENGE BUCCAL PRN
Status: DISCONTINUED | OUTPATIENT
Start: 2021-09-16 | End: 2021-09-23 | Stop reason: HOSPADM

## 2021-09-16 RX ORDER — FERROUS SULFATE 325(65) MG
325 TABLET ORAL
Status: DISCONTINUED | OUTPATIENT
Start: 2021-09-17 | End: 2021-09-23 | Stop reason: HOSPADM

## 2021-09-16 RX ORDER — ALBUTEROL SULFATE 90 UG/1
2 AEROSOL, METERED RESPIRATORY (INHALATION) EVERY 6 HOURS PRN
Status: DISCONTINUED | OUTPATIENT
Start: 2021-09-16 | End: 2021-09-16

## 2021-09-16 RX ADMIN — SODIUM CHLORIDE 1000 ML: 9 INJECTION, SOLUTION INTRAVENOUS at 14:41

## 2021-09-16 RX ADMIN — ASPIRIN 324 MG: 81 TABLET, CHEWABLE ORAL at 17:16

## 2021-09-16 ASSESSMENT — ENCOUNTER SYMPTOMS
ABDOMINAL PAIN: 0
SORE THROAT: 0
SHORTNESS OF BREATH: 0
BACK PAIN: 0
DIARRHEA: 0
NAUSEA: 0
COUGH: 0
VOMITING: 0

## 2021-09-16 ASSESSMENT — PAIN SCALES - GENERAL: PAINLEVEL_OUTOF10: 0

## 2021-09-16 NOTE — PROGRESS NOTES
PHARMACY NOTE:    Esomeprazole (Nexium) 40 mg packet daily changed to lansoprazole 30 mg/mL solution daily per therapeutic interchange.     Debi Ha, PharmD   9/16/2021 5:52 PM

## 2021-09-16 NOTE — ED TRIAGE NOTES
Family called EMS for general weakness and possible dehydration, Pt was recently D/C from the hospital, Pt is alert, [place], afebrile breathes are equal and unlabored, denies any pain, N/V/D or SOB at this time.

## 2021-09-16 NOTE — H&P
Hospital Medicine  History and Physical    Patient:  David Matson  MRN: [id number]    CHIEF COMPLAINT:    Chief Complaint   Patient presents with    Extremity Weakness     pt was sent to the ER for general weakness and possible dehydration, recently D/C from hospital       History Obtained From:  Patient, EMR  Primary Care Physician: Radha Morrow MD, MD    HISTORY OF PRESENT ILLNESS:   61-year-old male with history of CAD (history of CABG), hypertension, diabetes hypothyroidism, PAD, DVT on Eliquis, pacemaker, presents from home with dizziness and left upper extremity numbness/tingling. Patient is a poor historian because he is very hard of hearing. The history below was provided by his granddaughter:    He was recently in the hospital from 9/14-9/15 after his granddaughter found him laying in bed and unable to ambulate. During the hospitalization he was able to ambulate without complication and did not have any neurologic abnormalities. He did well while at home on 9/15. On 9/16, he had an episode of dizziness and he knocked over a trash can-he then became anxious appearing and began complaining that his left upper extremity was tingling. It was then his granddaughter called EMS. In the emergency department, CT scan was suspicious for CVA in the right occipital lobe. Past Medical History:      Diagnosis Date    Anemia     CAD (coronary artery disease) 4/16/2015    DM (diabetes mellitus) ([facility] [address]     Dyslipidemia     History of tobacco abuse     HTN (hypertension)     Hypothyroidism     Non-ST elevation MI (NSTEMI) ([facility] [address]     Pacemaker 4/16/2015       Past Surgical History:      Procedure Laterality Date    CATARACT REMOVAL      MIDDLE EAR SURGERY Left 1998    \"they had to reset the bones\" after an infection       Medications Prior to Admission:    Prior to Admission medications    Medication Sig Start Date End Date Taking?  Authorizing Provider   apixaban (ELIQUIS) 5 MG TABS tablet Take 1 tablet by mouth 2 times daily 6/26/21   Silvina Mcgill MD   ramipril (ALTACE) 5 MG capsule Take 5 mg by mouth daily    Historical Provider, MD   Vitamin D (CHOLECALCIFEROL) 50 MCG (2000 UT) TABS tablet Take 1 tablet by mouth Daily with supper 2/13/21   Pat Mirza DO   ferrous sulfate (IRON 325) 325 (65 Fe) MG tablet Take 1 tablet by mouth 2 times daily (with meals) 2/13/21   CHRISTINE Lopez CNP   folic acid (FOLVITE) 1 MG tablet Take 1 tablet by mouth daily 2/14/21   CHRISTINE Lopez CNP   nitroGLYCERIN (NITROSTAT) 0.4 MG SL tablet Place 1 tablet under the tongue every 5 minutes as needed for Chest pain 9/21/19 2/1/21  CHRISTINE Jay CNP   carvedilol (COREG) 12.5 MG tablet Take 0.5 tablets by mouth 2 times daily (with meals) 7/9/19   Garrett Lopez MD   albuterol sulfate HFA (PROVENTIL HFA) 108 (90 Base) MCG/ACT inhaler Inhale 2 puffs into the lungs every 6 hours as needed for Wheezing 12/29/18   Aubrey Abraham MD   ipratropium-albuterol (DUONEB) 0.5-2.5 (3) MG/3ML SOLN nebulizer solution Inhale 3 mLs into the lungs three times daily 2/17/18   June Damico MD   atorvastatin (LIPITOR) 40 MG tablet Take 40 mg by mouth daily    Historical Provider, MD   ursodiol (ACTIGALL) 300 MG capsule Take 300 mg by mouth 2 times daily    Historical Provider, MD   esomeprazole Magnesium (NEXIUM) 40 MG PACK Take 40 mg by mouth daily    Historical Provider, MD   budesonide-formoterol (SYMBICORT) 80-4.5 MCG/ACT AERO Inhale 2 puffs into the lungs 2 times daily. Historical Provider, MD       Allergies:  Patient has no known allergies. Social History:   TOBACCO:   reports that he has quit smoking. He has never used smokeless tobacco.  ETOH:   reports no history of alcohol use. Family History:   History reviewed. No pertinent family history.     REVIEW OF SYSTEMS:  Ten systems reviewed and negative except for stated in HPI    Physical Exam:    Vitals: BP (!) 155/63   Pulse 60   Temp 97.6 °F (36.4 °C) (Oral)   Resp 12   Wt 150 lb (68 kg)   SpO2 96%   BMI 24.21 kg/m²   General appearance: Elderly. Very hard of hearing. No acute distress. Can answer some yes/no but is not capable of complex discussion due to his hearing deficit  Skin: Skin color, texture, turgor normal. No rashes or lesions  HEENT: eomi, perrla. MMM  Neck: No JVD or lymphadenopathy  Lungs: CTA bilaterally. No wheeze   Heart: RRR, no murmur or gallop  Abdomen: soft, nontender. Bsx4. No masses or organomegaly  Extremities: no edema, redness, or tenderness in calves. Cap refill <2s  Neurologic: Reported left side sensory loss. Otherwise no focal deficit    Recent Labs     09/14/21 2045 09/16/21  1532   WBC 6.3 6.7   HGB 14.0 13.8*    226     Recent Labs     09/14/21 2045 09/16/21  1532    138   K 5.5* 4.4    104   CO2 21 23   BUN 45* 29*   CREATININE 1.34* 1.11   GLUCOSE 203* 142*   AST 17 19   ALT 16 16   BILITOT 1.0* 0.8*   ALKPHOS 73 76     Troponin T:   Recent Labs     09/15/21  0643 09/15/21  1143 09/16/21  1532   TROPONINI 0.039* 0.028* 0.031*       ABGs:   Lab Results   Component Value Date    PHART 7.348 12/23/2016    PO2ART 227 12/23/2016    CLJ0YGI 41 12/23/2016     INR:   Recent Labs     09/14/21 2045   INR 1.3     URINALYSIS:  Recent Labs     09/14/21 2045   COLORU Yellow   PHUR 5.0   CLARITYU Clear   SPECGRAV 1.009   LEUKOCYTESUR Negative   UROBILINOGEN 0.2   BILIRUBINUR Negative   BLOODU Negative   GLUCOSEU Negative     -----------------------------------------------------------------   CT Head WO Contrast    Result Date: 9/16/2021  EXAMINATION: CT HEAD WO CONTRAST, 9/16/2021 3:14 PM CLINICAL HISTORY:  Weakness COMPARISON: Brain CT from September 14, 2021, 2 days prior TECHNIQUE:  Multiple contiguous axial images of the head were obtained from the skull base through the skull vertex without intravenous contrast. Sagittal and coronal reformats have been produced.  All CT scans at this facility use dose modulation, iterative reconstruction, and/or weight based dosing when appropriate to reduce radiation dose to as low as reasonably achievable. BRAIN CT FINDINGS: There has been interval development of a 3 cm area of hypodensity in the right occipital lobe since the previous day which is worrisome for an acute, subacute ischemia. There are persistent, chronic areas of ischemia in the anterior right temporal lobe, unchanged since previous study. No acute hemorrhage, mass, mass effect, or midline shift. There is prominence of sulci and ventricles indicating mild global cerebral atrophy and chronic involutional changes. Moderate periventricular white matter hypodensities indicating chronic microangiopathy are noted. The basal ganglia are within normal limits. There are no acute changes or space-occupying lesions in the posterior fossa. The visualized portions of the orbits are within normal limits. The globes are intact. The imaged portions of the paranasal sinuses are unremarkable. The calvarium is intact. There is a 3 cm new area of hypodensity worrisome for acute in the right occipital lobe. The findings were called to the ER [id number]. ,     XR CHEST PORTABLE    Result Date: 9/16/2021  XR CHEST PORTABLE Clinical History:  Weakness. Lethargic. Comparison:  9/14/2021. RESULT: Median sternotomy. Right transvenous pacemaker, unchanged. No consolidation. No pleural effusion. No pneumothorax. Stable cardiomediastinal silhouette. Aortic vascular calcifications. No distinct acute osseous findings. No acute radiographic abnormality. Assessment and Plan     1. 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 Add aspirin  -Permissive hypertension  -MRI head, carotid ultrasound, echo, lipid panel.   Recent A1c 7.4  -PT/OT  -Risk factor modification    Hypertension  Type 2 diabetes  CAD  PAD    DNR CCA-confirmed with POA granddaughter over the phone    39 minutes in total care time.      Anand Hopkins DO  Admitting Hospitalist

## 2021-09-16 NOTE — ED PROVIDER NOTES
puffs into the lungs every 6 hours as needed for Wheezing    APIXABAN (ELIQUIS) 5 MG TABS TABLET    Take 1 tablet by mouth 2 times daily    ATORVASTATIN (LIPITOR) 40 MG TABLET    Take 40 mg by mouth daily    BUDESONIDE-FORMOTEROL (SYMBICORT) 80-4.5 MCG/ACT AERO    Inhale 2 puffs into the lungs 2 times daily. CARVEDILOL (COREG) 12.5 MG TABLET    Take 0.5 tablets by mouth 2 times daily (with meals)    ESOMEPRAZOLE MAGNESIUM (NEXIUM) 40 MG PACK    Take 40 mg by mouth daily    FERROUS SULFATE (IRON 325) 325 (65 FE) MG TABLET    Take 1 tablet by mouth 2 times daily (with meals)    FOLIC ACID (FOLVITE) 1 MG TABLET    Take 1 tablet by mouth daily    IPRATROPIUM-ALBUTEROL (DUONEB) 0.5-2.5 (3) MG/3ML SOLN NEBULIZER SOLUTION    Inhale 3 mLs into the lungs three times daily    NITROGLYCERIN (NITROSTAT) 0.4 MG SL TABLET    Place 1 tablet under the tongue every 5 minutes as needed for Chest pain    RAMIPRIL (ALTACE) 5 MG CAPSULE    Take 5 mg by mouth daily    URSODIOL (ACTIGALL) 300 MG CAPSULE    Take 300 mg by mouth 2 times daily    VITAMIN D (CHOLECALCIFEROL) 50 MCG (2000 UT) TABS TABLET    Take 1 tablet by mouth Daily with supper       ALLERGIES     Patient has no known allergies. FAMILY HISTORY     History reviewed. No pertinent family history. SOCIAL HISTORY       Social History     Socioeconomic History    Marital status:       Spouse name: None    Number of children: None    Years of education: None    Highest education level: None   Occupational History    None   Tobacco Use    Smoking status: Former Smoker    Smokeless tobacco: Never Used   Vaping Use    Vaping Use: Never used   Substance and Sexual Activity    Alcohol use: No    Drug use: No    Sexual activity: None   Other Topics Concern    None   Social History Narrative    Lives With: granddaughter Shira  and family    Type of Home: House  Two level, Bed/Bath upstairs-[address] [facility] Access: Stairs to enter with rails Entrance Stairs - Number of Steps: 14    Bathroom Shower/Tub: Tub/Shower unit    Home Equipment: Rolling walker, Cane    ADL Assistance: Independent    Homemaking Assistance: Independent(granddaughter completes)    Ambulation Assistance: Independent    Transfer Assistance: Independent    Active : Yes    Additional Comments: pt is significantly [place]; information gathered via chart review and via writing. Social Determinants of Health     Financial Resource Strain:     Difficulty of Paying Living Expenses:    Food Insecurity:     Worried About Running Out of Food in the Last Year:     [address] in the Last Year:    Transportation Needs:     Lack of Transportation (Medical):  Lack of Transportation (Non-Medical):    Physical Activity:     Days of Exercise per Week:     Minutes of Exercise per Session:    Stress:     Feeling of Stress :    Social Connections:     Frequency of Communication with Friends and Family:     Frequency of Social Gatherings with Friends and Family:     Attends Latter day Services:     Active Member of Clubs or Organizations:     Attends Club or Organization Meetings:     Marital Status:    Intimate Partner Violence:     Fear of Current or Ex-Partner:     Emotionally Abused:     Physically Abused:     Sexually Abused:          PHYSICAL EXAM       ED Triage Vitals   BP Temp Temp src Pulse Resp SpO2 Height Weight   -- -- -- -- -- -- -- --       Physical Exam  Vitals and nursing note reviewed. Constitutional:       Appearance: He is well-developed. HENT:      Head: Normocephalic. Right Ear: External ear normal.      Left Ear: External ear normal.   Eyes:      Conjunctiva/sclera: Conjunctivae normal.      Pupils: Pupils are equal, round, and reactive to light. Cardiovascular:      Rate and Rhythm: Normal rate and regular rhythm. Heart sounds: Normal heart sounds.    Pulmonary:      Effort: Pulmonary effort is normal.      Breath sounds: Normal breath sounds. Abdominal:      General: Bowel sounds are normal. There is no distension. Palpations: Abdomen is soft. Tenderness: There is no abdominal tenderness. Musculoskeletal:         General: Normal range of motion. Cervical back: Normal range of motion and neck supple. Skin:     General: Skin is warm and dry. Neurological:      Mental Status: He is alert and oriented to person, place, and time. Psychiatric:         Mood and Affect: Mood normal.           MDM  79 yo male presents to the ED with weakness. Pt is afebrile, hemodynamically stable. Pt given 1 L NS in the ED. EKG shows AV paced rhythm with HR 64, normal axis, normal intervals, no ST changes. Labs remarkable for glucose 142, troponin 0.031. CT head shows no 3 cmm R occipital lobe lesion likely CVA. Pt had a CT scan 2 days ago that was unremarkable. Case discussed with Dr. Lulú Grove (neurology) who recommended PO asa and admission. Case then discussed with Dr. Antoine Barbour (medicine) who accepted the pt. Pt admitted to medicine in stable condition. FINAL IMPRESSION      1.  Cerebrovascular accident (CVA), unspecified mechanism ([address]    2. Elevated troponin          DISPOSITION/PLAN   DISPOSITION Decision To Admit 09/16/2021 04:47:23 PM        DISCHARGE MEDICATIONS:  [unfilled]         Omid Oliveira MD(electronically signed)  Attending Emergency Physician            Omid Oliveira MD  09/16/21 Katty Orr MD  09/16/21 [id number]

## 2021-09-17 LAB
BILIRUBIN URINE: NEGATIVE
BLOOD, URINE: NEGATIVE
CLARITY: CLEAR
COLOR: YELLOW
EKG ATRIAL RATE: 64 BPM
EKG P-R INTERVAL: 254 MS
EKG Q-T INTERVAL: 442 MS
EKG QRS DURATION: 110 MS
EKG QTC CALCULATION (BAZETT): 455 MS
EKG R AXIS: -69 DEGREES
EKG T AXIS: -48 DEGREES
EKG VENTRICULAR RATE: 64 BPM
GLUCOSE BLD-MCNC: 106 MG/DL (ref 60–115)
GLUCOSE BLD-MCNC: 113 MG/DL (ref 60–115)
GLUCOSE BLD-MCNC: 148 MG/DL (ref 60–115)
GLUCOSE BLD-MCNC: 175 MG/DL (ref 60–115)
GLUCOSE BLD-MCNC: 196 MG/DL (ref 60–115)
GLUCOSE URINE: NEGATIVE MG/DL
KETONES, URINE: NEGATIVE MG/DL
LEUKOCYTE ESTERASE, URINE: NEGATIVE
LV EF: 50 %
LVEF MODALITY: NORMAL
NITRITE, URINE: NEGATIVE
PERFORMED ON: ABNORMAL
PERFORMED ON: NORMAL
PERFORMED ON: NORMAL
PH UA: 5 (ref 5–9)
PROTEIN UA: NEGATIVE MG/DL
SPECIFIC GRAVITY UA: 1.01 (ref 1–1.03)
UROBILINOGEN, URINE: 0.2 E.U./DL

## 2021-09-17 PROCEDURE — 92523 SPEECH SOUND LANG COMPREHEN: CPT

## 2021-09-17 PROCEDURE — 93306 TTE W/DOPPLER COMPLETE: CPT

## 2021-09-17 PROCEDURE — 93010 ELECTROCARDIOGRAM REPORT: CPT | Performed by: INTERNAL MEDICINE

## 2021-09-17 PROCEDURE — 94761 N-INVAS EAR/PLS OXIMETRY MLT: CPT

## 2021-09-17 PROCEDURE — 99222 1ST HOSP IP/OBS MODERATE 55: CPT | Performed by: PSYCHIATRY & NEUROLOGY

## 2021-09-17 PROCEDURE — 1210000000 HC MED SURG R&B

## 2021-09-17 PROCEDURE — 2580000003 HC RX 258: Performed by: INTERNAL MEDICINE

## 2021-09-17 PROCEDURE — 97162 PT EVAL MOD COMPLEX 30 MIN: CPT

## 2021-09-17 PROCEDURE — 92610 EVALUATE SWALLOWING FUNCTION: CPT

## 2021-09-17 PROCEDURE — 6370000000 HC RX 637 (ALT 250 FOR IP): Performed by: INTERNAL MEDICINE

## 2021-09-17 PROCEDURE — 94640 AIRWAY INHALATION TREATMENT: CPT

## 2021-09-17 RX ORDER — RAMIPRIL 5 MG/1
5 CAPSULE ORAL DAILY
Status: DISCONTINUED | OUTPATIENT
Start: 2021-09-18 | End: 2021-09-23 | Stop reason: HOSPADM

## 2021-09-17 RX ADMIN — BUDESONIDE AND FORMOTEROL FUMARATE DIHYDRATE 2 PUFF: 80; 4.5 AEROSOL RESPIRATORY (INHALATION) at 07:02

## 2021-09-17 RX ADMIN — APIXABAN 5 MG: 5 TABLET, FILM COATED ORAL at 00:30

## 2021-09-17 RX ADMIN — APIXABAN 5 MG: 5 TABLET, FILM COATED ORAL at 08:15

## 2021-09-17 RX ADMIN — Medication 30 MG: at 12:06

## 2021-09-17 RX ADMIN — Medication 10 ML: at 00:30

## 2021-09-17 RX ADMIN — ASPIRIN 81 MG: 81 TABLET, COATED ORAL at 08:15

## 2021-09-17 RX ADMIN — Medication 10 ML: at 08:26

## 2021-09-17 RX ADMIN — FOLIC ACID 1 MG: 1 TABLET ORAL at 08:15

## 2021-09-17 RX ADMIN — FERROUS SULFATE TAB 325 MG (65 MG ELEMENTAL FE) 325 MG: 325 (65 FE) TAB at 08:15

## 2021-09-17 RX ADMIN — ATORVASTATIN CALCIUM 40 MG: 40 TABLET, FILM COATED ORAL at 00:29

## 2021-09-17 RX ADMIN — CARVEDILOL 6.25 MG: 6.25 TABLET, FILM COATED ORAL at 08:15

## 2021-09-17 RX ADMIN — INSULIN LISPRO 1 UNITS: 100 INJECTION, SOLUTION INTRAVENOUS; SUBCUTANEOUS at 17:38

## 2021-09-17 RX ADMIN — CARVEDILOL 6.25 MG: 6.25 TABLET, FILM COATED ORAL at 17:38

## 2021-09-17 RX ADMIN — BUDESONIDE AND FORMOTEROL FUMARATE DIHYDRATE 2 PUFF: 80; 4.5 AEROSOL RESPIRATORY (INHALATION) at 20:08

## 2021-09-17 RX ADMIN — INSULIN LISPRO 1 UNITS: 100 INJECTION, SOLUTION INTRAVENOUS; SUBCUTANEOUS at 11:59

## 2021-09-17 ASSESSMENT — PAIN SCALES - GENERAL: PAINLEVEL_OUTOF10: 0

## 2021-09-17 NOTE — PROGRESS NOTES
Pt's assessment was effected by pt being very hard of hearing. Admission questions were answered by pt's family. MRI questionnaire questions were answered by pt's daughter, Milton Rice. Talked to pt's granddaughter Sadie Cleaning, updates given.  Pt is resting in bed at this time, VSS

## 2021-09-17 NOTE — CARE COORDINATION
CMI FROM PREVIOUS ADMIT 3 DAYS AGO: PATIENT WENT HOME WITHOUT ANY NEEDS. PATIENT WAS ADMITTED FOR SAME SYMPTOMS/COMPLAINTS 9/14--9/15. PATIENT IS FROM HOME W/ HIS GRANDDAUGHTER, THIS TIME HE MAY NEED TO GO TO A SNF. PT/OT EVALS ARE PENDING. PATIENT SEEN BY SPEECH AND HAS NO NEEDS. PER ED CTB--POSS ACUTE CVA RT OCC LOBE--PENDING NEURO EVAL. PATIENT IS UNABLE TO HAVE AN MRI D/T AN INCOMPATIBLE PPM. PATIENT IS ON ELIQUIS PRIOR TO ADMISSION. CM/LSW TO FOLLOW ONCE PT/OT EVALS ARE COMPLETE FOR FURTHER THERAPY NEEDS AND SNF DISCUSSION. [facility] Case Management Initial Discharge Assessment     Met with Patient and Family to discuss discharge plan.          PCP: Shira Arango MD, MD                                Date of Last Visit:      If no PCP, list provided? N/A     Discharge Planning     Living Arrangements: at home dependent on family care     Who do you live with? GRANDDAUGHTER     Who helps you with your care:  family     If lives at home:                Do you have any barriers navigating in your home? no     Patient can perform ADL? Yes     Current Services (outpatient and in home) :  None     Dialysis: No     Is transportation available to get to your appointments? Yes, GRANDDAUGHTER DRIVES HIM TO [facility]PT[facility].     DME Equipment:  yes - HAS A WALKER AND A CANE BUT DOES NOT USE THEM AT HOME     Respiratory equipment: PRN/HS Oxygen 2 Liters     Respiratory provider:  yes -      Pharmacy:  yes - Francine with Medication Assistance Program?  No       [facility]     Patient agreeable to SNF/Rehab? No     Other discharge needs identified? N/A     Does Patient Have a High-Risk for Readmission Diagnosis (CHF, PN, MI, COPD)? Yes, see care coordinator assessment     If Yes,     e Plan? (Note: please see concurrent daily documentation for any updates after initial note). FROM HOME WITH GRANDDAUGHTER.   PLAN IS TO RETURN HOME WITH NO NEEDS AT DC.  SYMPTOMS HAVE RESOLVED AND PT SEEMS TO BE BACK TO BASELINE.       Readmission Risk              Risk of Unplanned Readmission:  24

## 2021-09-17 NOTE — PLAN OF CARE
Problem: Sensory Perception - Impaired:  Goal: Demonstrates accurate environmental perceptions  Description: Demonstrates accurate environmental perceptions  Outcome: Ongoing     Problem: Sensory Perception - Impaired:  Goal: Demonstrates accurate environmental perceptions  Description: Demonstrates accurate environmental perceptions  Outcome: Ongoing     Problem: Falls - Risk of:  Goal: Will remain free from falls  Description: Will remain free from falls  Outcome: Ongoing  Goal: Absence of physical injury  Description: Absence of physical injury  Outcome: Ongoing     Problem: Skin Integrity:  Goal: Will show no infection signs and symptoms  Description: Will show no infection signs and symptoms  Outcome: Ongoing  Goal: Absence of new skin breakdown  Description: Absence of new skin breakdown  Outcome: Ongoing     Problem: Confusion - Acute:  Goal: Absence of continued neurological deterioration signs and symptoms  Description: Absence of continued neurological deterioration signs and symptoms  Outcome: Ongoing  Goal: Mental status will be restored to baseline  Description: Mental status will be restored to baseline  Outcome: Ongoing     Problem: Discharge Planning:  Goal: Ability to perform activities of daily living will improve  Description: Ability to perform activities of daily living will improve  Outcome: Ongoing  Goal: Participates in care planning  Description: Participates in care planning  Outcome: Ongoing     Problem: Injury - Risk of, Physical Injury:  Goal: Will remain free from falls  Description: Will remain free from falls  Outcome: Ongoing  Goal: Absence of physical injury  Description: Absence of physical injury  Outcome: Ongoing     Problem: Mood - Altered:  Goal: Mood stable  Description: Mood stable  Outcome: Ongoing  Goal: Absence of abusive behavior  Description: Absence of abusive behavior  Outcome: Ongoing  Goal: Verbalizations of feeling emotionally comfortable while being cared for will increase  Description: Verbalizations of feeling emotionally comfortable while being cared for will increase  Outcome: Ongoing     Problem: Psychomotor Activity - Altered:  Goal: Absence of psychomotor disturbance signs and symptoms  Description: Absence of psychomotor disturbance signs and symptoms  Outcome: Ongoing     Problem: Sensory Perception - Impaired:  Goal: Demonstrations of improved sensory functioning will increase  Description: Demonstrations of improved sensory functioning will increase  Outcome: Ongoing  Goal: Decrease in sensory misperception frequency  Description: Decrease in sensory misperception frequency  Outcome: Ongoing  Goal: Able to refrain from responding to false sensory perceptions  Description: Able to refrain from responding to false sensory perceptions  Outcome: Ongoing  Goal: Demonstrates accurate environmental perceptions  Description: Demonstrates accurate environmental perceptions  Outcome: Ongoing  Goal: Able to distinguish between reality-based and nonreality-based thinking  Description: Able to distinguish between reality-based and nonreality-based thinking  Outcome: Ongoing  Goal: Able to interrupt nonreality-based thinking  Description: Able to interrupt nonreality-based thinking  Outcome: Ongoing     Problem: Sleep Pattern Disturbance:  Goal: Appears well-rested  Description: Appears well-rested  Outcome: Ongoing     Problem: HEMODYNAMIC STATUS  Goal: Patient has stable vital signs and fluid balance  Outcome: Ongoing     Problem: ACTIVITY INTOLERANCE/IMPAIRED MOBILITY  Goal: Mobility/activity is maintained at optimum level for patient  Outcome: Ongoing     Problem: COMMUNICATION IMPAIRMENT  Goal: Ability to express needs and understand communication  Outcome: Ongoing

## 2021-09-17 NOTE — PROGRESS NOTES
[facility] Occupational Therapy      Date: 2021  Patient Name: Monty Valles        MRN: [id number]  Account: [de-identified]   : 3/5/1929  (80 y.o.)  Room: [place] [address]    Chart reviewed, attempted OT at 1140 for eval. Patient not seen 2° to: Other: Pt continues to be unarousable; continues on CIWA protocol per chart. Spoke to [employer], RN aware. Will attempt again when able.     Electronically signed by GIN Quarles on 2021 at 1:38 PM

## 2021-09-17 NOTE — PROGRESS NOTES
[facility]   Facility/Department: [address] [place] [address]  Speech Language Pathology  Clinical Bedside Swallow Evaluation    NAME:Chey Lomax  : 3/5/1929 (89 y.o.)   MRN: [id number]  ROOM: [place] [address]  ADMISSION DATE: 2021  PATIENT DIAGNOSIS(ES): Elevated troponin [R77.8]  Acute CVA (cerebrovascular accident) [facility]) [I63.9]  Cerebrovascular accident (CVA), unspecified mechanism ([facility] [I63.9]  Chief Complaint   Patient presents with    Extremity Weakness     pt was sent to the ER for general weakness and possible dehydration, recently D/C from hospital     Patient Active Problem List    Diagnosis Date Noted    Anginal equivalent ([facility]     Acute CVA (cerebrovascular accident) ([facility] 2021    General weakness 09/15/2021    Elevated troponin 09/15/2021    Hyperkalemia 09/15/2021    Anticoagulated 09/15/2021    Arterial occlusion 2021    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    CKD (chronic kidney disease) 2021    PVD (peripheral vascular disease) ([facility] 2021    Venous thrombosis of leg 2021    Hyponatremia 2021    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    Syncope and collapse 2020    COPD exacerbation ([facility] 2020    NSTEMI (non-ST elevated myocardial infarction) ([facility] 2019    Chest pain 2019    Hypotension 2019    SOB (shortness of breath) 2018    Chronic right shoulder pain 2018    COPD with acute exacerbation ([address] 2018    Bronchitis 2016    CAD (coronary artery disease) 2015    HTN (hypertension)     Dyslipidemia     DM (diabetes mellitus) ([facility]     Hypothyroidism     History of tobacco abuse     Anemia      Past Medical History:   Diagnosis Date    Anemia     CAD (coronary artery disease) 2015    DM (diabetes mellitus) ([facility]     Dyslipidemia     History of tobacco abuse     HTN (hypertension)     Hypothyroidism     Non-ST elevation MI (NSTEMI) [facility])     Pacemaker 4/16/2015     Past Surgical History:   Procedure Laterality Date    CATARACT REMOVAL      MIDDLE EAR SURGERY Left 1998    \"they had to reset the bones\" after an infection     No Known Allergies    DATE ONSET: 9/16/2021    Date of Evaluation: 9/17/2021   Evaluating Therapist: Gunner Will. Yesika Nicholson, SLP    Recommended Diet and Intervention  Diet Solids Recommendation: Regular  Liquid Consistency Recommendation: Thin     Recommendations: Self feed       Compensatory Swallowing Strategies  Compensatory Swallowing Strategies: Upright as possible for all oral intake    Reason for Referral  Nely Hilario was referred for a bedside swallow evaluation to assess the efficiency of his swallow function, identify signs and symptoms of aspiration and make recommendations regarding safe dietary consistencies, effective compensatory strategies, and safe eating environment. General  Chart Reviewed: Yes  Behavior/Cognition: Alert; Cooperative; Requires cueing  Respiratory Status: O2 via nasual cannula  Communication Observation:  (communication breakdowns secondary to severe [place])  Follows Directions: Simple (with pocket talker on and frequent repetition)  Dentition: Dentures top  Patient Positioning: Upright in bed  Baseline Vocal Quality: Normal  Prior Dysphagia History: Previous BSE on previous admission 2/4/2021: regular/thin. Pt denies difficulty. Consistencies Administered: Reg solid; Dysphagia Pureed (Dysphagia I); Thin - straw; Thin - cup    Current Diet level:  Current Diet : Regular  Current Liquid Diet : Thin    Oral Motor Deficits  Oral/Motor  Oral Motor:  Within functional limits  Lingual ROM:  (mild reduced lingual elevation)    Oral Phase Dysfunction  Oral Phase  Oral Phase: WFL  Oral Phase  Oral Phase - Comment: Oral stage WFL     Indicators of Pharyngeal Phase Dysfunction   Pharyngeal Phase  Pharyngeal Phase: WFL  Pharyngeal Phase   Pharyngeal: Appears WFL    Impression  Dysphagia Diagnosis: Swallow function appears grossly intact  Dysphagia Impression : Pt presents with functional oral/pharyngeal swallow at this time. Pt finishing breakfast upon SLP arrival. Pt tolerating consecutive drinks of thin from cup and straw. Pt demonstrated adequate bolus manipulation and A-P transit. Mild lingual residue of regular which cleared with liquid wash. Prompt swallow with good laryngeal elevation. No overt s/s of aspiration occurred. Dysphagia Outcome Severity Scale: Level 7: Normal in all situations     Treatment Plan  Requires SLP Intervention: No       Prognosis  Individuals consulted  Consulted and agree with results and recommendations: Patient;RN    Education  Patient Education: Educated pt on results  Patient Education Response: Verbalizes understanding  Safety Devices in place: Yes  Type of devices: Bed alarm in place;Call light within reach    Pain Assessment:  Pre-Treatment  Pain assessment: 0-10  Pain level: 0  Intervention:  Patient denies pain. Post-Treatment  Pain assessment: 0-10  Pain level: 0  Intervention:  Patient denies pain. Therapy Time  SLP Individual Minutes  Time In: 9999  Time Out: 9208  Minutes: 15              Signature: Electronically signed by Melania Dawson.  DALTON Moss on 9/17/2021 at 9:24 AM

## 2021-09-17 NOTE — PROGRESS NOTES
([facility]) ([facility]     Pacemaker 4/16/2015     Past Surgical History:   Procedure Laterality Date    CATARACT REMOVAL      MIDDLE EAR SURGERY Left 1998    \"they had to reset the bones\" after an infection         DATE ONSET: 9/16/2021    Date of Evaluation: 9/17/2021   Evaluating Therapist: Israel Jackman, SLP      Assessment:      Diagnosis: Evaluation limited secondary to severe hearing impairment. Right hearing aid not working at this time. Pocket talker used which improved pt's ability to answer questions, however frequent repetition was still required. Pt presents with moderate cognitive linguistic impairment characterized by decreased attention, memory, and problem solving. See report for details. Results are consistent to SLE on previous admission 2/3/2021. With amplification, pt was able to follow simple directions and express basic wants and needs. Pt frequently will state \"I don't know\" and needs encouragement to engage in conversation. Recommendations:  Requires SLP Intervention: No       Subjective:   Previous level of function and limitations: Impaired cognition  General  Chart Reviewed: Yes  Subjective  Subjective: Alert, cooperative, fair participation     Vision  Vision: Exceptions to [facility] (unable to assess, but decreased vision is suspected per chart review and difficulty with naming some objects in room. Pt able to demonstrate appropriate visual scanning of breakfast tray to feed self and point to environmental objects in room)  Hearing  Hearing: Exceptions to [facility]  Hearing Exceptions: Hard of hearing/hearing concerns;Right hearing aid (right hearing aid not working, suspect needs new batteries. extrememly [place]. Pocket talker used during evaluation with good improvement.)           Objective:     Oral/Motor  Oral Motor:  Within functional limits  Lingual ROM:  (mild reduced lingual elevation)    Auditory Comprehension  Comprehension: Exceptions  Yes/No Questions:  (unable to answer mid level yes/no secondary to decreased hearing)  Basic Questions:  [facility])  One Step Basic Commands:  (WFL with occasional repetition and gesture cues)  Two Step Basic Commands: Severe  Common Objects:  (WFL for environmental items)  Pictures:  (DNT)  Conversation: Severe  Interfering Components: Hearing  Effective Techniques: Assisted listening device;Repetition;Visual/Gestural cues; Increased volume         Expression  Primary Mode of Expression: Verbal    Verbal Expression  Verbal Expression: Exceptions to [facility]  Initiation: Moderate  Repetition:  (DNT)  Automatic Speech:  (WFL)  Confrontation: Mild (stated \"waste basket\" for chair and \"car fare\" for pitcher; named 3/5 correct)  Conversation:  (did not initiate speech but answered questions)  Interfering Components: Attention         Motor Speech  Motor Speech: Within Functional Limits         Cognition:      Orientation  Overall Orientation Status: Impaired (oriented to person, place (\"hospital\" and \"[place]\" but could not recall name of hospital), time (\"morning\"), and injury (\"I was having dizzy spells\"). Unable to recall current month (\"I don't know\") and year (\"2020\"). )  Attention  Attention: Exceptions to [facility]  Selective Attention: Moderate  Sustained Attention: Mild  Memory  Memory: Exceptions to [facility] (able to recall he just consumed breakfast; pt recalled his address; unable to fully assess secondary to [facility])  Problem Solving  Problem Solving: Exceptions to [facility] (unable to answer questions related to his call light or 9-1-1)      Prognosis:  Individuals consulted  Consulted and agree with results and recommendations: Patient    Education:  Patient Education: Educated pt on results  Patient Education Response: Verbalizes understanding  Safety Devices in place: Yes  Type of devices: Bed alarm in place;Call light within reach    Pain Assessment:  Pre-Treatment  Pain assessment: 0-10  Pain level: 0  Intervention:  Patient denies pain.     Post-Treatment  Pain assessment:

## 2021-09-17 NOTE — PROGRESS NOTES
Physical Therapy Missed Treatment   Facility/Department: [facility] MED SURG [id number]/[id number]    NAME: Willie Guzman    : 3/5/1929 (89 y.o.)  MRN: [id number]    Account: [de-identified]  Gender: male        PT chart review completed, evaluation attempted, pt asleep and unarousable at 11:42 AM.      Will attempt PT evaluation again at earliest convenience.       Electronically signed by Mikhail House PT on 21 at 11:42 AM EDT

## 2021-09-17 NOTE — PROGRESS NOTES
Iman Grief, DO        lansoprazole suspension SUSP 30 mg  30 mg Oral Daily Iman Grief, DO   30 mg at 21 1206    glucose (GLUTOSE) 40 % oral gel 15 g  15 g Oral PRN Iman Grief, DO        dextrose 50 % IV solution  12.5 g IntraVENous PRN Iman Grief, DO        glucagon (rDNA) injection 1 mg  1 mg IntraMUSCular PRN Iman Grief, DO        dextrose 5 % solution  100 mL/hr IntraVENous PRN Iman Grief, DO        insulin lispro (HUMALOG) injection vial 0-6 Units  0-6 Units SubCUTAneous TID WC Iman Grief, DO   1 Units at 21 1159    insulin lispro (HUMALOG) injection vial 0-3 Units  0-3 Units SubCUTAneous Nightly Iman Grief, DO        albuterol sulfate  (90 Base) MCG/ACT inhaler 2 puff  2 puff Inhalation Q4H PRN Iman Grief, DO           Physical Examination:      Vitals:  BP (!) 157/68   Pulse 60   Temp 97.7 °F (36.5 °C) (Oral)   Resp 18   Wt 150 lb (68 kg)   SpO2 97%   BMI 24.21 kg/m²   Temp (24hrs), Av.8 °F (36.6 °C), Min:97.5 °F (36.4 °C), Max:98.1 °F (36.7 °C)      General appearance: Elderly. Very hard of hearing. Very difficult to understand. Lungs: clear to auscultation bilaterally, normal effort  Heart: regular rate and rhythm, no murmur  Abdomen: soft, nontender, nondistended, bowel sounds present, no masses  Extremities: no edema, redness, tenderness in the calves. Cap refill <2s  Skin: no gross lesions, rashes    Data:     Labs:  Recent Labs     21  1532   WBC 6.3 6.7   HGB 14.0 13.8*    226     Recent Labs     21  1532    138   K 5.5* 4.4    104   CO2 21 23   BUN 45* 29*   CREATININE 1.34* 1.11   GLUCOSE 203* 142*     Recent Labs     21  1532   AST 17 19   ALT 16 16   BILITOT 1.0* 0.8*   ALKPHOS 73 76       Assessment and Plan:        1.   Acute CVA manifesting as dizziness and left-sided sensory loss: deficits appear improved- patient was able to ambulate with PT  -Already on Eliquis. Added aspirin  -Permissive hypertension-can resume medication  -Cannot get MRI due to pacemaker. -TTE pending. Lipid panel, carotid US, A1c reviewed  -PT/OT  -Risk factor modification     Hypertension  Type 2 diabetes A1c 7.4  CAD  PAD  Hyperlipidemia    Diet: ADULT DIET; Regular; 4 carb choices (60 gm/meal)  DNR-CCA    Pending PT/OT.   Will likely be medically ready for discharge 9/18    >35 minutes in total care time    Electronically signed by Ivy Malcolm DO on 9/17/2021 at 3:52 PM

## 2021-09-17 NOTE — PROGRESS NOTES
Patient has an 21 [facility] MRI  [employer] ADAPTA  MRI CONTRAINDICATED  Nurse Radha notified  MRI DISCONTINUED

## 2021-09-17 NOTE — CONSULTS
Subjective:      Patient ID: Yuliet Irvin is a 80 y.o. male who presents today for stroke. .  Dr Oglesby Dense  HPI 80-year-old right-handed gentleman admitted with a stroke. He  apparently have had a stroke a few days ago given that we already saw the stroke when he came to the emergency room. He had general weakness and some confusion and therefore evaluation. Patient has a pacemaker as well. Patient is on Eliquis. No bleeding or bruising are noted. Review of system is difficult ascertain as patient is not speaking much today is very hard of hearing  Review of Systems   Unable to perform ROS: Patient nonverbal       Past Medical History:   Diagnosis Date    Anemia     CAD (coronary artery disease) 4/16/2015    [facility]     Dyslipidemia     History of tobacco abuse     HTN (hypertension)     Hypothyroidism     Non-ST elevation MI (NSTEMI) ([facility]     Pacemaker 4/16/2015     Past Surgical History:   Procedure Laterality Date    CATARACT REMOVAL      MIDDLE EAR SURGERY Left 1998    \"they had to reset the bones\" after an infection     Social History     Socioeconomic History    Marital status:       Spouse name: Not on file    Number of children: Not on file    Years of education: Not on file    Highest education level: Not on file   Occupational History    Not on file   Tobacco Use    Smoking status: Former Smoker    Smokeless tobacco: Never Used   Vaping Use    Vaping Use: Never used   Substance and Sexual Activity    Alcohol use: No    Drug use: No    Sexual activity: Not on file   Other Topics Concern    Not on file   Social History Narrative    Lives With: granddaughter Michelle Older  and family    Type of Home: House  Two level, Bed/Bath upstairs-[address] [facility] Access: Stairs to enter with rails    Entrance Stairs - Number of Steps: 14    Bathroom Shower/Tub: Tub/Shower unit    Home Equipment: Rolling walker, Cane    ADL Assistance: Independent Homemaking Assistance: Independent(granddaughter completes)    Ambulation Assistance: Independent    Transfer Assistance: Independent    Active : Yes    Additional Comments: pt is significantly [place]; information gathered via chart review and via writing. Social Determinants of Health     Financial Resource Strain:     Difficulty of Paying Living Expenses:    Food Insecurity:     Worried About Running Out of Food in the Last Year:     [address] in the Last Year:    Transportation Needs:     Lack of Transportation (Medical):  Lack of Transportation (Non-Medical):    Physical Activity:     Days of Exercise per Week:     Minutes of Exercise per Session:    Stress:     Feeling of Stress :    Social Connections:     Frequency of Communication with Friends and Family:     Frequency of Social Gatherings with Friends and Family:     Attends [employer] Services:     Active Member of Clubs or Organizations:     Attends Club or Organization Meetings:     Marital Status:    Intimate Partner Violence:     Fear of Current or Ex-Partner:     Emotionally Abused:     Physically Abused:     Sexually Abused:      History reviewed. No pertinent family history.   No Known Allergies  Current Facility-Administered Medications   Medication Dose Route Frequency Provider Last Rate Last Admin    apixaban (ELIQUIS) tablet 5 mg  5 mg Oral BID Sophie Hurst, DO   5 mg at 09/17/21 0815    atorvastatin (LIPITOR) tablet 40 mg  40 mg Oral Nightly Sophie Hurst, DO   40 mg at 09/17/21 0029    budesonide-formoterol (SYMBICORT) 80-4.5 MCG/ACT inhaler 2 puff  2 puff Inhalation BID Sophie Hurst, DO   2 puff at 09/17/21 0702    carvedilol (COREG) tablet 6.25 mg  6.25 mg Oral BID WC Sophie Hurst, DO   6.25 mg at 09/17/21 0815    ferrous sulfate (IRON 325) tablet 325 mg  325 mg Oral Daily with breakfast Sophie Hurst, DO   325 mg at 95/99/22 8850    folic acid (FOLVITE) tablet 1 mg  1 mg Oral Daily Otoniel Georges R Shashank, DO   1 mg at 09/17/21 0815    sodium chloride flush 0.9 % injection 5-40 mL  5-40 mL IntraVENous 2 times per day Doreene Torres, DO   10 mL at 09/17/21 0826    sodium chloride flush 0.9 % injection 5-40 mL  5-40 mL IntraVENous PRN Doreene Torres, DO        0.9 % sodium chloride infusion  25 mL IntraVENous PRN Doreene Torres, DO        ondansetron (ZOFRAN-ODT) disintegrating tablet 4 mg  4 mg Oral Q8H PRN Doreene Torres, DO        Or    ondansetron [facility]) injection 4 mg  4 mg IntraVENous Q6H PRN Doreene Torres, DO        polyethylene glycol (GLYCOLAX) packet 17 g  17 g Oral Daily PRN Doreene Torres, DO        aspirin EC tablet 81 mg  81 mg Oral Daily Doreene Torres, DO   81 mg at 09/17/21 1148    Or    aspirin suppository 300 mg  300 mg Rectal Daily Doreene Torres, DO        lansoprazole suspension SUSP 30 mg  30 mg Oral Daily Doreene Torres, DO   30 mg at 09/17/21 1206    glucose (GLUTOSE) 40 % oral gel 15 g  15 g Oral PRN Doreene Torres, DO        dextrose 50 % IV solution  12.5 g IntraVENous PRN Doreene Torres, DO        glucagon (rDNA) injection 1 mg  1 mg IntraMUSCular PRN Doreene Torres, DO        dextrose 5 % solution  100 mL/hr IntraVENous PRN Doreene Torres, DO        insulin lispro (HUMALOG) injection vial 0-6 Units  0-6 Units SubCUTAneous TID WC Doreene Torres, DO   1 Units at 09/17/21 1159    insulin lispro (HUMALOG) injection vial 0-3 Units  0-3 Units SubCUTAneous Nightly Doreene Torres, DO        albuterol sulfate  (90 Base) MCG/ACT inhaler 2 puff  2 puff Inhalation Q4H PRN Doreene Torres, DO            Objective:   BP (!) 157/68   Pulse 64   Temp 97.7 °F (36.5 °C) (Oral)   Resp 18   Wt 150 lb (68 kg)   SpO2 97%   BMI 24.21 kg/m²     Physical Exam  Vitals reviewed. Eyes:      Pupils: Pupils are equal, round, and reactive to light. Cardiovascular:      Rate and Rhythm: Normal rate and regular rhythm. Heart sounds: No murmur heard. mild global cerebral atrophy and chronic involutional changes. Moderate periventricular white matter hypodensities indicating chronic microangiopathy are noted. The basal ganglia are within normal limits. There are no acute changes or space-occupying lesions in the posterior fossa. The visualized portions of the orbits are within normal limits. The globes are intact. The imaged portions of the paranasal sinuses are unremarkable. The calvarium is intact. There is a 3 cm new area of hypodensity worrisome for acute in the right occipital lobe. The findings were called to the ER [id number] hours. ,     XR CHEST PORTABLE    Result Date: 9/16/2021  XR CHEST PORTABLE Clinical History:  Weakness. Lethargic. Comparison:  9/14/2021. RESULT: Median sternotomy. Right transvenous pacemaker, unchanged. No consolidation. No pleural effusion. No pneumothorax. Stable cardiomediastinal silhouette. Aortic vascular calcifications. No distinct acute osseous findings. No acute radiographic abnormality. US CAROTID ARTERY BILATERAL    Result Date: 9/16/2021  US CAROTID ARTERY BILATERAL: 9/16/2021 6:40 PM CLINICAL HISTORY:  cva . COMPARISON: None available. Grayscale, color and waveform Doppler analysis of the cervical carotid and vertebral arteries was performed. Validated velocity measurements with angiographic measurements, velocity criteria are extrapolated from diameter data as defined by the Society of Radiologist in [address] [place] [address] Drive Radiology 2003; 603;310-413. FINDINGS: There is mild atherosclerotic plaquing of the common carotid arteries and carotid bifurcations without significantly elevated velocities. Maximum systolic velocity within the right internal and mid common carotid arteries are 79 and 51 cm/s, with an ICA/CCA ratio of approximately 1.5 , which indicates less than 50% by velocity criteria.  Maximum systolic velocity within the left internal and mid common carotid arteries are 59 and 74 cm/s, with an ICA/CCA ratio of approximately 0.9, which indicates less than 50% by velocity criteria. Maximum velocities within the right and left external carotid arteries are 141 and 81 cm/sec respectively. There is antegrade flow in both vertebral arteries. MILD ATHEROSCLEROTIC PLAQUING OF THE CAROTID BULBS WITHOUT EVIDENCE OF A FLOW-LIMITING STENOSIS, BY VELOCITY RATIO CRITERIA.  GOSINK CRITERIA Diameter          PSV t            EDV t          PSV          EDV          Stenosis Site       %              cm/sec          cm/sec      ICA/CCA    ICA/CCA 0-49                 <124              <40             <2:1           ---                 --- 50-69              125-225                  >2:1           ---                 --- 70-89               225-325          >100          >4:1           >5:1              --- 90%+                >325              >100          >4:1           >9:1           Damped resistive CCA >95%               May be            May be      Damped      ---              Damped resistive CCA                       decreased      decreased  resistive CCA       Lab Results   Component Value Date    WBC 6.7 09/16/2021    RBC 4.29 09/16/2021    HGB 13.8 09/16/2021    HCT 41.2 09/16/2021    MCV 96.0 09/16/2021    MCH 32.2 09/16/2021    MCHC 33.5 09/16/2021    RDW 14.2 09/16/2021     09/16/2021    MPV 9.0 07/29/2014     Lab Results   Component Value Date     09/16/2021    K 4.4 09/16/2021    K 4.2 06/23/2021     09/16/2021    CO2 23 09/16/2021    BUN 29 09/16/2021    CREATININE 1.11 09/16/2021    GFRAA >60.0 09/16/2021    LABGLOM >60.0 09/16/2021    GLUCOSE 142 09/16/2021    PROT 6.9 09/16/2021    LABALBU 4.1 09/16/2021    CALCIUM 8.9 09/16/2021    BILITOT 0.8 09/16/2021    ALKPHOS 76 09/16/2021    AST 19 09/16/2021    ALT 16 09/16/2021     Lab Results   Component Value Date    PROTIME 16.3 09/14/2021    INR 1.3 09/14/2021     Lab Results   Component Value Date    TSH 3.700

## 2021-09-17 NOTE — PROGRESS NOTES
Physical Therapy Med Surg Initial Assessment  Facility/Department: [facility]  [facility]       NAME: Hal Ramos  : 3/5/1929 (80 y.o.)  MRN: [id number]  CODE STATUS: DNR-CCA    Date of Service: 2021    Patient Diagnosis(es): Elevated troponin [R77.8]  Acute CVA (cerebrovascular accident) [facility]) [I63.9]  Cerebrovascular accident (CVA), unspecified mechanism ([address] [I63.9]   Chief Complaint   Patient presents with    Extremity Weakness     pt was sent to the ER for general weakness and possible dehydration, recently D/C from hospital     Patient Active Problem List    Diagnosis Date Noted    Anginal equivalent ([facility]     Acute CVA (cerebrovascular accident) ([facility] 2021    General weakness 09/15/2021    Elevated troponin 09/15/2021    Hyperkalemia 09/15/2021    Anticoagulated 09/15/2021    Arterial occlusion 2021    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    CKD (chronic kidney disease) 2021    PVD (peripheral vascular disease) ([address] 2021    Venous thrombosis of leg 2021    Hyponatremia 2021    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[facility] [facility] 2021    Syncope and collapse 2020    COPD exacerbation ([address] 2020    NSTEMI (non-ST elevated myocardial infarction) ([address] 2019    Chest pain 2019    Hypotension 2019    SOB (shortness of breath) 2018    Chronic right shoulder pain 2018    COPD with acute exacerbation ([address] 2018    Bronchitis 2016    CAD (coronary artery disease) 2015    HTN (hypertension)     Dyslipidemia     DM (diabetes mellitus) ([address]     Hypothyroidism     History of tobacco abuse     Anemia         Past Medical History:   Diagnosis Date    Anemia     CAD (coronary artery disease) 2015    DM (diabetes mellitus) ([address]     Dyslipidemia     History of tobacco abuse     HTN (hypertension)     Hypothyroidism     Non-ST elevation MI (NSTEMI) [facility])     Pacemaker 4/16/2015     Past Surgical History:   Procedure Laterality Date    CATARACT REMOVAL      MIDDLE EAR SURGERY Left 1998    \"they had to reset the bones\" after an infection       Chart Reviewed: Yes  Family / Caregiver Present: Yes (grand daughter)    Restrictions:  Restrictions/Precautions: Fall Risk     SUBJECTIVE:      Pain  Pre Treatment Pain Screening  Pain at present: 0    Post Treatment Pain Screening:   Pain Assessment  Pain Level: 0    Prior Level of Function:  Social/Functional History  Lives With:  (grand daughter)  Type of Home: House  Home Layout: Two level, Bed/Bath upstairs, Able to Live on Main level with bedroom/bathroom  Home Access: Ramped entrance  Home Equipment: Rolling walker  ADL Assistance: Independent  Homemaking Assistance: Independent  Ambulation Assistance: Independent (no device)  Transfer Assistance: Independent  Active : No    OBJECTIVE:   Vision: Within Functional Limits  Hearing Exceptions: Hard of hearing/hearing concerns;Right hearing aid    Cognition:  Orientation Level: Oriented to person  Follows Commands: Impaired (extremely [place])         ROM:  RLE AROM: WFL  LLE AROM : WFL    Strength:  Strength RLE  Strength RLE: WFL  Strength LLE  Strength LLE: WFL    Neuro:  Balance  Sitting - Static: Good  Sitting - Dynamic: Good  Standing - Static: Good;- (mild increased sway with standing urination however no balance assistancerequired)  Standing - Dynamic: Good;- (intermittent unsteadiness with pt preferring light touch on wall or therapist hand for correction)             Bed mobility  Bridging: Supervision  Rolling to Left: Supervision  Rolling to Right: Supervision  Supine to Sit: Supervision  Sit to Supine: Supervision  Comment: mild concern for edge of bed safety noted    Transfers  Sit to Stand: Stand by assistance (initial CGA provided due to concern for new CVA and pts first opportunity OOB)  Stand to sit: Stand by assistance    Ambulation  Ambulation?: Yes  Ambulation 1  Surface: level tile  Device: No Device  Assistance: Stand by assistance;Minimal assistance;Contact guard assistance  Quality of Gait: narrow MISAEL, no significant LOB with mild unsteadiness noted, occasional use of wall for balancerecovery and occasional therapist assistance needed, multiple turns included  Distance: 70 feet  Comments: familyindicates pt is close to his baseline level of function              Activity Tolerance  Activity Tolerance: Patient Tolerated treatment well  Activity Tolerance: O2 sat decreased to 78% after gait and with supine lying - recovered to 98% on room air with HOB elevated. Nursing and MD aware          PT Education  PT Education: Goals;PT Role;Plan of Care  Patient Education: Family observed in person and via zoom. They report they can assist pt at this level of care upon return to home    ASSESSMENT:   Body structures, Functions, Activity limitations: Decreased functional mobility ; Decreased balance  Decision Making: Medium Complexity  History: high  Exam: med  Clinical Presentation: med    Patient Education: Family observed in person and via zoom. They report they can assist pt at this level of care upon return to home  Barriers to Learning: [facility]    DISCHARGE RECOMMENDATIONS:  Discharge Recommendations: Continue to assess pending progress (pts family report pt can stay on first level if needed andthis is recommended initially)    Assessment: Pt demonstrates mild deficits as listed.  Pt would benefit from short term PT at this level of care for safe return to home  REQUIRES PT FOLLOW UP: Yes      PLAN OF CARE:  Plan  Times per week: 2-3 visits  Current Treatment Recommendations: Functional Mobility Training, Neuromuscular Re-education, Stair training, Balance Training, Gait Training  Safety Devices  Type of devices: Bed alarm in place, Left in bed    Goals:  Short term goals  Short term goal 1: indep sit to stand  Short term goal 2: indep bed mobility  Short term goal 3: indep gait with no device 50 feet in protected area  Short term goal 4: SBA stairs with rails    AMPAC (6 CLICK) BASIC MOBILITY  AM-PAC Inpatient Mobility Raw Score : 20     Therapy Time:   Individual   Time In 1420   Time Out 1442   Minutes [address] [place], [place], 09/17/21 at 2:55 PM         Definitions for assistance levels  Independent = pt does not require any physical supervision or assistance from another person for activity completion. Device may be needed.   Stand by assistance = pt requires verbal cues or instructions from another person, close to but not touching, to perform the activity  Minimal assistance= pt performs 75% or more of the activity; assistance is required to complete the activity  Moderate assistance= pt performs 50% of the activity; assistance is required to complete the activity  Maximal assistance = pt performs 25% of the activity; assistance is required to complete the activity  Dependent = pt requires total physical assistance to accomplish the task

## 2021-09-17 NOTE — PROGRESS NOTES
[facility] Respiratory Therapy Evaluation   Current Order:  Albuterol mdi q6prn  Home Regimen: prn    Ordering Physician:emma  Re-evaluation Date:  eval done   Diagnosis: cva   Patient Status: Stable / Unstable + Physician notified    The following MDI Criteria must be met in order to convert aerosol to MDI with spacer. If unable to meet, MDI will be converted to aerosol:  []  Patient able to demonstrate the ability to use MDI effectively  []  Patient alert and cooperative  []  Patient able to take deep breath with 5-10 second hold  []  Medication(s) available in this delivery method   []  Peak flow greater than or equal to 200 ml/min            Current Order Substituted To  (same drug, same frequency)   Aerosol to MDI [] Albuterol Sulfate 0.083% unit dose by aerosol Albuterol Sulfate MDI 2 puffs by inhalation with spacer    [] Levalbuterol 1.25 mg unit dose by aerosol Levalbuterol MDI 2 puffs by inhalation with spacer    [] Levalbuterol 0.63 mg unit dose by aerosol Levalbuterol MDI 2 puffs by inhalation with spacer    [] Ipratropium Bromide 0.02% unit dose by aerosol Ipratropium Bromide MDI 2 puffs by inhalation with spacer    [] Duoneb (Ipratropium + Albuterol) unit dose by aerosol Ipratropium MDI + Albuterol MDI 2 puffs by inhalation w/spacer   MDI to Aerosol [] Albuterol Sulfate MDI Albuterol Sulfate 0.083% unit dose by aerosol    [] Levalbuterol MDI 2 puffs by inhalation Levalbuterol 1.25 mg unit dose by aerosol    [] Ipratropium Bromide MDI by inhalation Ipratropium Bromide 0.02% unit dose by aerosol    [] Combivent (Ipratropium + Albuterol) MDI by inhalation Duoneb (Ipratropium + Albuterol) unit dose by aerosol       Treatment Assessment [Frequency/Schedule]:  Change frequency to: _albuterol mdi q4prn_________________________________________________per Protocol, P&T, MEC      Points 0 1 2 3 4   Pulmonary Status  Non-Smoker  []   Smoking history   < 20 pack years  []   Smoking history  ?  20 pack years  [x]   Pulmonary Disorder  (acute or chronic)  []   Severe or Chronic w/ Exacerbation  []     Surgical Status No [x]   Surgeries     General []   Surgery Lower []   Abdominal Thoracic or []   Upper Abdominal Thoracic with  PulmonaryDisorder  []     Chest X-ray Clear/Not  Ordered     [x]  Chronic Changes  Results Pending  []  Infiltrates, atelectasis, pleural effusion, or edema  []  Infiltrates in more than one lobe []  Infiltrate + Atelectasis, &/or pleural effusion  []    Respiratory Pattern Regular,  RR = 12-20 [x]  Increased,  RR = 21-25 []  BUTT, irregular,  or RR = 26-30 []  Decreased FEV1  or RR = 31-35 []  Severe SOB, use  of accessory muscles, or RR ? 35  []    Mental Status Alert, oriented,  Cooperative []  Confused but Follows commands [x]  Lethargic or unable to follow commands []  Obtunded  []  Comatose  []    Breath Sounds Clear to  auscultation  []  Decreased unilaterally or  in bases only [x]  Decreased  bilaterally  []  Crackles or intermittent wheezes []  Wheezes []    Cough Strong, Spontan., & nonproductive [x]  Strong,  spontaneous, &  productive []  Weak,  Nonproductive []  Weak, productive or  with wheezes []  No spontaneous  cough or may require suctioning []    Level of Activity Ambulatory []  Ambulatory w/ Assist  [x]  Non-ambulatory []  Paraplegic []  Quadriplegic []    Total5    Score:__4_____     Triage Score:__5______      Tri       Triage:     1. (>20) Freq: Q3    2. (16-20) Freq: Q4   3. (11-15) Freq: QID & Albuterol Q2 PRN    4. (6-10) Freq: TID & Albuterol Q2 PRN    5. (0-5) Freq Q4prn

## 2021-09-18 LAB
ANION GAP SERPL CALCULATED.3IONS-SCNC: 13 MEQ/L (ref 9–15)
BUN BLDV-MCNC: 27 MG/DL (ref 8–23)
CALCIUM SERPL-MCNC: 8.5 MG/DL (ref 8.5–9.9)
CHLORIDE BLD-SCNC: 107 MEQ/L (ref 95–107)
CO2: 19 MEQ/L (ref 20–31)
CREAT SERPL-MCNC: 1.23 MG/DL (ref 0.7–1.2)
GFR AFRICAN AMERICAN: >60
GFR NON-AFRICAN AMERICAN: 55
GLUCOSE BLD-MCNC: 108 MG/DL (ref 60–115)
GLUCOSE BLD-MCNC: 114 MG/DL (ref 70–99)
GLUCOSE BLD-MCNC: 116 MG/DL (ref 60–115)
GLUCOSE BLD-MCNC: 132 MG/DL (ref 60–115)
GLUCOSE BLD-MCNC: 143 MG/DL (ref 60–115)
HCT VFR BLD CALC: 36.6 % (ref 42–52)
HEMOGLOBIN: 12.5 G/DL (ref 14–18)
MCH RBC QN AUTO: 32.6 PG (ref 27–31.3)
MCHC RBC AUTO-ENTMCNC: 34.1 % (ref 33–37)
MCV RBC AUTO: 95.6 FL (ref 80–100)
PDW BLD-RTO: 14.1 % (ref 11.5–14.5)
PERFORMED ON: ABNORMAL
PERFORMED ON: NORMAL
PLATELET # BLD: 201 K/UL (ref 130–400)
POTASSIUM REFLEX MAGNESIUM: 4.4 MEQ/L (ref 3.4–4.9)
RBC # BLD: 3.82 M/UL (ref 4.7–6.1)
SODIUM BLD-SCNC: 139 MEQ/L (ref 135–144)
WBC # BLD: 6.1 K/UL (ref 4.8–10.8)

## 2021-09-18 PROCEDURE — 94640 AIRWAY INHALATION TREATMENT: CPT

## 2021-09-18 PROCEDURE — 6370000000 HC RX 637 (ALT 250 FOR IP): Performed by: INTERNAL MEDICINE

## 2021-09-18 PROCEDURE — 2580000003 HC RX 258: Performed by: INTERNAL MEDICINE

## 2021-09-18 PROCEDURE — 1210000000 HC MED SURG R&B

## 2021-09-18 PROCEDURE — 94761 N-INVAS EAR/PLS OXIMETRY MLT: CPT

## 2021-09-18 PROCEDURE — 85027 COMPLETE CBC AUTOMATED: CPT

## 2021-09-18 PROCEDURE — 80048 BASIC METABOLIC PNL TOTAL CA: CPT

## 2021-09-18 PROCEDURE — APPSS45 APP SPLIT SHARED TIME 31-45 MINUTES: Performed by: STUDENT IN AN ORGANIZED HEALTH CARE EDUCATION/TRAINING PROGRAM

## 2021-09-18 PROCEDURE — 36415 COLL VENOUS BLD VENIPUNCTURE: CPT

## 2021-09-18 PROCEDURE — 99233 SBSQ HOSP IP/OBS HIGH 50: CPT | Performed by: PSYCHIATRY & NEUROLOGY

## 2021-09-18 PROCEDURE — 97116 GAIT TRAINING THERAPY: CPT

## 2021-09-18 RX ADMIN — RAMIPRIL 5 MG: 5 CAPSULE ORAL at 11:50

## 2021-09-18 RX ADMIN — ASPIRIN 81 MG: 81 TABLET, COATED ORAL at 11:49

## 2021-09-18 RX ADMIN — APIXABAN 5 MG: 5 TABLET, FILM COATED ORAL at 00:18

## 2021-09-18 RX ADMIN — Medication 10 ML: at 11:51

## 2021-09-18 RX ADMIN — BUDESONIDE AND FORMOTEROL FUMARATE DIHYDRATE 2 PUFF: 80; 4.5 AEROSOL RESPIRATORY (INHALATION) at 19:58

## 2021-09-18 RX ADMIN — Medication 10 ML: at 00:21

## 2021-09-18 RX ADMIN — INSULIN LISPRO 1 UNITS: 100 INJECTION, SOLUTION INTRAVENOUS; SUBCUTANEOUS at 13:23

## 2021-09-18 RX ADMIN — APIXABAN 5 MG: 5 TABLET, FILM COATED ORAL at 22:47

## 2021-09-18 RX ADMIN — ATORVASTATIN CALCIUM 40 MG: 40 TABLET, FILM COATED ORAL at 00:19

## 2021-09-18 RX ADMIN — FOLIC ACID 1 MG: 1 TABLET ORAL at 11:50

## 2021-09-18 RX ADMIN — FERROUS SULFATE TAB 325 MG (65 MG ELEMENTAL FE) 325 MG: 325 (65 FE) TAB at 11:50

## 2021-09-18 RX ADMIN — Medication 10 ML: at 22:50

## 2021-09-18 RX ADMIN — APIXABAN 5 MG: 5 TABLET, FILM COATED ORAL at 11:50

## 2021-09-18 RX ADMIN — CARVEDILOL 6.25 MG: 6.25 TABLET, FILM COATED ORAL at 11:49

## 2021-09-18 RX ADMIN — ATORVASTATIN CALCIUM 40 MG: 40 TABLET, FILM COATED ORAL at 22:46

## 2021-09-18 ASSESSMENT — PAIN SCALES - GENERAL
PAINLEVEL_OUTOF10: 0
PAINLEVEL_OUTOF10: 0

## 2021-09-18 NOTE — PROGRESS NOTES
[facility])     Pacemaker 4/16/2015     Past Surgical History:   Procedure Laterality Date    CATARACT REMOVAL      MIDDLE EAR SURGERY Left 1998    \"they had to reset the bones\" after an infection       Restrictions  Restrictions/Precautions: Fall Risk    SUBJECTIVE   General  Chart Reviewed: Yes  Family / Caregiver Present: No  Subjective  Subjective: nods yes    Pre-Session Pain Report     Pain Screening  Patient Currently in Pain: No  Pre Treatment Pain Screening  Pain at present: 0  Scale Used: Numeric Score  Intervention List: Patient able to continue with treatment    Post-Session Pain Report  Pain Assessment  Pain Assessment: 0-10  Pain Level: 0         OBJECTIVE        Bed mobility  Bridging: Supervision  Rolling to Left: Supervision  Rolling to Right: Supervision  Supine to Sit: Supervision  Sit to Supine: Supervision    Transfers  Sit to Stand: Contact guard assistance  Stand to sit: Contact guard assistance  Comment: unsteady with inital stand with increased lateral LOB. pt lethargic    Ambulation  Ambulation?: Yes  More Ambulation?: No  Ambulation 1  Surface: level tile  Device: No Device  Assistance: Minimal assistance; Moderate assistance (Mod A d/t multiple occurances of LOB)  Quality of Gait: NBOS, deviation from straight path, inconsistent maria l, occasional use of wall for balance, LOB with turns and when trying to avoid obstacles in hallway  Distance: [de-identified]'                                         Activity Tolerance  Activity Tolerance: Patient Tolerated treatment well          ASSESSMENT      Pt demonstrated deviation from straight path and multiple episodes of LOB this session with need for Mod A to help recover and correct.      Discharge Recommendations:  Continue to assess pending progress (pts family report pt can stay on first level if needed andthis is recommended initially)    Goals  Short term goals  Short term goal 1: indep sit to stand  Short term goal 2: indep bed mobility  Short term goal 3: indep gait with no device 50 feet in protected area  Short term goal 4: SBA stairs with rails    PLAN    Times per week: 2-3 visits  Safety Devices  Type of devices: Bed alarm in place, Left in bed, All fall risk precautions in place     AMPAC (6 CLICK) BASIC MOBILITY  AM-PAC Inpatient Mobility Raw Score : 20   Therapy Time   Individual   Time In 0804   Time Out 0814   Minutes 10         gait:10    Lucia Colin DEL, 09/18/21 at 9:38 AM         Definitions for assistance levels  Independent = pt does not require any physical supervision or assistance from another person for activity completion. Device may be needed.   Stand by assistance = pt requires verbal cues or instructions from another person, close to but not touching, to perform the activity  Minimal assistance= pt performs 75% or more of the activity; assistance is required to complete the activity  Moderate assistance= pt performs 50% of the activity; assistance is required to complete the activity  Maximal assistance = pt performs 25% of the activity; assistance is required to complete the activity  Dependent = pt requires total physical assistance to accomplish the task

## 2021-09-18 NOTE — PLAN OF CARE
Problem: Falls - Risk of:  Goal: Will remain free from falls  Outcome: Ongoing  Goal: Absence of physical injury  Outcome: Ongoing     Problem: Skin Integrity:  Goal: Will show no infection signs and symptoms  Outcome: Ongoing  Goal: Absence of new skin breakdown  Outcome: Ongoing     Problem: Confusion - Acute:  Goal: Absence of continued neurological deterioration signs and symptoms  Outcome: Ongoing  Goal: Mental status will be restored to baseline  Outcome: Ongoing     Problem: Discharge Planning:  Goal: Ability to perform activities of daily living will improve  Outcome: Ongoing  Goal: Participates in care planning  Outcome: Ongoing     Problem: Injury - Risk of, Physical Injury:  Goal: Will remain free from falls  Outcome: Ongoing  Goal: Absence of physical injury  Outcome: Ongoing     Problem: Mood - Altered:  Goal: Mood stable  Outcome: Ongoing  Goal: Absence of abusive behavior  Outcome: Ongoing  Goal: Verbalizations of feeling emotionally comfortable while being cared for will increase  Outcome: Ongoing     Problem: Psychomotor Activity - Altered:  Goal: Absence of psychomotor disturbance signs and symptoms  Outcome: Ongoing     Problem: Sensory Perception - Impaired:  Goal: Demonstrations of improved sensory functioning will increase  Outcome: Ongoing  Goal: Decrease in sensory misperception frequency  Outcome: Ongoing  Goal: Able to refrain from responding to false sensory perceptions  Outcome: Ongoing  Goal: Demonstrates accurate environmental perceptions  Outcome: Ongoing  Goal: Able to distinguish between reality-based and nonreality-based thinking  Outcome: Ongoing  Goal: Able to interrupt nonreality-based thinking  Outcome: Ongoing     Problem: Sleep Pattern Disturbance:  Goal: Appears well-rested  Outcome: Ongoing     Problem: HEMODYNAMIC STATUS  Goal: Patient has stable vital signs and fluid balance  Outcome: Ongoing     Problem: ACTIVITY INTOLERANCE/IMPAIRED MOBILITY  Goal: Mobility/activity is maintained at optimum level for patient  Outcome: Ongoing     Problem: COMMUNICATION IMPAIRMENT  Goal: Ability to express needs and understand communication  Outcome: Ongoing     Problem: IP COMMUNICATION/DYSARTHRIA  Goal: LTG - Patient will improve receptive language skills to allow for completion of daily activities  9/17/2021 2022 by Michelle Ureña RN  Outcome: Ongoing  9/17/2021 0945 by Edward Kemp, SLP  Outcome: Met This Shift     Problem: IP SWALLOWING  Goal: LTG - patient will tolerate the least restrictive diet consistency to allow for safe consumption of daily meals  9/17/2021 2022 by Michelle Ureña RN  Outcome: Ongoing  9/17/2021 0945 by Edward Kemp, SLP  Outcome: Met This Shift

## 2021-09-18 NOTE — PROGRESS NOTES
Hospitalist Progress Note      Date of Admission: 9/16/2021  Chief Complaint:    Chief Complaint   Patient presents with    Extremity Weakness     pt was sent to the ER for general weakness and possible dehydration, recently D/C from hospital     Subjective:  No new complaints. No nausea, vomiting, chest pain, or headache      Medications:    Infusion Medications    sodium chloride      dextrose       Scheduled Medications    ramipril  5 mg Oral Daily    apixaban  5 mg Oral BID    atorvastatin  40 mg Oral Nightly    budesonide-formoterol  2 puff Inhalation BID    carvedilol  6.25 mg Oral BID WC    ferrous sulfate  325 mg Oral Daily with breakfast    folic acid  1 mg Oral Daily    sodium chloride flush  5-40 mL IntraVENous 2 times per day    aspirin  81 mg Oral Daily    Or    aspirin  300 mg Rectal Daily    lansoprazole  30 mg Oral Daily    insulin lispro  0-6 Units SubCUTAneous TID WC    insulin lispro  0-3 Units SubCUTAneous Nightly     PRN Meds: sodium chloride flush, sodium chloride, ondansetron **OR** ondansetron, polyethylene glycol, glucose, dextrose, glucagon (rDNA), dextrose, albuterol sulfate HFA  No intake or output data in the 24 hours ending 09/18/21 1725  Exam:  BP (!) 151/66   Pulse 65   Temp 98.2 °F (36.8 °C) (Oral)   Resp 16   Wt 150 lb (68 kg)   SpO2 97%   BMI 24.21 kg/m²   Head: Normocephalic, atraumatic  Sclera clear  Neck JVD flat  Lungs: normal effort of breathing    Labs:   Recent Labs     09/16/21  1532 09/18/21  0517   WBC 6.7 6.1   HGB 13.8* 12.5*   HCT 41.2* 36.6*    201     Recent Labs     09/16/21  1532 09/18/21  0517    139   K 4.4 4.4    107   CO2 23 19*   BUN 29* 27*   CREATININE 1.11 1.23*   CALCIUM 8.9 8.5   AST 19  --    ALT 16  --    BILITOT 0.8*  --    ALKPHOS 76  --      No results for input(s): INR in the last 72 hours.   Recent Labs     09/16/21  1532   CKTOTAL 79   TROPONINI 0.031*     Radiology:  US CAROTID ARTERY BILATERAL   Final Result      MILD ATHEROSCLEROTIC PLAQUING OF THE CAROTID BULBS WITHOUT EVIDENCE OF A FLOW-LIMITING STENOSIS, BY VELOCITY RATIO CRITERIA. GOSINK CRITERIA      Diameter          PSV t            EDV t          PSV          EDV          Stenosis Site         %              cm/sec          cm/sec      ICA/CCA    ICA/CCA      0-49                 <124              <40             <2:1           ---                 ---      50-69              125-225                  >2:1           ---                 ---      70-89               225-325          >100          >4:1           >5:1              ---      90%+                >325              >100          >4:1           >9:1           Damped resistive CCA      >95%               May be            May be      Damped      ---              Damped resistive CCA                         decreased      decreased  resistive CCA                           CT Head WO Contrast   Final Result      There is a 3 cm new area of hypodensity worrisome for acute in the right occipital lobe. The findings were called to the ER [id number]. ,            XR CHEST PORTABLE   Final Result      No acute radiographic abnormality. Assessment/Plan:    Acute CVA manifesting as dizziness and left-sided sensory loss: deficits appear improved- patient was able to ambulate with PT  -Already on Eliquis.  Added aspirin  -Permissive hypertension-can resume medication  -Cannot get MRI due to pacemaker. -TTE pending. Lipid panel, carotid US, A1c reviewed  -PT/OT  -Risk factor modification     Hypertension  Type 2 diabetes A1c 7.4  CAD  PAD  Hyperlipidemia     Diet: ADULT DIET;  Regular; 4 carb choices (60 gm/meal)  DNR-CCA     DC Plan: no medical barriers to DC when placement achieved.      35 minutes total care time, >1/2 in unit/floor time and care coordination      Aditi Schneider MD

## 2021-09-18 NOTE — PROGRESS NOTES
Subjective:      Patient ID:  Patient seen and examined for neurology follow-up for acute right occipital CVA. Exam difficult given patient's severe hearing impairment. Patient is able to ambulate without assistive device. Can follow commands. No new or worsening focal deficits. No complaints,  no headache,  has a aphasia        Review of Systems   Unable to perform ROS: Patient nonverbal       Past Medical History:   Diagnosis Date    Anemia     CAD (coronary artery disease) 4/16/2015    DM (diabetes mellitus) ([facility] [address]     Dyslipidemia     History of tobacco abuse     HTN (hypertension)     Hypothyroidism     Non-ST elevation MI (NSTEMI) ([facility] [address]     Pacemaker 4/16/2015     Past Surgical History:   Procedure Laterality Date    CATARACT REMOVAL      MIDDLE EAR SURGERY Left 1998    \"they had to reset the bones\" after an infection     Social History     Socioeconomic History    Marital status:      Spouse name: Not on file    Number of children: Not on file    Years of education: Not on file    Highest education level: Not on file   Occupational History    Not on file   Tobacco Use    Smoking status: Former Smoker    Smokeless tobacco: Never Used   Vaping Use    Vaping Use: Never used   Substance and Sexual Activity    Alcohol use: No    Drug use: No    Sexual activity: Not on file   Other Topics Concern    Not on file   Social History Narrative    Lives With: granddaughter [place] Gloss  and family    Type of Home: House  Two level, Bed/Bath upstairs-[address] [facility] Access: Stairs to enter with rails    Entrance Stairs - Number of Steps: 14    Bathroom Shower/Tub: Tub/Shower unit    Home Equipment: Rolling walker, Cane    ADL Assistance: Independent    Homemaking Assistance: Independent(granddaughter completes)    Ambulation Assistance: Independent    Transfer Assistance: Independent    Active :  Yes    Additional Comments: pt is significantly [place]; information gathered via chart review and via motion. Cervical back: Normal range of motion. Skin:     General: Skin is warm. Neurological:      Mental Status: He is alert and oriented to person, place, and time. Cranial Nerves: No cranial nerve deficit. Sensory: No sensory deficit. Motor: No abnormal muscle tone. Coordination: Coordination normal.      Deep Tendon Reflexes: Reflexes are normal and symmetric. Babinski sign absent on the right side. Babinski sign absent on the left side. Psychiatric:         Mood and Affect: Mood normal.     Distinctly as much as he would not speak he was able to follow all my commands and his examination appears to be normal except for possibility of a visual field defect more notable on the left. We did not attempt to walk him. CT Head WO Contrast    Result Date: 9/16/2021  EXAMINATION: CT HEAD WO CONTRAST, 9/16/2021 3:14 PM CLINICAL HISTORY:  Weakness COMPARISON: Brain CT from September 14, 2021, 2 days prior TECHNIQUE:  Multiple contiguous axial images of the head were obtained from the skull base through the skull vertex without intravenous contrast. Sagittal and coronal reformats have been produced. All CT scans at this facility use dose modulation, iterative reconstruction, and/or weight based dosing when appropriate to reduce radiation dose to as low as reasonably achievable. BRAIN CT FINDINGS: There has been interval development of a 3 cm area of hypodensity in the right occipital lobe since the previous day which is worrisome for an acute, subacute ischemia. There are persistent, chronic areas of ischemia in the anterior right temporal lobe, unchanged since previous study. No acute hemorrhage, mass, mass effect, or midline shift. There is prominence of sulci and ventricles indicating mild global cerebral atrophy and chronic involutional changes. Moderate periventricular white matter hypodensities indicating chronic microangiopathy are noted.  The basal ganglia are within normal limits. There are no acute changes or space-occupying lesions in the posterior fossa. The visualized portions of the orbits are within normal limits. The globes are intact. The imaged portions of the paranasal sinuses are unremarkable. The calvarium is intact. There is a 3 cm new area of hypodensity worrisome for acute in the right occipital lobe. The findings were called to the ER [id number] hours. ,     XR CHEST PORTABLE    Result Date: 9/16/2021  XR CHEST PORTABLE Clinical History:  Weakness. Lethargic. Comparison:  9/14/2021. RESULT: Median sternotomy. Right transvenous pacemaker, unchanged. No consolidation. No pleural effusion. No pneumothorax. Stable cardiomediastinal silhouette. Aortic vascular calcifications. No distinct acute osseous findings. No acute radiographic abnormality. US CAROTID ARTERY BILATERAL    Result Date: 9/16/2021  US CAROTID ARTERY BILATERAL: 9/16/2021 6:40 PM CLINICAL HISTORY:  cva . COMPARISON: None available. Grayscale, color and waveform Doppler analysis of the cervical carotid and vertebral arteries was performed. Validated velocity measurements with angiographic measurements, velocity criteria are extrapolated from diameter data as defined by the Society of Radiologist in [address] [place] [address] Drive Radiology 2003; 183;[id number]. FINDINGS: There is mild atherosclerotic plaquing of the common carotid arteries and carotid bifurcations without significantly elevated velocities. Maximum systolic velocity within the right internal and mid common carotid arteries are 79 and 51 cm/s, with an ICA/CCA ratio of approximately 1.5 , which indicates less than 50% by velocity criteria. Maximum systolic velocity within the left internal and mid common carotid arteries are 59 and 74 cm/s, with an ICA/CCA ratio of approximately 0.9, which indicates less than 50% by velocity criteria.  Maximum velocities within the right and left external carotid arteries are 141 and 81 cm/sec respectively. There is antegrade flow in both vertebral arteries. MILD ATHEROSCLEROTIC PLAQUING OF THE CAROTID BULBS WITHOUT EVIDENCE OF A FLOW-LIMITING STENOSIS, BY VELOCITY RATIO CRITERIA.  GOSINK CRITERIA Diameter          PSV t            EDV t          PSV          EDV          Stenosis Site       %              cm/sec          cm/sec      ICA/CCA    ICA/CCA 0-49                 <124              <40             <2:1           ---                 --- 50-69              125-225                  >2:1           ---                 --- 70-89               225-325          >100          >4:1           >5:1              --- 90%+                >325              >100          >4:1           >9:1           Damped resistive CCA >95%               May be            May be      Damped      ---              Damped resistive CCA                       decreased      decreased  resistive CCA       Lab Results   Component Value Date    WBC 6.1 09/18/2021    RBC 3.82 09/18/2021    HGB 12.5 09/18/2021    HCT 36.6 09/18/2021    MCV 95.6 09/18/2021    MCH 32.6 09/18/2021    MCHC 34.1 09/18/2021    RDW 14.1 09/18/2021     09/18/2021    MPV 9.0 07/29/2014     Lab Results   Component Value Date     09/18/2021    K 4.4 09/18/2021     09/18/2021    CO2 19 09/18/2021    BUN 27 09/18/2021    CREATININE 1.23 09/18/2021    GFRAA >60.0 09/18/2021    LABGLOM 55.0 09/18/2021    GLUCOSE 114 09/18/2021    PROT 6.9 09/16/2021    LABALBU 4.1 09/16/2021    CALCIUM 8.5 09/18/2021    BILITOT 0.8 09/16/2021    ALKPHOS 76 09/16/2021    AST 19 09/16/2021    ALT 16 09/16/2021     Lab Results   Component Value Date    PROTIME 16.3 09/14/2021    INR 1.3 09/14/2021     Lab Results   Component Value Date    TSH 3.700 09/16/2021    QFCMNHCT06 520 09/15/2021    FOLATE >20.0 09/15/2021    IRON 44 02/04/2021    TIBC 211 02/04/2021     Lab Results   Component Value Date    TRIG 206 09/16/2021    HDL 41 09/16/2021    LDLCALC 148 09/16/2021     No results found for: Adhikari Billet, LABBENZ, CANNAB, COCAINESCRN, LABMETH, [address], PHENCYCLIDINESCREENURINE, PPXUR, ETOH  No results found for: LITHIUM, DILFRTOT, VALPROATE    Assessment:   Right occipital stroke consistent with a subacute stroke. A subtle mass cannot be ruled out though it is very unlikely given he does not have any risk factors for cerebrovascular disease. Patient is already on Eliquis and will add aspirin for now. Patient's PTT and INR suggest that patient was responsive to Eliquis and therefore is not likely to have any changes on any other anticoagulation. Coumadin may not be the best option given that again he has responded to anticoagulation. We will keep in observation of the same carotid ultrasounds are pending. If of this consultation includes my consultation the emergency room. CT of the head shows 3 mm new area of hypodensity worrisome for acute right occipital lobe infarct. Initial presentation was dizziness with left-sided sensory loss. Patient was already on Eliquis at time of CVA and aspirin has been added. Unable to obtain MRI due to pacemaker. Ultrasound of carotid arteries showed less than 50% stenosis in bilateral carotids. Antegrade flow in vertebral arteries. TTE showed LVEF of 50%  Lipid panel shows LDL of 148   A1c 7.4  Patient's risk factors for stroke include hypertension, hyperlipidemia, diabetes, and history of tobacco use  Plans for discharge to SNF. I have personally performed a face to face diagnostic evaluation on this patient, reviewed all data and investigations, and am the sole provider of all clinical decisions on the neurological status of this patient. occipital infarct but has some extension as aphasia, nonfocal otherwise , ok for SNF      German Bridges MD, [phone], American Board of Psychiatry & Neurology  Board Certified in Vascular Neurology  Board Certified in Neuromuscular Medicine  Certified in Neurorehabilitation     Plan:

## 2021-09-19 LAB
GLUCOSE BLD-MCNC: 101 MG/DL (ref 60–115)
GLUCOSE BLD-MCNC: 106 MG/DL (ref 60–115)
GLUCOSE BLD-MCNC: 132 MG/DL (ref 60–115)
GLUCOSE BLD-MCNC: 156 MG/DL (ref 60–115)
GLUCOSE BLD-MCNC: 175 MG/DL (ref 60–115)
PERFORMED ON: ABNORMAL
PERFORMED ON: NORMAL
PERFORMED ON: NORMAL

## 2021-09-19 PROCEDURE — 6370000000 HC RX 637 (ALT 250 FOR IP): Performed by: INTERNAL MEDICINE

## 2021-09-19 PROCEDURE — 97166 OT EVAL MOD COMPLEX 45 MIN: CPT

## 2021-09-19 PROCEDURE — 99232 SBSQ HOSP IP/OBS MODERATE 35: CPT | Performed by: PSYCHIATRY & NEUROLOGY

## 2021-09-19 PROCEDURE — 94761 N-INVAS EAR/PLS OXIMETRY MLT: CPT

## 2021-09-19 PROCEDURE — 1210000000 HC MED SURG R&B

## 2021-09-19 PROCEDURE — 2580000003 HC RX 258: Performed by: INTERNAL MEDICINE

## 2021-09-19 PROCEDURE — 94640 AIRWAY INHALATION TREATMENT: CPT

## 2021-09-19 RX ADMIN — INSULIN LISPRO 1 UNITS: 100 INJECTION, SOLUTION INTRAVENOUS; SUBCUTANEOUS at 18:27

## 2021-09-19 RX ADMIN — BUDESONIDE AND FORMOTEROL FUMARATE DIHYDRATE 2 PUFF: 80; 4.5 AEROSOL RESPIRATORY (INHALATION) at 19:16

## 2021-09-19 RX ADMIN — BUDESONIDE AND FORMOTEROL FUMARATE DIHYDRATE 2 PUFF: 80; 4.5 AEROSOL RESPIRATORY (INHALATION) at 07:12

## 2021-09-19 RX ADMIN — RAMIPRIL 5 MG: 5 CAPSULE ORAL at 09:20

## 2021-09-19 RX ADMIN — Medication 30 MG: at 12:39

## 2021-09-19 RX ADMIN — FOLIC ACID 1 MG: 1 TABLET ORAL at 09:20

## 2021-09-19 RX ADMIN — CARVEDILOL 6.25 MG: 6.25 TABLET, FILM COATED ORAL at 18:14

## 2021-09-19 RX ADMIN — CARVEDILOL 6.25 MG: 6.25 TABLET, FILM COATED ORAL at 09:20

## 2021-09-19 RX ADMIN — APIXABAN 5 MG: 5 TABLET, FILM COATED ORAL at 09:20

## 2021-09-19 RX ADMIN — FERROUS SULFATE TAB 325 MG (65 MG ELEMENTAL FE) 325 MG: 325 (65 FE) TAB at 09:20

## 2021-09-19 RX ADMIN — ATORVASTATIN CALCIUM 40 MG: 40 TABLET, FILM COATED ORAL at 21:38

## 2021-09-19 RX ADMIN — Medication 10 ML: at 21:43

## 2021-09-19 RX ADMIN — APIXABAN 5 MG: 5 TABLET, FILM COATED ORAL at 21:38

## 2021-09-19 RX ADMIN — ASPIRIN 81 MG: 81 TABLET, COATED ORAL at 09:20

## 2021-09-19 ASSESSMENT — PAIN SCALES - GENERAL: PAINLEVEL_OUTOF10: 0

## 2021-09-19 NOTE — PROGRESS NOTES
DISHA neurological assessment. Pt is very hard of hearing unless speaking directly into right ear and he gets frustrated easy and closes eyes.

## 2021-09-19 NOTE — PLAN OF CARE
Problem: Falls - Risk of:  Goal: Will remain free from falls  Outcome: Ongoing  Goal: Absence of physical injury  Outcome: Ongoing     Problem: Skin Integrity:  Goal: Will show no infection signs and symptoms  Outcome: Ongoing  Goal: Absence of new skin breakdown  Outcome: Ongoing     Problem: Confusion - Acute:  Goal: Absence of continued neurological deterioration signs and symptoms  Outcome: Ongoing  Goal: Mental status will be restored to baseline  Outcome: Ongoing     Problem: Discharge Planning:  Goal: Ability to perform activities of daily living will improve  Outcome: Ongoing  Goal: Participates in care planning  Outcome: Ongoing     Problem: Injury - Risk of, Physical Injury:  Goal: Will remain free from falls  Outcome: Ongoing  Goal: Absence of physical injury  Outcome: Ongoing     Problem: Mood - Altered:  Goal: Mood stable  Outcome: Ongoing  Goal: Absence of abusive behavior  Outcome: Ongoing  Goal: Verbalizations of feeling emotionally comfortable while being cared for will increase  Outcome: Ongoing     Problem: Psychomotor Activity - Altered:  Goal: Absence of psychomotor disturbance signs and symptoms  Outcome: Ongoing     Problem: Sensory Perception - Impaired:  Goal: Demonstrations of improved sensory functioning will increase  Outcome: Ongoing  Goal: Decrease in sensory misperception frequency  Outcome: Ongoing  Goal: Able to refrain from responding to false sensory perceptions  Outcome: Ongoing  Goal: Demonstrates accurate environmental perceptions  Outcome: Ongoing  Goal: Able to distinguish between reality-based and nonreality-based thinking  Outcome: Ongoing  Goal: Able to interrupt nonreality-based thinking  Outcome: Ongoing     Problem: Sleep Pattern Disturbance:  Goal: Appears well-rested  Outcome: Ongoing     Problem: HEMODYNAMIC STATUS  Goal: Patient has stable vital signs and fluid balance  Outcome: Ongoing     Problem: ACTIVITY INTOLERANCE/IMPAIRED MOBILITY  Goal: Mobility/activity is maintained at optimum level for patient  Outcome: Ongoing     Problem: COMMUNICATION IMPAIRMENT  Goal: Ability to express needs and understand communication  Outcome: Ongoing     Problem: IP COMMUNICATION/DYSARTHRIA  Goal: LTG - Patient will improve receptive language skills to allow for completion of daily activities  Outcome: Ongoing     Problem: IP SWALLOWING  Goal: LTG - patient will tolerate the least restrictive diet consistency to allow for safe consumption of daily meals  Outcome: Ongoing

## 2021-09-19 NOTE — PROGRESS NOTES
Pt is alert and oriented to self and situation, DISHA if alert to place and time. Is cooperative with care and very [place].  Very impulsive and has to be reminded to call for assistance with using the restroom

## 2021-09-19 NOTE — PROGRESS NOTES
Subjective:      Patient ID:  Patient seen and examined for neurology follow-up for acute right occipital CVA. Exam difficult given patient's severe hearing impairment. Patient is able to ambulate without assistive device. Can follow commands. No new or worsening focal deficits. No complaints,  no headache,  has a aphasia   And remained stable without any new changes from nursing staff     Review of Systems   Unable to perform ROS: Patient nonverbal       Past Medical History:   Diagnosis Date    Anemia     CAD (coronary artery disease) 4/16/2015    [facility]     Dyslipidemia     History of tobacco abuse     HTN (hypertension)     Hypothyroidism     [facility]     Pacemaker 4/16/2015     Past Surgical History:   Procedure Laterality Date    CATARACT REMOVAL      MIDDLE EAR SURGERY Left 1998    \"they had to reset the bones\" after an infection     Social History     Socioeconomic History    Marital status:      Spouse name: Not on file    Number of children: Not on file    Years of education: Not on file    Highest education level: Not on file   Occupational History    Not on file   Tobacco Use    Smoking status: Former Smoker    Smokeless tobacco: Never Used   Vaping Use    Vaping Use: Never used   Substance and Sexual Activity    Alcohol use: No    Drug use: No    Sexual activity: Not on file   Other Topics Concern    Not on file   Social History Narrative    Lives With: granddaughter Cecilia Fearing  and family    Type of Home: House  Two level, Bed/Bath upstairs-[address] [facility] Access: Stairs to enter with rails    Entrance Stairs - Number of Steps: 14    Bathroom Shower/Tub: Tub/Shower unit    Home Equipment: Rolling walker, Cane    ADL Assistance: Independent    Homemaking Assistance: Independent(granddaughter completes)    Ambulation Assistance: Independent    Transfer Assistance: Independent    Active :  Yes    Additional Comments: pt is significantly [place]; information gathered via chart review and via writing. Social Determinants of Health     Financial Resource Strain:     Difficulty of Paying Living Expenses:    Food Insecurity:     Worried About Running Out of Food in the Last Year:     [address] in the Last Year:    Transportation Needs:     Lack of Transportation (Medical):  Lack of Transportation (Non-Medical):    Physical Activity:     Days of Exercise per Week:     Minutes of Exercise per Session:    Stress:     Feeling of Stress :    Social Connections:     Frequency of Communication with Friends and Family:     Frequency of Social Gatherings with Friends and Family:     Attends Baptism Services:     Active Member of Clubs or Organizations:     Attends Club or Organization Meetings:     Marital Status:    Intimate Partner Violence:     Fear of Current or Ex-Partner:     Emotionally Abused:     Physically Abused:     Sexually Abused:      History reviewed. No pertinent family history.   No Known Allergies  Current Facility-Administered Medications   Medication Dose Route Frequency Provider Last Rate Last Admin    ramipril (ALTACE) capsule 5 mg  5 mg Oral Daily Jamil Citron, DO   5 mg at 09/19/21 0920    apixaban (ELIQUIS) tablet 5 mg  5 mg Oral BID Jamil Citron, DO   5 mg at 09/19/21 0920    atorvastatin (LIPITOR) tablet 40 mg  40 mg Oral Nightly Jamil Citron, DO   40 mg at 09/18/21 2246    budesonide-formoterol (SYMBICORT) 80-4.5 MCG/ACT inhaler 2 puff  2 puff Inhalation BID Jamil Citron, DO   2 puff at 09/19/21 9264    carvedilol (COREG) tablet 6.25 mg  6.25 mg Oral BID WC Jamil Citron, DO   6.25 mg at 09/19/21 0920    ferrous sulfate (IRON 325) tablet 325 mg  325 mg Oral Daily with breakfast Jamil Citron, DO   325 mg at 15/14/16 0480    folic acid (FOLVITE) tablet 1 mg  1 mg Oral Daily Jamil Citron, DO   1 mg at 09/19/21 0920    sodium chloride flush 0.9 % injection 5-40 mL  5-40 mL IntraVENous 2 times per day Xin Skates, DO   10 mL at 09/18/21 2250    sodium chloride flush 0.9 % injection 5-40 mL  5-40 mL IntraVENous PRN Xin Skates, DO        0.9 % sodium chloride infusion  25 mL IntraVENous PRN Xin Skates, DO        ondansetron (ZOFRAN-ODT) disintegrating tablet 4 mg  4 mg Oral Q8H PRN Xin Skates, DO        Or    ondansetron [facility]) injection 4 mg  4 mg IntraVENous Q6H PRN Xin Skates, DO        polyethylene glycol (GLYCOLAX) packet 17 g  17 g Oral Daily PRN Xin Skates, DO        aspirin EC tablet 81 mg  81 mg Oral Daily Xin Skates, DO   81 mg at 09/19/21 0920    Or    aspirin suppository 300 mg  300 mg Rectal Daily Xin Skates, DO        lansoprazole suspension SUSP 30 mg  30 mg Oral Daily Xin Skates, DO   30 mg at 09/17/21 1206    glucose (GLUTOSE) 40 % oral gel 15 g  15 g Oral PRN Xin Skates, DO        dextrose 50 % IV solution  12.5 g IntraVENous PRN Xin Skates, DO        glucagon (rDNA) injection 1 mg  1 mg IntraMUSCular PRN Xin Skates, DO        dextrose 5 % solution  100 mL/hr IntraVENous PRN Xin Skates, DO        insulin lispro (HUMALOG) injection vial 0-6 Units  0-6 Units SubCUTAneous TID WC Xin Skmary jo, DO   1 Units at 09/18/21 1323    insulin lispro (HUMALOG) injection vial 0-3 Units  0-3 Units SubCUTAneous Nightly Xin Skmary jo, DO   1 Units at 09/18/21 0019    albuterol sulfate  (90 Base) MCG/ACT inhaler 2 puff  2 puff Inhalation Q4H PRN Xin Skates, DO            Objective:   BP (!) 118/49   Pulse 61   Temp 98.2 °F (36.8 °C) (Oral)   Resp 16   Wt 150 lb (68 kg)   SpO2 93%   BMI 24.21 kg/m²     Physical Exam  Vitals reviewed. Eyes:      Pupils: Pupils are equal, round, and reactive to light. Cardiovascular:      Rate and Rhythm: Normal rate and regular rhythm. Heart sounds: No murmur heard.      Pulmonary:      Effort: Pulmonary effort is normal.      Breath sounds: Normal breath sounds. Abdominal:      General: Bowel sounds are normal.   Musculoskeletal:         General: Normal range of motion. Cervical back: Normal range of motion. Skin:     General: Skin is warm. Neurological:      Mental Status: He is alert and oriented to person, place, and time. Cranial Nerves: No cranial nerve deficit. Sensory: No sensory deficit. Motor: No abnormal muscle tone. Coordination: Coordination normal.      Deep Tendon Reflexes: Reflexes are normal and symmetric. Babinski sign absent on the right side. Babinski sign absent on the left side. Psychiatric:         Mood and Affect: Mood normal.     Distinctly as much as he would not speak he was able to follow all my commands and his examination appears to be normal except for possibility of a visual field defect more notable on the left. We did not attempt to walk him. CT Head WO Contrast    Result Date: 9/16/2021  EXAMINATION: CT HEAD WO CONTRAST, 9/16/2021 3:14 PM CLINICAL HISTORY:  Weakness COMPARISON: Brain CT from September 14, 2021, 2 days prior TECHNIQUE:  Multiple contiguous axial images of the head were obtained from the skull base through the skull vertex without intravenous contrast. Sagittal and coronal reformats have been produced. All CT scans at this facility use dose modulation, iterative reconstruction, and/or weight based dosing when appropriate to reduce radiation dose to as low as reasonably achievable. BRAIN CT FINDINGS: There has been interval development of a 3 cm area of hypodensity in the right occipital lobe since the previous day which is worrisome for an acute, subacute ischemia. There are persistent, chronic areas of ischemia in the anterior right temporal lobe, unchanged since previous study. No acute hemorrhage, mass, mass effect, or midline shift.  There is prominence of sulci and ventricles indicating mild global cerebral atrophy and chronic involutional changes. Moderate periventricular white matter hypodensities indicating chronic microangiopathy are noted. The basal ganglia are within normal limits. There are no acute changes or space-occupying lesions in the posterior fossa. The visualized portions of the orbits are within normal limits. The globes are intact. The imaged portions of the paranasal sinuses are unremarkable. The calvarium is intact. There is a 3 cm new area of hypodensity worrisome for acute in the right occipital lobe. The findings were called to the ER [id number]. ,     XR CHEST PORTABLE    Result Date: 9/16/2021  XR CHEST PORTABLE Clinical History:  Weakness. Lethargic. Comparison:  9/14/2021. RESULT: Median sternotomy. Right transvenous pacemaker, unchanged. No consolidation. No pleural effusion. No pneumothorax. Stable cardiomediastinal silhouette. Aortic vascular calcifications. No distinct acute osseous findings. No acute radiographic abnormality. US CAROTID ARTERY BILATERAL    Result Date: 9/16/2021  US CAROTID ARTERY BILATERAL: 9/16/2021 6:40 PM CLINICAL HISTORY:  cva . COMPARISON: None available. Grayscale, color and waveform Doppler analysis of the cervical carotid and vertebral arteries was performed. Validated velocity measurements with angiographic measurements, velocity criteria are extrapolated from diameter data as defined by the Society of Radiologist in [address] [place] [address] Drive Radiology 2003; 129;[id number]. FINDINGS: There is mild atherosclerotic plaquing of the common carotid arteries and carotid bifurcations without significantly elevated velocities. Maximum systolic velocity within the right internal and mid common carotid arteries are 79 and 51 cm/s, with an ICA/CCA ratio of approximately 1.5 , which indicates less than 50% by velocity criteria.  Maximum systolic velocity within the left internal and mid common carotid arteries are 59 and 74 cm/s, with an ICA/CCA ratio of approximately 0.9, which indicates less than 50% by velocity criteria. Maximum velocities within the right and left external carotid arteries are 141 and 81 cm/sec respectively. There is antegrade flow in both vertebral arteries. MILD ATHEROSCLEROTIC PLAQUING OF THE CAROTID BULBS WITHOUT EVIDENCE OF A FLOW-LIMITING STENOSIS, BY VELOCITY RATIO CRITERIA.  GOSINK CRITERIA Diameter          PSV t            EDV t          PSV          EDV          Stenosis Site       %              cm/sec          cm/sec      ICA/CCA    ICA/CCA 0-49                 <124              <40             <2:1           ---                 --- 50-69              125-225                  >2:1           ---                 --- 70-89               225-325          >100          >4:1           >5:1              --- 90%+                >325              >100          >4:1           >9:1           Damped resistive CCA >95%               May be            May be      Damped      ---              Damped resistive CCA                       decreased      decreased  resistive CCA       Lab Results   Component Value Date    WBC 6.1 09/18/2021    RBC 3.82 09/18/2021    HGB 12.5 09/18/2021    HCT 36.6 09/18/2021    MCV 95.6 09/18/2021    MCH 32.6 09/18/2021    MCHC 34.1 09/18/2021    RDW 14.1 09/18/2021     09/18/2021    MPV 9.0 07/29/2014     Lab Results   Component Value Date     09/18/2021    K 4.4 09/18/2021     09/18/2021    CO2 19 09/18/2021    BUN 27 09/18/2021    CREATININE 1.23 09/18/2021    GFRAA >60.0 09/18/2021    LABGLOM 55.0 09/18/2021    GLUCOSE 114 09/18/2021    PROT 6.9 09/16/2021    LABALBU 4.1 09/16/2021    CALCIUM 8.5 09/18/2021    BILITOT 0.8 09/16/2021    ALKPHOS 76 09/16/2021    AST 19 09/16/2021    ALT 16 09/16/2021     Lab Results   Component Value Date    PROTIME 16.3 09/14/2021    INR 1.3 09/14/2021     Lab Results   Component Value Date    TSH 3.700 09/16/2021    BAOPGOHF79 520 09/15/2021    FOLATE >20.0 09/15/2021 IRON 44 02/04/2021    TIBC 211 02/04/2021     Lab Results   Component Value Date    TRIG 206 09/16/2021    HDL 41 09/16/2021    LDLCALC 148 09/16/2021     No results found for: LABAMPH, BARBSCNU, LABBENZ, CANNAB, COCAINESCRN, LABMETH, OPIATESCREENURINE, PHENCYCLIDINESCREENURINE, PPXUR, ETOH  No results found for: LITHIUM, DILFRTOT, VALPROATE    Assessment:   Right occipital stroke consistent with a subacute stroke. A subtle mass cannot be ruled out though it is very unlikely given he does not have any risk factors for cerebrovascular disease. Patient is already on Eliquis and will add aspirin for now. Patient's PTT and INR suggest that patient was responsive to Eliquis and therefore is not likely to have any changes on any other anticoagulation. Coumadin may not be the best option given that again he has responded to anticoagulation. We will keep in observation of the same carotid ultrasounds are pending. If of this consultation includes my consultation the emergency room. CT of the head shows 3 mm new area of hypodensity worrisome for acute right occipital lobe infarct. Initial presentation was dizziness with left-sided sensory loss. Patient was already on Eliquis at time of CVA and aspirin has been added. Unable to obtain MRI due to pacemaker. Ultrasound of carotid arteries showed less than 50% stenosis in bilateral carotids. Antegrade flow in vertebral arteries. TTE showed LVEF of 50%  Lipid panel shows LDL of 148   A1c 7.4  Patient's risk factors for stroke include hypertension, hyperlipidemia, diabetes, and history of tobacco use  Plans for discharge to SNF. Patient remained stable without any changes neurologically. We will continue on the same dose of medications for now there is no bleeding or bruising. Unable to do MRI due to pacemaker. Patient be discharged to skilled nursing. German Ahuja MD, Yao Metzger, American Board of Psychiatry & Neurology  Board Certified in Vascular Neurology  Board Certified in Neuromuscular Medicine  Certified in [facility]:

## 2021-09-19 NOTE — PROGRESS NOTES
[facility] OCCUPATIONAL THERAPY EVALUATION - ACUTE     NAME: Alok Wallace  : 3/5/1929 (80 y.o.)  MRN: [id number]  CODE STATUS: DNR-CCA  Room: E818T551-14    Date of Service: 2021    Patient Diagnosis(es): Elevated troponin [R77.8]  Acute CVA (cerebrovascular accident) [facility]) [I63.9]  Cerebrovascular accident (CVA), unspecified mechanism ([facility] [address] [I63.9]   Chief Complaint   Patient presents with    Extremity Weakness     pt was sent to the ER for general weakness and possible dehydration, recently D/C from hospital     Patient Active Problem List    Diagnosis Date Noted    Anginal equivalent ([facility] [facility]     Aphasia     Acute CVA (cerebrovascular accident) ([facility] [facility] 2021    General weakness 09/15/2021    Elevated troponin 09/15/2021    Hyperkalemia 09/15/2021    Anticoagulation adequate with anticoagulant therapy 09/15/2021    Arterial occlusion 2021    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    CKD (chronic kidney disease) 2021    PVD (peripheral vascular disease) ([facility] [address] 2021    Venous thrombosis of leg 2021    Hyponatremia 2021    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[facility] [address] [facility] 2021    Syncope and collapse 2020    COPD exacerbation ([address] 2020    NSTEMI (non-ST elevated myocardial infarction) ([facility] [facility] 2019    Chest pain 2019    Hypotension 2019    SOB (shortness of breath) 2018    Chronic right shoulder pain 2018    COPD with acute exacerbation ([address] 2018    Bronchitis 2016    CAD (coronary artery disease) 2015    HTN (hypertension)     Dyslipidemia     DM (diabetes mellitus) ([address]     Hypothyroidism     History of tobacco abuse     Anemia         Past Medical History:   Diagnosis Date    Anemia     CAD (coronary artery disease) 2015    DM (diabetes mellitus) ([address]     Dyslipidemia     History of tobacco abuse     HTN (hypertension)     Hypothyroidism     Non-ST motor impairments, Right UE, Left UE    Hand Dominance:  Hand Dominance  Hand Dominance: Right    ADL Status:  ADL  Grooming: Supervision  UE Dressing: None (gown)  LE Dressing:  (Patient replaced his socks while seated EOB with min assist needed to get over his long toe nails while threading the socks)  Toileting: Stand by assistance  Toilet Transfers  Toilet - Technique: Ambulating  Equipment Used: Grab bars  Toilet Transfer: Stand by assistance, Contact guard assistance  Toilet Transfers Comments: Patient stood at the toilet to urinate with only supervision needed. As patient turned around he had a LOB and was able to almost regain his balance on his own by grasping a shelf but also min assist was provided to assist patient to right himself       Therapy key for assistance levels -   Independent = Pt. is able to perform task with no assistance but may require a device   Stand by assistance = Pt. does not perform task at an independent level but does not need physical assistance, requires verbal cues  Minimal, Moderate, Maximal Assistance = Pt. requires physical assistance (25%, 50%, 75% assist from helper) for task but is able to actively participate in task   Dependent = Pt. requires total assistance with task and is not able to actively participate with task completion     Functional Mobility:  Functional Mobility  Functional - Mobility Device: No device  Activity: To/from bathroom  Assist Level: Stand by assistance  Functional Mobility Comments: Patient was noted to reach for walls and the counter of the sink when walking to the bathroom. Did not require assist to do so.   Transfers  Sit to stand: Supervision  Stand to sit: Supervision    Bed Mobility  Bed mobility  Rolling to Right: Supervision  Supine to Sit: Supervision  Sit to Supine: Supervision    Seated and Standing Balance:  Balance  Sitting Balance: Supervision  Standing Balance: Stand by assistance    Functional Endurance:  Activity Tolerance  Activity Tolerance: Patient limited by fatigue    D/C Recommendations:  OT D/C RECOMMENDATIONS  REQUIRES OT FOLLOW UP: Yes    Equipment Recommendations:  OT Equipment Recommendations  Other: to be assessed    OT Education:    none at time of eval    OT Follow Up:  OT D/C RECOMMENDATIONS  REQUIRES OT FOLLOW UP: Yes       Assessment/Discharge Disposition:     Performance deficits / Impairments: Decreased safe awareness, Decreased endurance, Decreased balance, Decreased ADL status, Decreased coordination, Decreased high-level IADLs, Decreased functional mobility   Prognosis: Good  Discharge Recommendations: Continue to assess pending progress  Decision Making: Medium Complexity  History: intensive chart review  Exam: 7 areas of deficit  Assistance / Modification: moderate modfication    Six Click Score   How much help for putting on and taking off regular lower body clothing?: A Little  How much help for Bathing?: A Little  How much help for Toileting?: A Little  How much help for putting on and taking off regular upper body clothing?: A Little  How much help for taking care of personal grooming?: A Little  How much help for eating meals?: None  AM-[facility] Inpatient Daily Activity Raw Score: 19  AM-PAC Inpatient ADL T-Scale Score : 40.22  ADL Inpatient CMS 0-100% Score: 42.8    Plan:  Plan  Times per week: 1-4  Plan weeks: acute length of stay  Current Treatment Recommendations: Strengthening, Balance Training, Endurance Training, Functional Mobility Training, Safety Education & Training, Equipment Evaluation, Education, & procurement, Self-Care / ADL, Patient/Caregiver Education & Training, Cognitive/Perceptual Training    Goals:   Patient will:    - Be ind in UB ADLs   - Be ind in LB ADLs  - Be ind in ADL transfers without LOB  - Be ind in toileting tasks  - Improve B hand fine motor coordination to [facility] in order to manage clothing fasteners/self-care containers in a timely manner  - Access appropriate D/C site with as few architectural barriers as possible.   - Sequence self-care tasks with min visual cues    Patient Goal: Patient goals : Patient was unable to understand/hear the question  Discussed and agreed upon: Yes Comments:     Therapy Time:   OT Individual Minutes  Time In: 1050  Time Out: 1105  Minutes: 15    Eval: 15 minutes     Electronically signed by:    Elaine Tuttle OTR/TONY OTR/L  9/19/2021, 11:21 AM

## 2021-09-20 LAB
ANION GAP SERPL CALCULATED.3IONS-SCNC: 12 MEQ/L (ref 9–15)
BUN BLDV-MCNC: 33 MG/DL (ref 8–23)
CALCIUM SERPL-MCNC: 8.6 MG/DL (ref 8.5–9.9)
CHLORIDE BLD-SCNC: 106 MEQ/L (ref 95–107)
CO2: 20 MEQ/L (ref 20–31)
CREAT SERPL-MCNC: 1.2 MG/DL (ref 0.7–1.2)
GFR AFRICAN AMERICAN: >60
GFR NON-AFRICAN AMERICAN: 56.6
GLUCOSE BLD-MCNC: 152 MG/DL (ref 60–115)
GLUCOSE BLD-MCNC: 156 MG/DL (ref 60–115)
GLUCOSE BLD-MCNC: 177 MG/DL (ref 70–99)
GLUCOSE BLD-MCNC: 182 MG/DL (ref 60–115)
HCT VFR BLD CALC: 36.1 % (ref 42–52)
HEMOGLOBIN: 12.6 G/DL (ref 14–18)
MCH RBC QN AUTO: 33.1 PG (ref 27–31.3)
MCHC RBC AUTO-ENTMCNC: 34.9 % (ref 33–37)
MCV RBC AUTO: 94.9 FL (ref 80–100)
PDW BLD-RTO: 14 % (ref 11.5–14.5)
PERFORMED ON: ABNORMAL
PLATELET # BLD: 212 K/UL (ref 130–400)
POTASSIUM SERPL-SCNC: 4.7 MEQ/L (ref 3.4–4.9)
RBC # BLD: 3.81 M/UL (ref 4.7–6.1)
SODIUM BLD-SCNC: 138 MEQ/L (ref 135–144)
WBC # BLD: 9 K/UL (ref 4.8–10.8)

## 2021-09-20 PROCEDURE — 94640 AIRWAY INHALATION TREATMENT: CPT

## 2021-09-20 PROCEDURE — APPSS30 APP SPLIT SHARED TIME 16-30 MINUTES: Performed by: NURSE PRACTITIONER

## 2021-09-20 PROCEDURE — 2580000003 HC RX 258: Performed by: INTERNAL MEDICINE

## 2021-09-20 PROCEDURE — 80048 BASIC METABOLIC PNL TOTAL CA: CPT

## 2021-09-20 PROCEDURE — 85027 COMPLETE CBC AUTOMATED: CPT

## 2021-09-20 PROCEDURE — 36415 COLL VENOUS BLD VENIPUNCTURE: CPT

## 2021-09-20 PROCEDURE — 97116 GAIT TRAINING THERAPY: CPT

## 2021-09-20 PROCEDURE — 6370000000 HC RX 637 (ALT 250 FOR IP): Performed by: INTERNAL MEDICINE

## 2021-09-20 PROCEDURE — 99232 SBSQ HOSP IP/OBS MODERATE 35: CPT | Performed by: PSYCHIATRY & NEUROLOGY

## 2021-09-20 PROCEDURE — 1210000000 HC MED SURG R&B

## 2021-09-20 PROCEDURE — 94761 N-INVAS EAR/PLS OXIMETRY MLT: CPT

## 2021-09-20 RX ADMIN — ATORVASTATIN CALCIUM 40 MG: 40 TABLET, FILM COATED ORAL at 21:03

## 2021-09-20 RX ADMIN — CARVEDILOL 6.25 MG: 6.25 TABLET, FILM COATED ORAL at 09:51

## 2021-09-20 RX ADMIN — RAMIPRIL 5 MG: 5 CAPSULE ORAL at 09:51

## 2021-09-20 RX ADMIN — CARVEDILOL 6.25 MG: 6.25 TABLET, FILM COATED ORAL at 21:03

## 2021-09-20 RX ADMIN — Medication 10 ML: at 21:02

## 2021-09-20 RX ADMIN — APIXABAN 5 MG: 5 TABLET, FILM COATED ORAL at 21:03

## 2021-09-20 RX ADMIN — INSULIN LISPRO 1 UNITS: 100 INJECTION, SOLUTION INTRAVENOUS; SUBCUTANEOUS at 21:03

## 2021-09-20 RX ADMIN — APIXABAN 5 MG: 5 TABLET, FILM COATED ORAL at 09:52

## 2021-09-20 RX ADMIN — FOLIC ACID 1 MG: 1 TABLET ORAL at 09:51

## 2021-09-20 RX ADMIN — BUDESONIDE AND FORMOTEROL FUMARATE DIHYDRATE 2 PUFF: 80; 4.5 AEROSOL RESPIRATORY (INHALATION) at 19:18

## 2021-09-20 RX ADMIN — BUDESONIDE AND FORMOTEROL FUMARATE DIHYDRATE 2 PUFF: 80; 4.5 AEROSOL RESPIRATORY (INHALATION) at 06:59

## 2021-09-20 RX ADMIN — ASPIRIN 81 MG: 81 TABLET, COATED ORAL at 09:52

## 2021-09-20 ASSESSMENT — PAIN SCALES - GENERAL: PAINLEVEL_OUTOF10: 0

## 2021-09-20 NOTE — PROGRESS NOTES
Patient is arouseable but very lethargic this AM. Patient not eating breakfast and not taking pills at this time.  Patient just continues to sleep this AM.

## 2021-09-20 NOTE — PROGRESS NOTES
Department of Internal Medicine  General Internal Medicine  Attending Progress Note      SUBJECTIVE:  Pt seen and examined. No distress.  No complaints    OBJECTIVE      Medications    Current Facility-Administered Medications: ramipril (ALTACE) capsule 5 mg, 5 mg, Oral, Daily  apixaban (ELIQUIS) tablet 5 mg, 5 mg, Oral, BID  atorvastatin (LIPITOR) tablet 40 mg, 40 mg, Oral, Nightly  budesonide-formoterol (SYMBICORT) 80-4.5 MCG/ACT inhaler 2 puff, 2 puff, Inhalation, BID  carvedilol (COREG) tablet 6.25 mg, 6.25 mg, Oral, BID WC  ferrous sulfate (IRON 325) tablet 325 mg, 325 mg, Oral, Daily with breakfast  folic acid (FOLVITE) tablet 1 mg, 1 mg, Oral, Daily  sodium chloride flush 0.9 % injection 5-40 mL, 5-40 mL, IntraVENous, 2 times per day  sodium chloride flush 0.9 % injection 5-40 mL, 5-40 mL, IntraVENous, PRN  0.9 % sodium chloride infusion, 25 mL, IntraVENous, PRN  ondansetron (ZOFRAN-ODT) disintegrating tablet 4 mg, 4 mg, Oral, Q8H PRN **OR** ondansetron (ZOFRAN) injection 4 mg, 4 mg, IntraVENous, Q6H PRN  polyethylene glycol (GLYCOLAX) packet 17 g, 17 g, Oral, Daily PRN  aspirin EC tablet 81 mg, 81 mg, Oral, Daily **OR** aspirin suppository 300 mg, 300 mg, Rectal, Daily  lansoprazole suspension SUSP 30 mg, 30 mg, Oral, Daily  glucose (GLUTOSE) 40 % oral gel 15 g, 15 g, Oral, PRN  dextrose 50 % IV solution, 12.5 g, IntraVENous, PRN  glucagon (rDNA) injection 1 mg, 1 mg, IntraMUSCular, PRN  dextrose 5 % solution, 100 mL/hr, IntraVENous, PRN  insulin lispro (HUMALOG) injection vial 0-6 Units, 0-6 Units, SubCUTAneous, TID WC  insulin lispro (HUMALOG) injection vial 0-3 Units, 0-3 Units, SubCUTAneous, Nightly  albuterol sulfate  (90 Base) MCG/ACT inhaler 2 puff, 2 puff, Inhalation, Q4H PRN  Physical    VITALS:  /61   Pulse 65   Temp 98.1 °F (36.7 °C) (Oral)   Resp 18   Wt 150 lb (68 kg)   SpO2 94%   BMI 24.21 kg/m²   Constitutional: Lying in bed comfortably  Head: Normocephalic, atraumatic  ENT: moist mucous membranes  Neck: neck supple, trachea midline  Lungs: Good inspiratory effort, no wheeze, no rhonchi, no rales  Heart: RRR, normal S1 and S2  GI: Soft, non-distended, +BS  MSK: no edema noted  Skin: warm, dry  Psych: appropriate affect     Data    CBC:   Lab Results   Component Value Date    WBC 6.1 09/18/2021    RBC 3.82 09/18/2021    HGB 12.5 09/18/2021    HCT 36.6 09/18/2021    MCV 95.6 09/18/2021    MCH 32.6 09/18/2021    MCHC 34.1 09/18/2021    RDW 14.1 09/18/2021     09/18/2021    MPV 9.0 07/29/2014     CMP:    Lab Results   Component Value Date     09/18/2021    K 4.4 09/18/2021     09/18/2021    CO2 19 09/18/2021    BUN 27 09/18/2021    CREATININE 1.23 09/18/2021    GFRAA >60.0 09/18/2021    LABGLOM 55.0 09/18/2021    GLUCOSE 114 09/18/2021    PROT 6.9 09/16/2021    LABALBU 4.1 09/16/2021    CALCIUM 8.5 09/18/2021    BILITOT 0.8 09/16/2021    ALKPHOS 76 09/16/2021    AST 19 09/16/2021    ALT 16 09/16/2021       ASSESSMENT AND PLAN      # Acute CVA manifesting as dizziness and left-sided sensory loss: - deficits appear improved- patient was able to ambulate with PT  - Already on Eliquis.  Added aspirin  - Permissive hypertension-can resume medication  - Cannot get MRI due to pacemaker.  - TTE pending.  Lipid panel, carotid US, A1c reviewed  - PT/OT  - Risk factor modification     # HTN/HLD  - cont home meds    # Type 2 diabetes A1c 7.4  - ISS    # CAD/PAD  - cont home meds    DVT: on Eliquis    CODE: DNR-CCA    Disposition: Awaiting pre-cert for SNF. Medically stable.       Ree Bergman,   Internal Medicine

## 2021-09-20 NOTE — PROGRESS NOTES
Subjective:      Patient ID:  Patient seen and examined for neurology follow-up for acute right occipital CVA. Exam difficult given patient's severe hearing impairment. Patient is able to ambulate without assistive device. Can follow commands. No new or worsening focal deficits. Patient has been increasingly somnolent and fatigued today. No seizure activity. No new focal deficits. Afebrile. P.o. intake has been poor. Most recent creatinine on 9/18/2021 was slightly elevated from baseline. He is mildly hypotensive today. Review of Systems   Unable to perform ROS: Patient nonverbal   Constitutional: Positive for fatigue. HENT: Positive for hearing loss. Eyes: Positive for visual disturbance. Neurological: Positive for weakness. Negative for tremors, seizures, syncope, facial asymmetry and speech difficulty. No new changes,   Past Medical History:   Diagnosis Date    Anemia     CAD (coronary artery disease) 4/16/2015    DM (diabetes mellitus) ([facility] [address]     Dyslipidemia     History of tobacco abuse     HTN (hypertension)     Hypothyroidism     Non-ST elevation MI (NSTEMI) ([facility] [address]     Pacemaker 4/16/2015     Past Surgical History:   Procedure Laterality Date    CATARACT REMOVAL      MIDDLE EAR SURGERY Left 1998    \"they had to reset the bones\" after an infection     Social History     Socioeconomic History    Marital status:       Spouse name: Not on file    Number of children: Not on file    Years of education: Not on file    Highest education level: Not on file   Occupational History    Not on file   Tobacco Use    Smoking status: Former Smoker    Smokeless tobacco: Never Used   Vaping Use    Vaping Use: Never used   Substance and Sexual Activity    Alcohol use: No    Drug use: No    Sexual activity: Not on file   Other Topics Concern    Not on file   Social History Narrative    Lives With: granddaughter Cecilia Fearing  and family    Type of Home: House  Two level, Bed/Bath [address] [facility] Access: Stairs to enter with rails    Entrance Stairs - Number of Steps: 14    Bathroom Shower/Tub: Tub/Shower unit    Home Equipment: Rolling walker, Cane    ADL Assistance: Independent    Homemaking Assistance: Independent(granddaughter completes)    Ambulation Assistance: Independent    Transfer Assistance: Independent    Active : Yes    Additional Comments: pt is significantly [place]; information gathered via chart review and via writing. Social Determinants of Health     Financial Resource Strain:     Difficulty of Paying Living Expenses:    Food Insecurity:     Worried About Running Out of Food in the Last Year:     Ran Out of Food in the Last Year:    Transportation Needs:     Lack of Transportation (Medical):     Lack of Transportation (Non-Medical):    Physical Activity:     Days of Exercise per Week:     Minutes of Exercise per Session:    Stress:     Feeling of Stress :    Social Connections:     Frequency of Communication with Friends and Family:     Frequency of Social Gatherings with Friends and Family:     Attends Spiritism Services: Active Member of Clubs or Organizations:     Attends Club or Organization Meetings:     Marital Status:    Intimate Partner Violence:     Fear of Current or Ex-Partner:     Emotionally Abused:     Physically Abused:     Sexually Abused:      History reviewed. No pertinent family history.   No Known Allergies  Current Facility-Administered Medications   Medication Dose Route Frequency Provider Last Rate Last Admin    ramipril (ALTACE) capsule 5 mg  5 mg Oral Daily Colon Pati, DO   5 mg at 09/20/21 0951    apixaban (ELIQUIS) tablet 5 mg  5 mg Oral BID Colon Pati, DO   5 mg at 09/20/21 8104    atorvastatin (LIPITOR) tablet 40 mg  40 mg Oral Nightly Colon Pati, DO   40 mg at 09/19/21 2138    budesonide-formoterol (SYMBICORT) 80-4.5 MCG/ACT inhaler 2 puff  2 puff Inhalation BID Colon Pati, DO   2 puff at 09/20/21 5118 carvedilol (COREG) tablet 6.25 mg  6.25 mg Oral BID  Stephany Leroy, DO   6.25 mg at 09/20/21 0951    ferrous sulfate (IRON 325) tablet 325 mg  325 mg Oral Daily with breakfast Stephany Leroy, DO   325 mg at 87/47/87 3829    folic acid (FOLVITE) tablet 1 mg  1 mg Oral Daily Stephany Leroy, DO   1 mg at 09/20/21 2566    sodium chloride flush 0.9 % injection 5-40 mL  5-40 mL IntraVENous 2 times per day Stephany Leroy, DO   10 mL at 09/19/21 2143    sodium chloride flush 0.9 % injection 5-40 mL  5-40 mL IntraVENous PRN Stephany Leroy, DO        0.9 % sodium chloride infusion  25 mL IntraVENous PRN Stephany Leroy, DO        ondansetron (ZOFRAN-ODT) disintegrating tablet 4 mg  4 mg Oral Q8H PRN Stephany Leroy, DO        Or    ondansetron [email or web address]) injection 4 mg  4 mg IntraVENous Q6H PRN Stephany Leroy, DO        polyethylene glycol (GLYCOLAX) packet 17 g  17 g Oral Daily PRN Stephany Leroy, DO        aspirin EC tablet 81 mg  81 mg Oral Daily Stephany Leroy, DO   81 mg at 09/20/21 8726    Or    aspirin suppository 300 mg  300 mg Rectal Daily Stephany Leroy, DO        lansoprazole suspension SUSP 30 mg  30 mg Oral Daily Stephany Leroy, DO   30 mg at 09/19/21 1239    glucose (GLUTOSE) 40 % oral gel 15 g  15 g Oral PRN Stephany Leroy, DO        dextrose 50 % IV solution  12.5 g IntraVENous PRN Stephany Leroy, DO        glucagon (rDNA) injection 1 mg  1 mg IntraMUSCular PRN Stephany Leroy, DO        dextrose 5 % solution  100 mL/hr IntraVENous PRN Stephany Leroy, DO        insulin lispro (HUMALOG) injection vial 0-6 Units  0-6 Units SubCUTAneous TID  Stephany Leroy, DO   1 Units at 09/19/21 1827    insulin lispro (HUMALOG) injection vial 0-3 Units  0-3 Units SubCUTAneous Nightly Stephany Leroy, DO   1 Units at 09/19/21 2144    albuterol sulfate  (90 Base) MCG/ACT inhaler 2 puff  2 puff Inhalation Q4H PRN Stephany Leroy DO            Objective:   /61   Pulse 65   Temp 98.1 °F (36.7 °C) (Oral)   Resp 18   Wt 150 lb (68 kg)   SpO2 94%   BMI 24.21 kg/m²     Physical Exam  Vitals reviewed. Constitutional:       General: He is not in acute distress. Eyes:      General: Visual field deficit present. Pupils: Pupils are equal, round, and reactive to light. Cardiovascular:      Rate and Rhythm: Normal rate and regular rhythm. Heart sounds: No murmur heard. Pulmonary:      Effort: Pulmonary effort is normal.      Breath sounds: Normal breath sounds. Abdominal:      General: Bowel sounds are normal.   Musculoskeletal:         General: Normal range of motion. Cervical back: Normal range of motion. Skin:     General: Skin is warm. Neurological:      Mental Status: He is alert and oriented to person, place, and time. Cranial Nerves: No cranial nerve deficit, dysarthria or facial asymmetry. Sensory: No sensory deficit. Motor: No tremor, abnormal muscle tone or seizure activity. Coordination: Coordination normal.      Deep Tendon Reflexes: Reflexes are normal and symmetric. Babinski sign absent on the right side. Babinski sign absent on the left side. Psychiatric:         Mood and Affect: Mood normal.       CT Head WO Contrast    Result Date: 9/16/2021  EXAMINATION: CT HEAD WO CONTRAST, 9/16/2021 3:14 PM CLINICAL HISTORY:  Weakness COMPARISON: Brain CT from September 14, 2021, 2 days prior TECHNIQUE:  Multiple contiguous axial images of the head were obtained from the skull base through the skull vertex without intravenous contrast. Sagittal and coronal reformats have been produced. All CT scans at this facility use dose modulation, iterative reconstruction, and/or weight based dosing when appropriate to reduce radiation dose to as low as reasonably achievable. BRAIN CT FINDINGS: There has been interval development of a 3 cm area of hypodensity in the right occipital lobe since the previous day which is worrisome for an acute, subacute ischemia.  There are persistent, chronic areas of ischemia in the anterior right temporal lobe, unchanged since previous study. No acute hemorrhage, mass, mass effect, or midline shift. There is prominence of sulci and ventricles indicating mild global cerebral atrophy and chronic involutional changes. Moderate periventricular white matter hypodensities indicating chronic microangiopathy are noted. The basal ganglia are within normal limits. There are no acute changes or space-occupying lesions in the posterior fossa. The visualized portions of the orbits are within normal limits. The globes are intact. The imaged portions of the paranasal sinuses are unremarkable. The calvarium is intact. There is a 3 cm new area of hypodensity worrisome for acute in the right occipital lobe. The findings were called to the ER [id number] hours. ,     XR CHEST PORTABLE    Result Date: 9/16/2021  XR CHEST PORTABLE Clinical History:  Weakness. Lethargic. Comparison:  9/14/2021. RESULT: Median sternotomy. Right transvenous pacemaker, unchanged. No consolidation. No pleural effusion. No pneumothorax. Stable cardiomediastinal silhouette. Aortic vascular calcifications. No distinct acute osseous findings. No acute radiographic abnormality. US CAROTID ARTERY BILATERAL    Result Date: 9/16/2021  US CAROTID ARTERY BILATERAL: 9/16/2021 6:40 PM CLINICAL HISTORY:  cva . COMPARISON: None available. Grayscale, color and waveform Doppler analysis of the cervical carotid and vertebral arteries was performed. Validated velocity measurements with angiographic measurements, velocity criteria are extrapolated from diameter data as defined by the Society of Radiologist in [address] [place] [address] Drive Radiology 2003; [phone]. FINDINGS: There is mild atherosclerotic plaquing of the common carotid arteries and carotid bifurcations without significantly elevated velocities.  Maximum systolic velocity within the right internal and mid common carotid arteries are 79 and 51 cm/s, with an ICA/CCA ratio of approximately 1.5 , which indicates less than 50% by velocity criteria. Maximum systolic velocity within the left internal and mid common carotid arteries are 59 and 74 cm/s, with an ICA/CCA ratio of approximately 0.9, which indicates less than 50% by velocity criteria. Maximum velocities within the right and left external carotid arteries are 141 and 81 cm/sec respectively. There is antegrade flow in both vertebral arteries. MILD ATHEROSCLEROTIC PLAQUING OF THE CAROTID BULBS WITHOUT EVIDENCE OF A FLOW-LIMITING STENOSIS, BY VELOCITY RATIO CRITERIA.  GOSINK CRITERIA Diameter          PSV t            EDV t          PSV          EDV          Stenosis Site       %              cm/sec          cm/sec      ICA/CCA    ICA/CCA 0-49                 <124              <40             <2:1           ---                 --- 50-69              125-225                  >2:1           ---                 --- 70-89               225-325          >100          >4:1           >5:1              --- 90%+                >325              >100          >4:1           >9:1           Damped resistive CCA >95%               May be            May be      Damped      ---              Damped resistive CCA                       decreased      decreased  resistive CCA       Lab Results   Component Value Date    WBC 6.1 09/18/2021    RBC 3.82 09/18/2021    HGB 12.5 09/18/2021    HCT 36.6 09/18/2021    MCV 95.6 09/18/2021    MCH 32.6 09/18/2021    MCHC 34.1 09/18/2021    RDW 14.1 09/18/2021     09/18/2021    MPV 9.0 07/29/2014     Lab Results   Component Value Date     09/18/2021    K 4.4 09/18/2021     09/18/2021    CO2 19 09/18/2021    BUN 27 09/18/2021    CREATININE 1.23 09/18/2021    GFRAA >60.0 09/18/2021    LABGLOM 55.0 09/18/2021    GLUCOSE 114 09/18/2021    PROT 6.9 09/16/2021    LABALBU 4.1 09/16/2021    CALCIUM 8.5 09/18/2021    BILITOT 0.8 09/16/2021    ALKPHOS 76 09/16/2021    AST 19 09/16/2021    ALT 16 09/16/2021     Lab Results   Component Value Date    PROTIME 16.3 09/14/2021    INR 1.3 09/14/2021     Lab Results   Component Value Date    TSH 3.700 09/16/2021    TJPVLIPC50 520 09/15/2021    FOLATE >20.0 09/15/2021    IRON 44 02/04/2021    TIBC 211 02/04/2021     Lab Results   Component Value Date    TRIG 206 09/16/2021    HDL 41 09/16/2021    LDLCALC 148 09/16/2021     No results found for: [place], LABBENZ, CANNAB, COCAINESCRN, LABMETH, OPIATESCREENURINE, PHENCYCLIDINESCREENURINE, PPXUR, ETOH  No results found for: LITHIUM, DILFRTOT, VALPROATE    Assessment:   Right occipital stroke consistent with a subacute stroke. A subtle mass cannot be ruled out though it is very unlikely given he does not have any risk factors for cerebrovascular disease. Patient is already on Eliquis and will add aspirin for now. Patient's PTT and INR suggest that patient was responsive to Eliquis and therefore is not likely to have any changes on any other anticoagulation. Coumadin may not be the best option given that again he has responded to anticoagulation. We will keep in observation of the same carotid ultrasounds are pending. If of this consultation includes my consultation the emergency room. CT of the head shows 3 mm new area of hypodensity worrisome for acute right occipital lobe infarct. Initial presentation was dizziness with left-sided sensory loss. Patient was already on Eliquis at time of CVA and aspirin has been added. Unable to obtain MRI due to pacemaker. Ultrasound of carotid arteries showed less than 50% stenosis in bilateral carotids. Antegrade flow in vertebral arteries. TTE showed LVEF of 50%  Lipid panel shows LDL of 148   A1c 7.4  Patient's risk factors for stroke include hypertension, hyperlipidemia, diabetes, and history of tobacco use  Plans for discharge to SNF. Patient remained stable without any changes neurologically.   We will continue

## 2021-09-20 NOTE — CARE COORDINATION
LSW spoke with pt's daughter and she would like pt to get rehab prior to returning home. They would like [facility] acute [facility] as first choice. If denied for acute rehab then [facility] would probably be next choice but the daughter will get back with this LSW to confirm that choice.

## 2021-09-20 NOTE — PROGRESS NOTES
Physical Therapy Med Surg Daily Treatment Note  Facility/Department: [facility]  Room: B874/F508-45       NAME: Manoj Dillard  : 3/5/1929 (80 y.o.)  MRN: [id number]  CODE STATUS: DNR-CCA    Date of Service: 2021    Patient Diagnosis(es): Elevated troponin [R77.8]  Acute CVA (cerebrovascular accident) [facility]) [I63.9]  Cerebrovascular accident (CVA), unspecified mechanism ([facility] [address] [I63.9]   Chief Complaint   Patient presents with    Extremity Weakness     pt was sent to the ER for general weakness and possible dehydration, recently D/C from hospital     Patient Active Problem List    Diagnosis Date Noted    Anginal equivalent ([facility] [address]     Aphasia     Acute CVA (cerebrovascular accident) ([facility] [address] 2021    General weakness 09/15/2021    Elevated troponin 09/15/2021    Hyperkalemia 09/15/2021    Anticoagulation adequate with anticoagulant therapy 09/15/2021    Arterial occlusion 2021    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    CKD (chronic kidney disease) 2021    PVD (peripheral vascular disease) ([facility] [address] 2021    Venous thrombosis of leg 2021    Hyponatremia 2021    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[facility] [address] 2021    Syncope and collapse 2020    COPD exacerbation ([facility] [address] 2020    NSTEMI (non-ST elevated myocardial infarction) ([facility] [address] 2019    Chest pain 2019    Hypotension 2019    SOB (shortness of breath) 2018    Chronic right shoulder pain 2018    COPD with acute exacerbation ([facility] [address] 2018    Bronchitis 2016    CAD (coronary artery disease) 2015    HTN (hypertension)     Dyslipidemia     DM (diabetes mellitus) ([facility] [address]     Hypothyroidism     History of tobacco abuse     Anemia         Past Medical History:   Diagnosis Date    Anemia     CAD (coronary artery disease) 2015    DM (diabetes mellitus) ([facility] [address]     Dyslipidemia     History of tobacco abuse     HTN (hypertension)  Hypothyroidism     Non-ST elevation [facility] ([facility]STEMI) [facility] [address]     Pacemaker 4/16/2015     Past Surgical History:   Procedure Laterality Date    CATARACT REMOVAL      MIDDLE EAR SURGERY Left 1998    \"they had to reset the bones\" after an infection       Restrictions  Restrictions/Precautions: Fall Risk    SUBJECTIVE   General  Chart Reviewed: Yes  Family / Caregiver Present: No  Subjective  Subjective: nods yes    Pre-Session Pain Report     Pain Screening  Patient Currently in Pain: No  Pre Treatment Pain Screening  Pain at present: 0  Scale Used: Numeric Score  Intervention List: Patient able to continue with treatment    Post-Session Pain Report  Pain Assessment  Pain Assessment: 0-10  Pain Level: 0         OBJECTIVE        Bed mobility  Rolling to Right: Supervision  Supine to Sit: Supervision  Sit to Supine: Supervision    Transfers  Sit to Stand: Contact guard assistance  Stand to sit: Contact guard assistance  Comment: unsteady with inital stand with increased lateral LOB. pt lethargic    Ambulation  Ambulation?: Yes  More Ambulation?: No  Ambulation 1  Surface: level tile  Device: No Device  Assistance: Minimal assistance  Quality of Gait: NBOS, slow maria l, decreased trunk and cervical rotation, staggering at times, unsteady  Distance: [de-identified]'                    Other exercises  Other exercises?: Yes  Other exercises 1: standing no AD with FR OBOS while performing personal hygiene tasks  Other exercises 2: static and dynamic balance standing                    Activity Tolerance  Activity Tolerance: Patient Tolerated treatment well          ASSESSMENT      Pt demonstrated less deviation from straight path and less episodes of LOB this session, but continues to ambulate with a NBOS and reaches for furniture.      Discharge Recommendations:  Continue to assess pending progress (pts family report pt can stay on first level if needed andthis is recommended initially)    Goals  Short term goals  Short term goal 1: indep sit to stand  Short term goal 2: indep bed mobility  Short term goal 3: indep gait with no device 50 feet in protected area  Short term goal 4: SBA stairs with rails    PLAN    Times per week: 2-3 visits  Safety Devices  Type of devices: Bed alarm in place, Left in bed, All fall risk precautions in place     AMPAC (6 CLICK) BASIC MOBILITY  AM-PAC Inpatient Mobility Raw Score : 20     Therapy Time   Individual   Time In 1036   Time Out 1046   Minutes 10      gait:8  thereex:2       All [place] PTA, 09/20/21 at 11:14 AM         Definitions for assistance levels  Independent = pt does not require any physical supervision or assistance from another person for activity completion. Device may be needed.   Stand by assistance = pt requires verbal cues or instructions from another person, close to but not touching, to perform the activity  Minimal assistance= pt performs 75% or more of the activity; assistance is required to complete the activity  Moderate assistance= pt performs 50% of the activity; assistance is required to complete the activity  Maximal assistance = pt performs 25% of the activity; assistance is required to complete the activity  Dependent = pt requires total physical assistance to accomplish the task

## 2021-09-20 NOTE — PROGRESS NOTES
CAROTID BULBS WITHOUT EVIDENCE OF A FLOW-LIMITING STENOSIS, BY VELOCITY RATIO CRITERIA. GOSINK CRITERIA      Diameter          PSV t            EDV t          PSV          EDV          Stenosis Site         %              cm/sec          cm/sec      ICA/CCA    ICA/CCA      0-49                 <124              <40             <2:1           ---                 ---      50-69              125-225                  >2:1           ---                 ---      70-89               225-325          >100          >4:1           >5:1              ---      90%+                >325              >100          >4:1           >9:1           Damped resistive CCA      >95%               May be            May be      Damped      ---              Damped resistive CCA                         decreased      decreased  resistive CCA                           CT Head WO Contrast   Final Result      There is a 3 cm new area of hypodensity worrisome for acute in the right occipital lobe. The findings were called to the ER [id number] hours. ,            XR CHEST PORTABLE   Final Result      No acute radiographic abnormality. Assessment/Plan:    Acute CVA manifesting as dizziness and left-sided sensory loss: deficits appear improved- patient was able to ambulate with PT  -Already on Eliquis.  Added aspirin  -Permissive hypertension-can resume medication  -Cannot get MRI due to pacemaker. -TTE pending. Lipid panel, carotid US, A1c reviewed  -PT/OT  -Risk factor modification     Hypertension  Type 2 diabetes A1c 7.4  CAD  PAD  Hyperlipidemia     Diet: ADULT DIET;  Regular; 4 carb choices (60 gm/meal)  DNR-CCA     DC Plan: no medical barriers to DC when placement achieved.      35 minutes total care time, >1/2 in unit/floor time and care coordination      Joan Garcia MD

## 2021-09-20 NOTE — DISCHARGE INSTR - COC
Continuity of Care Form    Patient Name: Willie Guzman   :  3/5/1929  MRN:  [id number]    Admit date:  2021  Discharge date:  ***    Code Status Order: DNR-CCA   Advance Directives:      Admitting Physician:  No admitting provider for patient encounter. PCP: Vinny Christianson MD, MD    Discharging Nurse: [facility] Unit/Room#: Y815/V605-09  Discharging Unit Phone Number: ***    Emergency Contact:   Extended Emergency Contact Information  Primary Emergency Contact: [address] Phone: [phone]  Mobile Phone: [phone]  Relation: Grandchild  Secondary Emergency Contact:  [address], [place] [address] Phone: [phone]  Relation: Child   needed?  No    Past Surgical History:  Past Surgical History:   Procedure Laterality Date    CATARACT REMOVAL      MIDDLE EAR SURGERY Left     \"they had to reset the bones\" after an infection       Immunization History:   Immunization History   Administered Date(s) Administered    Influenza, High Dose (Fluzone 65 yrs and older) 2016, 2016, 10/12/2017    Influenza, Quadv, IM, PF (6 mo and older Fluzone, Flulaval, Fluarix, and 3 yrs and older Afluria) 2020    Pneumococcal Conjugate 13-valent (Wfhdmmo32) 2016    Pneumococcal Polysaccharide (Xqqwcvivu69) 2012    Tdap (Boostrix, Adacel) 2020    Zoster Live (Zostavax) 2013       Active Problems:  Patient Active Problem List   Diagnosis Code    HTN (hypertension) I10    Dyslipidemia E78.5    DM (diabetes mellitus) ([facility] E11.9    Hypothyroidism E03.9    History of tobacco abuse Z87.891    Anemia D64.9    CAD (coronary artery disease) I25.10    Bronchitis J40    COPD with acute exacerbation (HCC) J44.1    SOB (shortness of breath) R06.02    Anginal equivalent (HCC) 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    Hypotension I95.9    Chest pain R07.9    NSTEMI (non-ST elevated myocardial infarction) ([facility] I21.4    COPD exacerbation ([facility] J44.1    Syncope and collapse R55 PTWZ:458961899}  Med Delivery   [address] MED Delivery:101612794}    Wound Care Documentation and Therapy:        Elimination:  Continence: Bowel: {YES / VT:30400}  Bladder: {YES / I}  Urinary Catheter: {Urinary Catheter:235840860}   Colostomy/Ileostomy/Ileal Conduit: {YES / UM:33762}       Date of Last BM: ***  No intake or output data in the 24 hours ending 21 1615  No intake/output data recorded.     Safety Concerns:     [address] Safety Concerns:367966273}    Impairments/Disabilities:      [address] Impairments/Disabilities:956123077}    Nutrition Therapy:  Current Nutrition Therapy:   [address] Diet List:988805120}    Routes of Feeding: {CHP DME Other Feedings:830604972}  Liquids: {Slp liquid thickness:82763}  Daily Fluid Restriction: {CHP DME Yes amt example:510114027}  Last Modified Barium Swallow with Video (Video Swallowing Test): {Done Not Done NMFT:693786519}    Treatments at the Time of Hospital Discharge:   Respiratory Treatments: ***  Oxygen Therapy:  {Therapy; copd oxygen:36887}  Ventilator:    {[facility] Vent PRISCA:986773853}    Rehab Therapies: {THERAPEUTIC INTERVENTION:8673713906}  Weight Bearing Status/Restrictions: [address]  Weight Bearin}  Other Medical Equipment (for information only, NOT a DME order):  {EQUIPMENT:946079584}  Other Treatments: ***    Patient's personal belongings (please select all that are sent with patient):  {[facility] DME Belongings:342903313}    RN SIGNATURE:  {Esignature:215460750}    CASE MANAGEMENT/SOCIAL WORK SECTION    Inpatient Status Date: 21     Readmission Risk Assessment Score:  Readmission Risk              Risk of Unplanned Readmission:  34           Discharging to Facility/ Agency   Name: Acute Rehab   Address:  Phone:  Fax:    Dialysis Facility (if applicable)   Name:  Address:  Dialysis Schedule:  Phone:  Fax:    / signature: Electronically signed by LINDA Reina on 21 at 4:16 PM EDT    PHYSICIAN SECTION    Prognosis: Fair    Condition at Discharge: Stable    Rehab Potential (if transferring to Rehab): Good    Recommended Labs or Other Treatments After Discharge:     Physician Certification: I certify the above information and transfer of Manoj Dillard  is necessary for the continuing treatment of the diagnosis listed and that he requires Acute Rehab for less 30 days.      Update Admission H&P: No change in H&P    PHYSICIAN SIGNATURE:  Electronically signed by [facility] Evette on 9/22/21 at 12:52 PM EDT

## 2021-09-21 LAB
BLOOD CULTURE, ROUTINE: NORMAL
CULTURE, BLOOD 2: NORMAL
GLUCOSE BLD-MCNC: 104 MG/DL (ref 60–115)
GLUCOSE BLD-MCNC: 119 MG/DL (ref 60–115)
GLUCOSE BLD-MCNC: 134 MG/DL (ref 60–115)
GLUCOSE BLD-MCNC: 160 MG/DL (ref 60–115)
PERFORMED ON: ABNORMAL
PERFORMED ON: NORMAL

## 2021-09-21 PROCEDURE — 99221 1ST HOSP IP/OBS SF/LOW 40: CPT | Performed by: PHYSICAL MEDICINE & REHABILITATION

## 2021-09-21 PROCEDURE — 97535 SELF CARE MNGMENT TRAINING: CPT

## 2021-09-21 PROCEDURE — 97116 GAIT TRAINING THERAPY: CPT

## 2021-09-21 PROCEDURE — 6370000000 HC RX 637 (ALT 250 FOR IP): Performed by: INTERNAL MEDICINE

## 2021-09-21 PROCEDURE — 2580000003 HC RX 258: Performed by: INTERNAL MEDICINE

## 2021-09-21 PROCEDURE — 99232 SBSQ HOSP IP/OBS MODERATE 35: CPT | Performed by: PSYCHIATRY & NEUROLOGY

## 2021-09-21 PROCEDURE — 1210000000 HC MED SURG R&B

## 2021-09-21 PROCEDURE — 94640 AIRWAY INHALATION TREATMENT: CPT

## 2021-09-21 PROCEDURE — 94760 N-INVAS EAR/PLS OXIMETRY 1: CPT

## 2021-09-21 RX ADMIN — ATORVASTATIN CALCIUM 40 MG: 40 TABLET, FILM COATED ORAL at 23:03

## 2021-09-21 RX ADMIN — APIXABAN 5 MG: 5 TABLET, FILM COATED ORAL at 08:29

## 2021-09-21 RX ADMIN — BUDESONIDE AND FORMOTEROL FUMARATE DIHYDRATE 2 PUFF: 80; 4.5 AEROSOL RESPIRATORY (INHALATION) at 07:12

## 2021-09-21 RX ADMIN — CARVEDILOL 6.25 MG: 6.25 TABLET, FILM COATED ORAL at 08:28

## 2021-09-21 RX ADMIN — FOLIC ACID 1 MG: 1 TABLET ORAL at 08:28

## 2021-09-21 RX ADMIN — BUDESONIDE AND FORMOTEROL FUMARATE DIHYDRATE 2 PUFF: 80; 4.5 AEROSOL RESPIRATORY (INHALATION) at 19:31

## 2021-09-21 RX ADMIN — RAMIPRIL 5 MG: 5 CAPSULE ORAL at 08:28

## 2021-09-21 RX ADMIN — FERROUS SULFATE TAB 325 MG (65 MG ELEMENTAL FE) 325 MG: 325 (65 FE) TAB at 08:28

## 2021-09-21 RX ADMIN — Medication 10 ML: at 08:29

## 2021-09-21 RX ADMIN — ASPIRIN 81 MG: 81 TABLET, COATED ORAL at 08:29

## 2021-09-21 RX ADMIN — APIXABAN 5 MG: 5 TABLET, FILM COATED ORAL at 23:03

## 2021-09-21 ASSESSMENT — ENCOUNTER SYMPTOMS
STRIDOR: 0
PHOTOPHOBIA: 0
BLOOD IN STOOL: 0
CONSTIPATION: 1
WHEEZING: 0
SORE THROAT: 0
EYE REDNESS: 0
BACK PAIN: 1
VOMITING: 0
NAUSEA: 0
SHORTNESS OF BREATH: 1
DIARRHEA: 0
EYE PAIN: 0
ABDOMINAL PAIN: 0
COUGH: 0

## 2021-09-21 ASSESSMENT — PAIN SCALES - GENERAL: PAINLEVEL_OUTOF10: 0

## 2021-09-21 NOTE — PROGRESS NOTES
Pt is alert to self, DISHA alertness to place and time. He does lift his wrist to be scanned for medications. Very non-compliant with safety precautions and will get up without assistance or calling for help from staff. No s/s of discomfort of pain noted or observed by this writer. Bed alarm on, call light and personal belongings within reach. Will continue to monitor.

## 2021-09-21 NOTE — PROGRESS NOTES
17:00 rounding complete, no needs at this time    Electronically signed by LEONCIO Flannery on 9/21/21 at 8:36 PM EDT

## 2021-09-21 NOTE — PROGRESS NOTES
use: No    Drug use: No    Sexual activity: Not on file   Other Topics Concern    Not on file   Social History Narrative    Lives With: granddaughter  city  and family    Type of Home: House  Two level, Bed/Bath upstairs-[address] Access: Stairs to enter with rails    Entrance Stairs - Number of Steps: 14    Bathroom Shower/Tub: Tub/Shower unit    Home Equipment: Rolling walker, Cane    ADL Assistance: Independent    Homemaking Assistance: Independent(granddaughter completes)    Ambulation Assistance: Independent    Transfer Assistance: Independent    Active : Yes    Additional Comments: pt is significantly [place]; information gathered via chart review and via writing. Social Determinants of Health     Financial Resource Strain:     Difficulty of Paying Living Expenses:    Food Insecurity:     Worried About Running Out of Food in the Last Year:     [address] in the Last Year:    Transportation Needs:     Lack of Transportation (Medical):  Lack of Transportation (Non-Medical):    Physical Activity:     Days of Exercise per Week:     Minutes of Exercise per Session:    Stress:     Feeling of Stress :    Social Connections:     Frequency of Communication with Friends and Family:     Frequency of Social Gatherings with Friends and Family:     Attends Sikhism Services:     Active Member of Clubs or Organizations:     Attends Club or Organization Meetings:     Marital Status:    Intimate Partner Violence:     Fear of Current or Ex-Partner:     Emotionally Abused:     Physically Abused:     Sexually Abused:      History reviewed. No pertinent family history.   No Known Allergies  Current Facility-Administered Medications   Medication Dose Route Frequency Provider Last Rate Last Admin    ramipril (ALTACE) capsule 5 mg  5 mg Oral Daily Eri Jaramillo, DO   5 mg at 09/21/21 1637    apixaban (ELIQUIS) tablet 5 mg  5 mg Oral BID Eri Jaramillo DO   5 mg at 09/21/21 0829    atorvastatin (LIPITOR) tablet 40 mg  40 mg Oral Nightly Janessa Nay, DO   40 mg at 09/20/21 2103    budesonide-formoterol (SYMBICORT) 80-4.5 MCG/ACT inhaler 2 puff  2 puff Inhalation BID Janessa Nay, DO   2 puff at 09/21/21 4955    carvedilol (COREG) tablet 6.25 mg  6.25 mg Oral BID WC Jaenssa Nay, DO   6.25 mg at 09/21/21 7687    ferrous sulfate (IRON 325) tablet 325 mg  325 mg Oral Daily with breakfast Janessa Nay, DO   325 mg at 52/45/49 0458    folic acid (FOLVITE) tablet 1 mg  1 mg Oral Daily Janessa Nay, DO   1 mg at 09/21/21 2923    sodium chloride flush 0.9 % injection 5-40 mL  5-40 mL IntraVENous 2 times per day Janessa Nay, DO   10 mL at 09/21/21 4485    sodium chloride flush 0.9 % injection 5-40 mL  5-40 mL IntraVENous PRN Janessa Nay, DO        0.9 % sodium chloride infusion  25 mL IntraVENous PRN Janessa Nay, DO        ondansetron (ZOFRAN-ODT) disintegrating tablet 4 mg  4 mg Oral Q8H PRN Janessa Nay, DO        Or    ondansetron [facility]) injection 4 mg  4 mg IntraVENous Q6H PRN Janessa Nay, DO        polyethylene glycol (GLYCOLAX) packet 17 g  17 g Oral Daily PRN Janessa Nay, DO        aspirin EC tablet 81 mg  81 mg Oral Daily Janessa Nay, DO   81 mg at 09/21/21 4229    Or    aspirin suppository 300 mg  300 mg Rectal Daily Janessa Nay, DO        lansoprazole suspension SUSP 30 mg  30 mg Oral Daily Janessa Nay, DO   30 mg at 09/19/21 1239    glucose (GLUTOSE) 40 % oral gel 15 g  15 g Oral PRN Janessa Nay, DO        dextrose 50 % IV solution  12.5 g IntraVENous PRN Janessa Nay, DO        glucagon (rDNA) injection 1 mg  1 mg IntraMUSCular PRN Janessa Nay, DO        dextrose 5 % solution  100 mL/hr IntraVENous PRN Janessa Arenas DO        insulin lispro (HUMALOG) injection vial 0-6 Units  0-6 Units SubCUTAneous TID  Janessa Arenas DO   1 Units at 09/20/21 2103    insulin lispro (HUMALOG) injection vial 0-3 Units  0-3 Units SubCUTAneous Nightly Fortino Shelby DO   1 Units at 09/20/21 2230    albuterol sulfate  (90 Base) MCG/ACT inhaler 2 puff  2 puff Inhalation Q4H PRN Fortino Shelby DO            Objective:   /66   Pulse 65   Temp 98.6 °F (37 °C) (Oral)   Resp 16   Wt 150 lb (68 kg)   SpO2 95%   BMI 24.21 kg/m²     Physical Exam  Vitals reviewed. Constitutional:       General: He is not in acute distress. Eyes:      General: Visual field deficit present. Pupils: Pupils are equal, round, and reactive to light. Cardiovascular:      Rate and Rhythm: Normal rate and regular rhythm. Heart sounds: No murmur heard. Pulmonary:      Effort: Pulmonary effort is normal.      Breath sounds: Normal breath sounds. Abdominal:      General: Bowel sounds are normal.   Musculoskeletal:         General: Normal range of motion. Cervical back: Normal range of motion. Skin:     General: Skin is warm. Neurological:      Mental Status: He is alert and oriented to person, place, and time. Cranial Nerves: No cranial nerve deficit, dysarthria or facial asymmetry. Sensory: No sensory deficit. Motor: No tremor, abnormal muscle tone or seizure activity. Coordination: Coordination normal.      Deep Tendon Reflexes: Reflexes are normal and symmetric. Babinski sign absent on the right side. Babinski sign absent on the left side. Psychiatric:         Mood and Affect: Mood normal.       CT Head WO Contrast    Result Date: 9/16/2021  EXAMINATION: CT HEAD WO CONTRAST, 9/16/2021 3:14 PM CLINICAL HISTORY:  Weakness COMPARISON: Brain CT from September 14, 2021, 2 days prior TECHNIQUE:  Multiple contiguous axial images of the head were obtained from the skull base through the skull vertex without intravenous contrast. Sagittal and coronal reformats have been produced.  All CT scans at this facility use dose modulation, iterative reconstruction, and/or weight based dosing when appropriate to reduce radiation dose to as low as reasonably achievable. BRAIN CT FINDINGS: There has been interval development of a 3 cm area of hypodensity in the right occipital lobe since the previous day which is worrisome for an acute, subacute ischemia. There are persistent, chronic areas of ischemia in the anterior right temporal lobe, unchanged since previous study. No acute hemorrhage, mass, mass effect, or midline shift. There is prominence of sulci and ventricles indicating mild global cerebral atrophy and chronic involutional changes. Moderate periventricular white matter hypodensities indicating chronic microangiopathy are noted. The basal ganglia are within normal limits. There are no acute changes or space-occupying lesions in the posterior fossa. The visualized portions of the orbits are within normal limits. The globes are intact. The imaged portions of the paranasal sinuses are unremarkable. The calvarium is intact. There is a 3 cm new area of hypodensity worrisome for acute in the right occipital lobe. The findings were called to the ER [id number] hours. ,     XR CHEST PORTABLE    Result Date: 9/16/2021  XR CHEST PORTABLE Clinical History:  Weakness. Lethargic. Comparison:  9/14/2021. RESULT: Median sternotomy. Right transvenous pacemaker, unchanged. No consolidation. No pleural effusion. No pneumothorax. Stable cardiomediastinal silhouette. Aortic vascular calcifications. No distinct acute osseous findings. No acute radiographic abnormality. US CAROTID ARTERY BILATERAL    Result Date: 9/16/2021  US CAROTID ARTERY BILATERAL: 9/16/2021 6:40 PM CLINICAL HISTORY:  cva . COMPARISON: None available. Grayscale, color and waveform Doppler analysis of the cervical carotid and vertebral arteries was performed.  Validated velocity measurements with angiographic measurements, velocity criteria are extrapolated from diameter data as defined by the Society of Radiologist in Ultrasound Consensus Conference Radiology 2003; 715;363-535. FINDINGS: There is mild atherosclerotic plaquing of the common carotid arteries and carotid bifurcations without significantly elevated velocities. Maximum systolic velocity within the right internal and mid common carotid arteries are 79 and 51 cm/s, with an ICA/CCA ratio of approximately 1.5 , which indicates less than 50% by velocity criteria. Maximum systolic velocity within the left internal and mid common carotid arteries are 59 and 74 cm/s, with an ICA/CCA ratio of approximately 0.9, which indicates less than 50% by velocity criteria. Maximum velocities within the right and left external carotid arteries are 141 and 81 cm/sec respectively. There is antegrade flow in both vertebral arteries. MILD ATHEROSCLEROTIC PLAQUING OF THE CAROTID BULBS WITHOUT EVIDENCE OF A FLOW-LIMITING STENOSIS, BY VELOCITY RATIO CRITERIA.  GOSINK CRITERIA Diameter          PSV t            EDV t          PSV          EDV          Stenosis Site       %              cm/sec          cm/sec      ICA/CCA    ICA/CCA 0-49                 <124              <40             <2:1           ---                 --- 50-69              125-225                  >2:1           ---                 --- 70-89               225-325          >100          >4:1           >5:1              --- 90%+                >325              >100          >4:1           >9:1           Damped resistive CCA >95%               May be            May be      Damped      ---              Damped resistive CCA                       decreased      decreased  resistive CCA       Lab Results   Component Value Date    WBC 9.0 09/20/2021    RBC 3.81 09/20/2021    HGB 12.6 09/20/2021    HCT 36.1 09/20/2021    MCV 94.9 09/20/2021    MCH 33.1 09/20/2021    MCHC 34.9 09/20/2021    RDW 14.0 09/20/2021     09/20/2021    MPV 9.0 07/29/2014     Lab Results   Component Value Date     09/20/2021    K 4.7 09/20/2021    K 4.4 09/18/2021     09/20/2021    CO2 20 09/20/2021    BUN 33 09/20/2021    CREATININE 1.20 09/20/2021    GFRAA >60.0 09/20/2021    LABGLOM 56.6 09/20/2021    GLUCOSE 177 09/20/2021    PROT 6.9 09/16/2021    LABALBU 4.1 09/16/2021    CALCIUM 8.6 09/20/2021    BILITOT 0.8 09/16/2021    ALKPHOS 76 09/16/2021    AST 19 09/16/2021    ALT 16 09/16/2021     Lab Results   Component Value Date    PROTIME 16.3 09/14/2021    INR 1.3 09/14/2021     Lab Results   Component Value Date    TSH 3.700 09/16/2021    XLWICEFR46 520 09/15/2021    FOLATE >20.0 09/15/2021    IRON 44 02/04/2021    TIBC 211 02/04/2021     Lab Results   Component Value Date    TRIG 206 09/16/2021    HDL 41 09/16/2021    LDLCALC 148 09/16/2021     No results found for: LABAMPH, BARBSCNU, LABBENZ, CANNAB, COCAINESCRN, LABMETH, OPIATESCREENURINE, PHENCYCLIDINESCREENURINE, PPXUR, ETOH  No results found for: LITHIUM, DILFRTOT, VALPROATE    Assessment:   Right occipital stroke consistent with a subacute stroke. A subtle mass cannot be ruled out though it is very unlikely given he does not have any risk factors for cerebrovascular disease. Patient is already on Eliquis and will add aspirin for now. Patient's PTT and INR suggest that patient was responsive to Eliquis and therefore is not likely to have any changes on any other anticoagulation. Coumadin may not be the best option given that again he has responded to anticoagulation. We will keep in observation of the same carotid ultrasounds are pending. If of this consultation includes my consultation the emergency room. CT of the head shows 3 mm new area of hypodensity worrisome for acute right occipital lobe infarct. Initial presentation was dizziness with left-sided sensory loss. Patient was already on Eliquis at time of CVA and aspirin has been added. Unable to obtain MRI due to pacemaker. Ultrasound of carotid arteries showed less than 50% stenosis in bilateral carotids.   Antegrade flow in

## 2021-09-21 NOTE — PROGRESS NOTES
Patient is Hard of hearing and was up the restroom this evening with one person assistance. Bed alarm on and rails up times 2. Call light with patient.

## 2021-09-21 NOTE — PROGRESS NOTES
Physical Therapy Med Surg Daily Treatment Note  Facility/Department: [facility]  Room: Q735/D514-43       NAME: Manoj Dillard  : 3/5/1929 (80 y.o.)  MRN: [id number]  CODE STATUS: DNR-CCA    Date of Service: 2021    Patient Diagnosis(es): Elevated troponin [R77.8]  Acute CVA (cerebrovascular accident) [facility]) [I63.9]  Cerebrovascular accident (CVA), unspecified mechanism ([facility] [address] [I63.9]   Chief Complaint   Patient presents with    Extremity Weakness     pt was sent to the ER for general weakness and possible dehydration, recently D/C from hospital     Patient Active Problem List    Diagnosis Date Noted    Anginal equivalent ([facility] [address]     Aphasia     Acute CVA (cerebrovascular accident) ([facility] [address] 2021    General weakness 09/15/2021    Elevated troponin 09/15/2021    Hyperkalemia 09/15/2021    Anticoagulation adequate with anticoagulant therapy 09/15/2021    Arterial occlusion 2021    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    CKD (chronic kidney disease) 2021    PVD (peripheral vascular disease) ([facility] [address] 2021    Venous thrombosis of leg 2021    Hyponatremia 2021    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[facility] [address] 2021    Syncope and collapse 2020    COPD exacerbation ([facility] [address] [facility] 2020    NSTEMI (non-ST elevated myocardial infarction) ([facility] [address] 2019    Chest pain 2019    Hypotension 2019    SOB (shortness of breath) 2018    Chronic right shoulder pain 2018    COPD with acute exacerbation ([facility] [address] [facility] 2018    Bronchitis 2016    CAD (coronary artery disease) 2015    HTN (hypertension)     Dyslipidemia     DM (diabetes mellitus) ([facility] [address] [facility]     Hypothyroidism     History of tobacco abuse     Anemia         Past Medical History:   Diagnosis Date    Anemia     CAD (coronary artery disease) 2015    DM (diabetes mellitus) ([facility] [address] [facility]     Dyslipidemia     History of tobacco abuse     HTN (hypertension)  Hypothyroidism     Non-ST elevation MI (NSTEMI) ([facility] [address]     Pacemaker 4/16/2015     Past Surgical History:   Procedure Laterality Date    CATARACT REMOVAL      MIDDLE EAR SURGERY Left 1998    \"they had to reset the bones\" after an infection       Restrictions  Restrictions/Precautions: Fall Risk    SUBJECTIVE   General  Chart Reviewed: Yes  Subjective  Subjective: \"No pain. \"    Pre-Session Pain Report     Pain Screening  Patient Currently in Pain: No       Post-Session Pain Report  Pain Assessment  Pain Level: 0         OBJECTIVE        Bed mobility  Bridging: Supervision  Supine to Sit: Supervision  Sit to Supine: Supervision  Comment: Good technique and safety when transfering    Transfers  Sit to Stand: Contact guard assistance  Stand to sit: Contact guard assistance  Comment: Pt presents with initial unsteadiness upon standing but no LOB    Ambulation  Ambulation?: Yes  Ambulation 1  Surface: level tile  Device: No Device  Assistance: Contact guard assistance;Minimal assistance  Quality of Gait: NBOS, slow candence, decreased trunk control with staggering gait at times. Occassional use of hallway handrail for balance. Distance: [de-identified]'                    Exercises  Comments: Pt educated and completed on exercises to be done in room. Pt demoed understanding                    Activity Tolerance  Activity Tolerance: Patient Tolerated treatment well          ASSESSMENT   Body structures, Functions, Activity limitations: Decreased functional mobility ; Decreased balance  Assessment: Pt demo's bed mobility and transfers to EOB with no use of handrail. Pt display initial unsteadiness when going from sit to stand and occassionally reached for hand rail for assistance.   Barriers to Learning: [place]  REQUIRES PT FOLLOW UP: Yes     Discharge Recommendations:  Continue to assess pending progress (pts family report pt can stay on first level if needed andthis is recommended initially)    Goals  Short term goals  Short term goal 1: indep sit to stand  Short term goal 2: indep bed mobility  Short term goal 3: indep gait with no device 50 feet in protected area  Short term goal 4: SBA stairs with rails    PLAN    Times per week: 2-3 visits  Safety Devices  Type of devices: Bed alarm in place, Left in bed, All fall risk precautions in place     AMPAC (6 CLICK) BASIC MOBILITY  AM-PAC Inpatient Mobility Raw Score : 20     Therapy Time   Individual   Time In 1122   Time Out 1137   Minutes 15     Timed Code Treatment Minutes: 15 Minutes     BM/TR: 5  Gait: 10 Manuel Moran PTA, 09/21/21 at 12:11 PM         Definitions for assistance levels  Independent = pt does not require any physical supervision or assistance from another person for activity completion. Device may be needed.   Stand by assistance = pt requires verbal cues or instructions from another person, close to but not touching, to perform the activity  Minimal assistance= pt performs 75% or more of the activity; assistance is required to complete the activity  Moderate assistance= pt performs 50% of the activity; assistance is required to complete the activity  Maximal assistance = pt performs 25% of the activity; assistance is required to complete the activity  Dependent = pt requires total physical assistance to accomplish the task

## 2021-09-21 NOTE — PROGRESS NOTES
[facility]CC[facility] THERAP[facility] MED SURG TREATMENT NOTE     Date: 2021  Patient Name: Bryan Hill        MRN: [id number]  Account: [de-identified]   : 3/5/1929  (80 y.o.)  [facility]    Chart Review:  Diagnosis:  The primary encounter diagnosis was Cerebrovascular accident (CVA), unspecified mechanism ([address] A diagnosis of Elevated troponin was also pertinent to this visit. Restrictions:    Restrictions/Precautions: Fall Risk       Safety Devices: Safety Devices in place: Yes  Type of devices: All fall risk precautions in place     Subjective:  Patient states: \"I can dress myself. \"  Pain:  Start of tx:  Pre Treatment Pain Screening  Pain at present: 0  Scale Used: Numeric Score  Intervention List: Patient able to continue with treatment    End of tx:  Pain Assessment  Patient Currently in Pain: Denies    Objective: Pt limited by [facility]. Pt states \"I can't hear you\" and covers himself up with blankets. Provided pt with his hearing aids. Pt had Min difficulty inserting hearing aids on only the right one would work. Encouraged pt to sit EOB and don pants, however, he refused and donned them at bed level. ADL:  UE Bathing: Independent (Pt simulated at bed level)  LE Bathing: Modified independent (Pt simulated at bed level)  UE Dressing: None  LE Dressing: Minimal assistance (To don pants at bed level.  Assistance to correctly orient pants)      Therapy key for assistance levels -   Independent = Pt. is able to perform task with no assistance but may require a device   Stand by assistance = Pt. does not perform task at an independent level but does not need physical assistance, requires verbal cues  Minimal, Moderate, Maximal Assistance = Pt. requires physical assistance (25%, 50%, 75% assist from helper) for task but is able to actively participate in task   Dependent = Pt. requires total assistance with task and is not able to actively participate with task completion    Activity Tolerance:  Activity Tolerance: Patient Tolerated treatment well  Activity Tolerance: [place] limits activity    Treatment Consisted Of:  ADL Training    6-Click   How much help for putting on and taking off regular lower body clothing?: A Little  How much help for Bathing?: A Little  How much help for Toileting?: A Little  How much help for putting on and taking off regular upper body clothing?: A Little  How much help for taking care of personal grooming?: None  How much help for eating meals?: None  AM-PAC Inpatient Daily Activity Raw Score: 20  AM-PAC Inpatient ADL T-Scale Score : 42.03  ADL Inpatient CMS 0-100% Score: 38.32    Plan:  Continue OT per POC    Goals/Plan Addressed This session:  Improve ADL [place]    Minutes:  Time In: 903  Time Out: 914  Minutes: 11    ADL/IADL trainin minutes    Electronically signed by:    LEONCIO Jurado  2021, 9:23 AM

## 2021-09-21 NOTE — CONSULTS
Physical Medicine & Rehabilitation  Consult Note      Admitting Physician: Ree Bergman DO    Primary Care Provider: Jordon Ceron MD, MD     Reason for Consult:  Asses rehab needs, promote physical and mental function, and decrease likelihood of re-admit to the hospital after discharge. History of Present Illness:    Chioma Ackerman is a 80 y.o. male admitted to [facility] on 9/16/2021. Was admitted through the ER after suffering stroke like symptoms 2 days prior to evaluation. He was found on imaging to have a 3 cm new area of hypodensity worrisome for acute in the right occipital lobe. Not have an MRI due to his pacemaker. His communication is affected by the fact that he is severely hard of hearing and of course his occipital infarcts makes it likely that he is having visual deficits especially in the left visual field. Neurologic Problem  The patient's primary symptoms include focal sensory loss, focal weakness and weakness. The patient's pertinent negatives include no syncope. This is a new problem. The current episode started in the past 7 days. The neurological problem developed suddenly. The problem has been gradually improving since onset. Associated symptoms include back pain, confusion, dizziness, fatigue and shortness of breath. Pertinent negatives include no abdominal pain, chest pain, diaphoresis, fever, headaches, nausea, neck pain, palpitations or vomiting. I reviewed recent nursing notes discussed care with acute care providers, \" Pt is alert to self, DISHA alertness to place and time. He does lift his wrist to be scanned for medications. Very non-compliant with safety precautions and will get up without assistance or calling for help from staff. No s/s of discomfort of pain noted or observed by this writer. Bed alarm on, call light and personal belongings within reach. Will continue to monitor \".       Their inpatient work up has included:    Imaging:    Imaging and other studies reviewed and discussed with patient and staff    Echocardiogram 9/17/2021  Transthoracic Echocardiography   Left Ventricle Normal left ventricular systolic function, no regional wall motion abnormalities, estimated ejection fraction of 50%. Normal left ventricular size and function. Right Ventricle Normal right ventricle structure and function. Normal right ventricle systolic pressure. Left Atrium The left atrium is Mildly dilated. Right Atrium Normal right atrium. Mitral Valve Diffusely thickened and pliable mitral valve leaflets with normal excursion. Mild (1+) mitral regurgitation is present. No evidence of mitral valve stenosis. Tricuspid Valve Normal tricuspid valve structure and function. Aortic Valve The aortic valve leaflets were not well visualized. No evidence of aortic valve regurgitation . No evidence of aortic valve stenosis. Pericardial Effusion No evidence of pericardial effusion. Aorta \ Miscellaneous Miscellaneous normal findings were found. M-Mode Measurements (cm)   LVIDd: 3.33 cm            LVIDs: 2.3 cm  IVSd: 1.44 cm             IVSs: 1.73 cm  LVPWd: 0.8 cm             AO Root Dimension: 3.45 cm                             LVOT: 1.98 cm  Valves  LVOT   LVOT Diameter: 1.98 cm  Structures  Left Ventricle   Diastolic Dimension: 0.42 cm         Systolic Dimension: 2.3 cm  Septum Diastolic: 6.88 cm            Septum Systolic: 7.23 cm  PW Diastolic: 0.8 cm                                       FS: 30.9 %  LV EDV/LV EDV Index: 45.04 ml/25 m^2 LV ESV/LV ESV Index: 18.13 ml/10 m^2  EF Calculated: 59.8 %   LVOT Diameter: 1.98 cm  Aorta/ Miscellaneous Aorta   Aortic Root: 3.45 cm  LVOT Diameter: 1.98 cm          CT Head  9/16/2021 FINDINGS: There has been interval development of a 3 cm area of hypodensity in the right occipital lobe since the previous day which is worrisome for an acute, subacute ischemia.  There are persistent, chronic areas of ischemia in the anterior right temporal lobe, unchanged since previous study. No acute hemorrhage, mass, mass effect, or midline shift. There is prominence of sulci and ventricles indicating mild global cerebral atrophy and chronic involutional changes. Moderate periventricular white matter hypodensities indicating chronic microangiopathy are noted. The basal ganglia are within normal limits. There are no acute changes or space-occupying lesions in the posterior fossa. The visualized portions of the orbits are within normal limits. The globes are intact. The imaged portions of the paranasal sinuses are unremarkable. The calvarium is intact. There is a 3 cm new area of hypodensity worrisome for acute in the right occipital lobe. The findings were called to the ER [id number]. ,     CT Head  : 9/15/2021 The ventricles are dilated. Unchanged size configuration. No mass. No midline shift. The cisterns are patent. There are white matter and periventricular changes most likely consistent with chronic small vessel disease. Again note is made of a focal area of encephalomalacia at the inferolateral medial aspect the right temporal lobe. No acute intra-axial or extra-axial findings. The visualized osseous structures are unremarkable. There is mucoperiosteal thickening in the right maxillary sinus. There is a small amount of patchy opacification right mastoid. There is resection of the left mastoid. The middle ear bones are not appreciated. Correlate with patient's surgical history. .     NO ACUTE INTRA-AXIAL OR EXTRA-AXIAL FINDINGS. XR CHEST   9/16/2021  XR CHEST PORTABLE Clinical History:  Weakness. Lethargic. Comparison:  9/14/2021. RESULT: Median sternotomy. Right transvenous pacemaker, unchanged. No consolidation. No pleural effusion. No pneumothorax. Stable cardiomediastinal silhouette. Aortic vascular calcifications. No distinct acute osseous findings. No acute radiographic abnormality.      XR CHEST   9/14/2021: Single  views of the chest is submitted. Right-sided CCD device. Leads overlying the cardiac silhouette. There are multiple median sternotomy wires overlying the cardiac silhouette. The cardiac silhouette is of normal size configuration. Pulmonary vascular unremarkable. Right sided trachea. No focal infiltrates. No Pneumothoraces. NO ACUTE ACTIVE CARDIOPULMONARY PROCESS        US CAROTID ARTERY BILATERAL  9/16/2021 There is mild atherosclerotic plaquing of the common carotid arteries and carotid bifurcations without significantly elevated velocities. Maximum systolic velocity within the right internal and mid common carotid arteries are 79 and 51 cm/s, with an ICA/CCA ratio of approximately 1.5 , which indicates less than 50% by velocity criteria. Maximum systolic velocity within the left internal and mid common carotid arteries are 59 and 74 cm/s, with an ICA/CCA ratio of approximately 0.9, which indicates less than 50% by velocity criteria. Maximum velocities within the right and left external carotid arteries are 141 and 81 cm/sec respectively. There is antegrade flow in both vertebral arteries. MILD ATHEROSCLEROTIC PLAQUING OF THE CAROTID BULBS WITHOUT EVIDENCE OF A FLOW-LIMITING STENOSIS, BY VELOCITY RATIO CRITERIA.  GOSINK CRITERIA Diameter          PSV t            EDV t          PSV          EDV          Stenosis Site       %              cm/sec          cm/sec      ICA/CCA    ICA/CCA 0-49                 <124              <40             <2:1           ---                 --- 50-69              125-225                  >2:1           ---                 --- 70-89               225-325          >100          >4:1           >5:1              --- 90%+                >325              >100          >4:1           >9:1           Damped resistive CCA >95%               May be            May be Damped      ---              Damped resistive CCA                       decreased      decreased  resistive CCA              Labs:     labs reviewed and discussed with patient and staff    Lab Results   Component Value Date    POCGLU 119 09/21/2021    POCGLU 104 09/21/2021    POCGLU 182 09/20/2021    POCGLU 156 09/20/2021    POCGLU 152 09/20/2021     Lab Results   Component Value Date     09/20/2021    K 4.7 09/20/2021    K 4.4 09/18/2021     09/20/2021    CO2 20 09/20/2021    BUN 33 09/20/2021    CREATININE 1.20 09/20/2021    CALCIUM 8.6 09/20/2021    LABALBU 4.1 09/16/2021    BILITOT 0.8 09/16/2021    ALKPHOS 76 09/16/2021    AST 19 09/16/2021    ALT 16 09/16/2021     Lab Results   Component Value Date    WBC 9.0 09/20/2021    RBC 3.81 09/20/2021    HGB 12.6 09/20/2021    HCT 36.1 09/20/2021    MCV 94.9 09/20/2021    MCH 33.1 09/20/2021    MCHC 34.9 09/20/2021    RDW 14.0 09/20/2021     09/20/2021    MPV 9.0 07/29/2014     Lab Results   Component Value Date    VITD25 16.8 02/03/2021     Lab Results   Component Value Date    COLORU Yellow 09/16/2021    NITRU Negative 09/16/2021    GLUCOSEU Negative 09/16/2021    KETUA Negative 09/16/2021    UROBILINOGEN 0.2 09/16/2021    BILIRUBINUR Negative 09/16/2021     Lab Results   Component Value Date    PROTIME 16.3 09/14/2021     Lab Results   Component Value Date    INR 1.3 09/14/2021         I discussed results with patient. Current Rehabilitation Assessments:    Rehabilitation:  Physical therapy: FIMS:  BedMobility:      Transfers: Sit to Stand: Contact guard assistance  Stand to sit: Contact guard assistance, Ambulation 1  Surface: level tile  Device: No Device  Assistance: Contact guard assistance, Minimal assistance  Quality of Gait: NBOS, slow candence, decreased trunk control with staggering gait at times. Occassional use of hallway handrail for balance.   Distance: [de-identified]'  Comments: familyindicates pt is close to his baseline level of function, FIMS: ,  , Assessment: Pt demo's bed mobility and transfers to EOB with no use of handrail. Pt display initial unsteadiness when going from sit to stand and occassionally reached for hand rail for assistance. Occupational therapy: FIMS:   ,  ,        ADL  Grooming: Supervision (09/19/21 1111)  UE Bathing: Independent (09/21/21 0921)  LE Bathing: Modified independent  (09/21/21 0277)  UE Dressing: None (09/21/21 0921)  LE Dressing: Minimal assistance (To don pants at bed level. Assistance to correctly orient pants) (09/21/21 0921)  Toileting: Stand by assistance (09/19/21 1111)  OCCUPATIONAL THERAPY  Hand Dominance: Right  ADL  Grooming: Supervision (09/19/21 1111)  UE Bathing: Independent (09/21/21 0921)  LE Bathing: Modified independent  (09/21/21 0921)  UE Dressing: None (09/21/21 0921)  LE Dressing: Minimal assistance (To don pants at bed level. Assistance to correctly orient pants) (09/21/21 1762)  Toileting: Stand by assistance (09/19/21 1111)  Toilet Transfers  Toilet - Technique: Ambulating (09/19/21 1107)  Equipment Used: Grab bars (09/19/21 1107)  Toilet Transfer: Stand by assistance;Contact guard assistance (09/19/21 1107)  Toilet Transfers Comments: Patient stood at the toilet to urinate with only supervision needed. As patient turned around he had a LOB and was able to almost regain his balance on his own by grasping a shelf but also min assist was provided to assist patient to right himself (09/19/21 1107)            LTG:                 Speech therapy: FIMS:       SPEECH THERAPY  Motor Speech: Within Functional Limits  Comprehension: Exceptions  Verbal Expression: Exceptions to functional limits      Diet/Swallow:  Diet Solids Recommendation: Regular  Liquid Consistency Recommendation:  Thin  Dysphagia Outcome Severity Scale: Level 7: Normal in all situations    Compensatory Swallowing Strategies: Upright as possible for all oral intake             COGNITION  OT: Cognition Comment: difficult to assess because of hearing difficulties  SP:Memory: Exceptions to [facility] (able to recall he just consumed breakfast; pt recalled his address; unable to fully assess secondary to [facility]  Problem Solving: Exceptions to [facility] (unable to answer questions related to his call light or 9-1-1)      Past Medical History:          Diagnosis Date    Anemia     CAD (coronary artery disease) 4/16/2015    [facility] mellitus) [facility]     Dyslipidemia     History of tobacco abuse     HTN (hypertension)     Hypothyroidism     Non-ST elevation MI (NSTEMI) ([facility]     Pacemaker 4/16/2015         PastSurgical History:          Procedure Laterality Date    CATARACT REMOVAL      MIDDLE EAR SURGERY Left 1998    \"they had to reset the bones\" after an infection         Allergies:   No Known Allergies     CurrentMedications:     Current Facility-Administered Medications: ramipril (ALTACE) capsule 5 mg, 5 mg, Oral, Daily  apixaban (ELIQUIS) tablet 5 mg, 5 mg, Oral, BID  atorvastatin (LIPITOR) tablet 40 mg, 40 mg, Oral, Nightly  budesonide-formoterol (SYMBICORT) 80-4.5 MCG/ACT inhaler 2 puff, 2 puff, Inhalation, BID  carvedilol (COREG) tablet 6.25 mg, 6.25 mg, Oral, BID WC  ferrous sulfate (IRON 325) tablet 325 mg, 325 mg, Oral, Daily with breakfast  folic acid (FOLVITE) tablet 1 mg, 1 mg, Oral, Daily  sodium chloride flush 0.9 % injection 5-40 mL, 5-40 mL, IntraVENous, 2 times per day  sodium chloride flush 0.9 % injection 5-40 mL, 5-40 mL, IntraVENous, PRN  0.9 % sodium chloride infusion, 25 mL, IntraVENous, PRN  ondansetron (ZOFRAN-ODT) disintegrating tablet 4 mg, 4 mg, Oral, Q8H PRN **OR** ondansetron (ZOFRAN) injection 4 mg, 4 mg, IntraVENous, Q6H PRN  polyethylene glycol (GLYCOLAX) packet 17 g, 17 g, Oral, Daily PRN  aspirin EC tablet 81 mg, 81 mg, Oral, Daily **OR** aspirin suppository 300 mg, 300 mg, Rectal, Daily  lansoprazole suspension SUSP 30 mg, 30 mg, Oral, Daily  glucose (GLUTOSE) 40 % oral gel 15 g, 15 g, Oral, PRN  dextrose 50 % IV solution, 12.5 g, IntraVENous, PRN  glucagon (rDNA) injection 1 mg, 1 mg, IntraMUSCular, PRN  dextrose 5 % solution, 100 mL/hr, IntraVENous, PRN  insulin lispro (HUMALOG) injection vial 0-6 Units, 0-6 Units, SubCUTAneous, TID WC  insulin lispro (HUMALOG) injection vial 0-3 Units, 0-3 Units, SubCUTAneous, Nightly  albuterol sulfate  (90 Base) MCG/ACT inhaler 2 puff, 2 puff, Inhalation, Q4H PRN      Social History:    Social History     Socioeconomic History    Marital status:      Spouse name: Not on file    Number of children: Not on file    Years of education: Not on file    Highest education level: Not on file   Occupational History    Not on file   Tobacco Use    Smoking status: Former Smoker    Smokeless tobacco: Never Used   Vaping Use    Vaping Use: Never used   Substance and Sexual Activity    Alcohol use: No    Drug use: No    Sexual activity: Not on file   Other Topics Concern    Not on file   Social History Narrative    Lives With: granddaughter Michelle Older  and family    Type of Home: House  Two level, Bed/Bath upstairs-[address] [facility] Access: Stairs to enter with rails    Entrance Stairs - Number of Steps: 14    Bathroom Shower/Tub: Tub/Shower unit    Home Equipment: Rolling walker, Cane    ADL Assistance: Independent    Homemaking Assistance: Independent(granddaughter completes)    Ambulation Assistance: Independent    Transfer Assistance: Independent    Active : Yes    Additional Comments: pt is significantly [place]; information gathered via chart review and via writing. Social Determinants of Health     Financial Resource Strain:     Difficulty of Paying Living Expenses:    Food Insecurity:     Worried About Running Out of Food in the Last Year:     [address] in the Last Year:    Transportation Needs:     Lack of Transportation (Medical):      Lack of Transportation (Non-Medical):    Physical Activity:     Days of Exercise per Week:  Minutes of Exercise per Session:    Stress:     Feeling of Stress :    Social Connections:     Frequency of Communication with Friends and Family:     Frequency of Social Gatherings with Friends and Family:     Attends Jew Services:     Active Member of Clubs or Organizations:     Attends Club or Organization Meetings:     Marital Status:    Intimate Partner Violence:     Fear of Current or Ex-Partner:     Emotionally Abused:     Physically Abused:     Sexually Abused:           Family History:     History reviewed. No pertinent family history. Review of Systems:    Review of Systems   Unable to perform ROS: Patient nonverbal   Constitutional: Positive for activity change and fatigue. Negative for chills, diaphoresis and fever. HENT: Positive for hearing loss. Negative for congestion, ear discharge, ear pain, nosebleeds, sore throat and tinnitus. Eyes: Positive for visual disturbance. Negative for photophobia, pain and redness. Respiratory: Positive for shortness of breath. Negative for cough, wheezing and stridor. Shortness of breath on exertion   Cardiovascular: Negative for chest pain, palpitations and leg swelling. Gastrointestinal: Positive for constipation. Negative for abdominal pain, blood in stool, diarrhea, nausea and vomiting. Endocrine: Negative for polydipsia. Genitourinary: Negative for dysuria, flank pain, frequency, hematuria and urgency. Musculoskeletal: Positive for back pain, gait problem and myalgias. Negative for neck pain. Skin: Negative for rash. Allergic/Immunologic: Positive for immunocompromised state. Negative for environmental allergies. Neurological: Positive for dizziness, focal weakness and weakness. Negative for tremors, seizures, syncope, facial asymmetry, speech difficulty and headaches. Hematological: Does not bruise/bleed easily. Psychiatric/Behavioral: Positive for confusion, decreased concentration and dysphoric mood. Negative for hallucinations and suicidal ideas. The patient is not nervous/anxious. Physical Exam:    /66   Pulse 65   Temp 98.6 °F (37 °C) (Oral)   Resp 16   Wt 150 lb (68 kg)   SpO2 95%   BMI 24.21 kg/m²      Physical Exam  Vitals reviewed. HENT:      Right Ear: Decreased hearing noted. Left Ear: Decreased hearing noted. Eyes:      Pupils: Pupils are equal, round, and reactive to light. Cardiovascular:      Rate and Rhythm: Normal rate and regular rhythm. Heart sounds: No murmur heard. Pulmonary:      Effort: Pulmonary effort is normal.      Breath sounds: Normal breath sounds. Abdominal:      General: Bowel sounds are normal.   Musculoskeletal:         General: Normal range of motion. Cervical back: Normal range of motion. Skin:     General: Skin is warm. Neurological:      Mental Status: He is alert and oriented to person, place, and time. Cranial Nerves: No cranial nerve deficit. Sensory: No sensory deficit. Motor: No abnormal muscle tone. Coordination: Coordination normal.      Deep Tendon Reflexes: Reflexes are normal and symmetric. Babinski sign absent on the right side. Babinski sign absent on the left side. Psychiatric:         Mood and Affect: Mood normal.         Cognition and Memory: Cognition is impaired. Memory is impaired. Ortho Exam  Neurologic Exam     Mental Status   Oriented to person, place, and time. Attention: decreased. Concentration: decreased. Cranial Nerves     CN III, IV, VI   Pupils are equal, round, and reactive to light.             Diagnostics:    Recent Results (from the past 24 hour(s))   POCT Glucose    Collection Time: 09/20/21  4:58 PM   Result Value Ref Range    POC Glucose 156 (H) 60 - 115 mg/dl    Performed on ACCU-CHEK    CBC    Collection Time: 09/20/21  6:46 PM   Result Value Ref Range    WBC 9.0 4.8 - 10.8 K/uL    RBC 3.81 (L) 4.70 - 6.10 M/uL    Hemoglobin 12.6 (L) 14.0 - 18.0 g/dL Hematocrit 36.1 (L) 42.0 - 52.0 %    MCV 94.9 80.0 - 100.0 fL    MCH 33.1 (H) 27.0 - 31.3 pg    MCHC 34.9 33.0 - 37.0 %    RDW 14.0 11.5 - 14.5 %    Platelets 125 478 - 632 K/uL   Basic Metabolic Panel    Collection Time: 09/20/21  6:46 PM   Result Value Ref Range    Sodium 138 135 - 144 mEq/L    Potassium 4.7 3.4 - 4.9 mEq/L    Chloride 106 95 - 107 mEq/L    CO2 20 20 - 31 mEq/L    Anion Gap 12 9 - 15 mEq/L    Glucose 177 (H) 70 - 99 mg/dL    BUN 33 (H) 8 - 23 mg/dL    CREATININE 1.20 0.70 - 1.20 mg/dL    GFR Non-African American 56.6 (L) >60    GFR  >60.0 >60    Calcium 8.6 8.5 - 9.9 mg/dL   POCT Glucose    Collection Time: 09/20/21  8:40 PM   Result Value Ref Range    POC Glucose 182 (H) 60 - 115 mg/dl    Performed on ACCU-CHEK    POCT Glucose    Collection Time: 09/21/21  8:19 AM   Result Value Ref Range    POC Glucose 104 60 - 115 mg/dl    Performed on ACCU-CHEK    POCT Glucose    Collection Time: 09/21/21 11:20 AM   Result Value Ref Range    POC Glucose 119 (H) 60 - 115 mg/dl    Performed on ACCU-CHEK               Impression:    1. Impaired mobility and ADLs due to CVA- acute in the right occipital lobe. 2. Fatigue and Malaise  3. recent DVT of the right lower extremity in June 2021-is currently on Eliquis aspirin also added      Complex Active General Medical Issues thatcomplicate care Assess & Plan:     1. Active Problems:    HTN (hypertension)    DM (diabetes mellitus) ([facility]    Anticoagulation adequate with anticoagulant therapy    Acute CVA (cerebrovascular accident) [facility])    Aphasia  Resolved Problems:    * No resolved hospital problems. *            Recommendations:    1. Considering all of the factors aboveincluding the patient's current medical status, social status/home envirnment, their functional needs and their ability to participate in a therapy program, I feel that they would best be served at acute rehabilitationl evel of care.   It is my opinion that they will be able to tolerate and benefit from 3 hours of therapy a day. I reviewed the varous local options re these levels of care with the patient and family. 2. Nursing care to focus on bowel and bladder issues. 3. Vitamin B12 shot times one  4. Improve hydration and Nutrition by adding Proteinex and push PO fluids       It was my pleasure to evaluate Chey Lomax today. Please call [phone] with questions.     Alanda Saint, DO

## 2021-09-21 NOTE — PROGRESS NOTES
Department of Internal Medicine  General Internal Medicine  Attending Progress Note      SUBJECTIVE:  Pt seen and examined. No distress.  No complaints    OBJECTIVE      Medications    Current Facility-Administered Medications: ramipril (ALTACE) capsule 5 mg, 5 mg, Oral, Daily  apixaban (ELIQUIS) tablet 5 mg, 5 mg, Oral, BID  atorvastatin (LIPITOR) tablet 40 mg, 40 mg, Oral, Nightly  budesonide-formoterol (SYMBICORT) 80-4.5 MCG/ACT inhaler 2 puff, 2 puff, Inhalation, BID  carvedilol (COREG) tablet 6.25 mg, 6.25 mg, Oral, BID WC  ferrous sulfate (IRON 325) tablet 325 mg, 325 mg, Oral, Daily with breakfast  folic acid (FOLVITE) tablet 1 mg, 1 mg, Oral, Daily  sodium chloride flush 0.9 % injection 5-40 mL, 5-40 mL, IntraVENous, 2 times per day  sodium chloride flush 0.9 % injection 5-40 mL, 5-40 mL, IntraVENous, PRN  0.9 % sodium chloride infusion, 25 mL, IntraVENous, PRN  ondansetron (ZOFRAN-ODT) disintegrating tablet 4 mg, 4 mg, Oral, Q8H PRN **OR** ondansetron (ZOFRAN) injection 4 mg, 4 mg, IntraVENous, Q6H PRN  polyethylene glycol (GLYCOLAX) packet 17 g, 17 g, Oral, Daily PRN  aspirin EC tablet 81 mg, 81 mg, Oral, Daily **OR** aspirin suppository 300 mg, 300 mg, Rectal, Daily  lansoprazole suspension SUSP 30 mg, 30 mg, Oral, Daily  glucose (GLUTOSE) 40 % oral gel 15 g, 15 g, Oral, PRN  dextrose 50 % IV solution, 12.5 g, IntraVENous, PRN  glucagon (rDNA) injection 1 mg, 1 mg, IntraMUSCular, PRN  dextrose 5 % solution, 100 mL/hr, IntraVENous, PRN  insulin lispro (HUMALOG) injection vial 0-6 Units, 0-6 Units, SubCUTAneous, TID WC  insulin lispro (HUMALOG) injection vial 0-3 Units, 0-3 Units, SubCUTAneous, Nightly  albuterol sulfate  (90 Base) MCG/ACT inhaler 2 puff, 2 puff, Inhalation, Q4H PRN  Physical    VITALS:  /66   Pulse 65   Temp 98.6 °F (37 °C) (Oral)   Resp 16   Wt 150 lb (68 kg)   SpO2 95%   BMI 24.21 kg/m²   Constitutional: Lying in bed comfortably  Head: Normocephalic, atraumatic  ENT: moist mucous membranes  Neck: neck supple, trachea midline  Lungs: Good inspiratory effort, no wheeze, no rhonchi, no rales  Heart: RRR, normal S1 and S2  GI: Soft, non-distended, +BS  MSK: no edema noted  Skin: warm, dry  Psych: appropriate affect     Data    CBC:   Lab Results   Component Value Date    WBC 9.0 09/20/2021    RBC 3.81 09/20/2021    HGB 12.6 09/20/2021    HCT 36.1 09/20/2021    MCV 94.9 09/20/2021    MCH 33.1 09/20/2021    MCHC 34.9 09/20/2021    RDW 14.0 09/20/2021     09/20/2021    MPV 9.0 07/29/2014     CMP:    Lab Results   Component Value Date     09/20/2021    K 4.7 09/20/2021    K 4.4 09/18/2021     09/20/2021    CO2 20 09/20/2021    BUN 33 09/20/2021    CREATININE 1.20 09/20/2021    GFRAA >60.0 09/20/2021    LABGLOM 56.6 09/20/2021    GLUCOSE 177 09/20/2021    PROT 6.9 09/16/2021    LABALBU 4.1 09/16/2021    CALCIUM 8.6 09/20/2021    BILITOT 0.8 09/16/2021    ALKPHOS 76 09/16/2021    AST 19 09/16/2021    ALT 16 09/16/2021       ASSESSMENT AND PLAN      # Acute CVA manifesting as dizziness and left-sided sensory loss: - deficits appear improved- patient was able to ambulate with PT  - Already on Eliquis.  Added aspirin  - Permissive hypertension-can resume medication  - Cannot get MRI due to pacemaker.  - TTE pending.  Lipid panel, carotid US, A1c reviewed  - PT/OT  - Risk factor modification     # HTN/HLD  - cont home meds    # Type 2 diabetes A1c 7.4  - ISS    # CAD/PAD  - cont home meds    DVT: on Eliquis    CODE: DNR-CCA    Disposition: Awaiting pre-cert for SNF vs rehab. Medically stable.       Ree Bergman, DO  Internal Medicine

## 2021-09-22 LAB
ANION GAP SERPL CALCULATED.3IONS-SCNC: 12 MEQ/L (ref 9–15)
BUN BLDV-MCNC: 28 MG/DL (ref 8–23)
CALCIUM SERPL-MCNC: 8.5 MG/DL (ref 8.5–9.9)
CHLORIDE BLD-SCNC: 101 MEQ/L (ref 95–107)
CO2: 18 MEQ/L (ref 20–31)
CREAT SERPL-MCNC: 1.11 MG/DL (ref 0.7–1.2)
GFR AFRICAN AMERICAN: >60
GFR NON-AFRICAN AMERICAN: >60
GLUCOSE BLD-MCNC: 109 MG/DL (ref 60–115)
GLUCOSE BLD-MCNC: 134 MG/DL (ref 60–115)
GLUCOSE BLD-MCNC: 145 MG/DL (ref 60–115)
GLUCOSE BLD-MCNC: 161 MG/DL (ref 60–115)
GLUCOSE BLD-MCNC: 189 MG/DL (ref 70–99)
PERFORMED ON: ABNORMAL
PERFORMED ON: NORMAL
POTASSIUM SERPL-SCNC: 4.2 MEQ/L (ref 3.4–4.9)
SODIUM BLD-SCNC: 131 MEQ/L (ref 135–144)

## 2021-09-22 PROCEDURE — 80048 BASIC METABOLIC PNL TOTAL CA: CPT

## 2021-09-22 PROCEDURE — 99233 SBSQ HOSP IP/OBS HIGH 50: CPT | Performed by: PSYCHIATRY & NEUROLOGY

## 2021-09-22 PROCEDURE — 6370000000 HC RX 637 (ALT 250 FOR IP): Performed by: INTERNAL MEDICINE

## 2021-09-22 PROCEDURE — 99231 SBSQ HOSP IP/OBS SF/LOW 25: CPT | Performed by: PHYSICAL MEDICINE & REHABILITATION

## 2021-09-22 PROCEDURE — 36415 COLL VENOUS BLD VENIPUNCTURE: CPT

## 2021-09-22 PROCEDURE — APPSS30 APP SPLIT SHARED TIME 16-30 MINUTES: Performed by: NURSE PRACTITIONER

## 2021-09-22 PROCEDURE — 94640 AIRWAY INHALATION TREATMENT: CPT

## 2021-09-22 PROCEDURE — 1210000000 HC MED SURG R&B

## 2021-09-22 PROCEDURE — 2580000003 HC RX 258: Performed by: INTERNAL MEDICINE

## 2021-09-22 PROCEDURE — 94761 N-INVAS EAR/PLS OXIMETRY MLT: CPT

## 2021-09-22 RX ORDER — ONDANSETRON 4 MG/1
4 TABLET, ORALLY DISINTEGRATING ORAL EVERY 8 HOURS PRN
Status: CANCELLED | OUTPATIENT
Start: 2021-09-22

## 2021-09-22 RX ORDER — ASPIRIN 300 MG/1
300 SUPPOSITORY RECTAL DAILY
Status: CANCELLED | OUTPATIENT
Start: 2021-09-23

## 2021-09-22 RX ORDER — RAMIPRIL 5 MG/1
5 CAPSULE ORAL DAILY
Status: CANCELLED | OUTPATIENT
Start: 2021-09-23

## 2021-09-22 RX ORDER — LANSOPRAZOLE
30 KIT DAILY
Status: CANCELLED | OUTPATIENT
Start: 2021-09-23

## 2021-09-22 RX ORDER — POLYETHYLENE GLYCOL 3350 17 G/17G
17 POWDER, FOR SOLUTION ORAL DAILY PRN
Status: CANCELLED | OUTPATIENT
Start: 2021-09-22

## 2021-09-22 RX ORDER — FOLIC ACID 1 MG/1
1 TABLET ORAL DAILY
Status: CANCELLED | OUTPATIENT
Start: 2021-09-23

## 2021-09-22 RX ORDER — FERROUS SULFATE 325(65) MG
325 TABLET ORAL
Status: CANCELLED | OUTPATIENT
Start: 2021-09-23

## 2021-09-22 RX ORDER — ALBUTEROL SULFATE 90 UG/1
2 AEROSOL, METERED RESPIRATORY (INHALATION) EVERY 4 HOURS PRN
Status: CANCELLED | OUTPATIENT
Start: 2021-09-22

## 2021-09-22 RX ORDER — ONDANSETRON 2 MG/ML
4 INJECTION INTRAMUSCULAR; INTRAVENOUS EVERY 6 HOURS PRN
Status: CANCELLED | OUTPATIENT
Start: 2021-09-22

## 2021-09-22 RX ORDER — ATORVASTATIN CALCIUM 40 MG/1
40 TABLET, FILM COATED ORAL NIGHTLY
Status: CANCELLED | OUTPATIENT
Start: 2021-09-22

## 2021-09-22 RX ORDER — BUDESONIDE AND FORMOTEROL FUMARATE DIHYDRATE 80; 4.5 UG/1; UG/1
2 AEROSOL RESPIRATORY (INHALATION) 2 TIMES DAILY
Status: CANCELLED | OUTPATIENT
Start: 2021-09-22

## 2021-09-22 RX ORDER — ASPIRIN 81 MG/1
81 TABLET ORAL DAILY
Status: CANCELLED | OUTPATIENT
Start: 2021-09-23

## 2021-09-22 RX ORDER — CARVEDILOL 3.12 MG/1
6.25 TABLET ORAL 2 TIMES DAILY WITH MEALS
Status: CANCELLED | OUTPATIENT
Start: 2021-09-22

## 2021-09-22 RX ADMIN — CARVEDILOL 6.25 MG: 6.25 TABLET, FILM COATED ORAL at 18:33

## 2021-09-22 RX ADMIN — FOLIC ACID 1 MG: 1 TABLET ORAL at 09:58

## 2021-09-22 RX ADMIN — CARVEDILOL 6.25 MG: 6.25 TABLET, FILM COATED ORAL at 09:58

## 2021-09-22 RX ADMIN — FERROUS SULFATE TAB 325 MG (65 MG ELEMENTAL FE) 325 MG: 325 (65 FE) TAB at 09:58

## 2021-09-22 RX ADMIN — RAMIPRIL 5 MG: 5 CAPSULE ORAL at 09:58

## 2021-09-22 RX ADMIN — Medication 5 ML: at 00:15

## 2021-09-22 RX ADMIN — APIXABAN 5 MG: 5 TABLET, FILM COATED ORAL at 23:53

## 2021-09-22 RX ADMIN — APIXABAN 5 MG: 5 TABLET, FILM COATED ORAL at 09:59

## 2021-09-22 RX ADMIN — INSULIN LISPRO 1 UNITS: 100 INJECTION, SOLUTION INTRAVENOUS; SUBCUTANEOUS at 18:32

## 2021-09-22 RX ADMIN — Medication 30 MG: at 19:20

## 2021-09-22 RX ADMIN — ATORVASTATIN CALCIUM 40 MG: 40 TABLET, FILM COATED ORAL at 23:53

## 2021-09-22 RX ADMIN — BUDESONIDE AND FORMOTEROL FUMARATE DIHYDRATE 2 PUFF: 80; 4.5 AEROSOL RESPIRATORY (INHALATION) at 07:18

## 2021-09-22 RX ADMIN — ASPIRIN 81 MG: 81 TABLET, COATED ORAL at 09:58

## 2021-09-22 RX ADMIN — Medication 10 ML: at 23:53

## 2021-09-22 ASSESSMENT — PAIN SCALES - GENERAL
PAINLEVEL_OUTOF10: 0

## 2021-09-22 NOTE — PROGRESS NOTES
inspection of ear     and nose benign. Inspection of lips, tongue and gums benign  Musculoskeletal: No significant change in strength or tone. All joints stable. Inspection and palpation of digits and nails show no clubbing,       cyanosis or inflammatory conditions. Neuro/Psychiatric: Affect: flat-  Alert and oriented to self and     Situation with  min cues. No significant change in deep tendon reflexes or     sensation  Lungs:  Diminished, CTA-B. Respiration effort is normal at rest.  BUTT  Heart:   S1 = S2,   RRR. No loud murmurs. Abdomen:  Soft, non-tender, no enlargement of liver or spleen. Extremities:  No significant lower extremity edema or tenderness. Skin:    BUE bruises dt blood draws, no visualized or palpated problems. Rehabilitation:  Physical therapy: FIMS:  Bed Mobility:      Transfers: Sit to Stand: Contact guard assistance  Stand to sit: Contact guard assistance, Ambulation 1  Surface: level tile  Device: No Device  Assistance: Contact guard assistance, Minimal assistance  Quality of Gait: NBOS, slow candence, decreased trunk control with staggering gait at times. Occassional use of hallway handrail for balance. Distance: [de-identified]'  Comments: familyindicates pt is close to his baseline level of function,      FIMS:  ,  , Assessment: Pt demo's bed mobility and transfers to EOB with no use of handrail. Pt display initial unsteadiness when going from sit to stand and occassionally reached for hand rail for assistance. Occupational therapy: FIMS:   ,  ,      Speech therapy: FIMS:      SPEECH THERAPY  Motor Speech: Within Functional Limits  Comprehension: Exceptions  Verbal Expression: Exceptions to functional limits      Diet/Swallow:  Diet Solids Recommendation: Regular  Liquid Consistency Recommendation:  Thin  Dysphagia Outcome Severity Scale: Level 7: Normal in all situations    Compensatory Swallowing Strategies: Upright as possible for all oral intake             COGNITION  OT: Cognition Comment: difficult to assess because of hearing difficulties  SP:Memory: Exceptions to [facility] (able to recall he just consumed breakfast; pt recalled his address; unable to fully assess secondary to [facility])  Problem Solving: Exceptions to [facility] (unable to answer questions related to his call light or 9-1-1)       Lab/X-ray studies reviewed, analyzed and discussed with patient and staff:   Recent Results (from the past 24 hour(s))   POCT Glucose    Collection Time: 09/21/21  4:31 PM   Result Value Ref Range    POC Glucose 160 (H) 60 - 115 mg/dl    Performed on ACCU-CHEK    POCT Glucose    Collection Time: 09/21/21 11:03 PM   Result Value Ref Range    POC Glucose 134 (H) 60 - 115 mg/dl    Performed on ACCU-CHEK    POCT Glucose    Collection Time: 09/22/21  8:14 AM   Result Value Ref Range    POC Glucose 109 60 - 115 mg/dl    Performed on ACCU-CHEK    POCT Glucose    Collection Time: 09/22/21 11:11 AM   Result Value Ref Range    POC Glucose 134 (H) 60 - 115 mg/dl    Performed on ACCU-CHEK        Previous extensive, complex labs, notes and diagnostics reviewed and analyzed. ALLERGIES:    Allergies as of 09/16/2021    (No Known Allergies)      (please also verify by checking [facility])     Complex Physical Medicine & Rehab Issues Assess & Plan:   1. Severe abnormality of gait and mobility and impaired self-care and ADL's secondary to progressive acute CVA. Functional and medical status reassessed regarding patients ability to participate in therapies and patient found to be able to participate in  acute intensive comprehensive inpatient rehabilitation program including PT/OT to improve balance, ambulation, ADLs, and to improve the P/AROM. It is my opinion that they will be able to tolerate 3 hours of therapy a day and benefit from it at an acute level. 2. Bowel constipation and Bladder dysfunction overactive bladder:  frequent toileting, ambulate to bathroom with assistance, check post void residuals.   Check for

## 2021-09-22 NOTE — PROGRESS NOTES
Subjective:      Patient ID:  Patient seen and examined for neurology follow-up for acute right occipital CVA. Exam difficult given patient's severe hearing impairment. Patient is able to ambulate without assistive device. Can follow commands. No new or worsening focal deficits. Patient continues to have visual deficits. Hard to assess as he does not follow commands appropriately. Some expressive aphasia noted. Repeat BMP 2 days ago with normal creatinine. P.o. intake remains poor. Met with daughter by my NP   Review of Systems   Unable to perform ROS: Patient nonverbal   Constitutional: Positive for appetite change and fatigue. HENT: Positive for hearing loss. Eyes: Positive for visual disturbance. Neurological: Positive for speech difficulty (aphasia) and weakness. Negative for tremors, seizures, syncope and facial asymmetry. patient reports no headache denies any weakness nausea vomiting though he has some degree of aphasia. No new changes,   Past Medical History:   Diagnosis Date    Anemia     CAD (coronary artery disease) 4/16/2015    DM (diabetes mellitus) ([facility] [address]     Dyslipidemia     History of tobacco abuse     HTN (hypertension)     Hypothyroidism     [facility]     Pacemaker 4/16/2015     Past Surgical History:   Procedure Laterality Date    CATARACT REMOVAL      MIDDLE EAR SURGERY Left 1998    \"they had to reset the bones\" after an infection     Social History     Socioeconomic History    Marital status:       Spouse name: Not on file    Number of children: Not on file    Years of education: Not on file    Highest education level: Not on file   Occupational History    Not on file   Tobacco Use    Smoking status: Former Smoker    Smokeless tobacco: Never Used   Vaping Use    Vaping Use: Never used   Substance and Sexual Activity    Alcohol use: No    Drug use: No    Sexual activity: Not on file   Other Topics Concern    Not on file   Social History Narrative    Lives With: granddaughter Geovanna Richard  and family    Type of Home: House  Two level, Bed/Bath upstairs-[address] Access: Stairs to enter with rails    Entrance Stairs - Number of Steps: 14    Bathroom Shower/Tub: Tub/Shower unit    Home Equipment: Rolling walker, Cane    ADL Assistance: Independent    Homemaking Assistance: Independent(granddaughter completes)    Ambulation Assistance: Independent    Transfer Assistance: Independent    Active : Yes    Additional Comments: pt is significantly Tribe; information gathered via chart review and via writing. Social Determinants of Health     Financial Resource Strain:     Difficulty of Paying Living Expenses:    Food Insecurity:     Worried About Running Out of Food in the Last Year:     Ran Out of Food in the Last Year:    Transportation Needs:     Lack of Transportation (Medical):     Lack of Transportation (Non-Medical):    Physical Activity:     Days of Exercise per Week:     Minutes of Exercise per Session:    Stress:     Feeling of Stress :    Social Connections:     Frequency of Communication with Friends and Family:     Frequency of Social Gatherings with Friends and Family:     Attends Yazidi Services: Active Member of Clubs or Organizations:     Attends Club or Organization Meetings:     Marital Status:    Intimate Partner Violence:     Fear of Current or Ex-Partner:     Emotionally Abused:     Physically Abused:     Sexually Abused:      History reviewed. No pertinent family history.   No Known Allergies  Current Facility-Administered Medications   Medication Dose Route Frequency Provider Last Rate Last Admin    ramipril (ALTACE) capsule 5 mg  5 mg Oral Daily Verona Weiss DO   5 mg at 09/22/21 0958    apixaban (ELIQUIS) tablet 5 mg  5 mg Oral BID Verona Weiss DO   5 mg at 09/22/21 0959    atorvastatin (LIPITOR) tablet 40 mg  40 mg Oral Nightly Verona Weiss DO   40 mg at 09/21/21 9362 budesonide-formoterol (SYMBICORT) 80-4.5 MCG/ACT inhaler 2 puff  2 puff Inhalation BID Katherine Hem, DO   2 puff at 09/22/21 0718    carvedilol (COREG) tablet 6.25 mg  6.25 mg Oral BID WC Katherine Hem, DO   6.25 mg at 09/22/21 5367    ferrous sulfate (IRON 325) tablet 325 mg  325 mg Oral Daily with breakfast Katherine Hem, DO   325 mg at 59/51/41 8792    folic acid (FOLVITE) tablet 1 mg  1 mg Oral Daily Katherine Hem, DO   1 mg at 09/22/21 4996    sodium chloride flush 0.9 % injection 5-40 mL  5-40 mL IntraVENous 2 times per day Katherine Hem, DO   5 mL at 09/22/21 0015    sodium chloride flush 0.9 % injection 5-40 mL  5-40 mL IntraVENous PRN Katherine Hem, DO        0.9 % sodium chloride infusion  25 mL IntraVENous PRN Katherine Hem, DO        ondansetron (ZOFRAN-ODT) disintegrating tablet 4 mg  4 mg Oral Q8H PRN Katherine Hem, DO        Or    ondansetron [facility]) injection 4 mg  4 mg IntraVENous Q6H PRN Katherine Hem, DO        polyethylene glycol (GLYCOLAX) packet 17 g  17 g Oral Daily PRN Katherine Hem, DO        aspirin EC tablet 81 mg  81 mg Oral Daily Katherine Hem, DO   81 mg at 09/22/21 2244    Or    aspirin suppository 300 mg  300 mg Rectal Daily Katherine Hem, DO        lansoprazole suspension SUSP 30 mg  30 mg Oral Daily Katherine Hem, DO   30 mg at 09/19/21 1239    glucose (GLUTOSE) 40 % oral gel 15 g  15 g Oral PRN Katherine Hem, DO        dextrose 50 % IV solution  12.5 g IntraVENous PRN Katherine Hem, DO        glucagon (rDNA) injection 1 mg  1 mg IntraMUSCular PRN Katherine Hem, DO        dextrose 5 % solution  100 mL/hr IntraVENous PRN Katherine Hem, DO        insulin lispro (HUMALOG) injection vial 0-6 Units  0-6 Units SubCUTAneous TID WC Katherine Hem, DO   1 Units at 09/20/21 2103    insulin lispro (HUMALOG) injection vial 0-3 Units  0-3 Units SubCUTAneous Nightly Katherine Hem, DO   1 Units at 09/20/21 2230    albuterol sulfate  (90 Base) MCG/ACT inhaler 2 puff  2 puff Inhalation Q4H PRN Thuanbenito DO Hecotr            Objective:   BP (!) 121/45   Pulse 64   Temp 98.3 °F (36.8 °C) (Oral)   Resp 20   Wt 150 lb (68 kg)   SpO2 97%   BMI 24.21 kg/m²     Physical Exam  Vitals reviewed. Constitutional:       General: He is not in acute distress. Eyes:      General: Visual field deficit present. Pupils: Pupils are equal, round, and reactive to light. Cardiovascular:      Rate and Rhythm: Normal rate and regular rhythm. Heart sounds: No murmur heard. Pulmonary:      Effort: Pulmonary effort is normal.      Breath sounds: Normal breath sounds. Abdominal:      General: Bowel sounds are normal.   Musculoskeletal:         General: Normal range of motion. Cervical back: Normal range of motion. Skin:     General: Skin is warm. Neurological:      Mental Status: He is alert. Cranial Nerves: Cranial nerve deficit present. No dysarthria or facial asymmetry. Sensory: No sensory deficit. Motor: No tremor, abnormal muscle tone or seizure activity. Coordination: Coordination normal.      Deep Tendon Reflexes: Reflexes are normal and symmetric. Babinski sign absent on the right side. Babinski sign absent on the left side. Comments: Expressive aphasia. Visual field deficit. Patient not following commands  therefore neuro exam limited. Psychiatric:         Mood and Affect: Mood normal.       CT Head WO Contrast    Result Date: 9/16/2021  EXAMINATION: CT HEAD WO CONTRAST, 9/16/2021 3:14 PM CLINICAL HISTORY:  Weakness COMPARISON: Brain CT from September 14, 2021, 2 days prior TECHNIQUE:  Multiple contiguous axial images of the head were obtained from the skull base through the skull vertex without intravenous contrast. Sagittal and coronal reformats have been produced.  All CT scans at this facility use dose modulation, iterative reconstruction, and/or weight based dosing when appropriate to reduce radiation dose to as low as reasonably achievable. BRAIN CT FINDINGS: There has been interval development of a 3 cm area of hypodensity in the right occipital lobe since the previous day which is worrisome for an acute, subacute ischemia. There are persistent, chronic areas of ischemia in the anterior right temporal lobe, unchanged since previous study. No acute hemorrhage, mass, mass effect, or midline shift. There is prominence of sulci and ventricles indicating mild global cerebral atrophy and chronic involutional changes. Moderate periventricular white matter hypodensities indicating chronic microangiopathy are noted. The basal ganglia are within normal limits. There are no acute changes or space-occupying lesions in the posterior fossa. The visualized portions of the orbits are within normal limits. The globes are intact. The imaged portions of the paranasal sinuses are unremarkable. The calvarium is intact. There is a 3 cm new area of hypodensity worrisome for acute in the right occipital lobe. The findings were called to the ER [id number] hours. ,     XR CHEST PORTABLE    Result Date: 9/16/2021  XR CHEST PORTABLE Clinical History:  Weakness. Lethargic. Comparison:  9/14/2021. RESULT: Median sternotomy. Right transvenous pacemaker, unchanged. No consolidation. No pleural effusion. No pneumothorax. Stable cardiomediastinal silhouette. Aortic vascular calcifications. No distinct acute osseous findings. No acute radiographic abnormality. US CAROTID ARTERY BILATERAL    Result Date: 9/16/2021  US CAROTID ARTERY BILATERAL: 9/16/2021 6:40 PM CLINICAL HISTORY:  cva . COMPARISON: None available. Grayscale, color and waveform Doppler analysis of the cervical carotid and vertebral arteries was performed. Validated velocity measurements with angiographic measurements, velocity criteria are extrapolated from diameter data as defined by the Society of Radiologist in [address] [place] [address] Drive Radiology 2003; 133;[id number]. FINDINGS: There is mild atherosclerotic plaquing of the common carotid arteries and carotid bifurcations without significantly elevated velocities. Maximum systolic velocity within the right internal and mid common carotid arteries are 79 and 51 cm/s, with an ICA/CCA ratio of approximately 1.5 , which indicates less than 50% by velocity criteria. Maximum systolic velocity within the left internal and mid common carotid arteries are 59 and 74 cm/s, with an ICA/CCA ratio of approximately 0.9, which indicates less than 50% by velocity criteria. Maximum velocities within the right and left external carotid arteries are 141 and 81 cm/sec respectively. There is antegrade flow in both vertebral arteries. MILD ATHEROSCLEROTIC PLAQUING OF THE CAROTID BULBS WITHOUT EVIDENCE OF A FLOW-LIMITING STENOSIS, BY VELOCITY RATIO CRITERIA.  GOSINK CRITERIA Diameter          PSV t            EDV t          PSV          EDV          Stenosis Site       %              cm/sec          cm/sec      ICA/CCA    ICA/CCA 0-49                 <124              <40             <2:1           ---                 --- 50-69              125-225                  >2:1           ---                 --- 70-89               225-325          >100          >4:1           >5:1              --- 90%+                >325              >100          >4:1           >9:1           Damped resistive CCA >95%               May be            May be      Damped      ---              Damped resistive CCA                       decreased      decreased  resistive CCA       Lab Results   Component Value Date    WBC 9.0 09/20/2021    RBC 3.81 09/20/2021    HGB 12.6 09/20/2021    HCT 36.1 09/20/2021    MCV 94.9 09/20/2021    MCH 33.1 09/20/2021    MCHC 34.9 09/20/2021    RDW 14.0 09/20/2021     09/20/2021    MPV 9.0 07/29/2014     Lab Results   Component Value Date     09/20/2021    K 4.7 09/20/2021    K 4.4 09/18/2021     09/20/2021    CO2 20 09/20/2021    BUN 33 09/20/2021    CREATININE 1.20 09/20/2021    GFRAA >60.0 09/20/2021    LABGLOM 56.6 09/20/2021    GLUCOSE 177 09/20/2021    PROT 6.9 09/16/2021    LABALBU 4.1 09/16/2021    CALCIUM 8.6 09/20/2021    BILITOT 0.8 09/16/2021    ALKPHOS 76 09/16/2021    AST 19 09/16/2021    ALT 16 09/16/2021     Lab Results   Component Value Date    PROTIME 16.3 09/14/2021    INR 1.3 09/14/2021     Lab Results   Component Value Date    TSH 3.700 09/16/2021    DFPLBSTD97 520 09/15/2021    FOLATE >20.0 09/15/2021    IRON 44 02/04/2021    TIBC 211 02/04/2021     Lab Results   Component Value Date    TRIG 206 09/16/2021    HDL 41 09/16/2021    LDLCALC 148 09/16/2021     No results found for: LABAMPH, BARBSCNU, LABBENZ, CANNAB, COCAINESCRN, LABMETH, OPIATESCREENURINE, PHENCYCLIDINESCREENURINE, PPXUR, ETOH  No results found for: LITHIUM, DILFRTOT, VALPROATE    Assessment:   Right occipital stroke consistent with a subacute stroke. A subtle mass cannot be ruled out though it is very unlikely given he does not have any risk factors for cerebrovascular disease. Patient is already on Eliquis and will add aspirin for now. Patient's PTT and INR suggest that patient was responsive to Eliquis and therefore is not likely to have any changes on any other anticoagulation. Coumadin may not be the best option given that again he has responded to anticoagulation. We will keep in observation of the same carotid ultrasounds are pending. If of this consultation includes my consultation the emergency room. CT of the head shows 3 mm new area of hypodensity worrisome for acute right occipital lobe infarct. Initial presentation was dizziness with left-sided sensory loss. Patient was already on Eliquis at time of CVA and aspirin has been added. Unable to obtain MRI due to pacemaker. Ultrasound of carotid arteries showed less than 50% stenosis in bilateral carotids. Antegrade flow in vertebral arteries.   TTE showed LVEF of 50%  Lipid panel shows LDL of 148   A1c 7.4  Patient's risk factors for stroke include hypertension, hyperlipidemia, diabetes, and history of tobacco use  Plans for discharge to SNF. Patient remained stable without any changes neurologically. We will continue on the same dose of medications for now there is no bleeding or bruising. Unable to do MRI due to pacemaker. Patient be discharged to skilled nursing. Patient has been somnolent and borderline lethargic today. He did have his increase in his creatinine on 9/18/2021 of 1.23. Previous creatinine was normal.  P.o. intake has been poor. Will need to reassess metabolic panel. Check CBC. Patient continues on Eliquis and aspirin for acute right occipital ischemic CVA. Patient was on Eliquis prior to admission for recent DVT of the right lower extremity in June 2021. Aspirin added this admission. Patient continues to be nonfocal with an aphasia without any new changes we await his rehabilitation as patient is maximized on anticoagulation. Patient to be discharged to [address] [facility] inpatient rehab  [place] from neurology standpoint  Did call and speak with patient's daughter Vidal Ramey and update her on patient's condition, plan of care and prognosis    I have personally performed a face to face diagnostic evaluation on this patient, reviewed all data and investigations, and am the sole provider of all clinical decisions on the neurological status of this patient. as noted above, pt mostly nonfocal      German Carlson MD, Alex Mcdonald, American Board of Psychiatry & Neurology  Board Certified in Vascular Neurology  Board Certified in Neuromuscular Medicine  Certified in [address] [place] [address] Ave: [phone]

## 2021-09-22 NOTE — PROGRESS NOTES
atraumatic  ENT: moist mucous membranes  Neck: neck supple, trachea midline  Lungs: Good inspiratory effort, no wheeze, no rhonchi, no rales  Heart: RRR, normal S1 and S2  GI: Soft, non-distended, +BS  MSK: no edema noted  Skin: warm, dry  Psych: appropriate affect     Data    CBC:   Lab Results   Component Value Date    WBC 9.0 09/20/2021    RBC 3.81 09/20/2021    HGB 12.6 09/20/2021    HCT 36.1 09/20/2021    MCV 94.9 09/20/2021    MCH 33.1 09/20/2021    MCHC 34.9 09/20/2021    RDW 14.0 09/20/2021     09/20/2021    MPV 9.0 07/29/2014     CMP:    Lab Results   Component Value Date     09/20/2021    K 4.7 09/20/2021    K 4.4 09/18/2021     09/20/2021    CO2 20 09/20/2021    BUN 33 09/20/2021    CREATININE 1.20 09/20/2021    GFRAA >60.0 09/20/2021    LABGLOM 56.6 09/20/2021    GLUCOSE 177 09/20/2021    PROT 6.9 09/16/2021    LABALBU 4.1 09/16/2021    CALCIUM 8.6 09/20/2021    BILITOT 0.8 09/16/2021    ALKPHOS 76 09/16/2021    AST 19 09/16/2021    ALT 16 09/16/2021       ASSESSMENT AND PLAN      # Acute CVA manifesting as dizziness and left-sided sensory loss: - deficits appear improved- patient was able to ambulate with PT  - Already on Eliquis.  Added aspirin  - Permissive hypertension-can resume medication  - Cannot get MRI due to pacemaker.  - TTE pending.  Lipid panel, carotid US, A1c reviewed  - PT/OT  - Risk factor modification     # HTN/HLD  - cont home meds    # Type 2 diabetes A1c 7.4  - ISS    # CAD/PAD  - cont home meds    DVT: on Eliquis    CODE: DNR-CCA    Disposition: Awaiting pre-cert for SNF vs rehab. Medically stable.       Tono Rubio, DO  Internal Medicine

## 2021-09-22 NOTE — CONSULTS
PODIATRIC MEDICINE AND SURGERY  CONSULT HISTORY AND PHYSICAL    Consulting Service: Medicine  Requesting Provider: Teresa Mercado DO  Opinion/advice regarding: Painful elongated toenails  Staff Doctor:  Claudia Martínez DPM    ASSESSMENT:     80 y.o. male with PMH significant for CAD (status post CABG and PPM), HTN, DM, hypothyroidism, PAD, Hx of DVT on Eliquis admitted for upper extremity weakness and dizziness for who podiatry was consulted for nail debridement. PLAN AND RECOMMENDATIONS:     - Nails 1-5 B/L were debrided with nail nippers without incident.   - Patient will need follow up with podiatry in 2-3 months for routine nail care. - Podiatry to sign off. Please re-consult for any questions or concerns. Patient's case discussed with staff, Dr. Yelitza Nevarez, who will provide final recommendations going forward    SUBJECTIVE:     HPI: This 80y.o. year old male was seen today for painful elongated toenails. Patient is hard of hearing and a poor historian. Patient opens eyes and nods but does not respond to questioning. Admitted for dizziness and upper extremity weakness, with CT suspicious for CVA of right occipital lobe. Past Medical History:   Diagnosis Date    Anemia     CAD (coronary artery disease) 4/16/2015    DM [facility] [address]     Dyslipidemia     History of tobacco abuse     HTN (hypertension)     Hypothyroidism     Non-ST elevation MI (NSTEMI) ([facility] [address]     Pacemaker 4/16/2015       Past Surgical History:   Procedure Laterality Date    CATARACT REMOVAL      MIDDLE EAR SURGERY Left 1998    \"they had to reset the bones\" after an infection       No current facility-administered medications on file prior to encounter.      Current Outpatient Medications on File Prior to Encounter   Medication Sig Dispense Refill    apixaban (ELIQUIS) 5 MG TABS tablet Take 1 tablet by mouth 2 times daily 60 tablet 5    ramipril (ALTACE) 5 MG capsule Take 5 mg by mouth daily      Vitamin D (CHOLECALCIFEROL) 50 MCG (2000 UT) TABS tablet Take 1 tablet by mouth Daily with supper 60 tablet 3    ferrous sulfate (IRON 325) 325 (65 Fe) MG tablet Take 1 tablet by mouth 2 times daily (with meals) 30 tablet 3    folic acid (FOLVITE) 1 MG tablet Take 1 tablet by mouth daily 30 tablet 3    carvedilol (COREG) 12.5 MG tablet Take 0.5 tablets by mouth 2 times daily (with meals) 180 tablet 0    atorvastatin (LIPITOR) 40 MG tablet Take 40 mg by mouth daily      ursodiol (ACTIGALL) 300 MG capsule Take 300 mg by mouth 2 times daily      esomeprazole Magnesium (NEXIUM) 40 MG PACK Take 40 mg by mouth daily      nitroGLYCERIN (NITROSTAT) 0.4 MG SL tablet Place 1 tablet under the tongue every 5 minutes as needed for Chest pain 25 tablet 0    albuterol sulfate HFA (PROVENTIL HFA) 108 (90 Base) MCG/ACT inhaler Inhale 2 puffs into the lungs every 6 hours as needed for Wheezing 1 Inhaler 3    ipratropium-albuterol (DUONEB) 0.5-2.5 (3) MG/3ML SOLN nebulizer solution Inhale 3 mLs into the lungs three times daily 100 mL 0    budesonide-formoterol (SYMBICORT) 80-4.5 MCG/ACT AERO Inhale 2 puffs into the lungs 2 times daily. No Known Allergies    History reviewed. No pertinent family history. Social History     Socioeconomic History    Marital status:       Spouse name: Not on file    Number of children: Not on file    Years of education: Not on file    Highest education level: Not on file   Occupational History    Not on file   Tobacco Use    Smoking status: Former Smoker    Smokeless tobacco: Never Used   Vaping Use    Vaping Use: Never used   Substance and Sexual Activity    Alcohol use: No    Drug use: No    Sexual activity: Not on file   Other Topics Concern    Not on file   Social History Narrative    Lives With: granddaughter Michelle Older  and family    Type of Home: House  Two level, Bed/Bath upstairs-[address] [facility] Access: Stairs to enter with rails    Entrance Stairs - Plantarflexion, Inversion, Eversion B/L  + tenderness on palpation of toenails 1 through 5 bilateral    Dermatological:   Skin appears well hydrated and supple with good temperature, texture, turgor  No hyperkeratotic lesions noted  Nails 1-5 B/L appear thickened, elongated, and mycotic.   Interspaces 1-4 B/L are clear and without debris  No open lesions noted B/L    LABS:     Lab Results   Component Value Date    WBC 9.0 09/20/2021    HGB 12.6 (L) 09/20/2021    HCT 36.1 (L) 09/20/2021    MCV 94.9 09/20/2021     09/20/2021     Lab Results   Component Value Date     09/20/2021    K 4.7 09/20/2021    K 4.4 09/18/2021     09/20/2021    CO2 20 09/20/2021    BUN 33 09/20/2021    CREATININE 1.20 09/20/2021    GLUCOSE 177 09/20/2021    CALCIUM 8.6 09/20/2021      Lab Results   Component Value Date    LABALBU 4.1 09/16/2021     Lab Results   Component Value Date    SEDRATE 10 02/15/2018     Lab Results   Component Value Date    CRP 5.4 (H) 12/04/2019     Lab Results   Component Value Date    LABA1C 7.4 (H) 09/14/2021           Darby De Leon DPM.  PGY-2  Podiatric Surgery Resident  Podiatry On Call Pager: [phone]  September 22, 2021  10:09 AM

## 2021-09-22 NOTE — CARE COORDINATION
Call received from Laquita Birmingham with [facility], Medical director intends to deny precert for acute rehab as patient does not meet criteria with rational being: no clear benefit of acute rehab setting over that of less intense, patient is doing excellent with pt and ot, needs can be met at a lower level of care such as SNF or outpatient setting. P:P available by calling [phone], opt 4 by noon 9/23/21. Precert ref #[id number]. Message left for PM&R and [address] notified.  Electronically signed by Sariah Lind RN on 9/22/21 at 5:22 PM EDT

## 2021-09-23 ENCOUNTER — HOSPITAL ENCOUNTER (INPATIENT)
Age: 86
LOS: 9 days | Discharge: HOME HEALTH | DRG: 057 | End: 2021-10-02
Attending: PHYSICAL MEDICINE & REHABILITATION | Admitting: PHYSICAL MEDICINE & REHABILITATION
Payer: MEDICARE

## 2021-09-23 VITALS
HEART RATE: 70 BPM | DIASTOLIC BLOOD PRESSURE: 72 MMHG | SYSTOLIC BLOOD PRESSURE: 141 MMHG | BODY MASS INDEX: 24.21 KG/M2 | OXYGEN SATURATION: 96 % | WEIGHT: 150 LBS | TEMPERATURE: 98.4 F | RESPIRATION RATE: 16 BRPM

## 2021-09-23 PROBLEM — R26.89 IMPAIRED GAIT AND MOBILITY: Status: ACTIVE | Noted: 2021-09-23

## 2021-09-23 LAB
GLUCOSE BLD-MCNC: 107 MG/DL (ref 60–115)
GLUCOSE BLD-MCNC: 124 MG/DL (ref 60–115)
GLUCOSE BLD-MCNC: 155 MG/DL (ref 60–115)
PERFORMED ON: ABNORMAL
PERFORMED ON: ABNORMAL
PERFORMED ON: NORMAL
SARS-COV-2, NAAT: NOT DETECTED

## 2021-09-23 PROCEDURE — 99233 SBSQ HOSP IP/OBS HIGH 50: CPT | Performed by: PSYCHIATRY & NEUROLOGY

## 2021-09-23 PROCEDURE — 6370000000 HC RX 637 (ALT 250 FOR IP): Performed by: INTERNAL MEDICINE

## 2021-09-23 PROCEDURE — 87635 SARS-COV-2 COVID-19 AMP PRB: CPT

## 2021-09-23 PROCEDURE — APPSS15 APP SPLIT SHARED TIME 0-15 MINUTES: Performed by: NURSE PRACTITIONER

## 2021-09-23 PROCEDURE — 94761 N-INVAS EAR/PLS OXIMETRY MLT: CPT

## 2021-09-23 PROCEDURE — 1180000000 HC REHAB R&B

## 2021-09-23 PROCEDURE — 94664 DEMO&/EVAL PT USE INHALER: CPT

## 2021-09-23 PROCEDURE — 94640 AIRWAY INHALATION TREATMENT: CPT

## 2021-09-23 PROCEDURE — 99232 SBSQ HOSP IP/OBS MODERATE 35: CPT | Performed by: PHYSICAL MEDICINE & REHABILITATION

## 2021-09-23 RX ORDER — RAMIPRIL 5 MG/1
5 CAPSULE ORAL DAILY
Status: DISCONTINUED | OUTPATIENT
Start: 2021-09-24 | End: 2021-10-02 | Stop reason: HOSPADM

## 2021-09-23 RX ORDER — BUDESONIDE AND FORMOTEROL FUMARATE DIHYDRATE 80; 4.5 UG/1; UG/1
2 AEROSOL RESPIRATORY (INHALATION) 2 TIMES DAILY
Status: DISCONTINUED | OUTPATIENT
Start: 2021-09-23 | End: 2021-10-02 | Stop reason: HOSPADM

## 2021-09-23 RX ORDER — ASPIRIN 81 MG/1
81 TABLET ORAL DAILY
Status: DISCONTINUED | OUTPATIENT
Start: 2021-09-24 | End: 2021-10-02 | Stop reason: HOSPADM

## 2021-09-23 RX ORDER — ASPIRIN 300 MG/1
300 SUPPOSITORY RECTAL DAILY
Status: DISCONTINUED | OUTPATIENT
Start: 2021-09-24 | End: 2021-10-02 | Stop reason: HOSPADM

## 2021-09-23 RX ORDER — ONDANSETRON 2 MG/ML
4 INJECTION INTRAMUSCULAR; INTRAVENOUS EVERY 6 HOURS PRN
Status: DISCONTINUED | OUTPATIENT
Start: 2021-09-23 | End: 2021-10-02 | Stop reason: HOSPADM

## 2021-09-23 RX ORDER — FERROUS SULFATE 325(65) MG
325 TABLET ORAL
Status: DISCONTINUED | OUTPATIENT
Start: 2021-09-24 | End: 2021-10-02 | Stop reason: HOSPADM

## 2021-09-23 RX ORDER — LANSOPRAZOLE
30 KIT DAILY
Status: DISCONTINUED | OUTPATIENT
Start: 2021-09-24 | End: 2021-10-02 | Stop reason: HOSPADM

## 2021-09-23 RX ORDER — CARVEDILOL 6.25 MG/1
6.25 TABLET ORAL 2 TIMES DAILY WITH MEALS
Status: DISCONTINUED | OUTPATIENT
Start: 2021-09-23 | End: 2021-10-02 | Stop reason: HOSPADM

## 2021-09-23 RX ORDER — POLYETHYLENE GLYCOL 3350 17 G/17G
17 POWDER, FOR SOLUTION ORAL DAILY PRN
Status: DISCONTINUED | OUTPATIENT
Start: 2021-09-23 | End: 2021-10-02 | Stop reason: HOSPADM

## 2021-09-23 RX ORDER — ONDANSETRON 4 MG/1
4 TABLET, ORALLY DISINTEGRATING ORAL EVERY 8 HOURS PRN
Status: DISCONTINUED | OUTPATIENT
Start: 2021-09-23 | End: 2021-10-02 | Stop reason: HOSPADM

## 2021-09-23 RX ORDER — FOLIC ACID 1 MG/1
1 TABLET ORAL DAILY
Status: DISCONTINUED | OUTPATIENT
Start: 2021-09-24 | End: 2021-10-02 | Stop reason: HOSPADM

## 2021-09-23 RX ORDER — ATORVASTATIN CALCIUM 40 MG/1
40 TABLET, FILM COATED ORAL NIGHTLY
Status: DISCONTINUED | OUTPATIENT
Start: 2021-09-23 | End: 2021-10-02 | Stop reason: HOSPADM

## 2021-09-23 RX ORDER — ALBUTEROL SULFATE 90 UG/1
2 AEROSOL, METERED RESPIRATORY (INHALATION) EVERY 4 HOURS PRN
Status: DISCONTINUED | OUTPATIENT
Start: 2021-09-23 | End: 2021-10-02 | Stop reason: HOSPADM

## 2021-09-23 RX ADMIN — ASPIRIN 81 MG: 81 TABLET, COATED ORAL at 09:32

## 2021-09-23 RX ADMIN — ATORVASTATIN CALCIUM 40 MG: 40 TABLET, FILM COATED ORAL at 22:33

## 2021-09-23 RX ADMIN — FERROUS SULFATE TAB 325 MG (65 MG ELEMENTAL FE) 325 MG: 325 (65 FE) TAB at 09:32

## 2021-09-23 RX ADMIN — APIXABAN 5 MG: 5 TABLET, FILM COATED ORAL at 22:33

## 2021-09-23 RX ADMIN — CARVEDILOL 6.25 MG: 6.25 TABLET, FILM COATED ORAL at 22:33

## 2021-09-23 RX ADMIN — FOLIC ACID 1 MG: 1 TABLET ORAL at 09:32

## 2021-09-23 RX ADMIN — Medication 30 MG: at 09:32

## 2021-09-23 RX ADMIN — BUDESONIDE AND FORMOTEROL FUMARATE DIHYDRATE 2 PUFF: 80; 4.5 AEROSOL RESPIRATORY (INHALATION) at 07:24

## 2021-09-23 RX ADMIN — RAMIPRIL 5 MG: 5 CAPSULE ORAL at 09:32

## 2021-09-23 RX ADMIN — APIXABAN 5 MG: 5 TABLET, FILM COATED ORAL at 09:32

## 2021-09-23 RX ADMIN — CARVEDILOL 6.25 MG: 6.25 TABLET, FILM COATED ORAL at 09:32

## 2021-09-23 NOTE — PROGRESS NOTES
Head to toe assessment completed. Patient did not want to get cleaned up today or have linens changed. Hourly rounding done. Assisted patient to bathroom in the morning, he moved around independently after set up with walker. Breakfast set up, tray was taken before I could see how much was eaten. Patient is exteremly hard of hearing in right ear, talk loud on left side.    [facility] to [facility]

## 2021-09-23 NOTE — PROGRESS NOTES
Subjective:      Patient ID:  Patient seen and examined for neurology follow-up for acute right occipital CVA. Exam difficult given patient's severe hearing impairment. Patient is able to ambulate without assistive device. Can follow commands. No new or worsening focal deficits. Patient continues to have visual deficits. Hard to assess as he does not follow commands appropriately. Some expressive aphasia noted. No further FATEMEH. Slightly hyponatremic     Review of Systems   Unable to perform ROS: Patient nonverbal   Constitutional: Positive for appetite change and fatigue. HENT: Positive for hearing loss. Eyes: Positive for visual disturbance. Neurological: Positive for speech difficulty (aphasia) and weakness. Negative for tremors, seizures, syncope and facial asymmetry. No new changes,   Patient continues to be stable. Past Medical History:   Diagnosis Date    Anemia     CAD (coronary artery disease) 4/16/2015    DM (diabetes mellitus) ([facility] [address]     Dyslipidemia     History of tobacco abuse     HTN (hypertension)     Hypothyroidism     Non-ST elevation MI (NSTEMI) ([facility] [address]     Pacemaker 4/16/2015     Past Surgical History:   Procedure Laterality Date    CATARACT REMOVAL      MIDDLE EAR SURGERY Left 1998    \"they had to reset the bones\" after an infection     Social History     Socioeconomic History    Marital status:       Spouse name: Not on file    Number of children: Not on file    Years of education: Not on file    Highest education level: Not on file   Occupational History    Not on file   Tobacco Use    Smoking status: Former Smoker    Smokeless tobacco: Never Used   Vaping Use    Vaping Use: Never used   Substance and Sexual Activity    Alcohol use: No    Drug use: No    Sexual activity: Not on file   Other Topics Concern    Not on file   Social History Narrative    Lives With: granddaughter [place]  and family    Type of Home: House  Two level, Bed/Bath upstairs-[address] Home Access: Stairs to enter with rails    Entrance Stairs - Number of Steps: 14    Bathroom Shower/Tub: Tub/Shower unit    Home Equipment: Rolling walker, Cane    ADL Assistance: Independent    Homemaking Assistance: Independent(granddaughter completes)    Ambulation Assistance: Independent    Transfer Assistance: Independent    Active : Yes    Additional Comments: pt is significantly [place]; information gathered via chart review and via writing. Social Determinants of Health     Financial Resource Strain:     Difficulty of Paying Living Expenses:    Food Insecurity:     Worried About Running Out of Food in the Last Year:     Ran Out of Food in the Last Year:    Transportation Needs:     Lack of Transportation (Medical):     Lack of Transportation (Non-Medical):    Physical Activity:     Days of Exercise per Week:     Minutes of Exercise per Session:    Stress:     Feeling of Stress :    Social Connections:     Frequency of Communication with Friends and Family:     Frequency of Social Gatherings with Friends and Family:     Attends Hinduism Services: Active Member of Clubs or Organizations:     Attends Club or Organization Meetings:     Marital Status:    Intimate Partner Violence:     Fear of Current or Ex-Partner:     Emotionally Abused:     Physically Abused:     Sexually Abused:      History reviewed. No pertinent family history.   No Known Allergies  Current Facility-Administered Medications   Medication Dose Route Frequency Provider Last Rate Last Admin    ramipril (ALTACE) capsule 5 mg  5 mg Oral Daily Francella Needs, DO   5 mg at 09/23/21 0932    apixaban (ELIQUIS) tablet 5 mg  5 mg Oral BID Francella Needs, DO   5 mg at 09/23/21 0932    atorvastatin (LIPITOR) tablet 40 mg  40 mg Oral Nightly Francella Needs, DO   40 mg at 09/22/21 0684    budesonide-formoterol (SYMBICORT) 80-4.5 MCG/ACT inhaler 2 puff  2 puff Inhalation BID Francella Needs, DO   2 puff at 09/23/21 5624    carvedilol (COREG) tablet 6.25 mg  6.25 mg Oral BID WC Rayma Starcher, DO   6.25 mg at 09/23/21 0932    ferrous sulfate (IRON 325) tablet 325 mg  325 mg Oral Daily with breakfast Rayma Starcher, DO   325 mg at 45/26/67 6375    folic acid (FOLVITE) tablet 1 mg  1 mg Oral Daily Rayma Starcher, DO   1 mg at 09/23/21 0932    sodium chloride flush 0.9 % injection 5-40 mL  5-40 mL IntraVENous 2 times per day Rayma Starcher, DO   10 mL at 09/22/21 2353    sodium chloride flush 0.9 % injection 5-40 mL  5-40 mL IntraVENous PRN Rayma Starcher, DO        0.9 % sodium chloride infusion  25 mL IntraVENous PRN Rayma Starcher, DO        ondansetron (ZOFRAN-ODT) disintegrating tablet 4 mg  4 mg Oral Q8H PRN Rayma Starcher, DO        Or    ondansetron [facility]) injection 4 mg  4 mg IntraVENous Q6H PRN Rayma Starcher, DO        polyethylene glycol (GLYCOLAX) packet 17 g  17 g Oral Daily PRN Rayma Starcher, DO        aspirin EC tablet 81 mg  81 mg Oral Daily Rayma Starcher, DO   81 mg at 09/23/21 6399    Or    aspirin suppository 300 mg  300 mg Rectal Daily Rayma Starcher, DO        lansoprazole suspension SUSP 30 mg  30 mg Oral Daily Rayma Starcher, DO   30 mg at 09/23/21 0932    glucose (GLUTOSE) 40 % oral gel 15 g  15 g Oral PRN Rayma Starcher, DO        dextrose 50 % IV solution  12.5 g IntraVENous PRN Rayma Starcher, DO        glucagon (rDNA) injection 1 mg  1 mg IntraMUSCular PRN Rayma Starcher, DO        dextrose 5 % solution  100 mL/hr IntraVENous PRN Rayma Starcher, DO        insulin lispro (HUMALOG) injection vial 0-6 Units  0-6 Units SubCUTAneous TID  Rayma Starcher, DO   1 Units at 09/22/21 1832    insulin lispro (HUMALOG) injection vial 0-3 Units  0-3 Units SubCUTAneous Nightly Rayma Starcher, DO   1 Units at 09/20/21 2230    albuterol sulfate  (90 Base) MCG/ACT inhaler 2 puff  2 puff Inhalation Q4H PRN Rayma Starcher, DO            Objective:   BP (!) 141/72   Pulse 70   Temp 98.4 °F (36.9 °C) (Oral) Resp 16   Wt 150 lb (68 kg)   SpO2 96%   BMI 24.21 kg/m²     Physical Exam  Vitals reviewed. Constitutional:       General: He is not in acute distress. Eyes:      General: Visual field deficit present. Pupils: Pupils are equal, round, and reactive to light. Cardiovascular:      Rate and Rhythm: Normal rate and regular rhythm. Heart sounds: No murmur heard. Pulmonary:      Effort: Pulmonary effort is normal.      Breath sounds: Normal breath sounds. Abdominal:      General: Bowel sounds are normal.   Musculoskeletal:         General: Normal range of motion. Cervical back: Normal range of motion. Skin:     General: Skin is warm. Neurological:      Mental Status: He is alert. Cranial Nerves: Cranial nerve deficit present. No dysarthria or facial asymmetry. Sensory: No sensory deficit. Motor: No tremor, abnormal muscle tone or seizure activity. Coordination: Coordination normal.      Deep Tendon Reflexes: Reflexes are normal and symmetric. Babinski sign absent on the right side. Babinski sign absent on the left side. Comments: Expressive aphasia. Visual field deficit. Patient not following commands  therefore neuro exam limited. Psychiatric:         Mood and Affect: Mood normal.       CT Head WO Contrast    Result Date: 9/16/2021  EXAMINATION: CT HEAD WO CONTRAST, 9/16/2021 3:14 PM CLINICAL HISTORY:  Weakness COMPARISON: Brain CT from September 14, 2021, 2 days prior TECHNIQUE:  Multiple contiguous axial images of the head were obtained from the skull base through the skull vertex without intravenous contrast. Sagittal and coronal reformats have been produced. All CT scans at this facility use dose modulation, iterative reconstruction, and/or weight based dosing when appropriate to reduce radiation dose to as low as reasonably achievable.  BRAIN CT FINDINGS: There has been interval development of a 3 cm area of hypodensity in the right occipital lobe since the previous day which is worrisome for an acute, subacute ischemia. There are persistent, chronic areas of ischemia in the anterior right temporal lobe, unchanged since previous study. No acute hemorrhage, mass, mass effect, or midline shift. There is prominence of sulci and ventricles indicating mild global cerebral atrophy and chronic involutional changes. Moderate periventricular white matter hypodensities indicating chronic microangiopathy are noted. The basal ganglia are within normal limits. There are no acute changes or space-occupying lesions in the posterior fossa. The visualized portions of the orbits are within normal limits. The globes are intact. The imaged portions of the paranasal sinuses are unremarkable. The calvarium is intact. There is a 3 cm new area of hypodensity worrisome for acute in the right occipital lobe. The findings were called to the ER [id number] hours. ,     XR CHEST PORTABLE    Result Date: 9/16/2021  XR CHEST PORTABLE Clinical History:  Weakness. Lethargic. Comparison:  9/14/2021. RESULT: Median sternotomy. Right transvenous pacemaker, unchanged. No consolidation. No pleural effusion. No pneumothorax. Stable cardiomediastinal silhouette. Aortic vascular calcifications. No distinct acute osseous findings. No acute radiographic abnormality. US CAROTID ARTERY BILATERAL    Result Date: 9/16/2021  US CAROTID ARTERY BILATERAL: 9/16/2021 6:40 PM CLINICAL HISTORY:  cva . COMPARISON: None available. Grayscale, color and waveform Doppler analysis of the cervical carotid and vertebral arteries was performed. Validated velocity measurements with angiographic measurements, velocity criteria are extrapolated from diameter data as defined by the Society of Radiologist in [address] [place] [address] Drive Radiology 2003; 145;793-636. FINDINGS: There is mild atherosclerotic plaquing of the common carotid arteries and carotid bifurcations without significantly elevated velocities. Maximum systolic velocity within the right internal and mid common carotid arteries are 79 and 51 cm/s, with an ICA/CCA ratio of approximately 1.5 , which indicates less than 50% by velocity criteria. Maximum systolic velocity within the left internal and mid common carotid arteries are 59 and 74 cm/s, with an ICA/CCA ratio of approximately 0.9, which indicates less than 50% by velocity criteria. Maximum velocities within the right and left external carotid arteries are 141 and 81 cm/sec respectively. There is antegrade flow in both vertebral arteries. MILD ATHEROSCLEROTIC PLAQUING OF THE CAROTID BULBS WITHOUT EVIDENCE OF A FLOW-LIMITING STENOSIS, BY VELOCITY RATIO CRITERIA.  GOSINK CRITERIA Diameter          PSV t            EDV t          PSV          EDV          Stenosis Site       %              cm/sec          cm/sec      ICA/CCA    ICA/CCA 0-49                 <124              <40             <2:1           ---                 --- 50-69              125-225                  >2:1           ---                 --- 70-89               225-325          >100          >4:1           >5:1              --- 90%+                >325              >100          >4:1           >9:1           Damped resistive CCA >95%               May be            May be      Damped      ---              Damped resistive CCA                       decreased      decreased  resistive CCA       Lab Results   Component Value Date    WBC 9.0 09/20/2021    RBC 3.81 09/20/2021    HGB 12.6 09/20/2021    HCT 36.1 09/20/2021    MCV 94.9 09/20/2021    MCH 33.1 09/20/2021    MCHC 34.9 09/20/2021    RDW 14.0 09/20/2021     09/20/2021    MPV 9.0 07/29/2014     Lab Results   Component Value Date     09/22/2021    K 4.2 09/22/2021    K 4.4 09/18/2021     09/22/2021    CO2 18 09/22/2021    BUN 28 09/22/2021    CREATININE 1.11 09/22/2021    GFRAA >60.0 09/22/2021    LABGLOM >60.0 09/22/2021    GLUCOSE 189 09/22/2021    PROT 6.9 09/16/2021    LABALBU 4.1 09/16/2021    CALCIUM 8.5 09/22/2021    BILITOT 0.8 09/16/2021    ALKPHOS 76 09/16/2021    AST 19 09/16/2021    ALT 16 09/16/2021     Lab Results   Component Value Date    PROTIME 16.3 09/14/2021    INR 1.3 09/14/2021     Lab Results   Component Value Date    TSH 3.700 09/16/2021    CXEVZXQA05 520 09/15/2021    FOLATE >20.0 09/15/2021    IRON 44 02/04/2021    TIBC 211 02/04/2021     Lab Results   Component Value Date    TRIG 206 09/16/2021    HDL 41 09/16/2021    LDLCALC 148 09/16/2021     No results found for: LABAMPH, BARBSCNU, LABBENZ, CANNAB, COCAINESCRN, LABMETH, OPIATESCREENURINE, PHENCYCLIDINESCREENURINE, PPXUR, ETOH  No results found for: LITHIUM, DILFRTOT, VALPROATE    Assessment:   Right occipital stroke consistent with a subacute stroke. A subtle mass cannot be ruled out though it is very unlikely given he does not have any risk factors for cerebrovascular disease. Patient is already on Eliquis and will add aspirin for now. Patient's PTT and INR suggest that patient was responsive to Eliquis and therefore is not likely to have any changes on any other anticoagulation. Coumadin may not be the best option given that again he has responded to anticoagulation. We will keep in observation of the same carotid ultrasounds are pending. If of this consultation includes my consultation the emergency room. CT of the head shows 3 mm new area of hypodensity worrisome for acute right occipital lobe infarct. Initial presentation was dizziness with left-sided sensory loss. Patient was already on Eliquis at time of CVA and aspirin has been added. Unable to obtain MRI due to pacemaker. Ultrasound of carotid arteries showed less than 50% stenosis in bilateral carotids. Antegrade flow in vertebral arteries.   TTE showed LVEF of 50%  Lipid panel shows LDL of 148   A1c 7.4  Patient's risk factors for stroke include hypertension, hyperlipidemia, diabetes, and history of tobacco use  Plans for discharge to SNF. Patient remained stable without any changes neurologically. We will continue on the same dose of medications for now there is no bleeding or bruising. Unable to do MRI due to pacemaker. Patient be discharged to skilled nursing. Patient has been somnolent and borderline lethargic today. He did have his increase in his creatinine on 9/18/2021 of 1.23. Previous creatinine was normal.  P.o. intake has been poor. Will need to reassess metabolic panel. Check CBC. Patient continues on Eliquis and aspirin for acute right occipital ischemic CVA. Patient was on Eliquis prior to admission for recent DVT of the right lower extremity in June 2021. Aspirin added this admission. Patient continues to be nonfocal with an aphasia without any new changes we await his rehabilitation as patient is maximized on anticoagulation. Patient to be discharged to [address] [facility] inpatient rehab  Okay from neurology standpoint  Did call and speak with patient's daughter Tanvir Lebron and update her on patient's condition, plan of care and prognosis  as noted above, pt mostly nonfocal    Awaiting discharge to rehab today. Slightly hyponatremic today. FATEMEH resolved    I have personally performed a face to face diagnostic evaluation on this patient, reviewed all data and investigations, and am the sole provider of all clinical decisions on the neurological status of this patient. Patient has not seen since hospitalization family was informed. Patient will be discharged soon      German De La Torre MD, Kateri Gitelman, American Board of Psychiatry & Neurology  Board Certified in Vascular Neurology  Board Certified in Neuromuscular Medicine  Certified in [facility]:

## 2021-09-23 NOTE — PROGRESS NOTES
This pt. Is being discharged to in house rehab, report was given to receiving nurse pt.  In stable condition

## 2021-09-23 NOTE — CARE COORDINATION
Call received from Perston Medellin with [facility] [phone], precert for ARU approved after P:P completed by Dr Jorge Mcclure for dates 9/23-9/27. Next clinical update due 9/27/21 to [facility] via fax [phone]. Please call [place] at [phone] and confirm date of admission. [id number]. Patient can transfer to room 234 pending negative covid and medical clearance. Adiel CLARKE notified.  Electronically signed by Isidra Oconnell RN on 9/23/21 at 2:21 PM EDT

## 2021-09-23 NOTE — PROGRESS NOTES
Subjective: The patient complains of moderate acute on chronic progressive fatigue and a field cut secondary to the right occipital CVA partially relieved by rest, PT, OT and meds and exacerbated by exertion and recent illness. I am concerned about patients medical complexities. He is making good improvements and may be able to go acute rehab pending [employer] approval.  Raudel Kunz is present and is very much advocating for him to come to acute rehab and I agree with that he would do much better there than at a skilled facility. She works at those types of facilities and knows the type of rehab that he needs would not be obtainable there. She will call his insurance company today. We are hoping to hear back from them today. Reviewed recent nursing note and discussed current status and planned care with acute care providers, \" Call received from [address]  with [facility], Medical director intends to deny precert for acute rehab as patient does not meet criteria with rational being: no clear benefit of acute rehab setting over that of less intense, patient is doing excellent with pt and ot, needs can be met at a lower level of care such as SNF or outpatient setting. P:P available by calling [phone], opt 4 by noon 9/23/21. Precert ref #[id number]. Message left for PM&R and [address] notified. \".    ROS x10: The patient also complains of severely impaired mobility and activities of daily living. Otherwise no new problems with vision, hearing, nose, mouth, throat, dermal, cardiovascular, GI, , pulmonary, musculoskeletal, psychiatric or neurological. See Rehab consult on Rehab chart .        Vital signs:  BP (!) 102/47   Pulse 66   Temp 98.4 °F (36.9 °C) (Oral)   Resp 20   Wt 150 lb (68 kg)   SpO2 97%   BMI 24.21 kg/m²   I/O:   PO/Intake:    fair PO intake,   four carb regular consistency thin liquid diet    Bowel/Bladder:   continent,    General:  Patient is well developed, adequately nourished, non-obese and     well kempt. HEENT:    PERRLA, hearing intact to loud voice, external inspection of ear     and nose benign. Inspection of lips, tongue and gums benign  Musculoskeletal: No significant change in strength or tone. All joints stable. Inspection and palpation of digits and nails show no clubbing,       cyanosis or inflammatory conditions. Neuro/Psychiatric: Affect: flat-  Alert and oriented to self and     Situation with  min cues. No significant change in deep tendon reflexes or     sensation  Lungs:  Diminished, CTA-B. Respiration effort is normal at rest.  BUTT  Heart:   S1 = S2,   RRR. No loud murmurs. Abdomen:  Soft, non-tender, no enlargement of liver or spleen. Extremities:  No significant lower extremity edema or tenderness. Skin:    BUE bruises dt blood draws, no visualized or palpated problems. Rehabilitation:  Physical therapy: FIMS:  Bed Mobility:      Transfers: Sit to Stand: Contact guard assistance  Stand to sit: Contact guard assistance, Ambulation 1  Surface: level tile  Device: No Device  Assistance: Contact guard assistance, Minimal assistance  Quality of Gait: NBOS, slow candence, decreased trunk control with staggering gait at times. Occassional use of hallway handrail for balance. Distance: [de-identified]'  Comments: familyindicates pt is close to his baseline level of function,      FIMS:  ,  , Assessment: Pt demo's bed mobility and transfers to EOB with no use of handrail. Pt display initial unsteadiness when going from sit to stand and occassionally reached for hand rail for assistance. Occupational therapy: FIMS:   ,  ,      Speech therapy: FIMS:      SPEECH THERAPY  Motor Speech: Within Functional Limits  Comprehension: Exceptions  Verbal Expression: Exceptions to functional limits      Diet/Swallow:  Diet Solids Recommendation: Regular  Liquid Consistency Recommendation:  Thin  Dysphagia Outcome Severity Scale: Level 7: Normal in all situations    Compensatory Swallowing Strategies: Upright as possible for all oral intake             COGNITION  OT: Cognition Comment: difficult to assess because of hearing difficulties  SP:Memory: Exceptions to [facility]MEN[facility] (able to recall he just consumed breakfast; pt recalled his address; unable to fully assess secondary to [facility])  Problem Solving: Exceptions to [facility] (unable to answer questions related to his call light or 9-1-1)       Lab/X-ray studies reviewed, analyzed and discussed with patient and staff:   Recent Results (from the past 24 hour(s))   POCT Glucose    Collection Time: 09/22/21  8:14 AM   Result Value Ref Range    POC Glucose 109 60 - 115 mg/dl    Performed on ACCU-CHEK    POCT Glucose    Collection Time: 09/22/21 11:11 AM   Result Value Ref Range    POC Glucose 134 (H) 60 - 115 mg/dl    Performed on ACCU-CHEK    Basic Metabolic Panel    Collection Time: 09/22/21  2:40 PM   Result Value Ref Range    Sodium 131 (L) 135 - 144 mEq/L    Potassium 4.2 3.4 - 4.9 mEq/L    Chloride 101 95 - 107 mEq/L    CO2 18 (L) 20 - 31 mEq/L    Anion Gap 12 9 - 15 mEq/L    Glucose 189 (H) 70 - 99 mg/dL    BUN 28 (H) 8 - 23 mg/dL    CREATININE 1.11 0.70 - 1.20 mg/dL    GFR Non-African American >60.0 >60    GFR  >60.0 >60    Calcium 8.5 8.5 - 9.9 mg/dL   POCT Glucose    Collection Time: 09/22/21  4:20 PM   Result Value Ref Range    POC Glucose 161 (H) 60 - 115 mg/dl    Performed on ACCU-CHEK    POCT Glucose    Collection Time: 09/22/21 10:38 PM   Result Value Ref Range    POC Glucose 145 (H) 60 - 115 mg/dl    Performed on ACCU-CHEK    POCT Glucose    Collection Time: 09/23/21  7:33 AM   Result Value Ref Range    POC Glucose 124 (H) 60 - 115 mg/dl    Performed on ACCU-CHEK        Previous extensive, complex labs, notes and diagnostics reviewed and analyzed.      ALLERGIES:    Allergies as of 09/16/2021    (No Known Allergies)      (please also verify by checking [facility])     Complex Physical Medicine & Rehab Issues Assess & Plan:   1. Severe abnormality of gait and mobility and impaired self-care and ADL's secondary to progressive acute CVA. Functional and medical status reassessed regarding patients ability to participate in therapies and patient found to be able to participate in  acute intensive comprehensive inpatient rehabilitation program including PT/OT to improve balance, ambulation, ADLs, and to improve the P/AROM. It is my opinion that they will be able to tolerate 3 hours of therapy a day and benefit from it at an acute level. 2. Bowel constipation and Bladder dysfunction overactive bladder:  frequent toileting, ambulate to bathroom with assistance, check post void residuals. Check for C.difficile x1 if >2 loose stools in 24 hours, continue bowel & bladder program.  Monitor for UTI symptoms including lethargy and confusion  3. Moderate LBP and generalized OA pain: reassess pain every shift and prior to and after each therapy session, give prn  Tylenol, modalities prn in therapy, consider Lidoderm, K-pad prn.   4. Skin breakdown risk:  continue pressure relief program.  Daily skin exams and reports from nursing. 5. Severe fatigue due to immobility and nutritional deficits: Add vitamin B12 vitamin D and CoQ10 titrate dosing and add protein supplementation with low carb content. 6. Complex discharge planning:    SP [facility]'s denial for acute rehab- I will do peer to peer per family's urging. Complex Active General Medical Issues that complicate care Assess & Plan:     1. 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  Samir Gibbs D.O., PM&R     Attending    [address]

## 2021-09-23 NOTE — DISCHARGE SUMMARY
Physician Discharge Summary     Patient ID:  Chioma Ackerman  [id number]  20 y.o.  3/5/1929    Admit date: 9/16/2021    Discharge date : 09/23/21     Admitting Physician: No admitting provider for patient encounter. Discharge Physician: Ree Bergman DO     Admission Diagnoses: Elevated troponin [R77.8]  Acute CVA (cerebrovascular accident) [facility]) [I63.9]  Cerebrovascular accident (CVA), unspecified mechanism ([facility] [address] [I63.9]    Discharge Diagnoses: Acute CVA    Admission Condition: fair    Discharged Condition: fair    Hospital Course: 80-year-old male with history of CAD (history of CABG), hypertension, diabetes hypothyroidism, PAD, DVT on Eliquis, pacemaker, presents from home with dizziness and left upper extremity numbness/tingling    He was recently in the hospital from 9/14-9/15 after his granddaughter found him laying in bed and unable to ambulate. During the hospitalization he was able to ambulate without complication and did not have any neurologic abnormalities. He did well while at home on 9/15. On 9/16, he had an episode of dizziness and he knocked over a trash can-he then became anxious appearing and began complaining that his left upper extremity was tingling. It was then his granddaughter called EMS. In the emergency department, CT scan was suspicious for CVA in the right occipital lobe. Pt was admitted and neuro consulted. MRI unable to be done due to pacemaker. Neuro added aspirin and continued Eliquis. PT/OT worked with patient and recommended rehab. Pt and family chose [address] [place] [address] and pre-cert started and patient denied by insurance. P2P done and insurance agreeable to approve acute rehab and patient was medically stable for discharge to rehab on 9/23 in stable and improved condition.      Consults: neurology      Discharge Exam:  Constitutional: Lying in bed comfortably  Head: Normocephalic, atraumatic  ENT: moist mucous membranes  Neck: neck supple, trachea midline  Lungs: Good inspiratory effort, no wheeze, no rhonchi, no rales  Heart: RRR, normal S1 and S2  GI: Soft, non-distended, +BS  MSK: no edema noted  Skin: warm, dry  Psych: appropriate affect    Labs:   Recent Labs     09/20/21  1846   WBC 9.0   HGB 12.6*   HCT 36.1*        Recent Labs     09/20/21  1846 09/22/21  1440    131*   K 4.7 4.2    101   CO2 20 18*   BUN 33* 28*   CREATININE 1.20 1.11   CALCIUM 8.6 8.5     No results for input(s): AST, ALT, BILIDIR, BILITOT, ALKPHOS in the last 72 hours. No results for input(s): INR in the last 72 hours. No results for input(s): Connee Matar in the last 72 hours. Urinalysis:   Lab Results   Component Value Date    NITRU Negative 09/16/2021    WBCUA 3-5 12/23/2016    BACTERIA Few 12/23/2016    RBCUA 3-5 12/23/2016    BLOODU Negative 09/16/2021    SPECGRAV 1.009 09/16/2021    GLUCOSEU Negative 09/16/2021       Radiology:   Most recent    Chest CT      WITH CONTRAST:No results found for this or any previous visit. WITHOUT CONTRAST: No results found for this or any previous visit. CXR      2-view: Results for orders placed during the hospital encounter of 02/01/21    XR CHEST (2 VW)    Narrative  EXAMINATION: XR CHEST (2 VW)    CLINICAL HISTORY:  cough    COMPARISONS: None    FINDINGS:    Two views of the chest are submitted. There are multiple median sternotomy wires. There is a right-sided CCD device. Leads overlying the cardiac silhouette. The cardiac silhouette is of normal size configuration. The mediastinum is unremarkable. Pulmonary vascular unremarkable. Right sided trachea. No focal infiltrates. No effusions. No Pneumothoraces. Impression  NO ACUTE ACTIVE CARDIOPULMONARY PROCESS      Results for orders placed during the hospital encounter of 03/26/20    XR CHEST STANDARD (2 VW)    Narrative  EXAMINATION: Chest x-ray, 2 view    HISTORY: Syncope. Recent fall.     TECHNIQUE: Frontal and lateral views of the chest    COMPARISON: Portable chest radiograph from February 13, 2020    FINDINGS:    Right-sided cardiac pacemaker is present. Evidence of prior thoracic surgery. Atherosclerotic calcification of the thoracic aorta. Cardiomediastinal silhouette is within normal limits. No pneumothorax, pleural effusion, or consolidation. The lungs appear  hyperinflated suggesting COPD. Degenerative changes of the spine. Impression  No acute intrathoracic process. Portable: Results for orders placed during the hospital encounter of 09/16/21    XR CHEST PORTABLE    Narrative  XR CHEST PORTABLE    Clinical History:  Weakness. Lethargic. Comparison:  9/14/2021. RESULT:    Median sternotomy. Right transvenous pacemaker, unchanged. No consolidation. No pleural effusion. No pneumothorax. Stable cardiomediastinal silhouette. Aortic vascular calcifications. No distinct acute osseous findings. Impression  No acute radiographic abnormality. Echo No results found for this or any previous visit. Disposition: Rehab    In process/preliminary results:  Outstanding Order Results     No orders found from 8/18/2021 to 9/17/2021. Patient Instructions:   Current Discharge Medication List      CONTINUE these medications which have NOT CHANGED    Details   apixaban (ELIQUIS) 5 MG TABS tablet Take 1 tablet by mouth 2 times daily  Qty: 60 tablet, Refills: 5      ramipril (ALTACE) 5 MG capsule Take 5 mg by mouth daily      Vitamin D (CHOLECALCIFEROL) 50 MCG (2000 UT) TABS tablet Take 1 tablet by mouth Daily with supper  Qty: 60 tablet, Refills: 3    Comments: Labeling may look different. 25 mcg=1000 Units. Please double check dosages.       ferrous sulfate (IRON 325) 325 (65 Fe) MG tablet Take 1 tablet by mouth 2 times daily (with meals)  Qty: 30 tablet, Refills: 3      folic acid (FOLVITE) 1 MG tablet Take 1 tablet by mouth daily  Qty: 30 tablet, Refills: 3      carvedilol (COREG) 12.5 MG tablet Take 0.5 tablets by mouth 2 times daily (with meals)  Qty: 180 tablet, Refills: 0    Comments: **Patient requests 90 days supply**      atorvastatin (LIPITOR) 40 MG tablet Take 40 mg by mouth daily      ursodiol (ACTIGALL) 300 MG capsule Take 300 mg by mouth 2 times daily      esomeprazole Magnesium (NEXIUM) 40 MG PACK Take 40 mg by mouth daily      nitroGLYCERIN (NITROSTAT) 0.4 MG SL tablet Place 1 tablet under the tongue every 5 minutes as needed for Chest pain  Qty: 25 tablet, Refills: 0      albuterol sulfate HFA (PROVENTIL HFA) 108 (90 Base) MCG/ACT inhaler Inhale 2 puffs into the lungs every 6 hours as needed for Wheezing  Qty: 1 Inhaler, Refills: 3      ipratropium-albuterol (DUONEB) 0.5-2.5 (3) MG/3ML SOLN nebulizer solution Inhale 3 mLs into the lungs three times daily  Qty: 100 mL, Refills: 0    Associated Diagnoses: COPD with acute exacerbation (HCC)      budesonide-formoterol (SYMBICORT) 80-4.5 MCG/ACT AERO Inhale 2 puffs into the lungs 2 times daily. Activity: activity as tolerated  Diet: diabetic diet  Wound Care: none needed    Follow-up with Brad Velez MD, MD  in 2 weeks.     DC time 35 minutes    Signed:  Electronically signed by Gemma Lea DO on 9/23/2021 at 3:34 PM

## 2021-09-23 NOTE — CARE COORDINATION
LSW spoke with pt's daughter and informed her of denial for acute rehab. [facility] is their backup plan. [facility] PEER TO PEER COMPLETED AND PT WAS APPROVED FOR [facility].  LSW CALLED PT'S DAUGHTER TO LET HER KNOW.

## 2021-09-24 PROBLEM — G81.94 LEFT HEMIPARESIS (HCC): Status: ACTIVE | Noted: 2021-09-24

## 2021-09-24 PROBLEM — R26.9 ABNORMALITY OF GAIT AND MOBILITY: Status: ACTIVE | Noted: 2021-09-24

## 2021-09-24 PROBLEM — N31.9 NEUROGENIC BLADDER: Status: ACTIVE | Noted: 2021-09-24

## 2021-09-24 PROBLEM — H91.93 BILATERAL HEARING LOSS: Status: ACTIVE | Noted: 2021-09-24

## 2021-09-24 LAB
GLUCOSE BLD-MCNC: 101 MG/DL (ref 60–115)
GLUCOSE BLD-MCNC: 107 MG/DL (ref 60–115)
GLUCOSE BLD-MCNC: 209 MG/DL (ref 60–115)
GLUCOSE BLD-MCNC: 97 MG/DL (ref 60–115)
PERFORMED ON: ABNORMAL
PERFORMED ON: NORMAL

## 2021-09-24 PROCEDURE — 97162 PT EVAL MOD COMPLEX 30 MIN: CPT

## 2021-09-24 PROCEDURE — 6370000000 HC RX 637 (ALT 250 FOR IP): Performed by: PHYSICAL MEDICINE & REHABILITATION

## 2021-09-24 PROCEDURE — 6370000000 HC RX 637 (ALT 250 FOR IP): Performed by: INTERNAL MEDICINE

## 2021-09-24 PROCEDURE — 99223 1ST HOSP IP/OBS HIGH 75: CPT | Performed by: PHYSICAL MEDICINE & REHABILITATION

## 2021-09-24 PROCEDURE — 94640 AIRWAY INHALATION TREATMENT: CPT

## 2021-09-24 PROCEDURE — 6360000002 HC RX W HCPCS: Performed by: PHYSICAL MEDICINE & REHABILITATION

## 2021-09-24 PROCEDURE — 1180000000 HC REHAB R&B

## 2021-09-24 PROCEDURE — 97166 OT EVAL MOD COMPLEX 45 MIN: CPT

## 2021-09-24 PROCEDURE — 97530 THERAPEUTIC ACTIVITIES: CPT

## 2021-09-24 PROCEDURE — 94761 N-INVAS EAR/PLS OXIMETRY MLT: CPT

## 2021-09-24 PROCEDURE — 97110 THERAPEUTIC EXERCISES: CPT

## 2021-09-24 PROCEDURE — 97116 GAIT TRAINING THERAPY: CPT

## 2021-09-24 PROCEDURE — 92610 EVALUATE SWALLOWING FUNCTION: CPT

## 2021-09-24 PROCEDURE — 92523 SPEECH SOUND LANG COMPREHEN: CPT

## 2021-09-24 RX ORDER — VITAMIN B COMPLEX
2000 TABLET ORAL
Status: DISCONTINUED | OUTPATIENT
Start: 2021-09-24 | End: 2021-10-02 | Stop reason: HOSPADM

## 2021-09-24 RX ORDER — CYANOCOBALAMIN 1000 UG/ML
1000 INJECTION INTRAMUSCULAR; SUBCUTANEOUS WEEKLY
Status: DISCONTINUED | OUTPATIENT
Start: 2021-09-24 | End: 2021-10-02 | Stop reason: HOSPADM

## 2021-09-24 RX ORDER — LIDOCAINE 4 G/G
3 PATCH TOPICAL DAILY
Status: DISCONTINUED | OUTPATIENT
Start: 2021-09-24 | End: 2021-09-27

## 2021-09-24 RX ORDER — BISACODYL 10 MG
10 SUPPOSITORY, RECTAL RECTAL DAILY PRN
Status: DISCONTINUED | OUTPATIENT
Start: 2021-09-24 | End: 2021-10-02 | Stop reason: HOSPADM

## 2021-09-24 RX ORDER — SODIUM PHOSPHATE, DIBASIC AND SODIUM PHOSPHATE, MONOBASIC 7; 19 G/133ML; G/133ML
1 ENEMA RECTAL DAILY PRN
Status: DISCONTINUED | OUTPATIENT
Start: 2021-09-24 | End: 2021-10-02 | Stop reason: HOSPADM

## 2021-09-24 RX ORDER — ACETAMINOPHEN 325 MG/1
650 TABLET ORAL EVERY 4 HOURS PRN
Status: DISCONTINUED | OUTPATIENT
Start: 2021-09-24 | End: 2021-10-02 | Stop reason: HOSPADM

## 2021-09-24 RX ORDER — UBIDECARENONE 100 MG
100 CAPSULE ORAL DAILY
Status: DISCONTINUED | OUTPATIENT
Start: 2021-09-24 | End: 2021-10-02 | Stop reason: HOSPADM

## 2021-09-24 RX ADMIN — CYANOCOBALAMIN 1000 MCG: 1000 INJECTION, SOLUTION INTRAMUSCULAR; SUBCUTANEOUS at 07:55

## 2021-09-24 RX ADMIN — Medication 2000 UNITS: at 17:29

## 2021-09-24 RX ADMIN — ASPIRIN 81 MG: 81 TABLET, COATED ORAL at 07:55

## 2021-09-24 RX ADMIN — BUDESONIDE AND FORMOTEROL FUMARATE DIHYDRATE 2 PUFF: 80; 4.5 AEROSOL RESPIRATORY (INHALATION) at 04:39

## 2021-09-24 RX ADMIN — ATORVASTATIN CALCIUM 40 MG: 40 TABLET, FILM COATED ORAL at 21:12

## 2021-09-24 RX ADMIN — APIXABAN 5 MG: 5 TABLET, FILM COATED ORAL at 07:55

## 2021-09-24 RX ADMIN — RAMIPRIL 5 MG: 5 CAPSULE ORAL at 10:14

## 2021-09-24 RX ADMIN — APIXABAN 5 MG: 5 TABLET, FILM COATED ORAL at 21:12

## 2021-09-24 RX ADMIN — Medication 30 MG: at 13:24

## 2021-09-24 RX ADMIN — CARVEDILOL 6.25 MG: 6.25 TABLET, FILM COATED ORAL at 17:29

## 2021-09-24 RX ADMIN — BUDESONIDE AND FORMOTEROL FUMARATE DIHYDRATE 2 PUFF: 80; 4.5 AEROSOL RESPIRATORY (INHALATION) at 16:03

## 2021-09-24 RX ADMIN — FOLIC ACID 1 MG: 1 TABLET ORAL at 07:55

## 2021-09-24 RX ADMIN — FERROUS SULFATE TAB 325 MG (65 MG ELEMENTAL FE) 325 MG: 325 (65 FE) TAB at 07:55

## 2021-09-24 RX ADMIN — Medication 100 MG: at 07:54

## 2021-09-24 RX ADMIN — CARVEDILOL 6.25 MG: 6.25 TABLET, FILM COATED ORAL at 07:54

## 2021-09-24 ASSESSMENT — PAIN SCALES - GENERAL
PAINLEVEL_OUTOF10: 0

## 2021-09-24 ASSESSMENT — ENCOUNTER SYMPTOMS
EYE PAIN: 0
VOMITING: 0
COUGH: 0
BLOOD IN STOOL: 0
EYE REDNESS: 0
STRIDOR: 0
BACK PAIN: 1
NAUSEA: 0
WHEEZING: 0
CONSTIPATION: 1
SHORTNESS OF BREATH: 1
DIARRHEA: 0
PHOTOPHOBIA: 0
ABDOMINAL PAIN: 0
SORE THROAT: 0

## 2021-09-24 NOTE — PLAN OF CARE
Nutrition Problem #1: Inadequate oral intake  Intervention: Food and/or Nutrient Delivery: Continue Current Diet, Start Oral Nutrition Supplement  Nutritional Goals: Intake >50% of meals/supplement. stable weight.

## 2021-09-24 NOTE — PROGRESS NOTES
Facility/Department: [facility] Initial Assessment: Physical Therapy  Room: R2/R234-01    NAME: Claudio Menchaca  : 3/5/1929  MRN: [id number]    Date of Service: 2021    Rehab Diagnosis(es):Impaired gait and mobility dt R occipital CVA  Patient Active Problem List    Diagnosis Date Noted    Anginal equivalent [facility] [address]     Abnormality of gait and mobility 2021    Neurogenic bladder 2021    Bilateral hearing loss 2021    Left hemiparesis ([address] 2021    Impaired gait and mobility dt R occipital CVA 2021    Aphasia     Acute CVA (cerebrovascular accident) (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 [address] [facility] 2021    Venous thrombosis of leg 2021    Hyponatremia 2021    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HCC) 2021    Syncope and collapse 2020    COPD exacerbation ([address] 2020    NSTEMI (non-ST elevated myocardial infarction) ([facility] [address] 2019    Chest pain 2019    Hypotension 2019    SOB (shortness of breath) 2018    Chronic right shoulder pain 2018    COPD with acute exacerbation ([facility] [address] 2018    Bronchitis 2016    CAD (coronary artery disease) 2015    HTN (hypertension)     Dyslipidemia     DM (diabetes mellitus) ([address]     Hypothyroidism     History of tobacco abuse     Anemia        Past Medical History:   Diagnosis Date    Anemia     CAD (coronary artery disease) 2015    DM (diabetes mellitus) [facility] [address] [facility]     Dyslipidemia     History of tobacco abuse     HTN (hypertension)     Hypothyroidism     Non-ST elevation MI (NSTEMI) ([facility] [address]     Pacemaker 2015     Past Surgical History:   Procedure Laterality Date    CATARACT REMOVAL      MIDDLE EAR SURGERY Left 1998    \"they had to reset the bones\" after an infection       Chart Reviewed: Yes  Family / Caregiver Present: No  Diagnosis: Impaired gait and mobility dt R occipital CVA    Restrictions:  Restrictions/Precautions: Fall Risk       SUBJECTIVE:      Pre Treatment Pain Screening  Pain at present: 0    Post Treatment Pain Screening:  Pain Assessment  Pain Level: 0    Prior Level of Function:  Social/Functional History  Lives With:  (grand daughter)  Type of Home: House  Home Layout: Two level, Bed/Bath upstairs, Able to Live on Main level with bedroom/bathroom  Home Access: Ramped entrance  Home Equipment: Rolling walker  ADL Assistance: Independent  Homemaking Assistance: Independent  Ambulation Assistance: Independent (no device)  Transfer Assistance: Independent  Active : No    OBJECTIVE:   Vision/Hearing:  Vision: Within Functional Limits  Hearing: Exceptions to [facility]  Hearing Exceptions: Hard of hearing/hearing concerns;Right hearing aid    Cognition:  Orientation Level: Oriented to person  Follows Commands: Impaired       ROM:  RLE AROM: WFL  LLE AROM : WFL    Strength:  Strength RLE  Strength RLE: WFL  Strength LLE  Strength LLE: WFL    Neuro:        Balance  Sitting - Static: Good  Sitting - Dynamic: Good  Standing - Static: Good;- (able to stand 60 sec with no UE support - mild sway noted)  Standing - Dynamic: Good;- (mild LOB ini gait with pt preferring light UE support in gait)  Motor Control  Gross Motor?: Exceptions (mild decreased coordination)    Bed mobility  Bridging: Independent  Rolling to Left: Independent  Rolling to Right: Independent  Supine to Sit: Independent  Sit to Supine: Independent    Transfers  Sit to Stand: Stand by assistance;Contact guard assistance  Stand to sit: Stand by assistance;Contact guard assistance  Bed to Chair: Contact guard assistance  Comment: no LOB - contact cues required to ensure pt follow thru of task due to hearing deficits    Ambulation  Ambulation?: Yes  Ambulation 1  Surface: level tile  Device: No Device;Hand-Held Assist  Assistance: Contact guard assistance  Quality of Gait: narrow MISAEL, controlled pace, short step length, light UE support sought by pt however no LOB noted  Distance: 30 feet ; 50 feet  Comments: Pt ambulated 30 feet with ww with SBA    Stairs/Curb  Stairs?: Yes  Stairs  # Steps : 4  Stairs Height: 6\"  Assistance: Stand by assistance;Contact guard assistance  Comment: non - reciprocal pattern chosen by pt - pt has ramp entry    Wheelchair Activities  Propulsion: No    Activity Tolerance  Activity Tolerance: Patient Tolerated treatment well          Quality Indicators (IRF-ED):  Rolling L and R: Independent - 6  Sit>Supine: Independent - 6  Supine>Sit: Independent - 6  Sit>Stand: Supervision or Touching Assistance - 4  Chair/Bed>Chair Transfer: Supervision or Touching Assistance - 4  Car Transfers: Not attempted due to Medical Condition or Safety Concerns (I.e. unsafe or physician orders) - 80  Walk 10 ft: Supervision or Touching Assistance - 4  Walk 50 ft with two 90 degree turns: Supervision or Touching Assistance - 4  Walk 150 ft in [address]: Not attempted due to Environmental Limitations (i.e. weather, equipment unavailable) - 10  Walking 10 ft on Unlevel Surface: Not attempted due to Environmental Limitations (i.e. weather, equipment unavailable) - 10  Picking up Objects from Standing Position: Not attempted due to Medical Condition or Safety Concerns (I.e. unsafe or physician orders) - 80  Stairs: Yes  WC Mobility: No Not Applicable (pt did not complete item prior to admission) - 9      ASSESSMENT:  Body structures, Functions, Activity limitations: Decreased functional mobility ; Decreased balance;Decreased posture;Decreased coordination  Decision Making: Medium Complexity  History: high  Exam: med  Clinical Presentation: med    PT Education: Goals;PT Role;Plan of Care  Barriers to Learning: [facility]    CLINICAL IMPRESSION: Pt demonstrates deficits as listed. He is requiring assistance with mobility at this time.  Pt would benefit from continued PT prior to safe return to home    PLAN OF CARE:  Frequency: 1-2 treatment sessions per day, 5-7 days per week     Current Treatment Recommendations: Functional Mobility Training, Neuromuscular Re-education, Stair training, Balance Training, Gait Training, Safety Education & Training, Patient/Caregiver Education & Training    Patient's Goal: Pt did not state goal       GOALS:  Long term goals  Long term goal 1: indep bed mobility  Long term goal 2: indep bed and car trnasfers  Long term goal 3: indep gait 50 feet in familiar and protected environment  Long term goal 4: supervision for 4 stairs  Long term goal 5: CGA gait 150 feet or greater    ELOS:   Plan weeks: 2-5 days    Therapy Time:    Individual   Time In 1100   Time Out 1130   Minutes [address] [place], [place], 09/24/21 at 11:44 AM

## 2021-09-24 NOTE — PROGRESS NOTES
[facility] Evaluation   Current Order:  Albuterol 2 puffs Q4 PRN    Home Regimen: PRN      Ordering Physician: Yuki  Re-evaluation Date:  ---     Diagnosis: Rehab    Patient Status: Stable / Unstable + Physician notified    The following MDI Criteria must be met in order to convert aerosol to MDI with spacer. If unable to meet, MDI will be converted to aerosol:  []  Patient able to demonstrate the ability to use MDI effectively  []  Patient alert and cooperative  []  Patient able to take deep breath with 5-10 second hold  []  Medication(s) available in this delivery method   []  Peak flow greater than or equal to 200 ml/min            Current Order Substituted To  (same drug, same frequency)   Aerosol to MDI [] Albuterol Sulfate 0.083% unit dose by aerosol Albuterol Sulfate MDI 2 puffs by inhalation with spacer    [] Levalbuterol 1.25 mg unit dose by aerosol Levalbuterol MDI 2 puffs by inhalation with spacer    [] Levalbuterol 0.63 mg unit dose by aerosol Levalbuterol MDI 2 puffs by inhalation with spacer    [] Ipratropium Bromide 0.02% unit dose by aerosol Ipratropium Bromide MDI 2 puffs by inhalation with spacer    [] Duoneb (Ipratropium + Albuterol) unit dose by aerosol Ipratropium MDI + Albuterol MDI 2 puffs by inhalation w/spacer   MDI to Aerosol [] Albuterol Sulfate MDI Albuterol Sulfate 0.083% unit dose by aerosol    [] Levalbuterol MDI 2 puffs by inhalation Levalbuterol 1.25 mg unit dose by aerosol    [] Ipratropium Bromide MDI by inhalation Ipratropium Bromide 0.02% unit dose by aerosol    [] Combivent (Ipratropium + Albuterol) MDI by inhalation Duoneb (Ipratropium + Albuterol) unit dose by aerosol       Treatment Assessment [Frequency/Schedule]:  Change frequency to: __________No Changes________________________________________per Protocol, P&T, MEC      Points 0 1 2 3 4   Pulmonary Status  Non-Smoker  []   Smoking history   < 20 pack years  []   Smoking history  ?  20 pack years  [x]   Pulmonary Disorder  (acute or chronic)  []   Severe or Chronic w/ Exacerbation  []     Surgical Status No [x]   Surgeries     General []   Surgery Lower []   Abdominal Thoracic or []   Upper Abdominal Thoracic with  PulmonaryDisorder  []     Chest X-ray Clear/Not  Ordered     [x]  Chronic Changes  Results Pending  []  Infiltrates, atelectasis, pleural effusion, or edema  []  Infiltrates in more than one lobe []  Infiltrate + Atelectasis, &/or pleural effusion  []    Respiratory Pattern Regular,  RR = 12-20 [x]  Increased,  RR = 21-25 []  BUTT, irregular,  or RR = 26-30 []  Decreased FEV1  or RR = 31-35 []  Severe SOB, use  of accessory muscles, or RR ? 35  []    Mental Status Alert, oriented,  Cooperative [x]  Confused but Follows commands []  Lethargic or unable to follow commands []  Obtunded  []  Comatose  []    Breath Sounds Clear to  auscultation  []  Decreased unilaterally or  in bases only [x]  Decreased  bilaterally  []  Crackles or intermittent wheezes []  Wheezes []    Cough Strong, Spontan., & nonproductive [x]  Strong,  spontaneous, &  productive []  Weak,  Nonproductive []  Weak, productive or  with wheezes []  No spontaneous  cough or may require suctioning []    Level of Activity Ambulatory []  Ambulatory w/ Assist  [x]  Non-ambulatory []  Paraplegic []  Quadriplegic []    Total    Score:___4___     Triage Score:___5_____      Tri       Triage:     1. (>20) Freq: Q3    2. (16-20) Freq: Q4   3. (11-15) Freq: QID & Albuterol Q2 PRN    4. (6-10) Freq: TID & Albuterol Q2 PRN    5. (0-5) Freq Q4prn

## 2021-09-24 NOTE — CARE COORDINATION
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     Dyslipidemia     History of tobacco abuse     HTN (hypertension)     Hypothyroidism     Non-ST elevation MI (NSTEMI) ([facility]     Pacemaker 2015     Past Surgical History:   Procedure Laterality Date    CATARACT REMOVAL      MIDDLE EAR SURGERY Left     \"they had to reset the bones\" after an infection     Chart Reviewed: Yes  Family / Caregiver Present: No  Diagnosis: Impaired gait and mobility dt R occipital CVA  Restrictions  Restrictions/Precautions: Fall Risk  CASE MANAGEMENT    Social/Functional History  Social/Functional History  Lives With:  (grand daughter)  Type of Home: House  Home Layout: Two level, Bed/Bath upstairs, Able to Live on Main level with bedroom/bathroom  Home Access: Ramped entrance  Home Equipment: Rolling walker  ADL Assistance: Independent  Homemaking Assistance: Independent  Ambulation Assistance: Independent (no device)  Transfer Assistance: Independent  Active : No  Additional Comments: Pt unable to provide entire home set up secondary to communication       Pts personal preferences: n/a    Pts assets/resources/support system: granddaughter and other family local    COVERAGE INFORMATION:Payor: Kehinde Jovel / Plan: Ravi Bray PPO / Product Type: Medicare /       NURSING  Weight: 149 lb 14.6 oz (68 kg) / Body mass index is 24.21 kg/m². ADULT DIET;  Regular; 4 carb choices (60 gm/meal)    SpO2: 92 % (21 0750)  No active isolations    Skin Issues: No    Pain Managed: Yes    Bladder continence: Yes    Bowel continence: Yes      Other: very [place], some depression, consult placed to Dr. Shivam Guerrero mobility:  Supine to Sit: Independent (09/24/21 1019)  Sit to Supine: Independent (09/24/21 1019)  Transfers:  Sit to Stand: Stand by assistance;Contact guard assistance (09/24/21 1140)  Bed to Chair: Contact guard assistance (09/24/21 1140)  Gait:   Device: No Device;Hand-Held Assist (09/24/21 1112)  Assistance: Contact guard assistance (09/24/21 1112)  Distance: 30 feet ; 50 feet (09/24/21 1112)  Quality of Gait: narrow MISAEL, controlled pace, short step length, light UE support sought by pt however no LOB noted (09/24/21 1112)  Comments: Pt ambulated 30 feet with ww with SBA (09/24/21 1112)  Stairs:  # Steps : 4 (09/24/21 1112)  Assistance: Stand by assistance;Contact guard assistance (09/24/21 1112)  Comment: non - reciprocal pattern chosen by pt - pt has ramp entry (09/24/21 1112)  W/C mobility:     LTG:  Long term goal 1: indep bed mobility  Long term goal 2: indep bed and car trnasfers  Long term goal 3: indep gait 50 feet in familiar and protected environment  Long term goal 4: supervision for 4 stairs  Long term goal 5: CGA gait 150 feet or greater  PT Treatment Time:  1.0 hrs      OCCUPATIONAL THERAPY  Hand Dominance: Right  ADL  Feeding: Setup (09/24/21 1015)  Grooming: Setup;Supervision (09/24/21 1015)  UE Bathing: Minimal assistance (09/24/21 1015)  LE Bathing: Minimal assistance (09/24/21 1015)  UE Dressing: Unable to assess(comment) (Gown only) (09/24/21 1015)  LE Dressing:  Moderate assistance (09/24/21 1015)  Toileting: Unable to assess(comment) (09/24/21 1015)  Toilet Transfers  Toilet - Technique: Stand step (09/24/21 1020)  Equipment Used: Grab bars (09/24/21 1020)  Toilet Transfer: Minimal assistance (09/24/21 1020)  Tub Transfers  Tub Transfers: Not tested (09/24/21 1020)  Shower Transfers  Shower - Transfer From: Wheelchair (09/24/21 1020)  Shower - Transfer Type: To and From (09/24/21 1020)  Shower - Transfer To: Shower seat with back (09/24/21 1020)  Shower - Technique: Stand pivot (09/24/21 1020)  Shower Transfers: Minimal assistance (09/24/21 1020)  LTG:  Eating  Assistance Needed: Setup or clean-up assistance  CARE Score: 5  Discharge Goal: Supervision or touching assistance, Oral Hygiene  Discharge Goal: Independent, [address] [place] [address] Dr Hygiene  Reason if not Attempted: Not applicable  CARE Score: 9  Discharge Goal: Supervision or touching assistance, Shower/Bathe Self  Assistance Needed: Partial/moderate assistance  CARE Score: 3  Discharge Goal: Supervision or touching assistance  Upper Body Dressing  Reason if not Attempted: Not applicable  CARE Score: 9  Discharge Goal: Set-up or clean-up assistance, Lower Body Dressing  Assistance Needed: Partial/moderate assistance  CARE Score: 3  Discharge Goal: Supervision or touching assistance, Putting On/Taking Off Footwear  Assistance Needed: Setup or clean-up assistance  CARE Score: 5  Discharge Goal: Independent, Toilet Transfer  Assistance Needed: Partial/moderate assistance  CARE Score: 3  Discharge Goal: Supervision or touching assistance  OT Treatment Time: 2.0 hrs      SPEECH THERAPY                 Diet/Swallow:  Diet Solids Recommendation: Regular  Liquid Consistency Recommendation: Thin  Dysphagia Outcome Severity Scale: Level 7: Normal in all situations    Compensatory Swallowing Strategies:  Alternate solids and liquids, Small bites/sips, Eat/Feed slowly, Upright as possible for all oral intake         LTG:     Long-term Goals  Goal 1: N/A          COGNITION  OT: Cognition Comment: Comp Max A, expression Min A, social Max A, problem Mod A, memory Mod A  SP:        RECREATIONAL THERAPY  Attendance to recreational therapy programs:    []  Pet Therapy  [] Music Therapy  [] Art Therapy    [] Recreation Therapy Group [] Support Group           Patient social interaction (mood, participation): good      Patient strengths: independent prior, good family support    Patients goal: home with family    Problems/Barriers: [place], some depression, cognition        1. Safety:          - Intervention / Plan:    [x]  falls protocol     [x]  PT/OT    [x]  SP        - Results:         2. Potential DME needs:         - Intervention / Plan:  [x]  PT/OT     [x]  Assess equipment needs/access       - Results:         3. Weakness:          - Intervention / Plan:  [x]  PT/OT      []  Other:         - Results:         4. Discharge planning needs:          - Intervention / Plan:  [x]  Weekly team conference      [x]  family training        - Results:         5.            - Intervention / Plan:          - Results:         6.            - Intervention / Plan:         - Results:         7.            - Intervention / Plan:         - Results:           Discharge Plan   Estimated Length of Stay: TBD    Tentative Discharge date: 10/2/21      Anticipated Discharge Destination:  Home      Team recommendations:    1. Follow up Therapy :    PT  OT  SLP  RN  Social Work  [facility] Aide    2.  Home Health vs OP    Other:     Equipment needed at Discharge: TBD      Team Members Present at Conference:    Physician: Dr. Mile Hernandez Worker: SOCO Rajan, LSW  RN: Pepe Matos RN  Physical Therapist: Dariela Mcmanus, KAYDEN  Occupational Therapist: Sasha Boles OTR  Speech Therapist: Dayo Nicole, SLP     Electronically signed by SOCO Rajan LSW on 9/24/2021 at 5:44 PM

## 2021-09-24 NOTE — PROGRESS NOTES
Comprehensive Nutrition Assessment    Type and Reason for Visit:  Initial, Consult    Nutrition Recommendations/Plan: Continue Current Diet, Start Oral Nutrition Supplement    Nutrition Assessment:  Pt presents at nutritional risk due to inadequate intake, noted weight loss. To provide diabetic supplement tid/monitor intake at meals. Malnutrition Assessment:  Malnutrition Status: At risk for malnutrition (Comment)    Context:  Chronic Illness       Estimated Daily Nutrient Needs:  Energy (kcal):  2677-8513 (kg x 25-27); Weight Used for Energy Requirements:  Admission     Protein (g):  81-88 (kg x 1.2-1.3); Weight Used for Protein Requirements:  Admission        Fluid (ml/day):  ~1800; Method Used for Fluid Requirements:  1 ml/kcal      Nutrition Related Findings:  PMH-DB, htn, hld, [place], MI (9/22) Na-131, Gluc- x last 3 days. No edema noted. BSE 9/24-regular consistancy diet. Pt with noted <25% intake at meals. Wounds:  None       Current Nutrition Therapies:    ADULT DIET; Regular; 4 carb choices (60 gm/meal)    Anthropometric Measures:  · Height: 5' 5.98\" (167.6 cm)  · Current Body Weight:   149lb 14.6oz lb (68 kg)  · Admission Body Weight: 149lb 14.6oz lb (68 kg)  · Usual Body Weight: 159 lb 8 oz (72.3 kg) (2/1 actual); 148lb (6/22 actual))     · Ideal Body Weight: 142 lbs; % Ideal Body Weight  105%   · BMI:  24.2  · BMI Categories: Normal Weight (BMI 22.0 to 24.9) age over 72       Nutrition Diagnosis:   · Inadequate oral intake related to  (advanced age, current condition) as evidenced by intake 0-25%  · Unintended weight loss related to inadequate protein-energy intake as evidenced by weight loss     Nutrition Interventions:   Food and/or Nutrient Delivery:  Continue Current Diet, Start Oral Nutrition Supplement  Nutrition Education/Counseling:  No recommendation at this time   Coordination of Nutrition Care:  Continue to monitor while inpatient    Goals:  Intake >50% of meals/supplement. stable weight.        Nutrition Monitoring and Evaluation:   Food/Nutrient Intake Outcomes:  Food and Nutrient Intake, Supplement Intake  Physical Signs/Symptoms Outcomes:  Weight, Biochemical Data, Meal Time Behavior     Electronically signed by Denny Robertson RD, LD on 9/24/21 at 3:19 PM EDT

## 2021-09-24 NOTE — CONSULTS
Spiritual Care Services     Summary of Visit:      Spiritual Assessment/Intervention/Outcomes:    Encounter Summary  Services provided to[de-identified] Patient and family together  Referral/Consult From[de-identified] Physician  Support System: Children, Family members  Continue Visiting: No  Complexity of Encounter: Moderate  Length of Encounter: 15 minutes  Spiritual Assessment Completed: Yes  Routine  Type: Follow up     Spiritual/Scientology  Type: Spiritual support  Assessment: Calm, Approachable  Intervention: Prayer, Sustaining presence/ Ministry of presence  Outcome: Receptive                    Values / Beliefs  Do you have any ethnic, cultural, sacramental, or spiritual Mormon needs you would like us to be aware of while you are in the hospital?: No    Care Plan:        [address]   Electronically signed by Christiano Castro on 9/24/21 at 7:06 PM EDT     To reach a  for emotional and spiritual support, place an [facility] consult request.   If a  is needed immediately, dial 0 and ask to page the on-call .

## 2021-09-24 NOTE — PROGRESS NOTES
[facility]   Facility/Department: [facility]  Speech Language Pathology  Initial Speech/Language/Cognitive Assessment    NAME:Chey Lomax  : 3/5/1929 (89 y.o.)   MRN: [id number]  ROOM: [place] [address]  ADMISSION DATE: 2021  PATIENT DIAGNOSIS(ES): Impaired gait and mobility [R26.89]  Abnormality of gait and mobility [R26.9]  No chief complaint on file.     Patient Active Problem List    Diagnosis Date Noted    [facility]inal equivalent [facility]     Abnormality of gait and mobility 2021    Neurogenic bladder 2021    Bilateral hearing loss 2021    Left hemiparesis ([address] 2021    Impaired gait and mobility dt R occipital CVA 2021    Aphasia     Acute CVA (cerebrovascular accident) ([facility] 2021    General weakness 09/15/2021    Elevated troponin 09/15/2021    Hyperkalemia 09/15/2021    Anticoagulation adequate with anticoagulant therapy 09/15/2021    Arterial occlusion 2021    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    CKD (chronic kidney disease) 2021    PVD (peripheral vascular disease) ([address] 2021    Venous thrombosis of leg 2021    Hyponatremia 2021    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HCC) 2021    Syncope and collapse 2020    COPD exacerbation ([address] 2020    NSTEMI (non-ST elevated myocardial infarction) ([facility] 2019    Chest pain 2019    Hypotension 2019    SOB (shortness of breath) 2018    Chronic right shoulder pain 2018    COPD with acute exacerbation ([address] 2018    Bronchitis 2016    CAD (coronary artery disease) 2015    HTN (hypertension)     Dyslipidemia     DM (diabetes mellitus) ([address]     Hypothyroidism     History of tobacco abuse     Anemia      Past Medical History:   Diagnosis Date    Anemia     CAD (coronary artery disease) 2015    DM (diabetes mellitus) ([address]     Dyslipidemia     History of tobacco abuse     HTN (hypertension)     Hypothyroidism     Non-ST elevation [facility]STEMI) [facility]     Pacemaker 4/16/2015     Past Surgical History:   Procedure Laterality Date    CATARACT REMOVAL      MIDDLE EAR SURGERY Left 1998    \"they had to reset the bones\" after an infection       DATE ONSET: 09/16/21    Date of Evaluation: 9/24/2021   Evaluating Therapist: DALTON Dye    Assessment:      Diagnosis: Limited evaluation secondary to patient is very hard of hearing. Patient had pocket talker in place. ST provided repetition and slow rate of speech to improve comprehension. Moderate cognitive-linguistic deficits were noted in the areas of STM, working memory, recall, problem solving and verbal reasoning. ST unsure if assesment was accurate secondary to lack of motivation and reduced hearing. ST to continue to assess during therapy to determine cognitive needs. Patient presented with limited length utterances, poor initiation and often stated \"I don't know. \"    Recommendations:  Requires SLP Intervention: Yes  Duration/Frequency of Treatment: 3-5x/week for LOS or until all goals are met  D/C Recommendations: To be determined          Goals:  Short-term Goals  Timeframe for Short-term Goals: 1-2 weeks  Goal 1: To increase safety awareness and judgment for safe completion of ADLs secondary to pt's cognitive deficits,  pt will complete mid level problem solving tasks related to ADLs (e.g. home/community safety) with 80% accuracy and min cues  Goal 2: To increase safety awareness and judgment for safe completion of ADLs secondary to pt's cognitive deficits, pt will sequence common activities of daily living with (verbal/written) steps with 80% accuracy and min cues  Goal 3: To address pt's cognitive deficits and promote orientation, pt will state name of facility, time within 1 hour, reason in hospital, current month and year with 100% accuracy with min assist, with use of external aid. Goal 4:  To address pt's cognitive deficits and promote recall of personal and medical information, pt will answer questions addressing (remote, recent, delayed) recall with 80% accuracy and min cues. Goal 5: To increase safety awareness and judgment for safe completion of ADLs secondary to pt's cognitive deficits, pt will complete abstract reasoning tasks (i.e. Word deduction, convergent and divergent naming, similarities/differences) with 80% accuracy and min cues. Long-term Goals  Timeframe for Long-term Goals: 2-3 weeks  Goal 1: Pt will improve his Cognition from mod assist to supervised assist for adequate functional recall and safety awareness for home, community. Patient's goals: Patient will need reinforcement with education on ST POC    Subjective:   Previous level of function and limitations: Per SW granddaughter reported that patient was A &O X4 with no memory deficits PTA  General  Chart Reviewed: Yes  Patient assessed for rehabilitation services?: Yes  Family / Caregiver Present: No  Subjective  Subjective: Patient was awake, but presented with reduced motivation. ST placed pocket talker to improve hearing. Right hearing aid was removed labeled and placed in green denture cup. Cup was placed on bedside table. ST suspects patient needs a new battery in his hearing aid. Patient appeared hear better with pocket talker in his right ear. Vision  Vision: Within Functional Limits  Hearing  Hearing: Exceptions to [facility]  Hearing Exceptions: Hard of hearing/hearing concerns;Right hearing aid (Right hearing aid not working)           Objective: Auditory Comprehension  Comprehension: Exceptions  Yes/No Questions:  (Difficult to assess secondary to being hard of hearing.)  Basic Questions:  (Difficult to assess secondary to patient being hard of hearing.  Repetition did improve comprehension approx 75% of the time)  Two Step Basic Commands:  (100% I accuracy with 2/2 directions)  Multistep Basic Commands:  (Unable to assess secondary to being hard of hearing)  Conversation:  (Patient required repetition secondary to hearing deficits. ST does suspect a break down may occur secondary with multi step directions secondary to memory deficits)  Interfering Components: Working memory; Hearing  Effective Techniques: Repetition; Increased volume;Assisted listening device    Reading Comprehension  Reading Status: Unable to assess (Unable to assess)    Expression  Primary Mode of Expression: Verbal    Verbal Expression  Verbal Expression: Exceptions to functional limits  Initiation: Moderate (Patient presented with reduced initiation of conversation and 1-2 word answers)  Convergent:  (I 100% accuracy with 2/2 items)  Divergent:  (I 100% accuracy naming 3/3 abstract items.)  Conversation:  (1-2 word answers)  Interfering Components: Impaired thought organization  Effective Techniques: Provide extra time; Word retrived strategies    Written Expression  Written Expression: Unable to assess (DUe to reduced motivation during eval)    Motor Speech  Motor Speech: Within Functional Limits    Pragmatics/Social Functioning  Pragmatics: Exceptions to [facility]  Affect: Moderate  Topic Maintenance: Mild    Cognition:      Orientation  Overall Orientation Status: Impaired  Orientation Level: Oriented to place;Oriented to person;Disoriented to time (Patient was disoriented to length of hospital stay, month, date and quintin)  Attention  Attention: Exceptions to [facility]  Sustained Attention: Mild  Memory  Memory: Exceptions to [facility]  Short-term Memory: Moderate (pt unable to state length of hospital stay, recent encounters, month, quintin. Patient often stated \"I don't know\")  Working Memory: Moderate  Problem Solving  Problem Solving: Exceptions to [facility]  Complex Functional Tasks: To be assessed in therapy  Managing Finances: To be assessed in therapy  Managing Medications: To be assessed in therapy (Granddaughter manages)  Performing Discharge Planning:  To be assessed in therapy  Simple Functional Tasks: Mild  Verbal Reasoning Skills: Moderate (ST unsure if reduced accuracy is related to reduced motivation vs cognitive deficits)  Abstract Reasoning  Abstract Reasoning: Exceptions to [facility]  Convergent Thinking:  (wfl with 2/2 concrete categories)  Divergent Thinking:  (I with naming 3/3 items in an abstract category)  Safety/Judgement  Safety/Judgement: Exceptions to [facility]  Complex Functional Tasks: To be assessed in therapy  Routine Tasks: To be assessed in therapy  Insight: Mild    Flexibility of Thought: Moderate                 Prognosis:  Speech Therapy Prognosis  Prognosis: Good  Individuals consulted  Consulted and agree with results and recommendations: Patient;RN Michal Denver)    Education:  Patient Education: Pt educated on SLE results  Patient Education Response: Demonstrated understanding;Needs reinforcement  Safety Devices in place: Yes  Type of devices: Bed alarm in place;Call light within reach    Pain Assessment:  Pre-Treatment  Pain assessment: 0-10  Pain level: 0  Intervention:  Patient denies pain. Post-Treatment  Pain assessment: 0-10  Pain level: 0  Intervention:  Patient denies pain.     NATIONAL OUTCOMES MEASUREMENT SYSTEM (NOMS):  SPOKEN LANGUAGE COMPREHENSION  Ratin    SPOKEN LANGUAGE EXPRESSION  Ratin    MOTOR SPEECH  Ratin    PROBLEM SOLVING  Ratin    MEMORY  Rating: 3             Therapy Time  SLP Individual Minutes  Time In: 1330  Time Out: 2499  Minutes: 17          Signature: Electronically signed by DALTON King on 2021 at 3:08 PM

## 2021-09-24 NOTE — PROGRESS NOTES
Occupational Therapy  Facility/Department: [facility]  Daily Treatment Note  NAME: Yue Saenz  : 3/5/1929  MRN: [id number]    Date of Service: 2021    Discharge Recommendations:  Continue to assess pending progress       Assessment   Performance deficits / Impairments: Decreased functional mobility ; Decreased safe awareness;Decreased balance;Decreased ADL status; Decreased endurance;Decreased strength;Decreased fine motor control;Decreased cognition  Assessment: Pt is a 79 y/o male admitted to hospital with declin in function noted. Pt would benefit from continued OT services to increase independence with ADL self care and functional mobility  Prognosis: Good  Decision Making: Medium Complexity  History: 7  Exam: 8 deficits  Assistance / Modification: mod A  REQUIRES OT FOLLOW UP: Yes  Activity Tolerance  Activity Tolerance: Treatment limited secondary to medical complications (free text)  Safety Devices  Safety Devices in place: Yes  Type of devices: All fall risk precautions in place  Restraints  Initially in place: No         Patient Diagnosis(es): There were no encounter diagnoses. has a past medical history of Anemia, CAD (coronary artery disease), DM (diabetes mellitus) ([facility] [address] Dyslipidemia, History of tobacco abuse, HTN (hypertension), Hypothyroidism, Non-ST elevation MI (NSTEMI) ([facility] [address] and Pacemaker. has a past surgical history that includes Cataract removal and Middle ear surgery (Left, ). Restrictions  Restrictions/Precautions  Restrictions/Precautions: Fall Risk  Implants present? : Pacemaker  Subjective Pt pleasant and cooperative with session. Pt reports 0/10 pain during session.   General  Chart Reviewed: Yes  Patient assessed for rehabilitation services?: Yes  Family / Caregiver Present: No  Referring Practitioner: Dr Javier Dykes  Diagnosis: Impaired mobility and ADL's due to right occipital stroke      Orientation  Orientation  Overall Orientation Status: Impaired  Orientation Level: Oriented to person  Objective    Pt completed acts to increase bilateral UE function for task completion. Pt completed removal of nuts and bolts from work station with bilateral hands. Pt completed card sorting of one deck of playing cards to suit. Pt noted decreased attention to left visual field during card sort tasks. Pt completed placing rubber washers on vertical dowels. Pt completed valve connectors to increase bilateral hand function. Pt required increased time for all task completion. Pt completed transfer w/c->bed with SBA. Sit->supine S, scooting S. Plan   Plan  Times per week: 5-7x/wk  Plan weeks: 2 weeks  Current Treatment Recommendations: Strengthening, Endurance Training, Neuromuscular Re-education, Balance Training, Functional Mobility Training, Self-Care / ADL, Cognitive Reorientation, Safety Education & Training, Cognitive/Perceptual Training, Patient/Caregiver Education & Training  G-Code     OutComes Score                                                  AM-PAC Score             Goals  Long term goals  Time Frame for Long term goals : 5-7x/wk x 2 weeks  Long term goal 1: Pt within 2 weeks of initial evaluation will demonstrate progress in th following areas listed below to achieve specific LTG's as stated in the initial evaluation  Long term goal 2: Increase independence with ADL self caare  Long term goal 3:  Increase cognition for the safe completion of ADL  Long term goal 4: increase functional mobility independence for completion of ADL  Patient Goals   Patient goals : Not stated at this time       Therapy Time   Individual Concurrent Group Co-treatment   Time In 1500         Time Out 1600         Minutes 60              Therapeutic activities: 60 minutes    [id number] CHETNA Ryder/L Electronically signed by [id number] GIN Ryder on 3/37/85 at 4:09 PM EDT

## 2021-09-24 NOTE — PROGRESS NOTES
Physical Therapy Rehab Treatment Note  Facility/Department: [facility]  [facility]       NAME: Yue Saenz  : 3/5/1929 (80 y.o.)  MRN: [id number]  CODE STATUS: Full Code    Date of Service: 2021  Chart Reviewed: Yes  Family / Caregiver Present: No    Restrictions:  Restrictions/Precautions: Fall Risk       SUBJECTIVE: Response To Previous Treatment: Patient reporting fatigue but able to participate. Pain Screening  Patient Currently in Pain: No  Pre Treatment Pain Screening  Pain at present: 0  Scale Used: Numeric Score  Intervention List: Patient able to continue with treatment    Post Treatment Pain Screening:  Pain Assessment  Pain Level: 0    OBJECTIVE:   Orientation Level: Oriented to person  Follows Commands: Impaired                     Transfers  Sit to Stand: Stand by assistance  Stand to sit: Stand by assistance;Contact guard assistance    Ambulation  Ambulation?: Yes  Ambulation 1  Surface: carpet  Device: No Device  Assistance: Contact guard assistance;Minimal assistance  Quality of Gait: Lateral sway with gait, short step length. Mild LOB x 1 with minimal assist to correct. Distance: 48' with 3 turns                   Exercises  Hip Flexion: x 20  Hip Abduction: Side kicks x 20  Knee Long Arc Quad: x 20  Ankle Pumps: x 20  Neurodevelopmental Techniques: MRE'S: ABD/ADD/EXT: x 15, 20, 15  Comments: Pt needs frequent verbal or visual cues. ASSESSMENT/PROGRESS TOWARDS GOALS:  Body structures, Functions, Activity limitations: Decreased functional mobility ; Decreased balance;Decreased posture;Decreased coordination  Assessment: Pt fatigued in PM.  Pt not talking very much, and had difficulty hearing despite use of hearing aid from ST. Pt not always consistent following commands. Focused on therapuetic exercise mostly secondary to fatigue.     Goals:  Long term goals  Long term goal 1: indep bed mobility  Long term goal 2: indep bed and car trnasfers  Long term goal 3: indep gait 50 feet in familiar and protected environment  Long term goal 4: supervision for 4 stairs  Long term goal 5: CGA gait 150 feet or greater    PLAN OF CARE/Safety:   Safety Devices  Type of devices: Bed alarm in place; Left in bed;Call light within reach      Therapy Time:   Individual   Time In 1400   Time Out 1430   Minutes 30     Minutes:30      Transfer/Bed mobility trainin      Gait training: 10      Neuro re education: 5     Therapeutic ex: [address] [place] [address], [facility], 21 at 3:55 PM

## 2021-09-24 NOTE — CONSULTS
Hospitalist Consult/Progress Note  9/24/2021 1:33 PM    Assessment and Plan:   CVA in the right occipital lobe: neurology following. Already on Eliquis. Add aspirin  CAD/A fib/pacemaker: rate controlled. Goal K and Mg 4.0 and 2.0, respectively. On Long term anticoagulation  DVT on Eliquis  COPD, Stable. Duonebs PRN. Incentive Spirometry, Respiratory therapist assessment. Resume home meds. Microcytic anemia, likely due to iron deficiency or anemia of chronic disease: Checking Iron panel and TIBCSecondary HTN: Stable. Continue home meds  DMII with hyperglycemia: ISS, hypoglycemia protocol POCT Glucose TIDAC & QHS  CKD stage III: Stable. Monitor urine output. Check BMP in AM. NephroCaps. Check Vit D level and supplement if deficient. Generalized weakness, Gait instability and Decreased Functional Status: Fall precautions. PT OT to evaluate. Maximize nutrition status. Assessing if needs DME at home. SW on board. Bowel Regimen and GI PPx: stool softners PRN ordered with hold parameters for loose stools or diarrhea. On antiacid  Diet: ADULT DIET; Regular; 4 carb choices (60 gm/meal)  Advance Directive: Full Code   Nutrition status: Supplemental Vitamins ordered. Dietitian assessment  Discharge planning: SW on board. High Risk Readmission Screening Tool Score Noted. Additionally, the following hospital problems were addressed:  Principal Problem:    Impaired gait and mobility dt R occipital CVA  Active Problems:    HTN (hypertension)    DM (diabetes mellitus) (HCC)    Hypothyroidism    CAD (coronary artery disease)    COPD exacerbation (HCC)    PVD (peripheral vascular disease) ([facility])    Gait abnormality dt PVD--right femoral artery occlusion    Chronic right shoulder pain    Acute CVA (cerebrovascular accident) ([address]    Aphasia    Abnormality of gait and mobility    Neurogenic bladder    Bilateral hearing loss    Left hemiparesis ([address]  Resolved Problems:    * No resolved hospital problems.  *      ** Total time spent reviewing medical records, evaluating patient, speaking with RN's and consultants where I was focused exclusively on this patient:  minutes. This time is excluding time spent performing procedures or significant events occurring earlier or later in the day requiring my attention and focus. Subjective:   Admit Date: 9/23/2021  PCP: John Lopez MD, MD    No acute events overnight. Afebrile  Telemetry reviewed. No new complaints. Pt denies chest pain, SOB, N/V, fevers or chills. Objective:     Vitals:    09/23/21 1829 09/24/21 0002 09/24/21 0439 09/24/21 0750   BP: 110/74 110/74  (!) 138/59   Pulse: 72 72  66   Resp: 16   17   Temp: 97.7 °F (36.5 °C)   98.1 °F (36.7 °C)   TempSrc: Oral   Oral   SpO2: 96%  96% 92%   Weight:       Height:         General appearance: no acute distress,  No conversational dyspnea noted. Dentition intact. Answers questions appropriately but Very hard of hearing  Neurological: Alert, awake, and orientated x 3 . Motor and sensory grossly intact. No focal deficits. GCS of 15. Lungs:  Diminished bases,  no exp wheezes, No rales No retractions; No use of accessory muscles  Heart:  S1, S2 normal, RRR, no MRG appreciated  Abdomen: (+) BS, soft, non-tender; non distended no guarding or rigidity. Extremities:  no cyanosis, no edema bilat lower exts, no calf tenderness bilaterally.  Dry skin noted       Medications:       Vitamin D  2,000 Units Oral Dinner    cyanocobalamin  1,000 mcg IntraMUSCular Weekly    coenzyme Q10  100 mg Oral Daily    lidocaine  3 patch TransDERmal Daily    aspirin  81 mg Oral Daily    Or    aspirin  300 mg Rectal Daily    insulin lispro  0-3 Units SubCUTAneous Nightly    insulin lispro  0-6 Units SubCUTAneous TID WC    apixaban  5 mg Oral BID    atorvastatin  40 mg Oral Nightly    budesonide-formoterol  2 puff Inhalation BID    carvedilol  6.25 mg Oral BID WC    ferrous sulfate  325 mg Oral Daily with breakfast    folic acid  1 mg Oral Daily lansoprazole  30 mg Oral Daily    ramipril  5 mg Oral Daily       LABS Reviewed    IMAGING Reviewed    Barb Conley APRN - JOVAN    Additional work up or/and treatment plan may be added today or then after based on clinical progression. I am managing a portion of pt care. Some medical issues are handled by other specialists and Primary Rehabilitation provider. Additional work up and treatment should be done in out pt setting by pt PCP and other out pt providers. I personally obtained the key and critical portions of the history and physical exam and made additions where appropriate in the documentation.  I reviewed the mid level documentation and agree with assessment and plan that we come up with together    Casie Roman DO  Internal Medicine

## 2021-09-24 NOTE — PROGRESS NOTES
INDIVIDUALIZED OVERALL REHAB PLAN OF CARE  ADDENDUM TO REHAB PROGRESS NOTE-for audit purposes must also refer to this day's clinical note and combine the information      Date: 2021  Patient Name: Misbah Gill   Room: E975/C994-98    MRN: [id number]    : 3/5/1929  (80 y.o.)  Gender: male       Today 2021 during weekly team meeting, I reviewed the patient Misbah Gill in detail with the therapists and nurses involved in patient's care gathering complex physiatric data regarding current medical issues, progress in therapies, factors limiting progress, social issues, psychological issues, ongoing therapeutic plans and discharge planning. Legend:  I= independent Im =Modified independent  S=Supervised SB=stand by DE SOUZA=set up CG=contact ricardo Min= minimal Mod=Moderate Max=maximal Max of 2 =maximal assist of 2 people      CURRENT FUNCTIONAL STATUS:    NURSING ISSUES:       New admit 80 y.o. male was transitioning from 2, initially arriving to the hospital w/a Rt. Occipital Stroke. Pt's admission was completed, medication, reconciled, assessment completed and medication administered. During assessment Pt become emotional and teary eyed, when questioned, the Pt was not responsive. The Pt may benefit from an evaluation for depression. Note:Pt is extremely hard of hearing, and can only hear if one gets w/in inches of his left ear and speaks very loudly. --Additionally: Pt didn't eat any part of dinner, until this RN fed him, despite assessment indicating Pt hand grasp as moderate/strong grasp and full movements in upper and lower extremities (no deficiencies, or weakness). Even then much of the food on the plate was not eaten    Nursing will continue to focus on bowel and bladder continence transitioning toward independence by time of discharge. Monitoring post void residuals monitoring for severe constipation and bowel obstruction.     Focus on achieving ADL goals with co-treating with OT when possible. PHYSICAL THERAPY  Bed mobility:  Supine to Sit: Independent (09/24/21 1019)  Sit to Supine: Independent (09/24/21 1019)  Transfers:  Sit to Stand: Stand by assistance;Contact guard assistance (09/24/21 1140)  Bed to Chair: Contact guard assistance (09/24/21 1140)  Gait:   Device: No Device;Hand-Held Assist (09/24/21 1112)  Assistance: Contact guard assistance (09/24/21 1112)  Distance: 30 feet ; 50 feet (09/24/21 1112)  Quality of Gait: narrow MISAEL, controlled pace, short step length, light UE support sought by pt however no LOB noted (09/24/21 1112)  Comments: Pt ambulated 30 feet with ww with SBA (09/24/21 1112)  Stairs:  # Steps : 4 (09/24/21 1112)  Assistance: Stand by assistance;Contact guard assistance (09/24/21 1112)  Comment: non - reciprocal pattern chosen by pt - pt has ramp entry (09/24/21 1112)  W/C mobility:         OCCUPATIONAL THERAPY  Hand Dominance: Right  ADL  Feeding: Setup (09/24/21 1015)  Grooming: Setup;Supervision (09/24/21 1015)  UE Bathing: Minimal assistance (09/24/21 1015)  LE Bathing: Minimal assistance (09/24/21 1015)  UE Dressing: Unable to assess(comment) (Gown only) (09/24/21 1015)  LE Dressing: Moderate assistance (09/24/21 1015)  Toileting: Unable to assess(comment) (09/24/21 1015)  Toilet Transfers  Toilet - Technique: Stand step (09/24/21 1020)  Equipment Used: Grab bars (09/24/21 1020)  Toilet Transfer: Minimal assistance (09/24/21 1020)  Tub Transfers  Tub Transfers: Not tested (09/24/21 1020)  Shower Transfers  Shower - Transfer From: Wheelchair (09/24/21 1020)  Shower - Transfer Type: To and From (09/24/21 1020)  Shower - Transfer To: Shower seat with back (09/24/21 1020)  Shower - Technique: Stand pivot (09/24/21 1020)  Shower Transfers: Minimal assistance (09/24/21 1020)      SPEECH THERAPY               Diet/Swallow:  Diet Solids Recommendation: Regular  Liquid Consistency Recommendation:  Thin  Dysphagia Outcome Severity Scale: Level 7: Normal in all situations    Compensatory Swallowing Strategies: Alternate solids and liquids, Small bites/sips, Eat/Feed slowly, Upright as possible for all oral intake             COGNITION  OT: Cognition Comment: Comp Max A, expression Min A, social Max A, problem Mod A, memory Mod A  SP:            THERAPY, MEDICAL AND NURSING COORDINATION:    []  Pain medication before therapies     []  Check orthostatic BP      [x]  Ambulate to the bathroom in room    [x]  Add scheduled rest beaks     []  In room therapies      Discharge date set for:              10/2/21      Home with:   granddaughter Shira  with help from   granddaughter Pedro Orellana  and family            And:      Home Health Care:     [x]  PT    [x]  OT    [x]  ST   [x]  Aide   []  SW    [x]  RN                                 Equipment:  [facility]      At D/C their function is goaled at:   PT:Long term goal 1: indep bed mobility  Long term goal 2: indep bed and car trnasfers  Long term goal 3: indep gait 50 feet in familiar and protected environment  Long term goal 4: supervision for 4 stairs  Long term goal 5: CGA gait 150 feet or greater  OT:Eating  Assistance Needed: Setup or clean-up assistance  CARE Score: 5  Discharge Goal: Supervision or touching assistance, Oral Hygiene  Discharge Goal: Independent, [address]  Reason if not Attempted: Not applicable  CARE Score: 9  Discharge Goal: Supervision or touching assistance, Shower/Bathe Self  Assistance Needed: Partial/moderate assistance  CARE Score: 3  Discharge Goal: Supervision or touching assistance  Upper Body Dressing  Reason if not Attempted: Not applicable  CARE Score: 9  Discharge Goal: Set-up or clean-up assistance, Lower Body Dressing  Assistance Needed: Partial/moderate assistance  CARE Score: 3  Discharge Goal: Supervision or touching assistance, Putting On/Taking Off Footwear  Assistance Needed: Setup or clean-up assistance  CARE Score: 5  Discharge Goal: Independent, Toilet Transfer  Assistance Needed: Partial/moderate assistance  CARE Score: 3  Discharge Goal: Supervision or touching assistance  SP:   Long-term Goals  Goal 1: N/A           From a cognitive standpoint they will need:        24 hr supervision  --progress to occasional           Significant problems/ barriers to functional progress include: Pt is at a high risk for functional loss,    [x]  Acute infection/UTI    []  Low BP's     []  COPD flare-up   []  Uncontrolled blood sugar     []  Progressive anemia         []  Severe pain exacerbation     [x]  Impaired mental status  And depression--will add psych and spiritual care        Plan to correct barriers to functional progress: Add scheduled rest breaks, control pain by using ice Lidoderm rest and massage as well as pain medications prior to therapy. Based on a comprehensive evaluation of the above, the individualized therapy and Discharge plan will be:    -Times stated are an average that will be varied based on the patient's daily need. PT  1 1/2  hrs/day 5-7 days per week           OT  1 1/2hrs per day 5-7 days per week ST ____1/2___hrs /day 3-5 days per week       Estimated LOS 2 week(s)    - Overall functional prognosis:     [x]  Good    []  Fair    []  Poor -Medical Prognosis:   [x]  Good    []  Fair    []  Poor    This patient was made aware of the discussion of Plan of Care, their projected dicharge date and their projected function at discharge.        Mercedes Prakash DO

## 2021-09-24 NOTE — H&P
HISTORY & PHYSICAL       DATE OF ADMISSION:  9/23/2021    DATE OF SERVICE:  9/24/21    Subjective:    Heber Mendez, 80 y.o. male presents today with:     CHIEF COMPLAINT:     70-year-old male with recent right occipital CVA was admitted to [facility] on 9/16/2021 with progressive weakness and acute cognitive changes. He was eventually diagnosed with an acute right occipital CVA. Testing was limited because of inability to perform a MRI of the brain however CT of the brain revealed the least the new areas of hypodensity worrisome for acute ischemia of the right occipital lobe. Other work-up included urine urinalysis was negative blood culture showed no growth CBC was unremarkable calcium level was 8.9 and 8.5 chest x-ray was no active disease EKG revealed AV mayito pacing and prolonged AV conduction. He could not get an MRI because of his pacer apparently it is not compatible with MRI carotid duplex on 9/16 revealed less than 50% stenosis bilaterally. Patient was evaluated in therapy according the patient's granddaughter whom he lives with he was 100% independent with cognition prior to the stroke and was driving up until the spring where she encouraged him not to mostly because of his mobility issues not because of his cognition. She states this is a profound change for him. Fatigue  This is a chronic problem. The current episode started more than 1 year ago. The problem occurs constantly. Associated symptoms include fatigue, myalgias and weakness. Pertinent negatives include no abdominal pain, chest pain, chills, congestion, coughing, diaphoresis, fever, headaches, nausea, neck pain, rash, sore throat or vomiting. The symptoms are aggravated by walking. He has tried rest and lying down for the symptoms. The treatment provided mild relief. Neurologic Problem  The patient's primary symptoms include weakness. The patient's pertinent negatives include no syncope.  Associated symptoms include back pain, fatigue and shortness of breath. Pertinent negatives include no abdominal pain, chest pain, diaphoresis, dizziness, fever, headaches, nausea, neck pain, palpitations or vomiting. I reviewed recent nursing note, \" New admit 80 y.o. male was transitioning from 2W, initially arriving to the hospital w/a Rt. Occipital Stroke. Pt's admission was completed, medication, reconciled, assessment completed and medication administered. During assessment Pt become emotional and teary eyed, when questioned, the Pt was not responsive. The Pt may benefit from an evaluation for depression. Note:Pt is extremely hard of hearing, and can only hear if one gets w/in inches of his left ear and speaks very loudly. --Additionally: Pt didn't eat any part of dinner, until this RN fed him, despite assessment indicating Pt hand grasp as moderate/strong grasp and full movements in upper and lower extremities (no deficiencies, or weakness). Even then much of the food on the plate was not eaten   \". Today I evaluated this patient for periodic reassessment of medical and functional status. The patient was discussed in detail at the treatment team meeting focusing on current medical issues, progress in therapies, social issues, psychological issues, barriers to progress and strategies to address these barriers, and discharge planning. See the addendum to rehab progress note-as a second progress note in the chart. The patient continues to be high risk for future disability and their medical and rehabilitation prognosis continue to be good and therefore, we will continue the patient's rehabilitation course as planned. The patient's tentative discharge date was set. Patient and family education was discussed. The patient was made aware of the team discussion regarding their progress.       The patient has stabilized medically andis able to participate at acute level rehab but is too medically complex for SNF due to need for therapy at the acute level with at least 15 hours a week of PT OT and cognitive and recreational therapy at an acute level with daily medical monitoring. Imaging:    Imaging and other studies reviewed and discussed with patient and staff    Echocardiogram   9/17/2021 Transthoracic Echocardiography   Left Ventricle Normal left ventricular systolic function, no regional wall motion abnormalities, estimated ejection fraction of 50%. Normal left ventricular size and function. Right Ventricle Normal right ventricle structure and function. Normal right ventricle systolic pressure. Left Atrium The left atrium is Mildly dilated. Right Atrium Normal right atrium. Mitral Valve Diffusely thickened and pliable mitral valve leaflets with normal excursion. Mild (1+) mitral regurgitation is present. No evidence of mitral valve stenosis. Tricuspid Valve Normal tricuspid valve structure and function. Aortic Valve The aortic valve leaflets were not well visualized. No evidence of aortic valve regurgitation . No evidence of aortic valve stenosis. Pericardial Effusion No evidence of pericardial effusion. Aorta \ Miscellaneous Miscellaneous normal findings were found.     M-Mode Measurements (cm)   LVIDd: 3.33 cm            LVIDs: 2.3 cm  IVSd: 1.44 cm             IVSs: 1.73 cm  LVPWd: 0.8 cm             AO Root Dimension: 3.45 cm                             LVOT: 1.98 cm  Valves  LVOT   LVOT Diameter: 1.98 cm  Structures  Left Ventricle   Diastolic Dimension: 2.04 cm         Systolic Dimension: 2.3 cm  Septum Diastolic: 6.49 cm            Septum Systolic: 6.49 cm  PW Diastolic: 0.8 cm                                       FS: 30.9 %  LV EDV/LV EDV Index: 45.04 ml/25 m^2 LV ESV/LV ESV Index: 18.13 ml/10 m^2  EF Calculated: 59.8 %   LVOT Diameter: 1.98 cm  Aorta/ Miscellaneous Aorta   Aortic Root: 3.45 cm  LVOT Diameter: 1.98 cm          CT Head  9/16/2021  There has been interval development of a 3 cm area of hypodensity in the right occipital lobe since the previous day which is worrisome for an acute, subacute ischemia. There are persistent, chronic areas of ischemia in the anterior right temporal lobe, unchanged since previous study. No acute hemorrhage, mass, mass effect, or midline shift. There is prominence of sulci and ventricles indicating mild global cerebral atrophy and chronic involutional changes. Moderate periventricular white matter hypodensities indicating chronic microangiopathy are noted. The basal ganglia are within normal limits. There are no acute changes or space-occupying lesions in the posterior fossa. The visualized portions of the orbits are within normal limits. The globes are intact. The imaged portions of the paranasal sinuses are unremarkable. The calvarium is intact. There is a 3 cm new area of hypodensity worrisome for acute in the right occipital lobe. The findings were called to the ER [id number] hours. ,       CT Head  9/15/2021 The ventricles are dilated. Unchanged size configuration. No mass. No midline shift. The cisterns are patent. There are white matter and periventricular changes most likely consistent with chronic small vessel disease. Again note is made of a focal area of encephalomalacia at the inferolateral medial aspect the right temporal lobe. No acute intra-axial or extra-axial findings. The visualized osseous structures are unremarkable. There is mucoperiosteal thickening in the right maxillary sinus. There is a small amount of patchy opacification right mastoid. There is resection of the left mastoid. The middle ear bones are not appreciated. Correlate with patient's surgical history. .     NO ACUTE INTRA-AXIAL OR EXTRA-AXIAL FINDINGS. XR CHEST  9/16/2021 Median sternotomy. Right transvenous pacemaker, unchanged. No consolidation. No pleural effusion. No pneumothorax. Stable cardiomediastinal silhouette. Aortic vascular calcifications. No distinct acute osseous findings.      No acute radiographic abnormality. XR CHEST   9/14/2021: Single  views of the chest is submitted. Right-sided CCD device. Leads overlying the cardiac silhouette. There are multiple median sternotomy wires overlying the cardiac silhouette. The cardiac silhouette is of normal size configuration. Pulmonary vascular unremarkable. Right sided trachea. No focal infiltrates. No Pneumothoraces. NO ACUTE ACTIVE CARDIOPULMONARY PROCESS      US CAROTID ARTERY BILATERAL : 9/16/2021 There is mild atherosclerotic plaquing of the common carotid arteries and carotid bifurcations without significantly elevated velocities. Maximum systolic velocity within the right internal and mid common carotid arteries are 79 and 51 cm/s, with an ICA/CCA ratio of approximately 1.5 , which indicates less than 50% by velocity criteria. Maximum systolic velocity within the left internal and mid common carotid arteries are 59 and 74 cm/s, with an ICA/CCA ratio of approximately 0.9, which indicates less than 50% by velocity criteria. Maximum velocities within the right and left external carotid arteries are 141 and 81 cm/sec respectively. There is antegrade flow in both vertebral arteries. MILD ATHEROSCLEROTIC PLAQUING OF THE CAROTID BULBS WITHOUT EVIDENCE OF A FLOW-LIMITING STENOSIS, BY VELOCITY RATIO CRITERIA.  GOSINK CRITERIA Diameter          PSV t            EDV t          PSV          EDV          Stenosis Site       %              cm/sec          cm/sec      ICA/CCA    ICA/CCA 0-49                 <124              <40             <2:1           ---                 --- 50-69              125-225                  >2:1           ---                 --- 70-89               225-325          >100          >4:1           >5:1              --- 90%+                >325              >100          >4:1           >9:1           Damped resistive CCA >95% May be            May be      Damped      ---              Damped resistive CCA                       decreased      decreased  resistive CCA              Labs:     labs reviewed and discussed with patient and staff    Lab Results   Component Value Date    POCGLU 101 09/24/2021    POCGLU 97 09/24/2021    POCGLU 107 09/23/2021    POCGLU 155 09/23/2021    POCGLU 124 09/23/2021     Lab Results   Component Value Date     09/22/2021    K 4.2 09/22/2021    K 4.4 09/18/2021     09/22/2021    CO2 18 09/22/2021    BUN 28 09/22/2021    CREATININE 1.11 09/22/2021    CALCIUM 8.5 09/22/2021    LABALBU 4.1 09/16/2021    BILITOT 0.8 09/16/2021    ALKPHOS 76 09/16/2021    AST 19 09/16/2021    ALT 16 09/16/2021     Lab Results   Component Value Date    WBC 9.0 09/20/2021    RBC 3.81 09/20/2021    HGB 12.6 09/20/2021    HCT 36.1 09/20/2021    MCV 94.9 09/20/2021    MCH 33.1 09/20/2021    MCHC 34.9 09/20/2021    RDW 14.0 09/20/2021     09/20/2021    MPV 9.0 07/29/2014     Lab Results   Component Value Date    VITD25 16.8 02/03/2021     Lab Results   Component Value Date    COLORU Yellow 09/16/2021    NITRU Negative 09/16/2021    GLUCOSEU Negative 09/16/2021    KETUA Negative 09/16/2021    UROBILINOGEN 0.2 09/16/2021    BILIRUBINUR Negative 09/16/2021     Lab Results   Component Value Date    PROTIME 16.3 09/14/2021     Lab Results   Component Value Date    INR 1.3 09/14/2021         I discussed results with patient. The patient remains highly medically complex and continues to have severe problems with activities of daily living and mobility. The patient was assessed to be able to tolerate intensive rehabilitation and therefore was admitted to Rehabilitation to address these needs.     The patient has been found to have severe abnormality of gait and mobility with impaired self care and is admitted to the acute inpatient rehab program.       Prior Function; everyday activities:     Social History Socioeconomic History    Marital status:      Spouse name: Not on file    Number of children: Not on file    Years of education: Not on file    Highest education level: Not on file   Occupational History    Not on file   Tobacco Use    Smoking status: Former Smoker    Smokeless tobacco: Never Used   Vaping Use    Vaping Use: Never used   Substance and Sexual Activity    Alcohol use: No    Drug use: No    Sexual activity: Not on file   Other Topics Concern    Not on file   Social History Narrative    Lives With: granddaughter Lili Torrez  and family-versus significant other and their 3 children are in the household Shira works to take care of her her uncle who is Mr. Wilfred Montes son who is a paraplegic who lives next door    SO named Shama    Type of Home: House  Two level, Bed/Bath upstairs-[address] [facility] Access: Stairs to enter with rails- Number of Steps: 14    Bathroom Shower/Tub: Tub/Shower unit    Home Equipment: Rolling walker, Cane    ADL Assistance: Independent    Homemaking Assistance: Independent(granddaughter completes)    Ambulation Assistance: Independent    Transfer Assistance: Independent    Active : Yes    Additional Comments: pt is significantly [place]; information gathered via chart review and via writing. Social Determinants of Health     Financial Resource Strain:     Difficulty of Paying Living Expenses:    Food Insecurity:     Worried About Running Out of Food in the Last Year:     [address] in the Last Year:    Transportation Needs:     Lack of Transportation (Medical):      Lack of Transportation (Non-Medical):    Physical Activity:     Days of Exercise per Week:     Minutes of Exercise per Session:    Stress:     Feeling of Stress :    Social Connections:     Frequency of Communication with Friends and Family:     Frequency of Social Gatherings with Friends and Family:     Attends [employer] Services:     Active Member of Clubs or Organizations:     Attends Club or Organization Meetings:     Marital Status:    Intimate Partner Violence:     Fear of Current or Ex-Partner:     Emotionally Abused:     Physically Abused:     Sexually Abused:      Social supports listed above. Prior Device(s) used:  As above    History of falls:  Rarely falls    In depth analysis of complex functional data; the patient has been:    Current Rehabilitation Assessments:    Physical therapy: FIMS:  Bed Mobility:      Transfers:Sit to Stand: Stand by assistance, Contact guard assistance  Stand to sit: Stand by assistance, Contact guard assistance  Bed to Chair: Contact guard assistance, Ambulation 1  Surface: level tile  Device: No Device, Hand-Held Assist  Assistance: Contact guard assistance  Quality of Gait: narrow MISAEL, controlled pace, short step length, light UE support sought by pt however no LOB noted  Distance: 30 feet ; 50 feet  Comments: Pt ambulated 30 feet with ww with SBA, Stairs  # Steps : 4  Stairs Height: 6\"  Assistance: Stand by assistance, Contact guard assistance  Comment: non - reciprocal pattern chosen by pt - pt has ramp entry    FIMS:  , , Assessment: Pt demonstrates deficits as listed. He is requiring assistance with mobility at this time. Pt would benefit from continued PT prior to safe return to home    Occupational therapy: FIMS:   ,  , Assessment: Pt is a 81 y/o male admitted to hospital with declin in function noted. Pt would benefit from continued OT services to increase independence with ADL self care and functional mobility    OCCUPATIONAL THERAPY  Hand Dominance: Right  ADL  Feeding: Setup (09/24/21 1015)  Grooming: Setup;Supervision (09/24/21 1015)  UE Bathing: Minimal assistance (09/24/21 1015)  LE Bathing: Minimal assistance (09/24/21 1015)  UE Dressing: Unable to assess(comment) (Gown only) (09/24/21 1015)  LE Dressing:  Moderate assistance (09/24/21 1015)  Toileting: Unable to assess(comment) (09/24/21 1015)  Toilet Transfers  Toilet - Technique: Stand step (09/24/21 1020)  Equipment Used: Grab bars (09/24/21 1020)  Toilet Transfer: Minimal assistance (09/24/21 1020)  Tub Transfers  Tub Transfers: Not tested (09/24/21 1020)  Shower Transfers  Shower - Transfer From: Wheelchair (09/24/21 1020)  Shower - Transfer Type: To and From (09/24/21 1020)  Shower - Transfer To: Shower seat with back (09/24/21 1020)  Shower - Technique: Stand pivot (09/24/21 1020)  Shower Transfers: Minimal assistance (09/24/21 1020)    Speech therapy: FIMS:       SPEECH THERAPY               Diet/Swallow:  Diet Solids Recommendation: Regular  Liquid Consistency Recommendation: Thin  Dysphagia Outcome Severity Scale: Level 7: Normal in all situations    Compensatory Swallowing Strategies: Alternate solids and liquids, Small bites/sips, Eat/Feed slowly, Upright as possible for all oral intake             COGNITION  OT: Cognition Comment: Comp Max A, expression Min A, social Max A, problem Mod A, memory Mod A  SP:          Prior to admission patient was independent with all ADLs and mobilityand did not require any outside services.        Past Medical History:   Diagnosis Date    Anemia     CAD (coronary artery disease) 4/16/2015    [facility] [address]     Dyslipidemia     History of tobacco abuse     HTN (hypertension)     Hypothyroidism     Non-ST elevation MI (NSTEMI) ([facility] [address]     Pacemaker 4/16/2015       Past Surgical History:   Procedure Laterality Date    CATARACT REMOVAL      MIDDLE EAR SURGERY Left 1998    \"they had to reset the bones\" after an infection       Current Facility-Administered Medications   Medication Dose Route Frequency Provider Last Rate Last Admin    Vitamin D (CHOLECALCIFEROL) tablet 2,000 Units  2,000 Units Oral Dinner Pat Scullin, DO        cyanocobalamin injection 1,000 mcg  1,000 mcg IntraMUSCular Weekly Pat Scullin, DO   1,000 mcg at 09/24/21 4265    coenzyme Q10 capsule 100 mg  100 mg Oral Daily Pat Scullin, DO   100 mg at 09/24/21 0754    lidocaine 4 % external patch 3 patch  3 patch TransDERmal Daily Pat Scullin, DO        acetaminophen (TYLENOL) tablet 650 mg  650 mg Oral Q4H PRN Pat Scullin, DO        bisacodyl (DULCOLAX) suppository 10 mg  10 mg Rectal Daily PRN Sohail Lopes, DO        fleet rectal enema 1 enema  1 enema Rectal Daily PRN Pat Scullin, DO        aspirin EC tablet 81 mg  81 mg Oral Daily [facility] DO   81 mg at 09/24/21 5940    Or    aspirin suppository 300 mg  300 mg Rectal Daily [facility] DO        ondansetron (ZOFRAN-ODT) disintegrating tablet 4 mg  4 mg Oral Q8H PRN [facility] DO        Or    ondansetron [facility]) injection 4 mg  4 mg IntraVENous Q6H PRN [facility] DO        polyethylene glycol (GLYCOLAX) packet 17 g  17 g Oral Daily PRN [facility] DO        insulin lispro (HUMALOG) injection vial 0-3 Units  0-3 Units SubCUTAneous Nightly [facility] DO        insulin lispro (HUMALOG) injection vial 0-6 Units  0-6 Units SubCUTAneous TID [facility], DO        albuterol sulfate  (90 Base) MCG/ACT inhaler 2 puff  2 puff Inhalation Q4H PRN [facility] DO        apixaban (ELIQUIS) tablet 5 mg  5 mg Oral BID [facility] DO   5 mg at 09/24/21 0755    atorvastatin (LIPITOR) tablet 40 mg  40 mg Oral Nightly [facility] DO   40 mg at 09/23/21 2233    budesonide-formoterol (SYMBICORT) 80-4.5 MCG/ACT inhaler 2 puff  2 puff Inhalation BID [facility] DO   2 puff at 09/24/21 0439    carvedilol (COREG) tablet 6.25 mg  6.25 mg Oral BID WC [facility] DO   6.25 mg at 09/24/21 0754    ferrous sulfate (IRON 325) tablet 325 mg  325 mg Oral Daily with breakfast [facility] DO   325 mg at 10/64/38 4278    folic acid (FOLVITE) tablet 1 mg  1 mg Oral Daily [facility] DO   1 mg at 09/24/21 0755    lansoprazole suspension SUSP 30 mg  30 mg Oral Daily [facility] DO   30 mg at 09/24/21 1324    ramipril (ALTACE) capsule 5 mg  5 mg Oral Daily [facility] DO   5 mg at 09/24/21 1014       No Known Allergies                   FAMILY HISTORY:  Does not pertain tochief complaint. Review of Systems   Unable to perform ROS: Patient nonverbal   Constitutional: Positive for activity change, appetite change and fatigue. Negative for chills, diaphoresis and fever. HENT: Positive for hearing loss. Negative for congestion, ear discharge, ear pain, nosebleeds, sore throat and tinnitus. Eyes: Positive for visual disturbance. Negative for photophobia, pain and redness. Respiratory: Positive for shortness of breath. Negative for cough, wheezing and stridor. Shortness of breath on exertion   Cardiovascular: Negative for chest pain, palpitations and leg swelling. Gastrointestinal: Positive for constipation. Negative for abdominal pain, blood in stool, diarrhea, nausea and vomiting. Endocrine: Negative for polydipsia. Genitourinary: Negative for dysuria, flank pain, frequency, hematuria and urgency. Musculoskeletal: Positive for back pain, gait problem and myalgias. Negative for neck pain. Skin: Negative for rash. Allergic/Immunologic: Positive for immunocompromised state. Negative for environmental allergies. Neurological: Positive for speech difficulty (aphasia) and weakness. Negative for dizziness, tremors, seizures, syncope, facial asymmetry and headaches. Hematological: Does not bruise/bleed easily. Psychiatric/Behavioral: Negative for hallucinations and suicidal ideas. The patient is not nervous/anxious. Objective  BP (!) 138/59   Pulse 66   Temp 98.1 °F (36.7 °C) (Oral)   Resp 17   Ht 5' 5.98\" (1.676 m)   Wt 149 lb 14.6 oz (68 kg)   SpO2 92%   BMI 24.21 kg/m² *    Physical Exam  Vitals reviewed. Constitutional:       General: He is not in acute distress. HENT:      Right Ear: Decreased hearing noted. Left Ear: Decreased hearing noted.    Eyes: being severe abnormality of gait and mobility and impaired self care and ADL's due to acute CVA with left-sided hemianesthesia hemiparesis and confirmed right occipital lobe CVA with visual field cut. Compared to Pre-Admission Assessment, patients medical and functional status remain challengingly complex and patient continues to requireintensive therapeutic intervention from multiple therapies, therefore, initiate acute intensive comprehensive inpatient rehabilitation program including PT/OT to improve balance, ambulation, ADLs, and to improve the P/AROM. Functional and medical status reassessed regarding patients ability to participate in therapies and patient found to be able to participate in acute intensivecomprehensive inpatient rehabilitation program.  Therapeutic modifications regarding activities in therapies, place, amount of time per day and intensity of therapy made daily. Enroll in acute course of therapy program to include 1/2 hours per day of PT 5 to 7days per week and 1 1/2 hours per day of OT 5 to 7 days per week,   SLP and Rec T 1/2 hour per day 3-5 days per week. The patient is stable medically and physically on previous exam.  When necessary therapy will be spread out over a 7-day window. This patient present with significant new onset decreased mobility andinability to perform activities of daily living skills independently and is at significant risk for prolonged disability  For this reason they have been admitted to [facility]. Thepatient's current functional and medical status are highly complex but the patient is able to participate in intensive rehabilitation. A comprehensive inpatient rehabilitation program is appropriate. The patient Denice Delcid initial evaluation by the rehabilitation team and be discussed at regular treatment team meetings to assess progress, mobility, self care, mood and discharge issues.   Physical therapy will be consulted for mobilityand endurance issues and should be performed 1 to 2 times per day, 7 days per week for the length of stay. Occupational therapy will be consulted for activities of daily living and should be performed 1 to 2 times per day,7 days per week for the length of stay. Their capacity to participate at an acute level, decision to be treated in the gym, room or on the unit, their activity goals for the day and the number of minutes of activeparticipation will be reassessed and re-prescribed daily. Because this patient is medically complex, I will check a CBC, BMP, UA and orthostatic blood pressures. They will be reassessed daily regarding their ability toparticipate in an acute level rehabilitation program.  Recreational Therapy will be consulted for community re-entry and adjustment to disability. Communication, cognitive and emotional issues will also be addressed duringthis rehabilitation stay by rehabilitation psychologist or speech therapist as appropriate. I reviewed the patient's old and current charts and discussed other rehabilitation options with the rehabilitation team including the rehab RN and the admission team as well as the patient. I feel that the patient's functional recovery would be best served at an acute inpatient rehabilitation program because the patient needs intense therapy three hours per day, direct RN supervision and daily monitoring by a physician for medical status. This cannot be sufficiently provided by home health care, a skilled nursing facility or in an outpatient setting. I further feel that the patient has the potential to improve functional abilities in an acute intensive rehabilitation program.    Old records were reviewed and summarized. 2.  Other diagnoses which complicate rehabilitation stay include:     Principal Problem:    Impaired gait and mobility dt R occipital CVA  Active Problems:  1.    HTN (hypertension),  CAD (coronary artery disease),   PVD (peripheral vascular disease) -continue blood signs every shift focusing on heart rate and blood pressure checks, consult hospitalist for backup medical and adjust/add medications ( Eliquis, ASA, Coreg, Lipitor, Altace )  2. DM (diabetes mellitus) with neuropathy-Continue blood sugar checks every shift, diet, add diabetic add dietary education, restrict carbohydrates to lowest effective and safe carb count per meal advising 4 carbs per meal, add at bedtime snack to prevent a.m. hypoglycemia, adjust/add medications ( SSI )  3. Anemia-monitor closely on Eliquis--add Iron and Vit B12  4. Hypothyroidism-titrate Synthroid  5. COPD exacerbation-Pulse oximeter checks to shift dose and titrate oxygen and aerosol treatments monitor for nocturnal hypoxemia, monitor vital signs, oxygen prn. Symbicort, Albuterol  6. 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 Chronic right shoulder pain-Add Lidoderm and Tylenol. 8.   Acute CVA (cerebrovascular accident)with  Aphasia, Dysphagia and Left hemiparesis -focus on balance and cognition--pocket talker helps  9. Neurogenic bladder-bladder training program  10. Bilateral hearing loss--SLP and AD for hearing    11. Depression-emotional support provided daily, vitamin B12, encourage participation in rehabilitation support group and recreational therapy, adjust/add medications ( Vit B12 )  add rehab Psych       Note Pt wishes to be DNR CCA code staus in the past but is now full code--will clarify. I am especially concerned about their recent medical complexities. The patient's high risk medication use includes Elliquis. The patient is high risk for urinary tract infection, an admission urinalysis has been ordered. I will have the nurses check post void residual bladder volumes and place acatheter if excessive urine is retained in the bladder after voiding.      The patient is risk for deep venous thromosis, complex deep venous thrombosis protocol prophylaxis has been ordered [facility]. The patient is high risk for orthostasis and a hydration program and orthostatic blood pressure screening have been ordered. I will attempt to get old records from the patient's previous hospital stay. Care everywhere on [facility] wasutilized. 3.  Current and previous medications were reviewed and summarized and compared to old medication lists from home and from the acute floor. 4.  Complex labs and x-rays were reviewed. I will review patient's old EKG and labs. 5.  Will provide emotional support for this patient regarding adjustment to their disability. Cognition and mood will be screened daily and addressed by rehabilitationpsychology and/or speech therapy as appropriate. I have encouraged the patient to attend the Rehab Adjustment to Disability Support Group and recreational therapy. 6.  Estimated length of stay is 2-3 weeks. Discharge to home with help from family and home health PT, OT, RN, and aide. Patient should be independent at discharge. 7.  The patient's medical and rehab prognosis are good. [address] regarding the patient's back up to general medical needs. A welcome letter was presented with an explanation of my services, my specialty and what to expect during the rehabilitation process. As well as introducing myself, I also wrote my name on their bedside marker board with their name as well as the names of the other physicians with an explanation of our individual roles in their care, as well as the rehab process.             Stacey Sarkar D.O., F.A.A.P.BARBARA.&SADIE

## 2021-09-24 NOTE — CARE COORDINATION
LSW spoke with Shira and informed her of projected discharge date of 10/2.  CRESENCIOW also scheduled family training for 9/28 at [place] [id number]. Electronically signed by SOCO Rajan, VINCE on 9/24/2021 at 5:58 PM

## 2021-09-24 NOTE — PROGRESS NOTES
[facility]   Facility/Department: [facility]  Speech Language Pathology  Clinical Bedside Swallow Evaluation    NAME:Chey Lomax  : 3/5/1929 (89 y.o.)   MRN: [id number]  [id number]  ADMISSION DATE: 2021  PATIENT DIAGNOSIS(ES): Impaired gait and mobility [R26.89]  Abnormality of gait and mobility [R26.9]  No chief complaint on file.     Patient Active Problem List    Diagnosis Date Noted    Anginal equivalent ([facility]     Abnormality of gait and mobility 2021    Neurogenic bladder 2021    Bilateral hearing loss 2021    Left hemiparesis ([address] 2021    Impaired gait and mobility dt R occipital CVA 2021    Aphasia     Acute CVA (cerebrovascular accident) ([facility] 2021    General weakness 09/15/2021    Elevated troponin 09/15/2021    Hyperkalemia 09/15/2021    Anticoagulation adequate with anticoagulant therapy 09/15/2021    Arterial occlusion 2021    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    CKD (chronic kidney disease) 2021    PVD (peripheral vascular disease) ([facility] 2021    Venous thrombosis of leg 2021    Hyponatremia 2021    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HCC) 2021    Syncope and collapse 2020    COPD exacerbation ([address] 2020    NSTEMI (non-ST elevated myocardial infarction) ([facility] 2019    Chest pain 2019    Hypotension 2019    SOB (shortness of breath) 2018    Chronic right shoulder pain 2018    COPD with acute exacerbation ([address] 2018    Bronchitis 2016    CAD (coronary artery disease) 2015    HTN (hypertension)     Dyslipidemia     DM (diabetes mellitus) ([address]     Hypothyroidism     History of tobacco abuse     Anemia      Past Medical History:   Diagnosis Date    Anemia     CAD (coronary artery disease) 2015    DM (diabetes mellitus) ([address]     Dyslipidemia     History of tobacco abuse     HTN (hypertension)     Hypothyroidism     Non-ST elevation MI (NSTEMI) ([facility]     Pacemaker 4/16/2015     Past Surgical History:   Procedure Laterality Date    CATARACT REMOVAL      MIDDLE EAR SURGERY Left 1998    \"they had to reset the bones\" after an infection     No Known Allergies    DATE ONSET: 9/24/21    Date of Evaluation: 9/24/2021   Evaluating Therapist: DALTON Richard      Recommended Diet and Intervention  Diet Solids Recommendation: Regular  Liquid Consistency Recommendation: Thin  Recommended Form of Meds: Whole with water          Compensatory Swallowing Strategies  Compensatory Swallowing Strategies: Alternate solids and liquids;Small bites/sips;Eat/Feed slowly;Upright as possible for all oral intake    Reason for Referral  Domenico Whitmore was referred for a bedside swallow evaluation to assess the efficiency of his swallow function, identify signs and symptoms of aspiration and make recommendations regarding safe dietary consistencies, effective compensatory strategies, and safe eating environment. General  Chart Reviewed: Yes  Behavior/Cognition: Alert; Doesn't follow directions; Requires cueing  Temperature Spikes Noted: No  Respiratory Status: Room air  O2 Device: None (Room air)  Follows Directions: Simple (SLP used pocket talker with patient and patient followed most of directions for OME and then wouldn't answer questions for SLE/Cog eval, but would respond to name when called)  Dentition: Adequate  Patient Positioning: Upright in chair  Consistencies Administered: Reg solid; Dysphagia Pureed (Dysphagia I); Thin    Current Diet level:  Current Diet : Regular  Current Liquid Diet : Thin    Oral Motor Deficits  Oral/Motor  Oral Motor:  Within functional limits    Oral Phase Dysfunction  Oral Phase  Oral Phase: WFL  Oral Phase  Oral Phase - Comment: Oral stage is [facility] with all consistencies tested with minimal oral residue noted with regular solids that was cleared with independent liquid

## 2021-09-24 NOTE — PROGRESS NOTES
New admit 80 y.o. male was transitioning from 2W, initially arriving to the hospital w/a Rt. Occipital Stroke. Pt's admission was completed, medication, reconciled, assessment completed and medication administered. During assessment Pt become emotional and teary eyed, when questioned, the Pt was not responsive. The Pt may benefit from an evaluation for depression. Note:Pt is extremely hard of hearing, and can only hear if one gets w/in inches of his left ear and speaks very loudly. --Additionally: Pt didn't eat any part of dinner, until this RN fed him, despite assessment indicating Pt hand grasp as moderate/strong grasp and full movements in upper and lower extremities (no deficiencies, or weakness).  Even then much of the food on the plate was not eaten

## 2021-09-24 NOTE — PROGRESS NOTES
Occupational Therapy   Occupational Therapy Initial Assessment  Date: 2021   Patient Name: Cali Salas  MRN: [id number]     : 3/5/1929    Date of Service: 2021    Discharge Recommendations:  Continue to assess pending progress       Assessment   Performance deficits / Impairments: Decreased functional mobility ; Decreased safe awareness;Decreased balance;Decreased ADL status; Decreased endurance;Decreased strength;Decreased fine motor control;Decreased cognition  Assessment: Pt is a 79 y/o male admitted to hospital with declin in function noted. Pt would benefit from continued OT services to increase independence with ADL self care and functional mobility  Prognosis: Good  Decision Making: Medium Complexity  History: 7  Exam: 8 deficits  Assistance / Modification: mod A  REQUIRES OT FOLLOW UP: Yes  Activity Tolerance  Activity Tolerance: Treatment limited secondary to medical complications (free text)  Safety Devices  Safety Devices in place: Yes  Type of devices: All fall risk precautions in place  Restraints  Initially in place: No           Patient Diagnosis(es): There were no encounter diagnoses. has a past medical history of Anemia, CAD (coronary artery disease), DM (diabetes mellitus) ([facility] Dyslipidemia, History of tobacco abuse, HTN (hypertension), Hypothyroidism, Non-ST elevation MI (NSTEMI) ([facility] [address] and Pacemaker. has a past surgical history that includes Cataract removal and Middle ear surgery (Left, ).            Restrictions  Restrictions/Precautions  Restrictions/Precautions: Fall Risk  Implants present? : Pacemaker    Subjective   General  Chart Reviewed: Yes  Family / Caregiver Present: No  Referring Practitioner: Dr Isaac Lopez  Diagnosis: Impaired mobility and ADL's due to right occipital stroke  Patient Currently in Pain: Denies  Vital Signs  Patient Currently in Pain: Denies  Social/Functional History  Social/Functional History  Lives With:  (grand daughter)  Type of Home: House  Home Layout: Two level, Bed/Bath upstairs, Able to Live on Main level with bedroom/bathroom  Home Access: Ramped entrance  Home Equipment: Rolling walker  ADL Assistance: Independent  Homemaking Assistance: Independent  Ambulation Assistance: Independent (no device)  Transfer Assistance: Independent  Active : No  Additional Comments: Pt unable to provide entire home set up secondary to communication       Objective   Vision: Within Functional Limits  Hearing Exceptions: Hard of hearing/hearing concerns;Right hearing aid    Orientation  Overall Orientation Status: Impaired  Orientation Level: Oriented to person  Observation/Palpation  Posture: Fair  Observation: mild flexed posture  Balance  Sitting Balance: Supervision  Standing Balance: Contact guard assistance  Toilet Transfers  Toilet - Technique: Stand step  Equipment Used: Grab bars  Toilet Transfer: Minimal assistance  Tub Transfers  Tub Transfers: Not tested  Shower Transfers  Shower - Transfer From: Wheelchair  Shower - Transfer Type: To and From  Shower - Transfer To: Shower seat with back  Shower - Technique: Stand pivot  Shower Transfers: Minimal assistance  ADL  Feeding: Setup  Grooming: Setup;Supervision  UE Bathing: Minimal assistance  LE Bathing: Minimal assistance  UE Dressing: Unable to assess(comment) (Gown only)  LE Dressing: Moderate assistance  Toileting: Unable to assess(comment)  Tone RUE  RUE Tone: Normotonic  Tone LUE  LUE Tone: Normotonic  Coordination  Movements Are Fluid And Coordinated: No  Coordination and Movement description: Decreased speed;Right UE;Left UE;Fine motor impairments     Bed mobility  Supine to Sit: Supervision  Transfers  Sit to stand: Contact guard assistance  Stand to sit: Contact guard assistance  Vision - Basic Assessment  Prior Vision: No visual deficits  Visual History: No significant visual history  Patient Visual Report: No visual complaint reported.   Visual Field Cut: No  Oculo Motor Control: WNL  Cognition  Overall Cognitive Status: Exceptions  Following Commands: Follows one step commands with repetition; Follows one step commands with increased time  Attention Span: Attends with cues to redirect  Memory: Decreased recall of biographical Information  Safety Judgement: Decreased awareness of need for safety  Problem Solving: Assistance required to generate solutions;Assistance required to implement solutions  Insights: Not aware of deficits  Initiation: Requires cues for some  Sequencing: Requires cues for some  Cognition Comment: Comp Max A, expression Min A, social Max A, problem Mod A, memory Mod A  Perception  Overall Perceptual Status: Impaired  Initiation: Cues to initiate tasks  Motor Planning: Hand over hand to sequence tasks     Sensation  Overall Sensation Status: WFL        LUE AROM (degrees)  LUE AROM : WFL  Left Hand AROM (degrees)  Left Hand AROM: WFL  RUE AROM (degrees)  RUE AROM : WFL  Right Hand AROM (degrees)  Right Hand AROM: WFL  LUE Strength  Gross LUE Strength: WFL  L Hand General: 4/5  RUE Strength  Gross RUE Strength: WFL  R Hand General: 4/5     Hand Dominance  Hand Dominance: Right             Plan   Plan  Times per week: 5-7x/wk  Plan weeks: 2 weeks  Current Treatment Recommendations: Strengthening, Endurance Training, Neuromuscular Re-education, Balance Training, Functional Mobility Training, Self-Care / ADL, Cognitive Reorientation, Safety Education & Training, Cognitive/Perceptual Training, Patient/Caregiver Education & Training    G-Code     OutComes Score                                                  AM-PAC Score             Goals  Long term goals  Time Frame for Long term goals : 5-7x/wk x 2 weeks  Long term goal 1: Pt within 2 weeks of initial evaluation will demonstrate progress in th following areas listed below to achieve specific LTG's as stated in the initial evaluation  Long term goal 2: Increase independence with ADL self caare  Long term goal 3:  Increase cognition for the safe completion of ADL  Long term goal 4: increase functional mobility independence for completion of ADL  Patient Goals   Patient goals : Not stated at this time     [x]  Patient will complete self care as followed using the recommended adaptive equipment and/or adaptive techniques as instructed:  Feeding: Mod I  Grooming: independent  Bathing: SBA  UE Dressing: Set up  LE Dressing: SBA  Toileting: supervision  Toilet Transfers: Supervision  Tub Transfers: Supervision     [x]  Patient will sequence self-care routine without verbal/tactile cues. [x]  Patient will improve static and dynamic standing balance to complete pants management at standing level     [x]  Patient will improve functional endurance to tolerate/complete 60 minutes of ADLs. [x]  Patient will improve bilateral UE strength and endurance to Fair+ in order to participate in self-care activities as projected. [x]  Patient will improve bilateral hand fine motor coordination to Good in order to manage clothing fasteners/self-care containers in a timely manner     [x]  Patient will improve visual perception to good in order to improve participation in self care and leisure activities     [x]  Patient will perform kitchen mobility at device level without episodes of LOB and good safety awareness      [x]  Patient will perform basic room mobility at least restrictive device level. [x]  Patient will access appropriate D/C site with as few architectural barriers as possible. [x]  Patient and/or caregiver will demonstrate understanding of recommended HEP for bilateral UE strength.         [x]  Patient's cognition will improve to safely perform ADLs:  Comprehension: Supervision  Expression: Supervision  Social Interaction: SUpervision  Problem Solving: Supervision  Memory: Supervision      Therapy Time   Individual Concurrent Group Co-treatment   Time In 0825         Time Out 1358         Minutes 60              Eval: 61 luis Stark, OTR/L Electronically signed by Leslye Ko, OTR/L on 0/74/85 at 12:06 PM EDT

## 2021-09-24 NOTE — PROGRESS NOTES
Physician Progress Note      PATIENTPrmerlene Gonzalez  [facility] #:                  [id number]  :                       3/5/1929  ADMIT DATE:       2021 2:19 PM  DISCH DATE:        2021 4:04 PM  RESPONDING  PROVIDER #:        Jenifer Ramirez CNP          QUERY TEXT:    Pt admitted with right occipital CVA and has encephalopathy documented. If   possible, please document in progress notes and discharge summary further   specificity regarding the type of encephalopathy:    The medical record reflects the following:  Risk Factors: CVA, hard of haring  Clinical Indicators: H&P: \"Poor historian, hard of hearing, Pt unsure of why   he is here. \";  Dr. Gage Duffy PN: \"Patient has been somnolent and borderline   lethargic today. still somewhat encephalopathic , aphasia\"; NN Neuro   assessment: Alert & Oriented x1, poor judgement, poor safety awareness,   impulsive, GCS 13-15;  NN: \"Patient is arouseable but very lethargic this   AM. Patient not eating breakfast and not taking pills at this time. Patient   just continues to sleep this AM\"  Treatment: Neuro consult, CT head, Stroke protocols, monitoring    Thank you,  Peggy Mann, RN BSN CDS  Options provided:  -- Metabolic encephalopathy due to hypotension  -- Encephalopathy due to CVA  -- Other - I will add my own diagnosis  -- Disagree - Not applicable / Not valid  -- Disagree - Clinically unable to determine / Unknown  -- Refer to Clinical Documentation Reviewer    PROVIDER RESPONSE TEXT:    This patient has encephalopathy due to CVA.     Query created by: Caleb Cobb on 2021 10:32 AM      Electronically signed by:  Jenifer Ramirez CNP 2021 9:10 AM

## 2021-09-24 NOTE — CARE COORDINATION
LSW called Shira (granddaughter) and left voicemail.  Electronically signed by Darylene Right, SOCO, VINCE on 9/24/2021 at 11:39 AM

## 2021-09-24 NOTE — CARE COORDINATION
Social/Functional Status:  Social/Functional History  Lives With:  (grand daughter)  Type of Home: House  Home Layout: Two level, Bed/Bath upstairs, Able to Live on Main level with bedroom/bathroom  Home Access: Ramped entrance  Home Equipment: Rolling walker  ADL Assistance: Independent  Homemaking Assistance: Independent  Ambulation Assistance: Independent (no device)  Transfer Assistance: Independent  Active : No  Additional Comments: Pt unable to provide entire home set up secondary to communication       Spoke with patient and explained role in the team. Patient is very [facility] and LSW communicated through writing. LSW then called granddaughter (Waldemar Burnette) to discuss PLOF. Patient's support system consists of Shira (granddaughter) and her family, Gabrielle Graham (), a dtr in [place], and a son that lives next door. Patient has a dtr that is  (Shira's mother) and a son that passed away at the age of 15 when he drowned in [place]. Shira reported that patient lives with her, her significant other, and three children. Patient's son (paraplegic) lives next door and a \"bridge\" connects the home. Waldemar Burnette works FT as the caretaker for patient's son. Shira reported that patient's son was repairing his home's roof in  and the ladder was on a hill and was not braced properly, the ladder then fell when he was climbing down and he landed on a pile of 4x4's which broke his back and paralyzed him. Patient was the caregiver for this son from [id number] until Waldemar Burnette took over. Patient has a wheeled walker, O2, cane, nebulizer, shower chair, and grab bars in the shower. Shira explained that patient received O2 about four years ago when he was diagnosed with COPD and PNA but then stopped using it up until most recent hospitalization and was on 2.5 L. Waldemar Burnette also reported that patient did not use any AD when ambulating prior to hospitalization and only used it for a short period this last March.  Shira stated that patient was completely independent with ADL's and most IADL's. Patient cooks breakfast for himself and Shira cooks dinner for him. Shira would provide reminders for him to shower, and organizes his pill box and reminds him when to take meds. Shira explained that patient was alert and oriented x 4 prior to hospitalization and that she did not notice any cognitive deficits, however she noted that it would take him some time to get words out.  Electronically signed by Darylene Right, MSW, VINCE on 9/24/2021 at 5:54 PM

## 2021-09-25 LAB
GLUCOSE BLD-MCNC: 100 MG/DL (ref 60–115)
GLUCOSE BLD-MCNC: 108 MG/DL (ref 60–115)
GLUCOSE BLD-MCNC: 132 MG/DL (ref 60–115)
GLUCOSE BLD-MCNC: 198 MG/DL (ref 60–115)
PERFORMED ON: ABNORMAL
PERFORMED ON: ABNORMAL
PERFORMED ON: NORMAL
PERFORMED ON: NORMAL

## 2021-09-25 PROCEDURE — 97535 SELF CARE MNGMENT TRAINING: CPT

## 2021-09-25 PROCEDURE — 6370000000 HC RX 637 (ALT 250 FOR IP): Performed by: PHYSICAL MEDICINE & REHABILITATION

## 2021-09-25 PROCEDURE — 94640 AIRWAY INHALATION TREATMENT: CPT

## 2021-09-25 PROCEDURE — 94761 N-INVAS EAR/PLS OXIMETRY MLT: CPT

## 2021-09-25 PROCEDURE — 6370000000 HC RX 637 (ALT 250 FOR IP): Performed by: INTERNAL MEDICINE

## 2021-09-25 PROCEDURE — 97110 THERAPEUTIC EXERCISES: CPT

## 2021-09-25 PROCEDURE — 1180000000 HC REHAB R&B

## 2021-09-25 PROCEDURE — 97116 GAIT TRAINING THERAPY: CPT

## 2021-09-25 RX ORDER — AMMONIUM LACTATE 12 G/100G
LOTION TOPICAL 2 TIMES DAILY
Status: DISCONTINUED | OUTPATIENT
Start: 2021-09-25 | End: 2021-10-02 | Stop reason: HOSPADM

## 2021-09-25 RX ADMIN — BUDESONIDE AND FORMOTEROL FUMARATE DIHYDRATE 2 PUFF: 80; 4.5 AEROSOL RESPIRATORY (INHALATION) at 04:00

## 2021-09-25 RX ADMIN — BUDESONIDE AND FORMOTEROL FUMARATE DIHYDRATE 2 PUFF: 80; 4.5 AEROSOL RESPIRATORY (INHALATION) at 16:02

## 2021-09-25 RX ADMIN — APIXABAN 5 MG: 5 TABLET, FILM COATED ORAL at 06:55

## 2021-09-25 RX ADMIN — CARVEDILOL 6.25 MG: 6.25 TABLET, FILM COATED ORAL at 18:32

## 2021-09-25 RX ADMIN — Medication 100 MG: at 06:55

## 2021-09-25 RX ADMIN — APIXABAN 5 MG: 5 TABLET, FILM COATED ORAL at 20:26

## 2021-09-25 RX ADMIN — CARVEDILOL 6.25 MG: 6.25 TABLET, FILM COATED ORAL at 06:54

## 2021-09-25 RX ADMIN — Medication: at 20:26

## 2021-09-25 RX ADMIN — ATORVASTATIN CALCIUM 40 MG: 40 TABLET, FILM COATED ORAL at 20:26

## 2021-09-25 RX ADMIN — FOLIC ACID 1 MG: 1 TABLET ORAL at 06:54

## 2021-09-25 RX ADMIN — FERROUS SULFATE TAB 325 MG (65 MG ELEMENTAL FE) 325 MG: 325 (65 FE) TAB at 06:55

## 2021-09-25 RX ADMIN — ASPIRIN 81 MG: 81 TABLET, COATED ORAL at 06:55

## 2021-09-25 RX ADMIN — Medication 30 MG: at 06:55

## 2021-09-25 RX ADMIN — RAMIPRIL 5 MG: 5 CAPSULE ORAL at 06:54

## 2021-09-25 RX ADMIN — Medication 2000 UNITS: at 18:33

## 2021-09-25 ASSESSMENT — PAIN SCALES - GENERAL: PAINLEVEL_OUTOF10: 0

## 2021-09-25 NOTE — PROGRESS NOTES
Patient was referred for assessment of mental status. The patient was laying in bed sleeping. I was able to wake him. His eyes were open briefly, but he did not speak at all. I asked him several times what his name was, and he did not respond. I wrote the question on paper. He held the paper and looked at the paper intently, but he did not speak. I reviewed the assessment by the speech/language pathologist from yesterday-     \"Diagnosis: Limited evaluation secondary to patient is very hard of hearing. Patient had pocket talker in place. ST provided repetition and slow rate of speech to improve comprehension. Moderate cognitive-linguistic deficits were noted in the areas of STM, working memory, recall, problem solving and verbal reasoning. ST unsure if assesment was accurate secondary to lack of motivation and reduced hearing. ST to continue to assess during therapy to determine cognitive needs. Patient presented with limited length utterances, poor initiation and often stated \"I don't know. \"    A/P- Patient did not speak when seen briefly this morning. He may well be aphasic, but further evaluation is needed.

## 2021-09-25 NOTE — PROGRESS NOTES
Physical Therapy  Physical Therapy Rehab Treatment Note  Facility/Department: [facility]  [facility]       NAME: Miguel Tierney  : 3/5/1929 (89 y.o.)  MRN: [id number]  CODE STATUS: Full Code    Date of Service: 2021  Chart Reviewed: Yes  Family / Caregiver Present: No    Restrictions:  Restrictions/Precautions: Fall Risk       SUBJECTIVE: Subjective: \"Okay. \"  Pain Screening  Patient Currently in Pain: Denies  Pre Treatment Pain Screening  Pain at present: 0  Intervention List: Patient able to continue with treatment    Post Treatment Pain Screening:  Pain Assessment  Pain Level: 0    OBJECTIVE:     Bed mobility  Rolling to Left: Independent  Supine to Sit: Independent  Sit to Supine: Independent  Scooting: Independent  Comment: HOB slightly elevated d/t pt impulsive and unwilling to wait for [facility] to become flat. Transfers  Sit to Stand: Stand by assistance  Stand to sit: Stand by assistance  Comment: No LOB. Poor follow through of cues for safety and improving technique. Multiple trsfs performed to improve technique and carryover. Ambulation  Ambulation?: Yes  More Ambulation?: Yes  Ambulation 1  Surface: carpet  Device: Hand-Held Assist  Assistance: Contact guard assistance;Minimal assistance  Quality of Gait: shortened step length, decreased wilmer foot clearance, mild instability with turns  Distance: 50'  Ambulation 2  Surface - 2: carpet  Device 2: Rolling Walker  Assistance 2: Contact guard assistance  Gait Deviations: Slow [place];Decreased step length;Decreased step height  Distance: 61'  Comments: improved stability with [employer], tc's for navigating ww    Exercises  Hamstring Sets: curls RTB x20  Hip Flexion: x 20  Hip Abduction: MRE x20 / Hip ADD MRE x20  Knee Long Arc Quad: x 20  Ankle Pumps: x 20     ASSESSMENT/PROGRESS TOWARDS GOALS:  Assessment: Pt exhibits decreased safety with approach to chair with and without device.  Without device pt goes to side of chair and side steps way to front while holding on to chair, with WW, pt leaves WW to side and continues to approach chair without. Despite vc/tc's, pt unable to be redirected d/t [place]. Increased time needed for tc's throughout tx. Goals:  Long term goals  Long term goal 1: indep bed mobility  Long term goal 2: indep bed and car trnasfers  Long term goal 3: indep gait 50 feet in familiar and protected environment  Long term goal 4: supervision for 4 stairs  Long term goal 5: CGA gait 150 feet or greater    PLAN OF CARE/Safety:   Plan Comment: Cont.  POC      Therapy Time:   Individual   Time In 0900   Time Out 1000   Minutes 60     Minutes:      Transfer/Bed mobility trainin      Gait training: 15      Neuro re education: 0     Therapeutic ex: [address], [facility] [place], 21 at 9:56 AM

## 2021-09-25 NOTE — PROGRESS NOTES
[facility] Occupational Therapy      Date: 2021  Patient Name: Domenico Whitmore        MRN: [id number]  Account: [de-identified]   : 3/5/1929  (80 y.o.)  Room: [place] [address]    Chart reviewed, attempted OT at 13:00 for 60 minutes. Patient not seen 2° to:    Pt. declined, stating: \"No\". Patient then closed eyes and ignored therapist.    [facility] to YESI Padilla RN aware. Will attempt again when able.     Electronically signed by LEONCIO Arias on 2021 at 1:41 PM

## 2021-09-25 NOTE — PROGRESS NOTES
Subjective: The patient complains of moderate acute on chronic progressive fatigue and a field cut secondary to the right occipital CVA partially relieved by rest, PT, OT and meds and exacerbated by exertion and recent illness. ROS x10: The patient also complains of severely impaired mobility and activities of daily living. Otherwise no new problems with vision, hearing, nose, mouth, throat, dermal, cardiovascular, GI, , pulmonary, musculoskeletal, psychiatric or neurological. See Rehab consult on Rehab chart . Vital signs:  BP (!) 117/57   Pulse 62   Temp 97.7 °F (36.5 °C) (Oral)   Resp 18   Ht 5' 5.98\" (1.676 m)   Wt 149 lb 0.5 oz (67.6 kg)   SpO2 95%   BMI 24.07 kg/m²   I/O:   PO/Intake:    fair PO intake,   four carb regular consistency thin liquid diet    Bowel/Bladder:   continent,    General:  Patient is well developed, adequately nourished, non-obese and     well kempt. HEENT:    PERRLA, hearing intact to loud voice, external inspection of ear     and nose benign. Inspection of lips, tongue and gums benign  Musculoskeletal: No significant change in strength or tone. All joints stable. Inspection and palpation of digits and nails show no clubbing,       cyanosis or inflammatory conditions. Neuro/Psychiatric: Affect: flat-  Alert and oriented to self and     Situation with  min cues. No significant change in deep tendon reflexes or     sensation  Lungs:  Diminished, CTA-B. Respiration effort is normal at rest.  BUTT  Heart:   S1 = S2,   RRR. No loud murmurs. Abdomen:  Soft, non-tender, no enlargement of liver or spleen. Extremities:  No significant lower extremity edema or tenderness. Skin:    BUE bruises dt blood draws, no visualized or palpated problems.     Rehabilitation:  Physical therapy: FIMS:  Bed Mobility: Scooting: Independent    Transfers: Sit to Stand: Stand by assistance  Stand to sit: Stand by assistance  Bed to Chair: Contact guard assistance, Ambulation 1  Surface: carpet  Device: Hand-Held Assist  Assistance: Contact guard assistance, Minimal assistance  Quality of Gait: shortened step length, decreased wilmer foot clearance, mild instability with turns  Distance: 48'  Comments: Pt ambulated 30 feet with ww with SBA, Stairs  # Steps : 4  Stairs Height: 6\"  Assistance: Stand by assistance, Contact guard assistance  Comment: non - reciprocal pattern chosen by pt - pt has ramp entry    FIMS:  ,  , Assessment: Pt exhibits decreased safety with approach to chair with and without device. Without device pt goes to side of chair and side steps way to front while holding on to chair, with WW, pt leaves WW to side and continues to approach chair without. Despite vc/tc's, pt unable to be redirected d/t [place]. Increased time needed for tc's throughout tx. Occupational therapy: FIMS:   ,  , Assessment: Pt is a 79 y/o male admitted to hospital with declin in function noted. Pt would benefit from continued OT services to increase independence with ADL self care and functional mobility    Speech therapy: FIMS:      SPEECH THERAPY  Motor Speech: Within Functional Limits  Comprehension: Exceptions  Verbal Expression: Exceptions to functional limits      Diet/Swallow:  Diet Solids Recommendation: Regular  Liquid Consistency Recommendation: Thin  Dysphagia Outcome Severity Scale: Level 7: Normal in all situations    Compensatory Swallowing Strategies:  Alternate solids and liquids, Small bites/sips, Eat/Feed slowly, Upright as possible for all oral intake             COGNITION  OT: Cognition Comment: Comp Max A, expression Min A, social Max A, problem Mod A, memory Mod A  SP:Memory: Exceptions to [facility]  Problem Solving: Exceptions to [facility]       Lab/X-ray studies reviewed, analyzed and discussed with patient and staff:   Recent Results (from the past 24 hour(s))   POCT Glucose    Collection Time: 09/24/21  8:04 PM   Result Value Ref Range    POC Glucose 209 (H) 60 - 115 mg/dl    Performed on ACCU-CHEK    POCT Glucose    Collection Time: 09/25/21  6:43 AM   Result Value Ref Range    POC Glucose 100 60 - 115 mg/dl    Performed on ACCU-CHEK    POCT Glucose    Collection Time: 09/25/21 11:24 AM   Result Value Ref Range    POC Glucose 108 60 - 115 mg/dl    Performed on ACCU-CHEK    POCT Glucose    Collection Time: 09/25/21  4:36 PM   Result Value Ref Range    POC Glucose 198 (H) 60 - 115 mg/dl    Performed on ACCU-CHEK        Previous extensive, complex labs, notes and diagnostics reviewed and analyzed. ALLERGIES:    Allergies as of 09/23/2021    (No Known Allergies)      (please also verify by checking [facility])     Complex Physical Medicine & Rehab Issues Assess & Plan:   1. Severe abnormality of gait and mobility and impaired self-care and ADL's secondary to progressive acute CVA. Functional and medical status reassessed regarding patients ability to participate in therapies and patient found to be able to participate in  acute intensive comprehensive inpatient rehabilitation program including PT/OT to improve balance, ambulation, ADLs, and to improve the P/AROM. It is my opinion that they will be able to tolerate 3 hours of therapy a day and benefit from it at an acute level. 2. Bowel constipation and Bladder dysfunction overactive bladder:  frequent toileting, ambulate to bathroom with assistance, check post void residuals. Check for C.difficile x1 if >2 loose stools in 24 hours, continue bowel & bladder program.  Monitor for UTI symptoms including lethargy and confusion  3. Moderate LBP and generalized OA pain: reassess pain every shift and prior to and after each therapy session, give prn  Tylenol, modalities prn in therapy, consider Lidoderm, K-pad prn.   4. Skin breakdown risk:  continue pressure relief program.  Daily skin exams and reports from nursing. 5. Severe fatigue due to immobility and nutritional deficits:  Add vitamin B12 vitamin D and CoQ10 titrate dosing and add protein supplementation with low carb content. 6. Complex discharge planning:    [facility] denial for acute rehab- I will do peer to peer per family's urging. Complex Active General Medical Issues that complicate care Assess & Plan:     1. Principal Problem:    Impaired gait and mobility dt R occipital CVA  Active Problems:    HTN (hypertension)    DM (diabetes mellitus) ([facility])    Hypothyroidism    CAD (coronary artery disease)    COPD exacerbation ([facility])    PVD (peripheral vascular disease) ([facility])    Gait abnormality dt PVD--right femoral artery occlusion    Chronic right shoulder pain    Acute CVA (cerebrovascular accident) ([address]    Aphasia    Abnormality of gait and mobility    Neurogenic bladder    Bilateral hearing loss    Left hemiparesis ([address]  Resolved Problems:    * No resolved hospital problems.  SHUKRI Joiner MD.THUAN., PM&R     Attending    [facility] Alka Moura

## 2021-09-25 NOTE — FLOWSHEET NOTE
Patient assessment is complete. Patient is non-verbal most of the day. He did do his PT this morning but refused OT. RN talked with his dtr, Reyna Adamson, regarding his progress.

## 2021-09-25 NOTE — PROGRESS NOTES
Therapist introduced self and asked patient to state his name and patient did not respond. Patient would flip at therapist, look out the window or close his eyes. After multiple attempts, therapist spoke with [facility]. RN stated that patient had been given a hearing device while in the hospital. Therapist retrieved linens and set up ADL in patients bathroom. Therapist provided patient with the hearing device. Patient placed the ear buds in his ears. Therapist retrieved patients clothing from the closet. Therapist held up a pair of shorts and a pair of pants for patient to choose from. Patient looked at items and eventually shrugged his shoulders. Therapist picked clothes for patient and placed them in the bathroom. Therapist turned off patients bed alarm and removed the covers. Patient did not move. Therapist spoke directly into hearing device ear buds stating that it was time to get up and dressed. Patient did not move. Therapist repeated this multiple times with no response. Therapist wrote the information on a legal paid in large letters. Patient read over the paper and stated \"I don't know\". Therapist encouraged patient and made multiple attempts to get patient out of bed without success. Therapist covered patient back up, turned on bed alarm and placed table tray in patients reach. Patient left supine in bed with call light in reach, bed alarm on and all needs met. Caterina Pedraza RN notified that patient did not get out of bed or complete ADL tasks.         Plan   Plan  Times per week: 5-7x/wk  Plan weeks: 2 weeks  Current Treatment Recommendations: Strengthening, Endurance Training, Neuromuscular Re-education, Balance Training, Functional Mobility Training, Self-Care / ADL, Cognitive Reorientation, Safety Education & Training, Cognitive/Perceptual Training, Patient/Caregiver Education & Training    Continue per OT POC     Goals  Long term goals  Time Frame for Long term goals : 5-7x/wk x 2 weeks  Long term goal 1: Pt within 2 weeks of initial evaluation will demonstrate progress in th following areas listed below to achieve specific LTG's as stated in the initial evaluation  Long term goal 2: Increase independence with ADL self caare  Long term goal 3:  Increase cognition for the safe completion of ADL  Long term goal 4: increase functional mobility independence for completion of ADL  Patient Goals   Patient goals : Not stated at this time       Therapy Time   Individual Concurrent Group Co-treatment   Time In 0800         Time Out 0825         Minutes 25             ADL/IADL trainin minutes     Electronically signed by LEONCIO Honeycutt on 21 at 11:26 AM EDT    LEONCIO Honeycutt

## 2021-09-26 LAB
GLUCOSE BLD-MCNC: 108 MG/DL (ref 60–115)
GLUCOSE BLD-MCNC: 112 MG/DL (ref 60–115)
GLUCOSE BLD-MCNC: 138 MG/DL (ref 60–115)
GLUCOSE BLD-MCNC: 171 MG/DL (ref 60–115)
PERFORMED ON: ABNORMAL
PERFORMED ON: ABNORMAL
PERFORMED ON: NORMAL
PERFORMED ON: NORMAL

## 2021-09-26 PROCEDURE — 1180000000 HC REHAB R&B

## 2021-09-26 PROCEDURE — 94761 N-INVAS EAR/PLS OXIMETRY MLT: CPT

## 2021-09-26 PROCEDURE — 6370000000 HC RX 637 (ALT 250 FOR IP): Performed by: INTERNAL MEDICINE

## 2021-09-26 PROCEDURE — 6370000000 HC RX 637 (ALT 250 FOR IP): Performed by: PHYSICAL MEDICINE & REHABILITATION

## 2021-09-26 PROCEDURE — 94760 N-INVAS EAR/PLS OXIMETRY 1: CPT

## 2021-09-26 PROCEDURE — 94640 AIRWAY INHALATION TREATMENT: CPT

## 2021-09-26 RX ADMIN — CARVEDILOL 6.25 MG: 6.25 TABLET, FILM COATED ORAL at 17:02

## 2021-09-26 RX ADMIN — BUDESONIDE AND FORMOTEROL FUMARATE DIHYDRATE 2 PUFF: 80; 4.5 AEROSOL RESPIRATORY (INHALATION) at 15:57

## 2021-09-26 RX ADMIN — APIXABAN 5 MG: 5 TABLET, FILM COATED ORAL at 09:14

## 2021-09-26 RX ADMIN — APIXABAN 5 MG: 5 TABLET, FILM COATED ORAL at 21:44

## 2021-09-26 RX ADMIN — ATORVASTATIN CALCIUM 40 MG: 40 TABLET, FILM COATED ORAL at 21:44

## 2021-09-26 RX ADMIN — ASPIRIN 81 MG: 81 TABLET, COATED ORAL at 09:14

## 2021-09-26 RX ADMIN — FOLIC ACID 1 MG: 1 TABLET ORAL at 09:14

## 2021-09-26 RX ADMIN — Medication: at 09:15

## 2021-09-26 RX ADMIN — FERROUS SULFATE TAB 325 MG (65 MG ELEMENTAL FE) 325 MG: 325 (65 FE) TAB at 09:14

## 2021-09-26 RX ADMIN — Medication 2000 UNITS: at 17:02

## 2021-09-26 RX ADMIN — Medication 30 MG: at 09:14

## 2021-09-26 RX ADMIN — Medication: at 21:47

## 2021-09-26 RX ADMIN — RAMIPRIL 5 MG: 5 CAPSULE ORAL at 09:14

## 2021-09-26 RX ADMIN — CARVEDILOL 6.25 MG: 6.25 TABLET, FILM COATED ORAL at 09:14

## 2021-09-26 RX ADMIN — BUDESONIDE AND FORMOTEROL FUMARATE DIHYDRATE 2 PUFF: 80; 4.5 AEROSOL RESPIRATORY (INHALATION) at 05:42

## 2021-09-26 RX ADMIN — Medication 100 MG: at 09:14

## 2021-09-26 ASSESSMENT — PAIN SCALES - GENERAL: PAINLEVEL_OUTOF10: 0

## 2021-09-26 NOTE — PROGRESS NOTES
Assessment completed. A&O to self. Pt [place] so unable to determine if alert to place and time. Denies pain at the time. Atrial paced, 60 on tele. Pt refused breakfast, shakes his head no when asked if wanting to eat and show in breakfast tray. Avasys in room for safety. In bed with alarm activated. Call light in reach.  Electronically signed by Neo Agudelo LPN on 8/27/1854 at 18:85 AM

## 2021-09-26 NOTE — PROGRESS NOTES
Subjective: The patient complains of moderate acute on chronic progressive fatigue and a field cut secondary to the right occipital CVA partially relieved by rest, PT, OT and meds and exacerbated by exertion and recent illness. ROS x10: The patient also complains of severely impaired mobility and activities of daily living. Otherwise no new problems with vision, hearing, nose, mouth, throat, dermal, cardiovascular, GI, , pulmonary, musculoskeletal, psychiatric or neurological. See Rehab consult on Rehab chart . Vital signs:  BP (!) 125/50   Pulse 60   Temp 98.1 °F (36.7 °C)   Resp 17   Ht 5' 5.98\" (1.676 m)   Wt 149 lb 0.5 oz (67.6 kg)   SpO2 92%   BMI 24.07 kg/m²   I/O:   PO/Intake:    fair PO intake,   four carb regular consistency thin liquid diet    Bowel/Bladder:   continent,    General:  Patient is well developed, adequately nourished, non-obese and     well kempt. HEENT:    PERRLA, hearing intact to loud voice, external inspection of ear     and nose benign. Inspection of lips, tongue and gums benign  Musculoskeletal: No significant change in strength or tone. All joints stable. Inspection and palpation of digits and nails show no clubbing,       cyanosis or inflammatory conditions. Neuro/Psychiatric: Affect: flat-  Alert and oriented to self and     Situation with  min cues. No significant change in deep tendon reflexes or     sensation  Lungs:  Diminished, CTA-B. Respiration effort is normal at rest.  BUTT  Heart:   S1 = S2,   RRR. No loud murmurs. Abdomen:  Soft, non-tender, no enlargement of liver or spleen. Extremities:  No significant lower extremity edema or tenderness. Skin:    BUE bruises dt blood draws, no visualized or palpated problems.     Rehabilitation:  Physical therapy: FIMS:  Bed Mobility: Scooting: Independent    Transfers: Sit to Stand: Stand by assistance  Stand to sit: Stand by assistance  Bed to Chair: Contact guard assistance, Ambulation 1  Surface: carpet  Device: Hand-Held Assist  Assistance: Contact guard assistance, Minimal assistance  Quality of Gait: shortened step length, decreased wilmer foot clearance, mild instability with turns  Distance: 48'  Comments: Pt ambulated 30 feet with ww with SBA, Stairs  # Steps : 4  Stairs Height: 6\"  Assistance: Stand by assistance, Contact guard assistance  Comment: non - reciprocal pattern chosen by pt - pt has ramp entry    FIMS:  ,  , Assessment: Pt exhibits decreased safety with approach to chair with and without device. Without device pt goes to side of chair and side steps way to front while holding on to chair, with WW, pt leaves WW to side and continues to approach chair without. Despite vc/tc's, pt unable to be redirected d/t [place]. Increased time needed for tc's throughout tx. Occupational therapy: FIMS:   ,  , Assessment: Pt is a 79 y/o male admitted to hospital with declin in function noted. Pt would benefit from continued OT services to increase independence with ADL self care and functional mobility    Speech therapy: FIMS:      SPEECH THERAPY  Motor Speech: Within Functional Limits  Comprehension: Exceptions  Verbal Expression: Exceptions to functional limits      Diet/Swallow:  Diet Solids Recommendation: Regular  Liquid Consistency Recommendation: Thin  Dysphagia Outcome Severity Scale: Level 7: Normal in all situations    Compensatory Swallowing Strategies:  Alternate solids and liquids, Small bites/sips, Eat/Feed slowly, Upright as possible for all oral intake             COGNITION  OT: Cognition Comment: Comp Max A, expression Min A, social Max A, problem Mod A, memory Mod A  SP:Memory: Exceptions to [facility]  Problem Solving: Exceptions to [facility]       Lab/X-ray studies reviewed, analyzed and discussed with patient and staff:   Recent Results (from the past 24 hour(s))   POCT Glucose    Collection Time: 09/25/21  4:36 PM   Result Value Ref Range    POC Glucose 198 (H) 60 - 115 mg/dl    Performed on ACCU-CHEK    POCT Glucose    Collection Time: 09/25/21  8:22 PM   Result Value Ref Range    POC Glucose 132 (H) 60 - 115 mg/dl    Performed on ACCU-CHEK    POCT Glucose    Collection Time: 09/26/21  5:57 AM   Result Value Ref Range    POC Glucose 112 60 - 115 mg/dl    Performed on ACCU-CHEK    POCT Glucose    Collection Time: 09/26/21 11:37 AM   Result Value Ref Range    POC Glucose 108 60 - 115 mg/dl    Performed on ACCU-CHEK        Previous extensive, complex labs, notes and diagnostics reviewed and analyzed. ALLERGIES:    Allergies as of 09/23/2021    (No Known Allergies)      (please also verify by checking [facility])     Complex Physical Medicine & Rehab Issues Assess & Plan:   1. Severe abnormality of gait and mobility and impaired self-care and ADL's secondary to progressive acute CVA. Functional and medical status reassessed regarding patients ability to participate in therapies and patient found to be able to participate in  acute intensive comprehensive inpatient rehabilitation program including PT/OT to improve balance, ambulation, ADLs, and to improve the P/AROM. It is my opinion that they will be able to tolerate 3 hours of therapy a day and benefit from it at an acute level. 2. Bowel constipation and Bladder dysfunction overactive bladder:  frequent toileting, ambulate to bathroom with assistance, check post void residuals. Check for C.difficile x1 if >2 loose stools in 24 hours, continue bowel & bladder program.  Monitor for UTI symptoms including lethargy and confusion  3. Moderate LBP and generalized OA pain: reassess pain every shift and prior to and after each therapy session, give prn  Tylenol, modalities prn in therapy, consider Lidoderm, K-pad prn.   4. Skin breakdown risk:  continue pressure relief program.  Daily skin exams and reports from nursing. 5. Severe fatigue due to immobility and nutritional deficits:  Add vitamin B12 vitamin D and CoQ10 titrate dosing and add protein supplementation with low carb content. 6. Complex discharge planning:    SANJANA Meyers's denial for acute rehab- I will do peer to peer per family's urging. Complex Active General Medical Issues that complicate care Assess & Plan:     1. Principal Problem:    Impaired gait and mobility dt R occipital CVA  Active Problems:    HTN (hypertension)    DM (diabetes mellitus) ([facility])    Hypothyroidism    CAD (coronary artery disease)    COPD exacerbation ([facility])    PVD (peripheral vascular disease) ([facility])    Gait abnormality dt PVD--right femoral artery occlusion    Chronic right shoulder pain    Acute CVA (cerebrovascular accident) ([address]    Aphasia    Abnormality of gait and mobility    Neurogenic bladder    Bilateral hearing loss    Left hemiparesis ([address]  Resolved Problems:    * No resolved hospital problems.  Devoria Francisco MD Annette Heimlich, D.O., PM&R     Attending    [address]

## 2021-09-26 NOTE — PROGRESS NOTES
Pt in bed sleeping most of evening. Very [place]. Call light in reach and bed alarm on. Avasys in room. Can be impulsive. Telemetry on and shows Atrial paced rhythm per monitor tech. One touch at hs was 132. No insulin required at hs. Electronically signed by Leeanne Stephenson on 9/26/2021 at 12:50 AM

## 2021-09-26 NOTE — PROGRESS NOTES
Physical Therapy  Facility/Department: [facility] MED SURG X842/P300-96  Physical Therapy Discharge      NAME: Willie Guzman    : 3/5/1929 (80 y.o.)  MRN: [id number]    Account: [de-identified]  Gender: male      Patient has been discharged from acute care hospital. DC patient from current PT program.      Electronically signed by Ravi Casey PT on 21 at 12:53 PM EDT

## 2021-09-27 LAB
GLUCOSE BLD-MCNC: 102 MG/DL (ref 60–115)
GLUCOSE BLD-MCNC: 120 MG/DL (ref 60–115)
GLUCOSE BLD-MCNC: 162 MG/DL (ref 60–115)
GLUCOSE BLD-MCNC: 253 MG/DL (ref 60–115)
PERFORMED ON: ABNORMAL
PERFORMED ON: NORMAL

## 2021-09-27 PROCEDURE — 97129 THER IVNTJ 1ST 15 MIN: CPT

## 2021-09-27 PROCEDURE — 97530 THERAPEUTIC ACTIVITIES: CPT

## 2021-09-27 PROCEDURE — 6370000000 HC RX 637 (ALT 250 FOR IP): Performed by: INTERNAL MEDICINE

## 2021-09-27 PROCEDURE — 99233 SBSQ HOSP IP/OBS HIGH 50: CPT | Performed by: PHYSICAL MEDICINE & REHABILITATION

## 2021-09-27 PROCEDURE — 97130 THER IVNTJ EA ADDL 15 MIN: CPT

## 2021-09-27 PROCEDURE — 94640 AIRWAY INHALATION TREATMENT: CPT

## 2021-09-27 PROCEDURE — 97116 GAIT TRAINING THERAPY: CPT

## 2021-09-27 PROCEDURE — 97110 THERAPEUTIC EXERCISES: CPT

## 2021-09-27 PROCEDURE — 97535 SELF CARE MNGMENT TRAINING: CPT

## 2021-09-27 PROCEDURE — 1180000000 HC REHAB R&B

## 2021-09-27 PROCEDURE — 97533 SENSORY INTEGRATION: CPT

## 2021-09-27 PROCEDURE — 6370000000 HC RX 637 (ALT 250 FOR IP): Performed by: PHYSICAL MEDICINE & REHABILITATION

## 2021-09-27 PROCEDURE — 94761 N-INVAS EAR/PLS OXIMETRY MLT: CPT

## 2021-09-27 RX ORDER — LIDOCAINE 4 G/G
3 PATCH TOPICAL DAILY PRN
Status: DISCONTINUED | OUTPATIENT
Start: 2021-09-27 | End: 2021-10-02 | Stop reason: HOSPADM

## 2021-09-27 RX ADMIN — Medication: at 21:55

## 2021-09-27 RX ADMIN — Medication 30 MG: at 11:17

## 2021-09-27 RX ADMIN — CARVEDILOL 6.25 MG: 6.25 TABLET, FILM COATED ORAL at 16:47

## 2021-09-27 RX ADMIN — BUDESONIDE AND FORMOTEROL FUMARATE DIHYDRATE 2 PUFF: 80; 4.5 AEROSOL RESPIRATORY (INHALATION) at 05:50

## 2021-09-27 RX ADMIN — ATORVASTATIN CALCIUM 40 MG: 40 TABLET, FILM COATED ORAL at 21:54

## 2021-09-27 RX ADMIN — RAMIPRIL 5 MG: 5 CAPSULE ORAL at 08:41

## 2021-09-27 RX ADMIN — APIXABAN 5 MG: 5 TABLET, FILM COATED ORAL at 08:41

## 2021-09-27 RX ADMIN — APIXABAN 5 MG: 5 TABLET, FILM COATED ORAL at 21:54

## 2021-09-27 RX ADMIN — Medication 2000 UNITS: at 16:47

## 2021-09-27 RX ADMIN — CARVEDILOL 6.25 MG: 6.25 TABLET, FILM COATED ORAL at 08:41

## 2021-09-27 RX ADMIN — ASPIRIN 81 MG: 81 TABLET, COATED ORAL at 08:41

## 2021-09-27 RX ADMIN — FERROUS SULFATE TAB 325 MG (65 MG ELEMENTAL FE) 325 MG: 325 (65 FE) TAB at 08:41

## 2021-09-27 RX ADMIN — BUDESONIDE AND FORMOTEROL FUMARATE DIHYDRATE 2 PUFF: 80; 4.5 AEROSOL RESPIRATORY (INHALATION) at 16:03

## 2021-09-27 RX ADMIN — FOLIC ACID 1 MG: 1 TABLET ORAL at 08:41

## 2021-09-27 RX ADMIN — Medication 100 MG: at 08:41

## 2021-09-27 ASSESSMENT — PAIN SCALES - GENERAL
PAINLEVEL_OUTOF10: 0

## 2021-09-27 NOTE — PROGRESS NOTES
INDIVIDUALIZED OVERALL REHAB PLAN OF CARE  ADDENDUM TO REHAB PROGRESS NOTE-for audit purposes must also refer to this day's clinical note and combine the information      Date: 2021  Patient Name: Ardyce Cheadle   Room: U560/E576-63    MRN: [id number]    : 3/5/1929  (80 y.o.)  Gender: male       Today 2021 during weekly team meeting, I reviewed the patient Ardyce Cheadle in detail with the therapists and nurses involved in patient's care gathering complex physiatric data regarding current medical issues, progress in therapies, factors limiting progress, social issues, psychological issues, ongoing therapeutic plans and discharge planning. Legend:  I= independent Im =Modified independent  S=Supervised SB=stand by DE SOUZA=set up CG=contact ricardo Min= minimal Mod=Moderate Max=maximal Max of 2 =maximal assist of 2 people      CURRENT FUNCTIONAL STATUS:    NURSING ISSUES:     Pts family was here in room and pt was laying supine in bed. Daughter stated that pt looked sob and she was worried. 02 sat was checked and was 94% on room air. HOB elevated and pt was put on 1 liter n/c of 02 just for comfort. Pt appeared to become more calm and restful with 02 on. No further distress noted. BP was 144/60 and HR was 61. Pt did have large BM on evenings. Avasys in room Call light in reach and bed alarm on. ... Pt  Sat up on side of bed making alarm go off. Pt assisted to bathroom with contact quard assist. Pt had large bm when up. .No complaints of sob . 02 is now off. Pt stable  Nursing will continue to focus on bowel and bladder continence transitioning toward independence by time of discharge. Monitoring post void residuals monitoring for severe constipation and bowel obstruction. Focus on achieving ADL goals with co-treating with OT when possible.     PHYSICAL THERAPY  Bed mobility:  Supine to Sit: Independent (21 1400)  Sit to Supine: Independent (21 1542)  Transfers:  Sit to Stand: Stand by assistance (09/25/21 0935)  Bed to Chair: Contact guard assistance (09/24/21 1140)  Gait:   Device: Hand-Held Assist (09/25/21 0928)  Assistance: Contact guard assistance;Minimal assistance (09/25/21 0928)  Distance: 50' (09/25/21 0928)  Quality of Gait: shortened step length, decreased wilmer foot clearance, mild instability with turns (09/25/21 8745)  Comments: Pt ambulated 30 feet with ww with SBA (09/24/21 1112)  Stairs:  # Steps : 4 (09/24/21 1112)  Assistance: Stand by assistance;Contact guard assistance (09/24/21 1112)  Comment: non - reciprocal pattern chosen by pt - pt has ramp entry (09/24/21 1112)  W/C mobility:         OCCUPATIONAL THERAPY  Hand Dominance: Right  ADL  Feeding: Setup (09/24/21 1015)  Grooming: Setup;Supervision (09/24/21 1015)  UE Bathing: Minimal assistance (09/24/21 1015)  LE Bathing: Minimal assistance (09/24/21 1015)  UE Dressing: Unable to assess(comment) (Gown only) (09/24/21 1015)  LE Dressing: Moderate assistance (09/24/21 1015)  Toileting: Unable to assess(comment) (09/24/21 1015)  Toilet Transfers  Toilet - Technique: Stand step (09/24/21 1020)  Equipment Used: Grab bars (09/24/21 1020)  Toilet Transfer: Minimal assistance (09/24/21 1020)  Tub Transfers  Tub Transfers: Not tested (09/24/21 1020)  Shower Transfers  Shower - Transfer From: Wheelchair (09/24/21 1020)  Shower - Transfer Type: To and From (09/24/21 1020)  Shower - Transfer To: Shower seat with back (09/24/21 1020)  Shower - Technique: Stand pivot (09/24/21 1020)  Shower Transfers: Minimal assistance (09/24/21 1020)      SPEECH THERAPY  Motor Speech: Within Functional Limits  Comprehension: Exceptions  Verbal Expression: Exceptions to functional limits      Diet/Swallow:  Diet Solids Recommendation: Regular  Liquid Consistency Recommendation: Thin  Dysphagia Outcome Severity Scale: Level 7: Normal in all situations    Compensatory Swallowing Strategies:  Alternate solids and liquids, Small bites/sips, Eat/Feed slowly, Upright as possible for all oral intake             COGNITION  OT: Cognition Comment: Comp Max A, expression Min A, social Max A, problem Mod A, memory Mod A  SP:Memory: Exceptions to [facility]  Problem Solving: Exceptions to [facility]        THERAPY, MEDICAL AND NURSING COORDINATION:    []  Pain medication before therapies     []  Check orthostatic BP      [x]  Ambulate to the bathroom in room    [x]  Add scheduled rest beaks     []  In room therapies      Discharge date set for:              10/2/21      Home with:   granddaughter Shira  with help from   granddaughter Pedro Orellana  and family            And:      Home Health Care:     [x]  PT    [x]  OT    [x]  ST   [x]  Aide   []  SW    [x]  RN                                 Equipment:  [employer]      At D/C their function is goaled at:   PT:Long term goal 1: indep bed mobility  Long term goal 2: indep bed and car trnasfers  Long term goal 3: indep gait 50 feet in familiar and protected environment  Long term goal 4: supervision for 4 stairs  Long term goal 5: CGA gait 150 feet or greater  OT:Eating  Assistance Needed: Setup or clean-up assistance  CARE Score: 5  Discharge Goal: Supervision or touching assistance, Oral Hygiene  Assistance Needed: Supervision or touching assistance  CARE Score: 4  Discharge Goal: Independent, [address]  Reason if not Attempted: Not applicable  CARE Score: 9  Discharge Goal: Supervision or touching assistance, Shower/Bathe Self  Assistance Needed: Partial/moderate assistance  CARE Score: 3  Discharge Goal: Supervision or touching assistance  Upper Body Dressing  Reason if not Attempted: Not applicable  CARE Score: 9  Discharge Goal: Set-up or clean-up assistance, Lower Body Dressing  Assistance Needed: Partial/moderate assistance  CARE Score: 3  Discharge Goal: Supervision or touching assistance, Putting On/Taking Off Footwear  Assistance Needed: Setup or clean-up assistance  CARE Score: 5  Discharge Goal: Independent, Toilet Transfer  Assistance Needed: Partial/moderate assistance  CARE Score: 3  Discharge Goal: Supervision or touching assistance  SP:Long-term Goals  Timeframe for Long-term Goals: 2-3 weeks  Goal 1: Pt will improve his Cognition from mod assist to supervised assist for adequate functional recall and safety awareness for home, community. Long-term Goals  Goal 1: N/A           From a cognitive standpoint they will need:        24 hr supervision  --progress to occasional           Significant problems/ barriers to functional progress include: Pt is at a high risk for functional loss,    [x]  Acute infection/UTI    []  Low BP's     []  COPD flare-up   []  Uncontrolled blood sugar     []  Progressive anemia         []  Severe pain exacerbation     [x]  Impaired mental status  And depression--will add psych and spiritual care        Plan to correct barriers to functional progress: Add scheduled rest breaks, control pain by using ice Lidoderm rest and massage as well as pain medications prior to therapy. Based on a comprehensive evaluation of the above, the individualized therapy and Discharge plan will be:    -Times stated are an average that will be varied based on the patient's daily need. PT  1 1/2  hrs/day 5-7 days per week           OT  1 1/2hrs per day 5-7 days per week ST ____1/2___hrs /day 3-5 days per week       Estimated LOS 2 week(s)    - Overall functional prognosis:     [x]  Good    []  Fair    []  Poor -Medical Prognosis:   [x]  Good    []  Fair    []  Poor    This patient was made aware of the discussion of Plan of Care, their projected dicharge date and their projected function at discharge.        Manjula Sampson DO

## 2021-09-27 NOTE — PROGRESS NOTES
Occupational Therapy  Facility/Department: [facility]  Daily Treatment Note  NAME: Balbina Morgan  : 3/5/1929  MRN: [id number]    Date of Service: 2021    Discharge Recommendations:  Continue to assess pending progress       Assessment      Activity Tolerance  Activity Tolerance: Patient Tolerated treatment well  Safety Devices  Safety Devices in place: Yes  Type of devices: All fall risk precautions in place         Patient Diagnosis(es): There were no encounter diagnoses. has a past medical history of Anemia, CAD (coronary artery disease), DM (diabetes mellitus) ([facility] [address] Dyslipidemia, History of tobacco abuse, HTN (hypertension), Hypothyroidism, Non-ST elevation MI (NSTEMI) ([facility] [address] and Pacemaker. has a past surgical history that includes Cataract removal and Middle ear surgery (Left, ). Restrictions  Restrictions/Precautions  Restrictions/Precautions: Fall Risk  Implants present? : Pacemaker     Subjective   General  Chart Reviewed: Yes  Patient assessed for rehabilitation services?: Yes  Response to previous treatment: Patient with no complaints from previous session  Family / Caregiver Present: No  Referring Practitioner: Dr Derrick Duran  Diagnosis: Impaired mobility and ADL's due to right occipital stroke    Subjective  Subjective: \"I have to go to the bathroom. \"    Pain Assessment  Pain Level: 0  Pre Treatment Pain Screening  Pain at present: 0  Intervention List: Patient able to continue with treatment     Orientation  Orientation  Orientation Level: Oriented to person     Objective      ADL    Grooming: Setup    Toileting: Supervision (in standing)    Patient engaged in visual perception activity to increase I with ADL's and IADL's. Patient completed cancel B activity with sheet placed at midline. Therapist provided verbal instruction and demonstration. Patient able to complete activity correctly highlighting 49/59 B's. Patient noted to not complete activity row by row L -> R.     Patient Group Co-treatment   Time In 1500         Time Out 1540         Minutes 40             ADL/IADL trainin minutes  Therapeutic activities: 25 minutes  Visual Perceptual training: 10 minutes   Missed 20 minutes due to patient stopped particpating, will attempt makeup as able     Electronically signed by LEONCIO Whittington on 21 at 4:28 PM EDT    LEONCIO Whittington

## 2021-09-27 NOTE — PROGRESS NOTES
[address]  UPDATE NOTE  Room: R234/R234-01  Admit Date: 2021       Date: 2021  Patient Name: Nicki Strauss        MRN: [id number]    : 3/5/1929  (80 y.o.)  Gender: male        Anticipated Discharge Plan: At this time, patient is scheduled to discharge on 10/2/21. We feel that patient would benefit from a continued stay to help aide in a safe discharge. Patient is currently being seen by psychology, internal med,  Podiatry, and PM&R. We will begin family training when it is safe for all involved. REHAB DIAGNOSIS:   Diagnosis: Impaired gait and mobility dt R occipital CVA    CO MORBIDITIES:      Past Medical History:   Diagnosis Date    Anemia     CAD (coronary artery disease) 2015    DM [facility]di[facility] mellitus) ([facility] [address]     Dyslipidemia     History of tobacco abuse     HTN (hypertension)     Hypothyroidism     Non-ST elevation MI (NSTEMI) ([facility] [address]     Pacemaker 2015     Past Surgical History:   Procedure Laterality Date    CATARACT REMOVAL      MIDDLE EAR SURGERY Left     \"they had to reset the bones\" after an infection        Last set of vitals:  Vital Signs  Temp: 98.4 °F (36.9 °C)  Temp Source: Oral  Pulse: 63  Heart Rate Source: Monitor  Resp: 16  BP: 131/61  BP Location: Left upper arm  MAP (mmHg): 76  Patient Position: Supine  Level of Consciousness: Alert (0)  MEWS Score: 1  Patient Currently in Pain: Denies    Results/Findings:   Labs:   No results for input(s): WBC, HGB, HCT, PLT in the last 72 hours. No results for input(s): NA, K, CL, CO2, BUN, CREATININE, CALCIUM, PHOS in the last 72 hours. Invalid input(s): MAGNES  No results for input(s): AST, ALT, BILIDIR, BILITOT, ALKPHOS in the last 72 hours. No results for input(s): INR in the last 72 hours. No results for input(s): Magdalene Sheets in the last 72 hours.     Urinalysis:      Lab Results   Component Value Date    NITRU Negative 2021    WBCUA 3-5 2016 BACTERIA Few 12/23/2016    RBCUA 3-5 12/23/2016    BLOODU Negative 09/16/2021    SPECGRAV 1.009 09/16/2021    GLUCOSEU Negative 09/16/2021       Radiology:  No orders to display      Restrictions  Restrictions/Precautions: Fall Risk  CASE MANAGEMENT    Social/Functional History  Social/Functional History  Lives With:  (grand daughter)  Type of Home: House  Home Layout: Two level, Bed/Bath upstairs, Able to Live on Main level with bedroom/bathroom  Home Access: Ramped entrance  Home Equipment: Rolling walker  ADL Assistance: Independent  Homemaking Assistance: Independent  Ambulation Assistance: Independent (no device)  Transfer Assistance: Independent  Active : No  Additional Comments: Pt unable to provide entire home set up secondary to communication       COVERAGE INFORMATION:Payor: [employer] / Plan: [employer] PPO / Product Type: Medicare /       NURSING  Weight: 149 lb 0.5 oz (67.6 kg) / Body mass index is 24.07 kg/m². Adult Oral Nutrition Supplement; Diabetic Oral Supplement  ADULT DIET;  Regular; 4 carb choices (60 gm/meal)    SpO2: 94 % (09/27/21 0609)  No active isolations    Skin Issues: No    Pain Managed: Yes    Bladder continence: Yes    Bowel continence: Yes      Other: very [place], some depression noted,consult placed to Dr. Tila Payton         PHYSICAL THERAPY  Initial status:           Bed mobility:  Supine to Sit: Independent  Sit to Supine: Independent  Transfers:  Sit to Stand: Stand by assistance, Contact guard assistance  Bed to Chair: Contact guard assistance  Gait:   Device: No Device, Hand-Held Assist  Assistance: Contact guard assistance  Distance: 30 feet ; 50 feet  Quality of Gait: narrow MISAEL, controlled pace, short step length, light UE support sought by pt however no LOB noted  Comments: Pt ambulated 30 feet with ww with SBA  Stairs:  # Steps : 4  Assistance: Stand by assistance, Contact guard assistance  Comment: non - reciprocal pattern chosen by pt - pt has ramp entry  W/C mobility: NA             Current status:   Bed mobility:  Supine to Sit: Independent  Sit to Supine: Independent  Transfers:  Sit to Stand: Stand by assistance  Bed to Chair: Contact guard assistance  Gait:   Device: Hand-Held Assist  Assistance: Contact guard assistance, Minimal assistance  Distance: 50'  Quality of Gait: shortened step length, decreased wilmer foot clearance, mild instability with turns  Comments: Pt ambulated 30 feet with ww with SBA  Stairs:  # Steps : 4  Assistance: Stand by assistance, Contact guard assistance  Comment: non - reciprocal pattern chosen by pt - pt has ramp entry  W/C mobility: NA       Assessment:Pt demonstrates improved mobility with use of ww vs no device. Pt has just begun rehab program. He has not yet attained the goals established and listed bolow. Pt would benefit from continued therapy at this level of care    LTG:  Long term goal 1: indep bed mobility  Long term goal 2: indep bed and car trnasfers  Long term goal 3: indep gait 50 feet in familiar and protected environment  Long term goal 4: supervision for 4 stairs  Long term goal 5: CGA gait 150 feet or greater    Signature: Electronically signed by Rita El PT on 9/27/21 at 11:56 AM EDT        OCCUPATIONAL THERAPY   Initial Self Care Status:  ADL  Feeding: Setup  Grooming: Setup, Supervision  UE Bathing: Minimal assistance  LE Bathing: Minimal assistance  UE Dressing: Unable to assess(comment) (Gown only)  LE Dressing: Moderate assistance  Toileting: Unable to assess(comment)  Toilet Transfers  Toilet - Technique: Stand step  Equipment Used: Grab bars  Toilet Transfer: Minimal assistance  Shower Transfers  Shower - Transfer From: Wheelchair  Shower - Transfer Type: To and From  Shower - Transfer To:  Shower seat with back  Shower - Technique: Stand pivot  Shower Transfers: Minimal assistance      Current Self Care Status:  ADL  Feeding: Setup  Grooming: Setup, Supervision  UE Bathing: Minimal assistance  LE adequate functional recall and safety awareness for home, community. Long-term Goals  Goal 1: N/A        Patient's Response to Therapy:  Patient was evaluated on 09/24/21. Patient has not had ST sessions at this time.  Plan is to continue with ST POC.      It is expected that patient would benefit from ongoing speech therapy, as he has responded positively to skilled intervention, thus far.        Signature: Electronically signed by DALTON Horton on 9/27/21 at 8:36 AM EDT                Electronically signed by Celinda Bamberger, RN on 9/27/2021 at 12:29 PM

## 2021-09-27 NOTE — CARE COORDINATION
[address] [place] [address] NOTE  Room: R2/R234-01  Admit Date: 2021       Date: 2021  Patient Name: Viviana Haley        MRN: [id number]    : 3/5/1929  (80 y.o.)  Gender: male        REHAB DIAGNOSIS:   Diagnosis: Impaired gait and mobility dt R occipital CVA    CO MORBIDITIES:      Past Medical History:   Diagnosis Date    Anemia     CAD (coronary artery disease) 2015    [facility]     Dyslipidemia     History of tobacco abuse     HTN (hypertension)     Hypothyroidism     Non-ST elevation MI (NSTEMI) ([facility]     Pacemaker 2015     Past Surgical History:   Procedure Laterality Date    CATARACT REMOVAL      MIDDLE EAR SURGERY Left     \"they had to reset the bones\" after an infection     Chart Reviewed: Yes  Patient assessed for rehabilitation services?: Yes  Family / Caregiver Present: No  Restrictions  Restrictions/Precautions: Fall Risk  CASE MANAGEMENT    Social/Functional History  Social/Functional History  Lives With:  (grand daughter)  Type of Home: House  Home Layout: Two level, Bed/Bath upstairs, Able to Live on Main level with bedroom/bathroom  Home Access: Ramped entrance  Home Equipment: Rolling walker  ADL Assistance: Independent  Homemaking Assistance: Independent  Ambulation Assistance: Independent (no device)  Transfer Assistance: Independent  Active : No  Additional Comments: Pt unable to provide entire home set up secondary to communication       Pts personal preferences: n/a    Pts assets/resources/support system: family    COVERAGE INFORMATION:Payor: Geo Jimenez / Plan: [employer] PPO / Product Type: Medicare /       NURSING  Weight: 149 lb 0.5 oz (67.6 kg) / Body mass index is 24.07 kg/m². Adult Oral Nutrition Supplement; Diabetic Oral Supplement  ADULT DIET;  Regular; 4 carb choices (60 gm/meal)    SpO2: 94 % (21 0609)  No active isolations    Skin Issues: No    Pain Managed: Yes    Bladder continence: Yes    Bowel continence: Yes      Other: decreased PO intake, d/c sliding scale and accuchecks      PHYSICAL THERAPY  Bed mobility:  Supine to Sit: Modified independent; Independent (09/27/21 1318)  Sit to Supine: Independent (09/25/21 0937)  Transfers:  Sit to Stand: Stand by assistance;Supervision (09/27/21 1319)  Bed to Chair: Stand by assistance;Supervision (09/27/21 1319)  Gait:   Device: No Device;Hand-Held Assist (09/27/21 1047)  Assistance: Contact guard assistance (09/27/21 1047)  Distance: 48' with 2 turns (09/27/21 1047)  Quality of Gait: Pt reaching out with other hand to steady self. short step length with shuffling type gait but without LOB (09/27/21 1047)  Comments: Pt ambulated 30 feet with ww with SBA (09/24/21 1112)  Stairs:  # Steps : 4 (09/27/21 1047)  Rails: Right ascending;Bilateral (09/27/21 1047)  Assistance: Stand by assistance;Contact guard assistance (09/27/21 1047)  Comment: Non-reciprocal pattern. Pt pulling self up heavily on rail with RUE. Pt used two rails while descending. NO LOB. (09/27/21 1047)  W/C mobility:     LTG:  Long term goal 1: indep bed mobility  Long term goal 2: indep bed and car trnasfers  Long term goal 3: indep gait 50 feet in familiar and protected environment  Long term goal 4: supervision for 4 stairs  Long term goal 5: CGA gait 150 feet or greater  PT Treatment Time:  1.0 hrs      OCCUPATIONAL THERAPY  Hand Dominance: Right  ADL  Feeding: Setup (09/24/21 1015)  Grooming: Setup;Supervision (09/24/21 1015)  UE Bathing: Minimal assistance (09/24/21 1015)  LE Bathing: Minimal assistance (09/24/21 1015)  UE Dressing: Unable to assess(comment) (Gown only) (09/24/21 1015)  LE Dressing:  Moderate assistance (09/24/21 1015)  Toileting: Unable to assess(comment) (09/24/21 1015)  Toilet Transfers  Toilet - Technique: Stand step (09/24/21 1020)  Equipment Used: Grab bars (09/24/21 1020)  Toilet Transfer: Minimal assistance (09/24/21 1020)  Tub Transfers  Tub Transfers: Not tested (09/24/21 1020)  Shower Transfers  Shower - Transfer From: Wheelchair (09/24/21 1020)  Shower - Transfer Type: To and From (09/24/21 1020)  Shower - Transfer To: Shower seat with back (09/24/21 1020)  Shower - Technique: Stand pivot (09/24/21 1020)  Shower Transfers: Minimal assistance (09/24/21 1020)  LTG:  Eating  Assistance Needed: Setup or clean-up assistance  CARE Score: 5  Discharge Goal: Supervision or touching assistance, Oral Hygiene  Assistance Needed: Supervision or touching assistance  CARE Score: 4  Discharge Goal: Independent, [address]  Reason if not Attempted: Not applicable  CARE Score: 9  Discharge Goal: Supervision or touching assistance, Shower/Bathe Self  Assistance Needed: Partial/moderate assistance  CARE Score: 3  Discharge Goal: Supervision or touching assistance  Upper Body Dressing  Reason if not Attempted: Not applicable  CARE Score: 9  Discharge Goal: Set-up or clean-up assistance, Lower Body Dressing  Assistance Needed: Partial/moderate assistance  CARE Score: 3  Discharge Goal: Supervision or touching assistance, Putting On/Taking Off Footwear  Assistance Needed: Setup or clean-up assistance  CARE Score: 5  Discharge Goal: Independent, Toilet Transfer  Assistance Needed: Partial/moderate assistance  CARE Score: 3  Discharge Goal: Supervision or touching assistance  OT Treatment Time: 2.0 hrs      SPEECH THERAPY    Motor Speech: Within Functional Limits  Comprehension: Exceptions  Verbal Expression: Exceptions to functional limits      Diet/Swallow:  Diet Solids Recommendation: Regular  Liquid Consistency Recommendation: Thin  Dysphagia Outcome Severity Scale: Level 7: Normal in all situations    Compensatory Swallowing Strategies:  Alternate solids and liquids, Small bites/sips, Eat/Feed slowly, Upright as possible for all oral intake         LTG:  Long-term Goals  Timeframe for Long-term Goals: 2-3 weeks  Goal 1: Pt will improve his Cognition from mod assist to supervised assist for adequate functional recall and safety awareness for home, community. Long-term Goals  Goal 1: N/A          COGNITION  OT: Cognition Comment: Comp Max A, expression Min A, social Max A, problem Mod A, memory Mod A  SP:Memory: Exceptions to [facility]  Problem Solving: Exceptions to [id number]  Attendance to recreational therapy programs:    []  Pet Therapy  [] Music Therapy  [] Art Therapy    [] Recreation Therapy Group [] Support Group           Patient social interaction (mood, participation): good       Patient strengths: good support    Patients goal: to go home    Problems/Barriers: unsteady         1. Safety:          - Intervention / Plan:    [x]  falls protocol     [x]  PT/OT    [x]  SP        - Results:         2. Potential DME needs:         - Intervention / Plan:  [x]  PT/OT     [x]  Assess equipment needs/access       - Results:         3. Weakness:          - Intervention / Plan:  [x]  PT/OT      []  Other:         - Results:         4. Discharge planning needs:          - Intervention / Plan:  [x]  Weekly team conference      [x]  family training        - Results:         5.            - Intervention / Plan:          - Results:         6.            - Intervention / Plan:         - Results:         7.            - Intervention / Plan:         - Results:           Discharge Plan   Estimated Length of Stay: 12 days     Tentative Discharge date: 10/2/21      Anticipated Discharge Destination:  Home      Team recommendations:    1. Follow up Therapy :    PT  OT  SLP  RN  [facility]    2. Home Health    Other:     Equipment needed at Discharge:  Other: TBD      Team Members Present at Conference:    Physician: Dr. Bishop Memory  : Paris Haskins RN  : Stevenson Bernard, MSW, LSW  RN: Ivy Prather RN  Physical Therapist: Sae Pardo PT  Occupational [address]  Speech Therapist: Shanthi Shah SLP  Nurse Manager: Donya Conley, RN     Electronically signed by Carol Bethea RN on 9/27/2021 at 2:17 PM

## 2021-09-27 NOTE — PROGRESS NOTES
Assumed care of pt at [address] [place] [address] on 9/26/21. Pts family was here in room and pt was laying supine in bed. Daughter stated that pt looked sob and she was worried. 02 sat was checked and was 94% on room air. HOB elevated and pt was put on 1 liter n/c of 02 just for comfort. Pt appeared to become more calm and restful with 02 on. No further distress noted. BP was 144/60 and HR was 61. Pt did have large BM on evenings. Avasys in room Call light in reach and bed alarm on. Electronically signed by Peg Murillo on 9/27/2021 at 1:31 AM

## 2021-09-27 NOTE — PROGRESS NOTES
Hospitalist Consult/Progress Note  9/27/2021 9:56 AM    Assessment and Plan:   1. 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 TTE EF 50%. US carotids showed less than 50% stenosis in bilateral carotids. Already on Eliquis. Now on aspirin  2. CAD/A fib/pacemaker: rate controlled. Goal K and Mg 4.0 and 2.0, respectively. On Long term anticoagulation. Continue statin, BB and ACEI  3. DVT on Eliquis  4. COPD, Stable. Duonebs PRN. Incentive Spirometry, Respiratory therapist assessment. Continue symbicort; albuterol PRN. 5. Microcytic anemia, likely due to iron deficiency or anemia of chronic disease: Checking Iron panel and TIBC. Continue ferrous sulfate. 6. Secondary HTN: Stable. Continue home meds  7. DMII with hyperglycemia: ISS, hypoglycemia protocol POCT Glucose TIDAC & QHS  8. CKD stage III: Stable. Monitor urine output. Check BMP in AM.   9. Generalized weakness, Gait instability and Decreased Functional Status: Fall precautions. PT OT to evaluate. Maximize nutrition status. Assessing if needs DME at home. SW on board. 10. Bowel Regimen and GI PPx: stool softners PRN ordered with hold parameters for loose stools or diarrhea. On antiacid  Diet: Adult Oral Nutrition Supplement; Diabetic Oral Supplement  11. ADULT DIET; Regular; 4 carb choices (60 gm/meal)  12. Advance Directive: Full Code   13. Nutrition status: Supplemental Vitamins ordered. Dietitian assessment  14. Discharge planning: SW on board. Discharge planning ongoing. TDD 10/2; family training scheduled for tomorrow. 15. High Risk Readmission Screening Tool Score Noted.      Additionally, the following hospital problems were addressed:  Principal Problem:    Impaired gait and mobility dt R occipital CVA  Active Problems:    HTN (hypertension)    DM (diabetes mellitus) (HCC)    Hypothyroidism    CAD (coronary artery disease)    COPD exacerbation (HCC)    PVD (peripheral vascular disease) (HCC)    Gait abnormality dt PVD--right femoral artery occlusion    Chronic right shoulder pain    Acute CVA (cerebrovascular accident) ([address]    Aphasia    Abnormality of gait and mobility    Neurogenic bladder    Bilateral hearing loss    Left hemiparesis ([address]  Resolved Problems:    * No resolved hospital problems. *      ** Total time spent reviewing medical records, evaluating patient, speaking with RN's and consultants where I was focused exclusively on this patient:  minutes. This time is excluding time spent performing procedures or significant events occurring earlier or later in the day requiring my attention and focus. Subjective:   Admit Date: 9/23/2021  PCP: Vanessa Billy MD, MD    No acute events overnight. Afebrile. No new complaints. Pt denies chest pain, SOB, N/V, fevers or chills. OOB in wheelchair. Participating in PT. Objective:     Vitals:    09/26/21 1645 09/26/21 1845 09/27/21 0550 09/27/21 0609   BP: (!) 108/56 (!) 144/60  131/61   Pulse: 61 61  63   Resp:  18 18 16   Temp:  98.4 °F (36.9 °C)  98.4 °F (36.9 °C)   TempSrc:  Oral     SpO2:  96% 95% 94%   Weight:       Height:         General appearance: no acute distress,  No conversational dyspnea noted. Dentition intact. Answers questions appropriately but extremely hard of hearing  Neurological: Alert, awake, expressive aphasia present. Lungs:  Diminished bases,  no exp wheezes, No rales No retractions; No use of accessory muscles  Heart:  S1, S2 normal, RRR, no MRG appreciated  Abdomen: (+) BS, soft, non-tender; non distended no guarding or rigidity. Extremities:  no cyanosis, no edema bilat lower exts, no calf tenderness bilaterally.  Dry skin noted       Medications:      ammonium lactate   Topical BID    Vitamin D  2,000 Units Oral Dinner    cyanocobalamin  1,000 mcg IntraMUSCular Weekly    coenzyme Q10  100 mg Oral Daily    lidocaine  3 patch TransDERmal Daily    aspirin  81 mg Oral Daily    Or    aspirin  300 mg Rectal Daily    insulin lispro  0-3 Units SubCUTAneous Nightly    insulin lispro  0-6 Units SubCUTAneous TID WC    apixaban  5 mg Oral BID    atorvastatin  40 mg Oral Nightly    budesonide-formoterol  2 puff Inhalation BID    carvedilol  6.25 mg Oral BID WC    ferrous sulfate  325 mg Oral Daily with breakfast    folic acid  1 mg Oral Daily    lansoprazole  30 mg Oral Daily    ramipril  5 mg Oral Daily       LABS Reviewed    IMAGING Reviewed    Chelsea Sahni, APRN - NP    Additional work up or/and treatment plan may be added today or then after based on clinical progression. I am managing a portion of pt care. Some medical issues are handled by other specialists and Primary Rehabilitation provider. Additional work up and treatment should be done in out pt setting by pt PCP and other out pt providers.

## 2021-09-27 NOTE — PROGRESS NOTES
Assessment complete. Pt. Is alert and oriented however extremely [place]. Goal is for him to communicate with this nurse. I provide the medication in the package to offer him opportunities to read the medication. Pt. Has limited verbal interaction. Pt. Interaction with family is also limited. Pt enjoys the snacks provided by family. Denies SOB at this time.

## 2021-09-27 NOTE — FLOWSHEET NOTE
If the patient needs encouragement with therapy the grand-daughter and aunt have volunteered to cone to therapy to help.     They wanted to be able to walk him in the slade is they come to visit , RN told them that would be up to the therapist.

## 2021-09-27 NOTE — PROGRESS NOTES
and min cues. Pt recalled home address and family members names Ind  Goal 5: To increase safety awareness and judgment for safe completion of ADLs secondary to pt's cognitive deficits, pt will complete abstract reasoning tasks (i.e. Word deduction, convergent and divergent naming, similarities/differences) with 80% accuracy and min cues. Long-term Goals  Timeframe for Long-term Goals: 2-3 weeks  Goal 1: Pt will improve his Cognition from mod assist to supervised assist for adequate functional recall and safety awareness for home, community. Short-term Goals  Goal 1: N/A  Compensatory Swallowing Strategies: Alternate solids and liquids, Small bites/sips, Eat/Feed slowly, Upright as possible for all oral intake      Treatment/Activity Tolerance:  Patient tolerated treatment well    Plan:  Continue per POC    Pain Assessment:  Pre-Treatment  Pain assessment: 0-10  Pain level: 0  Intervention:  Patient denies pain. Post-Treatment  Pain assessment: 0-10  Pain level: 0  Intervention:  Patient denies pain. Patient/Caregiver Education:  Patient educated on session and progression towards goals. Education needs reinforcement. Safety Devices:  Call light within reach and Chair alarm in place      [id number] [address] ([facility]):      SPOKEN LANGUAGE COMPREHENSION  Ratin    SPOKEN LANGUAGE EXPRESSION  Ratin    MOTOR SPEECH  Ratin    PROBLEM SOLVING  Ratin    MEMORY  Ratin          Therapy Time  SLP Individual Minutes  Time In: 0900  Time Out: 930  Minutes: 30       Patient participated in above/following treatment session to complete previously scheduled minutes from .         Signature: Electronically signed by DALTON Marin on 2021 at 10:17 AM

## 2021-09-27 NOTE — PLAN OF CARE
Problem: Skin Integrity:  Goal: Will show no infection signs and symptoms  Description: Will show no infection signs and symptoms  Outcome: Ongoing  Goal: Absence of new skin breakdown  Description: Absence of new skin breakdown  Outcome: Ongoing     Problem: Falls - Risk of:  Goal: Will remain free from falls  Description: Will remain free from falls  Outcome: Ongoing  Goal: Absence of physical injury  Description: Absence of physical injury  Outcome: Ongoing     Problem: IP SWALLOWING  Goal: LTG - patient will tolerate the least restrictive diet consistency to allow for safe consumption of daily meals  Outcome: Ongoing     Problem: IP COMMUNICATION/DYSARTHRIA  Goal: LTG - patient will improve expressive language skills to allow for communication of wants and needs in daily activities  Outcome: Ongoing     Problem: Nutrition  Goal: Optimal nutrition therapy  Outcome: Ongoing

## 2021-09-27 NOTE — PROGRESS NOTES
Physical Therapy Rehab Treatment Note  Facility/Department: [facility]  [facility]       NAME: Misbah Gill  : 3/5/1929 (48 y.o.)  MRN: [id number]  CODE STATUS: Full Code    Date of Service: 2021  Chart Reviewed: Yes  Family / Caregiver Present: No    Restrictions:  Restrictions/Precautions: Fall Risk       SUBJECTIVE: Subjective: I don't feel steady when I'm walking. Response To Previous Treatment: Patient with no complaints from previous session. Pain Screening  Patient Currently in Pain: Denies  Pre Treatment Pain Screening  Pain at present: 0  Scale Used: Numeric Score  Intervention List: Patient able to continue with treatment    Post Treatment Pain Screening:  Pain Assessment  Pain Level: 0    OBJECTIVE:                    Bed mobility  Supine to Sit: Modified independent; Independent      Transfers  Sit to Stand: Stand by assistance;Supervision  Stand to sit: Stand by assistance;Supervision  Bed to Chair: Stand by assistance;Supervision    Comment: Pt c/o dizziness upon standing while down in therapy and would not walk away from chair. BP taken and was 134/55. Ambulation  Ambulation?: No (Pt would not try to walk secondary to c/o dizziness. BP WFL. )              Exercises  Hip Flexion: x 20  Hip Abduction: Side kicks x 20  Knee Long Arc Quad: x 20  Neurodevelopmental Techniques: MRE'S: ABD/ADD: x 15     ASSESSMENT/PROGRESS TOWARDS GOALS:  Body structures, Functions, Activity limitations: Decreased functional mobility ; Decreased balance;Decreased posture;Decreased coordination  Assessment: Unable to test gait in PM with furniture, as planned, after consulting PT. Pt not always responding to questions and difficult to get pt to participate with entire treatment. Pt limited by [facility] also.     Goals:  Long term goals  Long term goal 2: indep bed and car trnasfers  Long term goal 3: indep gait 50 feet in familiar and protected environment  Long term goal 4: supervision for 4 stairs  Long term goal 5: CGA gait 150 feet or greater    PLAN OF CARE/Safety:   Safety Devices  Type of devices: All fall risk precautions in place; Left in chair;Chair alarm in place      Therapy Time:   Individual   Time In 1300   Time Out 1330   Minutes 30     Minutes:30      Transfer/Bed mobility training: 15      Gait trainin      Neuro re education: 0     Therapeutic ex: 10      Manual: HIWOT/ [address], PTA, 21 at 4:03 PM

## 2021-09-27 NOTE — PROGRESS NOTES
Occupational Therapy  Facility/Department: [facility]  Daily Treatment Note  NAME: Cali Salas  : 3/5/1929  MRN: [id number]    Date of Service: 2021    Discharge Recommendations:  Continue to assess pending progress       Assessment      REQUIRES OT FOLLOW UP: Yes  Activity Tolerance  Activity Tolerance: Patient Tolerated treatment well  Safety Devices  Safety Devices in place: Yes  Type of devices: All fall risk precautions in place         Patient Diagnosis(es): There were no encounter diagnoses. has a past medical history of Anemia, CAD (coronary artery disease), DM (diabetes mellitus) ([facility] Dyslipidemia, History of tobacco abuse, HTN (hypertension), Hypothyroidism, Non-ST elevation MI (NSTEMI) ([facility] and Pacemaker. has a past surgical history that includes Cataract removal and Middle ear surgery (Left, ). Restrictions  Restrictions/Precautions  Restrictions/Precautions: Fall Risk  Implants present? : Pacemaker  Subjective   General  Chart Reviewed: Yes  Patient assessed for rehabilitation services?: Yes  Response to previous treatment: Patient with no complaints from previous session  Family / Caregiver Present: No  Referring Practitioner: Dr Gray Screen  Diagnosis: Impaired mobility and ADL's due to right occipital stroke  Subjective  Subjective: \"I don't know. \" - when talking about patient participating in treatment session. Pain Assessment  Pain Assessment: 0-10  Pain Level: 0  Pre Treatment Pain Screening  Pain at present: 0  Scale Used: Numeric Score  Intervention List: Patient able to continue with treatment  Vital Signs  Patient Currently in Pain: Denies   Orientation  Orientation  Orientation Level: Oriented to person  Objective        While seated at tabletop, pt sorted playing cards onto velcro board using B UEs to improve problem solving during ADLs and IADLs. Pt demonstrates min difficulty to scan to the L side of tabletop and requires min verbal and tactile cues to recognize.  Pt worked at a slow pace and demonstrates mod difficulty to sort. Pt with min errors and requires verbal cues to recognize error. Pt requires significant increased time to sort all playing cards. Pt then removed all cards from the board one at a time and returned them to the ziplock bag. Pt requires verbal cues to only remove top card and no bottom card. Pt tolerated well. While seated at tabletop, pt placed plastic rings onto horizontal graded dowels using B UEs and 1lb wrist weights to improve strength and endurance during ADLs and IADLs. Pt demonstrates min decreased endurance and requires intermittent rest breaks to complete. Pt able to reach all 4 heights of dowels. Pt worked at a steady pace to perform task. Pt alternated hands as needed. Pt then removed rings from the dowels one at a time and demonstrates decreased endurance. Pt unable to remove all rings d/t end of therapy time. Plan   Plan  Times per week: 5-7x/wk  Plan weeks: 2 weeks  Current Treatment Recommendations: Strengthening, Endurance Training, Neuromuscular Re-education, Balance Training, Functional Mobility Training, Self-Care / ADL, Cognitive Reorientation, Safety Education & Training, Cognitive/Perceptual Training, Patient/Caregiver Education & Training  Plan Comment: Continue per OT plan of care  G-Code     OutComes Score                                                  AM-PAC Score             Goals  Long term goals  Time Frame for Long term goals : 5-7x/wk x 2 weeks  Long term goal 1: Pt within 2 weeks of initial evaluation will demonstrate progress in th following areas listed below to achieve specific LTG's as stated in the initial evaluation  Long term goal 2: Increase independence with ADL self caare  Long term goal 3:  Increase cognition for the safe completion of ADL  Long term goal 4: increase functional mobility independence for completion of ADL  Patient Goals   Patient goals : Not stated at this time       Therapy Time Individual Concurrent Group Co-treatment   Time In 930         Time Out 1030         Minutes 60           Therapeutic activities: 20 minutes  Cognitive Retrainin minutes     Amaya Hill OT   Electronically signed by Amaya Hill OT on 2021 at 10:37 AM

## 2021-09-27 NOTE — PROGRESS NOTES
Pt  Sat up on side of bed making alarm go off. Pt assisted to bathroom with contact quard assist. Pt had large bm when up. .No complaints of sob . 02 is now off. Pt stable. Electronically signed by Juancarlos Zamora.  Adelaide Webber on 9/27/2021 at 4:47 AM

## 2021-09-27 NOTE — PROGRESS NOTES
Physical Therapy Rehab Treatment Note  [facility]Department[facility]  [facility]       NAME: Unknown Medico  : 3/5/1929 (89 y.o.)  MRN: [id number]  CODE STATUS: Full Code    Date of Service: 2021  Chart Reviewed: Yes  Family / Caregiver Present: No    Restrictions:  Restrictions/Precautions: Fall Risk       SUBJECTIVE: Subjective: I don't feel steady when I'm walking. Response To Previous Treatment: Patient with no complaints from previous session. Pain Screening  Patient Currently in Pain: Denies  Pre Treatment Pain Screening  Pain at present: 0  Scale Used: Numeric Score  Intervention List: Patient able to continue with treatment    Post Treatment Pain Screening:  Pain Assessment  Pain Level: 0    OBJECTIVE:                    Bed mobility  Comment: Not tested secondary to independent. Transfers  Sit to Stand: Stand by assistance  Stand to sit: Stand by assistance  Car Transfer: Stand by assistance;Contact guard assistance  Comment: Pt needing cues to turn around and sit first.  Pt has tendency to reach across to chair and then sit but gets to chair safely. Ambulation  Ambulation?: Yes  More Ambulation?: Yes  Ambulation 1  Surface: carpet  Device: No Device;Hand-Held Assist  Assistance: Contact guard assistance  Quality of Gait: Pt reaching out with other hand to steady self. short step length with shuffling type gait but without LOB  Distance: 48' with 2 turns  Ambulation 2  Surface - 2: level tile;carpet  Device 2: Rolling Walker  Assistance 2: Stand by assistance  Quality of Gait 2: Steadier gait with AD with tactile cues for directional needs. Improved gait and step length with AD vs without. Gait Deviations: Slow Meseret;Decreased step length;Decreased step height  Distance: 150' with several turns.     Stairs/Curb  Stairs?: Yes  Stairs  # Steps : 4  Stairs Height: 6\"  Rails: Right ascending;Bilateral  Assistance: Stand by assistance;Contact guard assistance  Comment: Non-reciprocal pattern. Pt pulling self up heavily on rail with RUE. Pt used two rails while descending. NO LOB. ASSESSMENT/PROGRESS TOWARDS GOALS:  Body structures, Functions, Activity limitations: Decreased functional mobility ; Decreased balance;Decreased posture;Decreased coordination  Assessment: Pt has met bed mobility goal but still needing assist with car transfer which was initiated today. Pt's gait is steadier with wheeled walker vs without AD, which requires CGA. Pt furniture walking when without AD. Pt's attention to surroundings and need for frequent visual cues for directions needed. Able to initiate stair training today. Goals:  Long term goals  Long term goal 2: indep bed and car trnasfers  Long term goal 3: indep gait 50 feet in familiar and protected environment  Long term goal 4: supervision for 4 stairs  Long term goal 5: CGA gait 150 feet or greater    PLAN OF CARE/Safety:   Safety Devices  Type of devices: All fall risk precautions in place; Left in chair;Chair alarm in place      Therapy Time:   Individual   Time In 1030   Time Out 1100   Minutes 30     Minutes:30      Transfer/Bed mobility trainin      Gait training: Henny Chacon, DEL, 21 at 11:59 AM

## 2021-09-27 NOTE — PROGRESS NOTES
Subjective: The patient complains of severe acute on chronic progressive fatigue and  Left sided weakness partially relieved by rest,medications, PT,  OT,   SLP and rest and exacerbated by recent illness. 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 .        I reviewed current care and plans for further care with other rehab providers including nursing and case management. According to recent nursing note, \"  Pts family was here in room and pt was laying supine in bed. Daughter stated that pt looked sob and she was worried. 02 sat was checked and was 94% on room air. HOB elevated and pt was put on 1 liter n/c of 02 just for comfort. Pt appeared to become more calm and restful with 02 on. No further distress noted. BP was 144/60 and HR was 61. Pt did have large BM on evenings. Avasys in room Call light in reach and bed alarm on. ... Pt  Sat up on side of bed making alarm go off. Pt assisted to bathroom with contact quard assist. Pt had large bm when up. .No complaints of sob . 02 is now off. Pt stable \". ROS x10: The patient also complains of severely impaired mobility and activities of daily living. Otherwise no new problems with vision, hearing, nose, mouth, throat, dermal, cardiovascular, GI, , pulmonary, musculoskeletal, psychiatric or neurological. See Rehab H&P on Rehab chart dated . Vital signs:  /61   Pulse 63   Temp 98.4 °F (36.9 °C)   Resp 16   Ht 5' 5.98\" (1.676 m)   Wt 149 lb 0.5 oz (67.6 kg)   SpO2 94%   BMI 24.07 kg/m²   I/O:   PO/Intake:  fair PO intake, no problems observed or reported. Bowel/Bladder:  continent, no problems noted. General:  Patient is well developed, adequately nourished, non-obese and     well kempt. HEENT:    [place] hearing intact to loud voice, external inspection of ear     and nose benign.   Inspection of lips, tongue and gums benign  Musculoskeletal: No significant change in strength or tone. All joints stable. Inspection and palpation of digits and nails show no clubbing,       cyanosis or inflammatory conditions. Neuro/Psychiatric: Affect: flat but pleasant. Alert and oriented to person, place and     Situation with  mod cues. No significant change in deep tendon reflexes or     sensation  Lungs:  Diminished, CTA-B. Respiration effort is normal at rest.     Heart:   S1 = S2, RRR. No loud murmurs. Abdomen:  Soft, non-tender, no enlargement of liver or spleen. Extremities:  No significant lower extremity edema or tenderness. Skin:   Intact to general survey, no visualized or palpated problems. Rehabilitation:  Physical therapy:   Bed Mobility: Scooting: Independent    Transfers: Sit to Stand: Stand by assistance, Supervision  Stand to sit: Stand by assistance, Supervision  Bed to Chair: Stand by assistance, Supervision, Ambulation 1  Surface: carpet  Device: No Device, Hand-Held Assist  Assistance: Contact guard assistance  Quality of Gait: Pt reaching out with other hand to steady self. short step length with shuffling type gait but without LOB  Distance: 48' with 2 turns  Comments: Pt ambulated 30 feet with ww with SBA, Stairs  # Steps : 4  Stairs Height: 6\"  Rails: Right ascending, Bilateral  Assistance: Stand by assistance, Contact guard assistance  Comment: Non-reciprocal pattern. Pt pulling self up heavily on rail with RUE. Pt used two rails while descending. NO LOB. FIMS:  ,  , Assessment: Unable to test gait in PM with furniture, as planned, after consulting PT. Occupational therapy:    ,  , Assessment: Pt is a 81 y/o male admitted to hospital with declin in function noted.   Pt would benefit from continued OT services to increase independence with ADL self care and functional mobility    Speech therapy:        Lab/X-ray studies reviewed, analyzed and discussed with patient and staff:   Recent Results (from the past 24 hour(s))   POCT Glucose    Collection Time: 09/26/21  4:16 PM   Result Value Ref Range    POC Glucose 138 (H) 60 - 115 mg/dl    Performed on ACCU-CHEK    POCT Glucose    Collection Time: 09/26/21  8:33 PM   Result Value Ref Range    POC Glucose 171 (H) 60 - 115 mg/dl    Performed on ACCU-CHEK    POCT Glucose    Collection Time: 09/27/21  5:58 AM   Result Value Ref Range    POC Glucose 102 60 - 115 mg/dl    Performed on ACCU-CHEK    POCT Glucose    Collection Time: 09/27/21 11:10 AM   Result Value Ref Range    POC Glucose 120 (H) 60 - 115 mg/dl    Performed on ACCU-CHEK        Echocardiogram   9/17/2021   Transthoracic Echocardiography Report   Left Ventricle Normal left ventricular systolic function, no regional wall motion abnormalities, estimated ejection fraction of 50%. Normal left ventricular size and function. Right Ventricle Normal right ventricle structure and function. Normal right ventricle systolic pressure. Left Atrium The left atrium is Mildly dilated. Right Atrium Normal right atrium. Mitral Valve Diffusely thickened and pliable mitral valve leaflets with normal excursion. Mild (1+) mitral regurgitation is present. No evidence of mitral valve stenosis. Tricuspid Valve Normal tricuspid valve structure and function. Aortic Valve The aortic valve leaflets were not well visualized. No evidence of aortic valve regurgitation . No evidence of aortic valve stenosis. Pericardial Effusion No evidence of pericardial effusion. Aorta \ Miscellaneous Miscellaneous normal findings were found.       M-Mode Measurements (cm)   LVIDd: 3.33 cm            LVIDs: 2.3 cm  IVSd: 1.44 cm             IVSs: 1.73 cm  LVPWd: 0.8 cm             AO Root Dimension: 3.45 cm                             LVOT: 1.98 cm  Valves  LVOT   LVOT Diameter: 1.98 cm  Structures  Left Ventricle   Diastolic Dimension: 0.00 cm         Systolic Dimension: 2.3 cm  Septum Diastolic: 6.08 cm            Septum Systolic: 9.88 cm  PW Diastolic: 0.8 cm                                       FS: 30.9 %  LV EDV/LV EDV Index: 45.04 ml/25 m^2 LV ESV/LV ESV Index: 18.13 ml/10 m^2  EF Calculated: 59.8 %   LVOT Diameter: 1.98 cm  Aorta/ Miscellaneous Aorta   Aortic Root: 3.45 cm  LVOT Diameter: 1.98 cm      CT Head   9/16/2021    BRAIN CT FINDINGS: There has been interval development of a 3 cm area of hypodensity in the right occipital lobe since the previous day which is worrisome for an acute, subacute ischemia. There are persistent, chronic areas of ischemia in the anterior right temporal lobe, unchanged since previous study. No acute hemorrhage, mass, mass effect, or midline shift. There is prominence of sulci and ventricles indicating mild global cerebral atrophy and chronic involutional changes. Moderate periventricular white matter hypodensities indicating chronic microangiopathy are noted. The basal ganglia are within normal limits. There are no acute changes or space-occupying lesions in the posterior fossa. The visualized portions of the orbits are within normal limits. The globes are intact. The imaged portions of the paranasal sinuses are unremarkable. The calvarium is intact. There is a 3 cm new area of hypodensity worrisome for acute in the right occipital lobe. CT Head   9/15/2021: The ventricles are dilated. Unchanged size configuration. No mass. No midline shift. The cisterns are patent. There are white matter and periventricular changes most likely consistent with chronic small vessel disease. Again note is made of a focal area of encephalomalacia at the inferolateral medial aspect the right temporal lobe. No acute intra-axial or extra-axial findings. The visualized osseous structures are unremarkable. There is mucoperiosteal thickening in the right maxillary sinus. There is a small amount of patchy opacification right mastoid. There is resection of the left mastoid.  The middle ear bones are not appreciated. Correlate with patient's surgical history. .     NO ACUTE INTRA-AXIAL OR EXTRA-AXIAL FINDINGS. XR CHEST 9/16/2021: Median sternotomy. Right transvenous pacemaker, unchanged. No consolidation. No pleural effusion. No pneumothorax. Stable cardiomediastinal silhouette. Aortic vascular calcifications. No distinct acute osseous findings. No acute radiographic abnormality. XR CHEST : 9/14/2021 Single  views of the chest is submitted. Right-sided CCD device. Leads overlying the cardiac silhouette. There are multiple median sternotomy wires overlying the cardiac silhouette. The cardiac silhouette is of normal size configuration. Pulmonary vascular unremarkable. Right sided trachea. No focal infiltrates. No Pneumothoraces. NO ACUTE ACTIVE CARDIOPULMONARY PROCESS    US CAROTID ARTERY BILATERAL  9/16/2021  There is mild atherosclerotic plaquing of the common carotid arteries and carotid bifurcations without significantly elevated velocities. Maximum systolic velocity within the right internal and mid common carotid arteries are 79 and 51 cm/s, with an ICA/CCA ratio of approximately 1.5 , which indicates less than 50% by velocity criteria. Maximum systolic velocity within the left internal and mid common carotid arteries are 59 and 74 cm/s, with an ICA/CCA ratio of approximately 0.9, which indicates less than 50% by velocity criteria. Maximum velocities within the right and left external carotid arteries are 141 and 81 cm/sec respectively. There is antegrade flow in both vertebral arteries. MILD ATHEROSCLEROTIC PLAQUING OF THE CAROTID BULBS WITHOUT EVIDENCE OF A FLOW-LIMITING STENOSIS, BY VELOCITY RATIO CRITERIA.  GOSINK CRITERIA Diameter          PSV t            EDV t          PSV          EDV          Stenosis Site       %              cm/sec          cm/sec      ICA/CCA    ICA/CCA 0-49                 <124 comprehensive inpatient rehabilitation program including PT/OT to improve balance, ambulation, ADLs, and to improve the P/AROM. Therapeutic modifications regarding activities in therapies, place, amount of time per day and intensity of therapy made daily. In bed therapies or bedside therapies prn.   2. Bowel and Bladder dysfunction  Neurogenic bowel and Bladder:  frequent toileting, ambulate to bathroom with assistance, check post void residuals. Check for C.difficile x1 if >2 loose stools in 24 hours, continue bowel & bladder program.  Monitor bowel and bladder function. Lactinex 2 PO every AC. MOM prn, Brown Bomb prn, Glycerin suppository prn, enema prn. 3. Moderate to severe LBP pain as well as generalized OA pain: reassess pain every shift and prior to and after each therapy session, give prn Tylenol and   Scheduled Tylenol, modalities prn in therapy, masage, Lidoderm, K-pad prn. Consider scheduled AM pain meds. 4. Skin healing and breakdown risk:  continue pressure relief program.  Daily skin exams and reports from nursing. 5. Severe fatigue due to nutritional and hydration deficiency: Add and titrate vitamin B12 vitamin D and CoQ10 continue to monitor I&Os, calorie counts prn, dietary consult prn.  6. Acute episodic insomnia with situational adjustment disorder:  prn Ambien, monitor for day time sedation. 7. Falls risk elevated:  patient to use call light to get nursing assistance to get up, bed and chair alarm. 8. Elevated DVT risk: progressive activities in PT, continue prophylaxis BELLE hose, elevation and  Elliquis . 9. Complex discharge planning: [place] 10/2/21-home with grand dtr-and C. Continue weekly team meeting every Monday  to assess progress towards goals, discuss and address social, psychological and medical comorbidities and to address difficulties they may be having progressing in therapy. Patient and family education is in progress.   The patient is to follow-up with their family physician after discharge. Complex Active General Medical Issues that complicate care Assess & Plan:    1. HTN (hypertension),  CAD (coronary artery disease),   PVD (peripheral vascular disease) -continue blood signs every shift focusing on heart rate and blood pressure checks, consult hospitalist for backup medical and adjust/add medications ( Eliquis, ASA, Coreg, Lipitor, Altace )  2. DM (diabetes mellitus) with neuropathy-Continue blood sugar checks every shift, diet, add diabetic add dietary education, restrict carbohydrates to lowest effective and safe carb count per meal advising 4 carbs per meal, add at bedtime snack to prevent a.m. hypoglycemia, adjust/add medications ( SSI )  3. Anemia-monitor closely on Eliquis--add Iron and Vit B12  4. Hypothyroidism-titrate Synthroid  5. COPD exacerbation-Pulse oximeter checks to shift dose and titrate oxygen and aerosol treatments monitor for nocturnal hypoxemia, monitor vital signs, oxygen prn. Symbicort, Albuterol  6. 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 Chronic right shoulder pain-Add Lidoderm and Tylenol. 8.   Acute CVA (cerebrovascular accident)with  Aphasia, Dysphagia and Left hemiparesis -focus on balance and cognition--pocket talker helps  9. Neurogenic bladder-bladder training program  10. Bilateral hearing loss--SLP and AD for hearing    11.  Depression-emotional support provided daily, vitamin B12, encourage participation in rehabilitation support group and recreational therapy, adjust/add medications ( Vit B12 )  add rehab Psych       Electronically signed by Monty Galeana DO on 9/27/21 at 8:15 AM ELIZABETH Durand D.O., PM&R     Attending    [address]

## 2021-09-28 LAB
ANION GAP SERPL CALCULATED.3IONS-SCNC: 17 MEQ/L (ref 9–15)
BUN BLDV-MCNC: 33 MG/DL (ref 8–23)
CALCIUM SERPL-MCNC: 8.6 MG/DL (ref 8.5–9.9)
CHLORIDE BLD-SCNC: 104 MEQ/L (ref 95–107)
CO2: 19 MEQ/L (ref 20–31)
CREAT SERPL-MCNC: 1.14 MG/DL (ref 0.7–1.2)
GFR AFRICAN AMERICAN: >60
GFR NON-AFRICAN AMERICAN: >60
GLUCOSE BLD-MCNC: 127 MG/DL (ref 70–99)
GLUCOSE BLD-MCNC: 133 MG/DL (ref 60–115)
GLUCOSE BLD-MCNC: 135 MG/DL (ref 60–115)
GLUCOSE BLD-MCNC: 140 MG/DL (ref 60–115)
GLUCOSE BLD-MCNC: 191 MG/DL (ref 60–115)
IRON SATURATION: 25 % (ref 11–46)
IRON: 54 UG/DL (ref 59–158)
PERFORMED ON: ABNORMAL
POTASSIUM SERPL-SCNC: 4.4 MEQ/L (ref 3.4–4.9)
SODIUM BLD-SCNC: 140 MEQ/L (ref 135–144)
TOTAL IRON BINDING CAPACITY: 219 UG/DL (ref 178–450)

## 2021-09-28 PROCEDURE — 36415 COLL VENOUS BLD VENIPUNCTURE: CPT

## 2021-09-28 PROCEDURE — 97535 SELF CARE MNGMENT TRAINING: CPT

## 2021-09-28 PROCEDURE — 6370000000 HC RX 637 (ALT 250 FOR IP): Performed by: PHYSICAL MEDICINE & REHABILITATION

## 2021-09-28 PROCEDURE — 97112 NEUROMUSCULAR REEDUCATION: CPT

## 2021-09-28 PROCEDURE — 97129 THER IVNTJ 1ST 15 MIN: CPT

## 2021-09-28 PROCEDURE — 83550 IRON BINDING TEST: CPT

## 2021-09-28 PROCEDURE — 83540 ASSAY OF IRON: CPT

## 2021-09-28 PROCEDURE — 97130 THER IVNTJ EA ADDL 15 MIN: CPT

## 2021-09-28 PROCEDURE — 99232 SBSQ HOSP IP/OBS MODERATE 35: CPT | Performed by: PHYSICAL MEDICINE & REHABILITATION

## 2021-09-28 PROCEDURE — 80048 BASIC METABOLIC PNL TOTAL CA: CPT

## 2021-09-28 PROCEDURE — 94640 AIRWAY INHALATION TREATMENT: CPT

## 2021-09-28 PROCEDURE — 97116 GAIT TRAINING THERAPY: CPT

## 2021-09-28 PROCEDURE — 97110 THERAPEUTIC EXERCISES: CPT

## 2021-09-28 PROCEDURE — 6370000000 HC RX 637 (ALT 250 FOR IP): Performed by: INTERNAL MEDICINE

## 2021-09-28 PROCEDURE — 94761 N-INVAS EAR/PLS OXIMETRY MLT: CPT

## 2021-09-28 PROCEDURE — 1180000000 HC REHAB R&B

## 2021-09-28 RX ADMIN — CARVEDILOL 6.25 MG: 6.25 TABLET, FILM COATED ORAL at 07:53

## 2021-09-28 RX ADMIN — APIXABAN 5 MG: 5 TABLET, FILM COATED ORAL at 07:53

## 2021-09-28 RX ADMIN — RAMIPRIL 5 MG: 5 CAPSULE ORAL at 07:53

## 2021-09-28 RX ADMIN — ASPIRIN 81 MG: 81 TABLET, COATED ORAL at 07:53

## 2021-09-28 RX ADMIN — APIXABAN 5 MG: 5 TABLET, FILM COATED ORAL at 20:23

## 2021-09-28 RX ADMIN — BUDESONIDE AND FORMOTEROL FUMARATE DIHYDRATE 2 PUFF: 80; 4.5 AEROSOL RESPIRATORY (INHALATION) at 18:57

## 2021-09-28 RX ADMIN — Medication 100 MG: at 07:53

## 2021-09-28 RX ADMIN — Medication: at 20:25

## 2021-09-28 RX ADMIN — Medication 2000 UNITS: at 16:54

## 2021-09-28 RX ADMIN — Medication: at 08:00

## 2021-09-28 RX ADMIN — ATORVASTATIN CALCIUM 40 MG: 40 TABLET, FILM COATED ORAL at 20:23

## 2021-09-28 RX ADMIN — BUDESONIDE AND FORMOTEROL FUMARATE DIHYDRATE 2 PUFF: 80; 4.5 AEROSOL RESPIRATORY (INHALATION) at 05:10

## 2021-09-28 RX ADMIN — CARVEDILOL 6.25 MG: 6.25 TABLET, FILM COATED ORAL at 16:54

## 2021-09-28 RX ADMIN — Medication 30 MG: at 13:05

## 2021-09-28 RX ADMIN — FERROUS SULFATE TAB 325 MG (65 MG ELEMENTAL FE) 325 MG: 325 (65 FE) TAB at 07:53

## 2021-09-28 RX ADMIN — FOLIC ACID 1 MG: 1 TABLET ORAL at 07:53

## 2021-09-28 ASSESSMENT — PAIN SCALES - GENERAL
PAINLEVEL_OUTOF10: 0

## 2021-09-28 NOTE — PROGRESS NOTES
Occupational Therapy  Facility/Department: [facility]  Daily Treatment Note  NAME: Topher Mg  : 3/5/1929  MRN: [id number]    Date of Service: 2021    Discharge Recommendations:  Continue to assess pending progress       Assessment   Assessment: Pt demonstrates mod difficulty to sort and assemble puzzles and requires verbal cues to complete. Pt continutes to benefit from OT servicesto maximize independence and safety during ADLs. REQUIRES OT FOLLOW UP: Yes  Activity Tolerance  Activity Tolerance: Patient Tolerated treatment well  Activity Tolerance: fair  Safety Devices  Safety Devices in place: Yes  Type of devices: All fall risk precautions in place         Patient Diagnosis(es): There were no encounter diagnoses. has a past medical history of Anemia, CAD (coronary artery disease), DM (diabetes mellitus) ([facility] [address] Dyslipidemia, History of tobacco abuse, HTN (hypertension), Hypothyroidism, Non-ST elevation MI (NSTEMI) ([facility] [address] and Pacemaker. has a past surgical history that includes Cataract removal and Middle ear surgery (Left, ). Restrictions  Restrictions/Precautions  Restrictions/Precautions: Fall Risk  Implants present? : Pacemaker  Subjective   General  Chart Reviewed: Yes  Patient assessed for rehabilitation services?: Yes  Response to previous treatment: Patient with no complaints from previous session  Family / Caregiver Present: No  Referring Practitioner: Dr Terrie Daily  Diagnosis: Impaired mobility and ADL's due to right occipital stroke  Subjective  Subjective: \"I have to go to the bathroom. \"  Pain Assessment  Pain Assessment: 0-10  Pain Level: 0  Pre Treatment Pain Screening  Pain at present: 0  Scale Used: Numeric Score  Intervention List: Patient able to continue with treatment  Vital Signs  Patient Currently in Pain: Denies   Orientation  Orientation  Orientation Level: Oriented to person  Objective       Balance  Standing Balance: Supervision  Standing Balance  Time: Total of 14 minutes x 3 trials  Activity: cut out puzzles  Comment: Pt completed sit<>stand transfers at Supervision level and stood with supervision to sort and assemble cut out puzzles of familiar objects using B UEs to improve standing tolerance and balance during ADLs and IADLs. Pt able to stand for increments of ~4 minutes before requiring seated rest break. Pt demonstrates min difficulty to sort and assemble and requires mod verbal cues to complete. Pt with fair activity tolerance. Functional Mobility  Functional - Mobility Device: Rolling Walker  Activity: To/from bathroom; Retrieve items  Assist Level: Supervision  Functional Mobility Comments: Pt engaged in kitchen mobility task to improve safety and independence during IADLs. Pt ambulated around the kitchen to retrieve cones from cabinets, drawers, and appliances using ww at Supervision level. Pt requires max verbal cues to follow directions secondary to pt [place]. Pt also requires verbal cues to locate cones. Pt able to locate 6/8 cones before pt requests to use the bathroom. Pt ambulated to the bathroom at Supervision level and performed toilet transfer, toileting, and hand hygiene at supervision level. Pt then ambulated back to his w/c and had no LOB. However, pt did run into leg rest on wheelchair and the door frame on the L side. Toilet Transfers  Toilet - Technique: Ambulating  Equipment Used: Grab bars  Toilet Transfer: Supervision  Tub Transfers  Tub - Transfer From: Wheelchair  Tub - Transfer Type: To and From  Tub - Transfer To: Shower seat with back  Tub - Technique: Ambulating  Tub Transfers: Stand by assistance     Transfers  Sit to stand: Supervision  Stand to sit: Supervision    ADL  Grooming: Supervision  Toileting: Supervision      While seated at tabletop, pt flipped wooden tiles from the blank side to the letter side using B UEs and created as many words as pt could to improve problem solving and activity tolerance.  Pt requires min verbal cues and increased time to flip all tiles over. Pt requires verbal cues for initiation and encouragement to continue task. Pt able to create total of 7 mildly complex words with increased time. Therapist provided min A as needed to complete word or correct orientation of letters. Pt requires significant increased time to complete task. Pt returned all letters to the container using B UEs without requiring a rest break. Plan   Plan  Times per week: 5-7x/wk  Plan weeks: 2 weeks  Current Treatment Recommendations: Strengthening, Endurance Training, Neuromuscular Re-education, Balance Training, Functional Mobility Training, Self-Care / ADL, Cognitive Reorientation, Safety Education & Training, Cognitive/Perceptual Training, Patient/Caregiver Education & Training  Plan Comment: Continue per OT plan of care  G-Code     OutComes Score                                                  AM-PAC Score             Goals  Long term goals  Time Frame for Long term goals : 5-7x/wk x 2 weeks  Long term goal 1: Pt within 2 weeks of initial evaluation will demonstrate progress in th following areas listed below to achieve specific LTG's as stated in the initial evaluation  Long term goal 2: Increase independence with ADL self caare  Long term goal 3:  Increase cognition for the safe completion of ADL  Long term goal 4: increase functional mobility independence for completion of ADL  Patient Goals   Patient goals : Not stated at this time       Therapy Time   Individual Concurrent Group Co-treatment   Time In 1500         Time Out 1600         Minutes 60           ADL/IADL trainin minutes  Cognitive Retrainin minutes     Nu Umanzor OT   Electronically signed by Nu Umanzor OT on 2021 at 4:30 PM

## 2021-09-28 NOTE — CARE COORDINATION
VINCE met with Marco Mejía (patient's dtr) and she requested to discuss discharge options for patient as she was worried about patient climbing stairs in [place]. LSW discussed different discharge options such as arranging first floor set up, AL, or SNF. Marco Mejía felt that patient may not be able to have first floor set up in Shira's home due to not having a lot of room, as well as it being too noisy with Shira's children and their friends. CRESENCIOW discussed first floor set up in patient's son's home, which Marco Mejía felt may be a good option. This would be dependent on patient's level of function upon discharge as there were some concerns with him being unsteady. CRESENCIOW also discussed [facility] vs OP and that [facility] may be better to start off with, Marco Mejía in agreement. CRESENCIOW also discussed AL vs SNF and private pay rates. Family training is scheduled for 10AM today with Wen Carey, and Marco Mejía plans to attend as well. VINCE encouraged to participate in family training first and then determining what discharge plan would be best. VINCE gave business card to Marco Mejía and encouraged her to call with any questions.  Electronically signed by SOCO Woo, VINCE on 9/28/2021 at 10:00 AM

## 2021-09-28 NOTE — PROGRESS NOTES
Comprehensive Nutrition Assessment    Type and Reason for Visit:  Reassess     Nutrition Recommendations/Plan:   - Continue Carb control 4 diet  - Continue diabetic supplement TID  - Obtain updated labs    Nutrition Assessment:  Pt presents at risk for malnturition d/t inadequate intake with noted weight loss. Intakes appear to be improving since admission with complience with nutrition supplement. Will continue current diet and supplement. Noted hyponatremic on 9/22 with no new labs. Recommend obtaining updated serum sodium levels. Malnutrition Assessment:  Malnutrition Status: At risk for malnutrition (Comment)    Context:  Chronic Illness     Findings of the 6 clinical characteristics of malnutrition:  Energy Intake:  Mild decrease in energy intake (Comment)  Weight Loss:  1 - Mild weight loss (specify amount and time period) (~6% in 9 months)     Body Fat Loss:  Unable to assess     Muscle Mass Loss:  Unable to assess    Fluid Accumulation:  No significant fluid accumulation     Strength:  Not Performed    Estimated Daily Nutrient Needs:  Energy (kcal):  4780-5768 (kg x 25-27); Weight Used for Energy Requirements:  Admission     Protein (g):  81-88 (kg x 1.2-1.3); Weight Used for Protein Requirements:  Admission        Fluid (ml/day):  ~1800; Method Used for Fluid Requirements:  1 ml/kcal      Nutrition Related Findings:  pmhx: DM, htn, hld, [place], [place]. Gluc 100-140; No other updated labs since 9/22 (Na-131). No edema noted. BSE 9/24-regular consistancy diet. Last BM 9/27. Meal intakes noted %, supplement intake %. Meds include insulin. Wounds:  None       Current Nutrition Therapies:    Adult Oral Nutrition Supplement; Diabetic Oral Supplement  ADULT DIET;  Regular; 4 carb choices (60 gm/meal)    Anthropometric Measures:  · Height: 5' 5.98\" (167.6 cm)  · Current Body Weight: 149 lb 0.5 oz (67.6 kg) (bed 9/25)   · Admission Body Weight: 149 lb (67.6 kg)    · Usual Body Weight: 159 lb 8 oz (72.3 kg) (2/1 actual); 148lb (6/22 actual))     · Ideal Body Weight: 142 lbs; % Ideal Body Weight   >100%  · BMI: 24.1  · BMI Categories: Normal Weight (BMI 22.0 to 24.9) age over 72       Nutrition Diagnosis:   · Unintended weight loss related to inadequate protein-energy intake as evidenced by weight loss    Nutrition Interventions:   Food and/or Nutrient Delivery:  Continue Current Diet, Continue Oral Nutrition Supplement  Nutrition Education/Counseling:  No recommendation at this time   Coordination of Nutrition Care:  Continue to monitor while inpatient    Goals:  Intake >75% of meals/supplement. Maintain weight ~149#. Nutrition Monitoring and Evaluation:   Behavioral-Environmental Outcomes:  None Identified   Food/Nutrient Intake Outcomes:  Food and Nutrient Intake, Supplement Intake  Physical Signs/Symptoms Outcomes:  Weight, Biochemical Data, Meal Time Behavior     Discharge Planning:     Too soon to determine     Electronically signed by Sheridan Pope MS, RD, LD on 9/28/21 at 10:15 AM EDT

## 2021-09-28 NOTE — PROGRESS NOTES
[facility]  Facility/Department: [facility]  Speech Language Pathology   Treatment Note          Dayo Nicole  3/5/1929  [id number]        Rehab Dx/Hx: Impaired gait and mobility [R26.89]  Abnormality of gait and mobility [R26.9]    Precautions: falls    Medical Dx: Impaired gait and mobility [R26.89]  Abnormality of gait and mobility [R26.9]  Speech Dx: Cognitive Linguistic Impairment    Date: 9/28/2021    Subjective:  Alert and Cooperative        Interventions used this date:  Cognitive Skill Development    Objective/Assessment:  Patient progressing towards goals:  Short-term Goals  Timeframe for Short-term Goals: 1-2 weeks  Goal 1: To increase safety awareness and judgment for safe completion of ADLs secondary to pt's cognitive deficits,  pt will complete mid level problem solving tasks related to ADLs (e.g. home/community safety) with 80% accuracy and min cues. Patient completed mid level picture problem solving task I with 0% accuracy, with min cueing 50% accuracy. Patient presented with reduced motivation. ST placed cards on right side secondary to left visual neglect. Goal 4: To address pt's cognitive deficits and promote recall of personal and medical information, pt will answer questions addressing (remote, recent, delayed) recall with 80% accuracy and min cues. Patient completed 2 picture immediate recall What's missing task I with 100% accuracy. Patient completed 3 picture immediate recall task I with 100% accuracy. Patient completed 4 picture immediate recall task I with 87% accuracy. Goal 5: To increase safety awareness and judgment for safe completion of ADLs secondary to pt's cognitive deficits, pt will complete abstract reasoning tasks (i.e. Word deduction, convergent and divergent naming, similarities/differences) with 80% accuracy and min cues.  Patient named 3 items in an abstract category I with 66% accuracy, with mod cueing 88% accuracy  Long-term Goals  Timeframe for Long-term Goals: 2-3 weeks  Goal 1: Pt will improve his Cognition from mod assist to supervised assist for adequate functional recall and safety awareness for home, community. Short-term Goals  Goal 1: N/A  Compensatory Swallowing Strategies: Alternate solids and liquids, Small bites/sips, Eat/Feed slowly, Upright as possible for all oral intake    Pocket talker was utilized to improve hearing. Treatment/Activity Tolerance:  Patient tolerated treatment well    Plan:  Continue per POC    Pain Assessment:  Pre-Treatment  Pain assessment: 0-10  Pain level: 0  Intervention:  Patient denies pain. Post-Treatment  Pain assessment: 0-10  Pain level: 0  Intervention:  Patient denies pain. Patient/Caregiver Education:  Patient educated on session and progression towards goals.     Safety Devices:  Call light within reach and Chair alarm in place      [id number] Xander Hilliard ([facility]):    SWALLOWING  Rating:  DNT    SPOKEN LANGUAGE COMPREHENSION  Rating:  DNT    SPOKEN LANGUAGE EXPRESSION  Ratin    MOTOR SPEECH  Ratin    PROBLEM SOLVING  Ratin    MEMORY  Ratin          Therapy Time  SLP Individual Minutes  Time In: 1330  Time Out: 5383  Minutes: 23              Signature: Electronically signed by DALTON Payton on 2021 at 1:54 PM

## 2021-09-28 NOTE — PLAN OF CARE
Nutrition Problem #1: Unintended weight loss  Intervention: Food and/or Nutrient Delivery: Continue Current Diet, Continue Oral Nutrition Supplement  Nutritional Goals: Intake >75% of meals/supplement. Maintain weight ~149#.

## 2021-09-28 NOTE — PROGRESS NOTES
Occupational Therapy  Facility/Department: [facility]  Daily Treatment Note  NAME: Shanthi Shah  : 3/5/1929  MRN: [id number]    Date of Service: 2021    Discharge Recommendations:  Continue to assess pending progress       Assessment   Assessment: Pt demonstrates mod difficulty to sort and assemble puzzles and requires verbal cues to complete. Pt continutes to benefit from OT servicesto maximize independence and safety during ADLs. REQUIRES OT FOLLOW UP: Yes  Activity Tolerance  Activity Tolerance: Patient Tolerated treatment well  Activity Tolerance: fair  Safety Devices  Safety Devices in place: Yes  Type of devices: All fall risk precautions in place         Patient Diagnosis(es): There were no encounter diagnoses. has a past medical history of Anemia, CAD (coronary artery disease), DM (diabetes mellitus) ([facility] [address] Dyslipidemia, History of tobacco abuse, HTN (hypertension), Hypothyroidism, Non-ST elevation MI (NSTEMI) ([facility] [address] and Pacemaker. has a past surgical history that includes Cataract removal and Middle ear surgery (Left, ). Restrictions  Restrictions/Precautions  Restrictions/Precautions: Fall Risk  Implants present? : Pacemaker  Subjective   General  Chart Reviewed: Yes  Patient assessed for rehabilitation services?: Yes  Response to previous treatment: Patient with no complaints from previous session  Family / Caregiver Present: Yes  Referring Practitioner: Dr Vanessa Kelley  Diagnosis: Impaired mobility and ADL's due to right occipital stroke  Subjective  Subjective: \"I have to go to the bathroom. \"  Pain Assessment  Pain Assessment: 0-10  Pain Level: 0  Pre Treatment Pain Screening  Pain at present: 0  Scale Used: Numeric Score  Intervention List: Patient able to continue with treatment  Vital Signs  Patient Currently in Pain: Denies   Orientation  Orientation  Orientation Level: Oriented to person  Objective       Balance  Standing Balance: Supervision  Standing Balance  Time: Total of 14 minutes x 3 trials  Activity: cut out puzzles  Comment: Pt completed sit<>stand transfers at Supervision level and stood with supervision to sort and assemble cut out puzzles of familiar objects using B UEs to improve standing tolerance and balance during ADLs and IADLs. Pt able to stand for increments of ~4 minutes before requiring seated rest break. Pt demonstrates min difficulty to sort and assemble and requires mod verbal cues to complete. Pt with fair activity tolerance. Pt's family present for family training this morning. Pt's family was informed of pt's current level of function and progress towards LTG's. Pt's granddaughter reports she is usually home all the time but if she is not someone is always home with the pt. Therapist discussed pt's prior level of function and his interests with his family secondary to pt appears less motivated and uninterested at times during session. Pt enjoys puzzles, word searches, crossword puzzles, reading and cooking. Therapist informed pt's family that this therapist would attempt to have pt engage in similar activities while he is here. Pt's family completed toilet transfer and tub transfer training to observe and demonstrate safety during functional transfers prior to discharge. Pt completed at the levels listed below and pt's family does not report concerns at this time with pt's ability to transfer. Family is appreciative of information and have no further questions. Toilet Transfers  Toilet - Technique: Stand step  Equipment Used: Grab bars  Toilet Transfer: Supervision  Tub Transfers  Tub - Transfer From: Wheelchair  Tub - Transfer Type: To and From  Tub - Transfer To:  Shower seat with back  Tub - Technique: Ambulating  Tub Transfers: Stand by assistance     Transfers  Sit to stand: Supervision  Stand to sit: Supervision     Plan   Plan  Times per week: 5-7x/wk  Plan weeks: 2 weeks  Current Treatment Recommendations: Strengthening, Endurance Training, Neuromuscular Re-education, Balance Training, Functional Mobility Training, Self-Care / ADL, Cognitive Reorientation, Safety Education & Training, Cognitive/Perceptual Training, Patient/Caregiver Education & Training  Plan Comment: Continue per OT plan of care  G-Code     OutComes Score                                                  AM-PAC Score             Goals  Long term goals  Time Frame for Long term goals : 5-7x/wk x 2 weeks  Long term goal 1: Pt within 2 weeks of initial evaluation will demonstrate progress in th following areas listed below to achieve specific LTG's as stated in the initial evaluation  Long term goal 2: Increase independence with ADL self caare  Long term goal 3:  Increase cognition for the safe completion of ADL  Long term goal 4: increase functional mobility independence for completion of ADL  Patient Goals   Patient goals : Not stated at this time       Therapy Time   Individual Concurrent Group Co-treatment   Time In 930         Time Out 1030         Minutes 60           ADL/IADL trainin minutes  Neuromuscular reeducation: 15 minutes     Rosario Burnham OT   Electronically signed by Rosario Burnham OT on 2021 at 11:18 AM

## 2021-09-28 NOTE — PROGRESS NOTES
Physical Therapy Rehab Treatment Note  [facility]       NAME: Fede lFower  : 3/5/1929 (36 y.o.)  MRN: [id number]  CODE STATUS: Full Code    Date of Service: 2021  Chart Reviewed: Yes  Family / Caregiver Present: Yes (Granddaughter. Also daughter from [place].)    Restrictions:  Restrictions/Precautions: Fall Risk       SUBJECTIVE: Subjective: No comments. Response To Previous Treatment: Patient with no complaints from previous session. Pain Screening  Patient Currently in Pain: No  Pre Treatment Pain Screening  Pain at present: 0  Scale Used: Numeric Score  Intervention List: Patient able to continue with treatment    Post Treatment Pain Screening:  Pain Assessment  Pain Level: 0    OBJECTIVE:                    Bed mobility  Rolling to Left: Independent  Supine to Sit: Modified independent  Scooting: Independent      Transfers  Sit to Stand: Supervision  Stand to sit: Supervision  Bed to Chair: Supervision      Ambulation  Ambulation?: Yes  Ambulation 1  Surface: level tile;carpet  Device: Rolling Walker  Assistance: Stand by assistance  Quality of Gait: Pt needing occasional tactile cues secondary to left neglect for directing and path finding. Increased carry over to scan environment when told before hand. Distance: 80' with several turns. Exercises  Hip Flexion: x 20  Hip Abduction: Side kicks x 20  Knee Long Arc Quad: x 20  Ankle Pumps: x 20  Neurodevelopmental Techniques: MRE'S: ABD/ADD: x 20     ASSESSMENT/PROGRESS TOWARDS GOALS:  Assessment: PM focused on gait goal.  Pt demonstrated improved scanning of environment. Pt demonstrated improved transfer safety. Bed mobility goal met. Pt too fatigued for stairs.       Goals:  Long term goals  Long term goal 1: indep bed mobility  Long term goal 2: indep bed and car trnasfers  Long term goal 3: indep gait 50 feet in familiar and protected environment  Long term goal 4: supervision for 4 stairs  Long term goal 5: CGA gait 150 feet or greater    PLAN OF CARE/Safety:   Safety Devices  Type of devices: All fall risk precautions in place; Left in chair;Chair alarm in place      Therapy Time:   Individual   Time In    Time Out 1330   Minutes 30     Minutes:30      Transfer/Bed mobility trainin      Gait training: 10      Neuro re education: 5     Therapeutic ex: [address]edman PTA, 21 at 4:04 PM

## 2021-09-28 NOTE — PROGRESS NOTES
[facility]  Facility/Department: [facility]  Speech Language Pathology   Treatment Note          Ardyce Cheadle  3/5/1929  [id number]        Rehab Dx/Hx: Impaired gait and mobility [R26.89]  Abnormality of gait and mobility [R26.9]    Precautions: falls    Medical Dx: Impaired gait and mobility [R26.89]  Abnormality of gait and mobility [R26.9]  Speech Dx: Cognitive Linguistic Impairment    Date: 9/28/2021    Subjective:  Alert and Cooperative        Interventions used this date:  Caregiver education    Objective/Assessment:  Patient progressing towards goals:    Patient, granddaughter and daughter were present in the room. Family reported PTA patient was intermittently sleeping during the day and up all night. Granddaughter, Avi Hernandez, reported patient was forgetful at times, but doing well at home. Family manages medications and finances. Patient always has someone with him. Shira expressed concern about patient's new left visual neglect. ST discussed cognitive-linguistic deficits noted during the eval. ST also noted patient's hearing and being tired may have negatively impacted his scores on the assessment. ST discussed current ST POC. Family was agreeable with POC. Family reported patient had a calendar he looked at on a daily basis. ST provided some memory strategies that may be beneficial for the patient. ST answered all the families questions to the best of her ability. ST does recommend 24/7 supervision. Family was agreeable. Treatment/Activity Tolerance:  Patient tolerated treatment well    Plan:  Continue per POC    Pain Assessment:  Pre-Treatment  Pain assessment: 0-10  Pain level: 0  Intervention:  Patient denies pain. Post-Treatment  Pain assessment: 0-10  Pain level: 0  Intervention:  Patient denies pain. Patient/Caregiver Education:  Patient educated on session and progression towards goals. Caregiver education on session and progress towards goals.     Safety Devices:  Call light within reach and Chair alarm in place        Therapy Time  SLP Individual Minutes  Time In: 1103  Time Out: 1118  Minutes: 15              Signature: Electronically signed by DALTON Payton on 9/28/2021 at 11:34 AM

## 2021-09-28 NOTE — PROGRESS NOTES
Physical Therapy Rehab Treatment Note  [facility]  [facility]       NAME: Cali Salas  : 3/5/1929 (9 y.o.)  MRN: [id number]  CODE STATUS: Full Code    Date of Service: 2021  Chart Reviewed: Yes  Family / Caregiver Present: Yes (Granddaughter. Also daughter from [place].)    Restrictions:  Restrictions/Precautions: Fall Risk       SUBJECTIVE: Subjective: No comments. Response To Previous Treatment: Patient with no complaints from previous session. Pain Screening  Patient Currently in Pain: No  Pre Treatment Pain Screening  Pain at present: 0  Scale Used: Numeric Score  Intervention List: Patient able to continue with treatment    Post Treatment Pain Screening:  Pain Assessment  Pain Level: 0    OBJECTIVE:                    Bed mobility  Comment: Family was just educated that pt has been independent in this area. Family states he has a very high bed at home with a 6\" step to get up onto the bed. Granddaughter is going to lower the bed, so this was not practiced. Transfers  Sit to Stand: Supervision;Stand by assistance  Stand to sit: Supervision;Stand by assistance  Car Transfer: Unable to assess (Secondary to time, unable to assess. Granddaughter has SUV and is ok with getting pt in and out of the car. Educated only.)    Ambulation  Ambulation?: Yes  Ambulation 1  Surface: level tile;carpet;ramp  Device: Rolling Walker  Assistance: Stand by assistance  Quality of Gait: Pt needing occasional tactile cues secondary to left neglect for directing and path finding. Family able to properly direct pt to left as turning to right is not an issue. Pt has poor scanning of environment skills. Distance: 200' with multiple turns.     Stairs/Curb  Stairs?: Yes  Stairs  # Steps : 12  Stairs Height: 6\"  Rails: Right ascending (Use of helper's arm or HHA of RUE while descending with one rail.)  Assistance: Stand by assistance;Contact guard assistance  Comment: Family able to demonstrate capability of guarding pt up and down the stairs. ASSESSMENT/PROGRESS TOWARDS GOALS:  Assessment: Family training completed. PT Education: Family Education  Patient Education: Family educated on pt's deficits regarding left neglect and poor scanning abilities. Recommended pt use wheeled walker at home and have assist whenever up on feet. Goals:  Long term goals  Long term goal 1: indep bed mobility  Long term goal 2: indep bed and car trnasfers  Long term goal 3: indep gait 50 feet in familiar and protected environment  Long term goal 4: supervision for 4 stairs  Long term goal 5: CGA gait 150 feet or greater    PLAN OF CARE/Safety:   Safety Devices  Type of devices: All fall risk precautions in place; Left in chair;Chair alarm in place      Therapy Time:   Individual   Time In 1030   Time Out 1100   Minutes 30     Minutes:30      Transfer/Bed mobility trainin      Gait trainin [address], PTA, 21 at 12:58 PM

## 2021-09-29 LAB
GLUCOSE BLD-MCNC: 132 MG/DL (ref 60–115)
GLUCOSE BLD-MCNC: 145 MG/DL (ref 60–115)
GLUCOSE BLD-MCNC: 153 MG/DL (ref 60–115)
GLUCOSE BLD-MCNC: 204 MG/DL (ref 60–115)
PERFORMED ON: ABNORMAL

## 2021-09-29 PROCEDURE — 97535 SELF CARE MNGMENT TRAINING: CPT

## 2021-09-29 PROCEDURE — 6370000000 HC RX 637 (ALT 250 FOR IP): Performed by: INTERNAL MEDICINE

## 2021-09-29 PROCEDURE — 97130 THER IVNTJ EA ADDL 15 MIN: CPT

## 2021-09-29 PROCEDURE — 97129 THER IVNTJ 1ST 15 MIN: CPT

## 2021-09-29 PROCEDURE — 6370000000 HC RX 637 (ALT 250 FOR IP): Performed by: PHYSICAL MEDICINE & REHABILITATION

## 2021-09-29 PROCEDURE — 99232 SBSQ HOSP IP/OBS MODERATE 35: CPT | Performed by: PHYSICAL MEDICINE & REHABILITATION

## 2021-09-29 PROCEDURE — 97530 THERAPEUTIC ACTIVITIES: CPT

## 2021-09-29 PROCEDURE — 97110 THERAPEUTIC EXERCISES: CPT

## 2021-09-29 PROCEDURE — 97116 GAIT TRAINING THERAPY: CPT

## 2021-09-29 PROCEDURE — 94640 AIRWAY INHALATION TREATMENT: CPT

## 2021-09-29 PROCEDURE — 94761 N-INVAS EAR/PLS OXIMETRY MLT: CPT

## 2021-09-29 PROCEDURE — 1180000000 HC REHAB R&B

## 2021-09-29 RX ORDER — BACITRACIN, NEOMYCIN, POLYMYXIN B 400; 3.5; 5 [USP'U]/G; MG/G; [USP'U]/G
OINTMENT TOPICAL 2 TIMES DAILY
Status: DISCONTINUED | OUTPATIENT
Start: 2021-09-29 | End: 2021-10-02 | Stop reason: HOSPADM

## 2021-09-29 RX ADMIN — BACITRACIN, NEOMYCIN, POLYMYXIN B: 400; 3.5; 5 OINTMENT TOPICAL at 12:17

## 2021-09-29 RX ADMIN — Medication 100 MG: at 08:07

## 2021-09-29 RX ADMIN — Medication: at 20:54

## 2021-09-29 RX ADMIN — Medication 2000 UNITS: at 17:10

## 2021-09-29 RX ADMIN — APIXABAN 5 MG: 5 TABLET, FILM COATED ORAL at 08:07

## 2021-09-29 RX ADMIN — ASPIRIN 81 MG: 81 TABLET, COATED ORAL at 08:07

## 2021-09-29 RX ADMIN — Medication: at 08:08

## 2021-09-29 RX ADMIN — FERROUS SULFATE TAB 325 MG (65 MG ELEMENTAL FE) 325 MG: 325 (65 FE) TAB at 08:07

## 2021-09-29 RX ADMIN — FOLIC ACID 1 MG: 1 TABLET ORAL at 08:07

## 2021-09-29 RX ADMIN — BUDESONIDE AND FORMOTEROL FUMARATE DIHYDRATE 2 PUFF: 80; 4.5 AEROSOL RESPIRATORY (INHALATION) at 04:06

## 2021-09-29 RX ADMIN — BACITRACIN, NEOMYCIN, POLYMYXIN B: 400; 3.5; 5 OINTMENT TOPICAL at 20:55

## 2021-09-29 RX ADMIN — RAMIPRIL 5 MG: 5 CAPSULE ORAL at 12:00

## 2021-09-29 RX ADMIN — ATORVASTATIN CALCIUM 40 MG: 40 TABLET, FILM COATED ORAL at 20:51

## 2021-09-29 RX ADMIN — CARVEDILOL 6.25 MG: 6.25 TABLET, FILM COATED ORAL at 17:10

## 2021-09-29 RX ADMIN — Medication 30 MG: at 11:05

## 2021-09-29 RX ADMIN — APIXABAN 5 MG: 5 TABLET, FILM COATED ORAL at 20:50

## 2021-09-29 RX ADMIN — CARVEDILOL 6.25 MG: 6.25 TABLET, FILM COATED ORAL at 08:07

## 2021-09-29 ASSESSMENT — PAIN SCALES - GENERAL
PAINLEVEL_OUTOF10: 0

## 2021-09-29 NOTE — DISCHARGE INSTR - COC
Continuity of Care Form    Patient Name: Shawn Hernandez   :  3/5/1929  MRN:  [id number]    Admit date:  2021  Discharge date:  10/2/21    Code Status Order: Full Code   Advance Directives:     Admitting Physician:  Sarita Ray DO  PCP: Kedar Stapleton MD, MD    Discharging Nurse:[address] Unit/Room#: M438/L503-19  Discharging Unit Phone Number: [phone]    Emergency Contact:   Extended Emergency Contact Information  Primary Emergency Contact: [address] Phone: [phone]  Mobile Phone: [phone]  Relation: Grandchild  Secondary Emergency Contact: [address] Phone: [phone]  Relation: Child   needed?  No    Past Surgical History:  Past Surgical History:   Procedure Laterality Date    CATARACT REMOVAL      MIDDLE EAR SURGERY Left     \"they had to reset the bones\" after an infection       Immunization History:   Immunization History   Administered Date(s) Administered    Influenza, High Dose (Fluzone 65 yrs and older) 2016, 2016, 10/12/2017    Influenza, Quadv, IM, PF (6 mo and older Fluzone, Flulaval, Fluarix, and 3 yrs and older Afluria) 2020    Pneumococcal Conjugate 13-valent ([id number]) 2016    Pneumococcal Polysaccharide (Dtjmvgwuh18) 2012    Tdap (Boostrix, Adacel) 2020    Zoster Live (Zostavax) 2013       Active Problems:  Patient Active Problem List   Diagnosis Code    HTN (hypertension) I10    Dyslipidemia E78.5    DM (diabetes mellitus) ([facility] [address] E11.9    Hypothyroidism E03.9    History of tobacco abuse Z87.891    Anemia D64.9    CAD (coronary artery disease) I25.10    Bronchitis J40    COPD with acute exacerbation ([facility]) J44.1    SOB (shortness of breath) R06.02    Anginal equivalent ([facility]) I20.8    Hypotension I95.9    Chest pain R07.9    NSTEMI (non-ST elevated myocardial infarction) ([facility]) I21.4    COPD exacerbation ([facility]) J44.1    Syncope and collapse R55    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    Venous thrombosis of leg I82.90    Hyponatremia E87.1    PVD (peripheral vascular disease) ([facility]) I73.9    Gait abnormality dt PVD--right femoral artery occlusion R26.9    Chronic right shoulder pain M25.511, G89.29    CKD (chronic kidney disease) N18.9    Arterial occlusion I70.90    General weakness R53.1    Elevated troponin R77.8    Hyperkalemia E87.5    Anticoagulation adequate with anticoagulant therapy Z79.01    Acute CVA (cerebrovascular accident) ([facility] [address] I63.9    Aphasia R47.01    Impaired gait and mobility dt R occipital CVA R26.89    Abnormality of gait and mobility R26.9    Neurogenic bladder N31.9    Bilateral hearing loss H91.93    Left hemiparesis ([facility]) G81.94       Isolation/Infection:   Isolation          No Isolation        Patient Infection Status     Infection Onset Added Last Indicated Last Indicated By Review Planned Expiration Resolved Resolved By    None active    Resolved    COVID-19 Rule Out 09/14/21 09/14/21 09/14/21 COVID-19, Rapid (Ordered)   09/14/21 Rule-Out Test Resulted    COVID-19 Rule Out 02/09/21 02/09/21 02/09/21 COVID-19 (Ordered)   02/09/21 Rule-Out Test Resulted    COVID-19 Rule Out 02/01/21 02/01/21 02/01/21 COVID-19 (Ordered)   02/01/21 Rule-Out Test Resulted    COVID-19 Rule Out 01/26/21 01/26/21 01/26/21 COVID-19 (Ordered)   01/26/21 Rule-Out Test Resulted    COVID-19 Rule Out 07/08/20 07/08/20 07/08/20 Covid-19 Ambulatory (Ordered)   07/10/20 Rule-Out Test Resulted          Nurse Assessment:  Last Vital Signs: BP (!) 131/51   Pulse 68   Temp 98.2 °F (36.8 °C) (Oral)   Resp 18   Ht 5' 5.98\" (1.676 m)   Wt 149 lb 0.5 oz (67.6 kg)   SpO2 94%   BMI 24.07 kg/m²     Last documented pain score (0-10 scale): Pain Level: 0  Last Weight:   Wt Readings from Last 1 Encounters:   09/25/21 149 lb 0.5 oz (67.6 kg)     Mental Status:  {IP PT MENTAL STATUS:20030}    IV Access:  {[facility] IV ACCESS:629083608}    Nursing Mobility/ADLs:  Walking   {CHP DME ARGENIS:942588335}  Transfer  {CHP DME VPWF:459523868}  Bathing  {CHP DME ARIAN:475723773}  Dressing  {CHP DME YMTH:421859390}  Toileting  {CHP DME EJZK:436930352}  Feeding  {CHP DME OHLY:688591092}  Med Admin  {CHP DME LSRO:257887410}  Med Delivery   {[facility] MED Delivery:463733369}    Wound Care Documentation and Therapy:  Wound 21 Sternum Lower;Mid red small bumps (Active)   Dressing/Treatment Open to air 21   Number of days: 1       Wound 21 Abdomen Lower;Right red cirular rash ' (Active)   Number of days: 1       Wound 21 Abdomen Left; Lower red raised rash 1cm diameter (Active)   Number of days: 1        Elimination:  Continence:   · Bowel: {YES / FE:}  · Bladder: {YES / LW:81494}  Urinary Catheter: {Urinary Catheter:023354253}   Colostomy/Ileostomy/Ileal Conduit: {YES / DZ:72920}       Date of Last BM:10/1/21    Intake/Output Summary (Last 24 hours) at 2021 1002  Last data filed at 2021 0814  Gross per 24 hour   Intake 476 ml   Output --   Net 476 ml     I/O last 3 completed shifts:   In: 1 [P.O.:476]  Out: -     Safety Concerns:     [employer] Safety Concerns:519853271}    Impairments/Disabilities:      [employer] Impairments/Disabilities:685394799}    Nutrition Therapy:  Current Nutrition Therapy:   [employer] Diet List:471112157}    Routes of Feeding: {CHP DME Other Feedings:050324417}  Liquids: {Slp liquid thickness:06546}  Daily Fluid Restriction: {CHP DME Yes amt example:669057030}  Last Modified Barium Swallow with Video (Video Swallowing Test): {Done Not Done XS}    Treatments at the Time of Hospital Discharge:   Respiratory Treatments: ***  Oxygen Therapy:  {Therapy; copd oxygen:94338}  Ventilator:    { CC Vent ETZI:533875346}    Rehab Therapies: {THERAPEUTIC INTERVENTION:7576054828}  Weight Bearing Status/Restrictions: { CC Weight Bearin}  Other Medical Equipment (for information only, NOT a DME order): {EQUIPMENT:294847582}  Other Treatments: ***    Patient's personal belongings (please select all that are sent with patient):  {CHP DME Belongings:756487361}    RN SIGNATURE:  {Esignature:020957422}    CASE MANAGEMENT/SOCIAL WORK SECTION    Inpatient Status Date: Patient admitted to acute inpatient rehab on 9/23/21    Readmission Risk Assessment Score:  Readmission Risk              Risk of Unplanned Readmission:  33           Discharging to Facility/ Agency   · Name:   · Address:  · Phone:  · Fax:    Dialysis Facility (if applicable)   · Name:  · Address:  · Dialysis Schedule:  · Phone:  · Fax:    / signature: Electronically signed by Eva Luo RN on 9/29/21 at 10:02 AM EDT    PHYSICIAN SECTION    Prognosis: Good    Condition at Discharge: Stable    Rehab Potential (if transferring to Rehab): Good    Recommended Labs or Other Treatments After Discharge:     Physician Certification: I certify the above information and transfer of Misbah Gill  is necessary for the continuing treatment of the diagnosis listed and that he requires Home Care for  30 days.      Update Admission H&P: No change in H&P    PHYSICIAN SIGNATURE:Dr Marva Llamas DO    Electronically signed by Eva Luo RN on 9/29/21 at 10:03 AM EDT

## 2021-09-29 NOTE — PROGRESS NOTES
Physical Therapy Rehab Treatment Note  [facility]       NAME: Viviana Haley  : 3/5/1929 (89 y.o.)  MRN: [id number]  CODE STATUS: Full Code    Date of Service: 2021  Chart Reviewed: Yes  Patient assessed for rehabilitation services?: Yes  Family / Caregiver Present: No  Diagnosis: Impaired gait and mobility dt R occipital CVA  General Comment  Comments: okay    Restrictions:  Restrictions/Precautions: Fall Risk       SUBJECTIVE: Response To Previous Treatment: Patient with no complaints from previous session. Pain Screening  Patient Currently in Pain: Denies  Pre Treatment Pain Screening  Pain at present: 0  Scale Used: Numeric Score  Intervention List: Patient able to continue with treatment    Post Treatment Pain Screening:  Pain Assessment  Pain Assessment: 0-10  Pain Level: 0    OBJECTIVE:   Follows Commands: Within Functional Limits    Bed mobility  Bridging: Independent  Rolling to Left: Independent  Rolling to Right: Independent  Supine to Sit: Independent  Sit to Supine: Independent    Transfers  Sit to Stand: Modified independent  Stand to sit: Modified independent  Bed to Chair: Supervision    Ambulation  Ambulation?: Yes  More Ambulation?: No  Ambulation 1  Surface: level tile;uneven;carpet  Device: Rolling Walker  Assistance: Stand by assistance  Quality of Gait: Patient with difficulty negotiatting walker through doorways L sided neglect evident with walker bumping door jams  Distance: 250' x 2    Stairs/Curb  Stairs?: Yes  Stairs  Curbs: 4\"  Device: Rolling walker  Assistance: Stand by assistance  Comment: Patient impulsive with curb step lifts walker and reaches far to set on and off curb step    ASSESSMENT/PROGRESS TOWARDS GOALS:  Body structures, Functions, Activity limitations: Decreased functional mobility ; Decreased balance;Decreased posture;Decreased coordination  Assessment: Paptient continues to show L sided neglect bumping into door jams with [employer] during gait training remaining at super vision level. Initiated curb step traning and patient is impulsive not stepping close enough to curb prior to lifting [employer]    Goals:  Long term goals  Long term goal 1: indep bed mobility  Long term goal 2: indep bed and car transers  Long term goal 3: indep gait 50 feet in familiar and protected environment  Long term goal 4: supervision for 4 stairs  Long term goal 5: CGA gait 150 feet or greater    PLAN OF CARE/Safety:   Safety Devices  Type of devices: All fall risk precautions in place; Left in chair;Chair alarm in place      Therapy Time:   Individual   Time In 1300   Time Out 1330   Minutes 30     Minutes: 30      Transfer/Bed mobility training: 15      Gait trainin [place], PTA, 21 at 2:50 PM

## 2021-09-29 NOTE — PROGRESS NOTES
Subjective: The patient complains of severe acute on chronic progressive fatigue and  Left sided weakness partially relieved by rest,medications, PT,  OT,   SLP and rest and exacerbated by recent illness. 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 He has been doing well with his granddaughter at home but we are wondering whether he may need extra assistance possibly an assisted living versus long-term care eventually. Nursing reports very malodorous urine and I have ordered a stat UA. I had hoped to discontinue his fingersticks as his blood sugars were under good control however now his family has brought in a lot of junk food in her back into the 200s. We will continue sliding scale and fingersticks for now. I reviewed current care and plans for further care with other rehab providers including nursing and case management. According to recent  note, \"VINCE met with Peg Garcia (patient's dtr) and she requested to discuss discharge options for patient as she was worried about patient climbing stairs in [facility]. LSW discussed different discharge options such as arranging first floor set up, AL, or SNF. Peg Garcia felt that patient may not be able to have first floor set up in Soquel's home due to not having a lot of room, as well as it being too noisy with [facility] children and their friends. LSW discussed first floor set up in patient's son's home, which Peg Garcia felt may be a good option. This would be dependent on patient's level of function upon discharge as there were some concerns with him being unsteady. LSW also discussed [address] vs OP and that [address] may be better to start off with, Peg Garcia in agreement. LSW also discussed AL vs SNF and private pay rates. Family training is scheduled for 10AM today with Luann Mansfield, and Peg Garcia plans to attend as well.  LSW encouraged to participate in family training first and then determining what discharge plan would be best. LSW gave business card to Hilda Gonzalez and encouraged her to call with any questions. \".    ROS x10: The patient also complains of severely impaired mobility and activities of daily living. Otherwise no new problems with vision, hearing, nose, mouth, throat, dermal, cardiovascular, GI, , pulmonary, musculoskeletal, psychiatric or neurological. See Rehab H&P on Rehab chart dated . Vital signs:  BP (!) 131/51   Pulse 68   Temp 98.2 °F (36.8 °C) (Oral)   Resp 18   Ht 5' 5.98\" (1.676 m)   Wt 149 lb 0.5 oz (67.6 kg)   SpO2 94%   BMI 24.07 kg/m²   I/O:   PO/Intake:  fair PO intake, no problems observed or reported. Bowel/Bladder:  continent, no problems noted. General:  Patient is well developed, adequately nourished, non-obese and     well kempt. HEENT:    PERRLA, [place] hearing intact to loud voice, external inspection of ear     and nose benign. Inspection of lips, tongue and gums benign  Musculoskeletal: No significant change in strength or tone. All joints stable. Inspection and palpation of digits and nails show no clubbing,       cyanosis or inflammatory conditions. Neuro/Psychiatric: Affect: flat but pleasant. Alert and oriented to person, place and     Situation with  mod cues. No significant change in deep tendon reflexes or     sensation  Lungs:  Diminished, CTA-B. Respiration effort is normal at rest.     Heart:   S1 = S2, RRR. No loud murmurs. Abdomen:  Soft, non-tender, no enlargement of liver or spleen. Extremities:  No significant lower extremity edema or tenderness.   Skin:   Intact to general survey, shear rashing to the buttocks bacitracin added    Rehabilitation:  Physical therapy:   Bed Mobility: Scooting: Independent    Transfers: Sit to Stand: Supervision  Stand to sit: Supervision  Bed to Chair: Supervision, Ambulation 1  Surface: level tile, carpet  Device: Rolling Walker  Assistance: Stand by assistance  Quality of Gait: Pt needing occasional tactile cues secondary to left neglect for directing and path finding. Increased carry over to scan environment when told before hand. Distance: 80' with several turns. Comments: Pt ambulated 30 feet with ww with SBA, Stairs  # Steps : 12  Stairs Height: 6\"  Rails: Right ascending (Use of helper's arm or HHA of RUE while descending with one rail.)  Assistance: Stand by assistance, Contact guard assistance  Comment: Family able to demonstrate capability of guarding pt up and down the stairs. FIMS:  ,  , Assessment: Unable to test gait in PM with furniture, as planned, after consulting PT. Occupational therapy:    ,  , Assessment: Pt demonstrates mod difficulty to sort and assemble puzzles and requires verbal cues to complete. Pt continutes to benefit from OT servicesto maximize independence and safety during ADLs. Speech therapy:        Lab/X-ray studies reviewed, analyzed and discussed with patient and staff:   Recent Results (from the past 24 hour(s))   POCT Glucose    Collection Time: 09/28/21 11:06 AM   Result Value Ref Range    POC Glucose 133 (H) 60 - 115 mg/dl    Performed on ACCU-CHEK    POCT Glucose    Collection Time: 09/28/21  4:06 PM   Result Value Ref Range    POC Glucose 135 (H) 60 - 115 mg/dl    Performed on ACCU-CHEK    POCT Glucose    Collection Time: 09/28/21  8:05 PM   Result Value Ref Range    POC Glucose 191 (H) 60 - 115 mg/dl    Performed on ACCU-CHEK    POCT Glucose    Collection Time: 09/29/21  6:40 AM   Result Value Ref Range    POC Glucose 132 (H) 60 - 115 mg/dl    Performed on ACCU-CHEK        Echocardiogram   9/17/2021   Transthoracic Echocardiography Report   Left Ventricle Normal left ventricular systolic function, no regional wall motion abnormalities, estimated ejection fraction of 50%. Normal left ventricular size and function. Right Ventricle Normal right ventricle structure and function.  Normal right ventricle systolic pressure. Left Atrium The left atrium is Mildly dilated. Right Atrium Normal right atrium. Mitral Valve Diffusely thickened and pliable mitral valve leaflets with normal excursion. Mild (1+) mitral regurgitation is present. No evidence of mitral valve stenosis. Tricuspid Valve Normal tricuspid valve structure and function. Aortic Valve The aortic valve leaflets were not well visualized. No evidence of aortic valve regurgitation . No evidence of aortic valve stenosis. Pericardial Effusion No evidence of pericardial effusion. Aorta \ Miscellaneous Miscellaneous normal findings were found. M-Mode Measurements (cm)   LVIDd: 3.33 cm            LVIDs: 2.3 cm  IVSd: 1.44 cm             IVSs: 1.73 cm  LVPWd: 0.8 cm             AO Root Dimension: 3.45 cm                             LVOT: 1.98 cm  Valves  LVOT   LVOT Diameter: 1.98 cm  Structures  Left Ventricle   Diastolic Dimension: 2.02 cm         Systolic Dimension: 2.3 cm  Septum Diastolic: 1.04 cm            Septum Systolic: 4.29 cm  PW Diastolic: 0.8 cm                                       FS: 30.9 %  LV EDV/LV EDV Index: 45.04 ml/25 m^2 LV ESV/LV ESV Index: 18.13 ml/10 m^2  EF Calculated: 59.8 %   LVOT Diameter: 1.98 cm  Aorta/ Miscellaneous Aorta   Aortic Root: 3.45 cm  LVOT Diameter: 1.98 cm      CT Head   9/16/2021    BRAIN CT FINDINGS: There has been interval development of a 3 cm area of hypodensity in the right occipital lobe since the previous day which is worrisome for an acute, subacute ischemia. There are persistent, chronic areas of ischemia in the anterior right temporal lobe, unchanged since previous study. No acute hemorrhage, mass, mass effect, or midline shift. There is prominence of sulci and ventricles indicating mild global cerebral atrophy and chronic involutional changes. Moderate periventricular white matter hypodensities indicating chronic microangiopathy are noted. The basal ganglia are within normal limits.  There are no acute changes or space-occupying lesions in the posterior fossa. The visualized portions of the orbits are within normal limits. The globes are intact. The imaged portions of the paranasal sinuses are unremarkable. The calvarium is intact. There is a 3 cm new area of hypodensity worrisome for acute in the right occipital lobe. CT Head   9/15/2021: The ventricles are dilated. Unchanged size configuration. No mass. No midline shift. The cisterns are patent. There are white matter and periventricular changes most likely consistent with chronic small vessel disease. Again note is made of a focal area of encephalomalacia at the inferolateral medial aspect the right temporal lobe. No acute intra-axial or extra-axial findings. The visualized osseous structures are unremarkable. There is mucoperiosteal thickening in the right maxillary sinus. There is a small amount of patchy opacification right mastoid. There is resection of the left mastoid. The middle ear bones are not appreciated. Correlate with patient's surgical history. .     NO ACUTE INTRA-AXIAL OR EXTRA-AXIAL FINDINGS. XR CHEST 9/16/2021: Median sternotomy. Right transvenous pacemaker, unchanged. No consolidation. No pleural effusion. No pneumothorax. Stable cardiomediastinal silhouette. Aortic vascular calcifications. No distinct acute osseous findings. No acute radiographic abnormality. XR CHEST : 9/14/2021 Single  views of the chest is submitted. Right-sided CCD device. Leads overlying the cardiac silhouette. There are multiple median sternotomy wires overlying the cardiac silhouette. The cardiac silhouette is of normal size configuration. Pulmonary vascular unremarkable. Right sided trachea. No focal infiltrates. No Pneumothoraces.                                                                                    NO ACUTE ACTIVE CARDIOPULMONARY PROCESS    US CAROTID ARTERY BILATERAL  9/16/2021  There is mild atherosclerotic plaquing of the common carotid arteries and carotid bifurcations without significantly elevated velocities. Maximum systolic velocity within the right internal and mid common carotid arteries are 79 and 51 cm/s, with an ICA/CCA ratio of approximately 1.5 , which indicates less than 50% by velocity criteria. Maximum systolic velocity within the left internal and mid common carotid arteries are 59 and 74 cm/s, with an ICA/CCA ratio of approximately 0.9, which indicates less than 50% by velocity criteria. Maximum velocities within the right and left external carotid arteries are 141 and 81 cm/sec respectively. There is antegrade flow in both vertebral arteries. MILD ATHEROSCLEROTIC PLAQUING OF THE CAROTID BULBS WITHOUT EVIDENCE OF A FLOW-LIMITING STENOSIS, BY VELOCITY RATIO CRITERIA. GOSINK CRITERIA Diameter          PSV t            EDV t          PSV          EDV          Stenosis Site       %              cm/sec          cm/sec      ICA/CCA    ICA/CCA 0-49                 <124              <40             <2:1           ---                 --- 50-69              125-225                  >2:1           ---                 --- 70-89               225-325          >100          >4:1           >5:1              --- 90%+                >325              >100          >4:1           >9:1           Damped resistive CCA >95%               May be            May be      Damped      ---              Damped resistive CCA                       decreased      decreased  resistive CCA        Previous extensive, complex labs, notes and diagnostics reviewed and analyzed. ALLERGIES:    Allergies as of 09/23/2021    (No Known Allergies)      (please also verify by checking MAR)       I reviewed her [facility] prescription monitoring service data sheets in hopes of eliminating polypharmacy and weaning to the lowest effective dose of pain medications and eliminating the concomitant use of benzodiazepines.   I see no medications of concern. I see no habits of combining sedatives and narcotics. He was not on any consistent sedatives or pain medication got 1 prescription of [place] at 1 point. Complex Physical Medicine & Rehab Issues Assess & Plan:   1. 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 .  Functional and medical status reassessed regarding patients ability to participate in therapies and patient found to be able to participate in acute intensive comprehensive inpatient rehabilitation program including PT/OT to improve balance, ambulation, ADLs, and to improve the P/AROM. Therapeutic modifications regarding activities in therapies, place, amount of time per day and intensity of therapy made daily. In bed therapies or bedside therapies prn.   2. Bowel and Bladder dysfunction  Neurogenic bowel and Bladder:  frequent toileting, ambulate to bathroom with assistance, check post void residuals. Check for C.difficile x1 if >2 loose stools in 24 hours, continue bowel & bladder program.  Monitor bowel and bladder function. Lactinex 2 PO every AC. MOM prn, Brown Bomb prn, Glycerin suppository prn, enema prn. 3. Moderate to severe LBP pain as well as generalized OA pain: reassess pain every shift and prior to and after each therapy session, give prn Tylenol and   Scheduled Tylenol, modalities prn in therapy, masage, Lidoderm, K-pad prn. Consider scheduled AM pain meds. Consider Norco as needed. 4. Skin healing shear rashing to the buttocks bacitracin addedand breakdown risk:  continue pressure relief program.  Daily skin exams and reports from nursing.   5. Severe fatigue due to nutritional and hydration deficiency: Add and titrate vitamin B12 vitamin D and CoQ10 continue to monitor I&Os, calorie counts prn, dietary consult prn.  6. Acute episodic insomnia with situational adjustment disorder:  prn Ambien, monitor for day time sedation. 7. Falls risk elevated:  patient to use call light to get nursing assistance to get up, bed and chair alarm. 8. Elevated DVT risk: progressive activities in PT, continue prophylaxis BELLE hose, omer and  Elliquis . 9. Complex discharge planning: We will continue to work since his planned DC 10/2/21-home with grand andreas-and Helena Nielson family is now considering long-term care versus assisted living. Focus on balance and gait ataxia safety issues and endurance. Continue weekly team meeting every Monday  to assess progress towards goals, discuss and address social, psychological and medical comorbidities and to address difficulties they may be having progressing in therapy. Patient and family education is in progress. The patient is to follow-up with their family physician after discharge. Complex Active General Medical Issues that complicate care Assess & Plan:    1. HTN (hypertension),  CAD (coronary artery disease),   PVD (peripheral vascular disease) -continue blood signs every shift focusing on heart rate and blood pressure checks, consult hospitalist for backup medical and adjust/add medications ( Eliquis, ASA, Coreg, Lipitor, Altace )  2. DM (diabetes mellitus) with neuropathy-Continue blood sugar checks every shift, diet, add diabetic add dietary education, restrict carbohydrates to lowest effective and safe carb count per meal advising 4 carbs per meal, add at bedtime snack to prevent a.m. hypoglycemia, adjust/add medications ( SSI )  3. Anemia-monitor closely on Eliquis--add Iron and Vit B12  4. Hypothyroidism-titrate Synthroid  5. COPD exacerbation-Pulse oximeter checks to shift dose and titrate oxygen and aerosol treatments monitor for nocturnal hypoxemia, monitor vital signs, oxygen prn. Symbicort, Albuterol  6. 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    Chronic right shoulder pain-Add Lidoderm and Tylenol. 8.   Acute CVA (cerebrovascular accident)with  Aphasia, Dysphagia and Left hemiparesis -focus on balance and cognition--pocket talker helps  9. Neurogenic bladder-bladder training program  10. Bilateral hearing loss--SLP and AD for hearing    11.  Depression-emotional support provided daily, vitamin B12, encourage participation in rehabilitation support group and recreational therapy, adjust/add medications ( Vit B12 )  add rehab Psych       Electronically signed by Ramona Alcazar DO on 9/27/21 at 8:15 AM EDT       James Thao D.O., PM&R     Attending    [address]

## 2021-09-29 NOTE — PROGRESS NOTES
Supervision;Verbal cueing  LE Bathing: Supervision;Verbal cueing  UE Dressing: Setup;Supervision  LE Dressing: Supervision;Verbal cueing  Toileting: Unable to assess(comment) (pt did not need to go)        Balance  Sitting Balance: Modified independent   Standing Balance: Supervision  Functional Mobility  Functional - Mobility Device: Rolling Walker  Activity: To/from bathroom; Retrieve items  Assist Level: Supervision  Toilet Transfers  Toilet Transfer: Unable to assess  Toilet Transfers Comments: Pt did not need to go  [address] - Transfer From: Barbi Bruner - Transfer Type: To and From  Shower - Transfer To: Shower seat with back  Shower - Technique: Ambulating  Shower Transfers: Supervision  Bed mobility  Supine to Sit: Modified independent  Transfers  Sit to stand: Supervision  Stand to sit: Supervision     Cognition  Cognition Comment: Comp Max A, expression Min A, social Max A, problem Min A, memory Mod A     Plan   Plan  Times per week: 5-7x/wk  Plan weeks: 2 weeks  Current Treatment Recommendations: Strengthening, Endurance Training, Neuromuscular Re-education, Balance Training, Functional Mobility Training, Self-Care / ADL, Cognitive Reorientation, Safety Education & Training, Cognitive/Perceptual Training, Patient/Caregiver Education & Training  Plan Comment: Continue per OT plan of care  G-Code     OutComes Score                                                  AM-PAC Score             Goals  Long term goals  Time Frame for Long term goals : 5-7x/wk x 2 weeks  Long term goal 1: Pt within 2 weeks of initial evaluation will demonstrate progress in th following areas listed below to achieve specific LTG's as stated in the initial evaluation  Long term goal 2: Increase independence with ADL self caare  Long term goal 3:  Increase cognition for the safe completion of ADL  Long term goal 4: increase functional mobility independence for completion of ADL  Patient Goals   Patient goals : Not

## 2021-09-29 NOTE — PROGRESS NOTES
Occupational Therapy  Facility/Department: [facility]  Daily Treatment Note  NAME: Migue Yanez  : 3/5/1929  MRN: [id number]    Date of Service: 2021    Discharge Recommendations:  Continue to assess pending progress       Assessment      REQUIRES OT FOLLOW UP: Yes  Activity Tolerance  Activity Tolerance: Patient Tolerated treatment well  Safety Devices  Safety Devices in place: Yes  Type of devices: All fall risk precautions in place         Patient Diagnosis(es): There were no encounter diagnoses. has a past medical history of Anemia, CAD (coronary artery disease), DM (diabetes mellitus) ([facility] [address] Dyslipidemia, History of tobacco abuse, HTN (hypertension), Hypothyroidism, Non-ST elevation MI (NSTEMI) ([facility] [address] and Pacemaker. has a past surgical history that includes Cataract removal and Middle ear surgery (Left, ). Restrictions  Restrictions/Precautions  Restrictions/Precautions: Fall Risk  Implants present? : Pacemaker  Subjective   General  Chart Reviewed: Yes  Patient assessed for rehabilitation services?: Yes  Response to previous treatment: Patient with no complaints from previous session  Family / Caregiver Present: No  Referring Practitioner: Dr Yg Finley  Diagnosis: Impaired mobility and ADL's due to right occipital stroke  Subjective  Subjective: \"I have to go to the bathroom. \"  Pain Assessment  Pain Assessment: 0-10  Pain Level: 0  Pre Treatment Pain Screening  Pain at present: 0  Scale Used: Numeric Score  Intervention List: Patient able to continue with treatment  Vital Signs  Patient Currently in Pain: Denies   Orientation  Orientation  Orientation Level: Oriented to person  Objective     While seated at tabletop, pt sorted pegs into foam peg board using B UEs to improve problem solving during ADLs and IADLs. Pt demonstrates min difficulty to sort pegs and requires intermittent rest breaks throughout. Pt requires significant increased time to fill entire peg board. Pt with min errors. Therapist removed pegs from board and returned to container. Balance  Sitting Balance: Modified independent   Standing Balance: Modified independent   Standing Balance  Time: Total of 12 minutes x 2 tirals  Activity: sort washers  Comment: Pt completed sit<>stand transfers at Mod I level and stood at Mod I to sort rubber washers onto vertical graded dowels using B UEs to improve standing tolerance and endurance during ADLs and IADLs. Pt able to stand without LOB and sort washers with min difficulty. Pt requires seated rest break before removing washers from the dowels. ADL  Grooming: Supervision  Toileting: Modified independent   Toilet Transfers  Toilet - Technique: Stand step  Toilet Transfer: Modified independent    Transfers  Sit to stand: Modified independent  Stand to sit: Modified independent      Pt was transported back to his room after toileting and completed w/c to bed transfer at Mod I level. Pt then completed sit to supine transfer at [address] [place] [address]  Times per week: 5-7x/wk  Plan weeks: 2 weeks  Current Treatment Recommendations: Strengthening, Endurance Training, Neuromuscular Re-education, Balance Training, Functional Mobility Training, Self-Care / ADL, Cognitive Reorientation, Safety Education & Training, Cognitive/Perceptual Training, Patient/Caregiver Education & Training  Plan Comment: Continue per OT plan of care  G-Code     OutComes Score                                                  AM-PAC Score             Goals  Long term goals  Time Frame for Long term goals : 5-7x/wk x 2 weeks  Long term goal 1: Pt within 2 weeks of initial evaluation will demonstrate progress in th following areas listed below to achieve specific LTG's as stated in the initial evaluation  Long term goal 2: Increase independence with ADL self caare  Long term goal 3:  Increase cognition for the safe completion of ADL  Long term goal 4: increase functional mobility independence for completion of ADL  Patient Goals   Patient goals : Not stated at this time       Therapy Time   Individual Concurrent Group Co-treatment   Time In 1500         Time Out 1600         Minutes 60           ADL/IADL trainin minutes  Therapeutic activities: 20 minutes  Cognitive Retrainin minutes     Willy Gregg OT   Electronically signed by Willy Gregg OT on 2021 at 4:39 PM

## 2021-09-29 NOTE — PLAN OF CARE
Problem: Skin Integrity:  Goal: Will show no infection signs and symptoms  Description: Will show no infection signs and symptoms  Outcome: Met This Shift  Goal: Absence of new skin breakdown  Description: Absence of new skin breakdown  Outcome: Met This Shift     Problem: Falls - Risk of:  Goal: Will remain free from falls  Description: Will remain free from falls  Outcome: Ongoing  Goal: Absence of physical injury  Description: Absence of physical injury  Outcome: Ongoing     Problem: Nutrition  Goal: Optimal nutrition therapy  Outcome: Met This Shift

## 2021-09-30 LAB
GLUCOSE BLD-MCNC: 117 MG/DL (ref 60–115)
GLUCOSE BLD-MCNC: 146 MG/DL (ref 60–115)
GLUCOSE BLD-MCNC: 176 MG/DL (ref 60–115)
GLUCOSE BLD-MCNC: 226 MG/DL (ref 60–115)
PERFORMED ON: ABNORMAL

## 2021-09-30 PROCEDURE — 6370000000 HC RX 637 (ALT 250 FOR IP): Performed by: PHYSICAL MEDICINE & REHABILITATION

## 2021-09-30 PROCEDURE — 97116 GAIT TRAINING THERAPY: CPT

## 2021-09-30 PROCEDURE — 97530 THERAPEUTIC ACTIVITIES: CPT

## 2021-09-30 PROCEDURE — 99232 SBSQ HOSP IP/OBS MODERATE 35: CPT | Performed by: PHYSICAL MEDICINE & REHABILITATION

## 2021-09-30 PROCEDURE — 97129 THER IVNTJ 1ST 15 MIN: CPT

## 2021-09-30 PROCEDURE — 97112 NEUROMUSCULAR REEDUCATION: CPT

## 2021-09-30 PROCEDURE — 6370000000 HC RX 637 (ALT 250 FOR IP): Performed by: INTERNAL MEDICINE

## 2021-09-30 PROCEDURE — 94640 AIRWAY INHALATION TREATMENT: CPT

## 2021-09-30 PROCEDURE — 94761 N-INVAS EAR/PLS OXIMETRY MLT: CPT

## 2021-09-30 PROCEDURE — 1180000000 HC REHAB R&B

## 2021-09-30 PROCEDURE — 97140 MANUAL THERAPY 1/> REGIONS: CPT

## 2021-09-30 PROCEDURE — 97130 THER IVNTJ EA ADDL 15 MIN: CPT

## 2021-09-30 RX ORDER — ASPIRIN 81 MG/1
81 TABLET ORAL DAILY
Qty: 30 TABLET | Refills: 3 | Status: SHIPPED | OUTPATIENT
Start: 2021-09-30

## 2021-09-30 RX ORDER — UBIDECARENONE 100 MG
100 CAPSULE ORAL DAILY
Qty: 60 CAPSULE | Refills: 5 | Status: SHIPPED | OUTPATIENT
Start: 2021-09-30

## 2021-09-30 RX ADMIN — BUDESONIDE AND FORMOTEROL FUMARATE DIHYDRATE 2 PUFF: 80; 4.5 AEROSOL RESPIRATORY (INHALATION) at 04:42

## 2021-09-30 RX ADMIN — CARVEDILOL 6.25 MG: 6.25 TABLET, FILM COATED ORAL at 10:30

## 2021-09-30 RX ADMIN — RAMIPRIL 5 MG: 5 CAPSULE ORAL at 10:30

## 2021-09-30 RX ADMIN — FOLIC ACID 1 MG: 1 TABLET ORAL at 10:30

## 2021-09-30 RX ADMIN — Medication 2000 UNITS: at 17:26

## 2021-09-30 RX ADMIN — FERROUS SULFATE TAB 325 MG (65 MG ELEMENTAL FE) 325 MG: 325 (65 FE) TAB at 10:31

## 2021-09-30 RX ADMIN — BUDESONIDE AND FORMOTEROL FUMARATE DIHYDRATE 2 PUFF: 80; 4.5 AEROSOL RESPIRATORY (INHALATION) at 17:22

## 2021-09-30 RX ADMIN — ASPIRIN 81 MG: 81 TABLET, COATED ORAL at 10:31

## 2021-09-30 RX ADMIN — APIXABAN 5 MG: 5 TABLET, FILM COATED ORAL at 10:31

## 2021-09-30 RX ADMIN — Medication 100 MG: at 10:31

## 2021-09-30 RX ADMIN — BACITRACIN, NEOMYCIN, POLYMYXIN B: 400; 3.5; 5 OINTMENT TOPICAL at 21:12

## 2021-09-30 RX ADMIN — CARVEDILOL 6.25 MG: 6.25 TABLET, FILM COATED ORAL at 17:27

## 2021-09-30 RX ADMIN — APIXABAN 5 MG: 5 TABLET, FILM COATED ORAL at 21:06

## 2021-09-30 RX ADMIN — Medication: at 21:12

## 2021-09-30 RX ADMIN — ATORVASTATIN CALCIUM 40 MG: 40 TABLET, FILM COATED ORAL at 21:07

## 2021-09-30 ASSESSMENT — PAIN SCALES - GENERAL
PAINLEVEL_OUTOF10: 0

## 2021-09-30 NOTE — PROGRESS NOTES
Subjective: The patient complains of severe acute on chronic progressive fatigue and  Left sided weakness partially relieved by rest,medications, PT,  OT,   SLP and rest and exacerbated by recent illness. 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 He has been doing well with his granddaughter at home but we are wondering whether he may need extra assistance possibly an assisted living versus long-term care eventually. Nursing reports very malodorous urine and I have ordered a stat UA. I had hoped to discontinue his fingersticks as his blood sugars were under good control however now his family has brought in a lot of junk food in her back into the 200s. We will continue sliding scale and fingersticks for now. I have encouraged him to restrict himself to 4 carb per meal diet and avoid excess sugar. I reviewed current care and plans for further care with other rehab providers including nursing and case management. According to recent  note, \"LSW spoke with Lili Torrez (granddaughter) and discussed discharge for 10/2. Shira stated that patient had [facility] previously and would be open to receiving their services again. LSW to confirm with patient. Shira also explained that she would like to discharge patient by 9AM on 10/2. LSW will notify team\". ROS x10: The patient also complains of severely impaired mobility and activities of daily living. Otherwise no new problems with vision, hearing, nose, mouth, throat, dermal, cardiovascular, GI, , pulmonary, musculoskeletal, psychiatric or neurological. See Rehab H&P on Rehab chart dated .        Vital signs:  BP (!) 111/47   Pulse 68   Temp 98.4 °F (36.9 °C) (Oral)   Resp 16   Ht 5' 5.98\" (1.676 m)   Wt 149 lb 0.5 oz (67.6 kg)   SpO2 97%   BMI 24.07 kg/m²   I/O:   PO/Intake:  fair PO intake, no problems observed or reported. Bowel/Bladder:  continent, no problems noted. General:  Patient is well developed, adequately nourished, non-obese and     well kempt. HEENT:    PERRLA, [place] hearing intact to loud voice, external inspection of ear     and nose benign. Inspection of lips, tongue and gums benign  Musculoskeletal: No significant change in strength or tone. All joints stable. Inspection and palpation of digits and nails show no clubbing,       cyanosis or inflammatory conditions. Neuro/Psychiatric: Affect: flat but pleasant. Alert and oriented to person, place and     Situation with  mod cues. No significant change in deep tendon reflexes or     sensation  Lungs:  Diminished, CTA-B. Respiration effort is normal at rest.     Heart:   S1 = S2, RRR. No loud murmurs. Abdomen:  Soft, non-tender, no enlargement of liver or spleen. Extremities:  No significant lower extremity edema or tenderness. Skin:   Intact to general survey, shear rashing to the buttocks bacitracin added    Rehabilitation:  Physical therapy:   Bed Mobility: Scooting: Independent    Transfers: Sit to Stand: Modified independent  Stand to sit: Modified independent  Bed to Chair: Supervision, Ambulation 1  Surface: level tile, uneven, carpet  Device: Rolling Walker  Assistance: Stand by assistance  Quality of Gait: Patient with difficulty negotiatting walker through doorways L sided neglect evident with walker bumping door jams  Gait Deviations: Decreased step length, Decreased step height, Shuffles  Distance: 250' x 2  Comments: Pt ambulated 30 feet with ww with SBA, Stairs  # Steps : 12  Stairs Height: 6\"  Rails: Right ascending  Curbs: 4\"  Device: Rolling walker  Assistance: Stand by assistance  Comment: Patient impulsive with curb step lifts walker and reaches far to set on and off curb step    FIMS:  ,  , Assessment: Unable to test gait in PM with furniture, as planned, after consulting PT.       Occupational therapy:    ,  , Assessment: Pt demonstrates mod difficulty to sort and assemble puzzles and requires verbal cues to complete. Pt continutes to benefit from OT servicesto maximize independence and safety during ADLs. Speech therapy:        Lab/X-ray studies reviewed, analyzed and discussed with patient and staff:   Recent Results (from the past 24 hour(s))   POCT Glucose    Collection Time: 09/29/21 11:13 AM   Result Value Ref Range    POC Glucose 145 (H) 60 - 115 mg/dl    Performed on ACCU-CHEK    POCT Glucose    Collection Time: 09/29/21  4:15 PM   Result Value Ref Range    POC Glucose 153 (H) 60 - 115 mg/dl    Performed on ACCU-CHEK    POCT Glucose    Collection Time: 09/29/21  8:16 PM   Result Value Ref Range    POC Glucose 204 (H) 60 - 115 mg/dl    Performed on ACCU-CHEK    POCT Glucose    Collection Time: 09/30/21  6:01 AM   Result Value Ref Range    POC Glucose 117 (H) 60 - 115 mg/dl    Performed on ACCU-CHEK        Echocardiogram   9/17/2021   Transthoracic Echocardiography Report   Left Ventricle Normal left ventricular systolic function, no regional wall motion abnormalities, estimated ejection fraction of 50%. Normal left ventricular size and function. Right Ventricle Normal right ventricle structure and function. Normal right ventricle systolic pressure. Left Atrium The left atrium is Mildly dilated. Right Atrium Normal right atrium. Mitral Valve Diffusely thickened and pliable mitral valve leaflets with normal excursion. Mild (1+) mitral regurgitation is present. No evidence of mitral valve stenosis. Tricuspid Valve Normal tricuspid valve structure and function. Aortic Valve The aortic valve leaflets were not well visualized. No evidence of aortic valve regurgitation . No evidence of aortic valve stenosis. Pericardial Effusion No evidence of pericardial effusion. Aorta \ Miscellaneous Miscellaneous normal findings were found.       M-Mode Measurements (cm)   LVIDd: 3.33 cm            LVIDs: 2.3 cm  IVSd: 1.44 cm IVSs: 1.73 cm  LVPWd: 0.8 cm             AO Root Dimension: 3.45 cm                             LVOT: 1.98 cm  Valves  LVOT   LVOT Diameter: 1.98 cm  Structures  Left Ventricle   Diastolic Dimension: 5.18 cm         Systolic Dimension: 2.3 cm  Septum Diastolic: 9.28 cm            Septum Systolic: 6.35 cm  PW Diastolic: 0.8 cm                                       FS: 30.9 %  LV EDV/LV EDV Index: 45.04 ml/25 m^2 LV ESV/LV ESV Index: 18.13 ml/10 m^2  EF Calculated: 59.8 %   LVOT Diameter: 1.98 cm  Aorta/ Miscellaneous Aorta   Aortic Root: 3.45 cm  LVOT Diameter: 1.98 cm      CT Head   9/16/2021    BRAIN CT FINDINGS: There has been interval development of a 3 cm area of hypodensity in the right occipital lobe since the previous day which is worrisome for an acute, subacute ischemia. There are persistent, chronic areas of ischemia in the anterior right temporal lobe, unchanged since previous study. No acute hemorrhage, mass, mass effect, or midline shift. There is prominence of sulci and ventricles indicating mild global cerebral atrophy and chronic involutional changes. Moderate periventricular white matter hypodensities indicating chronic microangiopathy are noted. The basal ganglia are within normal limits. There are no acute changes or space-occupying lesions in the posterior fossa. The visualized portions of the orbits are within normal limits. The globes are intact. The imaged portions of the paranasal sinuses are unremarkable. The calvarium is intact. There is a 3 cm new area of hypodensity worrisome for acute in the right occipital lobe. CT Head   9/15/2021: The ventricles are dilated. Unchanged size configuration. No mass. No midline shift. The cisterns are patent. There are white matter and periventricular changes most likely consistent with chronic small vessel disease. Again note is made of a focal area of encephalomalacia at the inferolateral medial aspect the right temporal lobe. No acute intra-axial or extra-axial findings. The visualized osseous structures are unremarkable. There is mucoperiosteal thickening in the right maxillary sinus. There is a small amount of patchy opacification right mastoid. There is resection of the left mastoid. The middle ear bones are not appreciated. Correlate with patient's surgical history. .   NO ACUTE INTRA-AXIAL OR EXTRA-AXIAL FINDINGS. XR CHEST 9/16/2021: Median sternotomy. Right transvenous pacemaker, unchanged. No consolidation. No pleural effusion. No pneumothorax. Stable cardiomediastinal silhouette. Aortic vascular calcifications. No distinct acute osseous findings. No acute radiographic abnormality. XR CHEST : 9/14/2021 Single  views of the chest is submitted. Right-sided CCD device. Leads overlying the cardiac silhouette. There are multiple median sternotomy wires overlying the cardiac silhouette. The cardiac silhouette is of normal size configuration. Pulmonary vascular unremarkable. Right sided trachea. No focal infiltrates. No Pneumothoraces. NO ACUTE ACTIVE CARDIOPULMONARY PROCESS    US CAROTID ARTERY BILATERAL  9/16/2021  There is mild atherosclerotic plaquing of the common carotid arteries and carotid bifurcations without significantly elevated velocities. Maximum systolic velocity within the right internal and mid common carotid arteries are 79 and 51 cm/s, with an ICA/CCA ratio of approximately 1.5 , which indicates less than 50% by velocity criteria. Maximum systolic velocity within the left internal and mid common carotid arteries are 59 and 74 cm/s, with an ICA/CCA ratio of approximately 0.9, which indicates less than 50% by velocity criteria. Maximum velocities within the right and left external carotid arteries are 141 and 81 cm/sec respectively. There is antegrade flow in both vertebral arteries.      MILD ATHEROSCLEROTIC PLAQUING OF THE CAROTID BULBS WITHOUT EVIDENCE OF A FLOW-LIMITING STENOSIS, BY VELOCITY RATIO CRITERIA. GOSINK CRITERIA Diameter          PSV t            EDV t          PSV          EDV          Stenosis Site       %              cm/sec          cm/sec      ICA/CCA    ICA/CCA 0-49                 <124              <40             <2:1           ---                 --- 50-69              125-225                  >2:1           ---                 --- 70-89               225-325          >100          >4:1           >5:1              --- 90%+                >325              >100          >4:1           >9:1           Damped resistive CCA >95%               May be            May be      Damped      ---              Damped resistive CCA                       decreased      decreased  resistive CCA        Previous extensive, complex labs, notes and diagnostics reviewed and analyzed. ALLERGIES:    Allergies as of 09/23/2021    (No Known Allergies)      (please also verify by checking [facility])     Complex Physical Medicine & Rehab Issues Assess & Plan:   1. 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 .  Functional and medical status reassessed regarding patients ability to participate in therapies and patient found to be able to participate in acute intensive comprehensive inpatient rehabilitation program including PT/OT to improve balance, ambulation, ADLs, and to improve the P/AROM. Therapeutic modifications regarding activities in therapies, place, amount of time per day and intensity of therapy made daily. In bed therapies or bedside therapies prn.   2. Bowel and Bladder dysfunction  Neurogenic bowel and Bladder:  frequent toileting, ambulate to bathroom with assistance, check post void residuals.   Check for C.difficile x1 if >2 loose stools in 24 hours, continue bowel & bladder program.  Monitor bowel and bladder function. Lactinex 2 PO every AC. MOM prn, Brown Bomb prn, Glycerin suppository prn, enema prn. 3. Moderate to severe LBP pain as well as generalized OA pain: reassess pain every shift and prior to and after each therapy session, give prn Tylenol and   Scheduled Tylenol, modalities prn in therapy, masage, Lidoderm, K-pad prn. Consider scheduled AM pain meds. Consider Norco as needed. 4. Skin healing shear rashing to the buttocks bacitracin addedand breakdown risk:  continue pressure relief program.  Daily skin exams and reports from nursing. 5. Severe fatigue due to nutritional and hydration deficiency: Add and titrate vitamin B12 vitamin D and CoQ10 continue to monitor I&Os, calorie counts prn, dietary consult prn.  6. Acute episodic insomnia with situational adjustment disorder:  prn Ambien, monitor for day time sedation. 7. Falls risk elevated:  patient to use call light to get nursing assistance to get up, bed and chair alarm. 8. Elevated DVT risk: progressive activities in PT, continue prophylaxis BELLE hose, elevation and  Elliquis . 9. Complex discharge planning: I will begin medication reconciliation simplification patient and family education as we progressed toward we will continue to work since his planned DC in the early a.m. 10/2/21-home with grand dtr-and C-though family is now no longer considering long-term care versus assisted living. Focus on balance and gait ataxia safety issues and endurance. Continue weekly team meeting every Monday  to assess progress towards goals, discuss and address social, psychological and medical comorbidities and to address difficulties they may be having progressing in therapy. Patient and family education is in progress. The patient is to follow-up with their family physician after discharge. Complex Active General Medical Issues that complicate care Assess & Plan:    1.  HTN (hypertension),  CAD (coronary artery disease),   PVD (peripheral vascular disease) -continue blood signs every shift focusing on heart rate and blood pressure checks, consult hospitalist for backup medical and adjust/add medications ( Eliquis, ASA, Coreg, Lipitor, Altace )  2. DM (diabetes mellitus) with neuropathy-Continue blood sugar checks every shift, diet, add diabetic add dietary education, restrict carbohydrates to lowest effective and safe carb count per meal advising 4 carbs per meal, add at bedtime snack to prevent a.m. hypoglycemia, adjust/add medications ( SSI )  3. Anemia-monitor closely on Eliquis--add Iron and Vit B12  4. Hypothyroidism-titrate Synthroid  5. COPD exacerbation-Pulse oximeter checks to shift dose and titrate oxygen and aerosol treatments monitor for nocturnal hypoxemia, monitor vital signs, oxygen prn. Symbicort, Albuterol  6. 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 Chronic right shoulder pain-Add Lidoderm and Tylenol. 8.   Acute CVA (cerebrovascular accident)with  Aphasia, Dysphagia and Left hemiparesis -focus on balance and cognition--pocket talker helps  9. Neurogenic bladder-bladder training program  10. Bilateral hearing loss--SLP and AD for hearing    11.  Depression-emotional support provided daily, vitamin B12, encourage participation in rehabilitation support group and recreational therapy, adjust/add medications ( Vit B12 )  add rehab Psych       Electronically signed by Claudine Barnhart, DO on 9/27/21 at 8:15 AM ELIZABETH Lilly D.O., PM&R     Attending    [facility] Alka Moura

## 2021-09-30 NOTE — PROGRESS NOTES
Physical Therapy Rehab Treatment Note  [facility]  [facility]       NAME: Heber Mendez  : 3/5/1929 (89 y.o.)  MRN: [id number]  CODE STATUS: Full Code    Date of Service: 2021  Chart Reviewed: Yes  Family / Caregiver Present: No    Restrictions:  Restrictions/Precautions: Fall Risk       SUBJECTIVE: Subjective: I feel ok  Response To Previous Treatment: Patient with no complaints from previous session. Pain Screening  Patient Currently in Pain: Denies  Pre Treatment Pain Screening  Pain at present: 0  Scale Used: Numeric Score  Intervention List: Patient able to continue with treatment    Post Treatment Pain Screening:  Pain Assessment  Pain Level: 0    OBJECTIVE:                                       Exercises  Hip Flexion: x 20  Hip Abduction: Side kicks x 20  Ankle Pumps: x 20  Neurodevelopmental Techniques: MRE'S: ABD/ADD/EXT/Flex/Quads: x 20  Manual therapy  Joint mobilization: Bilateral HS stretches. ASSESSMENT/PROGRESS TOWARDS GOALS:  Assessment: Pt fatigued in PM with focus mostly on exercises. Goals:  Long term goals  Long term goal 1: indep bed mobility  Long term goal 2: indep bed and car transers  Long term goal 3: indep gait 50 feet in familiar and protected environment  Long term goal 4: supervision for 4 stairs  Long term goal 5: CGA gait 150 feet or greater    PLAN OF CARE/Safety:   Safety Devices  Type of devices: All fall risk precautions in place; Left in chair;Chair alarm in place      Therapy Time:   Individual   Time In 1300   Time Out 1330   Minutes 30     Minutes:30      Neuro re education:  20     Therapeutic ex: 5      Manual therapy: C/ [address], PTA, 21 at 1:29 PM

## 2021-09-30 NOTE — PROGRESS NOTES
Occupational Therapy  Facility/Department: [facility]  Daily Treatment Note  NAME: Nicki Strauss  : 3/5/1929  MRN: [id number]    Date of Service: 2021    Discharge Recommendations:  Continue to assess pending progress       Assessment      Activity Tolerance  Activity Tolerance: Patient limited by fatigue  Safety Devices  Safety Devices in place: Yes  Type of devices: All fall risk precautions in place         Patient Diagnosis(es): There were no encounter diagnoses. has a past medical history of Anemia, CAD (coronary artery disease), DM (diabetes mellitus) ([facility] [address] Dyslipidemia, History of tobacco abuse, HTN (hypertension), Hypothyroidism, Non-ST elevation MI (NSTEMI) ([facility] [address] and Pacemaker. has a past surgical history that includes Cataract removal and Middle ear surgery (Left, ). Restrictions  Restrictions/Precautions  Restrictions/Precautions: Fall Risk  Implants present? : Pacemaker  Subjective   General  Chart Reviewed: Yes  Patient assessed for rehabilitation services?: Yes  Response to previous treatment: Patient with no complaints from previous session  Family / Caregiver Present: No  Referring Practitioner: Dr Cherelle Gamino  Diagnosis: Impaired mobility and ADL's due to right occipital stroke  Pain Assessment  Pain Assessment: 0-10  Pain Level: 0  Pre Treatment Pain Screening  Pain at present: 0  Scale Used: Numeric Score  Intervention List: Patient able to continue with treatment  Vital Signs  Patient Currently in Pain: No   Orientation     Objective     Pt engaged in B UE strengthening, endurance, and fine motor skills to promote independence and safety with ADLs/IADLs. Pt. was able to reach with his R hand to  a golf analy from a container and place into the board. Pt then picked up marbles from a container with his L hand and place on top of the tees. Pt then removed the marbles with his R hand and the tees with his L hand.  Pt. only had difficulty with removing the marbles with his R hand. Pt was able to  shower rings from the table surface with each hand and reach all 4 poles to place them on. Pt completed one side at a time and was able to continue at a steady pace with his R UE but required short frequent rest breaks with his L UE. Pt then removed the rings from each side with rest breaks for his L UE again due to fatigue. Pt. was returned to his room to complete toileting. Pt. stood to urinate at MOD I at the toilet. Plan   Plan  Times per week: 5-7x/wk  Plan weeks: 2 weeks  Current Treatment Recommendations: Strengthening, Endurance Training, Neuromuscular Re-education, Balance Training, Functional Mobility Training, Self-Care / ADL, Cognitive Reorientation, Safety Education & Training, Cognitive/Perceptual Training, Patient/Caregiver Education & Training  Plan Comment: Continue per OT plan of care    Goals  Long term goals  Time Frame for Long term goals : 5-7x/wk x 2 weeks  Long term goal 1: Pt within 2 weeks of initial evaluation will demonstrate progress in th following areas listed below to achieve specific LTG's as stated in the initial evaluation  Long term goal 2: Increase independence with ADL self caare  Long term goal 3:  Increase cognition for the safe completion of ADL  Long term goal 4: increase functional mobility independence for completion of ADL  Patient Goals   Patient goals : Not stated at this time       Therapy Time   Individual Concurrent Group Co-treatment   Time In 1100         Time Out 1200         Minutes 60              ADL/IADL trainin minutes  Therapeutic activities: 55 minutes      LEONCIO Medina Electronically signed by LEONCIO Medina on 2021 at 12:53 PM

## 2021-09-30 NOTE — PROGRESS NOTES
color to improve standing tolerance, balance, and endurance during ADLs. Pt reached outside of his base of support without LOB. Pt demonstrates mod difficulty to correctly sort blocks and requires verbal cues to complete. Pt demonstrates L neglect occasionally during task and requires verbal cues to attend to that side. Pt demonstrates difficulty to initiate and sequence and requires occasional verbal cues. Pt had no LOB and requires one seated rest break to complete. Pt then sat in wheelchair to remove blocks from dowels one at a time using 1lb wrist weights to improve strength and endurance during ADLs. Pt requires intermittent rest breaks to complete. Transfers  Sit to stand: Modified independent  Stand to sit: Modified independent      While seated at tabletop, pt was provided with a wooden car to observe prior to therapist disassembling. Pt then reassembled wooden car to improve problem solving during ADLs and IADLs. Pt demonstrates mod difficulty to problem solve and requires min verbal and tactile cues to correctly reassemble. Pt requires increased time to assemble 14 piece car. Pt completed with good attention to task. While seated at tabletop, pt assembled pop beads together using B UEs to improve /pinch strength during self care. Pt worked at a slower pace to perform task and requires increased effort occasionally. Pt able to assemble total of 15 beads with increased time. Pt tolerated activity well with no reports of pain.         Plan   Plan  Times per week: 5-7x/wk  Plan weeks: 2 weeks  Current Treatment Recommendations: Strengthening, Endurance Training, Neuromuscular Re-education, Balance Training, Functional Mobility Training, Self-Care / ADL, Cognitive Reorientation, Safety Education & Training, Cognitive/Perceptual Training, Patient/Caregiver Education & Training  Plan Comment: Continue per OT plan of care  G-Code     OutComes Score                                                  AM-PAC Score             Goals  Long term goals  Time Frame for Long term goals : 5-7x/wk x 2 weeks  Long term goal 1: Pt within 2 weeks of initial evaluation will demonstrate progress in th following areas listed below to achieve specific LTG's as stated in the initial evaluation  Long term goal 2: Increase independence with ADL self caare  Long term goal 3:  Increase cognition for the safe completion of ADL  Long term goal 4: increase functional mobility independence for completion of ADL  Patient Goals   Patient goals : Not stated at this time       Therapy Time   Individual Concurrent Group Co-treatment   Time In 1500         Time Out 1600         Minutes 60           Neuromuscular reeducation: 25 minutes  Therapeutic activities: 20 minutes  Cognitive Retraining: 15 minutes     Evan Bai OT   Electronically signed by Evan Bai OT on 9/30/2021 at 4:44 PM

## 2021-09-30 NOTE — PROGRESS NOTES
Physical Therapy Rehab Treatment Note  [facility]Department: [facility]  [facility]       NAME: Claudio Menchaca  : 3/5/1929 (89 y.o.)  MRN: [id number]  CODE STATUS: Full Code    Date of Service: 2021  Chart Reviewed: Yes  Family / Caregiver Present: No    Restrictions:  Restrictions/Precautions: Fall Risk       SUBJECTIVE: Subjective: I feel ok  Response To Previous Treatment: Patient with no complaints from previous session. Pain Screening  Patient Currently in Pain: Denies  Pre Treatment Pain Screening  Pain at present: 0  Scale Used: Numeric Score  Intervention List: Patient able to continue with treatment    Post Treatment Pain Screening:  Pain Assessment  Pain Level: 0    OBJECTIVE:                         Transfers  Sit to Stand: Modified independent  Stand to sit: Modified independent    Ambulation  Ambulation?: Yes  Ambulation 1  Surface: level tile;carpet;ramp  Device: Rolling Walker  Assistance: Supervision  Quality of Gait: Steady gait. Decreased cues for path finding with pt scanning to left now more often. Distance: 5' with multiple turns. Ambulation 2  Surface - 2: carpet  Device 2: No device  Assistance 2: Stand by assistance;Contact guard assistance  Quality of Gait 2: Tested gait without AD. Steadier but pt still reaching for either therapist or objects. Pt prefers wheeled walker and sat down at nearest available area. Distance: 30'    Stairs/Curb  Stairs?: Yes  Stairs  # Steps : 12  Stairs Height: 6\"  Rails: Right ascending  Assistance: Stand by assistance  Comment: Pt needing reminders to use one rail every time. Exercises  Hip Flexion: x 20     ASSESSMENT/PROGRESS TOWARDS GOALS:  Assessment: Pt demonstrating decreased left neglect and good carry over to occasional look to left, however pt with poor pathfinding back to own w/c. Pt not wanting to progress off of walker to without despite improved balance. Pt requiring several rest breaks.     Goals:  Long term goals  Long term goal 1: indep bed mobility  Long term goal 2: indep bed and car transers  Long term goal 3: indep gait 50 feet in familiar and protected environment  Long term goal 4: supervision for 4 stairs  Long term goal 5: CGA gait 150 feet or greater    PLAN OF CARE/Safety:   Safety Devices  Type of devices: All fall risk precautions in place; Left in chair;Chair alarm in place      Therapy Time:   Individual   Time In 1030   Time Out 1100   Minutes 30     Minutes:30      Transfer/Bed mobility trainin      Gait trainin      Neuro re education: 0     Therapeutic ex: 3      Shana Hill PTA, 21 at 12:38 PM

## 2021-09-30 NOTE — PROGRESS NOTES
[facility]  Facility/Department: [facility]  Speech Language Pathology   Treatment Note          Migue Yanez  3/5/1929  [id number]        Rehab Dx/Hx: Impaired gait and mobility [R26.89]  Abnormality of gait and mobility [R26.9]    Precautions: falls    Medical Dx: Impaired gait and mobility [R26.89]  Abnormality of gait and mobility [R26.9]  Speech Dx: Cognitive Linguistic Impairment    Date: 9/30/2021    Subjective:  Alert, Self Limiting and Distractible        Interventions used this date:  Cognitive Skill Development    Objective/Assessment:  Patient progressing towards goals:  Short-term Goals  Timeframe for Short-term Goals: 1-2 weeks  Goal 1: To increase safety awareness and judgment for safe completion of ADLs secondary to pt's cognitive deficits,  pt will complete mid level problem solving tasks related to ADLs (e.g. home/community safety) with 80% accuracy and min cues. Patient solved low to mid level problems in pictures I with 75% accuracy, with min cueing 87% accuracy. Goal 2: To increase safety awareness and judgment for safe completion of ADLs secondary to pt's cognitive deficits, pt will sequence common activities of daily living with (verbal/written) steps with 80% accuracy and min cues. Patient completed 4 picture sequence task I with 50% accuracy. Reduced motivation noted. Patient rushed through task. Goal 3: To address pt's cognitive deficits and promote orientation, pt will state name of facility, time within 1 hour, reason in hospital, current month and year with 100% accuracy with min assist, with use of external aid. Time and place orientation questions were answered with the following accuracy    Beginning of session: 0% I accuracy  End of session: 40% I accuracy    Goal 4: To address pt's cognitive deficits and promote recall of personal and medical information, pt will answer questions addressing (remote, recent, delayed) recall with 80% accuracy and min cues.  Patient recalled 3 pictures immediately with 100% I accuracy and 4 pictures with 100% I accuracy. Patient completed money addition task I with 0% accuracy. Cues to look left did not increase accuracy. Poor motivation noted. Long-term Goals  Timeframe for Long-term Goals: 2-3 weeks  Goal 1: Pt will improve his Cognition from mod assist to supervised assist for adequate functional recall and safety awareness for home, community. Short-term Goals  Goal 1: N/A  Compensatory Swallowing Strategies: Alternate solids and liquids, Small bites/sips, Eat/Feed slowly, Upright as possible for all oral intake    ST noted left neglect difficulties with all tasks. ST provided with verbal and visual cues without success in improving looking left. Treatment/Activity Tolerance:  Other: reduced motivation. Patient did report his brain is \"fuzzy\" ST educated the patient on reason for ST. Patient continued to present with poor motivation. Plan:  Continue per POC    Pain Assessment:  Pre-Treatment  Pain assessment: 0-10  Pain level: 0  Intervention:  Patient denies pain. Post-Treatment  Pain assessment: 0-10  Pain level: 0  Intervention:  Patient denies pain. Patient/Caregiver Education:  Patient educated on session and progression towards goals.     Safety Devices:  Chair alarm in place      [id number] X[address]illiard ([facility]):    SPOKEN LANGUAGE EXPRESSION  Ratin    MOTOR SPEECH  Ratin    PROBLEM SOLVING  Ratin-5    MEMORY  Ratin          Therapy Time  SLP Individual Minutes  Time In: 1430  Time Out: 1500  Minutes: 30              Signature: Electronically signed by DALTON Lara on 2021 at 3:10 PM

## 2021-10-01 LAB
GLUCOSE BLD-MCNC: 114 MG/DL (ref 60–115)
GLUCOSE BLD-MCNC: 124 MG/DL (ref 60–115)
GLUCOSE BLD-MCNC: 153 MG/DL (ref 60–115)
GLUCOSE BLD-MCNC: 173 MG/DL (ref 60–115)
PERFORMED ON: ABNORMAL
PERFORMED ON: NORMAL

## 2021-10-01 PROCEDURE — 6370000000 HC RX 637 (ALT 250 FOR IP): Performed by: PHYSICAL MEDICINE & REHABILITATION

## 2021-10-01 PROCEDURE — 94760 N-INVAS EAR/PLS OXIMETRY 1: CPT

## 2021-10-01 PROCEDURE — 6360000002 HC RX W HCPCS: Performed by: PHYSICAL MEDICINE & REHABILITATION

## 2021-10-01 PROCEDURE — 6370000000 HC RX 637 (ALT 250 FOR IP): Performed by: INTERNAL MEDICINE

## 2021-10-01 PROCEDURE — 97530 THERAPEUTIC ACTIVITIES: CPT

## 2021-10-01 PROCEDURE — 97112 NEUROMUSCULAR REEDUCATION: CPT

## 2021-10-01 PROCEDURE — 94761 N-INVAS EAR/PLS OXIMETRY MLT: CPT

## 2021-10-01 PROCEDURE — 1180000000 HC REHAB R&B

## 2021-10-01 PROCEDURE — 97116 GAIT TRAINING THERAPY: CPT

## 2021-10-01 PROCEDURE — 97110 THERAPEUTIC EXERCISES: CPT

## 2021-10-01 PROCEDURE — 97535 SELF CARE MNGMENT TRAINING: CPT

## 2021-10-01 PROCEDURE — 94640 AIRWAY INHALATION TREATMENT: CPT

## 2021-10-01 PROCEDURE — 99232 SBSQ HOSP IP/OBS MODERATE 35: CPT | Performed by: PHYSICAL MEDICINE & REHABILITATION

## 2021-10-01 PROCEDURE — 97129 THER IVNTJ 1ST 15 MIN: CPT

## 2021-10-01 RX ORDER — LACTULOSE 10 G/15ML
20 SOLUTION ORAL 2 TIMES DAILY PRN
Status: DISCONTINUED | OUTPATIENT
Start: 2021-10-01 | End: 2021-10-02 | Stop reason: HOSPADM

## 2021-10-01 RX ADMIN — BACITRACIN, NEOMYCIN, POLYMYXIN B 1 G: 400; 3.5; 5 OINTMENT TOPICAL at 20:55

## 2021-10-01 RX ADMIN — Medication 100 MG: at 09:23

## 2021-10-01 RX ADMIN — BUDESONIDE AND FORMOTEROL FUMARATE DIHYDRATE 2 PUFF: 80; 4.5 AEROSOL RESPIRATORY (INHALATION) at 04:39

## 2021-10-01 RX ADMIN — BUDESONIDE AND FORMOTEROL FUMARATE DIHYDRATE 2 PUFF: 80; 4.5 AEROSOL RESPIRATORY (INHALATION) at 16:02

## 2021-10-01 RX ADMIN — Medication 30 MG: at 09:27

## 2021-10-01 RX ADMIN — CYANOCOBALAMIN 1000 MCG: 1000 INJECTION, SOLUTION INTRAMUSCULAR; SUBCUTANEOUS at 09:23

## 2021-10-01 RX ADMIN — CARVEDILOL 6.25 MG: 6.25 TABLET, FILM COATED ORAL at 17:36

## 2021-10-01 RX ADMIN — APIXABAN 5 MG: 5 TABLET, FILM COATED ORAL at 20:54

## 2021-10-01 RX ADMIN — ASPIRIN 81 MG: 81 TABLET, COATED ORAL at 09:24

## 2021-10-01 RX ADMIN — FERROUS SULFATE TAB 325 MG (65 MG ELEMENTAL FE) 325 MG: 325 (65 FE) TAB at 09:23

## 2021-10-01 RX ADMIN — LACTULOSE 20 G: 20 SOLUTION ORAL at 18:06

## 2021-10-01 RX ADMIN — FOLIC ACID 1 MG: 1 TABLET ORAL at 09:23

## 2021-10-01 RX ADMIN — Medication 2000 UNITS: at 17:35

## 2021-10-01 RX ADMIN — RAMIPRIL 5 MG: 5 CAPSULE ORAL at 09:24

## 2021-10-01 RX ADMIN — CARVEDILOL 6.25 MG: 6.25 TABLET, FILM COATED ORAL at 09:23

## 2021-10-01 RX ADMIN — ATORVASTATIN CALCIUM 40 MG: 40 TABLET, FILM COATED ORAL at 20:55

## 2021-10-01 RX ADMIN — APIXABAN 5 MG: 5 TABLET, FILM COATED ORAL at 09:24

## 2021-10-01 ASSESSMENT — PAIN SCALES - GENERAL
PAINLEVEL_OUTOF10: 0

## 2021-10-01 NOTE — PROGRESS NOTES
Subjective: The patient complains of severe acute on chronic progressive fatigue and  Left sided weakness partially relieved by rest,medications, PT,  OT,   SLP and rest and exacerbated by recent illness. 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 He has been doing well with his granddaughter at home but we are wondering whether he may need extra assistance possibly an assisted living versus long-term care eventually. Nursing reports very malodorous urine and I have ordered a stat UA. His daughter wants to take him home in the early morning I will work on his much of the discharge process as I can. I had hoped to discontinue his fingersticks as his blood sugars were under good control however now his family has brought in a lot of junk food in her back into the 200s. We will continue sliding scale and fingersticks for now. I have encouraged him to restrict himself to 4 carb per meal diet and avoid excess sugar. I reviewed current care and plans for further care with other rehab providers including nursing and case management. According to recent  note, \"NON-compliant with call light extremely hard of hearing. No safety awareness at all. Up x2 to the bathroom. Last BM 9/30 No complaints of pain  this am.\".    ROS x10: The patient also complains of severely impaired mobility and activities of daily living. Otherwise no new problems with vision, hearing, nose, mouth, throat, dermal, cardiovascular, GI, , pulmonary, musculoskeletal, psychiatric or neurological. See Rehab H&P on Rehab chart dated . Vital signs:  /65   Pulse 62   Temp 98.1 °F (36.7 °C)   Resp 16   Ht 5' 5.98\" (1.676 m)   Wt 149 lb 0.5 oz (67.6 kg)   SpO2 95%   BMI 24.07 kg/m²   I/O:   PO/Intake:  fair PO intake, no problems observed or reported.     Bowel/Bladder:  continent, no problems noted. General:  Patient is well developed, adequately nourished, non-obese and     well kempt. HEENT:    PERRLA, [place] hearing intact to loud voice, external inspection of ear     and nose benign. Inspection of lips, tongue and gums benign  Musculoskeletal: No significant change in strength or tone. All joints stable. Inspection and palpation of digits and nails show no clubbing,       cyanosis or inflammatory conditions. Neuro/Psychiatric: Affect: flat but pleasant. Alert and oriented to person, place and     Situation with  mod cues. No significant change in deep tendon reflexes or     sensation  Lungs:  Diminished, CTA-B. Respiration effort is normal at rest.     Heart:   S1 = S2, RRR. No loud murmurs. Abdomen:  Soft, non-tender, no enlargement of liver or spleen. Extremities:  No significant lower extremity edema or tenderness. Skin:   Intact to general survey, shear rashing to the buttocks bacitracin added    Rehabilitation:  Physical therapy:   Bed Mobility: Scooting: Independent    Transfers: Sit to Stand: Modified independent  Stand to sit: Modified independent  Bed to Chair: Supervision, Ambulation 1  Surface: level tile, carpet, ramp  Device: Rolling Walker  Assistance: Supervision  Quality of Gait: Steady gait. Decreased cues for path finding with pt scanning to left now more often. Gait Deviations: Decreased step length, Decreased step height, Shuffles  Distance: 300' with multiple turns. Comments: Pt ambulated 30 feet with ww with SBA, Stairs  # Steps : 12  Stairs Height: 6\"  Rails: Right ascending  Curbs: 4\"  Device: Rolling walker  Assistance: Stand by assistance  Comment: Pt needing reminders to use one rail every time. FIMS:  ,  , Assessment: Unable to test gait in PM with furniture, as planned, after consulting PT. Occupational therapy:    ,  , Assessment: Pt demonstrates mod difficulty to sort and assemble puzzles and requires verbal cues to complete.  Pt continutes to benefit from OT servicesto maximize independence and safety during ADLs. Speech therapy:        Lab/X-ray studies reviewed, analyzed and discussed with patient and staff:   Recent Results (from the past 24 hour(s))   POCT Glucose    Collection Time: 09/30/21 12:14 PM   Result Value Ref Range    POC Glucose 146 (H) 60 - 115 mg/dl    Performed on ACCU-CHEK    POCT Glucose    Collection Time: 09/30/21  4:30 PM   Result Value Ref Range    POC Glucose 176 (H) 60 - 115 mg/dl    Performed on ACCU-CHEK    POCT Glucose    Collection Time: 09/30/21  8:18 PM   Result Value Ref Range    POC Glucose 226 (H) 60 - 115 mg/dl    Performed on ACCU-CHEK    POCT Glucose    Collection Time: 10/01/21  6:05 AM   Result Value Ref Range    POC Glucose 114 60 - 115 mg/dl    Performed on ACCU-CHEK        Echocardiogram   9/17/2021   Transthoracic Echocardiography Report   Left Ventricle Normal left ventricular systolic function, no regional wall motion abnormalities, estimated ejection fraction of 50%. Normal left ventricular size and function. Right Ventricle Normal right ventricle structure and function. Normal right ventricle systolic pressure. Left Atrium The left atrium is Mildly dilated. Right Atrium Normal right atrium. Mitral Valve Diffusely thickened and pliable mitral valve leaflets with normal excursion. Mild (1+) mitral regurgitation is present. No evidence of mitral valve stenosis. Tricuspid Valve Normal tricuspid valve structure and function. Aortic Valve The aortic valve leaflets were not well visualized. No evidence of aortic valve regurgitation . No evidence of aortic valve stenosis. Pericardial Effusion No evidence of pericardial effusion. Aorta \ Miscellaneous Miscellaneous normal findings were found.         CT Head   9/16/2021    BRAIN CT FINDINGS: There has been interval development of a 3 cm area of hypodensity in the right occipital lobe since the previous day which is worrisome for an acute, subacute ischemia. There are persistent, chronic areas of ischemia in the anterior right temporal lobe, unchanged since previous study. No acute hemorrhage, mass, mass effect, or midline shift. There is prominence of sulci and ventricles indicating mild global cerebral atrophy and chronic involutional changes. Moderate periventricular white matter hypodensities indicating chronic microangiopathy are noted. The basal ganglia are within normal limits. There are no acute changes or space-occupying lesions in the posterior fossa. The visualized portions of the orbits are within normal limits. The globes are intact. The imaged portions of the paranasal sinuses are unremarkable. The calvarium is intact. There is a 3 cm new area of hypodensity worrisome for acute in the right occipital lobe. CT Head   9/15/2021: The ventricles are dilated. Unchanged size configuration. No mass. No midline shift. The cisterns are patent. There are white matter and periventricular changes most likely consistent with chronic small vessel disease. Again note is made of a focal area of encephalomalacia at the inferolateral medial aspect the right temporal lobe. No acute intra-axial or extra-axial findings. The visualized osseous structures are unremarkable. There is mucoperiosteal thickening in the right maxillary sinus. There is a small amount of patchy opacification right mastoid. There is resection of the left mastoid. The middle ear bones are not appreciated. Correlate with patient's surgical history. .   NO ACUTE INTRA-AXIAL OR EXTRA-AXIAL FINDINGS. XR CHEST 9/16/2021: Median sternotomy. Right transvenous pacemaker, unchanged. No consolidation. No pleural effusion. No pneumothorax. Stable cardiomediastinal silhouette. Aortic vascular calcifications. No distinct acute osseous findings. No acute radiographic abnormality. XR CHEST : 9/14/2021 Single  views of the chest is submitted. Right-sided CCD device.  Leads overlying the cardiac silhouette. There are multiple median sternotomy wires overlying the cardiac silhouette. The cardiac silhouette is of normal size configuration. Pulmonary vascular unremarkable. Right sided trachea. No focal infiltrates. No Pneumothoraces. NO ACUTE ACTIVE CARDIOPULMONARY PROCESS    US CAROTID ARTERY BILATERAL  9/16/2021  There is mild atherosclerotic plaquing of the common carotid arteries and carotid bifurcations without significantly elevated velocities. Maximum systolic velocity within the right internal and mid common carotid arteries are 79 and 51 cm/s, with an ICA/CCA ratio of approximately 1.5 , which indicates less than 50% by velocity criteria. Maximum systolic velocity within the left internal and mid common carotid arteries are 59 and 74 cm/s, with an ICA/CCA ratio of approximately 0.9, which indicates less than 50% by velocity criteria. Maximum velocities within the right and left external carotid arteries are 141 and 81 cm/sec respectively. There is antegrade flow in both vertebral arteries. MILD ATHEROSCLEROTIC PLAQUING OF THE CAROTID BULBS WITHOUT EVIDENCE OF A FLOW-LIMITING STENOSIS, BY VELOCITY RATIO CRITERIA.  GOSINK CRITERIA Diameter          PSV t            EDV t          PSV          EDV          Stenosis Site       %              cm/sec          cm/sec      ICA/CCA    ICA/CCA 0-49                 <124              <40             <2:1           ---                 --- 50-69              125-225                  >2:1           ---                 --- 70-89               225-325          >100          >4:1           >5:1              --- 90%+                >325              >100          >4:1           >9:1           Damped resistive CCA >95%               May be            May be      Damped      ---              Damped resistive CCA                       decreased      decreased resistive CCA        Previous extensive, complex labs, notes and diagnostics reviewed and analyzed. ALLERGIES:    Allergies as of 09/23/2021    (No Known Allergies)      (please also verify by checking [facility])     Complex Physical Medicine & Rehab Issues Assess & Plan:   1. 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 .  Functional and medical status reassessed regarding patients ability to participate in therapies and patient found to be able to participate in acute intensive comprehensive inpatient rehabilitation program including PT/OT to improve balance, ambulation, ADLs, and to improve the P/AROM. Therapeutic modifications regarding activities in therapies, place, amount of time per day and intensity of therapy made daily. In bed therapies or bedside therapies prn.   2. Bowel and Bladder dysfunction  Neurogenic bowel and Bladder:  frequent toileting, ambulate to bathroom with assistance, check post void residuals. Check for C.difficile x1 if >2 loose stools in 24 hours, continue bowel & bladder program.  Monitor bowel and bladder function. Lactinex 2 PO every AC. MOM prn, Brown Bomb prn, Glycerin suppository prn, enema prn. 3. Moderate to severe LBP pain as well as generalized OA pain: reassess pain every shift and prior to and after each therapy session, give prn Tylenol and   Scheduled Tylenol, modalities prn in therapy, masage, Lidoderm, K-pad prn. Consider scheduled AM pain meds. Consider Norco as needed. 4. Skin healing shear rashing to the buttocks bacitracin addedand breakdown risk:  continue pressure relief program.  Daily skin exams and reports from nursing.   5. Severe fatigue due to nutritional and hydration deficiency: Add and titrate vitamin B12 vitamin D and CoQ10 continue to monitor I&Os, calorie counts prn, dietary consult prn.  6. Acute episodic insomnia with situational adjustment disorder:  prn Ambien, monitor for day time sedation. 7. Falls risk elevated:  patient to use call light to get nursing assistance to get up, bed and chair alarm. 8. Elevated DVT risk: progressive activities in PT, continue prophylaxis BELLE hose, elevation and  Elliquis . 9. Complex discharge planning: I will complete medication reconciliation simplification patient and family education as we progressed toward we will continue to work since his planned DC in the early a.m. 10/2/21-home with grand dtr-and HHC-though family is now no longer considering long-term care versus assisted living. Focus on balance and gait ataxia safety issues and endurance. Continue weekly team meeting every Monday  to assess progress towards goals, discuss and address social, psychological and medical comorbidities and to address difficulties they may be having progressing in therapy. Patient and family education is in progress. The patient is to follow-up with their family physician after discharge. Complex Active General Medical Issues that complicate care Assess & Plan:    1. HTN (hypertension),  CAD (coronary artery disease),   PVD (peripheral vascular disease) -continue blood signs every shift focusing on heart rate and blood pressure checks, consult hospitalist for backup medical and adjust/add medications ( Eliquis, ASA, Coreg, Lipitor, Altace )  2. DM (diabetes mellitus) with neuropathy-Continue blood sugar checks every shift, diet, add diabetic add dietary education, restrict carbohydrates to lowest effective and safe carb count per meal advising 4 carbs per meal, add at bedtime snack to prevent a.m. hypoglycemia, adjust/add medications ( SSI )  3. Anemia-monitor closely on Eliquis--add Iron and Vit B12  4. Hypothyroidism-titrate Synthroid  5.    COPD exacerbation-Pulse oximeter checks to shift dose and titrate oxygen and aerosol treatments monitor for nocturnal hypoxemia, monitor vital signs, oxygen prn. Symbicort, Albuterol  6. 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 Chronic right shoulder pain-Add Lidoderm and Tylenol. 8.   Acute CVA (cerebrovascular accident)with  Aphasia, Dysphagia and Left hemiparesis -focus on balance and cognition--pocket talker helps  9. Neurogenic bladder-bladder training program  10. Bilateral hearing loss--SLP and AD for hearing    11.  Depression-emotional support provided daily, vitamin B12, encourage participation in rehabilitation support group and recreational therapy, adjust/add medications ( Vit B12 )  add rehab Psych       Electronically signed by Monty Galeana DO on 9/27/21 at 8:15 AM ELIZABETH Durand D.O., PM&R     Attending    [address]

## 2021-10-01 NOTE — PROGRESS NOTES
Occupational Therapy  Facility/Department: [facility]  Daily Treatment Note  NAME: Balbina Morgan  : 3/5/1929  MRN: [id number]    Date of Service: 10/1/2021    Discharge Recommendations:  Continue to assess pending progress       Assessment      REQUIRES OT FOLLOW UP: Yes  Activity Tolerance  Activity Tolerance: Patient Tolerated treatment well  Safety Devices  Safety Devices in place: Yes  Type of devices: All fall risk precautions in place         Patient Diagnosis(es): There were no encounter diagnoses. has a past medical history of Anemia, CAD (coronary artery disease), DM (diabetes mellitus) ([facility] [address] Dyslipidemia, History of tobacco abuse, HTN (hypertension), Hypothyroidism, Non-ST elevation MI (NSTEMI) ([facility] [address] and Pacemaker. has a past surgical history that includes Cataract removal and Middle ear surgery (Left, ). Restrictions  Restrictions/Precautions  Restrictions/Precautions: Fall Risk  Implants present? : Pacemaker  Subjective   General  Chart Reviewed: Yes  Patient assessed for rehabilitation services?: Yes  Response to previous treatment: Patient with no complaints from previous session  Family / Caregiver Present: No  Referring Practitioner: Dr Derrick Duran  Diagnosis: Impaired mobility and ADL's due to right occipital stroke  Subjective  Subjective: \"I don't wanna do that\"-shower  Pain Assessment  Pain Assessment: 0-10  Pain Level: 0  Pre Treatment Pain Screening  Pain at present: 0  Scale Used: Numeric Score  Intervention List: Patient able to continue with treatment  Vital Signs  Patient Currently in Pain: Denies   Orientation  Orientation  Orientation Level: Oriented to person  Objective    Pt completed ADL tasks at the levels below. Pt declined to shower this morning.    ADL  Feeding: Supervision;Setup  Grooming: Supervision  UE Bathing: Unable to assess(comment) (pt refused)  LE Bathing: Unable to assess(comment) (pt refused)  UE Dressing: Supervision;Verbal cueing  LE Dressing: Supervision;Verbal cueing  Toileting: Unable to assess(comment) (pt did not need to go)  Additional Comments: Pt ambulated to retrieve clothing at Mod I level but requires verbal cues throughout ADL tasks. Balance  Standing Balance: Modified independent   Functional Mobility  Functional - Mobility Device: Rolling Walker  Activity: Retrieve items  Assist Level: Modified independent   Toilet Transfers  Toilet Transfer: Unable to assess  Toilet Transfers Comments: Pt did not need to go  Shower Transfers  Shower Transfers: Not tested  [employer] Comments: pt refused  Bed mobility  Supine to Sit: Independent  Transfers  Sit to stand: Modified independent  Stand to sit: Modified independent        Cognition  Cognition Comment: Comp Min A, expression Min A, social Max A, problem Min A, memory Mod A      Pt with 25 minutes of therapy time left after ADL. Therapist transported pt down to therapy gym at total assist. While seated at tabletop, pt sorted and folded socks together using B UEs to improve problem solving during IADLs. Pt worked at a slow but steady pace to perform task. Pt with one error and requires verbal cues to recognize error. Pt able to fold total of 17 pairs of socks. Pt used gross grasp to  socks and return to the laundry bag. While seated at tabletop, pt placed graded clips ranging between 1lb and 8lbs onto horizontal rods using B UEs to improve /pinch strength during ADLs. Pt worked at a steady pace and with good attention to task. Pt demonstrates min difficulty to place clips with increased resistance. Pt able to place all clips before end of therapy session.         Plan   Plan  Times per week: 5-7x/wk  Plan weeks: 2 weeks  Current Treatment Recommendations: Strengthening, Endurance Training, Neuromuscular Re-education, Balance Training, Functional Mobility Training, Self-Care / ADL, Cognitive Reorientation, Safety Education & Training, Cognitive/Perceptual Training, Patient/Caregiver Education & Training  Plan Comment: Continue per OT plan of care  G-Code     OutComes Score                                                  AM-PAC Score             Goals  Long term goals  Time Frame for Long term goals : 5-7x/wk x 2 weeks  Long term goal 1: Pt within 2 weeks of initial evaluation will demonstrate progress in th following areas listed below to achieve specific LTG's as stated in the initial evaluation  Long term goal 2: Increase independence with ADL self caare  Long term goal 3:  Increase cognition for the safe completion of ADL  Long term goal 4: increase functional mobility independence for completion of ADL  Patient Goals   Patient goals : Not stated at this time       Therapy Time   Individual Concurrent Group Co-treatment   Time In 930         Time Out 1030         Minutes 60           ADL/IADL trainin minutes  Therapeutic activities: 10 minutes  Cognitive Retraining: 15 minutes     Augusto Smith OT   Electronically signed by Augusto Smith OT on 10/1/2021 at 11:09 AM

## 2021-10-01 NOTE — PROGRESS NOTES
Hospitalist Consult/Progress Note  10/1/2021 12:06 PM    Assessment and Plan:   1. 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 TTE EF 50%. US carotids showed less than 50% stenosis in bilateral carotids. Already on Eliquis. Now on aspirin  2. CAD/A fib/pacemaker: rate controlled. Goal K and Mg 4.0 and 2.0, respectively. On Long term anticoagulation. Continue statin, BB and ACEI  3. DVT on Eliquis  4. COPD, Stable. Duonebs PRN. Incentive Spirometry, Respiratory therapist assessment. Continue symbicort; albuterol PRN. 5. Microcytic anemia, likely due to iron deficiency or anemia of chronic disease: Checking Iron panel and TIBC. Continue ferrous sulfate. 6. Secondary HTN: Stable. Continue home meds  7. DMII with hyperglycemia: ISS, hypoglycemia protocol POCT Glucose TIDAC & QHS. Overall controlled  8. CKD stage III: Stable. Monitor urine output. Check BMP in AM.   9. Generalized weakness, Gait instability and Decreased Functional Status: Fall precautions. PT OT to evaluate. Maximize nutrition status. Assessing if needs DME at home. SW on board. Completed rehab plan. 10. Bowel Regimen and GI PPx: stool softners PRN ordered with hold parameters for loose stools or diarrhea. On antiacid  11. Diet: ADULT DIET; Regular; 4 carb choices (60 gm/meal)  12. Advance Directive: Full Code   13. Nutrition status: Supplemental Vitamins ordered. Dietitian assessment  14. Discharge planning: SW on board. Discharge planning ongoing. TDD 10/2; family training scheduled for tomorrow. Discharge scheduled for tomorrow am. Med rec updated. 15. High Risk Readmission Screening Tool Score Noted.      Additionally, the following hospital problems were addressed:  Principal Problem:    Impaired gait and mobility dt R occipital CVA  Active Problems:    HTN (hypertension)    DM (diabetes mellitus) (HCC)    Hypothyroidism    CAD (coronary artery disease)    COPD exacerbation (HCC)    PVD (peripheral vascular disease) ([facility] [address] Gait abnormality dt PVD--right femoral artery occlusion    Chronic right shoulder pain    Acute CVA (cerebrovascular accident) ([facility] [address]    Aphasia    Abnormality of gait and mobility    Neurogenic bladder    Bilateral hearing loss    Left hemiparesis ([address]  Resolved Problems:    * No resolved hospital problems. *      ** Total time spent reviewing medical records, evaluating patient, speaking with RN's and consultants where I was focused exclusively on this patient:  minutes. This time is excluding time spent performing procedures or significant events occurring earlier or later in the day requiring my attention and focus. Subjective:   Admit Date: 9/23/2021  PCP: Don Deng MD, MD    No acute events overnight. Afebrile. No new complaints. Pt denies chest pain, SOB, N/V, fevers or chills. Eating lunch. Objective:     Vitals:    09/30/21 1722 09/30/21 1821 10/01/21 0439 10/01/21 0655   BP:  (!) 162/67  136/65   Pulse:  64  62   Resp:  18  16   Temp:  97.3 °F (36.3 °C)  98.1 °F (36.7 °C)   TempSrc:  Oral     SpO2: 95% 99% 98% 95%   Weight:       Height:         General appearance: no acute distress,  No conversational dyspnea noted. Dentition intact. Answers questions appropriately but extremely hard of hearing  Neurological: Alert, awake, expressive aphasia present. Lungs:  Diminished bases,  no exp wheezes, No rales No retractions; No use of accessory muscles  Heart:  S1, S2 normal, RRR, no MRG appreciated  Abdomen: (+) BS, soft, non-tender; non distended no guarding or rigidity. Extremities:  no cyanosis, no edema bilat lower exts, no calf tenderness bilaterally.  Dry skin noted       Medications:      neomycin-bacitracin-polymyxin   Topical BID    ammonium lactate   Topical BID    Vitamin D  2,000 Units Oral Dinner    cyanocobalamin  1,000 mcg IntraMUSCular Weekly    coenzyme Q10  100 mg Oral Daily    aspirin  81 mg Oral Daily    Or    aspirin  300 mg Rectal Daily    insulin lispro  0-3 Units SubCUTAneous Nightly    insulin lispro  0-6 Units SubCUTAneous TID WC    apixaban  5 mg Oral BID    atorvastatin  40 mg Oral Nightly    budesonide-formoterol  2 puff Inhalation BID    carvedilol  6.25 mg Oral BID WC    ferrous sulfate  325 mg Oral Daily with breakfast    folic acid  1 mg Oral Daily    lansoprazole  30 mg Oral Daily    ramipril  5 mg Oral Daily       LABS Reviewed    IMAGING Reviewed    CHIRSTINE Mendoza - LUKE    Additional work up or/and treatment plan may be added today or then after based on clinical progression. I am managing a portion of pt care. Some medical issues are handled by other specialists and Primary Rehabilitation provider. Additional work up and treatment should be done in out pt setting by pt PCP and other out pt providers.

## 2021-10-01 NOTE — PLAN OF CARE
Problem: Skin Integrity:  Goal: Will show no infection signs and symptoms  Description: Will show no infection signs and symptoms  10/1/2021 0250 by Sherlon Siemens, RN  Outcome: Ongoing  9/30/2021 2339 by Grady Carvajal RN  Outcome: Ongoing  9/30/2021 1833 by Otoniel Ramos RN  Outcome: Ongoing  Goal: Absence of new skin breakdown  Description: Absence of new skin breakdown  10/1/2021 0250 by Sherlon Siemens, RN  Outcome: Ongoing  9/30/2021 2339 by Grady Carvajal RN  Outcome: Ongoing  9/30/2021 1833 by Otoniel Ramos RN  Outcome: Ongoing     Problem: Falls - Risk of:  Goal: Will remain free from falls  Description: Will remain free from falls  10/1/2021 0250 by Sherlon Siemens, RN  Outcome: Ongoing  9/30/2021 2339 by Grady Carvajal RN  Outcome: Ongoing  9/30/2021 1833 by Otoniel Ramos RN  Outcome: Ongoing  Goal: Absence of physical injury  Description: Absence of physical injury  10/1/2021 0250 by Sherlon Siemens, RN  Outcome: Ongoing  9/30/2021 2339 by Grady Carvajal RN  Outcome: Ongoing  9/30/2021 1833 by Otoniel Ramos RN  Outcome: Ongoing     Problem: IP SWALLOWING  Goal: LTG - patient will tolerate the least restrictive diet consistency to allow for safe consumption of daily meals  10/1/2021 0250 by Sherlon Siemens, RN  Outcome: Ongoing  9/30/2021 2339 by Grady Carvajal RN  Outcome: Ongoing  9/30/2021 1833 by Otoniel Ramos RN  Outcome: Ongoing     Problem: IP COMMUNICATION/DYSARTHRIA  Goal: LTG - patient will improve expressive language skills to allow for communication of wants and needs in daily activities  10/1/2021 0250 by Sherlon Siemens, RN  Outcome: Ongoing  9/30/2021 2339 by Grady Carvajal RN  Outcome: Ongoing  9/30/2021 1833 by Otoniel Ramos RN  Outcome: Ongoing     Problem: Nutrition  Goal: Optimal nutrition therapy  10/1/2021 0250 by Sherlon Siemens, RN  Outcome: Ongoing  9/30/2021 2339 by Grady Carvajal RN  Outcome: Ongoing  9/30/2021 1833 by Otoniel Ramos

## 2021-10-01 NOTE — PLAN OF CARE
Problem: Skin Integrity:  Goal: Will show no infection signs and symptoms  Description: Will show no infection signs and symptoms  9/30/2021 2339 by Mart Mckenna RN  Outcome: Ongoing  9/30/2021 1833 by Casey Hughes RN  Outcome: Ongoing  Goal: Absence of new skin breakdown  Description: Absence of new skin breakdown  9/30/2021 2339 by Mart Mckenna RN  Outcome: Ongoing  9/30/2021 1833 by Casey Hughes RN  Outcome: Ongoing     Problem: Falls - Risk of:  Goal: Will remain free from falls  Description: Will remain free from falls  9/30/2021 2339 by Mart Mckenna RN  Outcome: Ongoing  9/30/2021 1833 by Casey Hughes RN  Outcome: Ongoing  Goal: Absence of physical injury  Description: Absence of physical injury  9/30/2021 2339 by Mart Mckenna RN  Outcome: Ongoing  9/30/2021 1833 by Casey Hughes RN  Outcome: Ongoing     Problem: IP SWALLOWING  Goal: LTG - patient will tolerate the least restrictive diet consistency to allow for safe consumption of daily meals  9/30/2021 2339 by Mart Mckenna RN  Outcome: Ongoing  9/30/2021 1833 by Casey Hughes RN  Outcome: Ongoing     Problem: IP COMMUNICATION/DYSARTHRIA  Goal: LTG - patient will improve expressive language skills to allow for communication of wants and needs in daily activities  9/30/2021 2339 by Mart Mckenna RN  Outcome: Ongoing  9/30/2021 1833 by Casey Hughes RN  Outcome: Ongoing     Problem: Nutrition  Goal: Optimal nutrition therapy  9/30/2021 2339 by Mart Mckenna RN  Outcome: Ongoing  9/30/2021 1833 by Casey Hughes RN  Outcome: Ongoing

## 2021-10-01 NOTE — PROGRESS NOTES
Endurance Training, Neuromuscular Re-education, Balance Training, Functional Mobility Training, Self-Care / ADL, Cognitive Reorientation, Safety Education & Training, Cognitive/Perceptual Training, Patient/Caregiver Education & Training  Plan Comment: Continue per OT plan of care    Goals  Long term goals  Time Frame for Long term goals : 5-7x/wk x 2 weeks  Long term goal 1: Pt within 2 weeks of initial evaluation will demonstrate progress in th following areas listed below to achieve specific LTG's as stated in the initial evaluation  Long term goal 2: Increase independence with ADL self caare  Long term goal 3:  Increase cognition for the safe completion of ADL  Long term goal 4: increase functional mobility independence for completion of ADL  Patient Goals   Patient goals : Not stated at this time       Therapy Time   Individual Concurrent Group Co-treatment   Time In 1530         Time Out 1600         Minutes 30              ADL/IADL trainin minutes  Therapeutic activities: 10 minutes    CHETNA Shepard/L    Electronically signed by GIN Shepard on 10/1/2021 at 4:12 PM

## 2021-10-01 NOTE — PROGRESS NOTES
Pt has been trying to have a BM throughout our shift. I updated Dr. Yamileth Cleaning of this and she ordered Lactulose BID PRN (see orders).  Electronically signed by Neri Heredia RN on 10/1/2021 at 5:37 PM

## 2021-10-01 NOTE — PROGRESS NOTES
[facility]   Facility/Department: [facility]  Speech Language Pathology  Discharge Report        Patient: Dayo Nicole  : 3/5/1929    Date: 10/1/2021    SPEECH THERAPY    National Outcomes Measurement System (NOMS):  Initial Status:  Diet:  Regular solids with thin liquids  Swallowing:  Ratin  Spoken Language Comprehension:  Ratin  Spoken Language Expression:  Ratin  Motor Speech:  Ratin  Problem Solving:  Ratin  Memory:  Rating: 3    National Outcomes Measurement System (NOMS):  Discharge Status:  Diet:  Diet Solids Recommendation: Regular  Liquid Consistency Recommendation: Thin  Dysphagia Outcome Severity Scale: Level 7: Normal in all situations    Compensatory Swallowing Strategies: Alternate solids and liquids, Small bites/sips, Eat/Feed slowly, Upright as possible for all oral intake       Swallowing:  Ratin  Spoken Language Comprehension:  Ratin  Spoken Language Expression:  Ratin  Motor Speech:  Ratin  Problem Solving:  Ratin-5  Memory:  Ratin     Long Term Goals:  Long-term Goals  Timeframe for Long-term Goals: 2-3 weeks  Goal 1: Pt will improve his Cognition from mod assist to supervised assist for adequate functional recall and safety awareness for home, community. Long-term Goals  Goal 1: N/A         Patient's Response to Therapy:  Patient presented with improved comprehension, problem solving and memory skills. ST continues to recommend 24/7 supervision to improve safety during ADLs. Pocket talker was utilized to improve comprehension secondary to patient being hard of hearing. Functional Status at time of Discharge:    · Cognition: Patient demonstrates mild cognitive deficits. · Language: Patient demonstrates no language deficits. · Motor Speech: Patient demonstrates no motor speech deficits. · Swallow: Patient demonstrates no dysphagia.                                  Patient is discharged to Home with 24/7 supervision [x] Speech Therapy is no longer warranted      Signature:  Fede Flower, SLP, CCC-SLP, Date: 10/1/2021, Time: 1:57 PM

## 2021-10-01 NOTE — CARE COORDINATION
CRESENCIOW called and scheduled follow up with Dr. Lelo Taylor for 10/4/21 at 9:40AM. LSW notified Lacey Quintero (granddaughter). Referral was made to [facility] per patient and family request. LSW also notified her that LSW told nursing that she would like to  patient around [address] answered all questions.  Electronically signed by SOCO Morel LSW on 10/1/2021 at 3:56 PM

## 2021-10-01 NOTE — PROGRESS NOTES
Occupational Therapy  Facility/Department: [facility]  Daily Treatment Note  NAME: Jairo Argueta  : 3/5/1929  MRN: [id number]    Date of Service: 10/1/2021    Discharge Recommendations:  Continue to assess pending progress       Assessment      REQUIRES OT FOLLOW UP: Yes  Activity Tolerance  Activity Tolerance: Patient limited by fatigue  Safety Devices  Safety Devices in place: Yes  Type of devices: All fall risk precautions in place         Patient Diagnosis(es): There were no encounter diagnoses. has a past medical history of Anemia, CAD (coronary artery disease), DM (diabetes mellitus) ([facility] [address] Dyslipidemia, History of tobacco abuse, HTN (hypertension), Hypothyroidism, Non-ST elevation MI (NSTEMI) ([facility] [address] and Pacemaker. has a past surgical history that includes Cataract removal and Middle ear surgery (Left, ). Restrictions  Restrictions/Precautions  Restrictions/Precautions: Fall Risk  Implants present? 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 Pt transferred supine to sit eob at min assist. Pt. requested to use the bathroom but stood to urinate. Pt. able to manipulate his zipper and complete toileting at below status. ADL  Toileting: Supervision      Pt engaged in B UE strengthening task to promote independence with transfers and ADLs.  Pt had 1 lb wrist weights on B wrists and placed wooden dowels into a post and then placed rings on top of the dowels. Pt then removed the rings and dowels at the same time. Pt alternated hands to complete the task and took rest breaks prn due to fatigue. Pt completed ROM arc on the first and second level with both hands. Pt was able to tolerate the weights for the tasks. Plan   Plan  Times per week: 5-7x/wk  Plan weeks: 2 weeks  Current Treatment Recommendations: Strengthening, Endurance Training, Neuromuscular Re-education, Balance Training, Functional Mobility Training, Self-Care / ADL, Cognitive Reorientation, Safety Education & Training, Cognitive/Perceptual Training, Patient/Caregiver Education & Training  Plan Comment: Continue per OT plan of care    Goals  Long term goals  Time Frame for Long term goals : 5-7x/wk x 2 weeks  Long term goal 1: Pt within 2 weeks of initial evaluation will demonstrate progress in th following areas listed below to achieve specific LTG's as stated in the initial evaluation  Long term goal 2: Increase independence with ADL self caare  Long term goal 3:  Increase cognition for the safe completion of ADL  Long term goal 4: increase functional mobility independence for completion of ADL  Patient Goals   Patient goals : Not stated at this time       Therapy Time   Individual Concurrent Group Co-treatment   Time In 1500         Time Out 1530         Minutes 30              ADL/IADL trainin minutes  Therapeutic activities: 25 minutes      LEONCIO Moulton Electronically signed by LEONCIO Moulton on 10/1/2021 at 3:36 PM

## 2021-10-01 NOTE — PLAN OF CARE
Problem: Skin Integrity:  Goal: Will show no infection signs and symptoms  Description: Will show no infection signs and symptoms  10/1/2021 1139 by Nimisha Blunt RN  Outcome: Ongoing  10/1/2021 0250 by Dianne Chen RN  Outcome: Ongoing  9/30/2021 2339 by Laurie Adams RN  Outcome: Ongoing  Goal: Absence of new skin breakdown  Description: Absence of new skin breakdown  10/1/2021 1139 by Nimisha Blunt RN  Outcome: Ongoing  10/1/2021 0250 by Dianne Chen RN  Outcome: Ongoing  9/30/2021 2339 by Laurie Adams RN  Outcome: Ongoing     Problem: Falls - Risk of:  Goal: Will remain free from falls  Description: Will remain free from falls  10/1/2021 1139 by Nimisha Blunt RN  Outcome: Ongoing  10/1/2021 0250 by Dianne Chen RN  Outcome: Ongoing  9/30/2021 2339 by Laurie Adams RN  Outcome: Ongoing  Goal: Absence of physical injury  Description: Absence of physical injury  10/1/2021 1139 by Nimisha Blunt RN  Outcome: Ongoing  10/1/2021 0250 by Dianne Chen RN  Outcome: Ongoing  9/30/2021 2339 by Laurie Adams RN  Outcome: Ongoing     Problem: IP SWALLOWING  Goal: LTG - patient will tolerate the least restrictive diet consistency to allow for safe consumption of daily meals  10/1/2021 1139 by Nimisha Blunt RN  Outcome: Ongoing  10/1/2021 0250 by Dianne Chen RN  Outcome: Ongoing  9/30/2021 2339 by Laurie Adams RN  Outcome: Ongoing     Problem: IP COMMUNICATION/DYSARTHRIA  Goal: LTG - patient will improve expressive language skills to allow for communication of wants and needs in daily activities  10/1/2021 1139 by Nimisha Blunt RN  Outcome: Ongoing  10/1/2021 0250 by Dianne Chen RN  Outcome: Ongoing  9/30/2021 2339 by Laurie Adams RN  Outcome: Ongoing     Problem: Nutrition  Goal: Optimal nutrition therapy  10/1/2021 1139 by Nimisha Blunt RN  Outcome: Ongoing  10/1/2021 0250 by Dianne Chen RN  Outcome: Ongoing  9/30/2021 2339 by Laurie Adams, RN  Outcome: Ongoing

## 2021-10-01 NOTE — PROGRESS NOTES
Assumed care for this patient at [phone]. NON-compliant with call light extremely hard of hearing. No safety awareness at all. Up x2 to the bathroom. Last BM 9/30 No complaints of pain  this am. Call light within reach bed alarm on. Monitoring for safety and needs.

## 2021-10-01 NOTE — FLOWSHEET NOTE
Patient assessment completed earlier this shift. The patient is very [facility]. He denied any pain. No acute distress noted.

## 2021-10-01 NOTE — PROGRESS NOTES
Physical Therapy Rehab Treatment Note  [facility]Department[facility]  [facility]       NAME: Sloan Nowak  : 3/5/1929 (85 y.o.)  MRN: [id number]  CODE STATUS: Full Code    Date of Service: 10/1/2021  Chart Reviewed: Yes  Family / Caregiver Present: No    Restrictions:  Restrictions/Precautions: Fall Risk       SUBJECTIVE: Subjective: Okay  Response To Previous Treatment: Patient with no complaints from previous session. Pain Screening  Patient Currently in Pain: No  Pre Treatment Pain Screening  Pain at present: 0  Scale Used: Numeric Score  Intervention List: Patient able to continue with treatment    Post Treatment Pain Screening:  Pain Assessment  Pain Level: 0    OBJECTIVE:                    Bed mobility  Supine to Sit: Independent    Scooting: Independent    Transfers: Sit to stand and stand to sit Modified Independent  Pivot transfer: Modified Independent. Exercises  Hamstring Sets: curls RTB x20  Hip Flexion: x 20  Hip Abduction: Side kicks x 20  Knee Long Arc Quad: x 20  Ankle Pumps: x 20  Neurodevelopmental Techniques: MRE'S: ABD/ADD/EXT: x 20     ASSESSMENT/PROGRESS TOWARDS GOALS:  Assessment: Reviewed HEP. OT reports pt able to pick objects off of floor independently. Goals:  Long term goals  Long term goal 1: indep bed mobility - met  Long term goal 2: indep bed and car transers - met  Long term goal 3: indep gait 50 feet in familiar and protected environment - met  Long term goal 4: supervision for 4 stairs - met  Long term goal 5: CGA gait 150 feet or greater - met    PLAN OF CARE/Safety:   Safety Devices  Type of devices: All fall risk precautions in place; Left in chair;Chair alarm in place      Therapy Time:   Individual   Time In 1300   Time Out 1330   Minutes 30     Minutes:30      Transfer/Bed mobility trainin      Gait trainin      Neuro re education: 8     Therapeutic ex: Best Seo, PTA, 10/01/21 at 2:05 PM

## 2021-10-01 NOTE — PROGRESS NOTES
Physical Therapy Rehab Treatment Note  Facility/Department: [facility]  Room: R234/R234-01       NAME: Kae Haider  : 3/5/1929 (81 y.o.)  MRN: [id number]  CODE STATUS: Full Code    Date of Service: 10/1/2021  Chart Reviewed: Yes  Family / Caregiver Present: No    Restrictions:  Restrictions/Precautions: Fall Risk       SUBJECTIVE: Subjective: Pt not talking. Sighing often. Response To Previous Treatment: Patient with no complaints from previous session. Pain Screening  Patient Currently in Pain: No  Pre Treatment Pain Screening  Pain at present: 0  Scale Used: Numeric Score  Intervention List: Patient able to continue with treatment    Post Treatment Pain Screening:  Pain Assessment  Pain Level: 0    OBJECTIVE:                    Bed mobility  Bridging: Independent  Rolling to Left: Independent  Rolling to Right: Independent  Supine to Sit: Independent  Sit to Supine: Independent  Scooting: Independent  Comment: Hospital bed: flat and with rails but pt not using them. Transfers  Sit to Stand: Modified independent  Stand to sit: Modified independent  Bed to Chair: Modified independent  Car Transfer: Modified independent    Ambulation  Ambulation?: Yes  Ambulation 1  Surface: level tile;carpet;ramp  Device: Rolling Walker  Assistance: Modified Independent;Supervision  Quality of Gait: Seady gait. Modified independence with 48' and expected to be in familiar environment independent. Supervision for unfamiliar areas and for occasional directional cues secondary to left neglect. Good control on low grade ramp. Gait Deviations: Slow Meseret;Decreased step length;Decreased step height  Distance: 200' with multiple turns.     Stairs/Curb  Stairs?: Yes  Stairs  # Steps : 12  Stairs Height: 6\"  Rails: Right ascending  Curbs: 4\"  Device: Rolling walker  Assistance: Stand by assistance;Supervision  Comment: Pt needs reminders for one rail but only has one rail at home and not expected to need that reminder at home.                    ASSESSMENT/PROGRESS TOWARDS GOALS:  Assessment: Pt with good balance with gait and improved stamina on stairs with only one standing rest break. Pt has met all goals. Will review HEP in PM and test picking up object on floor. Occasional SBA will possibly be needed secondary to left neglect. Goals:  Long term goals  Long term goal 1: indep bed mobility - met  Long term goal 2: indep bed and car transers - met  Long term goal 3: indep gait 50 feet in familiar and protected environment - met  Long term goal 4: supervision for 4 stairs - met  Long term goal 5: CGA gait 150 feet or greater - met    PLAN OF CARE/Safety:   Safety Devices  Type of devices: All fall risk precautions in place; Left in chair;Chair alarm in place      Therapy Time:   Individual   Time In 1030   Time Out 1100   Minutes 30     Minutes:30      Transfer/Bed mobility training: 15      Gait training: Evert Junior PTA, 10/01/21 at 12:10 PM

## 2021-10-02 VITALS
SYSTOLIC BLOOD PRESSURE: 128 MMHG | DIASTOLIC BLOOD PRESSURE: 64 MMHG | OXYGEN SATURATION: 94 % | BODY MASS INDEX: 24.09 KG/M2 | HEART RATE: 79 BPM | TEMPERATURE: 98.8 F | WEIGHT: 149.91 LBS | RESPIRATION RATE: 18 BRPM | HEIGHT: 66 IN

## 2021-10-02 LAB
GLUCOSE BLD-MCNC: 111 MG/DL (ref 60–115)
GLUCOSE BLD-MCNC: 176 MG/DL (ref 60–115)
PERFORMED ON: ABNORMAL
PERFORMED ON: NORMAL

## 2021-10-02 PROCEDURE — 6370000000 HC RX 637 (ALT 250 FOR IP): Performed by: INTERNAL MEDICINE

## 2021-10-02 PROCEDURE — 94640 AIRWAY INHALATION TREATMENT: CPT

## 2021-10-02 PROCEDURE — 94761 N-INVAS EAR/PLS OXIMETRY MLT: CPT

## 2021-10-02 PROCEDURE — 99239 HOSP IP/OBS DSCHRG MGMT >30: CPT | Performed by: PHYSICAL MEDICINE & REHABILITATION

## 2021-10-02 PROCEDURE — 6370000000 HC RX 637 (ALT 250 FOR IP): Performed by: PHYSICAL MEDICINE & REHABILITATION

## 2021-10-02 RX ADMIN — APIXABAN 5 MG: 5 TABLET, FILM COATED ORAL at 10:23

## 2021-10-02 RX ADMIN — FOLIC ACID 1 MG: 1 TABLET ORAL at 10:23

## 2021-10-02 RX ADMIN — RAMIPRIL 5 MG: 5 CAPSULE ORAL at 10:23

## 2021-10-02 RX ADMIN — BACITRACIN, NEOMYCIN, POLYMYXIN B: 400; 3.5; 5 OINTMENT TOPICAL at 10:24

## 2021-10-02 RX ADMIN — CARVEDILOL 6.25 MG: 6.25 TABLET, FILM COATED ORAL at 10:23

## 2021-10-02 RX ADMIN — BUDESONIDE AND FORMOTEROL FUMARATE DIHYDRATE 2 PUFF: 80; 4.5 AEROSOL RESPIRATORY (INHALATION) at 04:17

## 2021-10-02 RX ADMIN — Medication 30 MG: at 12:03

## 2021-10-02 RX ADMIN — FERROUS SULFATE TAB 325 MG (65 MG ELEMENTAL FE) 325 MG: 325 (65 FE) TAB at 10:23

## 2021-10-02 RX ADMIN — ASPIRIN 81 MG: 81 TABLET, COATED ORAL at 10:23

## 2021-10-02 RX ADMIN — Medication 100 MG: at 10:23

## 2021-10-02 NOTE — PROGRESS NOTES
Followup    Patient was seen in his room with RN and patient's granddaughter with whom the patient resides. He was seated on the bed and he was alert and responsive. He used the sound amplifier and responded briefly to his granddaughter. We asked him why he didn't speak to the staff, and he said, \"I got nothing to say\". When asked whether he was happy to be going home, he said yes. She spoke with him about her plans for when he comes home today (e.g., that he would reside on the first floor of the home), and he seemed attentive. I asked the granddaughter whether he seemed depressed, and she said no. I asked her whether he slept a lot at home, and she said no. She said that he was active and independent with respect to activities of daily living. She said that her grandfather did not speak much at home, but she had no concerns about his comprehension or memory.

## 2021-10-02 NOTE — PROGRESS NOTES
Physical Therapy  Physical Therapy Missed Treatment   Facility/Department: [facility] X720/C538-71    NAME: Heather Galaviz    : 3/5/1929 (80 y.o.)  MRN: [id number]    Account: [de-identified]  Gender: male      Pt scheduled to go home today at 10AM.  Pt scheduled at 8:30 for PT but declined therapy. \"I don't want to. \"  Pt did not state anything else, as pt is not very verbal.  Nsg notified that pt declined therapy. Missed 30 minutes of scheduled therapy time.         Shamar Bee, PTA, 10/02/21 at 8:38 AM

## 2021-10-02 NOTE — PROGRESS NOTES
Patient and grand- daughter given discharge instructions. Patient and grand-daughter verbalized understanding. They had no further questions. Patient belongings packed up by family. Transport has been called.

## 2021-10-02 NOTE — PROGRESS NOTES
Patient up to bathroom with one assist does not call for assistance ALINA maintained for patient safety. Patient refused to  Get undressed and put on gown this evening.

## 2021-10-02 NOTE — PLAN OF CARE
Problem: Skin Integrity:  Goal: Will show no infection signs and symptoms  Description: Will show no infection signs and symptoms  10/1/2021 2209 by Naif Kirk RN  Outcome: Ongoing  10/1/2021 1139 by Bentley Schwartz RN  Outcome: Ongoing  Goal: Absence of new skin breakdown  Description: Absence of new skin breakdown  10/1/2021 2209 by Naif Kirk RN  Outcome: Ongoing  10/1/2021 1139 by Bentley Schwartz RN  Outcome: Ongoing     Problem: Falls - Risk of:  Goal: Will remain free from falls  Description: Will remain free from falls  10/1/2021 2209 by Naif Kirk RN  Outcome: Ongoing  10/1/2021 1139 by Bentley Schwartz RN  Outcome: Ongoing  Goal: Absence of physical injury  Description: Absence of physical injury  10/1/2021 2209 by Naif Kirk RN  Outcome: Ongoing  10/1/2021 1139 by Bentley Schwartz RN  Outcome: Ongoing     Problem: IP SWALLOWING  Goal: LTG - patient will tolerate the least restrictive diet consistency to allow for safe consumption of daily meals  10/1/2021 2209 by Naif Kirk RN  Outcome: Ongoing  10/1/2021 1139 by Bentley Schwartz RN  Outcome: Ongoing     Problem: IP COMMUNICATION/DYSARTHRIA  Goal: LTG - patient will improve expressive language skills to allow for communication of wants and needs in daily activities  10/1/2021 2209 by Naif Kirk RN  Outcome: Ongoing  10/1/2021 1139 by Bentley Schwartz RN  Outcome: Ongoing     Problem: Nutrition  Goal: Optimal nutrition therapy  10/1/2021 2209 by Naif Kirk RN  Outcome: Ongoing  10/1/2021 1139 by Bentley Schwartz RN  Outcome: Ongoing

## 2021-10-02 NOTE — PLAN OF CARE
Problem: Skin Integrity:  Goal: Will show no infection signs and symptoms  Description: Will show no infection signs and symptoms  10/2/2021 1125 by Vickie Ryan RN  Outcome: Ongoing  10/1/2021 2209 by Kirti Asher RN  Outcome: Ongoing  Goal: Absence of new skin breakdown  Description: Absence of new skin breakdown  10/2/2021 1125 by Vickie Ryan RN  Outcome: Ongoing  10/1/2021 2209 by Kirti Asher RN  Outcome: Ongoing     Problem: Falls - Risk of:  Goal: Will remain free from falls  Description: Will remain free from falls  10/2/2021 1125 by Vickie Ryan RN  Outcome: Ongoing  10/1/2021 2209 by Kirti Asher RN  Outcome: Ongoing  Goal: Absence of physical injury  Description: Absence of physical injury  10/2/2021 1125 by Vickie Ryan RN  Outcome: Ongoing  10/1/2021 2209 by Kirti Asher RN  Outcome: Ongoing     Problem: IP SWALLOWING  Goal: LTG - patient will tolerate the least restrictive diet consistency to allow for safe consumption of daily meals  10/2/2021 1125 by Vickie Ryan RN  Outcome: Ongoing  10/1/2021 2209 by Kirti Asher RN  Outcome: Ongoing     Problem: IP COMMUNICATION/DYSARTHRIA  Goal: LTG - patient will improve expressive language skills to allow for communication of wants and needs in daily activities  10/2/2021 1125 by Vickie Ryan RN  Outcome: Ongoing  10/1/2021 2209 by Kirti Asher RN  Outcome: Ongoing     Problem: Nutrition  Goal: Optimal nutrition therapy  10/2/2021 1125 by Vickie Ryan RN  Outcome: Ongoing  10/1/2021 2209 by Kirti Asher RN  Outcome: Ongoing

## 2021-10-02 NOTE — PROGRESS NOTES
Occupational Therapy   [facility] OCCUPATIONAL THERAPY DISCHARGE SUMMARY- REHAB     Date: 10/2/2021  Patient Name: Mekhi Lind        MRN: [id number]  Account: [de-identified]   : 3/5/1929  (80 y.o.)  Room: [place] [address]    Diagnosis:  Impaired mobility and ADL's due to right occipital stroke    Past Medical History:   Diagnosis Date    Anemia     CAD (coronary artery disease) 2015    DM (di[facility] mellitus) ([facility]     Dyslipidemia     History of tobacco abuse     HTN (hypertension)     Hypothyroidism     Non-ST elevation MI (NSTEMI) ([facility]     Pacemaker 2015     Past Surgical History:   Procedure Laterality Date    CATARACT REMOVAL      MIDDLE EAR SURGERY Left     \"they had to reset the bones\" after an infection       Precautions:   Restrictions/Precautions: Fall Risk  Implants present? : Pacemaker     Social/Functional History:  Social/Functional History  Lives With:  (grand daughter)  Type of Home: House  Home Layout: Two level, Bed/Bath upstairs, Able to Live on Main level with bedroom/bathroom  Home Access: Ramped entrance  Home Equipment: Rolling walker  ADL Assistance: Independent  Homemaking Assistance: Independent  Ambulation Assistance: Independent (no device)  Transfer Assistance: Independent  Active : No  Additional Comments: Pt unable to provide entire home set up secondary to communication    Current Functional Status:  ADL  Feeding: Supervision, Setup  Grooming: Supervision  UE Bathing: Unable to assess(comment) (pt refused)  LE Bathing: Unable to assess(comment) (pt refused)  UE Dressing: Supervision, Verbal cueing  LE Dressing: Supervision, Verbal cueing  Toileting: Supervision    Toilet Transfers  Toilet - Technique: Stand step  Equipment Used: Grab bars  Toilet Transfer: Supervision    Tub Transfers  Tub - Transfer From: Wheelchair  Tub - Transfer Type: To and From  Tub - Transfer To:  Shower seat with back  Tub - Technique: Ambulating  Tub Transfers: Stand by assistance  Shower Transfers  Shower - Transfer From: Elias Padron - Transfer Type: To and From  Shower - Transfer To: Shower seat with back  Shower - Technique: Ambulating  Shower Transfers: Not tested      Orientation Status:  Orientation  Overall Orientation Status:  (Patient does not provide response when asked orientation questions)  Orientation Level: Oriented to person    Cognition Status:  Cognition  Overall Cognitive Status: Exceptions  Following Commands: Follows one step commands with repetition, Follows one step commands with increased time  Attention Span: Attends with cues to redirect  Memory: Decreased recall of biographical Information  Safety Judgement: Decreased awareness of need for safety  Problem Solving: Assistance required to generate solutions, Assistance required to implement solutions  Insights: Not aware of deficits  Initiation: Requires cues for some  Sequencing: Requires cues for some  Cognition Comment: Comp Min A, expression Min A, social Max A, problem Min A, memory Mod A    Perception Status:  Perception  Overall Perceptual Status: Impaired  Initiation: Cues to initiate tasks  Motor Planning: Hand over hand to sequence tasks    Sensation Status:  Sensation  Overall Sensation Status: WFL    Vision and Hearing Status:  Vision  Vision: Within Functional Limits  Hearing  Hearing: Exceptions to [facility]  Hearing Exceptions: Hard of hearing/hearing concerns, Right hearing aid (Right hearing aid not working)     UE Function Status:    ROM:   LUE AROM (degrees)  LUE AROM : WFL  Left Hand AROM (degrees)  Left Hand AROM: WFL  RUE AROM (degrees)  RUE AROM : WFL  Right Hand AROM (degrees)  Right Hand AROM: WFL    Strength:  LUE Strength  Gross LUE Strength: WFL  L Hand General: 4/5  RUE Strength  Gross RUE Strength: WFL  R Hand General: 4/5    Coordination, Tone, Quality of Movement:    Tone RUE  RUE Tone: Normotonic  Tone LUE  LUE Tone: Normotonic  Coordination  Movements Are Fluid And Coordinated: No  Coordination and Movement description: Decreased speed, Right UE, Left UE, Fine motor impairments    Activity Tolerance:  Activity Tolerance  Activity Tolerance: Patient limited by fatigue  Activity Tolerance: fair    D/C Recommendations:  24/7 supervision  Equipment Recommendations:   shower chair    OT Follow Up:  OT D/C RECOMMENDATIONS  REQUIRES OT FOLLOW UP: Yes    Home Exercise Program Provided: No      Electronically signed by:    Eduardo Sena OT, MOT, OTR/L  10/2/2021, 3:54 PM

## 2021-10-02 NOTE — PROGRESS NOTES
Patient laying in bed. Bed alarm is on, call light within reach. Patient denies pain at this time through gesturing. Patient does not use spoken word very often, but will gesture from time to time. Patient is very hard of hearing. Patient has circular rash in several places on his abdomen. Antibiotic ointment applied. Will continue to monitor.

## 2021-10-02 NOTE — DISCHARGE SUMMARY
Subjective: The patient complains of severe acute on chronic progressive fatigue and  Left sided weakness partially relieved by rest,medications, PT,  OT,   SLP and rest and exacerbated by recent illness. I  Was concerned about patients medical complexities including CVA with left-sided hemianesthesia hemiparesis and confirmed right occipital lobe CVA with visual field cut. He has been doing well with his granddaughter at home but we are wondering whether he may need extra assistance possibly an assisted living versus long-term care eventually. Nursing reports very malodorous urine and I have ordered a stat UA. [address] Course: The patient was admitted to the Rehabilitation Unit to address ADL and mobility deficits. The patient was enrolled in acute PT, OT program.  Weekly team meetings were held to assess functional progress toward their goals. The patient's medical issues were addressed. The patient progressed in the rehab program and is now ready for discharge. Refer to FIM scores summary report for detailed functional status. Greater than 35 minutes was spent on coordinating patients discharge including follow-up care, medications and patient/family education. Extended time needed because of the potential use of opiate medications are high risk medications and a high risk population individual.  Patient and family were instructed to use lowest effective dose of these medications and slowly titrate off over the next 2 to 4 weeks. They are not to combine opiates with sedatives. [facility] in hopes of eliminating polypharmacy and weaning to the lowest effective dose of pain medications and eliminating the concomitant use of benzodiazepines. I see no medications of concern. I see no habits of combining sedatives and narcotics.       I reviewed current care and plans for further care with other rehab providers including nursing and case management. According to recent  note, \" Patient up to bathroom with one assist does not call for assistance ALINA maintained for patient safety. Patient refused to  Get undressed and put on gown this evening\". ROS x10: The patient also complains of severely impaired mobility and activities of daily living. Otherwise no new problems with vision, hearing, nose, mouth, throat, dermal, cardiovascular, GI, , pulmonary, musculoskeletal, psychiatric or neurological. See Rehab H&P on Rehab chart dated . Vital signs:  /62   Pulse 59   Temp 98.8 °F (37.1 °C)   Resp 18   Ht 5' 5.98\" (1.676 m)   Wt 149 lb 14.6 oz (68 kg)   SpO2 94%   BMI 24.21 kg/m²   I/O:   PO/Intake:  fair PO intake, no problems observed or reported. Bowel/Bladder:  continent, no problems noted. General:  Patient is well developed, adequately nourished, non-obese and     well kempt. HEENT:    PERRLA, [place] hearing intact to loud voice, external inspection of ear     and nose benign. Inspection of lips, tongue and gums benign  Musculoskeletal: No significant change in strength or tone. All joints stable. Inspection and palpation of digits and nails show no clubbing,       cyanosis or inflammatory conditions. Neuro/Psychiatric: Affect: flat but pleasant. Alert and oriented to person, place and     Situation with  mod cues. No significant change in deep tendon reflexes or     sensation  Lungs:  Diminished, CTA-B. Respiration effort is normal at rest.     Heart:   S1 = S2, RRR. No loud murmurs. Abdomen:  Soft, non-tender, no enlargement of liver or spleen. Extremities:  No significant lower extremity edema or tenderness.   Skin:   Intact to general survey, shear rashing to the buttocks bacitracin added    Rehabilitation:  Physical therapy:   Bed Mobility: Scooting: Independent    Transfers: Sit to Stand: Modified independent  Stand to sit: Modified independent  Bed to Chair: Modified independent, Ambulation 1  Surface: level tile, carpet, ramp  Device: Rolling Walker  Assistance: Modified Independent, Supervision  Quality of Gait: Seady gait. Modified independence with 48' and expected to be in familiar environment independent. Supervision for unfamiliar areas and for occasional directional cues secondary to left neglect. Good control on low grade ramp. Gait Deviations: Slow Meseret, Decreased step length, Decreased step height  Distance: 200' with multiple turns. Comments: Pt ambulated 30 feet with ww with SBA, Stairs  # Steps : 12  Stairs Height: 6\"  Rails: Right ascending  Curbs: 4\"  Device: Rolling walker  Assistance: Stand by assistance, Supervision  Comment: Pt needs reminders for one rail but only has one rail at home and not expected to need that reminder at home. FIMS:  ,  , Assessment: Unable to test gait in PM with furniture, as planned, after consulting PT. Occupational therapy:    ,  , Assessment: Pt demonstrates mod difficulty to sort and assemble puzzles and requires verbal cues to complete. Pt continutes to benefit from OT servicesto maximize independence and safety during ADLs.     Speech therapy:        Lab/X-ray studies reviewed, analyzed and discussed with patient and staff:   Recent Results (from the past 24 hour(s))   POCT Glucose    Collection Time: 10/01/21 11:02 AM   Result Value Ref Range    POC Glucose 173 (H) 60 - 115 mg/dl    Performed on ACCU-CHEK    POCT Glucose    Collection Time: 10/01/21  4:08 PM   Result Value Ref Range    POC Glucose 124 (H) 60 - 115 mg/dl    Performed on ACCU-CHEK    POCT Glucose    Collection Time: 10/01/21  9:02 PM   Result Value Ref Range    POC Glucose 153 (H) 60 - 115 mg/dl    Performed on ACCU-CHEK    POCT Glucose    Collection Time: 10/02/21  6:16 AM   Result Value Ref Range    POC Glucose 111 60 - 115 mg/dl    Performed on ACCU-CHEK        Echocardiogram   9/17/2021   Transthoracic Echocardiography Report   Left Ventricle Normal left ventricular systolic function, no regional wall motion abnormalities, estimated ejection fraction of 50%. Normal left ventricular size and function. Right Ventricle Normal right ventricle structure and function. Normal right ventricle systolic pressure. Left Atrium The left atrium is Mildly dilated. Right Atrium Normal right atrium. Mitral Valve Diffusely thickened and pliable mitral valve leaflets with normal excursion. Mild (1+) mitral regurgitation is present. No evidence of mitral valve stenosis. Tricuspid Valve Normal tricuspid valve structure and function. Aortic Valve The aortic valve leaflets were not well visualized. No evidence of aortic valve regurgitation . No evidence of aortic valve stenosis. Pericardial Effusion No evidence of pericardial effusion. Aorta \ Miscellaneous Miscellaneous normal findings were found. CT Head   9/16/2021    BRAIN CT FINDINGS: There has been interval development of a 3 cm area of hypodensity in the right occipital lobe since the previous day which is worrisome for an acute, subacute ischemia. There are persistent, chronic areas of ischemia in the anterior right temporal lobe, unchanged since previous study. No acute hemorrhage, mass, mass effect, or midline shift. There is prominence of sulci and ventricles indicating mild global cerebral atrophy and chronic involutional changes. Moderate periventricular white matter hypodensities indicating chronic microangiopathy are noted. The basal ganglia are within normal limits. There are no acute changes or space-occupying lesions in the posterior fossa. The visualized portions of the orbits are within normal limits. The globes are intact. The imaged portions of the paranasal sinuses are unremarkable. The calvarium is intact. There is a 3 cm new area of hypodensity worrisome for acute in the right occipital lobe. CT Head   9/15/2021: The ventricles are dilated. Unchanged size configuration.  No mass.  No midline shift. The cisterns are patent. There are white matter and periventricular changes most likely consistent with chronic small vessel disease. Again note is made of a focal area of encephalomalacia at the inferolateral medial aspect the right temporal lobe. No acute intra-axial or extra-axial findings. The visualized osseous structures are unremarkable. There is mucoperiosteal thickening in the right maxillary sinus. There is a small amount of patchy opacification right mastoid. There is resection of the left mastoid. The middle ear bones are not appreciated. Correlate with patient's surgical history. .   NO ACUTE INTRA-AXIAL OR EXTRA-AXIAL FINDINGS. XR CHEST 9/16/2021: Median sternotomy. Right transvenous pacemaker, unchanged. No consolidation. No pleural effusion. No pneumothorax. Stable cardiomediastinal silhouette. Aortic vascular calcifications. No distinct acute osseous findings. No acute radiographic abnormality. XR CHEST : 9/14/2021 Single  views of the chest is submitted. Right-sided CCD device. Leads overlying the cardiac silhouette. There are multiple median sternotomy wires overlying the cardiac silhouette. The cardiac silhouette is of normal size configuration. Pulmonary vascular unremarkable. Right sided trachea. No focal infiltrates. No Pneumothoraces. NO ACUTE ACTIVE CARDIOPULMONARY PROCESS    US CAROTID ARTERY BILATERAL  9/16/2021  There is mild atherosclerotic plaquing of the common carotid arteries and carotid bifurcations without significantly elevated velocities. Maximum systolic velocity within the right internal and mid common carotid arteries are 79 and 51 cm/s, with an ICA/CCA ratio of approximately 1.5 , which indicates less than 50% by velocity criteria.  Maximum systolic velocity within the left internal and mid common carotid arteries are 59 and 74 cm/s, with an ICA/CCA ratio of approximately 0.9, which indicates less than 50% by velocity criteria. Maximum velocities within the right and left external carotid arteries are 141 and 81 cm/sec respectively. There is antegrade flow in both vertebral arteries. MILD ATHEROSCLEROTIC PLAQUING OF THE CAROTID BULBS WITHOUT EVIDENCE OF A FLOW-LIMITING STENOSIS, BY VELOCITY RATIO CRITERIA. GOSINK CRITERIA Diameter          PSV t            EDV t          PSV          EDV          Stenosis Site       %              cm/sec          cm/sec      ICA/CCA    ICA/CCA 0-49                 <124              <40             <2:1           ---                 --- 50-69              125-225                  >2:1           ---                 --- 70-89               225-325          >100          >4:1           >5:1              --- 90%+                >325              >100          >4:1           >9:1           Damped resistive CCA >95%               May be            May be      Damped      ---              Damped resistive CCA                       decreased      decreased  resistive CCA        Previous extensive, complex labs, notes and diagnostics reviewed and analyzed. ALLERGIES:    Allergies as of 09/23/2021    (No Known Allergies)      (please also verify by checking [facility])     Complex Physical Medicine & Rehab Issues Assess & Plan:   1. 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 .  Functional and medical status have improved status post acute rehab at [facility].  2. Bowel and Bladder dysfunction  Neurogenic bowel and Bladder:  frequent toileting, ambulate to bathroom with assistance, check post void residuals. Check for C.difficile x1 if >2 loose stools in 24 hours, continue bowel & bladder program.  Monitor bowel and bladder function. Lactinex 2 PO every AC.   LUIS العراقيn, Alysha Araya on heart rate and blood pressure checks, consult hospitalist for backup medical and adjust/add medications ( Eliquis, ASA, Coreg, Lipitor, Altace )  2. DM (diabetes mellitus) with neuropathy-Continue blood sugar checks every shift, diet, add diabetic add dietary education, restrict carbohydrates to lowest effective and safe carb count per meal advising 4 carbs per meal, add at bedtime snack to prevent a.m. hypoglycemia, adjust/add medications ( SSI )  3. Anemia-monitor closely on Eliquis--add Iron and Vit B12  4. Hypothyroidism-titrate Synthroid  5. COPD exacerbation-Pulse oximeter checks to shift dose and titrate oxygen and aerosol treatments monitor for nocturnal hypoxemia, monitor vital signs, oxygen prn. Symbicort, Albuterol  6. 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 Chronic right shoulder pain-Add Lidoderm and Tylenol. 8.   Acute CVA (cerebrovascular accident)with  Aphasia, Dysphagia and Left hemiparesis -focus on balance and cognition--pocket talker helps  9. Neurogenic bladder-bladder training program  10. Bilateral hearing loss--SLP and AD for hearing    11.  Depression-emotional support provided daily, vitamin B12, encourage participation in rehabilitation support group and recreational therapy, adjust/add medications ( Vit B12 )  add rehab Psych       Electronically signed by Marva Llamas DO on 9/27/21 at 8:15 AM ELIZABETH Hebert D.O., PM&R     Attending    [address]

## 2021-10-04 NOTE — PROGRESS NOTES
Physical Therapy  Facility[facility]Department: [facility]  Rehabilitation Discharge note    NAME: Yue Saenz  : 3/5/1929  MRN: [id number]    Date of discharge: 10/2/21    Subjective: Pt    Past Medical History:   Diagnosis Date    Anemia     CAD (coronary artery disease) 2015    DM [facility] mellitus) [facility] [address]     Dyslipidemia     History of tobacco abuse     HTN (hypertension)     Hypothyroidism     Non-ST elevation MI (NSTEMI) ([facility] [address]     Pacemaker 2015     Past Surgical History:   Procedure Laterality Date    CATARACT REMOVAL      MIDDLE EAR SURGERY Left     \"they had to reset the bones\" after an infection       Restrictions  Restrictions/Precautions  Restrictions/Precautions: Fall Risk  Implants present? : Pacemaker    Objective    Bed mobility  Bridging: Independent  Rolling to Left: Independent  Rolling to Right: Independent  Supine to Sit: Independent  Sit to Supine: Independent  Scooting: Independent  Comment: Hospital bed: flat and with rails but pt not using them. Transfers  Sit to Stand: Modified independent  Stand to sit: Modified independent  Bed to Chair: Modified independent  Car Transfer: Modified independent    Ambulation  Ambulation?: Yes  More Ambulation?: No  Ambulation 1  Surface: level tile, carpet, ramp  Device: Rolling Walker  Assistance: Modified Independent, Supervision  Quality of Gait: Seady gait. Modified independence with 48' and expected to be in familiar environment independent. Supervision for unfamiliar areas and for occasional directional cues secondary to left neglect. Good control on low grade ramp. Gait Deviations: Slow Meseret, Decreased step length, Decreased step height  Distance: 200' with multiple turns. Comments: Pt ambulated 30 feet with ww with SBA    Ambulation 2  Surface - 2: carpet  Device 2: No device  Assistance 2: Stand by assistance, Contact guard assistance  Quality of Gait 2: Tested gait without AD.   Steadier but pt still reaching for either therapist or objects. Pt prefers wheeled walker and sat down at nearest available area. Gait Deviations: Slow Meseret, Decreased step length, Decreased step height  Distance: 30'  Comments: improved stability with [employer], tc's for navigating ww    Stairs/Curb  Stairs?: Yes  Stairs  # Steps : 12  Stairs Height: 6\"  Rails: Right ascending  Curbs: 4\"  Device: Rolling walker  Assistance: Stand by assistance, Supervision  Comment: Pt needs reminders for one rail but only has one rail at home and not expected to need that reminder at home. Wheelchair Activities  Propulsion: No              Pt/ family education/training: Pt has been educated throughout his stay in mobility and safety. He demonstrates improvement in all areas. He is most consistent when a device is utilzied for gait. Family educated on pt's deficits regarding left neglect and poor scanning abilities. Recommended pt use wheeled walker at home and have assist whenever up on feet. Assessment: Pt demonstrates improvement In all areas since his rehab admission. Pt has met goals as listed below      LTG established:  Long term goal 1: indep bed mobility - met  Long term goal 2: indep bed and car transers - met  Long term goal 3: indep gait 50 feet in familiar and protected environment - met  Long term goal 4: supervision for 4 stairs - met  Long term goal 5: CGA gait 150 feet or greater - met    Discharge Plan: d/c to home follow up PT recommended.  Assist for all mobility recommended initially        Electronically signed by Ally Harris PT on 10/4/2021 at 3:20 PM

## 2021-10-15 PROBLEM — R79.89 ELEVATED TROPONIN: Status: RESOLVED | Noted: 2021-09-15 | Resolved: 2021-10-15

## 2021-10-15 PROBLEM — R77.8 ELEVATED TROPONIN: Status: RESOLVED | Noted: 2021-09-15 | Resolved: 2021-10-15

## 2021-10-27 ENCOUNTER — APPOINTMENT (OUTPATIENT)
Dept: GENERAL RADIOLOGY | Age: 86
DRG: 189 | End: 2021-10-27
Payer: MEDICARE

## 2021-10-27 ENCOUNTER — APPOINTMENT (OUTPATIENT)
Dept: CT IMAGING | Age: 86
DRG: 189 | End: 2021-10-27
Payer: MEDICARE

## 2021-10-27 ENCOUNTER — HOSPITAL ENCOUNTER (INPATIENT)
Age: 86
LOS: 5 days | Discharge: HOME HEALTH | DRG: 189 | End: 2021-11-01
Attending: STUDENT IN AN ORGANIZED HEALTH CARE EDUCATION/TRAINING PROGRAM | Admitting: INTERNAL MEDICINE
Payer: MEDICARE

## 2021-10-27 DIAGNOSIS — R09.02 HYPOXIA: ICD-10-CM

## 2021-10-27 DIAGNOSIS — R77.8 ELEVATED TROPONIN: ICD-10-CM

## 2021-10-27 DIAGNOSIS — R53.83 LETHARGY: Primary | ICD-10-CM

## 2021-10-27 PROBLEM — R41.82 AMS (ALTERED MENTAL STATUS): Status: ACTIVE | Noted: 2021-10-27

## 2021-10-27 PROBLEM — J96.00 ACUTE RESPIRATORY FAILURE (HCC): Status: ACTIVE | Noted: 2021-10-27

## 2021-10-27 LAB
ALBUMIN SERPL-MCNC: 4.5 G/DL (ref 3.5–4.6)
ALP BLD-CCNC: 72 U/L (ref 35–104)
ALT SERPL-CCNC: 11 U/L (ref 0–41)
ANION GAP SERPL CALCULATED.3IONS-SCNC: 11 MEQ/L (ref 9–15)
APTT: 40.9 SEC (ref 24.4–36.8)
AST SERPL-CCNC: 20 U/L (ref 0–40)
BACTERIA: NEGATIVE /HPF
BASOPHILS ABSOLUTE: 0 K/UL (ref 0–0.2)
BASOPHILS RELATIVE PERCENT: 0.6 %
BILIRUB SERPL-MCNC: 1.4 MG/DL (ref 0.2–0.7)
BILIRUBIN URINE: NEGATIVE
BLOOD, URINE: NEGATIVE
BUN BLDV-MCNC: 28 MG/DL (ref 8–23)
CALCIUM SERPL-MCNC: 9.7 MG/DL (ref 8.5–9.9)
CHLORIDE BLD-SCNC: 101 MEQ/L (ref 95–107)
CHP ED QC CHECK: YES
CLARITY: CLEAR
CO2: 26 MEQ/L (ref 20–31)
COLOR: ABNORMAL
CREAT SERPL-MCNC: 1.3 MG/DL (ref 0.7–1.2)
EOSINOPHILS ABSOLUTE: 0.2 K/UL (ref 0–0.7)
EOSINOPHILS RELATIVE PERCENT: 3.4 %
EPITHELIAL CELLS, UA: ABNORMAL /HPF (ref 0–5)
GFR AFRICAN AMERICAN: >60
GFR NON-AFRICAN AMERICAN: 51.6
GLOBULIN: 3.2 G/DL (ref 2.3–3.5)
GLUCOSE BLD-MCNC: 134 MG/DL (ref 70–99)
GLUCOSE BLD-MCNC: 97 MG/DL
GLUCOSE BLD-MCNC: 97 MG/DL (ref 60–115)
GLUCOSE URINE: NEGATIVE MG/DL
HBA1C MFR BLD: 7.6 % (ref 4.8–5.9)
HCT VFR BLD CALC: 44.6 % (ref 42–52)
HEMOGLOBIN: 14.8 G/DL (ref 14–18)
HYALINE CASTS: ABNORMAL /HPF (ref 0–5)
INR BLD: 1.4
KETONES, URINE: ABNORMAL MG/DL
LACTIC ACID: 1.2 MMOL/L (ref 0.5–2.2)
LEUKOCYTE ESTERASE, URINE: ABNORMAL
LYMPHOCYTES ABSOLUTE: 2.1 K/UL (ref 1–4.8)
LYMPHOCYTES RELATIVE PERCENT: 32.6 %
MAGNESIUM: 2.1 MG/DL (ref 1.7–2.4)
MCH RBC QN AUTO: 31.5 PG (ref 27–31.3)
MCHC RBC AUTO-ENTMCNC: 33.2 % (ref 33–37)
MCV RBC AUTO: 94.7 FL (ref 80–100)
MONOCYTES ABSOLUTE: 0.6 K/UL (ref 0.2–0.8)
MONOCYTES RELATIVE PERCENT: 8.8 %
NEUTROPHILS ABSOLUTE: 3.5 K/UL (ref 1.4–6.5)
NEUTROPHILS RELATIVE PERCENT: 54.6 %
NITRITE, URINE: NEGATIVE
PDW BLD-RTO: 13.4 % (ref 11.5–14.5)
PERFORMED ON: NORMAL
PH UA: 5 (ref 5–9)
PLATELET # BLD: 243 K/UL (ref 130–400)
POTASSIUM SERPL-SCNC: 4.8 MEQ/L (ref 3.4–4.9)
PRO-BNP: 358 PG/ML
PROCALCITONIN: 0.04 NG/ML (ref 0–0.15)
PROTEIN UA: 30 MG/DL
PROTHROMBIN TIME: 17.1 SEC (ref 12.3–14.9)
RBC # BLD: 4.7 M/UL (ref 4.7–6.1)
RBC UA: ABNORMAL /HPF (ref 0–5)
SODIUM BLD-SCNC: 138 MEQ/L (ref 135–144)
SPECIFIC GRAVITY UA: 1.02 (ref 1–1.03)
TOTAL CK: 64 U/L (ref 0–190)
TOTAL PROTEIN: 7.7 G/DL (ref 6.3–8)
TROPONIN: 0.04 NG/ML (ref 0–0.01)
TROPONIN: 0.05 NG/ML (ref 0–0.01)
TSH SERPL DL<=0.05 MIU/L-ACNC: 2.81 UIU/ML (ref 0.44–3.86)
URINE REFLEX TO CULTURE: YES
UROBILINOGEN, URINE: 1 E.U./DL
WBC # BLD: 6.4 K/UL (ref 4.8–10.8)
WBC UA: ABNORMAL /HPF (ref 0–5)

## 2021-10-27 PROCEDURE — 80053 COMPREHEN METABOLIC PANEL: CPT

## 2021-10-27 PROCEDURE — 71275 CT ANGIOGRAPHY CHEST: CPT

## 2021-10-27 PROCEDURE — 85730 THROMBOPLASTIN TIME PARTIAL: CPT

## 2021-10-27 PROCEDURE — P9612 CATHETERIZE FOR URINE SPEC: HCPCS

## 2021-10-27 PROCEDURE — 99285 EMERGENCY DEPT VISIT HI MDM: CPT

## 2021-10-27 PROCEDURE — 6360000004 HC RX CONTRAST MEDICATION: Performed by: STUDENT IN AN ORGANIZED HEALTH CARE EDUCATION/TRAINING PROGRAM

## 2021-10-27 PROCEDURE — 87040 BLOOD CULTURE FOR BACTERIA: CPT

## 2021-10-27 PROCEDURE — 6370000000 HC RX 637 (ALT 250 FOR IP): Performed by: STUDENT IN AN ORGANIZED HEALTH CARE EDUCATION/TRAINING PROGRAM

## 2021-10-27 PROCEDURE — 81001 URINALYSIS AUTO W/SCOPE: CPT

## 2021-10-27 PROCEDURE — 83880 ASSAY OF NATRIURETIC PEPTIDE: CPT

## 2021-10-27 PROCEDURE — 82550 ASSAY OF CK (CPK): CPT

## 2021-10-27 PROCEDURE — 36415 COLL VENOUS BLD VENIPUNCTURE: CPT

## 2021-10-27 PROCEDURE — 6370000000 HC RX 637 (ALT 250 FOR IP): Performed by: INTERNAL MEDICINE

## 2021-10-27 PROCEDURE — 84145 PROCALCITONIN (PCT): CPT

## 2021-10-27 PROCEDURE — 84484 ASSAY OF TROPONIN QUANT: CPT

## 2021-10-27 PROCEDURE — 71045 X-RAY EXAM CHEST 1 VIEW: CPT

## 2021-10-27 PROCEDURE — 83735 ASSAY OF MAGNESIUM: CPT

## 2021-10-27 PROCEDURE — 2060000000 HC ICU INTERMEDIATE R&B

## 2021-10-27 PROCEDURE — 93005 ELECTROCARDIOGRAM TRACING: CPT | Performed by: STUDENT IN AN ORGANIZED HEALTH CARE EDUCATION/TRAINING PROGRAM

## 2021-10-27 PROCEDURE — 85610 PROTHROMBIN TIME: CPT

## 2021-10-27 PROCEDURE — 51702 INSERT TEMP BLADDER CATH: CPT

## 2021-10-27 PROCEDURE — 87086 URINE CULTURE/COLONY COUNT: CPT

## 2021-10-27 PROCEDURE — 83036 HEMOGLOBIN GLYCOSYLATED A1C: CPT

## 2021-10-27 PROCEDURE — 83605 ASSAY OF LACTIC ACID: CPT

## 2021-10-27 PROCEDURE — 85025 COMPLETE CBC W/AUTO DIFF WBC: CPT

## 2021-10-27 PROCEDURE — 84443 ASSAY THYROID STIM HORMONE: CPT

## 2021-10-27 RX ORDER — POLYETHYLENE GLYCOL 3350 17 G/17G
17 POWDER, FOR SOLUTION ORAL DAILY PRN
Status: DISCONTINUED | OUTPATIENT
Start: 2021-10-27 | End: 2021-11-01 | Stop reason: HOSPADM

## 2021-10-27 RX ORDER — FOLIC ACID 1 MG/1
1 TABLET ORAL DAILY
Status: DISCONTINUED | OUTPATIENT
Start: 2021-10-28 | End: 2021-11-01 | Stop reason: HOSPADM

## 2021-10-27 RX ORDER — LEVOTHYROXINE SODIUM 0.03 MG/1
25 TABLET ORAL DAILY
Status: DISCONTINUED | OUTPATIENT
Start: 2021-10-28 | End: 2021-11-01 | Stop reason: HOSPADM

## 2021-10-27 RX ORDER — NICOTINE POLACRILEX 4 MG
15 LOZENGE BUCCAL PRN
Status: DISCONTINUED | OUTPATIENT
Start: 2021-10-27 | End: 2021-11-01 | Stop reason: HOSPADM

## 2021-10-27 RX ORDER — VITAMIN B COMPLEX
2000 TABLET ORAL
Status: DISCONTINUED | OUTPATIENT
Start: 2021-10-28 | End: 2021-11-01 | Stop reason: HOSPADM

## 2021-10-27 RX ORDER — ONDANSETRON 2 MG/ML
4 INJECTION INTRAMUSCULAR; INTRAVENOUS EVERY 6 HOURS PRN
Status: DISCONTINUED | OUTPATIENT
Start: 2021-10-27 | End: 2021-11-01 | Stop reason: HOSPADM

## 2021-10-27 RX ORDER — ONDANSETRON 4 MG/1
4 TABLET, ORALLY DISINTEGRATING ORAL EVERY 8 HOURS PRN
Status: DISCONTINUED | OUTPATIENT
Start: 2021-10-27 | End: 2021-11-01 | Stop reason: HOSPADM

## 2021-10-27 RX ORDER — SODIUM CHLORIDE 0.9 % (FLUSH) 0.9 %
5-40 SYRINGE (ML) INJECTION PRN
Status: DISCONTINUED | OUTPATIENT
Start: 2021-10-27 | End: 2021-11-01 | Stop reason: HOSPADM

## 2021-10-27 RX ORDER — FERROUS SULFATE 325(65) MG
325 TABLET ORAL EVERY OTHER DAY
Status: DISCONTINUED | OUTPATIENT
Start: 2021-10-28 | End: 2021-11-01 | Stop reason: HOSPADM

## 2021-10-27 RX ORDER — DEXTROSE MONOHYDRATE 50 MG/ML
100 INJECTION, SOLUTION INTRAVENOUS PRN
Status: DISCONTINUED | OUTPATIENT
Start: 2021-10-27 | End: 2021-11-01 | Stop reason: HOSPADM

## 2021-10-27 RX ORDER — SODIUM CHLORIDE 0.9 % (FLUSH) 0.9 %
5-40 SYRINGE (ML) INJECTION EVERY 12 HOURS SCHEDULED
Status: DISCONTINUED | OUTPATIENT
Start: 2021-10-27 | End: 2021-11-01 | Stop reason: HOSPADM

## 2021-10-27 RX ORDER — SODIUM CHLORIDE 9 MG/ML
25 INJECTION, SOLUTION INTRAVENOUS PRN
Status: DISCONTINUED | OUTPATIENT
Start: 2021-10-27 | End: 2021-11-01 | Stop reason: HOSPADM

## 2021-10-27 RX ORDER — CARVEDILOL 6.25 MG/1
6.25 TABLET ORAL 2 TIMES DAILY WITH MEALS
Status: DISCONTINUED | OUTPATIENT
Start: 2021-10-28 | End: 2021-11-01 | Stop reason: HOSPADM

## 2021-10-27 RX ORDER — SODIUM CHLORIDE 0.9 % (FLUSH) 0.9 %
10 SYRINGE (ML) INJECTION
Status: DISPENSED | OUTPATIENT
Start: 2021-10-27 | End: 2021-10-27

## 2021-10-27 RX ORDER — URSODIOL 300 MG/1
300 CAPSULE ORAL 2 TIMES DAILY
Status: DISCONTINUED | OUTPATIENT
Start: 2021-10-28 | End: 2021-11-01 | Stop reason: HOSPADM

## 2021-10-27 RX ORDER — LEVOTHYROXINE SODIUM 0.03 MG/1
25 TABLET ORAL DAILY
COMMUNITY

## 2021-10-27 RX ORDER — ASPIRIN 81 MG/1
81 TABLET, CHEWABLE ORAL DAILY
Status: DISCONTINUED | OUTPATIENT
Start: 2021-10-28 | End: 2021-11-01 | Stop reason: HOSPADM

## 2021-10-27 RX ORDER — ATORVASTATIN CALCIUM 40 MG/1
40 TABLET, FILM COATED ORAL NIGHTLY
Status: DISCONTINUED | OUTPATIENT
Start: 2021-10-27 | End: 2021-11-01 | Stop reason: HOSPADM

## 2021-10-27 RX ORDER — ACETAMINOPHEN 650 MG/1
650 SUPPOSITORY RECTAL EVERY 6 HOURS PRN
Status: DISCONTINUED | OUTPATIENT
Start: 2021-10-27 | End: 2021-11-01 | Stop reason: HOSPADM

## 2021-10-27 RX ORDER — DEXTROSE MONOHYDRATE 25 G/50ML
12.5 INJECTION, SOLUTION INTRAVENOUS PRN
Status: DISCONTINUED | OUTPATIENT
Start: 2021-10-27 | End: 2021-11-01 | Stop reason: HOSPADM

## 2021-10-27 RX ORDER — ASPIRIN 81 MG/1
162 TABLET, CHEWABLE ORAL ONCE
Status: COMPLETED | OUTPATIENT
Start: 2021-10-27 | End: 2021-10-27

## 2021-10-27 RX ORDER — NITROGLYCERIN 0.4 MG/1
0.4 TABLET SUBLINGUAL EVERY 5 MIN PRN
Status: DISCONTINUED | OUTPATIENT
Start: 2021-10-27 | End: 2021-11-01 | Stop reason: HOSPADM

## 2021-10-27 RX ORDER — IPRATROPIUM BROMIDE AND ALBUTEROL SULFATE 2.5; .5 MG/3ML; MG/3ML
1 SOLUTION RESPIRATORY (INHALATION) EVERY 4 HOURS PRN
Status: DISCONTINUED | OUTPATIENT
Start: 2021-10-27 | End: 2021-10-28

## 2021-10-27 RX ORDER — ACETAMINOPHEN 325 MG/1
650 TABLET ORAL EVERY 6 HOURS PRN
Status: DISCONTINUED | OUTPATIENT
Start: 2021-10-27 | End: 2021-11-01 | Stop reason: HOSPADM

## 2021-10-27 RX ORDER — ESOMEPRAZOLE MAGNESIUM 40 MG/1
40 FOR SUSPENSION ORAL DAILY
Status: DISCONTINUED | OUTPATIENT
Start: 2021-10-28 | End: 2021-10-27 | Stop reason: CLARIF

## 2021-10-27 RX ORDER — PANTOPRAZOLE SODIUM 40 MG/1
40 TABLET, DELAYED RELEASE ORAL
Status: DISCONTINUED | OUTPATIENT
Start: 2021-10-28 | End: 2021-11-01 | Stop reason: HOSPADM

## 2021-10-27 RX ADMIN — ASPIRIN 162 MG: 81 TABLET, CHEWABLE ORAL at 17:57

## 2021-10-27 RX ADMIN — APIXABAN 5 MG: 5 TABLET, FILM COATED ORAL at 20:48

## 2021-10-27 RX ADMIN — IOPAMIDOL 100 ML: 755 INJECTION, SOLUTION INTRAVENOUS at 18:14

## 2021-10-27 ASSESSMENT — ENCOUNTER SYMPTOMS
COUGH: 0
DIARRHEA: 0
ABDOMINAL PAIN: 0
VOMITING: 0
NAUSEA: 0

## 2021-10-27 NOTE — ED PROVIDER NOTES
[address] ED  eMERGENCY dEPARTMENT eNCOUnter      Pt Name: Sloan Nowak  MRN: [id number]  [place] 3/5/1929  Date of evaluation: 10/27/2021  Provider: Dorcas Gillis DO    CHIEF COMPLAINT       Chief Complaint   Patient presents with    Altered Mental Status     difficulty responding at home         HISTORY OF PRESENT ILLNESS   (Location/Symptom, Timing/Onset,Context/Setting, Quality, Duration, Modifying Factors, Severity)  Note limiting factors. Sloan Nowak is a 80 y.o. male who presents to the emergency department with c/o patient being lethargic and difficult to arouse at home. Nurse reports that the patient's pulse ox was in the mid 70s and the patient who does not normally wear oxygen was placed on 4 L nasal cannula his pulse ox increased into the mid 90s. Patient himself is pleasantly confused which is result of his stroke and family reports this is baseline. Patient denies any fever or chills. Patient denies any nausea or vomiting. Patient denies any chest pain or shortness of breath. The history is provided by the patient, a relative and the EMS personnel. NursingNotes were reviewed. REVIEW OF SYSTEMS    (2-9 systems for level 4, 10 or more for level 5)     Review of Systems   Unable to perform ROS: Dementia   Constitutional: Positive for activity change. Negative for chills and fever. Respiratory: Negative for cough. Gastrointestinal: Negative for abdominal pain, diarrhea, nausea and vomiting. Except as noted above the remainder of the review of systems was reviewed and negative.        PAST MEDICAL HISTORY     Past Medical History:   Diagnosis Date    Anemia     CAD (coronary artery disease) 4/16/2015    [facility] [address]     Dyslipidemia     History of tobacco abuse     HTN (hypertension)     Hypothyroidism     Non-ST elevation MI (NSTEMI) [facility] [address] [facility]     Pacemaker 4/16/2015         SURGICALHISTORY       Past Surgical History:   Procedure Laterality Date    CATARACT REMOVAL      MIDDLE EAR SURGERY Left 1998    \"they had to reset the bones\" after an infection         CURRENT MEDICATIONS       Previous Medications    ALBUTEROL SULFATE HFA (PROVENTIL HFA) 108 (90 BASE) MCG/ACT INHALER    Inhale 2 puffs into the lungs every 6 hours as needed for Wheezing    APIXABAN (ELIQUIS) 5 MG TABS TABLET    Take 1 tablet by mouth 2 times daily    ASPIRIN 81 MG EC TABLET    Take 1 tablet by mouth daily    ATORVASTATIN (LIPITOR) 40 MG TABLET    Take 40 mg by mouth daily    BUDESONIDE-FORMOTEROL (SYMBICORT) 80-4.5 MCG/ACT AERO    Inhale 2 puffs into the lungs 2 times daily. CARVEDILOL (COREG) 12.5 MG TABLET    Take 0.5 tablets by mouth 2 times daily (with meals)    COENZYME Q10 100 MG CAPS CAPSULE    Take 1 capsule by mouth daily    ESOMEPRAZOLE MAGNESIUM (NEXIUM) 40 MG PACK    Take 40 mg by mouth daily    FERROUS SULFATE (IRON 325) 325 (65 FE) MG TABLET    Take 1 tablet by mouth 2 times daily (with meals)    FOLIC ACID (FOLVITE) 1 MG TABLET    Take 1 tablet by mouth daily    IPRATROPIUM-ALBUTEROL (DUONEB) 0.5-2.5 (3) MG/3ML SOLN NEBULIZER SOLUTION    Inhale 3 mLs into the lungs three times daily    NITROGLYCERIN (NITROSTAT) 0.4 MG SL TABLET    Place 1 tablet under the tongue every 5 minutes as needed for Chest pain    RAMIPRIL (ALTACE) 5 MG CAPSULE    Take 5 mg by mouth daily    URSODIOL (ACTIGALL) 300 MG CAPSULE    Take 300 mg by mouth 2 times daily    VITAMIN D (CHOLECALCIFEROL) 50 MCG (2000 UT) TABS TABLET    Take 1 tablet by mouth Daily with supper       ALLERGIES     Patient has no known allergies. FAMILY HISTORY     History reviewed. No pertinent family history. SOCIAL HISTORY       Social History     Socioeconomic History    Marital status:       Spouse name: None    Number of children: None    Years of education: None    Highest education level: None   Occupational History    None   Tobacco Use    Smoking status: Former Smoker    Smokeless tobacco: Never Used   Vaping Use    Vaping Use: Never used   Substance and Sexual Activity    Alcohol use: No    Drug use: No    Sexual activity: None   Other Topics Concern    None   Social History Narrative    Lives With: granddaughter Shira  and family-versus significant other and their 3 children are in the household Shira works to take care of her her uncle who is Mr. Aidee Andrew son who is a paraplegic who lives next door    SO named Shama    Type of Home: House  Two level, Bed/Bath upstairs-[address] [facility] Access: Stairs to enter with rails- Number of Steps: 14    Bathroom Shower/Tub: Tub/Shower unit    Home Equipment: Rolling walker, Cane    ADL Assistance: Independent    Homemaking Assistance: Independent(granddaughter completes)    Ambulation Assistance: Independent    Transfer Assistance: Independent    Active : Yes    Additional Comments: pt is significantly [place]; information gathered via chart review and via writing. Social Determinants of Health     Financial Resource Strain:     Difficulty of Paying Living Expenses:    Food Insecurity:     Worried About Running Out of Food in the Last Year:     [address] in the Last Year:    Transportation Needs:     Lack of Transportation (Medical):      Lack of Transportation (Non-Medical):    Physical Activity:     Days of Exercise per Week:     Minutes of Exercise per Session:    Stress:     Feeling of Stress :    Social Connections:     Frequency of Communication with Friends and Family:     Frequency of Social Gatherings with Friends and Family:     Attends Sikhism Services:     Active Member of Clubs or Organizations:     Attends Club or Organization Meetings:     Marital Status:    Intimate Partner Violence:     Fear of Current or Ex-Partner:     Emotionally Abused:     Physically Abused:     Sexually Abused:        SCREENINGS      @FLOW([id number])@      PHYSICAL EXAM    (up to 7 for level 4, 8 or more for level 5)     ED Triage Vitals [10/27/21 1345]   BP Temp Temp Source Pulse Resp SpO2 Height Weight   114/64 98.2 °F (36.8 °C) Oral 72 16 94 % -- --       Physical Exam  Vitals and nursing note reviewed. Constitutional:       General: He is awake. He is not in acute distress. Appearance: Normal appearance. He is well-developed and normal weight. He is not ill-appearing, toxic-appearing or diaphoretic. Comments: No photophobia. No phonophobia. HENT:      Head: Normocephalic and atraumatic. No Alvarez's sign. Right Ear: External ear normal.      Left Ear: External ear normal.      Nose: Nose normal. No congestion or rhinorrhea. Mouth/Throat:      Mouth: Mucous membranes are moist.      Pharynx: Oropharynx is clear. No oropharyngeal exudate or posterior oropharyngeal erythema. Eyes:      General: No scleral icterus. Right eye: No foreign body or discharge. Left eye: No discharge. Extraocular Movements: Extraocular movements intact. Conjunctiva/sclera: Conjunctivae normal.      Left eye: No exudate. Pupils: Pupils are equal, round, and reactive to light. Neck:      Vascular: No JVD. Trachea: No tracheal deviation. Comments: No meningismus. Cardiovascular:      Rate and Rhythm: Normal rate and regular rhythm. Heart sounds: Normal heart sounds. Heart sounds not distant. No murmur heard. No friction rub. No gallop. Pulmonary:      Effort: Pulmonary effort is normal. No respiratory distress. Breath sounds: Normal breath sounds. No stridor. No wheezing, rhonchi or rales. Chest:      Chest wall: No tenderness. Abdominal:      General: Abdomen is flat. Bowel sounds are normal. There is no distension. Palpations: Abdomen is soft. There is no mass. Tenderness: There is no abdominal tenderness. There is no right CVA tenderness, left CVA tenderness, guarding or rebound. Hernia: No hernia is present.    Musculoskeletal: General: No swelling, tenderness, deformity or signs of injury. Normal range of motion. Cervical back: Normal range of motion and neck supple. No rigidity. Lymphadenopathy:      Head:      Right side of head: No submental adenopathy. Left side of head: No submental adenopathy. Skin:     General: Skin is warm and dry. Capillary Refill: Capillary refill takes less than 2 seconds. Coloration: Skin is not jaundiced or pale. Findings: No bruising, erythema, lesion or rash. Neurological:      General: No focal deficit present. Mental Status: He is alert. Mental status is at baseline. GCS: GCS eye subscore is 4. GCS verbal subscore is 5. GCS motor subscore is 6. Cranial Nerves: No cranial nerve deficit. Sensory: No sensory deficit. Motor: No weakness. Coordination: Coordination normal.      Deep Tendon Reflexes: Reflexes are normal and symmetric. Psychiatric:         Mood and Affect: Mood normal.         Behavior: Behavior normal. Behavior is cooperative. Thought Content: Thought content normal.         Judgment: Judgment normal.         DIAGNOSTIC RESULTS     EKG: All EKG's are interpreted by the Emergency Department Physician who either signs or Co-signsthis chart in the absence of a cardiologist.    EKG: Atrially paced rhythm. T wave inversion in the inferior and lateral precordial leads. The QT intervals 444 milliseconds. There are no PVCs. There is a left axis. Similar to EKG dated 9/16/2021. RADIOLOGY:   Non-plain filmimages such as CT, Ultrasound and MRI are read by the radiologist. Plain radiographic images are visualized and preliminarily interpreted by the emergency physician with the below findings:        Interpretation per the Radiologist below, if available at the time ofthis note:    XR CHEST PORTABLE   Final Result   NO ACUTE CARDIOPULMONARY DISEASE.       CTA CHEST W WO CONTRAST    (Results Pending)         ED BEDSIDE ULTRASOUND:   Performed by ED Physician - none    LABS:  Labs Reviewed   APTT - Abnormal; Notable for the following components:       Result Value    aPTT 40.9 (*)     All other components within normal limits   CBC WITH AUTO DIFFERENTIAL - Abnormal; Notable for the following components:    MCH 31.5 (*)     All other components within normal limits   COMPREHENSIVE METABOLIC PANEL - Abnormal; Notable for the following components:    Glucose 134 (*)     BUN 28 (*)     CREATININE 1.30 (*)     GFR Non- 51.6 (*)     Total Bilirubin 1.4 (*)     All other components within normal limits   PROTIME-INR - Abnormal; Notable for the following components:    Protime 17.1 (*)     All other components within normal limits   TROPONIN - Abnormal; Notable for the following components:    Troponin 0.049 (*)     All other components within normal limits    Narrative:     CALL  Walker  [facility] tel. [phone],  Trop results called to and read back by Renetta Ng, 10/27/2021 15:47, by  [facility]   CULTURE, BLOOD 1   CULTURE, BLOOD 2   BRAIN NATRIURETIC PEPTIDE    Narrative:     Dianne Sanchez  [facility] tel. [phone],  Trop results called to and read back by Renetta Ng, 10/27/2021 15:47, by  [facility]   CK   LACTIC ACID, PLASMA   MAGNESIUM   TSH WITHOUT REFLEX    Narrative:     CALL  Walker  [facility] tel. [phone],  Trop results called to and read back by Renetta Ng, 10/27/2021 15:47, by  [facility]   PROCALCITONIN   URINE RT REFLEX TO CULTURE       All other labs were within normal range or not returned as of this dictation. EMERGENCY DEPARTMENT COURSE and DIFFERENTIAL DIAGNOSIS/MDM:   Vitals:    Vitals:    10/27/21 1448 10/27/21 1500 10/27/21 1615 10/27/21 1707   BP: (!) 105/54 131/63 (!) 108/52 (!) 138/58   Pulse: 60 65 60 60   Resp: 22 16 17 20   Temp:       TempSrc:       SpO2: 98% 97% 98% 96%           MDM  Patient was difficult to arouse and confused. He was lethargic. Pulse ox was in the 70s per RN.   Patient placed on 4 L nasal cannula and pulse ox is in the mid 90s. Patient does not wear home oxygen. Crackles heard at the lung bases. Chest x-ray shows clear lungs. Patient has borderline renal function the case was then discussed with the hospitalist.    The hospitalist is asked the ER attending to place an order for the CAT scan of the chest.    Troponin is elevated but this may be due to hypoxia. Patient has an atrially paced rhythm. EKG due 9/16/2021 showed inferior T wave inversions and inversion in V6. Very similar to today's EKG. ASA given PO for CV protection. Discussed with Dr. Nancy Hood, order CTA of chest.       CONSULTS:  None    PROCEDURES:  Unless otherwise noted below, none     Procedures    FINAL IMPRESSION      1. Lethargy    2. Hypoxia    3. Elevated troponin          DISPOSITION/PLAN   DISPOSITION        PATIENT REFERRED TO:  No follow-up provider specified.     DISCHARGE MEDICATIONS:  New Prescriptions    No medications on file          (Please note that portions of this note were completed with a voice recognition program.  Efforts were made to edit the dictations but occasionally words are mis-transcribed.)    Madelin Stevnes DO (electronically signed)  Attending Emergency Physician          Madelin Stevens DO  10/27/21 0692

## 2021-10-27 NOTE — ED NOTES
Attempted straight cath w/o success, advised Dr. Harsh Dave will order dahlia Pina, YESI  10/27/21 8578

## 2021-10-27 NOTE — Clinical Note
Patient Class: Observation [104]   REQUIRED: Diagnosis: AMS (altered mental status) [7194342]   Estimated Length of Stay: Estimated stay of less than 2 midnights

## 2021-10-27 NOTE — ED TRIAGE NOTES
Per EMS, patient lethargic and not responding to family at home. Patient also severely hard of hearing. Patient able to tell nurse his name on arrival, does not answer any further questions or follow commands. Skin pale and cool. Respirations equal and mildly labored at rest. No acute distress noted on arrival to ED.

## 2021-10-27 NOTE — ED NOTES
Lab stated they were unable to obtain blood for additional labs, stated will send other phlebotomist over \"shortly. \"     Felix Bates RN  10/27/21 5184

## 2021-10-27 NOTE — H&P
Hospital Medicine  History and Physical    Patient:  Ardyce Cheadle  MRN: [id number]    CHIEF COMPLAINT:    Chief Complaint   Patient presents with    Altered Mental Status     difficulty responding at home       History Obtained From:  Patient, EMR  Primary Care Physician: Madeline Omer MD, MD    HISTORY OF PRESENT ILLNESS:   The patient is a 80 y.o. male with PMH of CAD s/p CABG, SSS s/p PPM, PAD, RLE DVT s/p right iliac vein stent in 1/2021, DM2, HTN, COPD, severe hearing loss who presented with the above CC. Patient recently managed at [facility] from 9/16-9/23 for acute ischemic CVA, neurology added aspirin and continued Eliquis,  transferred to acute rehab, sent home on 10/2, today he presented to ED with altered mental status, was hypoxic with SPO2 in the 70-80s on arrival, improved int the 90s with 4 liters of O2 via NC, initial w/u showed elevated troponin, elevated BUN/Cr, CXR and procalcitonin were negative, ECG showed paced rhythm, CTA of the chest was negative for PE, referred to the hospital for further management. Past Medical History:      Diagnosis Date    Anemia     CAD (coronary artery disease) 4/16/2015    DM [facility]di[facility] mellitus) ([facility] [address]     Dyslipidemia     History of tobacco abuse     HTN (hypertension)     Hypothyroidism     Non-ST elevation MI (NSTEMI) ([facility] [address]     Pacemaker 4/16/2015       Past Surgical History:      Procedure Laterality Date    CATARACT REMOVAL      MIDDLE EAR SURGERY Left 1998    \"they had to reset the bones\" after an infection       Medications Prior to Admission:    Prior to Admission medications    Medication Sig Start Date End Date Taking?  Authorizing Provider   aspirin 81 MG EC tablet Take 1 tablet by mouth daily 9/30/21   Pat Stylesin, DO   coenzyme Q10 100 MG CAPS capsule Take 1 capsule by mouth daily 9/30/21   Pat Scullin, DO   apixaban (ELIQUIS) 5 MG TABS tablet Take 1 tablet by mouth 2 times daily 6/26/21   Claudene Pages, MD   ramipril (ALTACE) 5 MG capsule Take 5 mg by mouth daily    Historical Provider, MD   Vitamin D (CHOLECALCIFEROL) 50 MCG (2000 UT) TABS tablet Take 1 tablet by mouth Daily with supper 2/13/21   Pat Mirza DO   ferrous sulfate (IRON 325) 325 (65 Fe) MG tablet Take 1 tablet by mouth 2 times daily (with meals) 2/13/21   CHRISTINE Prajapati CNP   folic acid (FOLVITE) 1 MG tablet Take 1 tablet by mouth daily 2/14/21   CHRISTINE Prajapati CNP   nitroGLYCERIN (NITROSTAT) 0.4 MG SL tablet Place 1 tablet under the tongue every 5 minutes as needed for Chest pain 9/21/19 2/1/21  CHRISTINE Carrasquillo CNP   carvedilol (COREG) 12.5 MG tablet Take 0.5 tablets by mouth 2 times daily (with meals) 7/9/19   Garrett Mccord MD   albuterol sulfate HFA (PROVENTIL HFA) 108 (90 Base) MCG/ACT inhaler Inhale 2 puffs into the lungs every 6 hours as needed for Wheezing 12/29/18   Arnulfo Perry MD   ipratropium-albuterol (DUONEB) 0.5-2.5 (3) MG/3ML SOLN nebulizer solution Inhale 3 mLs into the lungs three times daily 2/17/18   Janneth Lozano MD   atorvastatin (LIPITOR) 40 MG tablet Take 40 mg by mouth daily    Historical Provider, MD   ursodiol (ACTIGALL) 300 MG capsule Take 300 mg by mouth 2 times daily    Historical Provider, MD   esomeprazole Magnesium (NEXIUM) 40 MG PACK Take 40 mg by mouth daily    Historical Provider, MD   budesonide-formoterol (SYMBICORT) 80-4.5 MCG/ACT AERO Inhale 2 puffs into the lungs 2 times daily. Historical Provider, MD       Allergies:  Patient has no known allergies. Social History:   TOBACCO:   reports that he has quit smoking. He has never used smokeless tobacco.  ETOH:   reports no history of alcohol use. Family History:   History reviewed. No pertinent family history.     REVIEW OF SYSTEMS:  Ten systems reviewed and negative except for stated in HPI    Physical Exam:    Vitals: BP (!) 138/58   Pulse 60   Temp 98.2 °F (36.8 °C) (Oral)   Resp 20   SpO2 96%   General appearance: alert, hard of hearing, cooperative,   Skin: Skin color, texture, turgor normal.   HEENT: Head: Normocephalic, no lesions, without obvious abnormality. Eye: Normal external eye, conjunctiva, lids cornea, CASSANDRA. Neck: supple, symmetrical, trachea midline  Lungs: clear to auscultation bilaterally  Heart: regular rate and rhythm and S1, S2 normal  Abdomen: soft, BS active  Extremities: no edema  Neurologic: somonolent, easily arousable, no gross motor deficits    Recent Labs     10/27/21  1415   WBC 6.4   HGB 14.8        Recent Labs     10/27/21  1415      K 4.8      CO2 26   BUN 28*   CREATININE 1.30*   GLUCOSE 134*   AST 20   ALT 11   BILITOT 1.4*   ALKPHOS 72     Troponin T:   Recent Labs     10/27/21  1415   TROPONINI 0.049*       ABGs:   Lab Results   Component Value Date    PHART 7.348 12/23/2016    PO2ART 227 12/23/2016    GTB6QRQ 41 12/23/2016     INR:   Recent Labs     10/27/21  1510   INR 1.4     URINALYSIS:  Recent Labs     10/27/21  1415   COLORU DARK YELLOW*   PHUR 5.0   WBCUA 20-50*   RBCUA 0-2   BACTERIA Negative   CLARITYU Clear   SPECGRAV 1.021   LEUKOCYTESUR SMALL*   UROBILINOGEN 1.0   BILIRUBINUR Negative   BLOODU Negative   GLUCOSEU Negative     -----------------------------------------------------------------   XR CHEST PORTABLE    Result Date: 10/27/2021  EXAMINATION: XR CHEST PORTABLE CLINICAL HISTORY: ALTERED MENTAL STATUS COMPARISONS: SEPTEMBER 16, 2021 FINDINGS: Median sternotomy. Pacemaker generator and wires unchanged in position. Cardiopericardial silhouette normal. Aorta calcified. Pulmonary vasculature normal. Lungs clear. NO ACUTE CARDIOPULMONARY DISEASE.           Assessment and Plan     80 y.o. male with PMH of CAD s/p CABG, SSS s/p PPM, PAD, RLE DVT s/p right iliac vein stent in 1/2021, DM2, HTN, [id number], hearing loss who presented with:    Acute hypoxic respiratory failure  - CTA of the chest was negative for PE,   - does not appear to be in acute COPD exacerbation  - continue duonebs  - pulmonology consult for further evaluation    Altered mental status and generalized weakness  - improved with O2 therapy per report  - easily arousable, follows commands, moving all extremities  - neurology consult  - PT/OT    Elevated troponin  - ECG showed paced rhythm  - serial troponins  - continue ASA and statin therapy  - cardiology consult    CKD3  - monitor renal function    DM2 with hyperglycemia  - ISS, hypoglycemia protocol    RLE DVT s/p right iliac vein stent in 1/2021  - continue Janet Restrepo MD, MD  Admitting Hospitalist

## 2021-10-27 NOTE — ED NOTES
Granddaughter of pt called. Stated pt has hx of MI x5 years ago w/ pacemaker implanted, COPD, stroke on September 6th 2021 w/ cognitive deficits leaving pt AOx3 at all times and \"generally weak. \" Granddaughter stated pt lives with her and she called 46 today because of \"less responsive\" and more weak. Advised Dr. Joanne Brito of updated information.      Nicole Oh RN  10/27/21 0457

## 2021-10-27 NOTE — ED NOTES
Bed: 24  Expected date:   Expected time:   Means of arrival:   Comments:  72year old male  ams. Alert to painful stimuli.  95/61, 90 paced 91 on 4 liters iv established     Rebecca Lorenzana RN  10/27/21 4257

## 2021-10-28 ENCOUNTER — APPOINTMENT (OUTPATIENT)
Dept: CT IMAGING | Age: 86
DRG: 189 | End: 2021-10-28
Payer: MEDICARE

## 2021-10-28 LAB
ALBUMIN SERPL-MCNC: 3.8 G/DL (ref 3.5–4.6)
ANION GAP SERPL CALCULATED.3IONS-SCNC: 16 MEQ/L (ref 9–15)
BASE EXCESS ARTERIAL: -1 (ref -3–3)
BUN BLDV-MCNC: 28 MG/DL (ref 8–23)
CALCIUM SERPL-MCNC: 9 MG/DL (ref 8.5–9.9)
CHLORIDE BLD-SCNC: 97 MEQ/L (ref 95–107)
CO2: 21 MEQ/L (ref 20–31)
CREAT SERPL-MCNC: 1.17 MG/DL (ref 0.7–1.2)
EKG ATRIAL RATE: 63 BPM
EKG ATRIAL RATE: 71 BPM
EKG P AXIS: 90 DEGREES
EKG P-R INTERVAL: 198 MS
EKG P-R INTERVAL: 224 MS
EKG Q-T INTERVAL: 444 MS
EKG Q-T INTERVAL: 454 MS
EKG QRS DURATION: 104 MS
EKG QRS DURATION: 104 MS
EKG QTC CALCULATION (BAZETT): 454 MS
EKG QTC CALCULATION (BAZETT): 493 MS
EKG R AXIS: -70 DEGREES
EKG R AXIS: -71 DEGREES
EKG T AXIS: -54 DEGREES
EKG T AXIS: -78 DEGREES
EKG VENTRICULAR RATE: 63 BPM
EKG VENTRICULAR RATE: 71 BPM
GFR AFRICAN AMERICAN: >60
GFR NON-AFRICAN AMERICAN: 58.2
GLUCOSE BLD-MCNC: 123 MG/DL (ref 60–115)
GLUCOSE BLD-MCNC: 125 MG/DL (ref 60–115)
GLUCOSE BLD-MCNC: 130 MG/DL (ref 60–115)
GLUCOSE BLD-MCNC: 163 MG/DL (ref 60–115)
GLUCOSE BLD-MCNC: 170 MG/DL (ref 60–115)
GLUCOSE BLD-MCNC: 99 MG/DL (ref 70–99)
HCO3 ARTERIAL: 23.9 MMOL/L (ref 21–29)
HCT VFR BLD CALC: 41.6 % (ref 42–52)
HEMOGLOBIN: 13.6 G/DL (ref 14–18)
LACTATE: 0.88 MMOL/L (ref 0.4–2)
LV EF: 40 %
LVEF MODALITY: NORMAL
MAGNESIUM: 1.9 MG/DL (ref 1.7–2.4)
MCH RBC QN AUTO: 31.5 PG (ref 27–31.3)
MCHC RBC AUTO-ENTMCNC: 32.8 % (ref 33–37)
MCV RBC AUTO: 96 FL (ref 80–100)
O2 SAT, ARTERIAL: 96 % (ref 93–100)
PCO2 ARTERIAL: 42 MM HG (ref 35–45)
PDW BLD-RTO: 13.6 % (ref 11.5–14.5)
PERFORMED ON: ABNORMAL
PH ARTERIAL: 7.37 (ref 7.35–7.45)
PHOSPHORUS: 3.5 MG/DL (ref 2.3–4.8)
PLATELET # BLD: 218 K/UL (ref 130–400)
PO2 ARTERIAL: 86 MM HG (ref 75–108)
POC FIO2: 28
POC SAMPLE TYPE: ABNORMAL
POTASSIUM SERPL-SCNC: 4 MEQ/L (ref 3.4–4.9)
RBC # BLD: 4.33 M/UL (ref 4.7–6.1)
SODIUM BLD-SCNC: 134 MEQ/L (ref 135–144)
TCO2 ARTERIAL: 25 (ref 22–29)
TROPONIN: 0.04 NG/ML (ref 0–0.01)
WBC # BLD: 7.4 K/UL (ref 4.8–10.8)

## 2021-10-28 PROCEDURE — 80069 RENAL FUNCTION PANEL: CPT

## 2021-10-28 PROCEDURE — 6370000000 HC RX 637 (ALT 250 FOR IP): Performed by: INTERNAL MEDICINE

## 2021-10-28 PROCEDURE — 99223 1ST HOSP IP/OBS HIGH 75: CPT | Performed by: INTERNAL MEDICINE

## 2021-10-28 PROCEDURE — 93306 TTE W/DOPPLER COMPLETE: CPT

## 2021-10-28 PROCEDURE — 2060000000 HC ICU INTERMEDIATE R&B

## 2021-10-28 PROCEDURE — 94761 N-INVAS EAR/PLS OXIMETRY MLT: CPT

## 2021-10-28 PROCEDURE — 94760 N-INVAS EAR/PLS OXIMETRY 1: CPT

## 2021-10-28 PROCEDURE — 94640 AIRWAY INHALATION TREATMENT: CPT

## 2021-10-28 PROCEDURE — 85027 COMPLETE CBC AUTOMATED: CPT

## 2021-10-28 PROCEDURE — 95816 EEG AWAKE AND DROWSY: CPT

## 2021-10-28 PROCEDURE — 93005 ELECTROCARDIOGRAM TRACING: CPT | Performed by: INTERNAL MEDICINE

## 2021-10-28 PROCEDURE — 2700000000 HC OXYGEN THERAPY PER DAY

## 2021-10-28 PROCEDURE — 83605 ASSAY OF LACTIC ACID: CPT

## 2021-10-28 PROCEDURE — 70450 CT HEAD/BRAIN W/O DYE: CPT

## 2021-10-28 PROCEDURE — 94664 DEMO&/EVAL PT USE INHALER: CPT

## 2021-10-28 PROCEDURE — 99222 1ST HOSP IP/OBS MODERATE 55: CPT | Performed by: PSYCHIATRY & NEUROLOGY

## 2021-10-28 PROCEDURE — 94150 VITAL CAPACITY TEST: CPT

## 2021-10-28 PROCEDURE — 2580000003 HC RX 258: Performed by: INTERNAL MEDICINE

## 2021-10-28 PROCEDURE — 82803 BLOOD GASES ANY COMBINATION: CPT

## 2021-10-28 PROCEDURE — 83735 ASSAY OF MAGNESIUM: CPT

## 2021-10-28 PROCEDURE — 84484 ASSAY OF TROPONIN QUANT: CPT

## 2021-10-28 PROCEDURE — 93010 ELECTROCARDIOGRAM REPORT: CPT | Performed by: INTERNAL MEDICINE

## 2021-10-28 PROCEDURE — 36415 COLL VENOUS BLD VENIPUNCTURE: CPT

## 2021-10-28 PROCEDURE — 99222 1ST HOSP IP/OBS MODERATE 55: CPT | Performed by: NURSE PRACTITIONER

## 2021-10-28 RX ORDER — ALBUTEROL SULFATE 2.5 MG/3ML
2.5 SOLUTION RESPIRATORY (INHALATION)
Status: DISCONTINUED | OUTPATIENT
Start: 2021-10-28 | End: 2021-11-01 | Stop reason: HOSPADM

## 2021-10-28 RX ORDER — IPRATROPIUM BROMIDE AND ALBUTEROL SULFATE 2.5; .5 MG/3ML; MG/3ML
1 SOLUTION RESPIRATORY (INHALATION) 3 TIMES DAILY
Status: DISCONTINUED | OUTPATIENT
Start: 2021-10-28 | End: 2021-11-01 | Stop reason: HOSPADM

## 2021-10-28 RX ADMIN — FERROUS SULFATE TAB 325 MG (65 MG ELEMENTAL FE) 325 MG: 325 (65 FE) TAB at 11:50

## 2021-10-28 RX ADMIN — INSULIN LISPRO 1 UNITS: 100 INJECTION, SOLUTION INTRAVENOUS; SUBCUTANEOUS at 13:05

## 2021-10-28 RX ADMIN — CARVEDILOL 6.25 MG: 6.25 TABLET, FILM COATED ORAL at 11:50

## 2021-10-28 RX ADMIN — LEVOTHYROXINE SODIUM 25 MCG: 0.03 TABLET ORAL at 05:40

## 2021-10-28 RX ADMIN — URSODIOL 300 MG: 300 CAPSULE ORAL at 20:49

## 2021-10-28 RX ADMIN — ATORVASTATIN CALCIUM 40 MG: 40 TABLET, FILM COATED ORAL at 20:50

## 2021-10-28 RX ADMIN — FOLIC ACID 1 MG: 1 TABLET ORAL at 11:50

## 2021-10-28 RX ADMIN — APIXABAN 5 MG: 5 TABLET, FILM COATED ORAL at 11:50

## 2021-10-28 RX ADMIN — IPRATROPIUM BROMIDE AND ALBUTEROL SULFATE 1 AMPULE: .5; 2.5 SOLUTION RESPIRATORY (INHALATION) at 12:42

## 2021-10-28 RX ADMIN — CARVEDILOL 6.25 MG: 6.25 TABLET, FILM COATED ORAL at 18:56

## 2021-10-28 RX ADMIN — Medication 2000 UNITS: at 18:53

## 2021-10-28 RX ADMIN — PANTOPRAZOLE SODIUM 40 MG: 40 TABLET, DELAYED RELEASE ORAL at 05:30

## 2021-10-28 RX ADMIN — SODIUM CHLORIDE, PRESERVATIVE FREE 10 ML: 5 INJECTION INTRAVENOUS at 20:49

## 2021-10-28 RX ADMIN — IPRATROPIUM BROMIDE AND ALBUTEROL SULFATE 1 AMPULE: .5; 2.5 SOLUTION RESPIRATORY (INHALATION) at 07:36

## 2021-10-28 RX ADMIN — ASPIRIN 81 MG: 81 TABLET, CHEWABLE ORAL at 11:50

## 2021-10-28 RX ADMIN — IPRATROPIUM BROMIDE AND ALBUTEROL SULFATE 1 AMPULE: .5; 2.5 SOLUTION RESPIRATORY (INHALATION) at 19:27

## 2021-10-28 RX ADMIN — APIXABAN 5 MG: 5 TABLET, FILM COATED ORAL at 20:50

## 2021-10-28 RX ADMIN — URSODIOL 300 MG: 300 CAPSULE ORAL at 11:50

## 2021-10-28 ASSESSMENT — PAIN SCALES - GENERAL
PAINLEVEL_OUTOF10: 0

## 2021-10-28 NOTE — CONSULTS
Consults    Patient Name: Yue Saenz  Admit Date: 10/27/2021  1:42 PM  MR #: [id number]  : 3/5/1929    Attending Physician: Jeff Mejia MD  Reason for consult: Elevated Troponin    History of Presenting Illness:      Yue Saenz is a 80 y.o. male on hospital day 1 with a history of . History Obtained From:  electronic medical record    Admitted with Hypoxia and MS changes. He recently suffered CVA and completed Rehab and went home on 10/2. He has CAD PPM PAD. Very [place] and still confused. He is trying to get out of bed. ROS not entirely reliable. We are asked to evaluate due to elevated Troponin    History:      EKG: A Paced with PVC  Past Medical History:   Diagnosis Date    Anemia     CAD (coronary artery disease) 2015    DM [facility] mellitus) [facility] [address] [facility]     Dyslipidemia     History of tobacco abuse     HTN (hypertension)     Hypothyroidism     Non-ST elevation MI (NSTEMI) ([facility] [address]     Pacemaker 2015     Past Surgical History:   Procedure Laterality Date    CATARACT REMOVAL      MIDDLE EAR SURGERY Left     \"they had to reset the bones\" after an infection       Family History  History reviewed. No pertinent family history. [] Unable to obtain due to ventilated and/ or neurologic status    Social History     Socioeconomic History    Marital status:       Spouse name: Not on file    Number of children: Not on file    Years of education: Not on file    Highest education level: Not on file   Occupational History    Not on file   Tobacco Use    Smoking status: Former Smoker    Smokeless tobacco: Never Used   Vaping Use    Vaping Use: Never used   Substance and Sexual Activity    Alcohol use: No    Drug use: No    Sexual activity: Not on file   Other Topics Concern    Not on file   Social History Narrative    Lives With: granddaughter Jr Fajardo  and family-versus significant other and their 3 children are in the household Shira works to take care of her her uncle who is Mr. Lynn Zarate son who is a paraplegic who lives next door    SO named Shama    Type of Home: House  Two level, Bed/Bath upstairs-[address] Access: Stairs to enter with rails- Number of Steps: 14    Bathroom Shower/Tub: Tub/Shower unit    Home Equipment: Rolling walker, Cane    ADL Assistance: Independent    Homemaking Assistance: Independent(granddaughter completes)    Ambulation Assistance: Independent    Transfer Assistance: Independent    Active : Yes    Additional Comments: pt is significantly [place]; information gathered via chart review and via writing. Social Determinants of Health     Financial Resource Strain:     Difficulty of Paying Living Expenses:    Food Insecurity:     Worried About Running Out of Food in the Last Year:     [address] in the Last Year:    Transportation Needs:     Lack of Transportation (Medical):      Lack of Transportation (Non-Medical):    Physical Activity:     Days of Exercise per Week:     Minutes of Exercise per Session:    Stress:     Feeling of Stress :    Social Connections:     Frequency of Communication with Friends and Family:     Frequency of Social Gatherings with Friends and Family:     Attends Sabianist Services:     Active Member of Clubs or Organizations:     Attends Club or Organization Meetings:     Marital Status:    Intimate Partner Violence:     Fear of Current or Ex-Partner:     Emotionally Abused:     Physically Abused:     Sexually Abused:       [] Unable to obtain due to ventilated and/ or neurologic status      Home Medications:      Medications Prior to Admission: levothyroxine (SYNTHROID) 25 MCG tablet, Take 25 mcg by mouth Daily  aspirin 81 MG EC tablet, Take 1 tablet by mouth daily  coenzyme Q10 100 MG CAPS capsule, Take 1 capsule by mouth daily  apixaban (ELIQUIS) 5 MG TABS tablet, Take 1 tablet by mouth 2 times daily  ramipril (ALTACE) 5 MG capsule, Take 5 mg by mouth daily  Vitamin D (CHOLECALCIFEROL) 50 MCG (2000 UT) TABS tablet, Take 1 tablet by mouth Daily with supper  ferrous sulfate (IRON 325) 325 (65 Fe) MG tablet, Take 1 tablet by mouth 2 times daily (with meals)  folic acid (FOLVITE) 1 MG tablet, Take 1 tablet by mouth daily  nitroGLYCERIN (NITROSTAT) 0.4 MG SL tablet, Place 1 tablet under the tongue every 5 minutes as needed for Chest pain  carvedilol (COREG) 12.5 MG tablet, Take 0.5 tablets by mouth 2 times daily (with meals)  albuterol sulfate HFA (PROVENTIL HFA) 108 (90 Base) MCG/ACT inhaler, Inhale 2 puffs into the lungs every 6 hours as needed for Wheezing  ipratropium-albuterol (DUONEB) 0.5-2.5 (3) MG/3ML SOLN nebulizer solution, Inhale 3 mLs into the lungs three times daily  atorvastatin (LIPITOR) 40 MG tablet, Take 40 mg by mouth daily  ursodiol (ACTIGALL) 300 MG capsule, Take 300 mg by mouth 2 times daily  esomeprazole Magnesium (NEXIUM) 40 MG PACK, Take 40 mg by mouth daily  budesonide-formoterol (SYMBICORT) 80-4.5 MCG/ACT AERO, Inhale 2 puffs into the lungs 2 times daily. Current Hospital Medications:     Scheduled Meds:   ipratropium-albuterol  1 ampule Inhalation TID    sodium chloride flush  5-40 mL IntraVENous 2 times per day    aspirin  81 mg Oral Daily    atorvastatin  40 mg Oral Nightly    insulin lispro  0-6 Units SubCUTAneous TID     insulin lispro  0-3 Units SubCUTAneous Nightly    apixaban  5 mg Oral BID    ursodiol  300 mg Oral BID    carvedilol  6.25 mg Oral BID     Vitamin D  2,000 Units Oral Dinner    folic acid  1 mg Oral Daily    levothyroxine  25 mcg Oral Daily    ferrous sulfate  325 mg Oral Every Other Day    pantoprazole  40 mg Oral QAM AC     Continuous Infusions:   dextrose      sodium chloride       PRN Meds:.albuterol, glucose, dextrose, glucagon (rDNA), dextrose, sodium chloride flush, sodium chloride, ondansetron **OR** ondansetron, acetaminophen **OR** acetaminophen, polyethylene glycol, nitroGLYCERIN  .    dextrose      sodium chloride          Allergies:     No Known Allergies     Review of Systems:       Review of Systems  Not reliable    Objective Findings:     Vitals:BP (!) 136/53   Pulse 77   Temp 98.2 °F (36.8 °C) (Oral)   Resp 18   SpO2 97%      Physical Examination:    Physical Exam   Constitutional: He appears healthy. No distress. HENT:   Normal cephalic and Atraumatic   Eyes: Pupils are equal, round, and reactive to light. Neck: Thyroid normal. No JVD present. No neck adenopathy. No thyromegaly present. Cardiovascular: Normal rate, regular rhythm, intact distal pulses and normal pulses. Murmur heard. Pulmonary/Chest: Effort normal and breath sounds normal. He has no wheezes. He has no rales. He exhibits no tenderness. Abdominal: Soft. Bowel sounds are normal. There is no abdominal tenderness. Musculoskeletal:         General: No tenderness or edema. Normal range of motion. Cervical back: Normal range of motion and neck supple. Neurological: He is alert and oriented to person, place, and time. Skin: Skin is warm. No cyanosis. Nails show no clubbing.          Results/ Medications reviewed 10/28/2021, 7:52 AM     Laboratory, Microbiology, Pathology, Radiology, Cardiology, Medications and Transcriptions reviewed  Scheduled Meds:   ipratropium-albuterol  1 ampule Inhalation TID    sodium chloride flush  5-40 mL IntraVENous 2 times per day    aspirin  81 mg Oral Daily    atorvastatin  40 mg Oral Nightly    insulin lispro  0-6 Units SubCUTAneous TID     insulin lispro  0-3 Units SubCUTAneous Nightly    apixaban  5 mg Oral BID    ursodiol  300 mg Oral BID    carvedilol  6.25 mg Oral BID     Vitamin D  2,000 Units Oral Dinner    folic acid  1 mg Oral Daily    levothyroxine  25 mcg Oral Daily    ferrous sulfate  325 mg Oral Every Other Day    pantoprazole  40 mg Oral QAM AC     Continuous Infusions:   dextrose      sodium chloride         Recent Labs     10/27/21  1415   WBC 6.4   HGB 14.8   HCT 44.6   MCV 94.7        Recent Labs     10/27/21  1415      K 4.8      CO2 26   BUN 28*   CREATININE 1.30*     Recent Labs     10/27/21  1415   AST 20   ALT 11   BILITOT 1.4*   ALKPHOS 72     No results for input(s): LIPASE, AMYLASE in the last 72 hours. Recent Labs     10/27/21  1415 10/27/21  1510   PROT 7.7  --    INR  --  1.4     CTA CHEST W WO CONTRAST    Result Date: 10/27/2021  EXAM: CTA CHEST W WO CONTRAST History: Hypoxia. Altered mental status. Pulmonary embolism. Pneumonia. Technique: Multiple contiguous axial images of the chest were obtained from the thoracic inlet through the upper abdomen with contrast. Multiplanar reformats including maximum intensity projection images (MIPS) were obtained. Comparison: Portable chest radiograph October 27, 2021. CT chest July 5, 2019 Findings: Visualized portion of the thyroid gland is within normal limits. No axillary, mediastinal, or hilar lymphadenopathy. No thoracic aortic aneurysm. Limited evaluation for aortic dissection given the suboptimal contrast opacification of the aorta. Given this, no dissection identified. Atherosclerotic calcification of the thoracic aorta. No filling defect within the pulmonary arteries. Pacemaker is present. Postsurgical changes of CABG. Heart size is within normal limits. No significant pericardial effusion. Esophagus is within normal limits. No pulmonary nodule or mass. No consolidation, pleural effusion, or pneumothorax. Small calcific pleural plaques posteriorly on the left. No acute osseous abnormality. Visualized upper abdomen demonstrates no acute abnormality. No pulmonary embolism or acute intrathoracic process. Calcified pleural plaques on the left can be seen with asbestos related disease. All CT scans at this facility use dose modulation, iterative reconstruction, and/or weight based dosing when appropriate to reduce radiation dose to as low as reasonably achievable.      XR CHEST PORTABLE    Result Date: 10/27/2021  EXAMINATION: XR CHEST PORTABLE CLINICAL HISTORY: ALTERED MENTAL STATUS COMPARISONS: SEPTEMBER 16, 2021 FINDINGS: Median sternotomy. Pacemaker generator and wires unchanged in position. Cardiopericardial silhouette normal. Aorta calcified. Pulmonary vasculature normal. Lungs clear. NO ACUTE CARDIOPULMONARY DISEASE. Active Hospital Problems    Diagnosis Date Noted    AMS (altered mental status) [R41.82] 10/27/2021     Priority: Low    Acute respiratory failure ([address] [J96.00] 10/27/2021     Priority: Low         Impression/Plan:   1. Hypoxic respiratory failure - stable on O2.  2. MS changes - remains confused this am.   3. Unprovoked LLE DVT - on Eliquis. 4. Elevated Troponin- Demand ischemia - ASA BB Statin. Medical Rx unless he has angina or malignant arhythmia  5. HTN Stable- continue meds. 6. PPM stable  7. CABG  8. LVEF 60%     Thank you for allowing us to participate in the care of this patient. Will continue to follow. Please call if questions or concerns arise.     Electronically signed by Colton Gross MD on 10/28/2021 at 7:52 AM

## 2021-10-28 NOTE — PROGRESS NOTES
BID    ursodiol  300 mg Oral BID    carvedilol  6.25 mg Oral BID     Vitamin D  2,000 Units Oral Dinner    folic acid  1 mg Oral Daily    levothyroxine  25 mcg Oral Daily    ferrous sulfate  325 mg Oral Every Other Day    pantoprazole  40 mg Oral QAM AC       PRN Meds:albuterol, glucose, dextrose, glucagon (rDNA), dextrose, sodium chloride flush, sodium chloride, ondansetron **OR** ondansetron, acetaminophen **OR** acetaminophen, polyethylene glycol, nitroGLYCERIN        REVIEW OF SYSTEMS:  ROS: 10 organs review of system is done including general, psychological, ENT, hematological, endocrine, respiratory, cardiovascular, gastrointestinal, musculoskeletal, neurological,  allergy and Immunology is done and is otherwise negative. PHYSICAL EXAM:    Vitals:  BP (!) 136/53   Pulse 67   Temp 98.2 °F (36.8 °C) (Oral)   Resp 20   SpO2 96%     General: alert, no distress, pleasantly confused  Head: normocephalic, atraumatic  Eyes:No gross abnormalities. ENT:  MMM no lesions  Neck:  supple and no masses  Chest : Diminished air movement, no wheezing, no rales, nontender, tympanic  Heart[de-identified] Heart sounds are normal.  Regular rate and rhythm without murmur, gallop or rub. ABD:  symmetric, soft, non-tender, no guarding or rebound  Musculoskeletal : no cyanosis, no clubbing and no edema  Neuro: No apparent acute neuro deficit  Skin: No rashes or nodules noted. Lymph node:  no cervical nodes  Urology: No Delacruz   Psychiatric: Calm        Data Review  Recent Labs     10/27/21  1415 10/28/21  0734   WBC 6.4 7.4   HGB 14.8 13.6*   HCT 44.6 41.6*    218      Recent Labs     10/27/21  1415 10/27/21  2051 10/28/21  0734     --  134*   K 4.8  --  4.0     --  97   CO2 26  --  21   BUN 28*  --  28*   CREATININE 1.30*  --  1.17   GLUCOSE 134* 97 99       MV Settings: ABGs: No results for input(s): PHART, AAF5NBJ, PO2ART, AWO7IJY, BEART, P9QHGDVG, RJD7EDJ in the last 72 hours.   O2 Device: Nasal cannula  O2 Flow Rate (L/min): 4 L/min  Lab Results   Component Value Date    LACTA 1.2 10/27/2021    LACTA 1.8 09/14/2021    LACTA 2.9 06/22/2021       Radiology  I personally reviewed imaging studies and CT chest shows no infiltrate, no emphysema, and no PE.         Assessment, plan:   Patient is at risk due to    · Hypoxia, could be due to microatelectasis, rule out cardiac etiology  · History of smoking, no reported history of COPD and no signs of obstructive airway disease exacerbation  · Rule out possible ROSEMARY      Recommendation  · Echo, with bubble study  · Incentive spirometry  · Will need sleep study and PFT as outpatient  · Continue cardioprotective medications  · Check ABG       Thank you for consultation    Electronically signed by Estrada Ramos MD, [facility],  on 10/28/2021 at 8:53 AM

## 2021-10-28 NOTE — ED NOTES
Patient up to toilet with nurse assistance. Patient tolerated well. Patient stood at toilet with no difficulty, voided x 1. Skin warm and dry. Respirations equal and unlabored. Patient remains on 2L O2 via nasal cannula. Assisted patient back to bed and repositioned for comfort. No distress noted at this time.      Hitesh Richard RN  10/27/21 2016

## 2021-10-28 NOTE — PROGRESS NOTES
Urinalysis:      Lab Results   Component Value Date    NITRU Negative 10/27/2021    WBCUA 20-50 10/27/2021    BACTERIA Negative 10/27/2021    RBCUA 0-2 10/27/2021    BLOODU Negative 10/27/2021    SPECGRAV 1.021 10/27/2021    GLUCOSEU Negative 10/27/2021       Radiology:  CTA CHEST W WO CONTRAST   Final Result      XR CHEST PORTABLE   Final Result   NO ACUTE CARDIOPULMONARY DISEASE. CT HEAD WO CONTRAST    (Results Pending)           Assessment/Plan:    80 y.o. male with PMH of CAD s/p CABG, SSS s/p PPM, PAD, RLE DVT s/p right iliac vein stent in 1/2021, CVA on 9/2021, DM2, HTN, CKD3, hearing loss who presented with:     Acute hypoxic respiratory failure  - CTA of the chest was negative for PE,   - followed by pulmonology      Altered mental status and generalized weakness  - followed by neurology  - PT/OT     Elevated troponins  - ECG showed paced rhythm  - continue ASA, statin, Coreg  - conservative management per cardiology      CKD3  - monitor renal function     DM2 with hyperglycemia  - ISS, hypoglycemia protocol     RLE DVT s/p right iliac vein stent in 1/2021  - continue Eliquis         Diet: ADULT DIET; Regular; 4 carb choices (60 gm/meal);  No Caffeine    Code Status: Full Code              Electronically signed by Harsh Bennett MD on 10/28/2021 at 1:55 PM

## 2021-10-28 NOTE — PROGRESS NOTES
[facility] Respiratory Therapy Evaluation   Current Order: TID   Home Regimen: TID duoneb      Ordering Physician: Vera Carranza  Re-evaluation Date: 10/31    Diagnosis: respiratory failure     Patient Status: Stable / Unstable + Physician notified    The following MDI Criteria must be met in order to convert aerosol to MDI with spacer. If unable to meet, MDI will be converted to aerosol:  []  Patient able to demonstrate the ability to use MDI effectively  []  Patient alert and cooperative  []  Patient able to take deep breath with 5-10 second hold  []  Medication(s) available in this delivery method   []  Peak flow greater than or equal to 200 ml/min            Current Order Substituted To  (same drug, same frequency)   Aerosol to MDI [] Albuterol Sulfate 0.083% unit dose by aerosol Albuterol Sulfate MDI 2 puffs by inhalation with spacer    [] Levalbuterol 1.25 mg unit dose by aerosol Levalbuterol MDI 2 puffs by inhalation with spacer    [] Levalbuterol 0.63 mg unit dose by aerosol Levalbuterol MDI 2 puffs by inhalation with spacer    [] Ipratropium Bromide 0.02% unit dose by aerosol Ipratropium Bromide MDI 2 puffs by inhalation with spacer    [] Duoneb (Ipratropium + Albuterol) unit dose by aerosol Ipratropium MDI + Albuterol MDI 2 puffs by inhalation w/spacer   MDI to Aerosol [] Albuterol Sulfate MDI Albuterol Sulfate 0.083% unit dose by aerosol    [] Levalbuterol MDI 2 puffs by inhalation Levalbuterol 1.25 mg unit dose by aerosol    [] Ipratropium Bromide MDI by inhalation Ipratropium Bromide 0.02% unit dose by aerosol    [] Combivent (Ipratropium + Albuterol) MDI by inhalation Duoneb (Ipratropium + Albuterol) unit dose by aerosol       Treatment Assessment [Frequency/Schedule]:  Change frequency to: ________TID duoneb_albuterol q2prn_________________________________________per Protocol, P&T, MEC      Points 0 1 2 3 4   Pulmonary Status  Non-Smoker  []   Smoking history   < 20 pack years  []   Smoking history  ?  21 pack years  []   Pulmonary Disorder  (acute or chronic)  [x]   Severe or Chronic w/ Exacerbation  []     Surgical Status No [x]   Surgeries     General []   Surgery Lower []   Abdominal Thoracic or []   Upper Abdominal Thoracic with  PulmonaryDisorder  []     Chest X-ray Clear/Not  Ordered     [x]  Chronic Changes  Results Pending  []  Infiltrates, atelectasis, pleural effusion, or edema  []  Infiltrates in more than one lobe []  Infiltrate + Atelectasis, &/or pleural effusion  []    Respiratory Pattern Regular,  RR = 12-20 [x]  Increased,  RR = 21-25 []  BUTT, irregular,  or RR = 26-30 []  Decreased FEV1  or RR = 31-35 []  Severe SOB, use  of accessory muscles, or RR ? 35  []    Mental Status Alert, oriented,  Cooperative [x]  Confused but Follows commands []  Lethargic or unable to follow commands []  Obtunded  []  Comatose  []    Breath Sounds Clear to  auscultation  [x]  Decreased unilaterally or  in bases only []  Decreased  bilaterally  []  Crackles or intermittent wheezes []  Wheezes []    Cough Strong, Spontan., & nonproductive [x]  Strong,  spontaneous, &  productive []  Weak,  Nonproductive []  Weak, productive or  with wheezes []  No spontaneous  cough or may require suctioning []    Level of Activity Ambulatory []  Ambulatory w/ Assist  [x]  Non-ambulatory []  Paraplegic []  Quadriplegic []    Total    Score:__4_____     Triage Score:____5____      Tri       Triage:     1. (>20) Freq: Q3    2. (16-20) Freq: Q4   3. (11-15) Freq: QID & Albuterol Q2 PRN    4. (6-10) Freq: TID & Albuterol Q2 PRN    5. (0-5) Freq Q4prn

## 2021-10-28 NOTE — CARE COORDINATION
CMI COPIED AND PASTED FROM PRIOR ADMIT SHE WAS ON 2WT 9/16-9/23 THEN [facility] REHAB. READMISSION AND RISK 27% : WAS DCD HOME Primus Stone MD, MD                                Date of Last Visit:      If no PCP, list provided? N/A     Discharge Planning     Living Arrangements: at home dependent on family care     Who do you live with? GRANDDAUGHTER     Who helps you with your care:  family     If lives at home:                Do you have any barriers navigating in your home? no     Patient can perform ADL?  Yes     Current Services (outpatient and in home) :[facility]     Is transportation available to get to your appointments? Yes, GRANDDAUGHTER DRIVES HIM TO APPTS.     DME Equipment:  yes - HAS A WALKER AND A CANE BUT DOES NOT USE THEM AT HOME     Respiratory equipment: PRN/HS Oxygen 2 Liters     Respiratory provider:  yes -      Pharmacy:  yes - [facility] [address]     Consult with Medication Assistance Program?  No       [facility]                            [facility]     Patient agreeable to SNF/Rehab?                No     Other discharge needs identified?                            N/A     Does Patient Have a High-Risk for Readmission Diagnosis (CHF, PN, MI, COPD)? Yes, see care coordinator assessment     If Yes,     e Plan? (Note: please see concurrent daily documentation for any updates after initial note).     FROM HOME WITH GRANDDAUGHTER.  PLAN IS TO RETURN HOME WITH NO NEEDS AT DC.  SYMPTOMS HAVE RESOLVED AND PT SEEMS TO BE BACK TO BASELINE.       Readmission Risk              Risk of Unplanned Readmission:  24

## 2021-10-28 NOTE — CONSULTS
Palliative Care Consult Note  Patient: Balbina Morgan  Gender: male  YOB: 1929  Unit/Bed: I101/W586-43  CodeStatus: Full Code  Inpatient Treatment Team: Treatment Team: Attending Provider: Joce Li MD; Consulting Physician: Nancie Newberry MD; Consulting Physician: Julia Mota MD; Consulting Physician: Huang Scherer DO; Consulting Physician: Magno Cabrera APRN - CNP; Consulting Physician: Urbano Ruiz MD; : Zuleima Rosa; Registered Nurse: Rafita Koenig RN; Patient Care Tech: Dannielle Sandra; Utilization Reviewer: Vivien Christopher RN  Admit Date:  10/27/2021    Chief Complaint: AMS    History of Presenting Illness:      Balbina Morgan is a 80 y.o. male on hospital day 1 with a history of CAD s/p CABG, SSS s/p PPM, PAD, RLE DVT s/p right iliac vein stent in 1/2021, DM2, HTN, COPD, severe hearing loss. Patient presented to the ER from home with altered mental status. He was recently admitted in September for an acute ischemic CVA. Patient was hypoxic on arrival to the ER with SPO2 in the 70s and 80s. Patient was admitted for acute hypoxic respiratory failure, altered mental status, and elevated troponin. Palliative care consulted for goals of care and code status discussion. Patient is very hard of hearing. Attempted to write on paper and give patient his glasses, but he folded my paper up and stated he didn't need his glasses. Patient is alert to person and place. He is disoriented to date, year, and situation. Patient closed his eyes as I was trying to talk to him and appeared to not want to participate in the conversation. Spoke with daughter, [facility]. She reports that ever since patient's stroke, and even before that, he has had some issues with confusion. Patient is normally ambulatory at home with a walker. He is currently living with his grandchild. He does have HPOA who is his grandchild, Sreekanth Barrera.  Lately, patient has been having some difficulty with grooming and not wanting to perform any hygiene. Dipti Andino has been having to encourage patient until he does it. Spoke with patient's granddaughter, Dipti Andino. She reports that he just completed PT and was doing well. He suddenly became SOB, had chest pain, and became unresponsive. She does report patient has a history of COPD. Review of Systems:       Review of Systems   Unable to perform ROS: Mental status change       Physical Examination:       BP (!) 136/53   Pulse 67   Temp 98.2 °F (36.8 °C) (Oral)   Resp 20   SpO2 96%    Physical Exam  Constitutional:       General: He is not in acute distress. Appearance: Normal appearance. HENT:      Head: Normocephalic and atraumatic. Nose: No rhinorrhea. Eyes:      General: No scleral icterus. Right eye: No discharge. Left eye: No discharge. Extraocular Movements: Extraocular movements intact. Conjunctiva/sclera: Conjunctivae normal.   Cardiovascular:      Rate and Rhythm: Normal rate and regular rhythm. Pulmonary:      Effort: Pulmonary effort is normal.      Breath sounds: Decreased breath sounds present. Abdominal:      General: Bowel sounds are normal. There is no distension. Palpations: Abdomen is soft. Tenderness: There is no abdominal tenderness. Musculoskeletal:      Cervical back: Normal range of motion. Right lower leg: No edema. Left lower leg: No edema. Skin:     General: Skin is warm and dry. Coloration: Skin is pale. Neurological:      Mental Status: He is alert. He is disoriented. Psychiatric:         Cognition and Memory: Cognition is impaired.          Allergies:      No Known Allergies    Medications:      Current Facility-Administered Medications   Medication Dose Route Frequency Provider Last Rate Last Admin    ipratropium-albuterol (DUONEB) nebulizer solution 1 ampule  1 ampule Inhalation TID Abhishek Guadarrama MD   1 ampule at 10/28/21 0736    albuterol (PROVENTIL) nebulizer solution 2.5 mg  2.5 mg Nebulization Q2H PRN Julia Mota MD        glucose (GLUTOSE) 40 % oral gel 15 g  15 g Oral PRN Joce Li MD        dextrose 50 % IV solution  12.5 g IntraVENous PRN Joce Li MD        glucagon (rDNA) injection 1 mg  1 mg IntraMUSCular PRN Joce Li MD        dextrose 5 % solution  100 mL/hr IntraVENous PRN Joce Li MD        sodium chloride flush 0.9 % injection 5-40 mL  5-40 mL IntraVENous 2 times per day Joce Li MD        sodium chloride flush 0.9 % injection 5-40 mL  5-40 mL IntraVENous PRN Joce Li MD        0.9 % sodium chloride infusion  25 mL IntraVENous PRN Joce Li MD        ondansetron (ZOFRAN-ODT) disintegrating tablet 4 mg  4 mg Oral Q8H PRN Joce Li MD        Or    ondansetron (ZOFRAN) injection 4 mg  4 mg IntraVENous Q6H PRN Joce Li MD        acetaminophen (TYLENOL) tablet 650 mg  650 mg Oral Q6H PRN Joce Li MD        Or    acetaminophen (TYLENOL) suppository 650 mg  650 mg Rectal Q6H PRN Joce Li MD        polyethylene glycol (GLYCOLAX) packet 17 g  17 g Oral Daily PRN Joce Li MD        aspirin chewable tablet 81 mg  81 mg Oral Daily Joce Li MD        atorvastatin (LIPITOR) tablet 40 mg  40 mg Oral Nightly Joce Li MD        insulin lispro (HUMALOG) injection vial 0-6 Units  0-6 Units SubCUTAneous TID  Joce Li MD        insulin lispro (HUMALOG) injection vial 0-3 Units  0-3 Units SubCUTAneous Nightly Joce Li MD        apixaban (ELIQUIS) tablet 5 mg  5 mg Oral BID Joce Li MD   5 mg at 10/27/21 2048    ursodiol (ACTIGALL) capsule 300 mg  300 mg Oral BID Marlinda Loth Sedar, DO        carvedilol (COREG) tablet 6.25 mg  6.25 mg Oral BID  Marlinda Loth Sedar, DO        nitroGLYCERIN (NITROSTAT) SL tablet 0.4 mg  0.4 mg SubLINGual Q5 Min PRN Marlinda Loth Sedar, DO        vitamin D (CHOLECALCIFEROL) tablet 2,000 Units  2,000 Units Oral Dinner Cailin Gupta DO Assistance: Independent    Transfer Assistance: Independent    Active : Yes    Additional Comments: pt is significantly [place]; information gathered via chart review and via writing. Social Determinants of Health     Financial Resource Strain:     Difficulty of Paying Living Expenses:    Food Insecurity:     Worried About Running Out of Food in the Last Year:     [address] in the Last Year:    Transportation Needs:     Lack of Transportation (Medical):  Lack of Transportation (Non-Medical):    Physical Activity:     Days of Exercise per Week:     Minutes of Exercise per Session:    Stress:     Feeling of Stress :    Social Connections:     Frequency of Communication with Friends and Family:     Frequency of Social Gatherings with Friends and Family:     Attends Zoroastrian Services:     Active Member of Clubs or Organizations:     Attends Club or Organization Meetings:     Marital Status:    Intimate Partner Violence:     Fear of Current or Ex-Partner:     Emotionally Abused:     Physically Abused:     Sexually Abused:        Family Hx:  History reviewed. No pertinent family history.     LABS: Reviewed     CBC:  Lab Results   Component Value Date    WBC 7.4 10/28/2021    RBC 4.33 10/28/2021    HGB 13.6 10/28/2021    HCT 41.6 10/28/2021    MCV 96.0 10/28/2021    MCH 31.5 10/28/2021    MCHC 32.8 10/28/2021    RDW 13.6 10/28/2021     10/28/2021    MPV 9.0 07/29/2014     CBC with Differential:   Lab Results   Component Value Date    WBC 7.4 10/28/2021    RBC 4.33 10/28/2021    HGB 13.6 10/28/2021    HCT 41.6 10/28/2021     10/28/2021    MCV 96.0 10/28/2021    MCH 31.5 10/28/2021    MCHC 32.8 10/28/2021    RDW 13.6 10/28/2021    BANDSPCT 14 05/18/2016    LYMPHOPCT 32.6 10/27/2021    MONOPCT 8.8 10/27/2021    BASOPCT 0.6 10/27/2021    MONOSABS 0.6 10/27/2021    LYMPHSABS 2.1 10/27/2021    EOSABS 0.2 10/27/2021    BASOSABS 0.0 10/27/2021     CMP:    Lab Results   Component Value Date     10/28/2021    K 4.0 10/28/2021    K 4.4 09/18/2021    CL 97 10/28/2021    CO2 21 10/28/2021    BUN 28 10/28/2021    CREATININE 1.17 10/28/2021    GFRAA >60.0 10/28/2021    LABGLOM 58.2 10/28/2021    GLUCOSE 99 10/28/2021    PROT 7.7 10/27/2021    LABALBU 3.8 10/28/2021    CALCIUM 9.0 10/28/2021    BILITOT 1.4 10/27/2021    ALKPHOS 72 10/27/2021    AST 20 10/27/2021    ALT 11 10/27/2021     BMP:    Lab Results   Component Value Date     10/28/2021    K 4.0 10/28/2021    K 4.4 09/18/2021    CL 97 10/28/2021    CO2 21 10/28/2021    BUN 28 10/28/2021    LABALBU 3.8 10/28/2021    CREATININE 1.17 10/28/2021    CALCIUM 9.0 10/28/2021    GFRAA >60.0 10/28/2021    LABGLOM 58.2 10/28/2021    GLUCOSE 99 10/28/2021     TSH:   Lab Results   Component Value Date    TSH 2.810 10/27/2021     Vitamin B12 and Folate: No components found for: FOLIC,  P53  Urinalysis:   Lab Results   Component Value Date    NITRU Negative 10/27/2021    WBCUA 20-50 10/27/2021    BACTERIA Negative 10/27/2021    RBCUA 0-2 10/27/2021    BLOODU Negative 10/27/2021    SPECGRAV 1.021 10/27/2021    GLUCOSEU Negative 10/27/2021           FUNCTIONAL ADL´S:     Independent: [  ] Eating, [   ] Dressing, [   ] Transferring, [   ] Umberto Decant, [   ] Neto Crosby, [   ] Continence  Dependent   : [  ] Eating, [   ] Dressing, [   ] Transferring, [   ] Umberto Decant, [   ] Bathing, [   ] Continence  W. assistant : [ X ] Eating, [ X ] Dressing, [ X ] Transferring, [ X ] Toileting, [ X ] Bathing, [ X ] Continence    Radiology: Reviewed      CTA CHEST W WO CONTRAST    Result Date: 10/27/2021  EXAM: CTA CHEST W WO CONTRAST History: Hypoxia. Altered mental status. Pulmonary embolism. Pneumonia. Technique: Multiple contiguous axial images of the chest were obtained from the thoracic inlet through the upper abdomen with contrast. Multiplanar reformats including maximum intensity projection images (MIPS) were obtained.  Comparison: Portable chest radiograph October 27, 2021. CT chest July 5, 2019 Findings: Visualized portion of the thyroid gland is within normal limits. No axillary, mediastinal, or hilar lymphadenopathy. No thoracic aortic aneurysm. Limited evaluation for aortic dissection given the suboptimal contrast opacification of the aorta. Given this, no dissection identified. Atherosclerotic calcification of the thoracic aorta. No filling defect within the pulmonary arteries. Pacemaker is present. Postsurgical changes of CABG. Heart size is within normal limits. No significant pericardial effusion. Esophagus is within normal limits. No pulmonary nodule or mass. No consolidation, pleural effusion, or pneumothorax. Small calcific pleural plaques posteriorly on the left. No acute osseous abnormality. Visualized upper abdomen demonstrates no acute abnormality. No pulmonary embolism or acute intrathoracic process. Calcified pleural plaques on the left can be seen with asbestos related disease. All CT scans at this facility use dose modulation, iterative reconstruction, and/or weight based dosing when appropriate to reduce radiation dose to as low as reasonably achievable. XR CHEST PORTABLE    Result Date: 10/27/2021  EXAMINATION: XR CHEST PORTABLE CLINICAL HISTORY: ALTERED MENTAL STATUS COMPARISONS: SEPTEMBER 16, 2021 FINDINGS: Median sternotomy. Pacemaker generator and wires unchanged in position. Cardiopericardial silhouette normal. Aorta calcified. Pulmonary vasculature normal. Lungs clear. NO ACUTE CARDIOPULMONARY DISEASE. Assessment and plan:    1. Palliative care encounter  Explained the role of palliative care in treating patient. Discussed symptom management related to chronic disease/condition. Provided emotional support and active listening. (Discussed with daughter, Vish Mccord, and HPOA, [facility])    -Advance Care Planning  Discussed goals of care with patient's HPOA.  Explained in extensive detail nuances between full code, DNR CCA and DNR CC. Patient has made the decision to be [facility]. HPOA in place: Granddaughter, Gifty Martinez. Discussed and confirmed code status with her. -Goals of Care Discussion:  Disease process and goals of treatment were discussed in basic terms. Chey's goal is to optimize available comfort care measures, return home with his granddaughter, and reduce hospitalization. We discussed the palliative care philosophy in light of those goals. We discussed all care options contingent on treatment response and QOL. Much active listening, presence, and emotional support were given. Patient is being treated for multiple medical conditions this admission includin. Acute hypoxic respiratory failure  2. AMS  3. Generalized weakness  4. Elevated troponin  5. CKD III  6. DM II  7. Hx of RLE DVT  8. Possible ROSEMARY  9. History of smoking  10. HTN  11. Hx. Of CABG    Total Time: 55 minutes with >50% spent counseling/care coordination.     Electronically signed by CHRISTINE Jones CNP on 10/28/2021 at 9:43 AM

## 2021-10-28 NOTE — CONSULTS
Subjective:      Patient ID: Margi Travis is a 80 y.o. male who presents today for encephalopthy    HPI 44-year-old right-handed gentleman who was recently discharged from rehabilitation with a right occipital stroke. Is actually doing quite well and was released from physical therapy but yesterday there was an acute change. He came down and he was quite encephalopathic and confused. When I went to see him today he is very hard of hearing so he was not able to respond was moving all his extremities very well there is no cranial nerve findings and we were not able to complete his Mini-Mental state examination. Further I discussed this case with his granddaughter and obtain full history and this appeared to be a transient event that occurred. Review of Systems   Unable to perform ROS: Mental status change       Past Medical History:   Diagnosis Date    Anemia     CAD (coronary artery disease) 4/16/2015    DM [facility] [address]     Dyslipidemia     History of tobacco abuse     HTN (hypertension)     Hypothyroidism     Non-ST elevation MI (NSTEMI) [facility] [address]     Pacemaker 4/16/2015     Past Surgical History:   Procedure Laterality Date    CATARACT REMOVAL      MIDDLE EAR SURGERY Left 1998    \"they had to reset the bones\" after an infection     Social History     Socioeconomic History    Marital status:       Spouse name: Not on file    Number of children: Not on file    Years of education: Not on file    Highest education level: Not on file   Occupational History    Not on file   Tobacco Use    Smoking status: Former Smoker    Smokeless tobacco: Never Used   Vaping Use    Vaping Use: Never used   Substance and Sexual Activity    Alcohol use: No    Drug use: No    Sexual activity: Not on file   Other Topics Concern    Not on file   Social History Narrative    Lives With: granddaughter Desirae Tirado  and family-versus significant other and their 3 children are in the household Shira works to take care of her her uncle who is Mr. Marko Kwon son who is a paraplegic who lives next door    SO named Shama    Type of Home: House  Two level, Bed/Bath upstairs-[address] [facility] Access: Stairs to enter with rails- Number of Steps: 14    Bathroom Shower/Tub: Tub/Shower unit    Home Equipment: Rolling walker, Cane    ADL Assistance: Independent    Homemaking Assistance: Independent(granddaughter completes)    Ambulation Assistance: Independent    Transfer Assistance: Independent    Active : Yes    Additional Comments: pt is significantly [place]; information gathered via chart review and via writing. Social Determinants of Health     Financial Resource Strain:     Difficulty of Paying Living Expenses:    Food Insecurity:     Worried About Running Out of Food in the Last Year:     [address] in the Last Year:    Transportation Needs:     Lack of Transportation (Medical):  Lack of Transportation (Non-Medical):    Physical Activity:     Days of Exercise per Week:     Minutes of Exercise per Session:    Stress:     Feeling of Stress :    Social Connections:     Frequency of Communication with Friends and Family:     Frequency of Social Gatherings with Friends and Family:     Attends Hinduism Services:     Active Member of Clubs or Organizations:     Attends Club or Organization Meetings:     Marital Status:    Intimate Partner Violence:     Fear of Current or Ex-Partner:     Emotionally Abused:     Physically Abused:     Sexually Abused:      History reviewed. No pertinent family history.   No Known Allergies  Current Facility-Administered Medications   Medication Dose Route Frequency Provider Last Rate Last Admin    ipratropium-albuterol (DUONEB) nebulizer solution 1 ampule  1 ampule Inhalation TID Gunner Tao MD   1 ampule at 10/28/21 0736    albuterol (PROVENTIL) nebulizer solution 2.5 mg  2.5 mg Nebulization Q2H PRN Gunner Tao MD        glucose (GLUTOSE) 40 % oral gel 15 g  15 g Oral PRN Jenny Chirinos MD        dextrose 50 % IV solution  12.5 g IntraVENous PRN Jenny Chirinos MD        glucagon (rDNA) injection 1 mg  1 mg IntraMUSCular PRN Jenny Chirinos MD        dextrose 5 % solution  100 mL/hr IntraVENous PRN Jenny Chirinos MD        sodium chloride flush 0.9 % injection 5-40 mL  5-40 mL IntraVENous 2 times per day Jenny Chirinos MD        sodium chloride flush 0.9 % injection 5-40 mL  5-40 mL IntraVENous PRN Jenny Chirinos MD        0.9 % sodium chloride infusion  25 mL IntraVENous PRN Jenny Chirinos MD        ondansetron (ZOFRAN-ODT) disintegrating tablet 4 mg  4 mg Oral Q8H PRN Jenny Chirinos MD        Or    ondansetron (ZOFRAN) injection 4 mg  4 mg IntraVENous Q6H PRN Jenny Chirinos MD        acetaminophen (TYLENOL) tablet 650 mg  650 mg Oral Q6H PRN Jenny Chirinos MD        Or    acetaminophen (TYLENOL) suppository 650 mg  650 mg Rectal Q6H PRN Jenny Chirinos MD        polyethylene glycol (GLYCOLAX) packet 17 g  17 g Oral Daily PRN Jenny Chirinos MD        aspirin chewable tablet 81 mg  81 mg Oral Daily Jenny Chirinos MD        atorvastatin (LIPITOR) tablet 40 mg  40 mg Oral Nightly Jenny Chirinos MD        insulin lispro (HUMALOG) injection vial 0-6 Units  0-6 Units SubCUTAneous TID  Jenny Chirinos MD        insulin lispro (HUMALOG) injection vial 0-3 Units  0-3 Units SubCUTAneous Nightly Jenny Chirinos MD        apixaban (ELIQUIS) tablet 5 mg  5 mg Oral BID Jenny Chirinos MD   5 mg at 10/27/21 2048    ursodiol (ACTIGALL) capsule 300 mg  300 mg Oral BID Terrence Ladd Sedar, DO        carvedilol (COREG) tablet 6.25 mg  6.25 mg Oral BID WC Terrence Ladd Sedar, DO        nitroGLYCERIN (NITROSTAT) SL tablet 0.4 mg  0.4 mg SubLINGual Q5 Min PRN Terrence Ladd Sedar, DO        vitamin D (CHOLECALCIFEROL) tablet 2,000 Units  2,000 Units Oral Dinner Melvin D Sedar, DO        folic acid (FOLVITE) tablet 1 mg  1 mg Oral Daily Terrence Ladd Sedar, DO        levothyroxine (SYNTHROID) tablet 25 mcg  25 mcg Oral Daily Naseem Keas Sedar, DO   25 mcg at 10/28/21 0540    ferrous sulfate (IRON 325) tablet 325 mg  325 mg Oral Every Other Day Naseem Keas Sedar, DO        pantoprazole (PROTONIX) tablet 40 mg  40 mg Oral QAM AC Melivn D Sedar, DO   40 mg at 10/28/21 0530        Objective:   BP (!) 136/53   Pulse 67   Temp 98.2 °F (36.8 °C) (Oral)   Resp 20   SpO2 96%     Physical Exam  Vitals reviewed. Eyes:      Pupils: Pupils are equal, round, and reactive to light. Cardiovascular:      Rate and Rhythm: Normal rate and regular rhythm. Heart sounds: No murmur heard. Pulmonary:      Effort: Pulmonary effort is normal.      Breath sounds: Normal breath sounds. Abdominal:      General: Bowel sounds are normal.   Musculoskeletal:         General: Normal range of motion. Cervical back: Normal range of motion. Skin:     General: Skin is warm. Neurological:      Mental Status: He is alert. He is disoriented. Cranial Nerves: No cranial nerve deficit. Sensory: No sensory deficit. Motor: No abnormal muscle tone. Coordination: Coordination normal.      Deep Tendon Reflexes: Reflexes are normal and symmetric. Babinski sign absent on the right side. Babinski sign absent on the left side. Comments: Overall strength is 4 5 there is no tremors I did not attempt to walk him but he is very hard of hearing. Psychiatric:         Mood and Affect: Mood normal.         CTA CHEST W WO CONTRAST    Result Date: 10/27/2021  EXAM: CTA CHEST W WO CONTRAST History: Hypoxia. Altered mental status. Pulmonary embolism. Pneumonia. Technique: Multiple contiguous axial images of the chest were obtained from the thoracic inlet through the upper abdomen with contrast. Multiplanar reformats including maximum intensity projection images (MIPS) were obtained. Comparison: Portable chest radiograph October 27, 2021.  CT chest July 5, 2019 Findings: Visualized portion of the 10/28/2021    GLUCOSE 99 10/28/2021    PROT 7.7 10/27/2021    LABALBU 3.8 10/28/2021    CALCIUM 9.0 10/28/2021    BILITOT 1.4 10/27/2021    ALKPHOS 72 10/27/2021    AST 20 10/27/2021    ALT 11 10/27/2021     Lab Results   Component Value Date    PROTIME 17.1 10/27/2021    INR 1.4 10/27/2021     Lab Results   Component Value Date    TSH 2.810 10/27/2021    IGNHWCQJ41 520 09/15/2021    FOLATE >20.0 09/15/2021    IRON 54 09/28/2021    TIBC 219 09/28/2021     Lab Results   Component Value Date    TRIG 206 09/16/2021    HDL 41 09/16/2021    LDLCALC 148 09/16/2021     No results found for: LABAMPH, BARBSCNU, LABBENZ, CANNAB, COCAINESCRN, LABMETH, OPIATESCREENURINE, PHENCYCLIDINESCREENURINE, PPXUR, ETOH  No results found for: LITHIUM, DILFRTOT, VALPROATE    Assessment:   Cerebral TIA or a brief seizure which cannot be ruled out. Patient symptoms appear to be though transient for 2 to 3 hours and may be reaching baseline. He is very difficult with certain his clinical examination as he cannot hear. Case was discussed with his granddaughter and that definitely appeared to be an acute change. He was noted to be hypoxemic at the time when the symptoms occur and therefore an encephalopathy with this. She is already on Eliquis. He had a right occipital stroke of recent. We will arrange for repeat CT scan as patient has a pacemaker not MRI compatible. We'll have PT OT assess him and compare our evaluations done recently by them to see if this is a major change. Will arrange for an EEG. German Mcgregor MD, [address], American Board of Psychiatry & Neurology  Board Certified in Vascular Neurology  Board Certified in Neuromuscular Medicine  Certified in [facility]:

## 2021-10-29 LAB
EKG ATRIAL RATE: 72 BPM
EKG P-R INTERVAL: 236 MS
EKG Q-T INTERVAL: 426 MS
EKG QRS DURATION: 104 MS
EKG QTC CALCULATION (BAZETT): 466 MS
EKG R AXIS: -65 DEGREES
EKG T AXIS: -41 DEGREES
EKG VENTRICULAR RATE: 72 BPM
GLUCOSE BLD-MCNC: 128 MG/DL (ref 60–115)
GLUCOSE BLD-MCNC: 207 MG/DL (ref 60–115)
PERFORMED ON: ABNORMAL
PERFORMED ON: ABNORMAL
URINE CULTURE, ROUTINE: NORMAL

## 2021-10-29 PROCEDURE — 99233 SBSQ HOSP IP/OBS HIGH 50: CPT

## 2021-10-29 PROCEDURE — 2700000000 HC OXYGEN THERAPY PER DAY

## 2021-10-29 PROCEDURE — 2580000003 HC RX 258: Performed by: INTERNAL MEDICINE

## 2021-10-29 PROCEDURE — 6370000000 HC RX 637 (ALT 250 FOR IP): Performed by: INTERNAL MEDICINE

## 2021-10-29 PROCEDURE — 94761 N-INVAS EAR/PLS OXIMETRY MLT: CPT

## 2021-10-29 PROCEDURE — 94640 AIRWAY INHALATION TREATMENT: CPT

## 2021-10-29 PROCEDURE — 97165 OT EVAL LOW COMPLEX 30 MIN: CPT

## 2021-10-29 PROCEDURE — 97162 PT EVAL MOD COMPLEX 30 MIN: CPT

## 2021-10-29 PROCEDURE — 6370000000 HC RX 637 (ALT 250 FOR IP)

## 2021-10-29 PROCEDURE — 95816 EEG AWAKE AND DROWSY: CPT

## 2021-10-29 PROCEDURE — 99232 SBSQ HOSP IP/OBS MODERATE 35: CPT | Performed by: INTERNAL MEDICINE

## 2021-10-29 PROCEDURE — 2060000000 HC ICU INTERMEDIATE R&B

## 2021-10-29 RX ORDER — LEVETIRACETAM 500 MG/1
500 TABLET ORAL 2 TIMES DAILY
Status: DISCONTINUED | OUTPATIENT
Start: 2021-10-29 | End: 2021-11-01 | Stop reason: HOSPADM

## 2021-10-29 RX ADMIN — LEVOTHYROXINE SODIUM 25 MCG: 0.03 TABLET ORAL at 06:23

## 2021-10-29 RX ADMIN — FOLIC ACID 1 MG: 1 TABLET ORAL at 13:00

## 2021-10-29 RX ADMIN — Medication 2000 UNITS: at 17:28

## 2021-10-29 RX ADMIN — SODIUM CHLORIDE, PRESERVATIVE FREE 10 ML: 5 INJECTION INTRAVENOUS at 21:54

## 2021-10-29 RX ADMIN — ATORVASTATIN CALCIUM 40 MG: 40 TABLET, FILM COATED ORAL at 21:53

## 2021-10-29 RX ADMIN — IPRATROPIUM BROMIDE AND ALBUTEROL SULFATE 1 AMPULE: .5; 2.5 SOLUTION RESPIRATORY (INHALATION) at 12:30

## 2021-10-29 RX ADMIN — URSODIOL 300 MG: 300 CAPSULE ORAL at 21:53

## 2021-10-29 RX ADMIN — ASPIRIN 81 MG: 81 TABLET, CHEWABLE ORAL at 13:00

## 2021-10-29 RX ADMIN — URSODIOL 300 MG: 300 CAPSULE ORAL at 13:00

## 2021-10-29 RX ADMIN — APIXABAN 5 MG: 5 TABLET, FILM COATED ORAL at 21:53

## 2021-10-29 RX ADMIN — PANTOPRAZOLE SODIUM 40 MG: 40 TABLET, DELAYED RELEASE ORAL at 06:23

## 2021-10-29 RX ADMIN — LEVETIRACETAM 500 MG: 500 TABLET, FILM COATED ORAL at 21:53

## 2021-10-29 RX ADMIN — CARVEDILOL 6.25 MG: 6.25 TABLET, FILM COATED ORAL at 17:29

## 2021-10-29 RX ADMIN — APIXABAN 5 MG: 5 TABLET, FILM COATED ORAL at 13:00

## 2021-10-29 RX ADMIN — CARVEDILOL 6.25 MG: 6.25 TABLET, FILM COATED ORAL at 12:59

## 2021-10-29 ASSESSMENT — PAIN SCALES - GENERAL
PAINLEVEL_OUTOF10: 0

## 2021-10-29 NOTE — PROGRESS NOTES
Progress Note  Patient: Daisy Nation  Unit/Bed: T704/G509-36  YOB: 1929  MRN: [id number]  Acct: [de-identified]   Admitting Diagnosis: Acute respiratory failure ([facility] [J96.00]  Lethargy [R53.83]  Hypoxia [R09.02]  Elevated troponin [R77.8]  AMS (altered mental status) [R41.82]  Admit Date:  10/27/2021  Hospital Day: 2    Chief Complaint: MS changes    Histories:  Past Medical History:   Diagnosis Date    Anemia     CAD (coronary artery disease) 4/16/2015    DM (diabetes mellitus) ([facility]     Dyslipidemia     History of tobacco abuse     HTN (hypertension)     Hypothyroidism     Non-ST elevation MI (NSTEMI) ([facility]     Pacemaker 4/16/2015     Past Surgical History:   Procedure Laterality Date    CATARACT REMOVAL      MIDDLE EAR SURGERY Left 1998    \"they had to reset the bones\" after an infection     History reviewed. No pertinent family history. Social History     Socioeconomic History    Marital status:       Spouse name: None    Number of children: None    Years of education: None    Highest education level: None   Occupational History    None   Tobacco Use    Smoking status: Former Smoker    Smokeless tobacco: Never Used   Vaping Use    Vaping Use: Never used   Substance and Sexual Activity    Alcohol use: No    Drug use: No    Sexual activity: None   Other Topics Concern    None   Social History Narrative    Lives With: granddaughter Shira  and family-versus significant other and their 3 children are in the household Shira works to take care of her her uncle who is Mr. Ramin Alonso son who is a paraplegic who lives next door    SO named Shama    Type of Home: House  Two level, Bed/Bath upstairs-[address] [facility] Access: Stairs to enter with rails- Number of Steps: 14    Bathroom Shower/Tub: Tub/Shower unit    Home Equipment: Rolling walker, Cane    ADL Assistance: Independent    Homemaking Assistance: Independent(granddaughter completes)    Ambulation Assistance: abdominal tenderness. Musculoskeletal:         General: No tenderness or edema. Normal range of motion. Cervical back: Normal range of motion and neck supple. Neurological: He is alert. Skin: Skin is warm. No cyanosis. Nails show no clubbing.        LABS:  CBC:   Lab Results   Component Value Date    WBC 7.4 10/28/2021    RBC 4.33 10/28/2021    HGB 13.6 10/28/2021    HCT 41.6 10/28/2021    MCV 96.0 10/28/2021    MCH 31.5 10/28/2021    MCHC 32.8 10/28/2021    RDW 13.6 10/28/2021     10/28/2021    MPV 9.0 07/29/2014     CBC with Differential:    Lab Results   Component Value Date    WBC 7.4 10/28/2021    RBC 4.33 10/28/2021    HGB 13.6 10/28/2021    HCT 41.6 10/28/2021     10/28/2021    MCV 96.0 10/28/2021    MCH 31.5 10/28/2021    MCHC 32.8 10/28/2021    RDW 13.6 10/28/2021    BANDSPCT 14 05/18/2016    LYMPHOPCT 32.6 10/27/2021    MONOPCT 8.8 10/27/2021    BASOPCT 0.6 10/27/2021    MONOSABS 0.6 10/27/2021    LYMPHSABS 2.1 10/27/2021    EOSABS 0.2 10/27/2021    BASOSABS 0.0 10/27/2021     CMP:    Lab Results   Component Value Date     10/28/2021    K 4.0 10/28/2021    K 4.4 09/18/2021    CL 97 10/28/2021    CO2 21 10/28/2021    BUN 28 10/28/2021    CREATININE 1.17 10/28/2021    GFRAA >60.0 10/28/2021    LABGLOM 58.2 10/28/2021    GLUCOSE 99 10/28/2021    PROT 7.7 10/27/2021    LABALBU 3.8 10/28/2021    CALCIUM 9.0 10/28/2021    BILITOT 1.4 10/27/2021    ALKPHOS 72 10/27/2021    AST 20 10/27/2021    ALT 11 10/27/2021     BMP:    Lab Results   Component Value Date     10/28/2021    K 4.0 10/28/2021    K 4.4 09/18/2021    CL 97 10/28/2021    CO2 21 10/28/2021    BUN 28 10/28/2021    LABALBU 3.8 10/28/2021    CREATININE 1.17 10/28/2021    CALCIUM 9.0 10/28/2021    GFRAA >60.0 10/28/2021    LABGLOM 58.2 10/28/2021    GLUCOSE 99 10/28/2021     Magnesium:    Lab Results   Component Value Date    MG 1.9 10/28/2021     Troponin:    Lab Results   Component Value Date    TROPONINI 0.040 10/28/2021        Active Hospital Problems    Diagnosis Date Noted    Lethargy [R53.83]      Priority: Low    AMS (altered mental status) [R41.82] 10/27/2021     Priority: Low    Acute respiratory failure ([facility] [J96.00] 10/27/2021     Priority: Low        Assessment/Plan:  1. Hypoxic respiratory failure   2. MS changes - remains confused this am.   3. Unprovoked LLE DVT - on Eliquis. 4. Elevated Troponin- Demand ischemia - ASA BB Statin. Medical Rx unless he has angina or malignant arhythmia - has been stable on Telemetry. 5. HTN Stable- continue meds. 6. PPM stable  7. CABG  8.  LVEF 60%       Electronically signed by Rodo Livingston MD on 10/29/2021 at 8:12 AM

## 2021-10-29 NOTE — PROGRESS NOTES
Hospitalist Progress Note      PCP: Cristino Kern MD, MD    Date of Admission: 10/27/2021    Chief Complaint:  No acute events, afebrile, stable HD, on 3 liters of NC    Medications:  Reviewed    Infusion Medications    dextrose      sodium chloride       Scheduled Medications    ipratropium-albuterol  1 ampule Inhalation TID    sodium chloride flush  5-40 mL IntraVENous 2 times per day    aspirin  81 mg Oral Daily    atorvastatin  40 mg Oral Nightly    insulin lispro  0-6 Units SubCUTAneous TID WC    insulin lispro  0-3 Units SubCUTAneous Nightly    apixaban  5 mg Oral BID    ursodiol  300 mg Oral BID    carvedilol  6.25 mg Oral BID WC    Vitamin D  2,000 Units Oral Dinner    folic acid  1 mg Oral Daily    levothyroxine  25 mcg Oral Daily    ferrous sulfate  325 mg Oral Every Other Day    pantoprazole  40 mg Oral QAM AC     PRN Meds: albuterol, glucose, dextrose, glucagon (rDNA), dextrose, sodium chloride flush, sodium chloride, ondansetron **OR** ondansetron, acetaminophen **OR** acetaminophen, polyethylene glycol, nitroGLYCERIN    No intake or output data in the 24 hours ending 10/29/21 1342    Exam:    BP (!) 140/96   Pulse 78   Temp 98.1 °F (36.7 °C)   Resp 20   SpO2 94%     General appearance: alert, hard of hearing, cooperative,   Lungs: clear to auscultation bilaterally  Heart: regular rate and rhythm, S1, S2  Abdomen: soft, BS active  Extremities: no edema      Labs:   Recent Labs     10/27/21  1415 10/28/21  0734   WBC 6.4 7.4   HGB 14.8 13.6*   HCT 44.6 41.6*    218     Recent Labs     10/27/21  1415 10/28/21  0734    134*   K 4.8 4.0    97   CO2 26 21   BUN 28* 28*   CREATININE 1.30* 1.17   CALCIUM 9.7 9.0   PHOS  --  3.5     Recent Labs     10/27/21  1415   AST 20   ALT 11   BILITOT 1.4*   ALKPHOS 72     Recent Labs     10/27/21  1510   INR 1.4     Recent Labs     10/27/21  1415 10/27/21  1930 10/28/21  0734   CKTOTAL 64  --   --    TROPONINI 0.049* 0.037* 0.040*       Urinalysis:      Lab Results   Component Value Date    NITRU Negative 10/27/2021    WBCUA 20-50 10/27/2021    BACTERIA Negative 10/27/2021    RBCUA 0-2 10/27/2021    BLOODU Negative 10/27/2021    SPECGRAV 1.021 10/27/2021    GLUCOSEU Negative 10/27/2021       Radiology:  CT HEAD WO CONTRAST   Final Result      Interval evolution of the right occipital infarct compared to prior study. No CT evidence for new acute infarct elsewhere or hemorrhagic transformation. Other chronic changes similar to prior. CTA CHEST W WO CONTRAST   Final Result      XR CHEST PORTABLE   Final Result   NO ACUTE CARDIOPULMONARY DISEASE. Assessment/Plan:    80 y.o. male with PMH of CAD s/p CABG, SSS s/p PPM, PAD, RLE DVT s/p right iliac vein stent in 1/2021, CVA on 9/2021, DM2, HTN, CKD3, hearing loss who presented with:     Acute hypoxic respiratory failure  - CTA of the chest was negative for PE,   - home O2 eval prior to d/c  - followed by pulmonology      Altered mental status and generalized weakness  - in the setting of recent CVA  - CT head showed evolution of the right occipital infarct, no acute findings  - followed by neurology  - PT/OT     Elevated troponins  - ECG showed paced rhythm  - continue ASA, statin, Coreg  - conservative management per cardiology      CKD3  - monitor renal function     DM2 with hyperglycemia  - ISS, hypoglycemia protocol     RLE DVT s/p right iliac vein stent in 1/2021  - continue Eliquis         Diet: ADULT DIET; Regular; 4 carb choices (60 gm/meal);  No Caffeine  ADULT ORAL NUTRITION SUPPLEMENT; Breakfast, Dinner; Diabetic Oral Supplement    Code Status: DNR-CCA              Electronically signed by Tammy Mock MD on 10/29/2021 at 1:42 PM

## 2021-10-29 NOTE — PROGRESS NOTES
Physician Progress Note      PATIENTManual Vargas  [facility] #:                  [id number]  :                       3/5/1929  ADMIT DATE:       10/27/2021 1:42 PM  [address] [place] DATE:  RESPONDING  PROVIDER #:        Anthony Mckeon MD          QUERY TEXT:    Pt admitted with acute respiratory failure and has CHF documented. If   possible, please document in progress notes and discharge summary further   specificity regarding the type and acuity of CHF:    The medical record reflects the following:  Risk Factors: CHF, CAD, DM, HTN, NSTEMI  Clinical Indicators: ROHINI result  Moderately dilated left atrium. Left   ventricular ejection fraction is visually estimated at 40%. Normal right ventricle structure and function. Treatment:  Eliquis, aspirin, coreg, lipitor  Options provided:  -- Acute on Chronic Systolic CHF/HFrEF  -- Acute on Chronic Diastolic CHF/HFpEF  -- Acute on Chronic Systolic and Diastolic CHF  -- Acute Systolic CHF/HFrEF  -- Acute Diastolic CHF/HFpEF  -- Acute Systolic and Diastolic CHF  -- Chronic Systolic CHF/HFrEF  -- Chronic Diastolic CHF/HFpEF  -- Chronic Systolic and Diastolic CHF  -- Other - I will add my own diagnosis  -- Disagree - Not applicable / Not valid  -- Disagree - Clinically unable to determine / Unknown  -- Refer to Clinical Documentation Reviewer    PROVIDER RESPONSE TEXT:    Provider is clinically unable to determine a response to this query.     Query created by: Briseida Espinoza on 10/29/2021 1:12 PM      Electronically signed by:  Anthony Mckeon MD 10/29/2021 1:39 PM

## 2021-10-29 NOTE — PROCEDURES
[address]                      [address], [address] [facility]                          ELECTROENCEPHALOGRAM REPORT    PATIENT NAME: Dave Link                     :        1929  MED REC NO:   [id number]                            ROOM:       W187  ACCOUNT NO:   [de-identified]                           ADMIT DATE: 10/27/2021  PROVIDER:     Cristopher Vargas MD    DATE OF EEG:  10/28/2021    EEG BACKGROUND:  8 Hz symmetric reactive to eye opening. DELTA:  2-3 Hz medium voltage independent bitemporal, maximum left  temporal.    THETA:  4-7 Hz intermixed with background rhythm. Medium voltage,  polymorphic, non-rhythmic. HYPERVENTILATION:  Not done. PHOTIC:  Good driving response seen. SEIZURES:  None. SLEEP:  Not seen. 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         Paris Tracey MD    D: 10/29/2021 14:34:27       T: 10/29/2021 15:59:52     [email or web address]  Job#: [id number]     Doc#: [id number]    CC:

## 2021-10-29 NOTE — FLOWSHEET NOTE
Pt resting in bed with the covers over his head. Pt c/o being cold and is refusing to eat and/or let the aid do his vitals. Bed alarm on.      1030  Pt resting with his eyes closed. No distress. Bed alarm on.    1245   Nurse in rounding. Woke pt and applied his hearing aid. Pt assisted to the br, gait fairly steady with help. Pt assisted back to bed and was set up for lunch. Pt agreeable to having vitals done and took his meds without issue. Pt denies any chest pain. 1700  Pts granddtr Octavio Salt here. Pt sitting up on the edge of his bed eating his dinner. Pt denies pain, no shortness of breath. Call bell in reach.

## 2021-10-29 NOTE — PROGRESS NOTES
Subjective:   Chief Complaint:Altered Mental Status (difficulty responding at home)    Patient ID: Shanthi Shah is a 80 y.o. male followed for new encephalopathy in the setting of previous occipital stroke. No new focal deficits. HPI: Mr. Brooksie Najjar remains agitated and confused. He did not want to awake from sleep and repeated \"no\" to writer. No she seizures or tremors. ROS not able (altered MS)        Past Medical History:   Diagnosis Date    Anemia     CAD (coronary artery disease) 4/16/2015    DM (diabetes mellitus) ([facility]     Dyslipidemia     History of tobacco abuse     HTN (hypertension)     Hypothyroidism     Non-ST elevation MI (NSTEMI) ([facility]     Pacemaker 4/16/2015     Past Surgical History:   Procedure Laterality Date    CATARACT REMOVAL      MIDDLE EAR SURGERY Left 1998    \"they had to reset the bones\" after an infection     Patient reports that he has quit smoking. He has never used smokeless tobacco. He reports that he does not drink alcohol and does not use drugs. History reviewed. No pertinent family history.   No Known Allergies  Current Facility-Administered Medications   Medication Dose Route Frequency Provider Last Rate Last Admin    ipratropium-albuterol (DUONEB) nebulizer solution 1 ampule  1 ampule Inhalation TID Sneha Dwyer MD   1 ampule at 10/28/21 1927    albuterol (PROVENTIL) nebulizer solution 2.5 mg  2.5 mg Nebulization Q2H PRN Sneha Dwyer MD        glucose (GLUTOSE) 40 % oral gel 15 g  15 g Oral PRN Paulino Preston MD        dextrose 50 % IV solution  12.5 g IntraVENous PRN Paulino Preston MD        glucagon (rDNA) injection 1 mg  1 mg IntraMUSCular PRN Paulino Preston MD        dextrose 5 % solution  100 mL/hr IntraVENous PRN Paulino Preston MD        sodium chloride flush 0.9 % injection 5-40 mL  5-40 mL IntraVENous 2 times per day Paulino Preston MD   10 mL at 10/28/21 2049    sodium chloride flush 0.9 % injection 5-40 mL  5-40 mL IntraVENous PRN Tonya Pino Patterson MD        0.9 % sodium chloride infusion  25 mL IntraVENous PRN Yue Cheeam MD        ondansetron (ZOFRAN-ODT) disintegrating tablet 4 mg  4 mg Oral Q8H PRN Yue Cheema MD        Or    ondansetron (ZOFRAN) injection 4 mg  4 mg IntraVENous Q6H PRN Yue Cheema MD        acetaminophen (TYLENOL) tablet 650 mg  650 mg Oral Q6H PRN Yue Cheema MD        Or    acetaminophen (TYLENOL) suppository 650 mg  650 mg Rectal Q6H PRN Yue Cheema MD        polyethylene glycol (GLYCOLAX) packet 17 g  17 g Oral Daily PRN Yue Cheema MD        aspirin chewable tablet 81 mg  81 mg Oral Daily Yue Cheema MD   81 mg at 10/28/21 1150    atorvastatin (LIPITOR) tablet 40 mg  40 mg Oral Nightly Yue Cheema MD   40 mg at 10/28/21 2050    insulin lispro (HUMALOG) injection vial 0-6 Units  0-6 Units SubCUTAneous TID  Yue Cheema MD   1 Units at 10/28/21 1305    insulin lispro (HUMALOG) injection vial 0-3 Units  0-3 Units SubCUTAneous Nightly Yue Cheema MD   1 Units at 10/28/21 2100    apixaban (ELIQUIS) tablet 5 mg  5 mg Oral BID Yue Cheema MD   5 mg at 10/28/21 2050    ursodiol (ACTIGALL) capsule 300 mg  300 mg Oral BID Eual Distad Sedar, DO   300 mg at 10/28/21 2049    carvedilol (COREG) tablet 6.25 mg  6.25 mg Oral BID [facility] D Sedar, DO   6.25 mg at 10/28/21 1856    nitroGLYCERIN (NITROSTAT) SL tablet 0.4 mg  0.4 mg SubLINGual Q5 Min PRN Eual Distad Sedar, DO        vitamin D (CHOLECALCIFEROL) tablet 2,000 Units  2,000 Units Oral Dinner Eual Distad Sedar, DO   2,000 Units at 25/41/96 3210    folic acid (FOLVITE) tablet 1 mg  1 mg Oral Daily [facility] D Sedar, DO   1 mg at 10/28/21 1150    levothyroxine (SYNTHROID) tablet 25 mcg  25 mcg Oral Daily Eual Distad Sedar, DO   25 mcg at 10/29/21 3969    ferrous sulfate (IRON 325) tablet 325 mg  325 mg Oral Every Other Day [facility] D Sedar, DO   325 mg at 10/28/21 1150    pantoprazole (PROTONIX) tablet 40 mg  40 mg Oral SONJA Charles D Sedar, DO   40 mg at 10/29/21 5584      Problem List     Lethargy - Primary           Objective:   BP (!) 169/72   Pulse 77   Temp 97.7 °F (36.5 °C) (Oral)   Resp 20   SpO2 (!) 83%     Physical Exam     Patient wanted to return to sleep and did not want to comply with neurologic examination despite multiple multiple attempts. He was able to eventually open his eyes to voice. No facial droop. Able to move all 4 limbs spontaneously but not complying with commands. CT HEAD WO CONTRAST    Result Date: 10/29/2021  Interval evolution of the right occipital infarct compared to prior study. No CT evidence for new acute infarct elsewhere or hemorrhagic transformation. Other chronic changes similar to prior. CTA CHEST W WO CONTRAST    Result Date: 10/27/2021  No pulmonary embolism or acute intrathoracic process. Calcified pleural plaques on the left can be seen with asbestos related disease. All CT scans at this facility use dose modulation, iterative reconstruction, and/or weight based dosing when appropriate to reduce radiation dose to as low as reasonably achievable. XR CHEST PORTABLE    Result Date: 10/27/2021  NO ACUTE CARDIOPULMONARY DISEASE.        Lab Results   Component Value Date    WBC 7.4 10/28/2021    RBC 4.33 10/28/2021    HGB 13.6 10/28/2021    HCT 41.6 10/28/2021    MCV 96.0 10/28/2021    MCH 31.5 10/28/2021    MCHC 32.8 10/28/2021    RDW 13.6 10/28/2021     10/28/2021    MPV 9.0 07/29/2014     Lab Results   Component Value Date     10/28/2021    K 4.0 10/28/2021    K 4.4 09/18/2021    CL 97 10/28/2021    CO2 21 10/28/2021    BUN 28 10/28/2021    CREATININE 1.17 10/28/2021    GFRAA >60.0 10/28/2021    LABGLOM 58.2 10/28/2021    GLUCOSE 99 10/28/2021    PROT 7.7 10/27/2021    LABALBU 3.8 10/28/2021    CALCIUM 9.0 10/28/2021    BILITOT 1.4 10/27/2021    ALKPHOS 72 10/27/2021    AST 20 10/27/2021    ALT 11 10/27/2021     Lab Results   Component Value Date    PROTIME 17.1 10/27/2021    INR 1.4 10/27/2021     Lab Results   Component Value Date    TSH 2.810 10/27/2021    PLFVYNFA10 520 09/15/2021    FOLATE >20.0 09/15/2021    IRON 54 09/28/2021    TIBC 219 09/28/2021     Lab Results   Component Value Date    TRIG 206 09/16/2021    HDL 41 09/16/2021    LDLCALC 148 09/16/2021     No components found for: A1C  No results found for: LABAMPH, BARBSCNU, LABBENZ, CANNAB, COCAINESCRN, LABMETH, OPIATESCREENURINE, PHENCYCLIDINESCREENURINE, PPXUR, ETOH  No results found for: LITHIUM, DILFRTOT, VALPROATE    Assessment & Plan          Chart, labs,and images reviewed. EEG done, will review. Repeat CT reviewed no causative infarct. Patient cannot have an MRI due to pacemaker. Continue apixaban for stroke prophylaxis in the setting of atrial fibrillation. Addendum 2:50 pm: Left temporal spikes on EEG - will start Keppra 500 bid.

## 2021-10-29 NOTE — PROGRESS NOTES
Physical Therapy Med Surg Initial Assessment  Facility/Department: [address] [place] [address]  [id number]       NAME: Harrison Harmon  : 3/5/1929 (80 y.o.)  MRN: [id number]  CODE STATUS: DNR-CCA    Date of Service: 10/29/2021    Patient Diagnosis(es): Acute respiratory failure (HCC) [J96.00]  Lethargy [R53.83]  Hypoxia [R09.02]  Elevated troponin [R77.8]  AMS (altered mental status) [R41.82]   Chief Complaint   Patient presents with    Altered Mental Status     difficulty responding at home     Patient Active Problem List    Diagnosis Date Noted    Anginal equivalent ([facility]     Encephalopathy     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) ([address]     Lethargy     AMS (altered mental status) 10/27/2021    Acute respiratory failure ([address] 10/27/2021    Abnormality of gait and mobility 2021    Neurogenic bladder 2021    Bilateral hearing loss 2021    Left hemiparesis ([address] 2021    Impaired gait and mobility dt R occipital CVA 2021    Aphasia     Acute CVA (cerebrovascular accident) ([address] 2021    General weakness 09/15/2021    Hyperkalemia 09/15/2021    Anticoagulation adequate with anticoagulant therapy 09/15/2021    Arterial occlusion 2021    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    CKD (chronic kidney disease) 2021    PVD (peripheral vascular disease) ([address] 2021    Venous thrombosis of leg 2021    Hyponatremia 2021    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    Syncope and collapse 2020    COPD exacerbation ([address] 2020    NSTEMI (non-ST elevated myocardial infarction) ([address] 2019    Chest pain 2019    Hypotension 2019    SOB (shortness of breath) 2018    Chronic right shoulder pain 2018    COPD with acute exacerbation ([address] 2018    Bronchitis 2016    CAD (coronary artery disease) 2015    HTN (hypertension)     WFL  Strength LLE  Strength LLE: WFL    Neuro:  Balance  Posture: Fair  Sitting - Static: Good;-  Sitting - Dynamic: Good;-  Standing - Static: Fair;+ (bilateral UE support on rolling walker)  Standing - Dynamic: Fair;+ (bilateral UE support on rolling walker)     Motor Control  Gross Motor?: WFL  Sensation  Overall Sensation Status: WFL    Bed mobility  Supine to Sit: Stand by assistance (use of rolling walker)  Sit to Supine: Stand by assistance (use of rolling walker)    Transfers  Sit to Stand: Stand by assistance (use of rolling walker)  Stand to sit: Stand by assistance (use of rolling walker)    Ambulation  Ambulation?: Yes  WB Status: full weight bearing all extremities  Ambulation 1  Surface: level tile  Device: Rolling Walker  Assistance: Stand by assistance;Contact guard assistance (use of rolling walker)  Gait Deviations: Slow M[place];Decreased step length;Decreased step height  Distance: 15-20' x 2    Stairs/Curb  Stairs?: No         Activity Tolerance  Activity Tolerance: Patient Tolerated treatment well          PT Education  PT Education: Goals;PT Role;Plan of Care;General Safety; Energy Conservation;Transfer Training;Family Education;Equipment;Gait Training;Functional Mobility Training    ASSESSMENT:   Body structures, Functions, Activity limitations: Decreased functional mobility ; Decreased strength;Decreased endurance;Decreased safe awareness;Decreased balance;Decreased coordination;Decreased posture  Decision Making: Medium Complexity  History: medium  Exam: medium  Clinical Presentation: medium    Prognosis: Good    DISCHARGE RECOMMENDATIONS:  Discharge Recommendations: Continue to assess pending progress    Assessment: Patient is currently hospitalized secondary to diagnosis listed above, presenting with deficits listed above. Patient would benefit from continued physical therapy services in order to improve patient's ability to perform functional mobility tasks.   REQUIRES PT FOLLOW UP: Yes

## 2021-10-29 NOTE — PLAN OF CARE
Therapy evaluation completed. Please see daily notes and/or progress notes for details related to planned treatment interventions, goals and functional performance.      Electronically signed by Adria Vázquez PT on 10/29/2021 at 4:26 PM

## 2021-10-29 NOTE — PLAN OF CARE
See OT evaluation for all goals and OT POC.  Electronically signed by Grey Zeng OT on 10/29/2021 at 2:46 PM

## 2021-10-30 LAB
ALBUMIN SERPL-MCNC: 3.7 G/DL (ref 3.5–4.6)
ANION GAP SERPL CALCULATED.3IONS-SCNC: 12 MEQ/L (ref 9–15)
BUN BLDV-MCNC: 33 MG/DL (ref 8–23)
CALCIUM SERPL-MCNC: 8.9 MG/DL (ref 8.5–9.9)
CHLORIDE BLD-SCNC: 102 MEQ/L (ref 95–107)
CO2: 23 MEQ/L (ref 20–31)
CREAT SERPL-MCNC: 1.68 MG/DL (ref 0.7–1.2)
GFR AFRICAN AMERICAN: 46.4
GFR NON-AFRICAN AMERICAN: 38.3
GLUCOSE BLD-MCNC: 121 MG/DL (ref 70–99)
GLUCOSE BLD-MCNC: 126 MG/DL (ref 60–115)
GLUCOSE BLD-MCNC: 135 MG/DL (ref 60–115)
GLUCOSE BLD-MCNC: 147 MG/DL (ref 60–115)
GLUCOSE BLD-MCNC: 222 MG/DL (ref 60–115)
HCT VFR BLD CALC: 38.4 % (ref 42–52)
HEMOGLOBIN: 13.1 G/DL (ref 14–18)
MAGNESIUM: 1.9 MG/DL (ref 1.7–2.4)
MCH RBC QN AUTO: 32.7 PG (ref 27–31.3)
MCHC RBC AUTO-ENTMCNC: 34.1 % (ref 33–37)
MCV RBC AUTO: 95.9 FL (ref 80–100)
PDW BLD-RTO: 13.7 % (ref 11.5–14.5)
PERFORMED ON: ABNORMAL
PHOSPHORUS: 3.8 MG/DL (ref 2.3–4.8)
PLATELET # BLD: 199 K/UL (ref 130–400)
POTASSIUM SERPL-SCNC: 4.4 MEQ/L (ref 3.4–4.9)
RBC # BLD: 4.01 M/UL (ref 4.7–6.1)
SODIUM BLD-SCNC: 137 MEQ/L (ref 135–144)
WBC # BLD: 7.2 K/UL (ref 4.8–10.8)

## 2021-10-30 PROCEDURE — 83735 ASSAY OF MAGNESIUM: CPT

## 2021-10-30 PROCEDURE — 2700000000 HC OXYGEN THERAPY PER DAY

## 2021-10-30 PROCEDURE — 2580000003 HC RX 258: Performed by: INTERNAL MEDICINE

## 2021-10-30 PROCEDURE — 6370000000 HC RX 637 (ALT 250 FOR IP): Performed by: INTERNAL MEDICINE

## 2021-10-30 PROCEDURE — 80069 RENAL FUNCTION PANEL: CPT

## 2021-10-30 PROCEDURE — 94640 AIRWAY INHALATION TREATMENT: CPT

## 2021-10-30 PROCEDURE — 6370000000 HC RX 637 (ALT 250 FOR IP)

## 2021-10-30 PROCEDURE — 99233 SBSQ HOSP IP/OBS HIGH 50: CPT | Performed by: PSYCHIATRY & NEUROLOGY

## 2021-10-30 PROCEDURE — 2060000000 HC ICU INTERMEDIATE R&B

## 2021-10-30 PROCEDURE — APPSS45 APP SPLIT SHARED TIME 31-45 MINUTES: Performed by: STUDENT IN AN ORGANIZED HEALTH CARE EDUCATION/TRAINING PROGRAM

## 2021-10-30 PROCEDURE — 85027 COMPLETE CBC AUTOMATED: CPT

## 2021-10-30 PROCEDURE — 94760 N-INVAS EAR/PLS OXIMETRY 1: CPT

## 2021-10-30 PROCEDURE — 99231 SBSQ HOSP IP/OBS SF/LOW 25: CPT | Performed by: INTERNAL MEDICINE

## 2021-10-30 PROCEDURE — 36415 COLL VENOUS BLD VENIPUNCTURE: CPT

## 2021-10-30 RX ORDER — LEVETIRACETAM 500 MG/1
500 TABLET ORAL 2 TIMES DAILY
Qty: 60 TABLET | Refills: 3 | Status: SHIPPED | OUTPATIENT
Start: 2021-10-30

## 2021-10-30 RX ADMIN — ASPIRIN 81 MG: 81 TABLET, CHEWABLE ORAL at 09:20

## 2021-10-30 RX ADMIN — SODIUM CHLORIDE, PRESERVATIVE FREE 10 ML: 5 INJECTION INTRAVENOUS at 09:20

## 2021-10-30 RX ADMIN — CARVEDILOL 6.25 MG: 6.25 TABLET, FILM COATED ORAL at 09:20

## 2021-10-30 RX ADMIN — IPRATROPIUM BROMIDE AND ALBUTEROL SULFATE 1 AMPULE: .5; 2.5 SOLUTION RESPIRATORY (INHALATION) at 19:44

## 2021-10-30 RX ADMIN — APIXABAN 5 MG: 5 TABLET, FILM COATED ORAL at 09:19

## 2021-10-30 RX ADMIN — APIXABAN 5 MG: 5 TABLET, FILM COATED ORAL at 20:21

## 2021-10-30 RX ADMIN — ATORVASTATIN CALCIUM 40 MG: 40 TABLET, FILM COATED ORAL at 20:20

## 2021-10-30 RX ADMIN — URSODIOL 300 MG: 300 CAPSULE ORAL at 20:20

## 2021-10-30 RX ADMIN — FERROUS SULFATE TAB 325 MG (65 MG ELEMENTAL FE) 325 MG: 325 (65 FE) TAB at 09:19

## 2021-10-30 RX ADMIN — SODIUM CHLORIDE, PRESERVATIVE FREE 10 ML: 5 INJECTION INTRAVENOUS at 20:21

## 2021-10-30 RX ADMIN — FOLIC ACID 1 MG: 1 TABLET ORAL at 09:19

## 2021-10-30 RX ADMIN — LEVETIRACETAM 500 MG: 500 TABLET, FILM COATED ORAL at 09:19

## 2021-10-30 RX ADMIN — PANTOPRAZOLE SODIUM 40 MG: 40 TABLET, DELAYED RELEASE ORAL at 09:19

## 2021-10-30 RX ADMIN — LEVOTHYROXINE SODIUM 25 MCG: 0.03 TABLET ORAL at 09:20

## 2021-10-30 RX ADMIN — LEVETIRACETAM 500 MG: 500 TABLET, FILM COATED ORAL at 20:21

## 2021-10-30 RX ADMIN — URSODIOL 300 MG: 300 CAPSULE ORAL at 09:19

## 2021-10-30 RX ADMIN — IPRATROPIUM BROMIDE AND ALBUTEROL SULFATE 1 AMPULE: .5; 2.5 SOLUTION RESPIRATORY (INHALATION) at 13:47

## 2021-10-30 ASSESSMENT — PAIN SCALES - GENERAL
PAINLEVEL_OUTOF10: 0

## 2021-10-30 NOTE — PROGRESS NOTES
Progress Note  Patient: Nicki Backer  Unit/Bed: T626/M635-68  YOB: 1929  MRN: [id number]  Acct: [de-identified]   Admitting Diagnosis: Acute respiratory failure ([address] [J96.00]  Lethargy [R53.83]  Hypoxia [R09.02]  Elevated troponin [R77.8]  AMS (altered mental status) [R41.82]  Date:  10/27/2021  Hospital Day: 3    Chief Complaint:  Elevated troponins    Subjective  Extremely fatigued and just does not want to talk or participate in any discussion. EEG was positive for spikes and patient had been started on Keppra. Will study was difficult to do. LV ejection fraction is possible about 40%. Moderately dilated left atrium    Review of Systems:   Review of Systems  NA  Physical Examination:    BP (!) 112/52   Pulse 69   Temp 97.9 °F (36.6 °C)   Resp 16   SpO2 98%    Physical Exam  Constitutional:       Appearance: He is well-developed. Cardiovascular:      Rate and Rhythm: Normal rate and regular rhythm. Heart sounds: Normal heart sounds. Pulmonary:      Effort: Pulmonary effort is normal.      Breath sounds: Normal breath sounds. Musculoskeletal:         General: Normal range of motion. Skin:     General: Skin is warm and dry. Neurological:      Mental Status: He is alert and oriented to person, place, and time. Deep Tendon Reflexes: Reflexes are normal and symmetric. Psychiatric:         Behavior: Behavior normal.         Thought Content:  Thought content normal.         Judgment: Judgment normal.         LABS:  CBC:   Lab Results   Component Value Date    WBC 7.2 10/30/2021    RBC 4.01 10/30/2021    HGB 13.1 10/30/2021    HCT 38.4 10/30/2021    MCV 95.9 10/30/2021    MCH 32.7 10/30/2021    MCHC 34.1 10/30/2021    RDW 13.7 10/30/2021     10/30/2021    MPV 9.0 07/29/2014     CBC with Differential:   Lab Results   Component Value Date    WBC 7.2 10/30/2021    RBC 4.01 10/30/2021    HGB 13.1 10/30/2021    HCT 38.4 10/30/2021     10/30/2021    MCV 95.9 10/30/2021 MCH 32.7 10/30/2021    MCHC 34.1 10/30/2021    RDW 13.7 10/30/2021    BANDSPCT 14 05/18/2016    LYMPHOPCT 32.6 10/27/2021    MONOPCT 8.8 10/27/2021    BASOPCT 0.6 10/27/2021    MONOSABS 0.6 10/27/2021    LYMPHSABS 2.1 10/27/2021    EOSABS 0.2 10/27/2021    BASOSABS 0.0 10/27/2021     CMP:    Lab Results   Component Value Date     10/30/2021    K 4.4 10/30/2021    K 4.4 09/18/2021     10/30/2021    CO2 23 10/30/2021    BUN 33 10/30/2021    CREATININE 1.68 10/30/2021    GFRAA 46.4 10/30/2021    LABGLOM 38.3 10/30/2021    GLUCOSE 121 10/30/2021    PROT 7.7 10/27/2021    LABALBU 3.7 10/30/2021    CALCIUM 8.9 10/30/2021    BILITOT 1.4 10/27/2021    ALKPHOS 72 10/27/2021    AST 20 10/27/2021    ALT 11 10/27/2021     BMP:    Lab Results   Component Value Date     10/30/2021    K 4.4 10/30/2021    K 4.4 09/18/2021     10/30/2021    CO2 23 10/30/2021    BUN 33 10/30/2021    LABALBU 3.7 10/30/2021    CREATININE 1.68 10/30/2021    CALCIUM 8.9 10/30/2021    GFRAA 46.4 10/30/2021    LABGLOM 38.3 10/30/2021    GLUCOSE 121 10/30/2021     Magnesium:    Lab Results   Component Value Date    MG 1.9 10/30/2021     Troponin:    Lab Results   Component Value Date    TROPONINI 0.040 10/28/2021       Radiology:  CT HEAD WO CONTRAST    Result Date: 10/29/2021  EXAMINATION:  CT HEAD WO CONTRAST HISTORY:  History of right occipital stroke. TECHNIQUE:  Serial axial images without IV contrast were obtained from the vertex to the foramen magnum. Sagittal and coronal reconstructions. All CT scans at this facility use dose modulation, iterative reconstruction, and/or weight based dosing when appropriate to reduce radiation dose to as low as reasonably achievable. COMPARISON:  CT 9/16/2021. RESULT: Acute change/chronic change:   Interval evolution of the right occipital lobe infarct. No associated hemorrhagic transformation or evidence for acute infarct elsewhere.  Chronic lacunar infarcts bilateral basal ganglia and bilateral cerebellum, similar to  prior. Extensive chronic microvascular ischemic changes, similar to prior. Vascular calcifications. Hemorrhage:    No evidence of acute intracranial hemorrhage. Mass Lesion / Mass Effect:   There is no evidence of an intracranial mass or extraaxial fluid collection. No significant mass effect. Parenchyma: There is mild generalized volume loss. The brain parenchyma is otherwise within normal limits for age. Ventricles:   Ventricular enlargement concordant with the degree of parenchymal volume loss. Paranasal sinuses and skull base:  Polypoid thickening right maxillary sinus. Other areas of mild mucosal thickening. Left mastoidectomy, grossly unchanged. Small right mastoid effusion. No evidence for acute fracture. Bilateral lens replacement surgery. Soft tissues unremarkable. Interval evolution of the right occipital infarct compared to prior study. No CT evidence for new acute infarct elsewhere or hemorrhagic transformation. Other chronic changes similar to prior. 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 Continue with Coreg and Eliquis. No evidence of any bleeding hemodynamically stable.   Electronically signed by Valeria Crockett MD on 10/30/2021 at 2:05 PM

## 2021-10-30 NOTE — FLOWSHEET NOTE
Patient was provided with a couple more for warm blankets,He voiced comfort from it,his lung sounds are clear ,denies chest pains. 06:50 patient slept most of the night after he was given warm blankets. he is resting in bed and his respirations  Were unlabored.

## 2021-10-30 NOTE — PROGRESS NOTES
Subjective:   Chief Complaint:Altered Mental Status (difficulty responding at home)    Patient ID: Shawn Hernandez is a 80 y.o. male followed for new encephalopathy in the setting of previous occipital stroke. No new focal deficits. HPI:     Patient is severely fatigued this morning and does not want a participate in exam.  He is arousable to voice. Patient is severely hard of hearing as well. Called granddaughter who he lives with to discuss further. Granddaughter states that this is normal for him in the morning. Also discussed EEG findings which showed left temporal lobe spikes suggestive of seizures. Discussed that we had started him on Keppra and discussed side effects and benefits at length. Events noted and patient appears to be doing somewhat better. Past Medical History:   Diagnosis Date    Anemia     CAD (coronary artery disease) 4/16/2015    DM (diabetes mellitus) ([facility]     Dyslipidemia     History of tobacco abuse     HTN (hypertension)     Hypothyroidism     Non-ST elevation MI (NSTEMI) ([facility]     Pacemaker 4/16/2015     Past Surgical History:   Procedure Laterality Date    CATARACT REMOVAL      MIDDLE EAR SURGERY Left 1998    \"they had to reset the bones\" after an infection     Patient reports that he has quit smoking. He has never used smokeless tobacco. He reports that he does not drink alcohol and does not use drugs. History reviewed. No pertinent family history.   No Known Allergies  Current Facility-Administered Medications   Medication Dose Route Frequency Provider Last Rate Last Admin    levETIRAcetam (KEPPRA) tablet 500 mg  500 mg Oral BID Denisha Vargas MD   500 mg at 10/30/21 0919    ipratropium-albuterol (DUONEB) nebulizer solution 1 ampule  1 ampule Inhalation TID Suresh Castro MD   1 ampule at 10/29/21 1230    albuterol (PROVENTIL) nebulizer solution 2.5 mg  2.5 mg Nebulization Q2H PRN Suresh Castro MD        glucose (GLUTOSE) 40 % oral gel 15 g  15 g Oral PRN Bledar Richard Khan MD        dextrose 50 % IV solution  12.5 g IntraVENous PRN Cleopatra Guthrie MD        glucagon (rDNA) injection 1 mg  1 mg IntraMUSCular PRN Cleopatra Guthrie MD        dextrose 5 % solution  100 mL/hr IntraVENous PRN Cleopatra Guthrie MD        sodium chloride flush 0.9 % injection 5-40 mL  5-40 mL IntraVENous 2 times per day Cleopatra Guthrie MD   10 mL at 10/30/21 0920    sodium chloride flush 0.9 % injection 5-40 mL  5-40 mL IntraVENous PRN Cleopatra Guthrie MD        0.9 % sodium chloride infusion  25 mL IntraVENous PRN Cleopatra Guthrie MD        ondansetron (ZOFRAN-ODT) disintegrating tablet 4 mg  4 mg Oral Q8H PRN Cleopatra Guthrie MD        Or    ondansetron (ZOFRAN) injection 4 mg  4 mg IntraVENous Q6H PRN Cleopatra Guthrie MD        acetaminophen (TYLENOL) tablet 650 mg  650 mg Oral Q6H PRN Cleopatra Guthrie MD        Or    acetaminophen (TYLENOL) suppository 650 mg  650 mg Rectal Q6H PRN Cleopatra Guthrie MD        polyethylene glycol (GLYCOLAX) packet 17 g  17 g Oral Daily PRN Cleopatra Guthrie MD        aspirin chewable tablet 81 mg  81 mg Oral Daily Cleopatra Guthrie MD   81 mg at 10/30/21 0920    atorvastatin (LIPITOR) tablet 40 mg  40 mg Oral Nightly Cleopatra Guthrie MD   40 mg at 10/29/21 2153    insulin lispro (HUMALOG) injection vial 0-6 Units  0-6 Units SubCUTAneous TID WC Cleopatra Guthrie MD   1 Units at 10/28/21 1305    insulin lispro (HUMALOG) injection vial 0-3 Units  0-3 Units SubCUTAneous Nightly Cleopatra Guthrie MD   1 Units at 10/29/21 2155    apixaban (ELIQUIS) tablet 5 mg  5 mg Oral BID Cleopatra Guthrie MD   5 mg at 10/30/21 0919    ursodiol (ACTIGALL) capsule 300 mg  300 mg Oral BID Sincere Wyatt Sedar, DO   300 mg at 10/30/21 0919    carvedilol (COREG) tablet 6.25 mg  6.25 mg Oral BID EDEN WATT Sedar, DO   6.25 mg at 10/30/21 0920    nitroGLYCERIN (NITROSTAT) SL tablet 0.4 mg  0.4 mg SubLINGual Q5 Min PRN Sincere Gupta, DO        vitamin D (CHOLECALCIFEROL) tablet 2,000 Units  2,000 Units Oral Dinner Shiv Gupta, DO   2,000 Units at 41/95/38 0053    folic acid (FOLVITE) tablet 1 mg  1 mg Oral Daily Stephany Face Sedar, DO   1 mg at 10/30/21 7163    levothyroxine (SYNTHROID) tablet 25 mcg  25 mcg Oral Daily Stephany Face Sedar, DO   25 mcg at 10/30/21 0920    ferrous sulfate (IRON 325) tablet 325 mg  325 mg Oral Every Other Day Stephany Face Sedar, DO   325 mg at 10/30/21 0919    pantoprazole (PROTONIX) tablet 40 mg  40 mg Oral QAM AC Melvin D Sedar, DO   40 mg at 10/30/21 0919      Problem List       Lethargy - Primary             Objective:   BP (!) 112/52   Pulse 69   Temp 97.9 °F (36.6 °C)   Resp 16   SpO2 98%   Review of Systems   Unable to perform ROS: Other   Patient severely hard of hearing      Physical Exam  Vitals and nursing note reviewed. Constitutional:       Appearance: Normal appearance. HENT:      Head: Normocephalic and atraumatic. Eyes:      Conjunctiva/sclera: Conjunctivae normal.   Cardiovascular:      Rate and Rhythm: Normal rate and regular rhythm. Pulses: Normal pulses. Heart sounds: Normal heart sounds. Pulmonary:      Effort: Pulmonary effort is normal.      Breath sounds: Normal breath sounds. Musculoskeletal:         General: Normal range of motion. Skin:     General: Skin is warm and dry. Neurological:      Mental Status: He is alert and oriented to person, place, and time. Mental status is at baseline. Cranial Nerves: No cranial nerve deficit. Sensory: No sensory deficit. Motor: Weakness present. Coordination: Coordination normal.      Deep Tendon Reflexes: Reflexes normal.   Psychiatric:         Mood and Affect: Mood normal.         Behavior: Behavior normal.     Patient lethargic and did not want to participate in physical exam.  He was able to eventually open his eyes to voice. No facial droop. Able to move all 4 limbs spontaneously but not complying with commands.     CT HEAD WO CONTRAST    Result Date: 10/29/2021  Interval evolution of the right occipital infarct compared to prior study. No CT evidence for new acute infarct elsewhere or hemorrhagic transformation. Other chronic changes similar to prior. CTA CHEST W WO CONTRAST    Result Date: 10/27/2021  No pulmonary embolism or acute intrathoracic process. Calcified pleural plaques on the left can be seen with asbestos related disease. All CT scans at this facility use dose modulation, iterative reconstruction, and/or weight based dosing when appropriate to reduce radiation dose to as low as reasonably achievable. XR CHEST PORTABLE    Result Date: 10/27/2021  NO ACUTE CARDIOPULMONARY DISEASE. Lab Results   Component Value Date    WBC 7.2 10/30/2021    RBC 4.01 10/30/2021    HGB 13.1 10/30/2021    HCT 38.4 10/30/2021    MCV 95.9 10/30/2021    MCH 32.7 10/30/2021    MCHC 34.1 10/30/2021    RDW 13.7 10/30/2021     10/30/2021    MPV 9.0 07/29/2014     Lab Results   Component Value Date     10/30/2021    K 4.4 10/30/2021    K 4.4 09/18/2021     10/30/2021    CO2 23 10/30/2021    BUN 33 10/30/2021    CREATININE 1.68 10/30/2021    GFRAA 46.4 10/30/2021    LABGLOM 38.3 10/30/2021    GLUCOSE 121 10/30/2021    PROT 7.7 10/27/2021    LABALBU 3.7 10/30/2021    CALCIUM 8.9 10/30/2021    BILITOT 1.4 10/27/2021    ALKPHOS 72 10/27/2021    AST 20 10/27/2021    ALT 11 10/27/2021     Lab Results   Component Value Date    PROTIME 17.1 10/27/2021    INR 1.4 10/27/2021     Lab Results   Component Value Date    TSH 2.810 10/27/2021    QNRKMJMO42 520 09/15/2021    FOLATE >20.0 09/15/2021    IRON 54 09/28/2021    TIBC 219 09/28/2021     Lab Results   Component Value Date    TRIG 206 09/16/2021    HDL 41 09/16/2021    LDLCALC 148 09/16/2021     No components found for: A1C  No results found for: LABAMPH, BARBSCNU, LABBENZ, CANNAB, COCAINESCRN, LABMETH, OPIATESCREENURINE, PHENCYCLIDINESCREENURINE, PPXUR, ETOH  No results found for: LITHIUM, DILFRTOT, VALPROATE    Assessment & Plan          Chart, labs,and images reviewed.     EEG shows left temporal spikes. Unfortunately we cannot get an MRI as patient's pacemaker is not compatible. CT of the head showed right occipital infarct which is old. Continue apixaban for stroke prophylaxis in the setting of atrial fibrillation. Patient appears to be tolerating the [address] well. No seizure activity noted. Called granddaughter and discussed the initiation of Keppra and discussed findings at length. Daughter reports that home health care and palliative care are now on board. Patient is stable from a neurological perspective and okay for discharge. To follow-up with neurology in 6 to 8 weeks. I have personally performed a face to face diagnostic evaluation on this patient, reviewed all data and investigations, and am the sole provider of all clinical decisions on the neurological status of this patient. he is very hard of hearing though otherwise remains neurologically intact. He is tolerating the Keppra very well does not any significant sedation. We will keep him on the medication for now unless changes noted. She may be discharged from neurological standpoint      German Mahajan MD, Shirlene Daley, American Board of Psychiatry & Neurology  Board Certified in Vascular Neurology  Board Certified in Neuromuscular Medicine  Certified in . [address]

## 2021-10-31 LAB
ALBUMIN SERPL-MCNC: 3.8 G/DL (ref 3.5–4.6)
ANION GAP SERPL CALCULATED.3IONS-SCNC: 17 MEQ/L (ref 9–15)
BUN BLDV-MCNC: 33 MG/DL (ref 8–23)
CALCIUM SERPL-MCNC: 8.8 MG/DL (ref 8.5–9.9)
CHLORIDE BLD-SCNC: 101 MEQ/L (ref 95–107)
CO2: 21 MEQ/L (ref 20–31)
CREAT SERPL-MCNC: 1.54 MG/DL (ref 0.7–1.2)
GFR AFRICAN AMERICAN: 51.3
GFR NON-AFRICAN AMERICAN: 42.4
GLUCOSE BLD-MCNC: 117 MG/DL (ref 60–115)
GLUCOSE BLD-MCNC: 131 MG/DL (ref 70–99)
GLUCOSE BLD-MCNC: 160 MG/DL (ref 60–115)
GLUCOSE BLD-MCNC: 162 MG/DL (ref 60–115)
GLUCOSE BLD-MCNC: 177 MG/DL (ref 60–115)
HCT VFR BLD CALC: 36.5 % (ref 42–52)
HEMOGLOBIN: 12.2 G/DL (ref 14–18)
MAGNESIUM: 2 MG/DL (ref 1.7–2.4)
MCH RBC QN AUTO: 31.5 PG (ref 27–31.3)
MCHC RBC AUTO-ENTMCNC: 33.6 % (ref 33–37)
MCV RBC AUTO: 94 FL (ref 80–100)
PDW BLD-RTO: 13.4 % (ref 11.5–14.5)
PERFORMED ON: ABNORMAL
PHOSPHORUS: 3.4 MG/DL (ref 2.3–4.8)
PLATELET # BLD: 200 K/UL (ref 130–400)
POTASSIUM SERPL-SCNC: 4 MEQ/L (ref 3.4–4.9)
RBC # BLD: 3.88 M/UL (ref 4.7–6.1)
SODIUM BLD-SCNC: 139 MEQ/L (ref 135–144)
WBC # BLD: 7 K/UL (ref 4.8–10.8)

## 2021-10-31 PROCEDURE — 94664 DEMO&/EVAL PT USE INHALER: CPT

## 2021-10-31 PROCEDURE — 6370000000 HC RX 637 (ALT 250 FOR IP): Performed by: INTERNAL MEDICINE

## 2021-10-31 PROCEDURE — 99231 SBSQ HOSP IP/OBS SF/LOW 25: CPT | Performed by: INTERNAL MEDICINE

## 2021-10-31 PROCEDURE — 99233 SBSQ HOSP IP/OBS HIGH 50: CPT | Performed by: PSYCHIATRY & NEUROLOGY

## 2021-10-31 PROCEDURE — 2060000000 HC ICU INTERMEDIATE R&B

## 2021-10-31 PROCEDURE — 94640 AIRWAY INHALATION TREATMENT: CPT

## 2021-10-31 PROCEDURE — 2700000000 HC OXYGEN THERAPY PER DAY

## 2021-10-31 PROCEDURE — 94761 N-INVAS EAR/PLS OXIMETRY MLT: CPT

## 2021-10-31 PROCEDURE — 85027 COMPLETE CBC AUTOMATED: CPT

## 2021-10-31 PROCEDURE — 80069 RENAL FUNCTION PANEL: CPT

## 2021-10-31 PROCEDURE — 6370000000 HC RX 637 (ALT 250 FOR IP)

## 2021-10-31 PROCEDURE — 83735 ASSAY OF MAGNESIUM: CPT

## 2021-10-31 PROCEDURE — 36415 COLL VENOUS BLD VENIPUNCTURE: CPT

## 2021-10-31 PROCEDURE — 2580000003 HC RX 258: Performed by: INTERNAL MEDICINE

## 2021-10-31 RX ADMIN — IPRATROPIUM BROMIDE AND ALBUTEROL SULFATE 1 AMPULE: .5; 2.5 SOLUTION RESPIRATORY (INHALATION) at 13:02

## 2021-10-31 RX ADMIN — IPRATROPIUM BROMIDE AND ALBUTEROL SULFATE 1 AMPULE: .5; 2.5 SOLUTION RESPIRATORY (INHALATION) at 19:39

## 2021-10-31 RX ADMIN — LEVOTHYROXINE SODIUM 25 MCG: 0.03 TABLET ORAL at 06:00

## 2021-10-31 RX ADMIN — FOLIC ACID 1 MG: 1 TABLET ORAL at 07:10

## 2021-10-31 RX ADMIN — CARVEDILOL 6.25 MG: 6.25 TABLET, FILM COATED ORAL at 07:10

## 2021-10-31 RX ADMIN — LEVETIRACETAM 500 MG: 500 TABLET, FILM COATED ORAL at 22:44

## 2021-10-31 RX ADMIN — CARVEDILOL 6.25 MG: 6.25 TABLET, FILM COATED ORAL at 17:33

## 2021-10-31 RX ADMIN — APIXABAN 5 MG: 5 TABLET, FILM COATED ORAL at 07:10

## 2021-10-31 RX ADMIN — URSODIOL 300 MG: 300 CAPSULE ORAL at 07:10

## 2021-10-31 RX ADMIN — SODIUM CHLORIDE, PRESERVATIVE FREE 10 ML: 5 INJECTION INTRAVENOUS at 22:44

## 2021-10-31 RX ADMIN — ASPIRIN 81 MG: 81 TABLET, CHEWABLE ORAL at 07:11

## 2021-10-31 RX ADMIN — ATORVASTATIN CALCIUM 40 MG: 40 TABLET, FILM COATED ORAL at 22:44

## 2021-10-31 RX ADMIN — LEVETIRACETAM 500 MG: 500 TABLET, FILM COATED ORAL at 07:10

## 2021-10-31 RX ADMIN — APIXABAN 5 MG: 5 TABLET, FILM COATED ORAL at 22:46

## 2021-10-31 RX ADMIN — PANTOPRAZOLE SODIUM 40 MG: 40 TABLET, DELAYED RELEASE ORAL at 07:11

## 2021-10-31 RX ADMIN — SODIUM CHLORIDE, PRESERVATIVE FREE 10 ML: 5 INJECTION INTRAVENOUS at 07:11

## 2021-10-31 RX ADMIN — Medication 2000 UNITS: at 17:33

## 2021-10-31 RX ADMIN — INSULIN LISPRO 1 UNITS: 100 INJECTION, SOLUTION INTRAVENOUS; SUBCUTANEOUS at 17:33

## 2021-10-31 RX ADMIN — URSODIOL 300 MG: 300 CAPSULE ORAL at 22:44

## 2021-10-31 RX ADMIN — LEVOTHYROXINE SODIUM 25 MCG: 0.03 TABLET ORAL at 07:10

## 2021-10-31 RX ADMIN — PANTOPRAZOLE SODIUM 40 MG: 40 TABLET, DELAYED RELEASE ORAL at 06:00

## 2021-10-31 ASSESSMENT — PAIN SCALES - GENERAL
PAINLEVEL_OUTOF10: 0
PAINLEVEL_OUTOF10: 0

## 2021-10-31 NOTE — CARE COORDINATION
INTERDISCIPLINARY ROUNDING    October 31, 2021 at 3:02 PM EDT    Anticipated Discharge Date:       Anticipated Discharge Disposition:HOME W/GRANDDTR    Patient Mobility or PT/OT ordered: YES    Readmission Risk              Risk of Unplanned Readmission:  46           Discussed patient goal for the day, patient clinical progression, and barriers to discharge. The following Goal(s) of the Day/Commitment(s) have been identified:      -THIS CM SPOKE WITH [facility], ALYSHA, SHE STATES THEY ARE AWARE OF PATIENT'S ADMITTANCE TO HOSPITAL. JAMES ORDER FOR SN/AIDE/PT AT ME COMPLETED. FAXED TO [address] [place] [address]. PER [facility], AGREEABLE TO RETURN SERVICES WITH [facility]. -ATTEMPTED TO MEET WITH PT, HE IS RESTING WITH EYES CLOSED.    -PALLIATIVE CARE FOLLOWING    Cortney Contreras RN  October 31, 2021

## 2021-10-31 NOTE — FLOWSHEET NOTE
Pt.  Resting in bed D/C planning per cardiology note. Pt's granddaughter called. They cannot pick patient up until tomorrow. Dr. Evelia Boss notified.  Ok to stay tonight and eval Hoe O2 in AM.

## 2021-10-31 NOTE — FLOWSHEET NOTE
Patient slept most of the night,no distress    07:34 patient was assisted to the bathroom,his gait is steady ,no shortness of breath noted.

## 2021-10-31 NOTE — PROGRESS NOTES
Progress Note  Patient: Misbah Gill  Unit/Bed: Q492/L043-44  YOB: 1929  MRN: [id number]  Acct: [de-identified]   Admitting Diagnosis: Acute respiratory failure ([address] [J96.00]  Lethargy [R53.83]  Hypoxia [R09.02]  Elevated troponin [R77.8]  AMS (altered mental status) [R41.82]  Date:  10/27/2021  Hospital Day: 4    Chief Complaint:  Elevated troponins    Subjective  Patient is again not interested in having significant talk or discussion about medical condition. Elevated troponin secondary to renal insufficiency. Prior CVA    Review of Systems:   Review of Systems  NA    Physical Examination:    BP (!) 112/55   Pulse 66   Temp 98.2 °F (36.8 °C) (Oral)   Resp 16   Wt 140 lb 12.8 oz (63.9 kg)   SpO2 94%   BMI 22.74 kg/m²    Physical Exam  Constitutional:       Appearance: He is ill-appearing. Cardiovascular:      Rate and Rhythm: Rhythm irregular. Pulmonary:      Breath sounds: Normal breath sounds. Skin:     Coloration: Skin is pale.          LABS:  CBC:   Lab Results   Component Value Date    WBC 7.0 10/31/2021    RBC 3.88 10/31/2021    HGB 12.2 10/31/2021    HCT 36.5 10/31/2021    MCV 94.0 10/31/2021    MCH 31.5 10/31/2021    MCHC 33.6 10/31/2021    RDW 13.4 10/31/2021     10/31/2021    MPV 9.0 07/29/2014     CBC with Differential:   Lab Results   Component Value Date    WBC 7.0 10/31/2021    RBC 3.88 10/31/2021    HGB 12.2 10/31/2021    HCT 36.5 10/31/2021     10/31/2021    MCV 94.0 10/31/2021    MCH 31.5 10/31/2021    MCHC 33.6 10/31/2021    RDW 13.4 10/31/2021    BANDSPCT 14 05/18/2016    LYMPHOPCT 32.6 10/27/2021    MONOPCT 8.8 10/27/2021    BASOPCT 0.6 10/27/2021    MONOSABS 0.6 10/27/2021    LYMPHSABS 2.1 10/27/2021    EOSABS 0.2 10/27/2021    BASOSABS 0.0 10/27/2021     CMP:    Lab Results   Component Value Date     10/31/2021    K 4.0 10/31/2021    K 4.4 09/18/2021     10/31/2021    CO2 21 10/31/2021    BUN 33 10/31/2021    CREATININE 1.54 10/31/2021 GFRAA 51.3 10/31/2021    LABGLOM 42.4 10/31/2021    GLUCOSE 131 10/31/2021    PROT 7.7 10/27/2021    LABALBU 3.8 10/31/2021    CALCIUM 8.8 10/31/2021    BILITOT 1.4 10/27/2021    ALKPHOS 72 10/27/2021    AST 20 10/27/2021    ALT 11 10/27/2021     BMP:    Lab Results   Component Value Date     10/31/2021    K 4.0 10/31/2021    K 4.4 09/18/2021     10/31/2021    CO2 21 10/31/2021    BUN 33 10/31/2021    LABALBU 3.8 10/31/2021    CREATININE 1.54 10/31/2021    CALCIUM 8.8 10/31/2021    GFRAA 51.3 10/31/2021    LABGLOM 42.4 10/31/2021    GLUCOSE 131 10/31/2021     Magnesium:    Lab Results   Component Value Date    MG 2.0 10/31/2021     Troponin:    Lab Results   Component Value Date    TROPONINI 0.040 10/28/2021       Radiology:  No results found. 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 Continue with anticoagulation  2.  Discharge planning  Electronically signed by Marguerite Sanon MD on 10/31/2021 at 11:47 AM

## 2021-10-31 NOTE — DISCHARGE SUMMARY
dosing when appropriate to reduce radiation dose to as low as reasonably achievable. COMPARISON:  CT 9/16/2021. RESULT: Acute change/chronic change:   Interval evolution of the right occipital lobe infarct. No associated hemorrhagic transformation or evidence for acute infarct elsewhere. Chronic lacunar infarcts bilateral basal ganglia and bilateral cerebellum, similar to  prior. Extensive chronic microvascular ischemic changes, similar to prior. Vascular calcifications. Hemorrhage:    No evidence of acute intracranial hemorrhage. Mass Lesion / Mass Effect:   There is no evidence of an intracranial mass or extraaxial fluid collection. No significant mass effect. Parenchyma: There is mild generalized volume loss. The brain parenchyma is otherwise within normal limits for age. Ventricles:   Ventricular enlargement concordant with the degree of parenchymal volume loss. Paranasal sinuses and skull base:  Polypoid thickening right maxillary sinus. Other areas of mild mucosal thickening. Left mastoidectomy, grossly unchanged. Small right mastoid effusion. No evidence for acute fracture. Bilateral lens replacement surgery. Soft tissues unremarkable. Interval evolution of the right occipital infarct compared to prior study. No CT evidence for new acute infarct elsewhere or hemorrhagic transformation. Other chronic changes similar to prior. CTA CHEST W WO CONTRAST    Result Date: 10/27/2021  EXAM: CTA CHEST W WO CONTRAST History: Hypoxia. Altered mental status. Pulmonary embolism. Pneumonia. Technique: Multiple contiguous axial images of the chest were obtained from the thoracic inlet through the upper abdomen with contrast. Multiplanar reformats including maximum intensity projection images (MIPS) were obtained. Comparison: Portable chest radiograph October 27, 2021. CT chest July 5, 2019 Findings: Visualized portion of the thyroid gland is within normal limits.  No axillary, mediastinal, or hilar lymphadenopathy. No thoracic aortic aneurysm. Limited evaluation for aortic dissection given the suboptimal contrast opacification of the aorta. Given this, no dissection identified. Atherosclerotic calcification of the thoracic aorta. No filling defect within the pulmonary arteries. Pacemaker is present. Postsurgical changes of CABG. Heart size is within normal limits. No significant pericardial effusion. Esophagus is within normal limits. No pulmonary nodule or mass. No consolidation, pleural effusion, or pneumothorax. Small calcific pleural plaques posteriorly on the left. No acute osseous abnormality. Visualized upper abdomen demonstrates no acute abnormality. No pulmonary embolism or acute intrathoracic process. Calcified pleural plaques on the left can be seen with asbestos related disease. All CT scans at this facility use dose modulation, iterative reconstruction, and/or weight based dosing when appropriate to reduce radiation dose to as low as reasonably achievable. XR CHEST PORTABLE    Result Date: 10/27/2021  EXAMINATION: XR CHEST PORTABLE CLINICAL HISTORY: ALTERED MENTAL STATUS COMPARISONS: SEPTEMBER 16, 2021 FINDINGS: Median sternotomy. Pacemaker generator and wires unchanged in position. Cardiopericardial silhouette normal. Aorta calcified. Pulmonary vasculature normal. Lungs clear. NO ACUTE CARDIOPULMONARY DISEASE.     Echo Limited with Bubble Study    Result Date: 10/28/2021  Transthoracic Echocardiography Report (TTE)  Demographics   Patient Name    Leticia Richards Gender               Male   Patient Number  [id number]       Race                                                   Ethnicity   Visit Number    [id number]      Room Number          W187   Corporate ID                   Date of Study        10/28/2021   Accession       [id number]     Referring Physician  Number   Date of Birth   03/05/1929     [address]   Age             80 year(s) Interpreting         [facility])                                 Physician            Cardiology                                                      Dia Dent  Procedure Type of Study   TTE procedure:ECHOCARDIOGRAM LIMITED WITH BUBBLE STUDY. Procedure Date Date: 10/28/2021 Start: 12:31 PM Study Location: Portable Technical Quality: Adequate visualization Indications:LVF. Patient Status: Routine Height: 72 inches BP: 136/53 mmHg Allergies   - No known allergies. Conclusions   Summary  TDS. Bubble study not obtainable  Moderately dilated left atrium. Left ventricular ejection fraction is visually estimated at 40%. Normal right ventricle structure and function. ?Pacer artifact   Signature   ----------------------------------------------------------------  Electronically signed by Alok Aparicio(Interpreting physician)  on 10/28/2021 05:25 PM  ----------------------------------------------------------------   Findings  Left Ventricle Left ventricular ejection fraction is visually estimated at 40%. Right Ventricle Normal right ventricle structure and function. ?Pacer artifact Left Atrium Moderately dilated left atrium. Right Atrium Normal right atrium. Mitral Valve Normal mitral valve structure and function. Tricuspid Valve Normal tricuspid valve structure and function. Aortic Valve Normal aortic valve structure and function. Pulmonic Valve Normal pulmonic valve structure and function. Pericardial Effusion No evidence of pericardial effusion. Pleural Effusion No evidence of pleural effusion. Aorta \ Miscellaneous Miscellaneous normal findings were found. M-Mode Measurements (cm)   LVIDd: 4.37 cm                         LVIDs: 3.43 cm  IVSd: 0.97 cm                          IVSs: 1.09 cm  LVPWd: 0.86 cm                         LVPWs: 1.05 cm  Rt. Vent.  Dimension: 2.46 cm           AO Root Dimension: 2.48 cm                                         ACS: 1.46 cm                                         LA: 3.04 cm LVOT: 1.98 cm  Doppler Measurements:                                   MV Peak E-Wave: 0.44 m/s  TR Velocity:1.52 m/s            MV Peak A-Wave: 0.88 m/s  TR Gradient:9.26 mmHg                                   Estimated RAP:3 mmHg                                  RVSP:12.26 mmHg  Valves  Mitral Valve   Peak E-Wave: 0.44 m/s           Peak A-Wave: 0.88 m/s                                  E/A Ratio: 0.5                                  Peak Gradient: 0.77 mmHg                                  Deceleration Time: 337.7 msec   Aortic Valve   Cusp Separation: 1.46 cm   Tricuspid Valve   Estimated RVSP: 12.26 mmHg               Estimated RAP: 3 mmHg  TR Velocity: 1.52 m/s                    TR Gradient: 9.26 mmHg   Pulmonic Valve            Estimated PASP: 12.26 mmHg   LVOT   LVOT Diameter: 1.98 cm  Structures  Left Atrium   LA Dimension: 3.04 cm  LA/Aorta: 1.23   Left Ventricle   Diastolic Dimension: 7.62 cm         Systolic Dimension: 3.53 cm  Septum Diastolic: 0.56 cm            Septum Systolic: 2.34 cm  PW Diastolic: 5.17 cm                PW Systolic: 3.43 cm                                       FS: 21.5 %  LV EDV/LV EDV Index: 86.12 ml        LV ESV/LV ESV Index: 48.58 ml  EF Calculated: 43.6 %   LVOT Diameter: 1.98 cm   Right Atrium   RA Systolic Pressure: 3 mmHg   Right Ventricle   Diastolic Dimension: 3.06 cm                                    RV Systolic Pressure: 50.54 mmHg  Aorta/ Miscellaneous Aorta   Aortic Root: 2.48 cm  LVOT Diameter: 1.98 cm        Discharge Medications:       Kia Avery   [place] Medication Instructions LFN:[id number]    Printed on:10/31/21 1038   Medication Information                      albuterol sulfate HFA (PROVENTIL HFA) 108 (90 Base) MCG/ACT inhaler  Inhale 2 puffs into the lungs every 6 hours as needed for Wheezing             apixaban (ELIQUIS) 5 MG TABS tablet  Take 1 tablet by mouth 2 times daily             aspirin 81 MG EC tablet  Take 1 tablet by mouth daily             atorvastatin (LIPITOR) 40 MG tablet  Take 40 mg by mouth daily             budesonide-formoterol (SYMBICORT) 80-4.5 MCG/ACT AERO  Inhale 2 puffs into the lungs 2 times daily. carvedilol (COREG) 12.5 MG tablet  Take 0.5 tablets by mouth 2 times daily (with meals)             coenzyme Q10 100 MG CAPS capsule  Take 1 capsule by mouth daily             esomeprazole Magnesium (NEXIUM) 40 MG PACK  Take 40 mg by mouth daily             ferrous sulfate (IRON 325) 325 (65 Fe) MG tablet  Take 1 tablet by mouth 2 times daily (with meals)             folic acid (FOLVITE) 1 MG tablet  Take 1 tablet by mouth daily             ipratropium-albuterol (DUONEB) 0.5-2.5 (3) MG/3ML SOLN nebulizer solution  Inhale 3 mLs into the lungs three times daily             levETIRAcetam (KEPPRA) 500 MG tablet  Take 1 tablet by mouth 2 times daily             levothyroxine (SYNTHROID) 25 MCG tablet  Take 25 mcg by mouth Daily             nitroGLYCERIN (NITROSTAT) 0.4 MG SL tablet  Place 1 tablet under the tongue every 5 minutes as needed for Chest pain             ramipril (ALTACE) 5 MG capsule  Take 5 mg by mouth daily             ursodiol (ACTIGALL) 300 MG capsule  Take 300 mg by mouth 2 times daily             Vitamin D (CHOLECALCIFEROL) 50 MCG (2000 UT) TABS tablet  Take 1 tablet by mouth Daily with supper                 Disposition:   Discharged to Home. Any [facility] needs that were indicated and/or required as been addressed and set up by Social Work. Condition at discharge: Pt was medically stable at the time of discharge. Activity: activity as tolerated, fall precautions. Total time taken for discharging this patient: 40 minutes. Greater than 70% of time was spent focused exclusively on this patient. Time was taken to review chart, discuss plans with consultants, reconciling medications, discussing plan answering questions with patient.      Signed:  Jody Lebron MD  10/31/2021, 10:38 AM  ----------------------------------------------------------------------------------------------------------------------    Clara Treviño,     Please return to ER or call 911 if you develop any significant signs or symptoms. I may not have addressed all of your medical illnesses or the abnormal blood work or imaging therefore please ask your PCP Jesenia Dominguez MD, MD and other out patient specialists and providers  to obtain [facility] record entirely to follow up on all of the abnormal labs, imaging and findings that I have and have not addressed during your hospitalization. Discharging you from the hospital does not mean that your medical care ends here and now. You may still need additional work up, investigation, monitoring, and treatment to be handled from this point on by outside providers including your PCP, Jesenia Dominguez MD, MD , Specialists and other healthcare providers. Please review your list of discharge medications prior to resuming medications you might still have at home, as the medications you need to be taking, dosages or how often you must take them may have changed. For medication questions, contact your retail pharmacy and your PCP, Jesenia Dominguez MD, MD .     ** I STRONGLY RECOMMEND that you follow up with Jesenia Dominguez MD, MD within 3 to 5 days for a post hospitalization evaluation. This specific office visit is covered by your insurance, and is not the same as your annual doctor visit/ check up. This office visit is important, as it may prevent need for repeat and/or future hospitalizations. **    Your medical team at [facility]) appreciates the opportunity to work with you to get well!     Sincerely,  Gregary Moritz, MD

## 2021-10-31 NOTE — PROGRESS NOTES
Hospitalist Progress Note      PCP: Jesi Baltazar MD, MD    Date of Admission: 10/27/2021    Chief Complaint:  No acute events, afebrile, stable HD, on RA    Medications:  Reviewed    Infusion Medications    dextrose      sodium chloride       Scheduled Medications    levETIRAcetam  500 mg Oral BID    ipratropium-albuterol  1 ampule Inhalation TID    sodium chloride flush  5-40 mL IntraVENous 2 times per day    aspirin  81 mg Oral Daily    atorvastatin  40 mg Oral Nightly    insulin lispro  0-6 Units SubCUTAneous TID WC    insulin lispro  0-3 Units SubCUTAneous Nightly    apixaban  5 mg Oral BID    ursodiol  300 mg Oral BID    carvedilol  6.25 mg Oral BID WC    Vitamin D  2,000 Units Oral Dinner    folic acid  1 mg Oral Daily    levothyroxine  25 mcg Oral Daily    ferrous sulfate  325 mg Oral Every Other Day    pantoprazole  40 mg Oral QAM AC     PRN Meds: albuterol, glucose, dextrose, glucagon (rDNA), dextrose, sodium chloride flush, sodium chloride, ondansetron **OR** ondansetron, acetaminophen **OR** acetaminophen, polyethylene glycol, nitroGLYCERIN      Intake/Output Summary (Last 24 hours) at 10/31/2021 1031  Last data filed at 10/31/2021 0716  Gross per 24 hour   Intake 240 ml   Output --   Net 240 ml       Exam:    BP (!) 112/55   Pulse 66   Temp 98.2 °F (36.8 °C) (Oral)   Resp 16   Wt 140 lb 12.8 oz (63.9 kg)   SpO2 94%   BMI 22.74 kg/m²     General appearance: awake, hard of hearing,    Lungs: clear to auscultation bilaterally  Heart: regular rate and rhythm, S1, S2  Abdomen: soft, BS active  Extremities: no edema      Labs:   Recent Labs     10/30/21  0856 10/31/21  0755   WBC 7.2 7.0   HGB 13.1* 12.2*   HCT 38.4* 36.5*    200     Recent Labs     10/30/21  0856 10/31/21  0755    139   K 4.4 4.0    101   CO2 23 21   BUN 33* 33*   CREATININE 1.68* 1.54*   CALCIUM 8.9 8.8   PHOS 3.8 3.4     No results for input(s): AST, ALT, BILIDIR, BILITOT, ALKPHOS in the last 72 hours. No results for input(s): INR in the last 72 hours. No results for input(s): Aly Shorts in the last 72 hours. Urinalysis:      Lab Results   Component Value Date    NITRU Negative 10/27/2021    WBCUA 20-50 10/27/2021    BACTERIA Negative 10/27/2021    RBCUA 0-2 10/27/2021    BLOODU Negative 10/27/2021    SPECGRAV 1.021 10/27/2021    GLUCOSEU Negative 10/27/2021       Radiology:  CT HEAD WO CONTRAST   Final Result      Interval evolution of the right occipital infarct compared to prior study. No CT evidence for new acute infarct elsewhere or hemorrhagic transformation. Other chronic changes similar to prior. CTA CHEST W WO CONTRAST   Final Result      XR CHEST PORTABLE   Final Result   NO ACUTE CARDIOPULMONARY DISEASE. Assessment/Plan:    80 y.o. male with PMH of CAD s/p CABG, SSS s/p PPM, PAD, RLE DVT s/p right iliac vein stent in 1/2021, CVA on 9/2021, DM2, HTN, CKD3, hearing loss who presented with:     Acute hypoxic respiratory failure  - CTA of the chest was negative for PE,   - improved, on RA  - follow up with pulmonology as outpatient     Altered mental status and generalized weakness  - in the setting of recent CVA   - CT head showed evolution of the right occipital infarct, no acute findings  - EEG showed interictal expression of epilepsy with focus in the left temporal lobe  - started on Keppra  - OK to d/c with outpatient f/u per neurology     Elevated troponins  - ECG showed paced rhythm  - continue ASA, statin, Coreg  - conservative management per cardiology      CKD3  - monitor renal function     DM2 with hyperglycemia  - ISS, hypoglycemia protocol     RLE DVT s/p right iliac vein stent in 1/2021  - continued Eliquis       Diet: ADULT DIET; Regular; 4 carb choices (60 gm/meal);  No Caffeine  ADULT ORAL NUTRITION SUPPLEMENT; Breakfast, Dinner; Diabetic Oral Supplement    Code Status: DNR-CCA      Disposition - home today        Electronically signed by [facility] [employer] Mali Billings MD on 10/31/2021 at 10:31 AM

## 2021-10-31 NOTE — PROGRESS NOTES
Subjective:   Chief Complaint:Altered Mental Status (difficulty responding at home)    Patient ID: Unknown Medico is a 80 y.o. male followed for new encephalopathy in the setting of previous occipital stroke. No new focal deficits. Altered Mental Status    :     Patient is severely fatigued this morning and does not want a participate in exam.  He is arousable to voice. Patient is severely hard of hearing as well. Called granddaughter who he lives with to discuss further. Granddaughter states that this is normal for him in the morning. Also discussed EEG findings which showed left temporal lobe spikes suggestive of seizures. Discussed that we had started him on Keppra and discussed side effects and benefits at length. Patient just awoke in the morning and had an uneventful night. Again he is in the window less room so he does not know whether it is day or night. Patient is not his sedation    Past Medical History:   Diagnosis Date    Anemia     CAD (coronary artery disease) 4/16/2015    [facility] [address]     Dyslipidemia     History of tobacco abuse     HTN (hypertension)     Hypothyroidism     Non-ST elevation MI (NSTEMI) ([facility] [address]     Pacemaker 4/16/2015     Past Surgical History:   Procedure Laterality Date    CATARACT REMOVAL      MIDDLE EAR SURGERY Left 1998    \"they had to reset the bones\" after an infection     Patient reports that he has quit smoking. He has never used smokeless tobacco. He reports that he does not drink alcohol and does not use drugs. History reviewed. No pertinent family history.   No Known Allergies  Current Facility-Administered Medications   Medication Dose Route Frequency Provider Last Rate Last Admin    levETIRAcetam (KEPPRA) tablet 500 mg  500 mg Oral BID Adonay Vargas MD   500 mg at 10/31/21 0710    ipratropium-albuterol (DUONEB) nebulizer solution 1 ampule  1 ampule Inhalation TID Gunner Tao MD   1 ampule at 10/30/21 1944    albuterol (PROVENTIL) nebulizer solution 2.5 mg  2.5 mg Nebulization Q2H PRN Brianna Bryant MD        glucose (GLUTOSE) 40 % oral gel 15 g  15 g Oral PRN Jeff Mejia MD        dextrose 50 % IV solution  12.5 g IntraVENous PRN Jeff Mejia MD        glucagon (rDNA) injection 1 mg  1 mg IntraMUSCular PRN Jeff Mejia MD        dextrose 5 % solution  100 mL/hr IntraVENous PRN Jeff Mejia MD        sodium chloride flush 0.9 % injection 5-40 mL  5-40 mL IntraVENous 2 times per day Jeff Mejia MD   10 mL at 10/31/21 0711    sodium chloride flush 0.9 % injection 5-40 mL  5-40 mL IntraVENous PRN Jeff Mejia MD        0.9 % sodium chloride infusion  25 mL IntraVENous PRN Jeff Mejia MD        ondansetron (ZOFRAN-ODT) disintegrating tablet 4 mg  4 mg Oral Q8H PRN Jeff Mejia MD        Or    ondansetron (ZOFRAN) injection 4 mg  4 mg IntraVENous Q6H PRN Jeff Mejia MD        acetaminophen (TYLENOL) tablet 650 mg  650 mg Oral Q6H PRN Jeff Mejia MD        Or    acetaminophen (TYLENOL) suppository 650 mg  650 mg Rectal Q6H PRN Jeff Mejia MD        polyethylene glycol (GLYCOLAX) packet 17 g  17 g Oral Daily PRN Jeff Mejia MD        aspirin chewable tablet 81 mg  81 mg Oral Daily Jeff Mejia MD   81 mg at 10/31/21 0711    atorvastatin (LIPITOR) tablet 40 mg  40 mg Oral Nightly Jeff Mejia MD   40 mg at 10/30/21 2020    insulin lispro (HUMALOG) injection vial 0-6 Units  0-6 Units SubCUTAneous TID  Jeff Mejia MD   1 Units at 10/28/21 1305    insulin lispro (HUMALOG) injection vial 0-3 Units  0-3 Units SubCUTAneous Nightly Jeff Mejia MD   1 Units at 10/29/21 2155    apixaban (ELIQUIS) tablet 5 mg  5 mg Oral BID Jeff Mejia MD   5 mg at 10/31/21 0710    ursodiol (ACTIGALL) capsule 300 mg  300 mg Oral BID Stephany Gupta, DO   300 mg at 10/31/21 0710    carvedilol (COREG) tablet 6.25 mg  6.25 mg Oral BID EDEN Gupta, DO   6.25 mg at 10/31/21 0710    nitroGLYCERIN (NITROSTAT) SL tablet 0.4 mg  0.4 mg SubLINGual Q5 Min PRN Eduard Isaac Sedar, DO        vitamin D (CHOLECALCIFEROL) tablet 2,000 Units  2,000 Units Oral Dinner Eduard Isaac Vallear, DO   2,000 Units at 11/77/53 7739    folic acid (FOLVITE) tablet 1 mg  1 mg Oral Daily Eduard Isaac Sedar, DO   1 mg at 10/31/21 0710    levothyroxine (SYNTHROID) tablet 25 mcg  25 mcg Oral Daily Eduard Isaac Sedar, DO   25 mcg at 10/31/21 0710    ferrous sulfate (IRON 325) tablet 325 mg  325 mg Oral Every Other Day Eduard Isaac Sedar, DO   325 mg at 10/30/21 0919    pantoprazole (PROTONIX) tablet 40 mg  40 mg Oral QAM [facility] SH[facility] Sedar, DO   40 mg at 10/31/21 1823      Problem List     Lethargy - Primary           Objective:   BP (!) 112/55   Pulse 66   Temp 98.2 °F (36.8 °C) (Oral)   Resp 16   Wt 140 lb 12.8 oz (63.9 kg)   SpO2 94%   BMI 22.74 kg/m²   Review of Systems   Unable to perform ROS: Other   Patient severely hard of hearing      Physical Exam  Vitals and nursing note reviewed. Constitutional:       Appearance: Normal appearance. HENT:      Head: Normocephalic and atraumatic. Eyes:      Conjunctiva/sclera: Conjunctivae normal.   Cardiovascular:      Rate and Rhythm: Normal rate and regular rhythm. Pulses: Normal pulses. Heart sounds: Normal heart sounds. Pulmonary:      Effort: Pulmonary effort is normal.      Breath sounds: Normal breath sounds. Musculoskeletal:         General: Normal range of motion. Skin:     General: Skin is warm and dry. Neurological:      Mental Status: He is alert and oriented to person, place, and time. Mental status is at baseline. Cranial Nerves: No cranial nerve deficit. Sensory: No sensory deficit. Motor: Weakness present.       Coordination: Coordination normal.      Deep Tendon Reflexes: Reflexes normal.   Psychiatric:         Mood and Affect: Mood normal.         Behavior: Behavior normal.     Patient lethargic and did not want to participate in physical exam.  He was able to eventually open his eyes to voice. No facial droop. Able to move all 4 limbs spontaneously but not complying with commands. CT HEAD WO CONTRAST    Result Date: 10/29/2021  Interval evolution of the right occipital infarct compared to prior study. No CT evidence for new acute infarct elsewhere or hemorrhagic transformation. Other chronic changes similar to prior. CTA CHEST W WO CONTRAST    Result Date: 10/27/2021  No pulmonary embolism or acute intrathoracic process. Calcified pleural plaques on the left can be seen with asbestos related disease. All CT scans at this facility use dose modulation, iterative reconstruction, and/or weight based dosing when appropriate to reduce radiation dose to as low as reasonably achievable. XR CHEST PORTABLE    Result Date: 10/27/2021  NO ACUTE CARDIOPULMONARY DISEASE.        Lab Results   Component Value Date    WBC 7.0 10/31/2021    RBC 3.88 10/31/2021    HGB 12.2 10/31/2021    HCT 36.5 10/31/2021    MCV 94.0 10/31/2021    MCH 31.5 10/31/2021    MCHC 33.6 10/31/2021    RDW 13.4 10/31/2021     10/31/2021    MPV 9.0 07/29/2014     Lab Results   Component Value Date     10/31/2021    K 4.0 10/31/2021    K 4.4 09/18/2021     10/31/2021    CO2 21 10/31/2021    BUN 33 10/31/2021    CREATININE 1.54 10/31/2021    GFRAA 51.3 10/31/2021    LABGLOM 42.4 10/31/2021    GLUCOSE 131 10/31/2021    PROT 7.7 10/27/2021    LABALBU 3.8 10/31/2021    CALCIUM 8.8 10/31/2021    BILITOT 1.4 10/27/2021    ALKPHOS 72 10/27/2021    AST 20 10/27/2021    ALT 11 10/27/2021     Lab Results   Component Value Date    PROTIME 17.1 10/27/2021    INR 1.4 10/27/2021     Lab Results   Component Value Date    TSH 2.810 10/27/2021    BIHXJLVL52 520 09/15/2021    FOLATE >20.0 09/15/2021    IRON 54 09/28/2021    TIBC 219 09/28/2021     Lab Results   Component Value Date    TRIG 206 09/16/2021    HDL 41 09/16/2021    LDLCALC 148 09/16/2021     No components found for: A1C  No results found

## 2021-11-01 VITALS
OXYGEN SATURATION: 95 % | RESPIRATION RATE: 16 BRPM | SYSTOLIC BLOOD PRESSURE: 130 MMHG | TEMPERATURE: 97.2 F | DIASTOLIC BLOOD PRESSURE: 55 MMHG | BODY MASS INDEX: 23.41 KG/M2 | HEART RATE: 61 BPM | WEIGHT: 145 LBS

## 2021-11-01 LAB
ALBUMIN SERPL-MCNC: 3.7 G/DL (ref 3.5–4.6)
ANION GAP SERPL CALCULATED.3IONS-SCNC: 13 MEQ/L (ref 9–15)
BLOOD CULTURE, ROUTINE: NORMAL
BUN BLDV-MCNC: 29 MG/DL (ref 8–23)
CALCIUM SERPL-MCNC: 9 MG/DL (ref 8.5–9.9)
CHLORIDE BLD-SCNC: 103 MEQ/L (ref 95–107)
CO2: 23 MEQ/L (ref 20–31)
CREAT SERPL-MCNC: 1.33 MG/DL (ref 0.7–1.2)
CULTURE, BLOOD 2: NORMAL
GFR AFRICAN AMERICAN: >60
GFR NON-AFRICAN AMERICAN: 50.2
GLUCOSE BLD-MCNC: 117 MG/DL (ref 70–99)
GLUCOSE BLD-MCNC: 118 MG/DL (ref 60–115)
GLUCOSE BLD-MCNC: 118 MG/DL (ref 60–115)
HCT VFR BLD CALC: 36.2 % (ref 42–52)
HEMOGLOBIN: 12.2 G/DL (ref 14–18)
MAGNESIUM: 2 MG/DL (ref 1.7–2.4)
MCH RBC QN AUTO: 31.4 PG (ref 27–31.3)
MCHC RBC AUTO-ENTMCNC: 33.6 % (ref 33–37)
MCV RBC AUTO: 93.5 FL (ref 80–100)
PDW BLD-RTO: 13.3 % (ref 11.5–14.5)
PERFORMED ON: ABNORMAL
PERFORMED ON: ABNORMAL
PHOSPHORUS: 3.3 MG/DL (ref 2.3–4.8)
PLATELET # BLD: 208 K/UL (ref 130–400)
POTASSIUM SERPL-SCNC: 4.1 MEQ/L (ref 3.4–4.9)
RBC # BLD: 3.87 M/UL (ref 4.7–6.1)
SODIUM BLD-SCNC: 139 MEQ/L (ref 135–144)
WBC # BLD: 6.4 K/UL (ref 4.8–10.8)

## 2021-11-01 PROCEDURE — 6370000000 HC RX 637 (ALT 250 FOR IP)

## 2021-11-01 PROCEDURE — 6370000000 HC RX 637 (ALT 250 FOR IP): Performed by: INTERNAL MEDICINE

## 2021-11-01 PROCEDURE — 99232 SBSQ HOSP IP/OBS MODERATE 35: CPT | Performed by: INTERNAL MEDICINE

## 2021-11-01 PROCEDURE — 97116 GAIT TRAINING THERAPY: CPT

## 2021-11-01 PROCEDURE — 80069 RENAL FUNCTION PANEL: CPT

## 2021-11-01 PROCEDURE — 99232 SBSQ HOSP IP/OBS MODERATE 35: CPT | Performed by: NURSE PRACTITIONER

## 2021-11-01 PROCEDURE — 85027 COMPLETE CBC AUTOMATED: CPT

## 2021-11-01 PROCEDURE — 36415 COLL VENOUS BLD VENIPUNCTURE: CPT

## 2021-11-01 PROCEDURE — G0008 ADMIN INFLUENZA VIRUS VAC: HCPCS | Performed by: INTERNAL MEDICINE

## 2021-11-01 PROCEDURE — 94761 N-INVAS EAR/PLS OXIMETRY MLT: CPT

## 2021-11-01 PROCEDURE — 83735 ASSAY OF MAGNESIUM: CPT

## 2021-11-01 PROCEDURE — 90686 IIV4 VACC NO PRSV 0.5 ML IM: CPT | Performed by: INTERNAL MEDICINE

## 2021-11-01 PROCEDURE — 94640 AIRWAY INHALATION TREATMENT: CPT

## 2021-11-01 PROCEDURE — 6360000002 HC RX W HCPCS: Performed by: INTERNAL MEDICINE

## 2021-11-01 RX ADMIN — ASPIRIN 81 MG: 81 TABLET, CHEWABLE ORAL at 10:15

## 2021-11-01 RX ADMIN — PANTOPRAZOLE SODIUM 40 MG: 40 TABLET, DELAYED RELEASE ORAL at 05:28

## 2021-11-01 RX ADMIN — LEVETIRACETAM 500 MG: 500 TABLET, FILM COATED ORAL at 10:15

## 2021-11-01 RX ADMIN — IPRATROPIUM BROMIDE AND ALBUTEROL SULFATE 1 AMPULE: .5; 2.5 SOLUTION RESPIRATORY (INHALATION) at 13:24

## 2021-11-01 RX ADMIN — INFLUENZA A VIRUS A/VICTORIA/2570/2019 IVR-215 (H1N1) ANTIGEN (PROPIOLACTONE INACTIVATED), INFLUENZA A VIRUS A/CAMBODIA/E0826360/2020 IVR-224 (H3N2) ANTIGEN (PROPIOLACTONE INACTIVATED), INFLUENZA B VIRUS B/VICTORIA/705/2018 BVR-11 ANTIGEN (PROPIOLACTONE INACTIVATED), INFLUENZA B VIRUS B/PHUKET/3073/2013 BVR-1B ANTIGEN (PROPIOLACTONE INACTIVATED) 0.5 ML: 15; 15; 15; 15 INJECTION, SUSPENSION INTRAMUSCULAR at 17:38

## 2021-11-01 RX ADMIN — APIXABAN 5 MG: 5 TABLET, FILM COATED ORAL at 10:15

## 2021-11-01 RX ADMIN — FERROUS SULFATE TAB 325 MG (65 MG ELEMENTAL FE) 325 MG: 325 (65 FE) TAB at 10:15

## 2021-11-01 RX ADMIN — LEVOTHYROXINE SODIUM 25 MCG: 0.03 TABLET ORAL at 05:28

## 2021-11-01 RX ADMIN — CARVEDILOL 6.25 MG: 6.25 TABLET, FILM COATED ORAL at 17:38

## 2021-11-01 RX ADMIN — URSODIOL 300 MG: 300 CAPSULE ORAL at 10:15

## 2021-11-01 RX ADMIN — FOLIC ACID 1 MG: 1 TABLET ORAL at 10:15

## 2021-11-01 RX ADMIN — Medication 2000 UNITS: at 17:38

## 2021-11-01 RX ADMIN — CARVEDILOL 6.25 MG: 6.25 TABLET, FILM COATED ORAL at 10:15

## 2021-11-01 ASSESSMENT — PAIN SCALES - GENERAL
PAINLEVEL_OUTOF10: 0
PAINLEVEL_OUTOF10: 0

## 2021-11-01 NOTE — PROGRESS NOTES
Progress Note  Patient: Manuel Trevizo  Unit/Bed: N834/O749-46  YOB: 1929  MRN: [id number]  Acct: [de-identified]   Admitting Diagnosis: Acute respiratory failure ([facility] [J96.00]  Lethargy [R53.83]  Hypoxia [R09.02]  Elevated troponin [R77.8]  AMS (altered mental status) [R41.82]  Admit Date:  10/27/2021  Hospital Day: 5    Chief Complaint: MS changes    Histories:  Past Medical History:   Diagnosis Date    Anemia     CAD (coronary artery disease) 4/16/2015    DM (diabetes mellitus) ([facility]     Dyslipidemia     History of tobacco abuse     HTN (hypertension)     Hypothyroidism     Non-ST elevation MI (NSTEMI) ([facility]     Pacemaker 4/16/2015     Past Surgical History:   Procedure Laterality Date    CATARACT REMOVAL      MIDDLE EAR SURGERY Left 1998    \"they had to reset the bones\" after an infection     History reviewed. No pertinent family history. Social History     Socioeconomic History    Marital status:       Spouse name: None    Number of children: None    Years of education: None    Highest education level: None   Occupational History    None   Tobacco Use    Smoking status: Former Smoker    Smokeless tobacco: Never Used   Vaping Use    Vaping Use: Never used   Substance and Sexual Activity    Alcohol use: No    Drug use: No    Sexual activity: None   Other Topics Concern    None   Social History Narrative    Lives With: granddaughter Shira  and family-versus significant other and their 3 children are in the household Shira works to take care of her her uncle who is Mr. Aidee Andrew son who is a paraplegic who lives next door    SO named Shama    Type of Home: House  Two level, Bed/Bath upstairs-[address] [facility] Access: Stairs to enter with rails- Number of Steps: 14    Bathroom Shower/Tub: Tub/Shower unit    Home Equipment: Rolling walker, Cane    ADL Assistance: Independent    Homemaking Assistance: Independent(granddaughter completes)    Ambulation Assistance: Independent    Transfer Assistance: Independent    Active : Yes    Additional Comments: pt is significantly [place]; information gathered via chart review and via writing. Social Determinants of Health     Financial Resource Strain:     Difficulty of Paying Living Expenses:    Food Insecurity:     Worried About Running Out of Food in the Last Year:     [address] in the Last Year:    Transportation Needs:     Lack of Transportation (Medical):  Lack of Transportation (Non-Medical):    Physical Activity:     Days of Exercise per Week:     Minutes of Exercise per Session:    Stress:     Feeling of Stress :    Social Connections:     Frequency of Communication with Friends and Family:     Frequency of Social Gatherings with Friends and Family:     Attends Latter-day Services:     Active Member of Clubs or Organizations:     Attends Club or Organization Meetings:     Marital Status:    Intimate Partner Violence:     Fear of Current or Ex-Partner:     Emotionally Abused:     Physically Abused:     Sexually Abused:        Subjective/HPI  Pt is somnolent. Able to wake but falls back asleep. No fever. On no O2. BP stable. EKG: A Pacing 62        Review of Systems:   Review of Systems  Not obtainable this am.     Physical Examination:    BP (!) 130/55   Pulse 64   Temp 97.2 °F (36.2 °C) (Oral)   Resp 18   Wt 145 lb (65.8 kg)   SpO2 94%   BMI 23.41 kg/m²    Physical Exam   Constitutional: He appears healthy. No distress. HENT:   Normal cephalic and Atraumatic   Eyes: Pupils are equal, round, and reactive to light. Neck: Thyroid normal. No JVD present. No neck adenopathy. No thyromegaly present. Cardiovascular: Normal rate, regular rhythm, normal heart sounds, intact distal pulses and normal pulses. Pulmonary/Chest: Effort normal and breath sounds normal. He has no wheezes. He has no rales. He exhibits no tenderness. Abdominal: Soft.  Bowel sounds are normal. There is no abdominal tenderness. Musculoskeletal:         General: No tenderness or edema. Normal range of motion. Cervical back: Normal range of motion and neck supple. Neurological: He is alert. Skin: Skin is warm. No cyanosis. Nails show no clubbing.        LABS:  CBC:   Lab Results   Component Value Date    WBC 6.4 11/01/2021    RBC 3.87 11/01/2021    HGB 12.2 11/01/2021    HCT 36.2 11/01/2021    MCV 93.5 11/01/2021    MCH 31.4 11/01/2021    MCHC 33.6 11/01/2021    RDW 13.3 11/01/2021     11/01/2021    MPV 9.0 07/29/2014     CBC with Differential:    Lab Results   Component Value Date    WBC 6.4 11/01/2021    RBC 3.87 11/01/2021    HGB 12.2 11/01/2021    HCT 36.2 11/01/2021     11/01/2021    MCV 93.5 11/01/2021    MCH 31.4 11/01/2021    MCHC 33.6 11/01/2021    RDW 13.3 11/01/2021    BANDSPCT 14 05/18/2016    LYMPHOPCT 32.6 10/27/2021    MONOPCT 8.8 10/27/2021    BASOPCT 0.6 10/27/2021    MONOSABS 0.6 10/27/2021    LYMPHSABS 2.1 10/27/2021    EOSABS 0.2 10/27/2021    BASOSABS 0.0 10/27/2021     CMP:    Lab Results   Component Value Date     11/01/2021    K 4.1 11/01/2021    K 4.4 09/18/2021     11/01/2021    CO2 23 11/01/2021    BUN 29 11/01/2021    CREATININE 1.33 11/01/2021    GFRAA >60.0 11/01/2021    LABGLOM 50.2 11/01/2021    GLUCOSE 117 11/01/2021    PROT 7.7 10/27/2021    LABALBU 3.7 11/01/2021    CALCIUM 9.0 11/01/2021    BILITOT 1.4 10/27/2021    ALKPHOS 72 10/27/2021    AST 20 10/27/2021    ALT 11 10/27/2021     BMP:    Lab Results   Component Value Date     11/01/2021    K 4.1 11/01/2021    K 4.4 09/18/2021     11/01/2021    CO2 23 11/01/2021    BUN 29 11/01/2021    LABALBU 3.7 11/01/2021    CREATININE 1.33 11/01/2021    CALCIUM 9.0 11/01/2021    GFRAA >60.0 11/01/2021    LABGLOM 50.2 11/01/2021    GLUCOSE 117 11/01/2021     Magnesium:    Lab Results   Component Value Date    MG 2.0 11/01/2021     Troponin:    Lab Results   Component Value Date    TROPONINI 0.040

## 2021-11-01 NOTE — PROGRESS NOTES
Palliative Care Progress Note  Patient: Daisy Nation  Gender: male  YOB: 1929  Unit/Bed: F975/N258-23  CodeStatus: DNR-CCA  Inpatient Treatment Team: Treatment Team: Attending Provider: Nico Rubio MD; Consulting Physician: Tena Mullen MD; Consulting Physician: Sagar Morse MD; Consulting Physician: Reno Rodney DO; Consulting Physician: CHRISTINE Hernandez - CNP; Consulting Physician: Preet Pretty MD; Utilization Reviewer: Funmi Kline, RN; Utilization Reviewer: Nettie Palomino, RN; Registered Nurse: Lukasz Parra RN; : Marilou Murray RN  Admit Date:  10/27/2021    Chief Complaint: AMS    History of Presenting Illness:      Daisy Nation is a 80 y.o. male on hospital day 5 with a history of CAD s/p CABG, SSS s/p PPM, PAD, RLE DVT s/p right iliac vein stent in 1/2021, DM2, HTN, COPD, severe hearing loss. Patient presented to the ER from home with altered mental status. He was recently admitted in September for an acute ischemic CVA. Patient was hypoxic on arrival to the ER with SPO2 in the 70s and 80s. Patient was admitted for acute hypoxic respiratory failure, altered mental status, and elevated troponin. Palliative care consulted for goals of care and code status discussion. Patient is very hard of hearing. Attempted to write on paper and give patient his glasses, but he folded my paper up and stated he didn't need his glasses. Patient is alert to person and place. He is disoriented to date, year, and situation. Patient closed his eyes as I was trying to talk to him and appeared to not want to participate in the conversation. Spoke with daughter, Reyna Adamson. She reports that ever since patient's stroke, and even before that, he has had some issues with confusion. Patient is normally ambulatory at home with a walker. He is currently living with his grandchild. He does have HPOA who is his grandchild, Jose Alberto Cosby.  Lately, patient has been having some difficulty with grooming and not wanting to perform any hygiene. Opal Alarcon has been having to encourage patient until he does it. Spoke with patient's granddaughter, Opal Alarcon. She reports that he just completed PT and was doing well. He suddenly became SOB, had chest pain, and became unresponsive. She does report patient has a history of COPD.     11/1/21     Alert, nonverbal, not responding to questions or requests. He did open his eyes to his name and move all extremities, falls back asleep when not stimulated. Per notes he has been participating in PT and OT. Per notes his mentation seems to wax and wane. No signs and symptoms of distress. PAIN Ad zero. Review of Systems:       Review of Systems   Unable to perform ROS: Mental status change       Physical Examination:       BP (!) 130/55   Pulse 64   Temp 97.2 °F (36.2 °C) (Oral)   Resp 18   Wt 145 lb (65.8 kg)   SpO2 94%   BMI 23.41 kg/m²    Physical Exam  Constitutional:       General: He is not in acute distress. Appearance: Normal appearance. HENT:      Head: Normocephalic and atraumatic. Nose: No rhinorrhea. Eyes:      General: No scleral icterus. Right eye: No discharge. Left eye: No discharge. Extraocular Movements: Extraocular movements intact. Conjunctiva/sclera: Conjunctivae normal.   Cardiovascular:      Rate and Rhythm: Normal rate and regular rhythm. Pulmonary:      Effort: Pulmonary effort is normal.      Breath sounds: Decreased breath sounds present. Abdominal:      General: Bowel sounds are normal. There is no distension. Palpations: Abdomen is soft. Tenderness: There is no abdominal tenderness. Musculoskeletal:      Cervical back: Normal range of motion. Right lower leg: No edema. Left lower leg: No edema. Skin:     General: Skin is warm and dry. Coloration: Skin is pale. Neurological:      Mental Status: He is alert. He is disoriented.    Psychiatric:         Cognition and Memory: Cognition is impaired.          Allergies:      No Known Allergies    Medications:      Current Facility-Administered Medications   Medication Dose Route Frequency Provider Last Rate Last Admin    influenza quadrivalent split vaccine (FLUZONE;FLUARIX;FLULAVAL;AFLURIA) injection 0.5 mL  0.5 mL IntraMUSCular Prior to discharge Julieta Tyson MD        levETIRAcetam (KEPPRA) tablet 500 mg  500 mg Oral BID Carla Vargas MD   500 mg at 11/01/21 1015    ipratropium-albuterol (DUONEB) nebulizer solution 1 ampule  1 ampule Inhalation TID Anthony Liz MD   1 ampule at 10/31/21 1939    albuterol (PROVENTIL) nebulizer solution 2.5 mg  2.5 mg Nebulization Q2H PRN Anhtony Liz MD        glucose (GLUTOSE) 40 % oral gel 15 g  15 g Oral PRN Julieta Tyson MD        dextrose 50 % IV solution  12.5 g IntraVENous PRN Julieta Tyson MD        glucagon (rDNA) injection 1 mg  1 mg IntraMUSCular PRN Julieta Tyson MD        dextrose 5 % solution  100 mL/hr IntraVENous PRN Julieta Tyson MD        sodium chloride flush 0.9 % injection 5-40 mL  5-40 mL IntraVENous 2 times per day Julieta Tyson MD   10 mL at 10/31/21 2244    sodium chloride flush 0.9 % injection 5-40 mL  5-40 mL IntraVENous PRN Julieta Tyson MD        0.9 % sodium chloride infusion  25 mL IntraVENous PRN Julieta Tyson MD        ondansetron (ZOFRAN-ODT) disintegrating tablet 4 mg  4 mg Oral Q8H PRN Julieta Tyson MD        Or    ondansetron (ZOFRAN) injection 4 mg  4 mg IntraVENous Q6H PRN Julieta Tyson MD        acetaminophen (TYLENOL) tablet 650 mg  650 mg Oral Q6H PRN Julieta Tyson MD        Or    acetaminophen (TYLENOL) suppository 650 mg  650 mg Rectal Q6H PRN Julieta Tyson MD        polyethylene glycol (GLYCOLAX) packet 17 g  17 g Oral Daily PRN Julieta Tyson MD        aspirin chewable tablet 81 mg  81 mg Oral Daily Julieta Tyson MD   81 mg at 11/01/21 1015    atorvastatin (LIPITOR) tablet 40 mg  40 mg Oral Nightly Julieta Tyson MD 40 mg at 10/31/21 2244    insulin lispro (HUMALOG) injection vial 0-6 Units  0-6 Units SubCUTAneous TID  Wm Bray MD   1 Units at 10/31/21 1733    insulin lispro (HUMALOG) injection vial 0-3 Units  0-3 Units SubCUTAneous Nightly Wm Bray MD   1 Units at 10/31/21 2245    apixaban (ELIQUIS) tablet 5 mg  5 mg Oral BID Wm Bray MD   5 mg at 11/01/21 1015    ursodiol (ACTIGALL) capsule 300 mg  300 mg Oral BID [place] Shoe Sedar, DO   300 mg at 11/01/21 1015    carvedilol (COREG) tablet 6.25 mg  6.25 mg Oral BID  Melvin WATT Sedar, DO   6.25 mg at 11/01/21 1015    nitroGLYCERIN (NITROSTAT) SL tablet 0.4 mg  0.4 mg SubLINGual Q5 Min PRN Portlanddaryl Husain Sedar, DO        vitamin D (CHOLECALCIFEROL) tablet 2,000 Units  2,000 Units Oral Dinner Elan Husain Sedar, DO   2,000 Units at 58/12/50 3201    folic acid (FOLVITE) tablet 1 mg  1 mg Oral Daily Melvin WATT Sedar, DO   1 mg at 11/01/21 1015    levothyroxine (SYNTHROID) tablet 25 mcg  25 mcg Oral Daily Melvin WATT Sedar, DO   25 mcg at 11/01/21 0528    ferrous sulfate (IRON 325) tablet 325 mg  325 mg Oral Every Other Day Flora Anette WATT Sedar, DO   325 mg at 11/01/21 1015    pantoprazole (PROTONIX) tablet 40 mg  40 mg Oral QAM  Melvin WATT Sedar, DO   40 mg at 11/01/21 9698       History: PMHx:  Past Medical History:   Diagnosis Date    Anemia     CAD (coronary artery disease) 4/16/2015    DM (diabetes mellitus) ([facility]     Dyslipidemia     History of tobacco abuse     HTN (hypertension)     Hypothyroidism     [facility]     Pacemaker 4/16/2015       PSHx:  Past Surgical History:   Procedure Laterality Date    CATARACT REMOVAL      MIDDLE EAR SURGERY Left 1998    \"they had to reset the bones\" after an infection       Social Hx:  Social History     Socioeconomic History    Marital status:       Spouse name: None    Number of children: None    Years of education: None    Highest education level: None   Occupational History    None   Tobacco Use    Smoking status: Former Smoker    Smokeless tobacco: Never Used   Vaping Use    Vaping Use: Never used   Substance and Sexual Activity    Alcohol use: No    Drug use: No    Sexual activity: None   Other Topics Concern    None   Social History Narrative    Lives With: granddaughter Shira  and family-versus significant other and their 3 children are in the household Shira works to take care of her her uncle who is Mr. Magdalena Flores son who is a paraplegic who lives next door    SO named Shama    Type of Home: House  Two level, Bed/Bath upstairs-[address] [facility] Access: Stairs to enter with rails- Number of Steps: 14    Bathroom Shower/Tub: Tub/Shower unit    Home Equipment: Rolling walker, Cane    ADL Assistance: Independent    Homemaking Assistance: Independent(granddaughter completes)    Ambulation Assistance: Independent    Transfer Assistance: Independent    Active : Yes    Additional Comments: pt is significantly [place]; information gathered via chart review and via writing. Social Determinants of Health     Financial Resource Strain:     Difficulty of Paying Living Expenses:    Food Insecurity:     Worried About Running Out of Food in the Last Year:     [address] in the Last Year:    Transportation Needs:     Lack of Transportation (Medical):  Lack of Transportation (Non-Medical):    Physical Activity:     Days of Exercise per Week:     Minutes of Exercise per Session:    Stress:     Feeling of Stress :    Social Connections:     Frequency of Communication with Friends and Family:     Frequency of Social Gatherings with Friends and Family:     Attends Hinduism Services:     Active Member of Clubs or Organizations:     Attends Club or Organization Meetings:     Marital Status:    Intimate Partner Violence:     Fear of Current or Ex-Partner:     Emotionally Abused:     Physically Abused:     Sexually Abused:        Family Hx:  History reviewed.  No pertinent family history.     LABS: Reviewed     CBC:  Lab Results   Component Value Date    WBC 6.4 11/01/2021    RBC 3.87 11/01/2021    HGB 12.2 11/01/2021    HCT 36.2 11/01/2021    MCV 93.5 11/01/2021    MCH 31.4 11/01/2021    MCHC 33.6 11/01/2021    RDW 13.3 11/01/2021     11/01/2021    MPV 9.0 07/29/2014     CBC with Differential:   Lab Results   Component Value Date    WBC 6.4 11/01/2021    RBC 3.87 11/01/2021    HGB 12.2 11/01/2021    HCT 36.2 11/01/2021     11/01/2021    MCV 93.5 11/01/2021    MCH 31.4 11/01/2021    MCHC 33.6 11/01/2021    RDW 13.3 11/01/2021    BANDSPCT 14 05/18/2016    LYMPHOPCT 32.6 10/27/2021    MONOPCT 8.8 10/27/2021    BASOPCT 0.6 10/27/2021    MONOSABS 0.6 10/27/2021    LYMPHSABS 2.1 10/27/2021    EOSABS 0.2 10/27/2021    BASOSABS 0.0 10/27/2021     CMP:    Lab Results   Component Value Date     11/01/2021    K 4.1 11/01/2021    K 4.4 09/18/2021     11/01/2021    CO2 23 11/01/2021    BUN 29 11/01/2021    CREATININE 1.33 11/01/2021    GFRAA >60.0 11/01/2021    LABGLOM 50.2 11/01/2021    GLUCOSE 117 11/01/2021    PROT 7.7 10/27/2021    LABALBU 3.7 11/01/2021    CALCIUM 9.0 11/01/2021    BILITOT 1.4 10/27/2021    ALKPHOS 72 10/27/2021    AST 20 10/27/2021    ALT 11 10/27/2021     BMP:    Lab Results   Component Value Date     11/01/2021    K 4.1 11/01/2021    K 4.4 09/18/2021     11/01/2021    CO2 23 11/01/2021    BUN 29 11/01/2021    LABALBU 3.7 11/01/2021    CREATININE 1.33 11/01/2021    CALCIUM 9.0 11/01/2021    GFRAA >60.0 11/01/2021    LABGLOM 50.2 11/01/2021    GLUCOSE 117 11/01/2021     TSH:   Lab Results   Component Value Date    TSH 2.810 10/27/2021     Vitamin B12 and Folate: No components found for: FOLIC,  N77  Urinalysis:   Lab Results   Component Value Date    NITRU Negative 10/27/2021    WBCUA 20-50 10/27/2021    BACTERIA Negative 10/27/2021    RBCUA 0-2 10/27/2021    BLOODU Negative 10/27/2021    SPECGRAV 1.021 10/27/2021    GLUCOSEU Negative 10/27/2021           FUNCTIONAL ADL´S:     Independent: [  ] Eating, [   ] Dressing, [   ] Transferring, [   ] Ernesto Jose Angel, [   ] Austin Come, [   ] Continence  Dependent   : [  ] Eating, [   ] Dressing, [   ] Transferring, [   ] Ernesto Jose Angel, [   ] Austin Come, [   ] Continence  W. assistant : [ X ] Eating, [ X ] Dressing, [ X ] Transferring, [ X ] Toileting, [ X ] Bathing, [ X ] Continence    Radiology: Reviewed      CTA CHEST W WO CONTRAST    Result Date: 10/27/2021  EXAM: CTA CHEST W WO CONTRAST History: Hypoxia. Altered mental status. Pulmonary embolism. Pneumonia. Technique: Multiple contiguous axial images of the chest were obtained from the thoracic inlet through the upper abdomen with contrast. Multiplanar reformats including maximum intensity projection images (MIPS) were obtained. Comparison: Portable chest radiograph October 27, 2021. CT chest July 5, 2019 Findings: Visualized portion of the thyroid gland is within normal limits. No axillary, mediastinal, or hilar lymphadenopathy. No thoracic aortic aneurysm. Limited evaluation for aortic dissection given the suboptimal contrast opacification of the aorta. Given this, no dissection identified. Atherosclerotic calcification of the thoracic aorta. No filling defect within the pulmonary arteries. Pacemaker is present. Postsurgical changes of CABG. Heart size is within normal limits. No significant pericardial effusion. Esophagus is within normal limits. No pulmonary nodule or mass. No consolidation, pleural effusion, or pneumothorax. Small calcific pleural plaques posteriorly on the left. No acute osseous abnormality. Visualized upper abdomen demonstrates no acute abnormality. No pulmonary embolism or acute intrathoracic process. Calcified pleural plaques on the left can be seen with asbestos related disease.  All CT scans at this facility use dose modulation, iterative reconstruction, and/or weight based dosing when appropriate to reduce radiation dose to as low as reasonably achievable. XR CHEST PORTABLE    Result Date: 10/27/2021  EXAMINATION: XR CHEST PORTABLE CLINICAL HISTORY: ALTERED MENTAL STATUS COMPARISONS: 2021 FINDINGS: Median sternotomy. Pacemaker generator and wires unchanged in position. Cardiopericardial silhouette normal. Aorta calcified. Pulmonary vasculature normal. Lungs clear. NO ACUTE CARDIOPULMONARY DISEASE. Assessment and plan:    1. Palliative care encounter  Explained the role of palliative care in treating patient. Discussed symptom management related to chronic disease/condition. Provided emotional support and active listening. (Discussed with daughter, Lianet Whitley, and HPOA, Brooke Yolanda)    -Advance Care Planning  Discussed goals of care with patient's HPOA. Explained in extensive detail nuances between full code, DNR CCA and DNR CC. Patient has made the decision to be [facility]. HPOA in place: Granddaughter, Brooke Guerrero. Discussed and confirmed code status with her. -Goals of Care Discussion:  Disease process and goals of treatment were discussed in basic terms. Chey's goal is to optimize available comfort care measures, return home with his granddaughter, and reduce hospitalization. We discussed the palliative care philosophy in light of those goals. We discussed all care options contingent on treatment response and QOL. Much active listening, presence, and emotional support were given. 21    Being discharged to home today, [id number] [address] [facility] will follow    Patient is being treated for multiple medical conditions this admission includin. Acute hypoxic respiratory failure  2. AMS  3. Generalized weakness  4. Elevated troponin  5. CKD III  6. DM II  7. Hx of RLE DVT  8. Possible ROSEMARY  9. History of smoking  10. HTN  11. Hx. Of CABG    Total Time: 55 minutes with >50% spent counseling/care coordination.     Electronically signed by CHRISTINE Prescott CNP on 2021 at 12:45 PM

## 2021-11-01 NOTE — PROGRESS NOTES
Subjective:   Chief Complaint:Altered Mental Status (difficulty responding at home)    Patient ID: Nola Peters is a 80 y.o. male followed for new encephalopathy and seizure in the setting of previous occipital stroke. No new focal deficits. Patient currently alert. Uncooperative with exam.  Mostly nonverbal.  No recurrent seizure activity. Nonfocal.  Plans for discharge home today. Past Medical History:   Diagnosis Date    Anemia     CAD (coronary artery disease) 4/16/2015    DM [facility]diabetes mellitus) [facility]     Dyslipidemia     History of tobacco abuse     HTN (hypertension)     Hypothyroidism     Non-ST elevation MI (NSTEMI) ([facility]     Pacemaker 4/16/2015     Past Surgical History:   Procedure Laterality Date    CATARACT REMOVAL      MIDDLE EAR SURGERY Left 1998    \"they had to reset the bones\" after an infection     Patient reports that he has quit smoking. He has never used smokeless tobacco. He reports that he does not drink alcohol and does not use drugs. History reviewed. No pertinent family history.   No Known Allergies  Current Facility-Administered Medications   Medication Dose Route Frequency Provider Last Rate Last Admin    influenza quadrivalent split vaccine (FLUZONE;FLUARIX;FLULAVAL;AFLURIA) injection 0.5 mL  0.5 mL IntraMUSCular Prior to discharge Clayton Carballo MD        levETIRAcetam (KEPPRA) tablet 500 mg  500 mg Oral BID Gilbertva Leon Vargas MD   500 mg at 11/01/21 1015    ipratropium-albuterol (DUONEB) nebulizer solution 1 ampule  1 ampule Inhalation TID Wilian Lopez MD   1 ampule at 11/01/21 1324    albuterol (PROVENTIL) nebulizer solution 2.5 mg  2.5 mg Nebulization Q2H PRN Wilian Lopez MD        glucose (GLUTOSE) 40 % oral gel 15 g  15 g Oral PRN Clayton Carballo MD        dextrose 50 % IV solution  12.5 g IntraVENous PRN Clayton Carballo MD        glucagon (rDNA) injection 1 mg  1 mg IntraMUSCular PRN Clayton Carballo MD        dextrose 5 % solution  100 mL/hr IntraVENous PRN Donato Abebe MD        sodium chloride flush 0.9 % injection 5-40 mL  5-40 mL IntraVENous 2 times per day Donato Abebe MD   10 mL at 10/31/21 2244    sodium chloride flush 0.9 % injection 5-40 mL  5-40 mL IntraVENous PRN Donato Abebe MD        0.9 % sodium chloride infusion  25 mL IntraVENous PRN Donato Abebe MD        ondansetron (ZOFRAN-ODT) disintegrating tablet 4 mg  4 mg Oral Q8H PRN Donato Abebe MD        Or    ondansetron (ZOFRAN) injection 4 mg  4 mg IntraVENous Q6H PRN Donato Abebe MD        acetaminophen (TYLENOL) tablet 650 mg  650 mg Oral Q6H PRN Donato Abebe MD        Or    acetaminophen (TYLENOL) suppository 650 mg  650 mg Rectal Q6H PRN Donato Abebe MD        polyethylene glycol (GLYCOLAX) packet 17 g  17 g Oral Daily PRN Donato Abebe MD        aspirin chewable tablet 81 mg  81 mg Oral Daily Donato Abebe MD   81 mg at 11/01/21 1015    atorvastatin (LIPITOR) tablet 40 mg  40 mg Oral Nightly Donato Abebe MD   40 mg at 10/31/21 2244    insulin lispro (HUMALOG) injection vial 0-6 Units  0-6 Units SubCUTAneous TID  Donato Abebe MD   1 Units at 10/31/21 1733    insulin lispro (HUMALOG) injection vial 0-3 Units  0-3 Units SubCUTAneous Nightly Donato Abebe MD   1 Units at 10/31/21 2245    apixaban (ELIQUIS) tablet 5 mg  5 mg Oral BID Donato Abebe MD   5 mg at 11/01/21 1015    ursodiol (ACTIGALL) capsule 300 mg  300 mg Oral BID Richrd Medico Sedar, DO   300 mg at 11/01/21 1015    carvedilol (COREG) tablet 6.25 mg  6.25 mg Oral BID  Melvin D Sedar, DO   6.25 mg at 11/01/21 1015    nitroGLYCERIN (NITROSTAT) SL tablet 0.4 mg  0.4 mg SubLINGual Q5 Min PRN Richrd Medico Sedar, DO        vitamin D (CHOLECALCIFEROL) tablet 2,000 Units  2,000 Units Oral Dinner Richrd Medico Sedar, DO   2,000 Units at 50/65/36 7277    folic acid (FOLVITE) tablet 1 mg  1 mg Oral Daily Jeremy Gupta DO   1 mg at 11/01/21 1015    levothyroxine (SYNTHROID) tablet 25 mcg  25 mcg Oral Daily Richrd Medico Sedar, DO   25 mcg at 11/01/21 0528    ferrous sulfate (IRON 325) tablet 325 mg  325 mg Oral Every Other Day Castillo Fishman Sedar, DO   325 mg at 11/01/21 1015    pantoprazole (PROTONIX) tablet 40 mg  40 mg Oral QAM TABATHA WATT Sedar, DO   40 mg at 11/01/21 7077      Problem List     Lethargy - Primary           Objective:   BP (!) 130/55   Pulse 61   Temp 97.2 °F (36.2 °C) (Oral)   Resp 16   Wt 145 lb (65.8 kg)   SpO2 95%   BMI 23.41 kg/m²   Review of Systems   Unable to perform ROS: Other   Patient severely hard of hearing      Physical Exam  Vitals and nursing note reviewed. Constitutional:       Appearance: Normal appearance. HENT:      Head: Normocephalic and atraumatic. Eyes:      Conjunctiva/sclera: Conjunctivae normal.   Cardiovascular:      Rate and Rhythm: Normal rate and regular rhythm. Pulses: Normal pulses. Heart sounds: Normal heart sounds. Pulmonary:      Effort: Pulmonary effort is normal.      Breath sounds: Normal breath sounds. Musculoskeletal:         General: Normal range of motion. Skin:     General: Skin is warm and dry. Neurological:      Mental Status: He is alert. Mental status is at baseline. Cranial Nerves: No cranial nerve deficit. Sensory: No sensory deficit. Motor: Weakness present. Coordination: Coordination normal.      Deep Tendon Reflexes: Reflexes normal.   Psychiatric:         Mood and Affect: Mood normal.         Behavior: Behavior normal.     Patient did not want to participate in physical exam.  He was able to eventually open his eyes to voice. No facial droop. Able to move all 4 limbs spontaneously but not complying with commands. CT HEAD WO CONTRAST    Result Date: 10/29/2021  Interval evolution of the right occipital infarct compared to prior study. No CT evidence for new acute infarct elsewhere or hemorrhagic transformation. Other chronic changes similar to prior.      CTA CHEST W WO CONTRAST    Result Date: 10/27/2021  No pulmonary embolism or acute intrathoracic process. Calcified pleural plaques on the left can be seen with asbestos related disease. All CT scans at this facility use dose modulation, iterative reconstruction, and/or weight based dosing when appropriate to reduce radiation dose to as low as reasonably achievable. XR CHEST PORTABLE    Result Date: 10/27/2021  NO ACUTE CARDIOPULMONARY DISEASE. Lab Results   Component Value Date    WBC 6.4 11/01/2021    RBC 3.87 11/01/2021    HGB 12.2 11/01/2021    HCT 36.2 11/01/2021    MCV 93.5 11/01/2021    MCH 31.4 11/01/2021    MCHC 33.6 11/01/2021    RDW 13.3 11/01/2021     11/01/2021    MPV 9.0 07/29/2014     Lab Results   Component Value Date     11/01/2021    K 4.1 11/01/2021    K 4.4 09/18/2021     11/01/2021    CO2 23 11/01/2021    BUN 29 11/01/2021    CREATININE 1.33 11/01/2021    GFRAA >60.0 11/01/2021    LABGLOM 50.2 11/01/2021    GLUCOSE 117 11/01/2021    PROT 7.7 10/27/2021    LABALBU 3.7 11/01/2021    CALCIUM 9.0 11/01/2021    BILITOT 1.4 10/27/2021    ALKPHOS 72 10/27/2021    AST 20 10/27/2021    ALT 11 10/27/2021     Lab Results   Component Value Date    PROTIME 17.1 10/27/2021    INR 1.4 10/27/2021     Lab Results   Component Value Date    TSH 2.810 10/27/2021    [id number] 520 09/15/2021    FOLATE >20.0 09/15/2021    IRON 54 09/28/2021    TIBC 219 09/28/2021     Lab Results   Component Value Date    TRIG 206 09/16/2021    HDL 41 09/16/2021    LDLCALC 148 09/16/2021     No components found for: A1C  No results found for: LABAMPH, BARBSCNU, LABBENZ, CANNAB, COCAINESCRN, LABMETH, OPIATESCREENURINE, PHENCYCLIDINESCREENURINE, PPXUR, ETOH  No results found for: LITHIUM, DILFRTOT, VALPROATE    Assessment & Plan          Chart, labs,and images reviewed. EEG shows left temporal spikes. Unfortunately we cannot get an MRI as patient's pacemaker is not compatible. CT of the head showed right occipital infarct which is old.   Continue apixaban for stroke prophylaxis in the setting of atrial fibrillation. Patient appears to be tolerating the [address] well. No seizure activity noted. Called granddaughter and discussed the initiation of Keppra and discussed findings at length. Daughter reports that home health care and palliative care are now on board. Patient is stable from a neurological perspective and okay for discharge. To follow-up with neurology in 6 to 8 weeks. Patient appears to be doing well on Keppra without any sedation or mental status changes though he has underlying dementia and is very difficult to certain about his clinical status given his significantly hard of hearing as well. Neurologically remains intact though in a window less room patient is likely to become more disoriented as he does not even know if he will stay overnight    No recurrent seizure activity and tolerating Keppra without behavioral changes. Plans for DC home with home health care today. Okay from neurology standpoint. Follow-up 4 to 6 weeks.

## 2021-11-01 NOTE — PROGRESS NOTES
Assessment complete. See flow sheet. Patient resting comfortably in bed. HS medication given, tolerated well. Declined snack.

## 2021-11-01 NOTE — PROGRESS NOTES
Physical Therapy Med Surg Daily Treatment Note  Facility/Department: [facility]  Room: [place] [id number]-       NAME: Yue Saenz  : 3/5/1929 (80 y.o.)  MRN: [id number]  CODE STATUS: DNR-CCA    Date of Service: 2021    Patient Diagnosis(es): Acute respiratory failure ([facility] [J96.00]  Lethargy [R53.83]  Hypoxia [R09.02]  Elevated troponin [R77.8]  AMS (altered mental status) [R41.82]   Chief Complaint   Patient presents with    Altered Mental Status     difficulty responding at home     Patient Active Problem List    Diagnosis Date Noted    Anginal equivalent ([facility]     Late effects of cerebrovascular accident     Ataxia     Encephalopathy     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) ([facility]     Lethargy     AMS (altered mental status) 10/27/2021    Acute respiratory failure ([address] 10/27/2021    Abnormality of gait and mobility 2021    Neurogenic bladder 2021    Bilateral hearing loss 2021    Left hemiparesis ([facility] 2021    Impaired gait and mobility dt R occipital CVA 2021    Aphasia     Acute CVA (cerebrovascular accident) ([address] 2021    General weakness 09/15/2021    Hyperkalemia 09/15/2021    Anticoagulation adequate with anticoagulant therapy 09/15/2021    Arterial occlusion 2021    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    CKD (chronic kidney disease) 2021    PVD (peripheral vascular disease) ([facility] 2021    Venous thrombosis of leg 2021    Hyponatremia 2021    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    Syncope and collapse 2020    COPD exacerbation ([address] 2020    NSTEMI (non-ST elevated myocardial infarction) ([address] 2019    Chest pain 2019    Hypotension 2019    SOB (shortness of breath) 2018    Chronic right shoulder pain 2018    COPD with acute exacerbation ([facility] 2018    Bronchitis 2016    CAD (coronary artery technique and [employer] throughout gait training. Pt tends to run into objects on both R and L side unless vc/tc's provided for object avoidance. Frequntly lets go of [employer] with one UE. Discharge Recommendations:  Continue to assess pending progress    Goals  Short term goals  Short term goal 1: Patient will perform bed mobility with supervision. Short term goal 2: Patient will perform sit<>stand & stand pivot transfers with rolling walker with supervision. Short term goal 3: Patient will ambulate 48' with rolling walker with supervision. Short term goal 4: Patient will ascend/descend 4-5 stairs with rail(s) with supervision. Patient Goals   Patient goals : None formally stated    PLAN    Times per week: 3-6  Plan Comment: Cont. POC  Safety Devices  Type of devices: All fall risk precautions in place, Bed alarm in place, Call light within reach, Left in bed     [facility] (6 CLICK) [facility] [address] [facility] Inpatient Mobility Raw Score : 18     Therapy Time   Individual   Time In 0900   Time Out 0914   Minutes 14      BM/Trsf: 6  Gait: 8       Suzanne Pain, PTA, 11/01/21 at 9:46 AM         Definitions for assistance levels  Independent = pt does not require any physical supervision or assistance from another person for activity completion. Device may be needed.   Stand by assistance = pt requires verbal cues or instructions from another person, close to but not touching, to perform the activity  Minimal assistance= pt performs 75% or more of the activity; assistance is required to complete the activity  Moderate assistance= pt performs 50% of the activity; assistance is required to complete the activity  Maximal assistance = pt performs 25% of the activity; assistance is required to complete the activity  Dependent = pt requires total physical assistance to accomplish the task

## 2021-11-01 NOTE — CARE COORDINATION
BRITTNEY AT [facility] NOTIFIED OF DISCHARGE TODAY. MARITZA HOLBROOK CARE TO FOLLOW AT HOME ALSO. RN TO REVIEW IMM WITH  [facility] WITH DISCHARGE PAPERS AS PT HAS DECLINED. PT DECLINED HOME O2 DR LARRY NAHS AWARE.

## 2021-11-01 NOTE — PROGRESS NOTES
INPATIENT PROGRESS NOTES    PATIENT NAME: Viviana Haley  MRN: [id number]  SERVICE DATE:  November 1, 2021   SERVICE TIME:  2:15 PM      PRIMARY SERVICE: Pulmonary Disease    CHIEF COMPLAIN: Hypoxia      INTERVAL HPI: Patient seen and examined at bedside, Interval Notes, orders reviewed. Nursing notes noted  Patient is currently on room air O2 saturation 95%. She is going home today. He refused to have a home O2 evaluation. He is not having any shortness of breath. No fever. No cough. No nausea vomiting diarrhea. OBJECTIVE    Body mass index is 23.41 kg/m². PHYSICAL EXAM:  Vitals:  BP (!) 130/55   Pulse 61   Temp 97.2 °F (36.2 °C) (Oral)   Resp 16   Wt 145 lb (65.8 kg)   SpO2 95%   BMI 23.41 kg/m²   General: Sleepy but arousable. .comfortable in bed, No distress. Head: Atraumatic , Normocephalic   Eyes: PERRL. No sclera icterus. No conjunctival injection. No discharge   ENT: No nasal  discharge. Pharynx clear. Neck:  Trachea midline. No thyromegaly, no JVD, No cervical adenopathy. Chest : Bilaterally symmetrical ,Normal effort,  No accessory muscle use  Lung : . Fair BS bilateral, decreased BS at bases. No Rales. No wheezing. No rhonchi. Heart[de-identified] Normal  rate. Regular rhythm. No mumur ,  Rub or gallop  ABD: Non-tender. Non-distended. No masses. No organmegaly. Normal bowel sounds. No hernia. Ext : No Pitting both leg , No Cyanosis No clubbing  Neuro: no focal weakness          DATA:   Recent Labs     10/31/21  0755 11/01/21  0712   WBC 7.0 6.4   HGB 12.2* 12.2*   HCT 36.5* 36.2*   MCV 94.0 93.5    208     Recent Labs     10/31/21  0755 10/31/21  0755 11/01/21  0712     --  139   K 4.0  --  4.1     --  103   CO2 21  --  23   BUN 33*  --  29*   CREATININE 1.54*  --  1.33*   GLUCOSE 131*  --  117*   CALCIUM 8.8  --  9.0   LABALBU 3.8  --  3.7   LABGLOM 42.4*   < > 50.2*   GFRAA 51.3*   < > >60.0    < > = values in this interval not displayed.        MV Settings:          No results for input(s): PHART, BPZ5TXP, PO2ART, EEV4PUL, BEART, T0IIJPHX in the last 72 hours. O2 Device: None (Room air)  O2 Flow Rate (L/min): 0 L/min    ADULT DIET; Regular; 4 carb choices (60 gm/meal); No Caffeine  ADULT ORAL NUTRITION SUPPLEMENT; Breakfast, Dinner; Diabetic Oral Supplement     MEDICATIONS during current hospitalization:    Continuous Infusions:   dextrose      sodium chloride         Scheduled Meds:   influenza virus vaccine  0.5 mL IntraMUSCular Prior to discharge    levETIRAcetam  500 mg Oral BID    ipratropium-albuterol  1 ampule Inhalation TID    sodium chloride flush  5-40 mL IntraVENous 2 times per day    aspirin  81 mg Oral Daily    atorvastatin  40 mg Oral Nightly    insulin lispro  0-6 Units SubCUTAneous TID WC    insulin lispro  0-3 Units SubCUTAneous Nightly    apixaban  5 mg Oral BID    ursodiol  300 mg Oral BID    carvedilol  6.25 mg Oral BID WC    Vitamin D  2,000 Units Oral Dinner    folic acid  1 mg Oral Daily    levothyroxine  25 mcg Oral Daily    ferrous sulfate  325 mg Oral Every Other Day    pantoprazole  40 mg Oral QAM AC       PRN Meds:albuterol, glucose, dextrose, glucagon (rDNA), dextrose, sodium chloride flush, sodium chloride, ondansetron **OR** ondansetron, acetaminophen **OR** acetaminophen, polyethylene glycol, nitroGLYCERIN    Radiology  CT HEAD WO CONTRAST    Result Date: 10/29/2021  EXAMINATION:  CT HEAD WO CONTRAST HISTORY:  History of right occipital stroke. TECHNIQUE:  Serial axial images without IV contrast were obtained from the vertex to the foramen magnum. Sagittal and coronal reconstructions. All CT scans at this facility use dose modulation, iterative reconstruction, and/or weight based dosing when appropriate to reduce radiation dose to as low as reasonably achievable. COMPARISON:  CT 9/16/2021. RESULT: Acute change/chronic change:   Interval evolution of the right occipital lobe infarct.  No associated hemorrhagic transformation or evidence for acute infarct elsewhere. Chronic lacunar infarcts bilateral basal ganglia and bilateral cerebellum, similar to  prior. Extensive chronic microvascular ischemic changes, similar to prior. Vascular calcifications. Hemorrhage:    No evidence of acute intracranial hemorrhage. Mass Lesion / Mass Effect:   There is no evidence of an intracranial mass or extraaxial fluid collection. No significant mass effect. Parenchyma: There is mild generalized volume loss. The brain parenchyma is otherwise within normal limits for age. Ventricles:   Ventricular enlargement concordant with the degree of parenchymal volume loss. Paranasal sinuses and skull base:  Polypoid thickening right maxillary sinus. Other areas of mild mucosal thickening. Left mastoidectomy, grossly unchanged. Small right mastoid effusion. No evidence for acute fracture. Bilateral lens replacement surgery. Soft tissues unremarkable. Interval evolution of the right occipital infarct compared to prior study. No CT evidence for new acute infarct elsewhere or hemorrhagic transformation. Other chronic changes similar to prior. CTA CHEST W WO CONTRAST    Result Date: 10/27/2021  EXAM: CTA CHEST W WO CONTRAST History: Hypoxia. Altered mental status. Pulmonary embolism. Pneumonia. Technique: Multiple contiguous axial images of the chest were obtained from the thoracic inlet through the upper abdomen with contrast. Multiplanar reformats including maximum intensity projection images (MIPS) were obtained. Comparison: Portable chest radiograph October 27, 2021. CT chest July 5, 2019 Findings: Visualized portion of the thyroid gland is within normal limits. No axillary, mediastinal, or hilar lymphadenopathy. No thoracic aortic aneurysm. Limited evaluation for aortic dissection given the suboptimal contrast opacification of the aorta. Given this, no dissection identified. Atherosclerotic calcification of the thoracic aorta.  No filling defect within the pulmonary arteries. Pacemaker is present. Postsurgical changes of CABG. Heart size is within normal limits. No significant pericardial effusion. Esophagus is within normal limits. No pulmonary nodule or mass. No consolidation, pleural effusion, or pneumothorax. Small calcific pleural plaques posteriorly on the left. No acute osseous abnormality. Visualized upper abdomen demonstrates no acute abnormality. No pulmonary embolism or acute intrathoracic process. Calcified pleural plaques on the left can be seen with asbestos related disease. All CT scans at this facility use dose modulation, iterative reconstruction, and/or weight based dosing when appropriate to reduce radiation dose to as low as reasonably achievable. XR CHEST PORTABLE    Result Date: 10/27/2021  EXAMINATION: XR CHEST PORTABLE CLINICAL HISTORY: ALTERED MENTAL STATUS COMPARISONS: SEPTEMBER 16, 2021 FINDINGS: Median sternotomy. Pacemaker generator and wires unchanged in position. Cardiopericardial silhouette normal. Aorta calcified. Pulmonary vasculature normal. Lungs clear. NO ACUTE CARDIOPULMONARY DISEASE. Echo Limited with Bubble Study    Result Date: 10/28/2021  Transthoracic Echocardiography Report (TTE)  Demographics   Patient Name    Evelia Burgos Gender               Male   Patient Number  [id number]       Race                                                   Ethnicity   Visit Number    [id number]      Room Number          W187   Corporate ID                   Date of Study        10/28/2021   Accession       [id number]     Referring Physician  Number   Date of Birth   03/05/1929     Sonographer          Rena    Age             80 year(s)     Interpreting         [facility])                                 Physician            Cardiology                                                      [address] [place] [address]  Procedure Type of Study   TTE procedure:ECHOCARDIOGRAM LIMITED WITH BUBBLE STUDY.   Procedure Date Date: 10/28/2021 Start: 12:31 PM Study Location: Portable Technical Quality: Adequate visualization Indications:LVF. Patient Status: Routine Height: 72 inches BP: 136/53 mmHg Allergies   - No known allergies. Conclusions   Summary  TDS. Bubble study not obtainable  Moderately dilated left atrium. Left ventricular ejection fraction is visually estimated at 40%. Normal right ventricle structure and function. ?Pacer artifact   Signature   ----------------------------------------------------------------  Electronically signed by Amber Aparicio(Interpreting physician)  on 10/28/2021 05:25 PM  ----------------------------------------------------------------   Findings  Left Ventricle Left ventricular ejection fraction is visually estimated at 40%. Right Ventricle Normal right ventricle structure and function. ?Pacer artifact Left Atrium Moderately dilated left atrium. Right Atrium Normal right atrium. Mitral Valve Normal mitral valve structure and function. Tricuspid Valve Normal tricuspid valve structure and function. Aortic Valve Normal aortic valve structure and function. Pulmonic Valve Normal pulmonic valve structure and function. Pericardial Effusion No evidence of pericardial effusion. Pleural Effusion No evidence of pleural effusion. Aorta \ Miscellaneous Miscellaneous normal findings were found. M-Mode Measurements (cm)   LVIDd: 4.37 cm                         LVIDs: 3.43 cm  IVSd: 0.97 cm                          IVSs: 1.09 cm  LVPWd: 0.86 cm                         LVPWs: 1.05 cm  Rt. Vent.  Dimension: 2.46 cm           AO Root Dimension: 2.48 cm                                         ACS: 1.46 cm                                         LA: 3.04 cm                                         LVOT: 1.98 cm  Doppler Measurements:                                   MV Peak E-Wave: 0.44 m/s  TR Velocity:1.52 m/s            MV Peak A-Wave: 0.88 m/s  TR Gradient:9.26 mmHg                                   Estimated RAP:3 mmHg

## 2021-11-02 NOTE — FLOWSHEET NOTE
Nurse spoke with the pts geetha Silva and reviewed the dc instructions with her on the phone. Pts geetha did want him to have the flu vaccine prior to dc. Nurse dressed the pt and made him aware he was going home. . Pt basically slept all day and got up twice to go to the bathroom with nurses assist.  Pt did not eat all day. Pt did take his meds. 1900  Pt left the unit per wc.

## 2021-11-02 NOTE — PROGRESS NOTES
Physician Progress Note      PATIENTConny Kwabena PORTER #:                  [id number]  :                       3/5/1929  ADMIT DATE:       10/27/2021 1:42 PM  [address] DATE:        2021 6:57 PM  RESPONDING  PROVIDER #:        Sheila Peterson MD          QUERY TEXT:    Pt admitted with acute respiratory failure and has CHF documented. If   possible, please document in progress notes and discharge summary further   specificity regarding the type and acuity of CHF:    The medical record reflects the following:  Risk Factors: CHF, CAD, DM, HTN, NSTEMI  Clinical Indicators: ROHINI result  Moderately dilated left atrium. Left   ventricular ejection fraction is visually estimated at 40%. Normal right ventricle structure and function. Treatment:  Eliquis, aspirin, coreg, lipitor  Options provided:  -- Acute on Chronic Systolic CHF/HFrEF  -- Acute on Chronic Diastolic CHF/HFpEF  -- Acute on Chronic Systolic and Diastolic CHF  -- Acute Systolic CHF/HFrEF  -- Acute Diastolic CHF/HFpEF  -- Acute Systolic and Diastolic CHF  -- Chronic Systolic CHF/HFrEF  -- Chronic Diastolic CHF/HFpEF  -- Chronic Systolic and Diastolic CHF  -- Other - I will add my own diagnosis  -- Disagree - Not applicable / Not valid  -- Disagree - Clinically unable to determine / Unknown  -- Refer to Clinical Documentation Reviewer    PROVIDER RESPONSE TEXT:    This patient is in acute on chronic systolic CHF/HFrEF.     Query created by: Razia Arias on 2021 1:46 PM      Electronically signed by:  Sheila Peterson MD 2021 11:59 AM

## 2021-12-22 ENCOUNTER — APPOINTMENT (OUTPATIENT)
Dept: CT IMAGING | Age: 86
DRG: 871 | End: 2021-12-22
Payer: MEDICARE

## 2021-12-22 ENCOUNTER — HOSPITAL ENCOUNTER (INPATIENT)
Age: 86
LOS: 6 days | Discharge: NURSING, REHAB OR LONG-TERM CARE | DRG: 871 | End: 2021-12-29
Attending: EMERGENCY MEDICINE
Payer: MEDICARE

## 2021-12-22 DIAGNOSIS — E86.0 DEHYDRATION: Primary | ICD-10-CM

## 2021-12-22 DIAGNOSIS — R53.1 GENERAL WEAKNESS: ICD-10-CM

## 2021-12-22 DIAGNOSIS — U07.1 COVID-19: ICD-10-CM

## 2021-12-22 PROCEDURE — 93005 ELECTROCARDIOGRAM TRACING: CPT | Performed by: EMERGENCY MEDICINE

## 2021-12-22 PROCEDURE — 96361 HYDRATE IV INFUSION ADD-ON: CPT

## 2021-12-22 PROCEDURE — 99284 EMERGENCY DEPT VISIT MOD MDM: CPT

## 2021-12-22 PROCEDURE — 96360 HYDRATION IV INFUSION INIT: CPT

## 2021-12-22 PROCEDURE — 2580000003 HC RX 258: Performed by: EMERGENCY MEDICINE

## 2021-12-22 RX ORDER — 0.9 % SODIUM CHLORIDE 0.9 %
1000 INTRAVENOUS SOLUTION INTRAVENOUS ONCE
Status: COMPLETED | OUTPATIENT
Start: 2021-12-22 | End: 2021-12-23

## 2021-12-22 RX ADMIN — SODIUM CHLORIDE 1000 ML: 9 INJECTION, SOLUTION INTRAVENOUS at 23:58

## 2021-12-23 ENCOUNTER — APPOINTMENT (OUTPATIENT)
Dept: CT IMAGING | Age: 86
DRG: 871 | End: 2021-12-23
Payer: MEDICARE

## 2021-12-23 ENCOUNTER — APPOINTMENT (OUTPATIENT)
Dept: GENERAL RADIOLOGY | Age: 86
DRG: 871 | End: 2021-12-23
Payer: MEDICARE

## 2021-12-23 PROBLEM — U07.1 COVID-19: Status: ACTIVE | Noted: 2021-12-23

## 2021-12-23 LAB
ALBUMIN SERPL-MCNC: 3.1 G/DL (ref 3.5–4.6)
ALP BLD-CCNC: 76 U/L (ref 35–104)
ALT SERPL-CCNC: 8 U/L (ref 0–41)
ANION GAP SERPL CALCULATED.3IONS-SCNC: 15 MEQ/L (ref 9–15)
AST SERPL-CCNC: 15 U/L (ref 0–40)
BASOPHILS ABSOLUTE: 0 K/UL (ref 0–0.2)
BASOPHILS RELATIVE PERCENT: 0.2 %
BILIRUB SERPL-MCNC: 0.6 MG/DL (ref 0.2–0.7)
BUN BLDV-MCNC: 65 MG/DL (ref 8–23)
C-REACTIVE PROTEIN: 134.1 MG/L (ref 0–5)
CALCIUM SERPL-MCNC: 8.1 MG/DL (ref 8.5–9.9)
CHLORIDE BLD-SCNC: 100 MEQ/L (ref 95–107)
CO2: 18 MEQ/L (ref 20–31)
CREAT SERPL-MCNC: 2.18 MG/DL (ref 0.7–1.2)
D DIMER: 1.52 MG/L FEU (ref 0–0.5)
EKG ATRIAL RATE: 66 BPM
EKG P AXIS: 91 DEGREES
EKG P-R INTERVAL: 138 MS
EKG Q-T INTERVAL: 418 MS
EKG QRS DURATION: 96 MS
EKG QTC CALCULATION (BAZETT): 438 MS
EKG R AXIS: -62 DEGREES
EKG T AXIS: 76 DEGREES
EKG VENTRICULAR RATE: 66 BPM
EOSINOPHILS ABSOLUTE: 0 K/UL (ref 0–0.7)
EOSINOPHILS RELATIVE PERCENT: 0.2 %
GFR AFRICAN AMERICAN: 34.3
GFR NON-AFRICAN AMERICAN: 28.4
GLOBULIN: 3.6 G/DL (ref 2.3–3.5)
GLUCOSE BLD-MCNC: 104 MG/DL (ref 60–115)
GLUCOSE BLD-MCNC: 115 MG/DL (ref 60–115)
GLUCOSE BLD-MCNC: 153 MG/DL (ref 60–115)
GLUCOSE BLD-MCNC: 179 MG/DL (ref 70–99)
HCT VFR BLD CALC: 36.2 % (ref 42–52)
HEMOGLOBIN: 11.9 G/DL (ref 14–18)
LACTATE DEHYDROGENASE: 155 U/L (ref 135–225)
LACTIC ACID: 2.2 MMOL/L (ref 0.5–2.2)
LYMPHOCYTES ABSOLUTE: 1.6 K/UL (ref 1–4.8)
LYMPHOCYTES RELATIVE PERCENT: 12.3 %
MCH RBC QN AUTO: 31.2 PG (ref 27–31.3)
MCHC RBC AUTO-ENTMCNC: 32.8 % (ref 33–37)
MCV RBC AUTO: 95 FL (ref 80–100)
MONOCYTES ABSOLUTE: 1.5 K/UL (ref 0.2–0.8)
MONOCYTES RELATIVE PERCENT: 11.9 %
NEUTROPHILS ABSOLUTE: 9.8 K/UL (ref 1.4–6.5)
NEUTROPHILS RELATIVE PERCENT: 75.4 %
PDW BLD-RTO: 14.1 % (ref 11.5–14.5)
PERFORMED ON: ABNORMAL
PERFORMED ON: NORMAL
PERFORMED ON: NORMAL
PLATELET # BLD: 229 K/UL (ref 130–400)
POTASSIUM SERPL-SCNC: 4.6 MEQ/L (ref 3.4–4.9)
PROCALCITONIN: 0.71 NG/ML (ref 0–0.15)
RBC # BLD: 3.81 M/UL (ref 4.7–6.1)
SARS-COV-2, NAAT: DETECTED
SODIUM BLD-SCNC: 133 MEQ/L (ref 135–144)
TOTAL PROTEIN: 6.7 G/DL (ref 6.3–8)
TROPONIN: 0.07 NG/ML (ref 0–0.01)
WBC # BLD: 13 K/UL (ref 4.8–10.8)

## 2021-12-23 PROCEDURE — 6360000002 HC RX W HCPCS: Performed by: INTERNAL MEDICINE

## 2021-12-23 PROCEDURE — 83615 LACTATE (LD) (LDH) ENZYME: CPT

## 2021-12-23 PROCEDURE — 93010 ELECTROCARDIOGRAM REPORT: CPT | Performed by: INTERNAL MEDICINE

## 2021-12-23 PROCEDURE — 85025 COMPLETE CBC W/AUTO DIFF WBC: CPT

## 2021-12-23 PROCEDURE — 81001 URINALYSIS AUTO W/SCOPE: CPT

## 2021-12-23 PROCEDURE — 36415 COLL VENOUS BLD VENIPUNCTURE: CPT

## 2021-12-23 PROCEDURE — 96361 HYDRATE IV INFUSION ADD-ON: CPT

## 2021-12-23 PROCEDURE — 99222 1ST HOSP IP/OBS MODERATE 55: CPT

## 2021-12-23 PROCEDURE — 82728 ASSAY OF FERRITIN: CPT

## 2021-12-23 PROCEDURE — 86140 C-REACTIVE PROTEIN: CPT

## 2021-12-23 PROCEDURE — 71045 X-RAY EXAM CHEST 1 VIEW: CPT

## 2021-12-23 PROCEDURE — 84145 PROCALCITONIN (PCT): CPT

## 2021-12-23 PROCEDURE — 70450 CT HEAD/BRAIN W/O DYE: CPT

## 2021-12-23 PROCEDURE — 84484 ASSAY OF TROPONIN QUANT: CPT

## 2021-12-23 PROCEDURE — 2580000003 HC RX 258: Performed by: INTERNAL MEDICINE

## 2021-12-23 PROCEDURE — 80053 COMPREHEN METABOLIC PANEL: CPT

## 2021-12-23 PROCEDURE — 2580000003 HC RX 258: Performed by: EMERGENCY MEDICINE

## 2021-12-23 PROCEDURE — 85379 FIBRIN DEGRADATION QUANT: CPT

## 2021-12-23 PROCEDURE — 6370000000 HC RX 637 (ALT 250 FOR IP): Performed by: INTERNAL MEDICINE

## 2021-12-23 PROCEDURE — 1210000000 HC MED SURG R&B

## 2021-12-23 PROCEDURE — 83605 ASSAY OF LACTIC ACID: CPT

## 2021-12-23 PROCEDURE — 87040 BLOOD CULTURE FOR BACTERIA: CPT

## 2021-12-23 PROCEDURE — 87635 SARS-COV-2 COVID-19 AMP PRB: CPT

## 2021-12-23 RX ORDER — SODIUM CHLORIDE 9 MG/ML
INJECTION, SOLUTION INTRAVENOUS CONTINUOUS
Status: DISCONTINUED | OUTPATIENT
Start: 2021-12-23 | End: 2021-12-25

## 2021-12-23 RX ORDER — DEXTROSE MONOHYDRATE 50 MG/ML
100 INJECTION, SOLUTION INTRAVENOUS PRN
Status: DISCONTINUED | OUTPATIENT
Start: 2021-12-23 | End: 2021-12-29 | Stop reason: HOSPADM

## 2021-12-23 RX ORDER — LEVETIRACETAM 500 MG/1
500 TABLET ORAL 2 TIMES DAILY
Status: DISCONTINUED | OUTPATIENT
Start: 2021-12-23 | End: 2021-12-29 | Stop reason: HOSPADM

## 2021-12-23 RX ORDER — ONDANSETRON 2 MG/ML
4 INJECTION INTRAMUSCULAR; INTRAVENOUS EVERY 6 HOURS PRN
Status: DISCONTINUED | OUTPATIENT
Start: 2021-12-23 | End: 2021-12-29 | Stop reason: HOSPADM

## 2021-12-23 RX ORDER — ATORVASTATIN CALCIUM 40 MG/1
40 TABLET, FILM COATED ORAL DAILY
Status: DISCONTINUED | OUTPATIENT
Start: 2021-12-23 | End: 2021-12-29 | Stop reason: HOSPADM

## 2021-12-23 RX ORDER — SODIUM CHLORIDE 0.9 % (FLUSH) 0.9 %
5-40 SYRINGE (ML) INJECTION PRN
Status: DISCONTINUED | OUTPATIENT
Start: 2021-12-23 | End: 2021-12-29 | Stop reason: HOSPADM

## 2021-12-23 RX ORDER — DEXAMETHASONE SODIUM PHOSPHATE 4 MG/ML
6 INJECTION, SOLUTION INTRA-ARTICULAR; INTRALESIONAL; INTRAMUSCULAR; INTRAVENOUS; SOFT TISSUE EVERY 24 HOURS
Status: DISCONTINUED | OUTPATIENT
Start: 2021-12-23 | End: 2021-12-29 | Stop reason: HOSPADM

## 2021-12-23 RX ORDER — NICOTINE POLACRILEX 4 MG
15 LOZENGE BUCCAL PRN
Status: DISCONTINUED | OUTPATIENT
Start: 2021-12-23 | End: 2021-12-29 | Stop reason: HOSPADM

## 2021-12-23 RX ORDER — ONDANSETRON 4 MG/1
4 TABLET, ORALLY DISINTEGRATING ORAL EVERY 8 HOURS PRN
Status: DISCONTINUED | OUTPATIENT
Start: 2021-12-23 | End: 2021-12-29 | Stop reason: HOSPADM

## 2021-12-23 RX ORDER — DEXTROSE MONOHYDRATE 25 G/50ML
12.5 INJECTION, SOLUTION INTRAVENOUS PRN
Status: DISCONTINUED | OUTPATIENT
Start: 2021-12-23 | End: 2021-12-29 | Stop reason: HOSPADM

## 2021-12-23 RX ORDER — SODIUM CHLORIDE 9 MG/ML
25 INJECTION, SOLUTION INTRAVENOUS PRN
Status: DISCONTINUED | OUTPATIENT
Start: 2021-12-23 | End: 2021-12-29 | Stop reason: HOSPADM

## 2021-12-23 RX ORDER — ASPIRIN 81 MG/1
81 TABLET ORAL DAILY
Status: DISCONTINUED | OUTPATIENT
Start: 2021-12-23 | End: 2021-12-29 | Stop reason: HOSPADM

## 2021-12-23 RX ORDER — ACETAMINOPHEN 325 MG/1
650 TABLET ORAL EVERY 6 HOURS PRN
Status: DISCONTINUED | OUTPATIENT
Start: 2021-12-23 | End: 2021-12-29 | Stop reason: HOSPADM

## 2021-12-23 RX ORDER — 0.9 % SODIUM CHLORIDE 0.9 %
1000 INTRAVENOUS SOLUTION INTRAVENOUS ONCE
Status: COMPLETED | OUTPATIENT
Start: 2021-12-23 | End: 2021-12-23

## 2021-12-23 RX ORDER — POLYETHYLENE GLYCOL 3350 17 G/17G
17 POWDER, FOR SOLUTION ORAL DAILY PRN
Status: DISCONTINUED | OUTPATIENT
Start: 2021-12-23 | End: 2021-12-29 | Stop reason: HOSPADM

## 2021-12-23 RX ORDER — SODIUM CHLORIDE 0.9 % (FLUSH) 0.9 %
5-40 SYRINGE (ML) INJECTION EVERY 12 HOURS SCHEDULED
Status: DISCONTINUED | OUTPATIENT
Start: 2021-12-23 | End: 2021-12-29 | Stop reason: HOSPADM

## 2021-12-23 RX ORDER — ACETAMINOPHEN 650 MG/1
650 SUPPOSITORY RECTAL EVERY 6 HOURS PRN
Status: DISCONTINUED | OUTPATIENT
Start: 2021-12-23 | End: 2021-12-29 | Stop reason: HOSPADM

## 2021-12-23 RX ADMIN — ENOXAPARIN SODIUM 30 MG: 100 INJECTION SUBCUTANEOUS at 23:11

## 2021-12-23 RX ADMIN — SODIUM CHLORIDE 1000 ML: 9 INJECTION, SOLUTION INTRAVENOUS at 02:31

## 2021-12-23 RX ADMIN — LEVETIRACETAM 500 MG: 100 INJECTION INTRAVENOUS at 12:26

## 2021-12-23 RX ADMIN — APIXABAN 5 MG: 5 TABLET, FILM COATED ORAL at 08:09

## 2021-12-23 RX ADMIN — DEXAMETHASONE SODIUM PHOSPHATE 6 MG: 4 INJECTION, SOLUTION INTRA-ARTICULAR; INTRALESIONAL; INTRAMUSCULAR; INTRAVENOUS; SOFT TISSUE at 10:57

## 2021-12-23 RX ADMIN — SODIUM CHLORIDE: 9 INJECTION, SOLUTION INTRAVENOUS at 05:59

## 2021-12-23 NOTE — ED NOTES
Assumed care of patient. Patient placed on monitor. Respirations even and unlabored. Lab is aware of the need for draw. IV does not give blood and ER has made multiple attempts at straight sticks. Ree Pittman [facility], [address] [place] [address]  12/23/21 9166

## 2021-12-23 NOTE — H&P
Hospitalist Group   History and Physical      CHIEF COMPLAINT: Fatigue    History of Present Illness:  80 y.o. male with a history of CAD, diabetes, hypothyroidism, hypertension, DVT on Eliquis, dementia presents with fatigue. Patient lives with his granddaughter and he was found on the toilet unable to get up. It was reported that patient probably passed out on the toilet. No reported fall. Patient is unable to provide much history. He opens his eyes and does not answer any questions. According to the nurse, patient was yelling at them when they were trying to get labs. Unknown exactly how long patient has been symptomatic and what kind of symptoms the patient has been having. REVIEW OF SYSTEMS:  Patient was not answering questions      PMH:  Past Medical History:   Diagnosis Date    Anemia     CAD (coronary artery disease) 4/16/2015    DM [facility]diabetes mellitus) [facility] [address] [facility]     Dyslipidemia     History of tobacco abuse     HTN (hypertension)     Hypothyroidism     Non-ST elevation MI (NSTEMI) ([facility] [address] [facility]     Pacemaker 4/16/2015       Surgical History:  Past Surgical History:   Procedure Laterality Date    CATARACT REMOVAL      MIDDLE EAR SURGERY Left 1998    \"they had to reset the bones\" after an infection       Medications Prior to Admission:    Prior to Admission medications    Medication Sig Start Date End Date Taking?  Authorizing Provider   levETIRAcetam (KEPPRA) 500 MG tablet Take 1 tablet by mouth 2 times daily 10/30/21   Indy Ibarra PA-C   levothyroxine (SYNTHROID) 25 MCG tablet Take 25 mcg by mouth Daily    Historical Provider, MD   aspirin 81 MG EC tablet Take 1 tablet by mouth daily 9/30/21   Pat Mirza DO   coenzyme Q10 100 MG CAPS capsule Take 1 capsule by mouth daily 9/30/21   Pat Mirza DO   apixaban (ELIQUIS) 5 MG TABS tablet Take 1 tablet by mouth 2 times daily 6/26/21   Anjali Gray MD   ramipril (ALTACE) 5 MG capsule Take 5 mg by mouth daily    Historical Provider, MD BILITOT 0.6 12/23/2021    ALKPHOS 76 12/23/2021    AST 15 12/23/2021    ALT 8 12/23/2021       Radiology: No results found. EKG: Normal sinus rhythm    ASSESSMENT/ PLAN[de-identified]      Active Problems:    COVID-19  Resolved Problems:    * No resolved hospital problems. *      1. COVID-19   Symptoms of fatigue, altered mental status and possible syncope   Noted to be dehydrated    Not hypoxic   Monitor inflammatory markers   History of DVT-on Eliquis. Continue Eliquis   Mild leukocytosis, check procalcitonin and start antibiotic if elevated   Unable to care for at home. Patient likely needs placement  2. FATEMEH   Creatinine 2.28-baseline of 1.2-history of CKD stage II/III   Clinically dehydrated   Hydrate and monitor  3. Elevated troponin   Likely around baseline   Cycle troponin and repeat EKG  4. Diabetes   Insulin sliding scale and monitor    Code Status: Full  DVT prophylaxis: Eliquis         Electronically signed by Anjana Singer MD on 12/23/2021 at 5:17 AM      NOTE: This report was transcribed using voice recognition software. Every effort was made to ensure accuracy; however, inadvertent computerized transcription errors may be present.

## 2021-12-23 NOTE — ED NOTES
Linen and depends changed on pt. Pt had loose BM. Breakfast tray at bedside. Pt repositioned in bed for comfort and safety.       Jeff Burroughs RN  12/23/21 3213

## 2021-12-23 NOTE — CONSULTS
[facility] Neurology Consult Note  Name: Kae Haider  Age: 80 y.o. Gender: male  Chief Complaint:Altered Mental Status    Primary Care Provider: Martha Hurtado MD, MD  Admission Date: 12/22/2021      HPI: 77-year-old gentleman known to neurology for seizures and occipital strokes, now presenting with encephalopathy in the setting of COVID-19. No breakthrough seizures. No reports of any new focal deficits. He has been maintained on levetiracetam 500 twice daily. He was found passed out on the toilet but there was no fall or injury. Found to be hypotensive.      Review of Systems   Unable to perform ROS: Dementia       Patient Active Problem List   Diagnosis    HTN (hypertension)    Dyslipidemia    DM (diabetes mellitus) ([address]    Hypothyroidism    History of tobacco abuse    Anemia    CAD (coronary artery disease)    Bronchitis    COPD with acute exacerbation (HCC)    SOB (shortness of breath)    Anginal equivalent (HCC)    Hypotension    Chest pain    NSTEMI (non-ST elevated myocardial infarction) ([address]    COPD exacerbation (HCC)    Syncope and collapse    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HCC)    Venous thrombosis of leg    Hyponatremia    PVD (peripheral vascular disease) (HCC)    Gait abnormality dt PVD--right femoral artery occlusion    Chronic right shoulder pain    CKD (chronic kidney disease)    Arterial occlusion    General weakness    Hyperkalemia    Anticoagulation adequate with anticoagulant therapy    Acute CVA (cerebrovascular accident) ([address]    Aphasia    Impaired gait and mobility dt R occipital CVA    Abnormality of gait and mobility    Neurogenic bladder    Bilateral hearing loss    Left hemiparesis ([address]    AMS (altered mental status)    Acute respiratory failure (HCC)    Lethargy    Encephalopathy    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) ([address]    Late effects of cerebrovascular accident    Ataxia    COVID-19       Past Medical History: Diagnosis Date    Anemia     CAD (coronary artery disease) 4/16/2015    [facility]     Dyslipidemia     History of tobacco abuse     HTN (hypertension)     Hypothyroidism     Non-ST elevation MI (NSTEMI) ([facility]     Pacemaker 4/16/2015       Medications:  Reviewed    Infusion Medications:    sodium chloride      sodium chloride 75 mL/hr at 12/23/21 0559    dextrose       Scheduled Medications:    [Held by provider] apixaban  5 mg Oral BID    sodium chloride flush  5-40 mL IntraVENous 2 times per day    insulin lispro  0-12 Units SubCUTAneous TID WC    insulin lispro  0-6 Units SubCUTAneous Nightly    aspirin  81 mg Oral Daily    atorvastatin  40 mg Oral Daily    [Held by provider] levETIRAcetam  500 mg Oral BID    dexamethasone  6 mg IntraVENous Q24H    enoxaparin  30 mg SubCUTAneous BID    levetiracetam  500 mg IntraVENous Q12H    cefTRIAXone (ROCEPHIN) IV  1,000 mg IntraVENous Q24H     PRN Meds: sodium chloride flush, sodium chloride, ondansetron **OR** ondansetron, polyethylene glycol, acetaminophen **OR** acetaminophen, glucose, dextrose, glucagon (rDNA), dextrose    No Known Allergies        History reviewed. No pertinent family history. Past Surgical History:   Procedure Laterality Date    CATARACT REMOVAL      MIDDLE EAR SURGERY Left 1998    \"they had to reset the bones\" after an infection            Social History     Tobacco History     Smoking Status  Former Smoker    Smokeless Tobacco Use  Never Used          Alcohol History     Alcohol Use Status  No          Drug Use     Drug Use Status  No          Sexual Activity     Sexually Active  Not Asked                  Vitals:    12/23/21 1845   BP: (!) 121/50   Pulse: 62   Resp: 16   Temp:    SpO2: 100%       PHYSICAL EXAM:     Psychiatric: Drowsy but arousable. Not able to tell me where he is. Cranial Nerves: Pupils were 3 mm and reactive to light. Clearly protecting corneals. Facial grimace was symmetric. Symmetric hearing. Able to move all 4 limbs spontaneously. Did not obey commands for limb movement. Normal tone in all 4 limbs. Skin, hematologic, and peripheral pulse exam unremarkable. Good respiratory effort. Labs:   Recent Labs     12/23/21  0000   WBC 13.0*   HGB 11.9*   HCT 36.2*        Recent Labs     12/23/21  0000   *   K 4.6      CO2 18*   BUN 65*   CREATININE 2.18*   CALCIUM 8.1*     Recent Labs     12/23/21  0000   AST 15   ALT 8   BILITOT 0.6   ALKPHOS 76     No results for input(s): INR in the last 72 hours. Recent Labs     12/23/21  0000   TROPONINI 0.072*       Urinalysis:   Lab Results   Component Value Date    NITRU Negative 10/27/2021    WBCUA 20-50 10/27/2021    BACTERIA Negative 10/27/2021    RBCUA 0-2 10/27/2021    BLOODU Negative 10/27/2021    SPECGRAV 1.021 10/27/2021    GLUCOSEU Negative 10/27/2021       Radiology:  [unfilled]    Most recent    EEG No valid procedures specified. MRI of Brain No results found for this or any previous visit. No results found for this or any previous visit. MRA of the Head and Neck: No results found for this or any previous visit. No results found for this or any previous visit. No results found for this or any previous visit. CT of the Head: Results for orders placed during the hospital encounter of 12/22/21    CT Head WO Contrast    Narrative  CT HEAD WO CONTRAST : 12/23/2021    CLINICAL HISTORY: Altered mental status. COMPARISON: 10/28/2021. TECHNIQUE: Spiral unenhanced images were obtained of the head, with routine multiplanar reconstructions performed. All CT scans at this facility use dose modulation, iterative reconstruction, and/or weight based dosing when appropriate to reduce radiation dose to as low as reasonably achievable.       FINDINGS:    There is no intracranial hemorrhage, mass effect, midline shift, extra-axial collection, evidence of hydrocephalus, recent ischemic infarct, or skull fracture identified. A chronic right temporal occipital ischemic infarct has evolved from the prior study. Mild to moderate generalized cerebral volume loss and mild to moderate white matter changes are again noted. Moderate mucosal thickening and bubbly fluid is present within the paranasal sinuses. The mastoid air cells are clear. Impression  PARANASAL SINUSITIS. NO ACUTE INTRACRANIAL PROCESS OTHER SIGNIFICANT CHANGE FROM 10/28/2021 IDENTIFIED. No results found for this or any previous visit. No results found for this or any previous visit. Carotid duplex: No results found for this or any previous visit. No results found for this or any previous visit. Results for orders placed during the hospital encounter of 09/16/21    US CAROTID ARTERY BILATERAL    Narrative  US CAROTID ARTERY BILATERAL: 9/16/2021 6:40 PM    CLINICAL HISTORY:  cva .    COMPARISON: None available. Grayscale, color and waveform Doppler analysis of the cervical carotid and vertebral arteries was performed. Validated velocity measurements with angiographic measurements, velocity criteria are extrapolated from diameter data as defined by the Society of Radiologist in [address] [place] [address] Radiology 2003; 936;[id number]. FINDINGS:    There is mild atherosclerotic plaquing of the common carotid arteries and carotid bifurcations without significantly elevated velocities. Maximum systolic velocity within the right internal and mid common carotid arteries are 79 and 51 cm/s, with an ICA/CCA ratio of approximately 1.5 , which indicates less than 50% by velocity criteria. Maximum systolic velocity within the left internal and mid common carotid arteries are 59 and 74 cm/s, with an ICA/CCA ratio of approximately 0.9, which indicates less than 50% by velocity criteria.     Maximum velocities within the right and left external carotid arteries are 141 and 81 cm/sec respectively. There is antegrade flow in both vertebral arteries. Impression  MILD ATHEROSCLEROTIC PLAQUING OF THE CAROTID BULBS WITHOUT EVIDENCE OF A FLOW-LIMITING STENOSIS, BY VELOCITY RATIO CRITERIA. GOSINK CRITERIA    Diameter          PSV t            EDV t          PSV          EDV          Stenosis Site  %              cm/sec          cm/sec      ICA/CCA    ICA/CCA    0-49                 <124              <40             <2:1           ---                 ---    50-69              125-225                  >2:1           ---                 ---    70-89               225-325          >100          >4:1           >5:1              ---    90%+                >325              >100          >4:1           >9:1           Damped resistive CCA    >95%               May be            May be      Damped      ---              Damped resistive CCA  decreased      decreased  resistive CCA      Echo No results found for this or any previous visit. Assessment/Plan:    Chart, labs,and relevant images reviewed. Encephalopathy is most in keeping with new diagnosis of COVID-19. No evidence of breakthrough seizures. No evidence of focal deficit on exam.  Can continue current antiepileptic therapy. Should his level of consciousness not improve as his respiratory status improves, can consider EEG. Thank you for involving me in the care of this patient.       Electronically signed by José Johnson MD on 12/23/2021 at 6:50 PM

## 2021-12-23 NOTE — ED NOTES
FULL PERICARE PERFORMED, PT HAD MEDIUM LOOSE BOWEL MOVEMENT IN PANTS. FULL LINENS CHANGED.       Olamide Suresh, RN  12/23/21 4103 Kehinde Rivas RN  12/23/21 6780

## 2021-12-23 NOTE — ED NOTES
Patient asleep in room. Equal chest rise and fall. No distress noted. Will continue to monitor.       Jean Camacho RN  12/23/21 7989

## 2021-12-23 NOTE — ED NOTES
NANO RN CALLED PHLEBOTOMY TO DRAW LABS.  PIV FLUSHED BUT NO BLOOD RETURN     Lizette Rivas RN  12/22/21 4990

## 2021-12-23 NOTE — CARE COORDINATION
[facility] Initial Discharge Assessment    Met with grand daughter by phone to discuss discharge plan. PCP: Dr Nilam Ratliff: visiting physician                                Date of Last Visit: last week    If no PCP, list provided? N/A    Discharge Planning    Living Arrangements: at home dependent on family care    Who do you live with? Pastora Gonzalez daughter    Who helps you with your care:  family    If lives at home:     Do you have any barriers navigating in your home? no    Patient can perform ADL? No    Current Services (outpatient and in home) :  Palliative Care (Company need to verify) and visiting physician    Dialysis: No    Is transportation available to get to your appointments? Yes    DME Equipment:  yes - cane, walker, grab bars, shower chair    Respiratory equipment: PRN/HS Oxygen 2 Liters    Respiratory provider:  yes - krishan Alvarez per grand daughter. She is not sure     Pharmacy:  yes - [facility]    Consult with Medication Assistance Program?  No        Does Patient Have a High-Risk for Readmission Diagnosis (CHF, PN, MI, COPD)? No     Initial Discharge Plan? (Note: please see concurrent daily documentation for any updates after initial note). Pt lives with his grand daughter who stated that he lives with her and wouldn't need hhc but he had been to [facility] rehab and did well and would be interested in the patient going there after he is d/c from medical service. Need to find out what palliative care company the patient goes through. Cm to assess for further d/c needs and referrals.     Readmission Risk              Risk of Unplanned Readmission:  51         Electronically signed by Zhang Scott on 12/23/2021 at 2:25 PM

## 2021-12-23 NOTE — ED NOTES
Lab assisted with draw. PT awake and alert, yelled \"God Damnit\". Pt not answering questions from this nurse regarding comfort or needs.  Pt given wam blankets     To Keating RN  12/23/21 [address] [place] [address]  12/23/21 5290

## 2021-12-23 NOTE — PROGRESS NOTES
Hospitalist Progress Note      PCP: Jesi Baltazar MD, MD    Date of Admission: 12/22/2021    Chief Complaint:  No acute events, afebrile, stable HD, on 2 liters of NC, remains lethargic    Medications:  Reviewed    Infusion Medications    sodium chloride      sodium chloride 75 mL/hr at 12/23/21 0559    dextrose       Scheduled Medications    apixaban  5 mg Oral BID    sodium chloride flush  5-40 mL IntraVENous 2 times per day    insulin lispro  0-12 Units SubCUTAneous TID WC    insulin lispro  0-6 Units SubCUTAneous Nightly     PRN Meds: sodium chloride flush, sodium chloride, ondansetron **OR** ondansetron, polyethylene glycol, acetaminophen **OR** acetaminophen, glucose, dextrose, glucagon (rDNA), dextrose      Intake/Output Summary (Last 24 hours) at 12/23/2021 0935  Last data filed at 12/23/2021 2324  Gross per 24 hour   Intake 2000 ml   Output --   Net 2000 ml       Exam:    BP (!) 145/50   Pulse 64   Temp 97 °F (36.1 °C) (Temporal)   Resp 14   SpO2 100%     General appearance: somnolent, arousable  Respiratory:  clear to auscultation bilaterally . Cardiovascular: Regular rate and rhythm, S1/S2. Abdomen: Soft, active bowel sounds. Musculoskeletal: No edema bilaterally. Labs:   Recent Labs     12/23/21  0000   WBC 13.0*   HGB 11.9*   HCT 36.2*        Recent Labs     12/23/21  0000   *   K 4.6      CO2 18*   BUN 65*   CREATININE 2.18*   CALCIUM 8.1*     Recent Labs     12/23/21  0000   AST 15   ALT 8   BILITOT 0.6   ALKPHOS 76     No results for input(s): INR in the last 72 hours. Recent Labs     12/23/21  0000   TROPONINI 0.072*       Urinalysis:      Lab Results   Component Value Date    NITRU Negative 10/27/2021    WBCUA 20-50 10/27/2021    BACTERIA Negative 10/27/2021    RBCUA 0-2 10/27/2021    BLOODU Negative 10/27/2021    SPECGRAV 1.021 10/27/2021    GLUCOSEU Negative 10/27/2021       Radiology:  CT Head WO Contrast   Final Result      PARANASAL SINUSITIS.       NO ACUTE INTRACRANIAL PROCESS OTHER SIGNIFICANT CHANGE FROM 10/28/2021 IDENTIFIED. XR CHEST PORTABLE    (Results Pending)           Assessment/Plan:    80 y. o. male with PMH of CAD s/p CABG, SSS s/p PPM, PAD, RLE DVT s/p right iliac vein stent in 1/2021, CVA on 9/2021, DM2, HTN, CKD3, seizure disorder, hearing loss who presented with:     Acute encephalopathy  - in the setting of COVID infection, seizure disorder  - CT head was negative for acute findings  - resumed Keppra  - consulted neurology    Hypotension   - improved with IVFs  - monitor BP    Leukocytosis, elevated procalcitonin  - in the setting of COVID infection  - obtain U/a, urine and blood cultures  - IV Rocephin emirically     FATEMEH/CKD3  - continue IVFs  - monitor renal function    Elevated troponin  - ECG showed SR with no acute ischemic changes  - resume ASA, statin therapy      DM2 with hyperglycemia  - ISS, hypoglycemia protocol     RLE DVT s/p right iliac vein stent in 1/2021  - SC Lovenox  - resume Eliquis when more awake and alert      Diet: ADULT DIET;  Regular    Code Status: DNR-CCA              Electronically signed by Joe Vale MD on 12/23/2021 at 9:35 AM

## 2021-12-23 NOTE — ED NOTES
Attempted to do swallow evaluation. Patient would not take a sip from the straw for assessment. Jairo Fox, RN  12/23/21 1102

## 2021-12-23 NOTE — CARE COORDINATION
I called the patient's grand daughter Any Aguilar with the patient's room number and phone number for the nurses station as the patient will not be able to use the phone in his room she states. She has the [facility] code D 73. She stated no questions except if the patient had had his picc line placed which he had not yet.

## 2021-12-23 NOTE — ED NOTES
FULL LINEN CHANGE AND KATHLEEN CARE PROVIDED. PT REPOSITIONED AND PROVIDED WITH WARM BLANKETS.      Tanika Martinez RN  12/23/21 0600

## 2021-12-23 NOTE — ED TRIAGE NOTES
PT ARRIVED TO ED VIA EMS WITH C/C AMS AND UNRESPONSIVENESS. PT WAS FOUND ON TOILET, FOR UNKNOWN AMOUNT OF TIME, BILATERAL LOWER EXTREMITIES CYANOTIC AT SCENE. PT HAS HX OF DEMENTIA, POOR HISTORIAN. STERNAL RUB PERFORMED ON SCENE, PT RESPONSIVETO PAIN. UNKNOWN LKW. HX OF COPD, PACER, STROKE IN September, AND EXTENSIVE CARDIAC HX PER GRAND DAUGHTER. PT GIVEN NITRO BY GRANDDAUGHTER AT 2245.

## 2021-12-23 NOTE — ACP (ADVANCE CARE PLANNING)
Advance Care Planning     Advance Care Planning Activator (Inpatient)  Conversation Note      Date of ACP Conversation: 12/23/2021     Conversation Conducted with:  Healthcare Decision Maker: Named in Advance Directive or Healthcare Power of [place] (name) Cady Larios    ACP Activator: Tram Tompkins Pt is a dnrcc-A and this is verified by Cady Larios who is patient's HCPOA.     Health Care Decision Maker:     Current Designated Health Care Decision Maker:     Primary Decision Maker: Shana Saunders - [phone]    Secondary Decision Maker: Doug Barbour [facility] - [phone]

## 2021-12-23 NOTE — ED NOTES
Received report from previous nurse. Agree with assessment and triage. No distress noted. Nurse will continue to monitor.       Nikkie Acevedo RN  12/23/21 1902

## 2021-12-23 NOTE — ED NOTES
THIS RN AND NANO RN ATTEMPTING PIV INSERTION AT [address] [place] [address], RN  12/22/21 7356

## 2021-12-23 NOTE — ED NOTES
Lab called for blood draw. Patient asleep in room. Equal chest rise and fall. No distress noted. Will continue to monitor.       To Pozo RN  12/23/21 4883

## 2021-12-24 ENCOUNTER — APPOINTMENT (OUTPATIENT)
Dept: INTERVENTIONAL RADIOLOGY/VASCULAR | Age: 86
DRG: 871 | End: 2021-12-24
Payer: MEDICARE

## 2021-12-24 LAB
ALBUMIN SERPL-MCNC: 2.8 G/DL (ref 3.5–4.6)
ALP BLD-CCNC: 55 U/L (ref 35–104)
ALT SERPL-CCNC: 11 U/L (ref 0–41)
ANION GAP SERPL CALCULATED.3IONS-SCNC: 15 MEQ/L (ref 9–15)
AST SERPL-CCNC: 19 U/L (ref 0–40)
BASOPHILS ABSOLUTE: 0 K/UL (ref 0–0.2)
BASOPHILS RELATIVE PERCENT: 0.2 %
BILIRUB SERPL-MCNC: 0.3 MG/DL (ref 0.2–0.7)
BUN BLDV-MCNC: 53 MG/DL (ref 8–23)
CALCIUM SERPL-MCNC: 7.9 MG/DL (ref 8.5–9.9)
CHLORIDE BLD-SCNC: 112 MEQ/L (ref 95–107)
CO2: 16 MEQ/L (ref 20–31)
CREAT SERPL-MCNC: 1.19 MG/DL (ref 0.7–1.2)
EOSINOPHILS ABSOLUTE: 0 K/UL (ref 0–0.7)
EOSINOPHILS RELATIVE PERCENT: 0 %
FERRITIN: 1280 UG/L (ref 30–400)
GFR AFRICAN AMERICAN: >60
GFR NON-AFRICAN AMERICAN: 57.1
GLOBULIN: 3.2 G/DL (ref 2.3–3.5)
GLUCOSE BLD-MCNC: 137 MG/DL (ref 60–115)
GLUCOSE BLD-MCNC: 141 MG/DL (ref 60–115)
GLUCOSE BLD-MCNC: 161 MG/DL (ref 60–115)
GLUCOSE BLD-MCNC: 183 MG/DL (ref 60–115)
GLUCOSE BLD-MCNC: 192 MG/DL (ref 70–99)
HCT VFR BLD CALC: 34.2 % (ref 42–52)
HEMOGLOBIN: 11.4 G/DL (ref 14–18)
LYMPHOCYTES ABSOLUTE: 0.9 K/UL (ref 1–4.8)
LYMPHOCYTES RELATIVE PERCENT: 9.1 %
MAGNESIUM: 1.7 MG/DL (ref 1.7–2.4)
MCH RBC QN AUTO: 31.3 PG (ref 27–31.3)
MCHC RBC AUTO-ENTMCNC: 33.3 % (ref 33–37)
MCV RBC AUTO: 94 FL (ref 80–100)
MONOCYTES ABSOLUTE: 0.6 K/UL (ref 0.2–0.8)
MONOCYTES RELATIVE PERCENT: 5.6 %
NEUTROPHILS ABSOLUTE: 8.8 K/UL (ref 1.4–6.5)
NEUTROPHILS RELATIVE PERCENT: 85.1 %
PDW BLD-RTO: 14.3 % (ref 11.5–14.5)
PERFORMED ON: ABNORMAL
PLATELET # BLD: 190 K/UL (ref 130–400)
POTASSIUM SERPL-SCNC: 4.5 MEQ/L (ref 3.4–4.9)
RBC # BLD: 3.64 M/UL (ref 4.7–6.1)
SODIUM BLD-SCNC: 143 MEQ/L (ref 135–144)
TOTAL PROTEIN: 6 G/DL (ref 6.3–8)
WBC # BLD: 10.4 K/UL (ref 4.8–10.8)

## 2021-12-24 PROCEDURE — 36573 INSJ PICC RS&I 5 YR+: CPT

## 2021-12-24 PROCEDURE — 6360000002 HC RX W HCPCS: Performed by: INTERNAL MEDICINE

## 2021-12-24 PROCEDURE — 83735 ASSAY OF MAGNESIUM: CPT

## 2021-12-24 PROCEDURE — 97163 PT EVAL HIGH COMPLEX 45 MIN: CPT

## 2021-12-24 PROCEDURE — 2060000000 HC ICU INTERMEDIATE R&B

## 2021-12-24 PROCEDURE — 2580000003 HC RX 258: Performed by: INTERNAL MEDICINE

## 2021-12-24 PROCEDURE — 2709999900 IR PICC WO SQ PORT/PUMP > 5 YEARS

## 2021-12-24 PROCEDURE — 85025 COMPLETE CBC W/AUTO DIFF WBC: CPT

## 2021-12-24 PROCEDURE — 2700000000 HC OXYGEN THERAPY PER DAY

## 2021-12-24 PROCEDURE — 02HV33Z INSERTION OF INFUSION DEVICE INTO SUPERIOR VENA CAVA, PERCUTANEOUS APPROACH: ICD-10-PCS | Performed by: RADIOLOGY

## 2021-12-24 PROCEDURE — 6370000000 HC RX 637 (ALT 250 FOR IP): Performed by: INTERNAL MEDICINE

## 2021-12-24 PROCEDURE — 87086 URINE CULTURE/COLONY COUNT: CPT

## 2021-12-24 PROCEDURE — 36415 COLL VENOUS BLD VENIPUNCTURE: CPT

## 2021-12-24 PROCEDURE — 1210000000 HC MED SURG R&B

## 2021-12-24 PROCEDURE — 99232 SBSQ HOSP IP/OBS MODERATE 35: CPT | Performed by: NURSE PRACTITIONER

## 2021-12-24 PROCEDURE — 2500000003 HC RX 250 WO HCPCS: Performed by: INTERNAL MEDICINE

## 2021-12-24 PROCEDURE — 99233 SBSQ HOSP IP/OBS HIGH 50: CPT

## 2021-12-24 PROCEDURE — 80053 COMPREHEN METABOLIC PANEL: CPT

## 2021-12-24 RX ORDER — SODIUM CHLORIDE 9 MG/ML
25 INJECTION, SOLUTION INTRAVENOUS PRN
Status: DISCONTINUED | OUTPATIENT
Start: 2021-12-24 | End: 2021-12-29 | Stop reason: HOSPADM

## 2021-12-24 RX ORDER — SODIUM CHLORIDE 0.9 % (FLUSH) 0.9 %
5-40 SYRINGE (ML) INJECTION EVERY 12 HOURS SCHEDULED
Status: DISCONTINUED | OUTPATIENT
Start: 2021-12-24 | End: 2021-12-29 | Stop reason: HOSPADM

## 2021-12-24 RX ORDER — SODIUM CHLORIDE 9 MG/ML
250 INJECTION, SOLUTION INTRAVENOUS ONCE
Status: COMPLETED | OUTPATIENT
Start: 2021-12-24 | End: 2021-12-24

## 2021-12-24 RX ORDER — LIDOCAINE HYDROCHLORIDE 20 MG/ML
5 INJECTION, SOLUTION INFILTRATION; PERINEURAL ONCE
Status: COMPLETED | OUTPATIENT
Start: 2021-12-24 | End: 2021-12-24

## 2021-12-24 RX ORDER — SODIUM CHLORIDE 0.9 % (FLUSH) 0.9 %
5-40 SYRINGE (ML) INJECTION PRN
Status: DISCONTINUED | OUTPATIENT
Start: 2021-12-24 | End: 2021-12-29 | Stop reason: HOSPADM

## 2021-12-24 RX ADMIN — LIDOCAINE HYDROCHLORIDE 5 ML: 20 INJECTION, SOLUTION INFILTRATION; PERINEURAL at 10:18

## 2021-12-24 RX ADMIN — ASPIRIN 81 MG: 81 TABLET, COATED ORAL at 09:29

## 2021-12-24 RX ADMIN — SODIUM CHLORIDE: 9 INJECTION, SOLUTION INTRAVENOUS at 12:49

## 2021-12-24 RX ADMIN — Medication 10 ML: at 12:45

## 2021-12-24 RX ADMIN — CEFTRIAXONE SODIUM 1000 MG: 1 INJECTION, POWDER, FOR SOLUTION INTRAMUSCULAR; INTRAVENOUS at 12:44

## 2021-12-24 RX ADMIN — ATORVASTATIN CALCIUM 40 MG: 40 TABLET, FILM COATED ORAL at 09:29

## 2021-12-24 RX ADMIN — DEXAMETHASONE SODIUM PHOSPHATE 6 MG: 4 INJECTION, SOLUTION INTRA-ARTICULAR; INTRALESIONAL; INTRAMUSCULAR; INTRAVENOUS; SOFT TISSUE at 12:43

## 2021-12-24 RX ADMIN — LEVETIRACETAM 500 MG: 100 INJECTION INTRAVENOUS at 14:02

## 2021-12-24 RX ADMIN — Medication 10 ML: at 22:07

## 2021-12-24 RX ADMIN — ENOXAPARIN SODIUM 30 MG: 100 INJECTION SUBCUTANEOUS at 22:07

## 2021-12-24 RX ADMIN — SODIUM CHLORIDE 250 ML: 9 INJECTION, SOLUTION INTRAVENOUS at 10:19

## 2021-12-24 RX ADMIN — ENOXAPARIN SODIUM 30 MG: 100 INJECTION SUBCUTANEOUS at 09:29

## 2021-12-24 ASSESSMENT — ENCOUNTER SYMPTOMS
ABDOMINAL DISTENTION: 0
COUGH: 0
VOMITING: 0
COLOR CHANGE: 0
WHEEZING: 0
NAUSEA: 0
TROUBLE SWALLOWING: 0

## 2021-12-24 ASSESSMENT — PAIN SCALES - GENERAL: PAINLEVEL_OUTOF10: 0

## 2021-12-24 NOTE — PROGRESS NOTES
([facility]     Hypothyroidism     History of tobacco abuse     Anemia       Past Medical History:   Diagnosis Date    Anemia     CAD (coronary artery disease) 4/16/2015    DM (diabetes mellitus) ([facility]     Dyslipidemia     History of tobacco abuse     HTN (hypertension)     Hypothyroidism     Non-ST elevation MI (NSTEMI) ([facility]     Pacemaker 4/16/2015     Past Surgical History:   Procedure Laterality Date    CATARACT REMOVAL      MIDDLE EAR SURGERY Left 1998    \"they had to reset the bones\" after an infection     Chart Reviewed: Yes  Family / Caregiver Present: No  General Comment  Comments: Pt resting in bed, agreeable to PT eval    Restrictions:  Restrictions/Precautions: Fall Risk,Isolation (high fall risk per Hinojosa score; COVID-19+; droplet +)     SUBJECTIVE: Subjective: Pt denies pain , denies SOB    Pain  Pre Treatment Pain Screening  Pain at present: 0    Post Treatment Pain Screening:   Pain Screening  Patient Currently in Pain: No    Prior Level of Function:  Social/Functional History  Lives With: Other (comment) (Grand daughter)  Type of Home: House  Home Layout: Two level,Able to Live on Main level with bedroom/bathroom  Home Access: Ramped entrance  Bathroom Shower/Tub:  (unable to state)  Bathroom Equipment: Grab bars in [facility] [employer] chair  Home Equipment: Rolling walker,Cane  Receives Help From: Family  ADL Assistance: Independent  Homemaking Assistance: Needs assistance  Homemaking Responsibilities: No  Ambulation Assistance: Independent  Transfer Assistance: Independent  Active : No    OBJECTIVE:   Vision: Within Functional Limits  Hearing: Exceptions to [facility]  Hearing Exceptions: Hard of hearing/hearing concerns    Cognition:  Overall Orientation Status: Impaired  Orientation Level: Oriented to person,Disoriented to place,Disoriented to time,Disoriented to situation  Follows Commands: Impaired    Observation/Palpation  Observation: sleeping upon arrival, difficult to wake, slow to respond, no acute distress noted on 2 L O2 NC    ROM:  RLE General PROM: impaired hip flexor, HS, gastroc flexibility  RLE AROM: WFL  LLE General PROM: impaired hip flexor, HS, gastroc flexibility  LLE AROM : WFL    Strength:  Strength RLE  Comment: observed 3/5  Strength LLE  Comment: observed 3/5    Neuro:  Balance  Sitting - Static: Fair  Sitting - Dynamic: Fair  Standing - Static:  (unable to assess d/t lethargy)     Motor Control  Gross Motor?: Exceptions  Comments: increased time for initiation, slow to respond  Sensation  Overall Sensation Status: WFL    Bed mobility  Rolling to Left: Stand by assistance  Supine to Sit: Moderate assistance  Sit to Supine: Moderate assistance    Transfers  Sit to Stand: Unable to assess  Stand to sit: Unable to assess  Comment: unable to assess d/t lethargy and safety concern    Ambulation  Ambulation?: No    Activity Tolerance  Activity Tolerance: Patient limited by fatigue  Activity Tolerance: lethargy      PT Education  PT Education: General Safety;PT Role;Plan of Care    ASSESSMENT:   Body structures, Functions, Activity limitations: Decreased functional mobility ; Decreased strength;Decreased ROM; Decreased balance; Increased pain;Decreased coordination;Decreased safe awareness  Decision Making: High Complexity  History: high  Exam: high  Clinical Presentation: high    Prognosis: Good  Barriers to Learning: cognitive deficits    DISCHARGE RECOMMENDATIONS:  Discharge Recommendations: Continue to assess pending progress,Patient would benefit from continued therapy after discharge  Assessment: Pt demonstrates the above deficits and decline in functional mobility status placing them at increased risk for falls. Pt would benefit from physical therapy to address above deficits and allow for safe return home at highest level of function, decrease risk for falls, and improve QOL.   REQUIRES PT FOLLOW UP: Yes      PLAN OF CARE:  Plan  Times per week: 3  Current Treatment Recommendations: Strengthening,Functional Mobility Training,Neuromuscular Re-education,Home Exercise Program,Equipment Evaluation, Education, & procurement,Safety Education & Training,Gait Training,Transfer Training,Positioning,Balance Training,Endurance Training,Patient/Caregiver Education & Training,Cognitive Reorientation,ROM  Plan Comment: May progress to 3-6 when pt able to participate in increased skilled interventions  Safety Devices  Type of devices: Left in bed,Call light within reach,Bed alarm in place    Goals:  Patient goals : unable to state  Long term goals  Long term goal 1: Bed mobility with SBA  Long term goal 2: unsupported sitting with functional reach >6 inches with supervision  Long term goal 3: functional transfers with  Min A to promote OOB activity  Long term goal 4: Stand with B UE support 2 min with Min A  Long term goal 5: Progress to ambulation with AD when approriate with Mod A    [facility] (6 CLICK) Hector Echols 28 Inpatient Mobility Raw Score : 11     Therapy Time:   Individual   Time In 0855   Time Out 0911   Minutes [address] [facility], [address] [facility], 12/24/21 at 10:03 AM       Definitions for assistance levels  Independent = pt does not require any physical supervision or assistance from another person for activity completion. Device may be needed.   Stand by assistance = pt requires verbal cues or instructions from another person, close to but not touching, to perform the activity  Minimal assistance= pt performs 75% or more of the activity; assistance is required to complete the activity  Moderate assistance= pt performs 50% of the activity; assistance is required to complete the activity  Maximal assistance = pt performs 25% of the activity; assistance is required to complete the activity  Dependent = pt requires total physical assistance to accomplish the task

## 2021-12-24 NOTE — PROGRESS NOTES
Hospitalist Progress Note      PCP: Lei Galarza MD, MD    Date of Admission: 12/22/2021    Chief Complaint:  No acute events, afebrile, stable HD, on 2 liters of NC, more awake, eating, confused per nursing staff    Medications:  Reviewed    Infusion Medications    sodium chloride      sodium chloride      sodium chloride 75 mL/hr at 12/24/21 1249    dextrose       Scheduled Medications    sodium chloride flush  5-40 mL IntraVENous 2 times per day    [Held by provider] apixaban  5 mg Oral BID    sodium chloride flush  5-40 mL IntraVENous 2 times per day    insulin lispro  0-12 Units SubCUTAneous TID WC    insulin lispro  0-6 Units SubCUTAneous Nightly    aspirin  81 mg Oral Daily    atorvastatin  40 mg Oral Daily    [Held by provider] levETIRAcetam  500 mg Oral BID    dexamethasone  6 mg IntraVENous Q24H    enoxaparin  30 mg SubCUTAneous BID    levetiracetam  500 mg IntraVENous Q12H    cefTRIAXone (ROCEPHIN) IV  1,000 mg IntraVENous Q24H     PRN Meds: sodium chloride flush, sodium chloride, sodium chloride flush, sodium chloride, ondansetron **OR** ondansetron, polyethylene glycol, acetaminophen **OR** acetaminophen, glucose, dextrose, glucagon (rDNA), dextrose    No intake or output data in the 24 hours ending 12/24/21 1349    Exam:    BP (!) 105/59   Pulse 77   Temp 97.6 °F (36.4 °C) (Axillary)   Resp 18   Wt 145 lb (65.8 kg)   SpO2 97%   BMI 23.41 kg/m²     General appearance: awake, cooperative  Respiratory:  clear to auscultation bilaterally . Cardiovascular: Regular rate and rhythm, S1/S2. Abdomen: Soft, active bowel sounds. Musculoskeletal: No edema bilaterally.      Labs:   Recent Labs     12/23/21  0000   WBC 13.0*   HGB 11.9*   HCT 36.2*        Recent Labs     12/23/21  0000   *   K 4.6      CO2 18*   BUN 65*   CREATININE 2.18*   CALCIUM 8.1*     Recent Labs     12/23/21  0000   AST 15   ALT 8   BILITOT 0.6   ALKPHOS 76     No results for input(s): INR in the

## 2021-12-24 NOTE — FLOWSHEET NOTE
Assessment completed. VS obtained. Left floor for PICC line insertion. Electronically signed by Rafaela Wesley RN on 12/24/2021 at 11:13 AM

## 2021-12-24 NOTE — PROGRESS NOTES
[facility] Neurology Daily Progress Note  Name: Clara Treviño  Age: 80 y.o. Gender: male  CodeStatus: DNR-CCA  Allergies: No Known Allergies    Chief Complaint:Altered Mental Status    Primary Care Provider: Jesenia Dominguez MD, MD  InpatientTreatment Team: Treatment Team: Attending Provider: Emelina Clinton MD; Consulting Physician: Emelina Clinton MD; Utilization Reviewer: Skyla Carranza, RN; Consulting Physician: Yunior Salamanca MD; Consulting Physician: Shaniqua Drake MD; Registered Nurse: Josef Bowen, RN; : Brayden Masterson, RN; Registered Nurse: Geena Sherwood RN  Admission Date: 12/22/2021      HPI   Pt seen and examined for neuro follow up for Acute metabolic encephalopathy in the setting of COVID-19, hypotension, FATEMEH, dehydration. Patient is Covid positive on Covid isolation. HPI obtained from nursing staff and medical records. Blood pressure improved. Afebrile. On 2 L per nasal cannula. Patient with some impulsiveness and restlessness. Pulled out his IV last night. Remains confused. No focal deficits or seizure activity reported. Vitals:    12/23/21 2300   BP: 125/68   Pulse: 77   Resp: 18   Temp:    SpO2: 97%      Review of Systems   Constitutional: Negative for appetite change and fever. HENT: Negative for hearing loss and trouble swallowing. Respiratory: Negative for cough and wheezing. Cardiovascular: Negative for chest pain, palpitations and leg swelling. Gastrointestinal: Negative for abdominal distention, nausea and vomiting. Genitourinary: Negative for difficulty urinating. Skin: Negative for color change and rash. Neurological: Positive for weakness (  Generalized). Negative for tremors, seizures, syncope, facial asymmetry and speech difficulty. Psychiatric/Behavioral: Positive for behavioral problems and confusion. Negative for agitation and hallucinations. The patient is not nervous/anxious.       Physical Exam  Deferred due to Covid isolation        Medications: Reviewed    Infusion Medications:    sodium chloride      sodium chloride 75 mL/hr at 12/23/21 0559    dextrose       Scheduled Medications:    [Held by provider] apixaban  5 mg Oral BID    sodium chloride flush  5-40 mL IntraVENous 2 times per day    insulin lispro  0-12 Units SubCUTAneous TID WC    insulin lispro  0-6 Units SubCUTAneous Nightly    aspirin  81 mg Oral Daily    atorvastatin  40 mg Oral Daily    [Held by provider] levETIRAcetam  500 mg Oral BID    dexamethasone  6 mg IntraVENous Q24H    enoxaparin  30 mg SubCUTAneous BID    levetiracetam  500 mg IntraVENous Q12H    cefTRIAXone (ROCEPHIN) IV  1,000 mg IntraVENous Q24H     PRN Meds: sodium chloride flush, sodium chloride, ondansetron **OR** ondansetron, polyethylene glycol, acetaminophen **OR** acetaminophen, glucose, dextrose, glucagon (rDNA), dextrose    Labs:   Recent Labs     12/23/21  0000   WBC 13.0*   HGB 11.9*   HCT 36.2*        Recent Labs     12/23/21  0000   *   K 4.6      CO2 18*   BUN 65*   CREATININE 2.18*   CALCIUM 8.1*     Recent Labs     12/23/21  0000   AST 15   ALT 8   BILITOT 0.6   ALKPHOS 76     No results for input(s): INR in the last 72 hours. Recent Labs     12/23/21  0000   TROPONINI 0.072*       Urinalysis:   Lab Results   Component Value Date    NITRU Negative 10/27/2021    WBCUA 20-50 10/27/2021    BACTERIA Negative 10/27/2021    RBCUA 0-2 10/27/2021    BLOODU Negative 10/27/2021    SPECGRAV 1.021 10/27/2021    GLUCOSEU Negative 10/27/2021       Radiology:   Most recent    EEG No valid procedures specified. MRI of Brain No results found for this or any previous visit. No results found for this or any previous visit. MRA of the Head and Neck: No results found for this or any previous visit. No results found for this or any previous visit. No results found for this or any previous visit.                             CT of the Head: Results for orders placed during the hospital encounter of 12/22/21    CT Head WO Contrast    Narrative  CT HEAD WO CONTRAST : 12/23/2021    CLINICAL HISTORY: Altered mental status. COMPARISON: 10/28/2021. TECHNIQUE: Spiral unenhanced images were obtained of the head, with routine multiplanar reconstructions performed. All CT scans at this facility use dose modulation, iterative reconstruction, and/or weight based dosing when appropriate to reduce radiation dose to as low as reasonably achievable. FINDINGS:    There is no intracranial hemorrhage, mass effect, midline shift, extra-axial collection, evidence of hydrocephalus, recent ischemic infarct, or skull fracture identified. A chronic right temporal occipital ischemic infarct has evolved from the prior study. Mild to moderate generalized cerebral volume loss and mild to moderate white matter changes are again noted. Moderate mucosal thickening and bubbly fluid is present within the paranasal sinuses. The mastoid air cells are clear. Impression  PARANASAL SINUSITIS. NO ACUTE INTRACRANIAL PROCESS OTHER SIGNIFICANT CHANGE FROM 10/28/2021 IDENTIFIED. No results found for this or any previous visit. No results found for this or any previous visit. Carotid duplex: No results found for this or any previous visit. No results found for this or any previous visit. Results for orders placed during the hospital encounter of 09/16/21    US CAROTID ARTERY BILATERAL    Narrative  US CAROTID ARTERY BILATERAL: 9/16/2021 6:40 PM    CLINICAL HISTORY:  cva .    COMPARISON: None available. Grayscale, color and waveform Doppler analysis of the cervical carotid and vertebral arteries was performed. Validated velocity measurements with angiographic measurements, velocity criteria are extrapolated from diameter data as defined by the Society of Radiologist in [facility] 13 Radiology 2003; 739;[id number].       FINDINGS:    There is mild atherosclerotic plaquing of the common carotid arteries and carotid bifurcations without significantly elevated velocities. Maximum systolic velocity within the right internal and mid common carotid arteries are 79 and 51 cm/s, with an ICA/CCA ratio of approximately 1.5 , which indicates less than 50% by velocity criteria. Maximum systolic velocity within the left internal and mid common carotid arteries are 59 and 74 cm/s, with an ICA/CCA ratio of approximately 0.9, which indicates less than 50% by velocity criteria. Maximum velocities within the right and left external carotid arteries are 141 and 81 cm/sec respectively. There is antegrade flow in both vertebral arteries. Impression  MILD ATHEROSCLEROTIC PLAQUING OF THE CAROTID BULBS WITHOUT EVIDENCE OF A FLOW-LIMITING STENOSIS, BY VELOCITY RATIO CRITERIA. GOSINK CRITERIA    Diameter          PSV t            EDV t          PSV          EDV          Stenosis Site  %              cm/sec          cm/sec      ICA/CCA    ICA/CCA    0-49                 <124              <40             <2:1           ---                 ---    50-69              125-225                  >2:1           ---                 ---    70-89               225-325          >100          >4:1           >5:1              ---    90%+                >325              >100          >4:1           >9:1           Damped resistive CCA    >95%               May be            May be      Damped      ---              Damped resistive CCA  decreased      decreased  resistive CCA      Echo No results found for this or any previous visit. Assessment/Plan:    Encephalopathy is most in keeping with new diagnosis of COVID-19. No evidence of breakthrough seizures. No evidence of focal deficit on exam.  Can continue current antiepileptic therapy. Should his level of consciousness not improve as his respiratory status improves, can consider EEG.      Acute metabolic encephalopathy in the setting of COVID-19, hypotension, FATEMEH, dehydration. Patient with history of underlying dementia, seizure disorder and CVA. CT the head negative for any acute findings. Patient remains confused. Nonfocal. No seizure activity noted. We will continue patient on Keppra, obtain EEG, and monitor.     Collaborating physicians: Dr Faustino Wallis and Dr Vargas    Electronically signed by CHRISTINE Mix CNP on 12/24/2021 at 8:33 AM     I attest that I performed a substantial portion of the history, physical exam, or medical decision making during this encounter, face-to face.      -Thomas Martinez MD, Neurologist

## 2021-12-25 LAB
ALBUMIN SERPL-MCNC: 2.6 G/DL (ref 3.5–4.6)
ALP BLD-CCNC: 56 U/L (ref 35–104)
ALT SERPL-CCNC: 11 U/L (ref 0–41)
ANION GAP SERPL CALCULATED.3IONS-SCNC: 11 MEQ/L (ref 9–15)
AST SERPL-CCNC: 18 U/L (ref 0–40)
BACTERIA: NEGATIVE /HPF
BASOPHILS ABSOLUTE: 0 K/UL (ref 0–0.2)
BASOPHILS RELATIVE PERCENT: 0.1 %
BILIRUB SERPL-MCNC: <0.2 MG/DL (ref 0.2–0.7)
BILIRUBIN URINE: NEGATIVE
BLOOD, URINE: NEGATIVE
BUN BLDV-MCNC: 48 MG/DL (ref 8–23)
C-REACTIVE PROTEIN, HIGH SENSITIVITY: 68.2 MG/L (ref 0–5)
CALCIUM SERPL-MCNC: 7.6 MG/DL (ref 8.5–9.9)
CHLORIDE BLD-SCNC: 114 MEQ/L (ref 95–107)
CLARITY: CLEAR
CO2: 17 MEQ/L (ref 20–31)
COLOR: YELLOW
CREAT SERPL-MCNC: 1.02 MG/DL (ref 0.7–1.2)
EOSINOPHILS ABSOLUTE: 0 K/UL (ref 0–0.7)
EOSINOPHILS RELATIVE PERCENT: 0 %
EPITHELIAL CELLS, UA: ABNORMAL /HPF (ref 0–5)
GFR AFRICAN AMERICAN: >60
GFR NON-AFRICAN AMERICAN: >60
GLOBULIN: 3.2 G/DL (ref 2.3–3.5)
GLUCOSE BLD-MCNC: 126 MG/DL (ref 60–115)
GLUCOSE BLD-MCNC: 137 MG/DL (ref 60–115)
GLUCOSE BLD-MCNC: 151 MG/DL (ref 60–115)
GLUCOSE BLD-MCNC: 202 MG/DL (ref 70–99)
GLUCOSE URINE: NEGATIVE MG/DL
HCT VFR BLD CALC: 33.8 % (ref 42–52)
HEMOGLOBIN: 11.1 G/DL (ref 14–18)
HYALINE CASTS: ABNORMAL /HPF (ref 0–5)
KETONES, URINE: ABNORMAL MG/DL
LEUKOCYTE ESTERASE, URINE: ABNORMAL
LYMPHOCYTES ABSOLUTE: 0.9 K/UL (ref 1–4.8)
LYMPHOCYTES RELATIVE PERCENT: 8.4 %
MAGNESIUM: 1.7 MG/DL (ref 1.7–2.4)
MCH RBC QN AUTO: 30.7 PG (ref 27–31.3)
MCHC RBC AUTO-ENTMCNC: 32.9 % (ref 33–37)
MCV RBC AUTO: 93.3 FL (ref 80–100)
MONOCYTES ABSOLUTE: 0.7 K/UL (ref 0.2–0.8)
MONOCYTES RELATIVE PERCENT: 6.4 %
NEUTROPHILS ABSOLUTE: 9.7 K/UL (ref 1.4–6.5)
NEUTROPHILS RELATIVE PERCENT: 85.1 %
NITRITE, URINE: NEGATIVE
PDW BLD-RTO: 14.6 % (ref 11.5–14.5)
PERFORMED ON: ABNORMAL
PH UA: 5 (ref 5–9)
PLATELET # BLD: 240 K/UL (ref 130–400)
POTASSIUM SERPL-SCNC: 4.3 MEQ/L (ref 3.4–4.9)
PROCALCITONIN: 0.3 NG/ML (ref 0–0.15)
PROTEIN UA: ABNORMAL MG/DL
RBC # BLD: 3.63 M/UL (ref 4.7–6.1)
RBC UA: ABNORMAL /HPF (ref 0–2)
SODIUM BLD-SCNC: 142 MEQ/L (ref 135–144)
SPECIFIC GRAVITY UA: 1.02 (ref 1–1.03)
TOTAL PROTEIN: 5.8 G/DL (ref 6.3–8)
UROBILINOGEN, URINE: 0.2 E.U./DL
WBC # BLD: 11.3 K/UL (ref 4.8–10.8)
WBC UA: ABNORMAL /HPF (ref 0–5)

## 2021-12-25 PROCEDURE — 1210000000 HC MED SURG R&B

## 2021-12-25 PROCEDURE — 6360000002 HC RX W HCPCS: Performed by: INTERNAL MEDICINE

## 2021-12-25 PROCEDURE — 2580000003 HC RX 258: Performed by: INTERNAL MEDICINE

## 2021-12-25 PROCEDURE — 6370000000 HC RX 637 (ALT 250 FOR IP): Performed by: INTERNAL MEDICINE

## 2021-12-25 PROCEDURE — 83735 ASSAY OF MAGNESIUM: CPT

## 2021-12-25 PROCEDURE — 36415 COLL VENOUS BLD VENIPUNCTURE: CPT

## 2021-12-25 PROCEDURE — 86141 C-REACTIVE PROTEIN HS: CPT

## 2021-12-25 PROCEDURE — 84145 PROCALCITONIN (PCT): CPT

## 2021-12-25 PROCEDURE — 80053 COMPREHEN METABOLIC PANEL: CPT

## 2021-12-25 PROCEDURE — 85025 COMPLETE CBC W/AUTO DIFF WBC: CPT

## 2021-12-25 PROCEDURE — 2060000000 HC ICU INTERMEDIATE R&B

## 2021-12-25 RX ADMIN — ASPIRIN 81 MG: 81 TABLET, COATED ORAL at 10:59

## 2021-12-25 RX ADMIN — LEVETIRACETAM 500 MG: 500 TABLET, FILM COATED ORAL at 21:36

## 2021-12-25 RX ADMIN — Medication 10 ML: at 21:36

## 2021-12-25 RX ADMIN — APIXABAN 5 MG: 5 TABLET, FILM COATED ORAL at 21:36

## 2021-12-25 RX ADMIN — Medication 10 ML: at 11:09

## 2021-12-25 RX ADMIN — ATORVASTATIN CALCIUM 40 MG: 40 TABLET, FILM COATED ORAL at 10:59

## 2021-12-25 RX ADMIN — ENOXAPARIN SODIUM 30 MG: 100 INJECTION SUBCUTANEOUS at 11:01

## 2021-12-25 RX ADMIN — LEVETIRACETAM 500 MG: 100 INJECTION INTRAVENOUS at 02:02

## 2021-12-25 RX ADMIN — DEXAMETHASONE SODIUM PHOSPHATE 6 MG: 4 INJECTION, SOLUTION INTRA-ARTICULAR; INTRALESIONAL; INTRAMUSCULAR; INTRAVENOUS; SOFT TISSUE at 10:59

## 2021-12-25 RX ADMIN — CEFTRIAXONE SODIUM 1000 MG: 1 INJECTION, POWDER, FOR SOLUTION INTRAMUSCULAR; INTRAVENOUS at 13:31

## 2021-12-25 NOTE — PROGRESS NOTES
Hospitalist Progress Note      PCP: Catia Zambrano MD, MD    Date of Admission: 12/22/2021    Chief Complaint:  No acute events, afebrile, stable HD, on 2 liters of NC on/off, confused per nursing staff    Medications:  Reviewed    Infusion Medications    sodium chloride      sodium chloride      dextrose       Scheduled Medications    sodium chloride flush  5-40 mL IntraVENous 2 times per day    apixaban  5 mg Oral BID    sodium chloride flush  5-40 mL IntraVENous 2 times per day    insulin lispro  0-12 Units SubCUTAneous TID WC    insulin lispro  0-6 Units SubCUTAneous Nightly    aspirin  81 mg Oral Daily    atorvastatin  40 mg Oral Daily    levETIRAcetam  500 mg Oral BID    dexamethasone  6 mg IntraVENous Q24H    cefTRIAXone (ROCEPHIN) IV  1,000 mg IntraVENous Q24H     PRN Meds: sodium chloride flush, sodium chloride, sodium chloride flush, sodium chloride, ondansetron **OR** ondansetron, polyethylene glycol, acetaminophen **OR** acetaminophen, glucose, dextrose, glucagon (rDNA), dextrose    No intake or output data in the 24 hours ending 12/25/21 1250    Exam:    BP (!) 136/58   Pulse 66   Temp 97.8 °F (36.6 °C) (Oral)   Resp 18   Wt 145 lb (65.8 kg)   SpO2 98%   BMI 23.41 kg/m²     General appearance: awake, frail, confused  Respiratory:  clear to auscultation bilaterally . Cardiovascular: Regular rate and rhythm, S1/S2. Abdomen: Soft, active bowel sounds. Musculoskeletal: No edema bilaterally.      Labs:   Recent Labs     12/23/21  0000 12/24/21  0600 12/25/21  0521   WBC 13.0* 10.4 11.3*   HGB 11.9* 11.4* 11.1*   HCT 36.2* 34.2* 33.8*    190 240     Recent Labs     12/23/21  0000 12/24/21  0600 12/25/21  0522   * 143 142   K 4.6 4.5 4.3    112* 114*   CO2 18* 16* 17*   BUN 65* 53* 48*   CREATININE 2.18* 1.19 1.02   CALCIUM 8.1* 7.9* 7.6*     Recent Labs     12/23/21  0000 12/24/21  0600 12/25/21  0522   AST 15 19 18   ALT 8 11 11   BILITOT 0.6 0.3 <0.2   ALKPHOS 76 55 56     No results for input(s): INR in the last 72 hours. Recent Labs     12/23/21  0000   TROPONINI 0.072*       Urinalysis:      Lab Results   Component Value Date    NITRU Negative 12/23/2021    WBCUA 20-50 12/23/2021    BACTERIA Negative 12/23/2021    RBCUA 0-2 12/23/2021    BLOODU Negative 12/23/2021    SPECGRAV 1.019 12/23/2021    GLUCOSEU Negative 12/23/2021       Radiology:  IR PICC WO SQ PORT/PUMP > 5 YEARS   Final Result   Successful fluoroscopic and ultrasound-guided placement of a right upper extremity PICC line. XR CHEST PORTABLE   Final Result   NO ACUTE ACTIVE CARDIOPULMONARY PROCESS      CT Head WO Contrast   Final Result      PARANASAL SINUSITIS. NO ACUTE INTRACRANIAL PROCESS OTHER SIGNIFICANT CHANGE FROM 10/28/2021 IDENTIFIED. Assessment/Plan:    80 y. o. male with PMH of CAD s/p CABG, SSS s/p PPM, PAD, RLE DVT s/p right iliac vein stent in 1/2021, CVA on 9/2021, DM2, HTN, CKD3, seizure disorder, hearing loss who presented with:     Acute metabolic encephalopathy  - in the setting of FATEMEH, COVID infection, seizure disorder  - CT head was negative for acute findings  - resumed Keppra  - followed by neurology    COVID infection  - on/off 2 liters of NC  - on Decadron  - monitor respiratory status closely    Hypotension   - improved with IVFs  - monitor BP    Leukocytosis, elevated procalcitonin  - in the setting of COVID infection  - blood cultures no growth  - u/a suggestive of UTI  - on IV Rocephin empirically  - follow urine culture     FATEMEH/CKD3  - improved with IVFs  - monitor renal function    Elevated troponin  - ECG showed SR with no acute ischemic changes  - resume ASA, statin therapy      DM2 with hyperglycemia  - ISS, hypoglycemia protocol     RLE DVT s/p right iliac vein stent in 1/2021  - resumed Eliquis      Diet: ADULT DIET;  Regular    Code Status: DNR-CCA      Disposition - home with [place] [address] vs [facility]      Electronically signed by Javi Brizuela MD on 12/25/2021 at 12:50 PM

## 2021-12-25 NOTE — ED NOTES
From:HOME WITH GRANDDAUGHTER-PALL.  CARE AND VISITING PHYSICIAN    PMH:DEMENTIA, RECENT STROKE, CAD,DM, NSTEMI,PPM    Anticipated Discharge Date:TBD    Anticipated Discharge Disposition: TBD--HOME W/ HHC VS COVID SNF    Patient Mobility or PT/OT ordered: ORDERED    Covid Test Date and Result:12/23 +COVID    Barriers to Discharge:  NEED COVID SNF  MONITOR FOR NEEDS--ON 2L  NEEDS EEG____

## 2021-12-26 LAB
ALBUMIN SERPL-MCNC: 2.8 G/DL (ref 3.5–4.6)
ALP BLD-CCNC: 54 U/L (ref 35–104)
ALT SERPL-CCNC: 11 U/L (ref 0–41)
ANION GAP SERPL CALCULATED.3IONS-SCNC: 12 MEQ/L (ref 9–15)
AST SERPL-CCNC: 20 U/L (ref 0–40)
BASOPHILS ABSOLUTE: 0 K/UL (ref 0–0.2)
BASOPHILS RELATIVE PERCENT: 0.1 %
BILIRUB SERPL-MCNC: 0.4 MG/DL (ref 0.2–0.7)
BUN BLDV-MCNC: 34 MG/DL (ref 8–23)
CALCIUM SERPL-MCNC: 7.7 MG/DL (ref 8.5–9.9)
CHLORIDE BLD-SCNC: 110 MEQ/L (ref 95–107)
CO2: 18 MEQ/L (ref 20–31)
CREAT SERPL-MCNC: 0.99 MG/DL (ref 0.7–1.2)
EOSINOPHILS ABSOLUTE: 0 K/UL (ref 0–0.7)
EOSINOPHILS RELATIVE PERCENT: 0 %
GFR AFRICAN AMERICAN: >60
GFR NON-AFRICAN AMERICAN: >60
GLOBULIN: 3 G/DL (ref 2.3–3.5)
GLUCOSE BLD-MCNC: 126 MG/DL (ref 60–115)
GLUCOSE BLD-MCNC: 134 MG/DL (ref 60–115)
GLUCOSE BLD-MCNC: 148 MG/DL (ref 70–99)
GLUCOSE BLD-MCNC: 189 MG/DL (ref 60–115)
GLUCOSE BLD-MCNC: 222 MG/DL (ref 60–115)
HCT VFR BLD CALC: 34.2 % (ref 42–52)
HEMOGLOBIN: 11.4 G/DL (ref 14–18)
LYMPHOCYTES ABSOLUTE: 1.5 K/UL (ref 1–4.8)
LYMPHOCYTES RELATIVE PERCENT: 14 %
MAGNESIUM: 1.5 MG/DL (ref 1.7–2.4)
MCH RBC QN AUTO: 31.1 PG (ref 27–31.3)
MCHC RBC AUTO-ENTMCNC: 33.4 % (ref 33–37)
MCV RBC AUTO: 93 FL (ref 80–100)
MONOCYTES ABSOLUTE: 0.9 K/UL (ref 0.2–0.8)
MONOCYTES RELATIVE PERCENT: 8.5 %
NEUTROPHILS ABSOLUTE: 8.1 K/UL (ref 1.4–6.5)
NEUTROPHILS RELATIVE PERCENT: 77.4 %
PDW BLD-RTO: 14.1 % (ref 11.5–14.5)
PERFORMED ON: ABNORMAL
PLATELET # BLD: 253 K/UL (ref 130–400)
POTASSIUM SERPL-SCNC: 3.8 MEQ/L (ref 3.4–4.9)
RBC # BLD: 3.67 M/UL (ref 4.7–6.1)
SODIUM BLD-SCNC: 140 MEQ/L (ref 135–144)
TOTAL PROTEIN: 5.8 G/DL (ref 6.3–8)
URINE CULTURE, ROUTINE: NORMAL
WBC # BLD: 10.4 K/UL (ref 4.8–10.8)

## 2021-12-26 PROCEDURE — 80053 COMPREHEN METABOLIC PANEL: CPT

## 2021-12-26 PROCEDURE — 6370000000 HC RX 637 (ALT 250 FOR IP): Performed by: INTERNAL MEDICINE

## 2021-12-26 PROCEDURE — 97535 SELF CARE MNGMENT TRAINING: CPT

## 2021-12-26 PROCEDURE — 6360000002 HC RX W HCPCS: Performed by: INTERNAL MEDICINE

## 2021-12-26 PROCEDURE — 83735 ASSAY OF MAGNESIUM: CPT

## 2021-12-26 PROCEDURE — 2580000003 HC RX 258: Performed by: INTERNAL MEDICINE

## 2021-12-26 PROCEDURE — 1210000000 HC MED SURG R&B

## 2021-12-26 PROCEDURE — 2060000000 HC ICU INTERMEDIATE R&B

## 2021-12-26 PROCEDURE — 85025 COMPLETE CBC W/AUTO DIFF WBC: CPT

## 2021-12-26 RX ORDER — MAGNESIUM SULFATE IN WATER 40 MG/ML
4000 INJECTION, SOLUTION INTRAVENOUS ONCE
Status: COMPLETED | OUTPATIENT
Start: 2021-12-26 | End: 2021-12-27

## 2021-12-26 RX ADMIN — MAGNESIUM SULFATE HEPTAHYDRATE 4000 MG: 40 INJECTION, SOLUTION INTRAVENOUS at 15:11

## 2021-12-26 RX ADMIN — Medication 10 ML: at 23:31

## 2021-12-26 RX ADMIN — ASPIRIN 81 MG: 81 TABLET, COATED ORAL at 10:14

## 2021-12-26 RX ADMIN — ATORVASTATIN CALCIUM 40 MG: 40 TABLET, FILM COATED ORAL at 10:14

## 2021-12-26 RX ADMIN — LEVETIRACETAM 500 MG: 500 TABLET, FILM COATED ORAL at 23:30

## 2021-12-26 RX ADMIN — APIXABAN 5 MG: 5 TABLET, FILM COATED ORAL at 23:30

## 2021-12-26 RX ADMIN — CEFTRIAXONE SODIUM 1000 MG: 1 INJECTION, POWDER, FOR SOLUTION INTRAMUSCULAR; INTRAVENOUS at 15:10

## 2021-12-26 RX ADMIN — Medication 650 MG: at 10:14

## 2021-12-26 RX ADMIN — Medication 10 ML: at 12:44

## 2021-12-26 RX ADMIN — Medication 10 ML: at 10:16

## 2021-12-26 RX ADMIN — APIXABAN 5 MG: 5 TABLET, FILM COATED ORAL at 10:15

## 2021-12-26 RX ADMIN — LEVETIRACETAM 500 MG: 500 TABLET, FILM COATED ORAL at 10:14

## 2021-12-26 RX ADMIN — DEXAMETHASONE SODIUM PHOSPHATE 6 MG: 4 INJECTION, SOLUTION INTRA-ARTICULAR; INTRALESIONAL; INTRAMUSCULAR; INTRAVENOUS; SOFT TISSUE at 10:15

## 2021-12-26 ASSESSMENT — PAIN SCALES - GENERAL
PAINLEVEL_OUTOF10: 0
PAINLEVEL_OUTOF10: 0

## 2021-12-26 NOTE — PROGRESS NOTES
Hospitalist Progress Note      PCP: Rossi Harley MD, MD    Date of Admission: 12/22/2021    Chief Complaint:  No acute events, afebrile, stable HD, on 2 liters of NC on/off, confused per nursing staff    Medications:  Reviewed    Infusion Medications    sodium chloride      sodium chloride      dextrose       Scheduled Medications    magnesium sulfate  4,000 mg IntraVENous Once    sodium chloride flush  5-40 mL IntraVENous 2 times per day    apixaban  5 mg Oral BID    sodium chloride flush  5-40 mL IntraVENous 2 times per day    insulin lispro  0-12 Units SubCUTAneous TID WC    insulin lispro  0-6 Units SubCUTAneous Nightly    aspirin  81 mg Oral Daily    atorvastatin  40 mg Oral Daily    levETIRAcetam  500 mg Oral BID    dexamethasone  6 mg IntraVENous Q24H    cefTRIAXone (ROCEPHIN) IV  1,000 mg IntraVENous Q24H     PRN Meds: sodium chloride flush, sodium chloride, sodium chloride flush, sodium chloride, ondansetron **OR** ondansetron, polyethylene glycol, acetaminophen **OR** acetaminophen, glucose, dextrose, glucagon (rDNA), dextrose      Intake/Output Summary (Last 24 hours) at 12/26/2021 1418  Last data filed at 12/25/2021 1639  Gross per 24 hour   Intake 420 ml   Output --   Net 420 ml       Exam:    BP (!) 134/55   Pulse 62   Temp 97.9 °F (36.6 °C)   Resp 18   Wt 145 lb (65.8 kg)   SpO2 95%   BMI 23.41 kg/m²     General appearance: awake, frail, confused  Respiratory:  clear to auscultation bilaterally . Cardiovascular: Regular rate and rhythm, S1/S2. Abdomen: Soft, active bowel sounds. Musculoskeletal: No edema bilaterally.      Labs:   Recent Labs     12/24/21  0600 12/25/21  0521 12/26/21  0600   WBC 10.4 11.3* 10.4   HGB 11.4* 11.1* 11.4*   HCT 34.2* 33.8* 34.2*    240 253     Recent Labs     12/24/21  0600 12/25/21  0522 12/26/21  0600    142 140   K 4.5 4.3 3.8   * 114* 110*   CO2 16* 17* 18*   BUN 53* 48* 34*   CREATININE 1.19 1.02 0.99   CALCIUM 7.9* 7.6* 7.7*     Recent Labs     12/24/21  0600 12/25/21  0522 12/26/21  0600   AST 19 18 20   ALT 11 11 11   BILITOT 0.3 <0.2 0.4   ALKPHOS 55 56 54     No results for input(s): INR in the last 72 hours. No results for input(s): Patrisha Meigs in the last 72 hours. Urinalysis:      Lab Results   Component Value Date    NITRU Negative 12/23/2021    WBCUA 20-50 12/23/2021    BACTERIA Negative 12/23/2021    RBCUA 0-2 12/23/2021    BLOODU Negative 12/23/2021    SPECGRAV 1.019 12/23/2021    GLUCOSEU Negative 12/23/2021       Radiology:  IR PICC WO SQ PORT/PUMP > 5 YEARS   Final Result   Successful fluoroscopic and ultrasound-guided placement of a right upper extremity PICC line. XR CHEST PORTABLE   Final Result   NO ACUTE ACTIVE CARDIOPULMONARY PROCESS      CT Head WO Contrast   Final Result      PARANASAL SINUSITIS. NO ACUTE INTRACRANIAL PROCESS OTHER SIGNIFICANT CHANGE FROM 10/28/2021 IDENTIFIED. Assessment/Plan:    80 y. o. male with PMH of CAD s/p CABG, SSS s/p PPM, PAD, RLE DVT s/p right iliac vein stent in 1/2021, CVA on 9/2021, DM2, HTN, CKD3, seizure disorder, hearing loss who presented with:     Acute metabolic encephalopathy  - in the setting of FATEMEH, COVID infection, seizure disorder  - CT head was negative for acute findings  - resumed Keppra  - followed by neurology    COVID infection  - on/off 2 liters of NC  - on Decadron  - monitor respiratory status closely    Hypotension   - improved with IVFs  - monitor BP    Leukocytosis, elevated procalcitonin  - in the setting of COVID infection  - blood cultures no growth  - u/a suggestive of UTI  - on IV Rocephin empirically  - follow urine culture     FATEMEH/CKD3  - improved with IVFs  - monitor renal function    Elevated troponin  - ECG showed SR with no acute ischemic changes  - resume ASA, statin therapy      DM2 with hyperglycemia  - ISS, hypoglycemia protocol     RLE DVT s/p right iliac vein stent in 1/2021  - resumed Eliquis      Diet: ADULT DIET;  Regular    Code Status: DNR-CCA      Disposition - home with [address]      Electronically signed by Zuhair Interiano MD on 12/26/2021 at 2:18 PM

## 2021-12-26 NOTE — PROGRESS NOTES
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) ([address]     Lethargy     AMS (altered mental status) 10/27/2021    Acute respiratory failure ([address] 10/27/2021    Abnormality of gait and mobility 2021    Neurogenic bladder 2021    Bilateral hearing loss 2021    Left hemiparesis ([address] 2021    Impaired gait and mobility dt R occipital CVA 2021    Aphasia     Acute CVA (cerebrovascular accident) ([address] 2021    General weakness 09/15/2021    Hyperkalemia 09/15/2021    Anticoagulation adequate with anticoagulant therapy 09/15/2021    Arterial occlusion 2021    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    CKD (chronic kidney disease) 2021    PVD (peripheral vascular disease) ([address] 2021    Venous thrombosis of leg 2021    Hyponatremia 2021    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    Syncope and collapse 2020    COPD exacerbation ([address] 2020    NSTEMI (non-ST elevated myocardial infarction) ([address] 2019    Chest pain 2019    Hypotension 2019    SOB (shortness of breath) 2018    Chronic right shoulder pain 2018    COPD with acute exacerbation ([address] 2018    Bronchitis 2016    CAD (coronary artery disease) 2015    HTN (hypertension)     Dyslipidemia     DM [facility]diabetes mellitus) [facility]     Hypothyroidism     History of tobacco abuse     Anemia         Past Medical History:   Diagnosis Date    Anemia     CAD (coronary artery disease) 4/16/2015    DM (diabetes mellitus) ([facility]     Dyslipidemia     History of tobacco abuse     HTN (hypertension)     Hypothyroidism     Non-ST elevation MI (NSTEMI) ([facility]     Pacemaker 4/16/2015     Past Surgical History:   Procedure Laterality Date    CATARACT REMOVAL      MIDDLE EAR SURGERY Left 1998    \"they had to reset the bones\" after an infection       Restrictions  Restrictions/Precautions: Fall Risk;Isolation (high fall risk per Hinojosa score; COVID-19+; droplet +)    SUBJECTIVE   General  Chart Reviewed: Yes  Family / Caregiver Present: No  Subjective  Subjective: \"I want the head of this bed down. \"    Pre-Session Pain Report  Pre Treatment Pain Screening  Pain at present: 0  Intervention List: Patient able to continue with treatment  Pain Screening  Patient Currently in Pain: Denies       Post-Session Pain Report  Pain Assessment  Pain Level: 0         OBJECTIVE   Orientation  Orientation Level: Oriented to person;Disoriented to place; Disoriented to time;Disoriented to situation    Bed mobility  Rolling to Left: Stand by assistance  Supine to Sit: Moderate assistance (assist with trunk)  Sit to Supine: Moderate assistance (assist with BLE and trunk once supine)  Scooting: Moderate assistance  Comment: HOB flat, use of HR. VC/TC's for hand placement and improved technique    Transfers  Sit to Stand: Unable to assess  Stand to sit: Unable to assess  Comment: Pt declines and returns to supine despite encouragement from this PTA       Exercises  Heelslides: x10  Hip Flexion: x10 seated  Knee Long Arc Quad: x10  Ankle Pumps: x10         ASSESSMENT   Assessment: Pt more alert this session but declines trialing STS trsf. Pt able to maintain midline sitting EOB x2 min with BUE at SBA.  Ther ex performed to continue to strengthen BLE and improve pt's endurance. Poor follow through of vc's and tc's. Discharge Recommendations:  Continue to assess pending progress,Patient would benefit from continued therapy after discharge    Goals  Long term goals  Long term goal 1: Bed mobility with SBA  Long term goal 2: unsupported sitting with functional reach >6 inches with supervision  Long term goal 3: functional transfers with  Min A to promote OOB activity  Long term goal 4: Stand with B UE support 2 min with Min A  Long term goal 5: Progress to ambulation with AD when approriate with Mod A  Patient Goals   Patient goals : unable to state    PLAN    Times per week: 3  Plan Comment: Cont. POC  Safety Devices  Type of devices: All fall risk precautions in place,Bed alarm in place,Call light within reach,Left in bed,Nurse notified     [facility] (6 CLICK) H[address] Echols [facility] Inpatient Mobility Raw Score : 11     Therapy Time   Individual   Time In 1318   Time Out 1332   Minutes 14      BM/Trsf: 8  There ex: 6       Melivn Spencer PTA, 12/26/21 at 1:39 PM         Definitions for assistance levels  Independent = pt does not require any physical supervision or assistance from another person for activity completion. Device may be needed.   Stand by assistance = pt requires verbal cues or instructions from another person, close to but not touching, to perform the activity  Minimal assistance= pt performs 75% or more of the activity; assistance is required to complete the activity  Moderate assistance= pt performs 50% of the activity; assistance is required to complete the activity  Maximal assistance = pt performs 25% of the activity; assistance is required to complete the activity  Dependent = pt requires total physical assistance to accomplish the task

## 2021-12-26 NOTE — CARE COORDINATION
Spoke with dtr today regarding discharge needs. Explained to the dtr that he will most likely need a COVID SNF prior to discharging home. She agrees. I explained the [address] were limited and that we would need to verify on Monday to see which ones would have beds available. If patient was accepted and didn't need a precert he could be discharged as early as tomorrow. She understood. CM will need to f/u with COVID available SNFs Monday.

## 2021-12-26 NOTE — PROGRESS NOTES
Agree with previous RN assessment, patient A&O x1, on 2L NC sat'ing in 90's. Patient remains calm throughout shift, redireactable, voiding in urinal. Magnesium given, rocephin given.      Electronically signed by Brad Stoll RN on 12/26/2021 at 5:55 PM

## 2021-12-27 LAB
ALBUMIN SERPL-MCNC: 2.8 G/DL (ref 3.5–4.6)
ALP BLD-CCNC: 56 U/L (ref 35–104)
ALT SERPL-CCNC: 13 U/L (ref 0–41)
ANION GAP SERPL CALCULATED.3IONS-SCNC: 10 MEQ/L (ref 9–15)
AST SERPL-CCNC: 21 U/L (ref 0–40)
BASOPHILS ABSOLUTE: 0 K/UL (ref 0–0.2)
BASOPHILS RELATIVE PERCENT: 0 %
BILIRUB SERPL-MCNC: 0.4 MG/DL (ref 0.2–0.7)
BUN BLDV-MCNC: 26 MG/DL (ref 8–23)
C-REACTIVE PROTEIN, HIGH SENSITIVITY: 24 MG/L (ref 0–5)
CALCIUM SERPL-MCNC: 7.7 MG/DL (ref 8.5–9.9)
CHLORIDE BLD-SCNC: 103 MEQ/L (ref 95–107)
CO2: 22 MEQ/L (ref 20–31)
CREAT SERPL-MCNC: 0.91 MG/DL (ref 0.7–1.2)
EOSINOPHILS ABSOLUTE: 0 K/UL (ref 0–0.7)
EOSINOPHILS RELATIVE PERCENT: 0 %
GFR AFRICAN AMERICAN: >60
GFR NON-AFRICAN AMERICAN: >60
GLOBULIN: 3.1 G/DL (ref 2.3–3.5)
GLUCOSE BLD-MCNC: 166 MG/DL (ref 70–99)
GLUCOSE BLD-MCNC: 201 MG/DL (ref 60–115)
GLUCOSE BLD-MCNC: 213 MG/DL (ref 60–115)
GLUCOSE BLD-MCNC: 277 MG/DL (ref 60–115)
HCT VFR BLD CALC: 35.3 % (ref 42–52)
HEMOGLOBIN: 11.7 G/DL (ref 14–18)
LYMPHOCYTES ABSOLUTE: 1.2 K/UL (ref 1–4.8)
LYMPHOCYTES RELATIVE PERCENT: 15.3 %
MAGNESIUM: 2.3 MG/DL (ref 1.7–2.4)
MCH RBC QN AUTO: 30.7 PG (ref 27–31.3)
MCHC RBC AUTO-ENTMCNC: 33 % (ref 33–37)
MCV RBC AUTO: 92.8 FL (ref 80–100)
MONOCYTES ABSOLUTE: 0.7 K/UL (ref 0.2–0.8)
MONOCYTES RELATIVE PERCENT: 9.2 %
NEUTROPHILS ABSOLUTE: 5.9 K/UL (ref 1.4–6.5)
NEUTROPHILS RELATIVE PERCENT: 75.5 %
PDW BLD-RTO: 13.9 % (ref 11.5–14.5)
PERFORMED ON: ABNORMAL
PLATELET # BLD: 241 K/UL (ref 130–400)
POTASSIUM SERPL-SCNC: 3.9 MEQ/L (ref 3.4–4.9)
PROCALCITONIN: 0.1 NG/ML (ref 0–0.15)
RBC # BLD: 3.81 M/UL (ref 4.7–6.1)
SODIUM BLD-SCNC: 135 MEQ/L (ref 135–144)
TOTAL PROTEIN: 5.9 G/DL (ref 6.3–8)
WBC # BLD: 7.9 K/UL (ref 4.8–10.8)

## 2021-12-27 PROCEDURE — 2580000003 HC RX 258: Performed by: INTERNAL MEDICINE

## 2021-12-27 PROCEDURE — APPSS30 APP SPLIT SHARED TIME 16-30 MINUTES: Performed by: NURSE PRACTITIONER

## 2021-12-27 PROCEDURE — 6360000002 HC RX W HCPCS: Performed by: INTERNAL MEDICINE

## 2021-12-27 PROCEDURE — 84145 PROCALCITONIN (PCT): CPT

## 2021-12-27 PROCEDURE — 86141 C-REACTIVE PROTEIN HS: CPT

## 2021-12-27 PROCEDURE — 2060000000 HC ICU INTERMEDIATE R&B

## 2021-12-27 PROCEDURE — 6370000000 HC RX 637 (ALT 250 FOR IP): Performed by: INTERNAL MEDICINE

## 2021-12-27 PROCEDURE — 95816 EEG AWAKE AND DROWSY: CPT

## 2021-12-27 PROCEDURE — 80053 COMPREHEN METABOLIC PANEL: CPT

## 2021-12-27 PROCEDURE — 83735 ASSAY OF MAGNESIUM: CPT

## 2021-12-27 PROCEDURE — 2700000000 HC OXYGEN THERAPY PER DAY

## 2021-12-27 PROCEDURE — 85025 COMPLETE CBC W/AUTO DIFF WBC: CPT

## 2021-12-27 PROCEDURE — 99233 SBSQ HOSP IP/OBS HIGH 50: CPT | Performed by: PSYCHIATRY & NEUROLOGY

## 2021-12-27 RX ADMIN — LEVETIRACETAM 500 MG: 500 TABLET, FILM COATED ORAL at 09:51

## 2021-12-27 RX ADMIN — APIXABAN 5 MG: 5 TABLET, FILM COATED ORAL at 21:11

## 2021-12-27 RX ADMIN — DEXAMETHASONE SODIUM PHOSPHATE 6 MG: 4 INJECTION, SOLUTION INTRA-ARTICULAR; INTRALESIONAL; INTRAMUSCULAR; INTRAVENOUS; SOFT TISSUE at 09:51

## 2021-12-27 RX ADMIN — LEVETIRACETAM 500 MG: 500 TABLET, FILM COATED ORAL at 21:11

## 2021-12-27 RX ADMIN — Medication 20 ML: at 09:51

## 2021-12-27 RX ADMIN — Medication 10 ML: at 21:11

## 2021-12-27 RX ADMIN — ASPIRIN 81 MG: 81 TABLET, COATED ORAL at 09:51

## 2021-12-27 RX ADMIN — APIXABAN 5 MG: 5 TABLET, FILM COATED ORAL at 09:51

## 2021-12-27 RX ADMIN — ATORVASTATIN CALCIUM 40 MG: 40 TABLET, FILM COATED ORAL at 09:51

## 2021-12-27 RX ADMIN — Medication 10 ML: at 00:10

## 2021-12-27 ASSESSMENT — ENCOUNTER SYMPTOMS
COUGH: 0
COLOR CHANGE: 0
TROUBLE SWALLOWING: 0
ABDOMINAL DISTENTION: 0
NAUSEA: 0
VOMITING: 0
WHEEZING: 0

## 2021-12-27 NOTE — CARE COORDINATION
CM Daily Update    Per chart review, patient will likely require SNF placement at discharge due to care needs post-hospital stay. Per notes, CM had discussed with daughter over the last two days that patient would have to go to a SNF that accepts COVID patients. 1415- This CM contacted patient's daughter Justyn ePrkins who resides in [place]. CM informed daughter that options for COVID-accepting facilities were limited and include [facility] and [address] in [place]. CM/SW confirmed that neither of these facilities have an open bed as of this date. CM asked if family would want CM/SW to pursue an [place]-of-[place]ounty SNF placement. 0- Patient's daughter reports that patient lives with his granddaughter and her children and daughter would like to include her in this DC planning conversation. Daughter to contact granddaughter and call this CM back. 1510- CM spoke at length with patient's daughter Justyn Perkins and granddaughter Darryle Goods regarding the plan for patient. Decision has been made to move forward with SNF placement. Granddaughter asked that CM check into [facility] - CM called and they do not accept COVID positive patients. Additional options (as discussed above) were reviewed. Plan to call referral to [address] as choice #1.    5386- Call placed to [facility] with Fan regarding referral. No answer, left message requesting call back for new referral.    1600- Referral faxed to [phone].

## 2021-12-27 NOTE — PROGRESS NOTES
[facility] Neurology Daily Progress Note  Name: Margi Travis  Age: 80 y.o. Gender: male  CodeStatus: DNR-CCA  Allergies: No Known Allergies    Chief Complaint:Altered Mental Status    Primary Care Provider: Joss Leger MD, MD  InpatientTreatment Team: Treatment Team: Attending Provider: Korin Pretty MD; Consulting Physician: Sandor Carrasco MD; Consulting Physician: Deena Rivera MD; Registered Nurse: Naheed Bryan RN; Utilization Reviewer: Chloé Guevara RN  Admission Date: 12/22/2021      Pt seen and examined for neuro follow up for Acute metabolic encephalopathy in the setting of COVID-19, hypotension, FATEMEH, dehydration. Patient is Covid positive on Covid isolation. HPI obtained from nursing staff and medical records. Blood pressure improved. Afebrile. On 2 L per nasal cannula. Remains confused. No focal deficits or seizure activity reported. Events noted, no new changes,     Vitals:    12/27/21 0211   BP:    Pulse:    Resp:    Temp:    SpO2: 100%      Review of Systems   Constitutional: Positive for appetite change. Negative for fever. HENT: Negative for hearing loss and trouble swallowing. Respiratory: Negative for cough and wheezing. Cardiovascular: Negative for chest pain, palpitations and leg swelling. Gastrointestinal: Negative for abdominal distention, nausea and vomiting. Genitourinary: Negative for difficulty urinating. Skin: Negative for color change and rash. Neurological: Positive for weakness (  Generalized). Negative for tremors, seizures, syncope, facial asymmetry and speech difficulty. Psychiatric/Behavioral: Positive for behavioral problems and confusion. Negative for agitation and hallucinations. The patient is not nervous/anxious.       Physical Exam  Deferred due to Covid isolation        Medications:  Reviewed    Infusion Medications:    sodium chloride      sodium chloride      dextrose       Scheduled Medications:    sodium chloride flush  5-40 mL IntraVENous 2 placed during the hospital encounter of 12/22/21    CT Head WO Contrast    Narrative  CT HEAD WO CONTRAST : 12/23/2021    CLINICAL HISTORY: Altered mental status. COMPARISON: 10/28/2021. TECHNIQUE: Spiral unenhanced images were obtained of the head, with routine multiplanar reconstructions performed. All CT scans at this facility use dose modulation, iterative reconstruction, and/or weight based dosing when appropriate to reduce radiation dose to as low as reasonably achievable. FINDINGS:    There is no intracranial hemorrhage, mass effect, midline shift, extra-axial collection, evidence of hydrocephalus, recent ischemic infarct, or skull fracture identified. A chronic right temporal occipital ischemic infarct has evolved from the prior study. Mild to moderate generalized cerebral volume loss and mild to moderate white matter changes are again noted. Moderate mucosal thickening and bubbly fluid is present within the paranasal sinuses. The mastoid air cells are clear. Impression  PARANASAL SINUSITIS. NO ACUTE INTRACRANIAL PROCESS OTHER SIGNIFICANT CHANGE FROM 10/28/2021 IDENTIFIED. No results found for this or any previous visit. No results found for this or any previous visit. Carotid duplex: No results found for this or any previous visit. No results found for this or any previous visit. Results for orders placed during the hospital encounter of 09/16/21    US CAROTID ARTERY BILATERAL    Narrative  US CAROTID ARTERY BILATERAL: 9/16/2021 6:40 PM    CLINICAL HISTORY:  cva .    COMPARISON: None available. Grayscale, color and waveform Doppler analysis of the cervical carotid and vertebral arteries was performed. Validated velocity measurements with angiographic measurements, velocity criteria are extrapolated from diameter data as defined by the Society of Radiologist in [address] [place] [employer] [address] Drive Radiology 2003; 365;933-207.       FINDINGS:    There is mild atherosclerotic plaquing of the common carotid arteries and carotid bifurcations without significantly elevated velocities. Maximum systolic velocity within the right internal and mid common carotid arteries are 79 and 51 cm/s, with an ICA/CCA ratio of approximately 1.5 , which indicates less than 50% by velocity criteria. Maximum systolic velocity within the left internal and mid common carotid arteries are 59 and 74 cm/s, with an ICA/CCA ratio of approximately 0.9, which indicates less than 50% by velocity criteria. Maximum velocities within the right and left external carotid arteries are 141 and 81 cm/sec respectively. There is antegrade flow in both vertebral arteries. Impression  MILD ATHEROSCLEROTIC PLAQUING OF THE CAROTID BULBS WITHOUT EVIDENCE OF A FLOW-LIMITING STENOSIS, BY VELOCITY RATIO CRITERIA. GOSINK CRITERIA    Diameter          PSV t            EDV t          PSV          EDV          Stenosis Site  %              cm/sec          cm/sec      ICA/CCA    ICA/CCA    0-49                 <124              <40             <2:1           ---                 ---    50-69              125-225                  >2:1           ---                 ---    70-89               225-325          >100          >4:1           >5:1              ---    90%+                >325              >100          >4:1           >9:1           Damped resistive CCA    >95%               May be            May be      Damped      ---              Damped resistive CCA  decreased      decreased  resistive CCA      Echo No results found for this or any previous visit. Assessment/Plan:    Encephalopathy is most in keeping with new diagnosis of COVID-19. No evidence of breakthrough seizures. No evidence of focal deficit on exam.  Can continue current antiepileptic therapy. Should his level of consciousness not improve as his respiratory status improves, can consider EEG.      Acute metabolic encephalopathy in the setting of COVID-19, hypotension, FATEMEH, dehydration. Patient with history of underlying dementia, seizure disorder and CVA. CT the head negative for any acute findings. Patient remains confused. Nonfocal. No seizure activity noted. We will continue patient on Keppra, obtain EEG, and monitor. COVID encephalopathy with underlying dementia  Nonfocal  History of seizures. Remains on Keppra. No seizure activity reported. EEG pending  We will continue to follow    I have personally performed a face to face diagnostic evaluation on this patient, reviewed all data and investigations, and am the sole provider of all clinical decisions on the neurological status of this patient. Covid encephalopathy superimposed on dementisa, will see how she does      German SADIE Liz MD, Wni Busots, American Board of Psychiatry & Neurology  Board Certified in Vascular Neurology  Board Certified in Neuromuscular Medicine  Certified in Neurorehabilitation     Collaborating physicians: Dr Joon Liz and Dr Vargas    Electronically signed by CHRISTINE Hawley CNP on 12/27/2021 at 9:58 AM     I

## 2021-12-27 NOTE — PROGRESS NOTES
Patient Granddaughter phone, she reports he \"eats like a bird, and all he wants to do is sleep,\" He does ambulate at times in the home with a walker but usually only eats one meal a day.

## 2021-12-27 NOTE — PROGRESS NOTES
Hospitalist Progress Note      PCP: Lei Galarza MD, MD    Date of Admission: 12/22/2021    Chief Complaint:  No acute events, afebrile, stable HD, on 2 liters of NC, oral intake remains poor per nursing staff    Medications:  Reviewed    Infusion Medications    sodium chloride      sodium chloride      dextrose       Scheduled Medications    sodium chloride flush  5-40 mL IntraVENous 2 times per day    apixaban  5 mg Oral BID    sodium chloride flush  5-40 mL IntraVENous 2 times per day    insulin lispro  0-12 Units SubCUTAneous TID WC    insulin lispro  0-6 Units SubCUTAneous Nightly    aspirin  81 mg Oral Daily    atorvastatin  40 mg Oral Daily    levETIRAcetam  500 mg Oral BID    dexamethasone  6 mg IntraVENous Q24H     PRN Meds: sodium chloride flush, sodium chloride, sodium chloride flush, sodium chloride, ondansetron **OR** ondansetron, polyethylene glycol, acetaminophen **OR** acetaminophen, glucose, dextrose, glucagon (rDNA), dextrose      Intake/Output Summary (Last 24 hours) at 12/27/2021 1147  Last data filed at 12/26/2021 1755  Gross per 24 hour   Intake --   Output 150 ml   Net -150 ml       Exam:    BP (!) 153/63   Pulse 61   Temp 97.5 °F (36.4 °C) (Oral)   Resp 18   Wt 145 lb (65.8 kg)   SpO2 100%   BMI 23.41 kg/m²     General appearance: awake, frail-appearing  Respiratory:  clear to auscultation bilaterally . Cardiovascular: Regular rate and rhythm, S1/S2. Abdomen: Soft, active bowel sounds. Musculoskeletal: No edema bilaterally.      Labs:   Recent Labs     12/25/21 0521 12/26/21 0600 12/27/21  0530   WBC 11.3* 10.4 7.9   HGB 11.1* 11.4* 11.7*   HCT 33.8* 34.2* 35.3*    253 241     Recent Labs     12/25/21  0522 12/26/21  0600 12/27/21  0531    140 135   K 4.3 3.8 3.9   * 110* 103   CO2 17* 18* 22   BUN 48* 34* 26*   CREATININE 1.02 0.99 0.91   CALCIUM 7.6* 7.7* 7.7*     Recent Labs     12/25/21  0522 12/26/21  0600 12/27/21  0531   AST 18 20 21   ALT manager following      Electronically signed by Neeraj Bass MD on 12/27/2021 at 11:47 AM

## 2021-12-28 LAB
BLOOD CULTURE, ROUTINE: NORMAL
CULTURE, BLOOD 2: NORMAL
GLUCOSE BLD-MCNC: 115 MG/DL (ref 60–115)
GLUCOSE BLD-MCNC: 177 MG/DL (ref 60–115)
GLUCOSE BLD-MCNC: 195 MG/DL (ref 60–115)
GLUCOSE BLD-MCNC: 197 MG/DL (ref 60–115)
PERFORMED ON: ABNORMAL
PERFORMED ON: NORMAL

## 2021-12-28 PROCEDURE — 2700000000 HC OXYGEN THERAPY PER DAY

## 2021-12-28 PROCEDURE — APPSS15 APP SPLIT SHARED TIME 0-15 MINUTES: Performed by: NURSE PRACTITIONER

## 2021-12-28 PROCEDURE — 6360000002 HC RX W HCPCS: Performed by: INTERNAL MEDICINE

## 2021-12-28 PROCEDURE — 6370000000 HC RX 637 (ALT 250 FOR IP): Performed by: INTERNAL MEDICINE

## 2021-12-28 PROCEDURE — 97116 GAIT TRAINING THERAPY: CPT

## 2021-12-28 PROCEDURE — 2580000003 HC RX 258: Performed by: INTERNAL MEDICINE

## 2021-12-28 PROCEDURE — 99233 SBSQ HOSP IP/OBS HIGH 50: CPT | Performed by: PSYCHIATRY & NEUROLOGY

## 2021-12-28 PROCEDURE — 2060000000 HC ICU INTERMEDIATE R&B

## 2021-12-28 RX ORDER — DEXAMETHASONE 6 MG/1
6 TABLET ORAL
Qty: 3 TABLET | Refills: 0 | Status: SHIPPED | OUTPATIENT
Start: 2021-12-28 | End: 2021-12-31

## 2021-12-28 RX ADMIN — ASPIRIN 81 MG: 81 TABLET, COATED ORAL at 10:58

## 2021-12-28 RX ADMIN — DEXAMETHASONE SODIUM PHOSPHATE 6 MG: 4 INJECTION, SOLUTION INTRA-ARTICULAR; INTRALESIONAL; INTRAMUSCULAR; INTRAVENOUS; SOFT TISSUE at 10:58

## 2021-12-28 RX ADMIN — ATORVASTATIN CALCIUM 40 MG: 40 TABLET, FILM COATED ORAL at 10:58

## 2021-12-28 RX ADMIN — LEVETIRACETAM 500 MG: 500 TABLET, FILM COATED ORAL at 10:58

## 2021-12-28 RX ADMIN — APIXABAN 5 MG: 5 TABLET, FILM COATED ORAL at 10:58

## 2021-12-28 RX ADMIN — Medication 20 ML: at 10:58

## 2021-12-28 ASSESSMENT — ENCOUNTER SYMPTOMS
NAUSEA: 0
WHEEZING: 0
TROUBLE SWALLOWING: 0
ABDOMINAL DISTENTION: 0
COLOR CHANGE: 0
COUGH: 0
VOMITING: 0

## 2021-12-28 ASSESSMENT — PAIN SCALES - GENERAL: PAINLEVEL_OUTOF10: 0

## 2021-12-28 NOTE — PROGRESS NOTES
Patient assessed. VSS. Unable to assess patient's orientation. Patient is very  hard of hearing and unable to understand what is being said, but does follow command. AVASYS present. Patient stands at bedside to use urinal. No sign of distress. O2 @ 96% on 2L. Ate one cup of applesauce this evening.

## 2021-12-28 NOTE — PROGRESS NOTES
[facility] Neurology Daily Progress Note  Name: Heber Mendez  Age: 80 y.o. Gender: male  CodeStatus: DNR-CCA  Allergies: No Known Allergies    Chief Complaint:Altered Mental Status    Primary Care Provider: Stacey Rangel MD, MD  InpatientTreatment Team: Treatment Team: Attending Provider: Ludmila Lopez MD; Consulting Physician: Magnus Rao MD; Consulting Physician: Leopold Hunger, MD; Utilization Reviewer: Janneth Crandall RN; : Jeannine Ly RN; Registered Nurse: Alix Paredes RN  Admission Date: 12/22/2021      neuro follow up for Acute metabolic encephalopathy in the setting of COVID-19, hypotension, FATEMEH, dehydration. Patient is Covid positive on Covid isolation. HPI obtained from nursing staff and medical records. Blood pressure improved. Afebrile. On 2 L per nasal cannula. Remains confused. No focal deficits or seizure activity reported. Nursing reports that patient is doing well today. Participating in physical therapy this morning. Sitting up in the chair. Eating and drinking well. Doing about the same, intermittent confusion      Vitals:    12/28/21 0651   BP: (!) 152/72   Pulse: 64   Resp: 16   Temp: 97.9 °F (36.6 °C)   SpO2: 95%      Review of Systems   Constitutional: Negative for appetite change and fever. HENT: Negative for hearing loss and trouble swallowing. Respiratory: Negative for cough and wheezing. Cardiovascular: Negative for chest pain, palpitations and leg swelling. Gastrointestinal: Negative for abdominal distention, nausea and vomiting. Genitourinary: Negative for difficulty urinating. Skin: Negative for color change and rash. Neurological: Negative for tremors, seizures, syncope, facial asymmetry, speech difficulty and weakness (  Generalized). Psychiatric/Behavioral: Positive for confusion. Negative for agitation, behavioral problems and hallucinations. The patient is not nervous/anxious.       Physical Exam  Deferred due to Covid isolation        Medications:  Reviewed    Infusion Medications:    sodium chloride      sodium chloride      dextrose       Scheduled Medications:    sodium chloride flush  5-40 mL IntraVENous 2 times per day    apixaban  5 mg Oral BID    sodium chloride flush  5-40 mL IntraVENous 2 times per day    insulin lispro  0-12 Units SubCUTAneous TID WC    insulin lispro  0-6 Units SubCUTAneous Nightly    aspirin  81 mg Oral Daily    atorvastatin  40 mg Oral Daily    levETIRAcetam  500 mg Oral BID    dexamethasone  6 mg IntraVENous Q24H     PRN Meds: sodium chloride flush, sodium chloride, sodium chloride flush, sodium chloride, ondansetron **OR** ondansetron, polyethylene glycol, acetaminophen **OR** acetaminophen, glucose, dextrose, glucagon (rDNA), dextrose    Labs:   Recent Labs     12/26/21  0600 12/27/21  0530   WBC 10.4 7.9   HGB 11.4* 11.7*   HCT 34.2* 35.3*    241     Recent Labs     12/26/21  0600 12/27/21  0531    135   K 3.8 3.9   * 103   CO2 18* 22   BUN 34* 26*   CREATININE 0.99 0.91   CALCIUM 7.7* 7.7*     Recent Labs     12/26/21  0600 12/27/21  0531   AST 20 21   ALT 11 13   BILITOT 0.4 0.4   ALKPHOS 54 56     No results for input(s): INR in the last 72 hours. No results for input(s): Patrisha Meigs in the last 72 hours. Urinalysis:   Lab Results   Component Value Date    NITRU Negative 12/23/2021    WBCUA 20-50 12/23/2021    BACTERIA Negative 12/23/2021    RBCUA 0-2 12/23/2021    BLOODU Negative 12/23/2021    SPECGRAV 1.019 12/23/2021    GLUCOSEU Negative 12/23/2021       Radiology:   Most recent    EEG No valid procedures specified. MRI of Brain No results found for this or any previous visit. No results found for this or any previous visit. MRA of the Head and Neck: No results found for this or any previous visit. No results found for this or any previous visit. No results found for this or any previous visit.                             CT of the Head: Results for orders placed during the hospital encounter of 12/22/21    CT Head WO Contrast    Narrative  CT HEAD WO CONTRAST : 12/23/2021    CLINICAL HISTORY: Altered mental status. COMPARISON: 10/28/2021. TECHNIQUE: Spiral unenhanced images were obtained of the head, with routine multiplanar reconstructions performed. All CT scans at this facility use dose modulation, iterative reconstruction, and/or weight based dosing when appropriate to reduce radiation dose to as low as reasonably achievable. FINDINGS:    There is no intracranial hemorrhage, mass effect, midline shift, extra-axial collection, evidence of hydrocephalus, recent ischemic infarct, or skull fracture identified. A chronic right temporal occipital ischemic infarct has evolved from the prior study. Mild to moderate generalized cerebral volume loss and mild to moderate white matter changes are again noted. Moderate mucosal thickening and bubbly fluid is present within the paranasal sinuses. The mastoid air cells are clear. Impression  PARANASAL SINUSITIS. NO ACUTE INTRACRANIAL PROCESS OTHER SIGNIFICANT CHANGE FROM 10/28/2021 IDENTIFIED. No results found for this or any previous visit. No results found for this or any previous visit. Carotid duplex: No results found for this or any previous visit. No results found for this or any previous visit. Results for orders placed during the hospital encounter of 09/16/21    US CAROTID ARTERY BILATERAL    Narrative  US CAROTID ARTERY BILATERAL: 9/16/2021 6:40 PM    CLINICAL HISTORY:  cva .    COMPARISON: None available. Grayscale, color and waveform Doppler analysis of the cervical carotid and vertebral arteries was performed.     Validated velocity measurements with angiographic measurements, velocity criteria are extrapolated from diameter data as defined by the Society of Radiologist in [facility] 13 Radiology 2003; can consider EEG. Acute metabolic encephalopathy in the setting of COVID-19, hypotension, FATEMEH, dehydration. Patient with history of underlying dementia, seizure disorder and CVA. CT the head negative for any acute findings. Patient remains confused. Nonfocal. No seizure activity noted. We will continue patient on Keppra, obtain EEG, and monitor. COVID encephalopathy with underlying dementia  Nonfocal  History of seizures. Remains on Keppra. No seizure activity reported. EEG pending    I have personally performed a face to face diagnostic evaluation on this patient, reviewed all data and investigations, and am the sole provider of all clinical decisions on the neurological status of this patient. encephalopathy, new COVID, nonfocal, keep on keppra      German SADIE Gonzales MD, Aaron Byrnes, American Board of Psychiatry & Neurology  Board Certified in Vascular Neurology  Board Certified in Neuromuscular Medicine  Certified in Neurorehabilitation     We will continue to follow  Covid encephalopathy superimposed on dementisa, will see how she does    EEG report pending  Overall he is improving.   precertification for skilled nursing pending  Okay to DC from neurology standpoint when medically stable  Collaborating physicians: Dr Deshaun Gonzales and Dr Vargas    Electronically signed by CHRISTINE Luis CNP on 12/28/2021 at 10:17 AM     I

## 2021-12-28 NOTE — PROGRESS NOTES
Physical Therapy Med Surg Daily Treatment Note  Facility/Department: [facility]  Room: [facility]/P470-39       NAME: Nola Peters  : 3/5/1929 (80 y.o.)  MRN: [id number]  CODE STATUS: DNR-CCA    Date of Service: 2021    Patient Diagnosis(es): Dehydration [E86.0]  General weakness [R53.1]  COVID-19 [U07.1]   Chief Complaint   Patient presents with    Altered Mental Status     Patient Active Problem List    Diagnosis Date Noted    Anginal equivalent ([address]     Dehydration     COVID-19 2021    Late effects of cerebrovascular accident     Ataxia     Encephalopathy     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) ([address]     Lethargy     AMS (altered mental status) 10/27/2021    Acute respiratory failure ([address] 10/27/2021    Abnormality of gait and mobility 2021    Neurogenic bladder 2021    Bilateral hearing loss 2021    Left hemiparesis ([address] 2021    Impaired gait and mobility dt R occipital CVA 2021    Aphasia     Acute CVA (cerebrovascular accident) ([address] 2021    General weakness 09/15/2021    Hyperkalemia 09/15/2021    Anticoagulation adequate with anticoagulant therapy 09/15/2021    Arterial occlusion 2021    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    CKD (chronic kidney disease) 2021    PVD (peripheral vascular disease) ([address] 2021    Venous thrombosis of leg 2021    Hyponatremia 2021    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    Syncope and collapse 2020    COPD exacerbation ([address] 2020    NSTEMI (non-ST elevated myocardial infarction) ([address] 2019    Chest pain 2019    Hypotension 2019    SOB (shortness of breath) 2018    Chronic right shoulder pain 2018    COPD with acute exacerbation ([address] 2018    Bronchitis 2016    CAD (coronary artery disease) 2015    HTN (hypertension)     Dyslipidemia     DM (diabetes mellitus) ([facility]     Hypothyroidism     History of tobacco abuse     Anemia         Past Medical History:   Diagnosis Date    Anemia     CAD (coronary artery disease) 4/16/2015    [facility]     Dyslipidemia     History of tobacco abuse     HTN (hypertension)     Hypothyroidism     Non-ST elevation MI (NSTEMI) ([facility]     Pacemaker 4/16/2015     Past Surgical History:   Procedure Laterality Date    CATARACT REMOVAL      MIDDLE EAR SURGERY Left 1998    \"they had to reset the bones\" after an infection       Restrictions  Restrictions/Precautions: Fall Risk;Isolation (high fall risk per Hinojosa score; COVID-19+; droplet +)    SUBJECTIVE   General  Chart Reviewed: Yes  Family / Caregiver Present: No  Subjective  Subjective: Pt nods head yes in agreement of participating in PT tx    Pre-Session Pain Report  Pre Treatment Pain Screening  Pain at present: 0  Intervention List: Patient able to continue with treatment  Pain Screening  Patient Currently in Pain: No       Post-Session Pain Report  Pain Assessment  Pain Level: 0         OBJECTIVE        Bed mobility  Supine to Sit: Unable to assess  Sit to Supine: Unable to assess  Comment: Pt up in chair pre/post tx    Transfers  Sit to Stand: Minimal Assistance  Stand to sit: Minimal Assistance  Comment: VC/TC's for hand placement, assist for anterior weight shift. Mild instability upon standing. STS x3 for improved technique and strengthening    Ambulation  Ambulation?: Yes  More Ambulation?: No  Ambulation 1  Surface: level tile  Device: Rolling Walker  Assistance: Minimal assistance; Moderate assistance  Quality of Gait: short step length, decreased wilmer heel strike and foot clearance, picks up [employer] with turns requiring increased assist for stability  Gait Deviations: Slow [place];Decreased step length;Decreased step height  Distance: 15' x 2       ASSESSMENT   Assessment: Pt displays an improvement in strength and endurance this session secondary ability to perform gait training. TC's required for safety throughout tx d/t pt [place]. Decreased safety exhibited with turning [address] while gait training secondary to pt picking up [address]. VC/TC's provided for improvement, fair follow through. Discharge Recommendations:  Continue to assess pending progress,Patient would benefit from continued therapy after discharge    Goals  Long term goals  Long term goal 1: Bed mobility with SBA  Long term goal 2: unsupported sitting with functional reach >6 inches with supervision  Long term goal 3: functional transfers with  Min A to promote OOB activity  Long term goal 4: Stand with B UE support 2 min with Min A  Long term goal 5: Progress to ambulation with AD when approriate with Mod A  Patient Goals   Patient goals : unable to state    PLAN    Times per week: 3  Plan Comment: Cont. POC - update goals  Safety Devices  Type of devices: Call light within reach,Left in chair,Telesitter in use,Nurse notified     [facility] (6 CLICK) [facility] 28 Inpatient Mobility Raw Score : 16     Therapy Time   Individual   Time In 0850   Time Out 0902   Minutes 12         BM/Trsf: 4  Gait: 8    Carlos Alberto Landa PTA, 12/28/21 at 9:16 AM         Definitions for assistance levels  Independent = pt does not require any physical supervision or assistance from another person for activity completion. Device may be needed.   Stand by assistance = pt requires verbal cues or instructions from another person, close to but not touching, to perform the activity  Minimal assistance= pt performs 75% or more of the activity; assistance is required to complete the activity  Moderate assistance= pt performs 50% of the activity; assistance is required to complete the activity  Maximal assistance = pt performs 25% of the activity; assistance is required to complete the activity  Dependent = pt requires total physical assistance to accomplish the task

## 2021-12-28 NOTE — PROGRESS NOTES
Progress Note  Date:2021       INGN:[id number]  Patient Name:Chey Lomax     YOB: 1929     Age:89 y.o.    ROS: Review of system cannot be obtained due to acuity of care    Subjective    Subjective   Review of Systems  Objective         Vitals Last 24 Hours:  TEMPERATURE:  Temp  Av.7 °F (36.5 °C)  Min: 97.2 °F (36.2 °C)  Max: 98.1 °F (36.7 °C)  RESPIRATIONS RANGE: Resp  Av  Min: 16  Max: 18  PULSE OXIMETRY RANGE: SpO2  Av.8 %  Min: 93 %  Max: 96 %  PULSE RANGE: Pulse  Av.4  Min: 63  Max: 67  BLOOD PRESSURE RANGE: Systolic (72GEJ), GCS:027 , Min:108 , RWO:885   ; Diastolic (29SBT), FBT:67, Min:42, Max:72    I/O (24Hr): Intake/Output Summary (Last 24 hours) at 2021 1526  Last data filed at 2021 1200  Gross per 24 hour   Intake 680 ml   Output 600 ml   Net 80 ml     Objective:  Vital signs: (most recent): Blood pressure 132/64, pulse 67, temperature 98.1 °F (36.7 °C), temperature source Axillary, resp. rate 18, weight 145 lb (65.8 kg), SpO2 95 %. Lungs: There are decreased breath sounds. Heart: S1 normal and S2 normal.    Abdomen: Abdomen is soft. Bowel sounds are normal.   There is no epigastric area or suprapubic area tenderness. Pulses: Distal pulses are intact. Neurological: Patient is alert. Pupils:  Pupils are equal, round, and reactive to light. Skin:  Warm and dry. Labs/Imaging/Diagnostics    Labs:  CBC:  Recent Labs     21  0600 21  0530   WBC 10.4 7.9   RBC 3.67* 3.81*   HGB 11.4* 11.7*   HCT 34.2* 35.3*   MCV 93.0 92.8   RDW 14.1 13.9    241     CHEMISTRIES:  Recent Labs     21  0600 21  0531    135   K 3.8 3.9   * 103   CO2 18* 22   BUN 34* 26*   CREATININE 0.99 0.91   GLUCOSE 148* 166*   MG 1.5* 2.3     PT/INR:No results for input(s): PROTIME, INR in the last 72 hours. APTT:No results for input(s): APTT in the last 72 hours.   LIVER PROFILE:  Recent Labs     21  0600 12/27/21  0531   AST 20 21   ALT 11 13   BILITOT 0.4 0.4   ALKPHOS 54 56       Imaging Last 24 Hours:  No results found. Assessment//Plan           Hospital Problems           Last Modified POA    COVID-19 12/23/2021 Yes    Dehydration 12/27/2021 Yes        Assessment & Plan  12/28/21; overall patient is improving as per previous notes. Precert is pending. Anticipate discharge tomorrow. Patient has dementia review of system cannot be obtained. Patient has  Covid encephalopathy. Nonfocal.  History of seizure patient on Keppra. No seizure activity reported. EEG is pending.   Electronically signed by Jaci Gupta MD on 12/28/21 at 3:26 PM EST

## 2021-12-29 VITALS
SYSTOLIC BLOOD PRESSURE: 122 MMHG | HEART RATE: 87 BPM | OXYGEN SATURATION: 97 % | HEIGHT: 66 IN | BODY MASS INDEX: 23.3 KG/M2 | RESPIRATION RATE: 14 BRPM | TEMPERATURE: 97.7 F | DIASTOLIC BLOOD PRESSURE: 61 MMHG | WEIGHT: 145 LBS

## 2021-12-29 LAB
GLUCOSE BLD-MCNC: 116 MG/DL (ref 60–115)
GLUCOSE BLD-MCNC: 122 MG/DL (ref 60–115)
PERFORMED ON: ABNORMAL
PERFORMED ON: ABNORMAL

## 2021-12-29 PROCEDURE — 95816 EEG AWAKE AND DROWSY: CPT | Performed by: PSYCHIATRY & NEUROLOGY

## 2021-12-29 PROCEDURE — 2580000003 HC RX 258: Performed by: INTERNAL MEDICINE

## 2021-12-29 PROCEDURE — 6370000000 HC RX 637 (ALT 250 FOR IP): Performed by: INTERNAL MEDICINE

## 2021-12-29 PROCEDURE — 6360000002 HC RX W HCPCS: Performed by: INTERNAL MEDICINE

## 2021-12-29 PROCEDURE — 99233 SBSQ HOSP IP/OBS HIGH 50: CPT | Performed by: PSYCHIATRY & NEUROLOGY

## 2021-12-29 PROCEDURE — 2700000000 HC OXYGEN THERAPY PER DAY

## 2021-12-29 PROCEDURE — APPSS15 APP SPLIT SHARED TIME 0-15 MINUTES: Performed by: NURSE PRACTITIONER

## 2021-12-29 RX ADMIN — APIXABAN 5 MG: 5 TABLET, FILM COATED ORAL at 09:57

## 2021-12-29 RX ADMIN — Medication 10 ML: at 09:57

## 2021-12-29 RX ADMIN — ASPIRIN 81 MG: 81 TABLET, COATED ORAL at 09:57

## 2021-12-29 RX ADMIN — ATORVASTATIN CALCIUM 40 MG: 40 TABLET, FILM COATED ORAL at 09:57

## 2021-12-29 RX ADMIN — DEXAMETHASONE SODIUM PHOSPHATE 6 MG: 4 INJECTION, SOLUTION INTRA-ARTICULAR; INTRALESIONAL; INTRAMUSCULAR; INTRAVENOUS; SOFT TISSUE at 09:57

## 2021-12-29 RX ADMIN — LEVETIRACETAM 500 MG: 500 TABLET, FILM COATED ORAL at 09:57

## 2021-12-29 ASSESSMENT — ENCOUNTER SYMPTOMS
ABDOMINAL DISTENTION: 0
WHEEZING: 0
VOMITING: 0
TROUBLE SWALLOWING: 0
NAUSEA: 0
COUGH: 0
COLOR CHANGE: 0

## 2021-12-29 ASSESSMENT — PAIN SCALES - GENERAL: PAINLEVEL_OUTOF10: 0

## 2021-12-29 NOTE — PROGRESS NOTES
Report called to Malon Prayer at [facility]. Dr. Luis Winkler called to ask about pt discharging with PICC line and he says it is OK to remove prior to discharge.

## 2021-12-29 NOTE — PROGRESS NOTES
Physical Therapy Missed Treatment   Facility/Department: [facility] MED SURG [id number]    NAME: Dayo Nicole    : 3/5/1929 (80 y.o.)  MRN: [id number]    Account: [de-identified]  Gender: male    Chart reviewed, attempted PT at 1140. Patient unavailable 2° to:        [x] Pt unresponsive to attempts to perform therapy this AM. Upon entering patient sleeping soundly taking multiple attempts to awaken. Pt unresponsive to requests for therapy, giving blank stares and then shutting eyes. Nsging reports patient has been fatigued and sleeping on and off all morning. [x] Nsg notified   [] Other notified          Will attempt PT treatment again at earliest convenience.       Electronically signed by Funmilayo Burns PTA on 21 at 11:47 AM EST

## 2021-12-29 NOTE — PROGRESS NOTES
Physician Progress Note      Julián Kwon  CSN #:                  [id number]  :                       3/5/1929  ADMIT DATE:       2021 11:29 PM  [address] [place] DATE:  RESPONDING  PROVIDER #:        Kerrie Haro MD          QUERY TEXT:    Pt admitted with COVID-19  and UTI with noted to have leukocytosis, low BP   with Low MAPS, elevated CRP, procalcitonin and lactic acid with diagnosis of   FATEMEH and metabolic encephalopathy  If possible, please document in progress   notes and discharge summary if you are evaluating and/or treating: ]      The medical record reflects the following:  Risk Factors: COVID-19 infection, UTI, dementia  Clinical Indicators: weakness, fatigue, HR 63-99, RR 20-22, BP 81//49,   MAPS 54-65; sCr/GFR 2.18/28.4, lactic acid 2.2, procalcitonin . 71, .1,   WBC 13  Treatment: IVFB 2L, IV Decadron, IV Rocephin, labs and monitoring    Thank you,  Zehra Wilde RN BSN CDS  Options provided:  -- Sepsis present on admission due to COVID-19 infection  -- Covid-19 infection without sepsis  -- Other - I will add my own diagnosis  -- Disagree - Not applicable / Not valid  -- Disagree - Clinically unable to determine / Unknown  -- Refer to Clinical Documentation Reviewer    PROVIDER RESPONSE TEXT:    This patient has sepsis which was present on admission due to COVID-19   infection.     Query created by: Christiano Mccarty on 2021 10:38 AM      Electronically signed by:  Kerrie Haro MD 2021 1:58 PM

## 2021-12-29 NOTE — DISCHARGE INSTR - COC
Continuity of Care Form    Patient Name: Sloan Nowak   :  3/5/1929  MRN:  [id number]    Admit date:  2021  Discharge date:  ***    Code Status Order: DNR-CCA   Advance Directives:      Admitting Physician:  No admitting provider for patient encounter. PCP: Brenda Romano MD, MD    Discharging Nurse: [facility] Unit/Room#: T930/T457-64  Discharging Unit Phone Number: ***    Emergency Contact:   Extended Emergency Contact Information  Primary Emergency Contact: [address] [place] [address] Phone: [phone]  Mobile Phone: [phone]  Relation: Grandchild  Secondary Emergency Contact: Kae Looney  Address: [address] [place] [address] Phone: [phone]  Relation: Child   needed?  No    Past Surgical History:  Past Surgical History:   Procedure Laterality Date    CATARACT REMOVAL      MIDDLE EAR SURGERY Left     \"they had to reset the bones\" after an infection       Immunization History:   Immunization History   Administered Date(s) Administered    COVID-19, Pfizer, PF, 30mcg/0.3mL 10/04/2021    Influenza, High Dose (Fluzone 65 yrs and older) 2016, 2016, 10/12/2017    Influenza, Quadv, IM, PF (6 mo and older Fluzone, Flulaval, Fluarix, and 3 yrs and older Afluria) 2020, 2021    Pneumococcal Conjugate 13-valent (Degkhik43) 2016    Pneumococcal Polysaccharide (Sccajvvyx54) 2012    Tdap (Boostrix, Adacel) 2020    Zoster Live (Zostavax) 2013       Active Problems:  Patient Active Problem List   Diagnosis Code    HTN (hypertension) I10    Dyslipidemia E78.5    DM (diabetes mellitus) ([facility] [address] E11.9    Hypothyroidism E03.9    History of tobacco abuse Z87.891    Anemia D64.9    CAD (coronary artery disease) I25.10    Bronchitis J40    COPD with acute exacerbation (HCC) J44.1    SOB (shortness of breath) R06.02    Anginal equivalent (HCC) I20.8 Hypotension I95.9    Chest pain R07.9    NSTEMI (non-ST elevated myocardial infarction) ([facility] [address] I21.4    COPD exacerbation ([facility]) J44.1    Syncope and collapse R55    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    Venous thrombosis of leg I82.90    Hyponatremia E87.1    PVD (peripheral vascular disease) ([facility]) I73.9    Gait abnormality dt PVD--right femoral artery occlusion R26.9    Chronic right shoulder pain M25.511, G89.29    CKD (chronic kidney disease) N18.9    Arterial occlusion I70.90    General weakness R53.1    Hyperkalemia E87.5    Anticoagulation adequate with anticoagulant therapy Z79.01    Acute CVA (cerebrovascular accident) ([facility] [address] I63.9    Aphasia R47.01    Impaired gait and mobility dt R occipital CVA R26.89    Abnormality of gait and mobility R26.9    Neurogenic bladder N31.9    Bilateral hearing loss H91.93    Left hemiparesis ([facility]) G81.94    AMS (altered mental status) R41.82    Acute respiratory failure ([facility]) J96.00    Lethargy R53.83    Encephalopathy G93.40    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) ([facility] [address] I69.398, G40.909    Late effects of cerebrovascular accident I69.90    Ataxia R27.0    COVID-19 U07.1    Dehydration E86.0       Isolation/Infection:   Isolation            Droplet Plus          Patient Infection Status       Infection Onset Added Last Indicated Last Indicated By Review Planned Expiration Resolved Resolved By    COVID-19 12/23/21 12/23/21 12/23/21 COVID-19, Rapid 12/30/21 01/06/22      Resolved    COVID-19 (Rule Out) 12/22/21 12/22/21 12/23/21 COVID-19, Rapid (Ordered)   12/23/21 Rule-Out Test Resulted    COVID-19 (Rule Out) 09/14/21 09/14/21 09/14/21 COVID-19, Rapid (Ordered)   09/14/21 Rule-Out Test Resulted    COVID-19 (Rule Out) 02/09/21 02/09/21 02/09/21 COVID-19 (Ordered)   02/09/21 Rule-Out Test Resulted    COVID-19 (Rule Out) 02/01/21 02/01/21 02/01/21 COVID-19 (Ordered)   02/01/21 Rule-Out Test Resulted    COVID-19 (Rule Out) 01/26/21 01/26/21 01/26/21 COVID-19 (Ordered)   01/26/21 Rule-Out Test Resulted    COVID-19 (Rule Out) 07/08/20 07/08/20 07/08/20 Covid-19 Ambulatory (Ordered)   07/10/20 Rule-Out Test Resulted            Nurse Assessment:  Last Vital Signs: /61   Pulse 87   Temp 97.7 °F (36.5 °C) (Oral)   Resp 14   Wt 145 lb (65.8 kg)   SpO2 97%   BMI 23.41 kg/m²     Last documented pain score (0-10 scale): Pain Level: 0  Last Weight:   Wt Readings from Last 1 Encounters:   12/23/21 145 lb (65.8 kg)     Mental Status:  {IP PT MENTAL STATUS:20030}    IV Access:  { CEDRICK IV ACCESS:024403065}    Nursing Mobility/ADLs:  Walking   {P DME HLLS:079784984}  Transfer  {P DME RGHX:105381713}  Bathing  {P DME JSUY:785322275}  Dressing  {[facility] DME DIIK:902235992}  Toileting  {P DME KTFK:317554095}  Feeding  {[facility] DME AZHA:836849242}  Med Admin  {[facility] DME GHSY:004104739}  Med Delivery   { CEDRICK MED Delivery:342646716}    Wound Care Documentation and Therapy:  Wound 09/27/21 Sternum Lower;Mid red small bumps (Active)   Number of days: 92       Wound 09/27/21 Abdomen Lower;Right red cirular rash ' (Active)   Number of days: 92       Wound 09/27/21 Abdomen Left; Lower red raised rash 1cm diameter (Active)   Number of days: 92        Elimination:  Continence: Bowel: {YES / JZ:43566}  Bladder: {YES / AV:15330}  Urinary Catheter: {Urinary Catheter:943518185}   Colostomy/Ileostomy/Ileal Conduit: {YES / RY:76709}       Date of Last BM: ***    Intake/Output Summary (Last 24 hours) at 12/29/2021 1421  Last data filed at 12/28/2021 2312  Gross per 24 hour   Intake --   Output 600 ml   Net -600 ml     I/O last 3 completed shifts:   In: 480 [P.O.:480]  Out: 850 [Urine:850]    Safety Concerns:     [address] Safety Concerns:019295970}    Impairments/Disabilities:      [address] Impairments/Disabilities:814723480}    Nutrition Therapy:  Current Nutrition Therapy:   [address] Diet List:829904075}    Routes of Feeding: {CHP DME Other Feedings:114176874}  Liquids: {Slp liquid thickness:11567}  Daily Fluid Restriction: {CHP DME Yes amt example:254256397}  Last Modified Barium Swallow with Video (Video Swallowing Test): {Done Not Done DL}    Treatments at the Time of Hospital Discharge:   Respiratory Treatments: ***  Oxygen Therapy:  {Therapy; copd oxygen:10680}  Ventilator:    { CC Vent GGAB:898611498}    Rehab Therapies: {THERAPEUTIC INTERVENTION:5152833571}  Weight Bearing Status/Restrictions: { CC Weight Bearin}  Other Medical Equipment (for information only, NOT a DME order):  {EQUIPMENT:622094638}  Other Treatments: ***    Patient's personal belongings (please select all that are sent with patient):  {CHP DME Belongings:260848395}    RN SIGNATURE:  {Esignature:450626328}    CASE MANAGEMENT/SOCIAL WORK SECTION    Inpatient Status Date: 21    Readmission Risk Assessment Score:  Readmission Risk              Risk of Unplanned Readmission:  48           Discharging to Facility/ Agency   Name: Rosa Elena Rojas  Address:  [facility]:[phone]  Fax:    Dialysis Facility (if applicable)   Name:  Address:  Dialysis Schedule:  Phone:  Fax:    / signature: Electronically signed by VINCE Drummond on 21 at 2:21 PM EST    PHYSICIAN SECTION    Prognosis: {Prognosis:3306942467}    Condition at Discharge: [address] Moran Patient Condition:130824313}    Rehab Potential (if transferring to Rehab): {Prognosis:3857180541}    Recommended Labs or Other Treatments After Discharge: ***    Physician Certification: I certify the above information and transfer of Domenico Whitmore  is necessary for the continuing treatment of the diagnosis listed and that he requires {Admit to Appropriate Level of Care:64084} for {GREATER/LESS:769669926} 30 days.      Update Admission H&P: {CHP DME Changes in MOBWP:043932990}    PHYSICIAN SIGNATURE:  Electronically signed by Chey Glass MD on 21 at 3:26 PM EST

## 2021-12-29 NOTE — PROGRESS NOTES
[facility] Neurology Daily Progress Note  Name: Clara Treviño  Age: 80 y.o. Gender: male  CodeStatus: DNR-CCA  Allergies: No Known Allergies    Chief Complaint:Altered Mental Status    Primary Care Provider: Jesenia Dominguez MD, MD  InpatientTreatment Team: Treatment Team: Attending Provider: Davis Garcia MD; Consulting Physician: Yunior Salamanca MD; Consulting Physician: Shaniqua Drake MD; : Luis Manuel Woodard RN; Utilization Reviewer: Skyla Carranza RN  Admission Date: 12/22/2021      neuro follow up for Acute metabolic encephalopathy in the setting of COVID-19, hypotension, FATEMEH, dehydration. Patient is Covid positive on Covid isolation. HPI obtained from nursing staff and medical records. Blood pressure improved. Afebrile. On 2 L per nasal cannula. Remains confused. No focal deficits or seizure activity reported. Nursing reports that patient is doing well today. Eating and drinking well. Awaiting precertification for skilled nursing facility    No new changes,     Vitals:    12/29/21 0724   BP: 122/61   Pulse: 87   Resp: 14   Temp: 97.7 °F (36.5 °C)   SpO2: 97%      Review of Systems   Constitutional: Negative for appetite change and fever. HENT: Negative for hearing loss and trouble swallowing. Respiratory: Negative for cough and wheezing. Cardiovascular: Negative for chest pain, palpitations and leg swelling. Gastrointestinal: Negative for abdominal distention, nausea and vomiting. Genitourinary: Negative for difficulty urinating. Skin: Negative for color change and rash. Neurological: Negative for tremors, seizures, syncope, facial asymmetry, speech difficulty and weakness (  Generalized). Psychiatric/Behavioral: Positive for confusion. Negative for agitation, behavioral problems and hallucinations. The patient is not nervous/anxious.       Physical Exam  Deferred due to Covid isolation        Medications:  Reviewed    Infusion Medications:    sodium chloride      sodium chloride dextrose       Scheduled Medications:    sodium chloride flush  5-40 mL IntraVENous 2 times per day    apixaban  5 mg Oral BID    sodium chloride flush  5-40 mL IntraVENous 2 times per day    insulin lispro  0-12 Units SubCUTAneous TID WC    insulin lispro  0-6 Units SubCUTAneous Nightly    aspirin  81 mg Oral Daily    atorvastatin  40 mg Oral Daily    levETIRAcetam  500 mg Oral BID    dexamethasone  6 mg IntraVENous Q24H     PRN Meds: sodium chloride flush, sodium chloride, sodium chloride flush, sodium chloride, ondansetron **OR** ondansetron, polyethylene glycol, acetaminophen **OR** acetaminophen, glucose, dextrose, glucagon (rDNA), dextrose    Labs:   Recent Labs     12/27/21  0530   WBC 7.9   HGB 11.7*   HCT 35.3*        Recent Labs     12/27/21  0531      K 3.9      CO2 22   BUN 26*   CREATININE 0.91   CALCIUM 7.7*     Recent Labs     12/27/21  0531   AST 21   ALT 13   BILITOT 0.4   ALKPHOS 56     No results for input(s): INR in the last 72 hours. No results for input(s): Gracie Gardiner in the last 72 hours. Urinalysis:   Lab Results   Component Value Date    NITRU Negative 12/23/2021    WBCUA 20-50 12/23/2021    BACTERIA Negative 12/23/2021    RBCUA 0-2 12/23/2021    BLOODU Negative 12/23/2021    SPECGRAV 1.019 12/23/2021    GLUCOSEU Negative 12/23/2021       Radiology:   Most recent    EEG No valid procedures specified. MRI of Brain No results found for this or any previous visit. No results found for this or any previous visit. MRA of the Head and Neck: No results found for this or any previous visit. No results found for this or any previous visit. No results found for this or any previous visit. CT of the Head: Results for orders placed during the hospital encounter of 12/22/21    CT Head WO Contrast    Narrative  CT HEAD WO CONTRAST : 12/23/2021    CLINICAL HISTORY: Altered mental status.     COMPARISON: common carotid arteries are 79 and 51 cm/s, with an ICA/CCA ratio of approximately 1.5 , which indicates less than 50% by velocity criteria. Maximum systolic velocity within the left internal and mid common carotid arteries are 59 and 74 cm/s, with an ICA/CCA ratio of approximately 0.9, which indicates less than 50% by velocity criteria. Maximum velocities within the right and left external carotid arteries are 141 and 81 cm/sec respectively. There is antegrade flow in both vertebral arteries. Impression  MILD ATHEROSCLEROTIC PLAQUING OF THE CAROTID BULBS WITHOUT EVIDENCE OF A FLOW-LIMITING STENOSIS, BY VELOCITY RATIO CRITERIA. GOSINK CRITERIA    Diameter          PSV t            EDV t          PSV          EDV          Stenosis Site  %              cm/sec          cm/sec      ICA/CCA    ICA/CCA    0-49                 <124              <40             <2:1           ---                 ---    50-69              125-225                  >2:1           ---                 ---    70-89               225-325          >100          >4:1           >5:1              ---    90%+                >325              >100          >4:1           >9:1           Damped resistive CCA    >95%               May be            May be      Damped      ---              Damped resistive CCA  decreased      decreased  resistive CCA      Echo No results found for this or any previous visit. Assessment/Plan:    Encephalopathy is most in keeping with new diagnosis of COVID-19. No evidence of breakthrough seizures. No evidence of focal deficit on exam.  Can continue current antiepileptic therapy. Should his level of consciousness not improve as his respiratory status improves, can consider EEG. Acute metabolic encephalopathy in the setting of COVID-19, hypotension, FATEMEH, dehydration. Patient with history of underlying dementia, seizure disorder and CVA. CT the head negative for any acute findings. Patient remains confused. Nonfocal. No seizure activity noted. We will continue patient on Keppra, obtain EEG, and monitor. COVID encephalopathy with underlying dementia  Nonfocal  History of seizures. Remains on Keppra. No seizure activity reported. 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 precertification for skilled nursing pending  Okay to DC from neurology standpoint when medically stable    Awaiting precertification for skilled nursing facility. Patient remains stable. I have personally performed a face to face diagnostic evaluation on this patient, reviewed all data and investigations, and am the sole provider of all clinical decisions on the neurological status of this patient. no new changes,      German Liz MD, Win Bustos, American Board of Psychiatry & Neurology  Board Certified in Vascular Neurology  Board Certified in Neuromuscular Medicine  Certified in Neurorehabilitation     Collaborating physicians: Dr Joon Liz and Dr Vargas    Electronically signed by CHRISTINE Hawley CNP on 12/29/2021 at 10:56 AM     I

## 2021-12-29 NOTE — PROGRESS NOTES
Comprehensive Nutrition Assessment    Type and Reason for Visit:  Initial,RD Nutrition Re-Screen/LOS (LOS day # 7)    Nutrition Recommendations/Plan:   · Trial diabetic supplement BID @ B+D  · Modify diet to carb control 4  · Obtain bed scale weight  · Encourage intakes    Nutrition Assessment:  Unable to determine malnutrition status d/t limited data available. Predicted inadequate intakes related to Covid-19. Will trial diabetic supplement BID and modify diet to carb control to help with glycemic control. Last BM noted 12/23, recommend starting stool softener. Malnutrition Assessment:  Malnutrition Status:  Insufficient data    Context:  Acute Illness       Estimated Daily Nutrient Needs:  Energy (kcal):  1645-1776kcal (25-27kcal/kg); Weight Used for Energy Requirements:  Current (65.8kg)     Protein (g):  78-84g (1.2-1.3g/kg); Weight Used for Protein Requirements:  Current (65.8kg)        Fluid (ml/day):  ~1776ml; Method Used for Fluid Requirements:  1 ml/kcal      Nutrition Related Findings:  pmhx: CAD, DM, dyslipidemia, HTN, hypothyroid, Nstemi, dementia. Unable to interview. Last BM 12/23 (accuracy?) no bowel regimen. Meds include decadron. Gluc=116-277. Limited intake records available, varies 1-75%. no edema.       Wounds:  None       Current Nutrition Therapies:    ADULT DIET; Easy to Chew    Anthropometric Measures:  · Height: 5' 6\" (167.6 cm)  · Current Body Weight: 145 lb (65.8 kg) (12/23 source?)   · Admission Body Weight: 145 lb (65.8 kg) (source? 12/23)    · Usual Body Weight: 149 lb (67.6 kg) (9/23 bed)     · Ideal Body Weight: 142 lbs; % Ideal Body Weight 102.1 %   · BMI: 23.4   · BMI Categories: Normal Weight (BMI 22.0 to 24.9) age over 72       Nutrition Diagnosis:   · Predicted inadequate energy intake related to impaired respiratory function as evidenced by intake 0-25%  · Altered nutrition-related lab values related to endocrine dysfuntion as evidenced by lab values    Nutrition Interventions:   Food and/or Nutrient Delivery:  Modify Current Diet,Start Oral Nutrition Supplement (Modify diet to carb 4, start diabetic supplement BID @ B+D)  Nutrition Education/Counseling:  No recommendation at this time   Coordination of Nutrition Care:  Continue to monitor while inpatient    Goals:  Po intake >50%. Maintain wt. Gluc <160       Nutrition Monitoring and Evaluation:   Behavioral-Environmental Outcomes:  None Identified   Food/Nutrient Intake Outcomes:  Food and Nutrient Intake,Supplement Intake  Physical Signs/Symptoms Outcomes:  Biochemical Data,Constipation,Meal Time Behavior,Weight     Discharge Planning:     Too soon to determine     Electronically signed by Ann Cee MS, RD, LD on 12/29/21 at 4:13 PM EST

## 2021-12-29 NOTE — PLAN OF CARE
Nutrition Problem #1: Predicted inadequate energy intake  Intervention: Food and/or Nutrient Delivery: Modify Current Diet,Start Oral Nutrition Supplement (Modify diet to carb 4, start diabetic supplement BID @ B+D)  Nutritional Goals: Po intake >50%. Maintain wt.  Gluc <160

## 2021-12-29 NOTE — PROGRESS NOTES
Progress Note  Date:2021       QBVZ:M081/F170-07  Patient Name:Chey Lomax     YOB: 1929     Age:89 y.o.    ROS: Review of system cannot be obtained due to acuity of care    Subjective    Subjective     Review of Systems    Objective         Vitals Last 24 Hours:  TEMPERATURE:  Temp  Av.7 °F (36.5 °C)  Min: 97.7 °F (36.5 °C)  Max: 97.7 °F (36.5 °C)  RESPIRATIONS RANGE: Resp  Av  Min: 14  Max: 14  PULSE OXIMETRY RANGE: SpO2  Av %  Min: 97 %  Max: 97 %  PULSE RANGE: Pulse  Av  Min: 87  Max: 87  BLOOD PRESSURE RANGE: Systolic (94HFF), ORU:672 , Min:122 , NSM:426   ; Diastolic (70LYG), NLI:37, Min:61, Max:61    I/O (24Hr): Intake/Output Summary (Last 24 hours) at 2021 1309  Last data filed at 2021 2312  Gross per 24 hour   Intake --   Output 600 ml   Net -600 ml     Objective:  Vital signs: (most recent): Blood pressure 122/61, pulse 87, temperature 97.7 °F (36.5 °C), temperature source Oral, resp. rate 14, weight 145 lb (65.8 kg), SpO2 97 %. Lungs: There are decreased breath sounds. Heart: S1 normal and S2 normal.    Abdomen: Abdomen is soft. Bowel sounds are normal.   There is no epigastric area or suprapubic area tenderness. Pulses: Distal pulses are intact. Neurological: Patient is alert. Pupils:  Pupils are equal, round, and reactive to light. Skin:  Warm and dry. Labs/Imaging/Diagnostics    Labs:  CBC:  Recent Labs     21  0530   WBC 7.9   RBC 3.81*   HGB 11.7*   HCT 35.3*   MCV 92.8   RDW 13.9        CHEMISTRIES:  Recent Labs     21  0531      K 3.9      CO2 22   BUN 26*   CREATININE 0.91   GLUCOSE 166*   MG 2.3     PT/INR:No results for input(s): PROTIME, INR in the last 72 hours. APTT:No results for input(s): APTT in the last 72 hours. LIVER PROFILE:  Recent Labs     21  0531   AST 21   ALT 13   BILITOT 0.4   ALKPHOS 56       Imaging Last 24 Hours:  No results found.   Assessment//Plan Hospital Problems           Last Modified POA    COVID-19 12/23/2021 Yes    Dehydration 12/27/2021 Yes        Assessment & Plan    12/28/21; overall patient is improving as per previous notes. Precert is pending. Anticipate discharge tomorrow. Patient has dementia review of system cannot be obtained. Patient has  Covid encephalopathy. Nonfocal.  History of seizure patient on Keppra. No seizure activity reported. EEG is pending. 12/29: Patient can be discharged. No overnight events. On 2 L oxygen therapy. Spoke with , awaiting for pre cert.   Electronically signed by Kris Kerr MD on 12/28/21 at 3:26 PM EST

## 2021-12-30 ENCOUNTER — OFFICE VISIT (OUTPATIENT)
Dept: GERIATRIC MEDICINE | Age: 86
End: 2021-12-30
Payer: MEDICARE

## 2021-12-30 DIAGNOSIS — J44.1 COPD WITH ACUTE EXACERBATION (HCC): ICD-10-CM

## 2021-12-30 DIAGNOSIS — R53.1 WEAKNESS: ICD-10-CM

## 2021-12-30 DIAGNOSIS — U07.1 COVID-19: Primary | ICD-10-CM

## 2021-12-30 DIAGNOSIS — F03.90 DEMENTIA WITHOUT BEHAVIORAL DISTURBANCE, UNSPECIFIED DEMENTIA TYPE: ICD-10-CM

## 2021-12-30 PROCEDURE — 99305 1ST NF CARE MODERATE MDM 35: CPT | Performed by: INTERNAL MEDICINE

## 2021-12-30 RX ORDER — CHOLECALCIFEROL (VITAMIN D3) 125 MCG
5 CAPSULE ORAL 2 TIMES DAILY
Status: ON HOLD | COMMUNITY
End: 2022-02-07

## 2021-12-30 RX ORDER — ASCORBIC ACID 500 MG
500 TABLET ORAL 2 TIMES DAILY
COMMUNITY

## 2021-12-30 NOTE — DISCHARGE SUMMARY
Discharge Summary    Date: 12/30/2021  Patient Name: Nola Peters YOB: 1929 Age: 80 y.o. Admit Date: 12/22/2021  Discharge Date: 12/29/2021  Discharge Condition:    Admission Diagnosis  Dehydration (E86.0); General weakness (R53.1);COVID-19 (U07.1)     Discharge Diagnosis  Active Problems: COVID-19 DehydrationResolved Problems: * No resolved hospital problems. [facility] Stay  Narrative of Hospital Course:  Patient was admitted for Covid pneumonia and dehydration. Evaluated by neurology for possible seizures and enecphalpahty and dementia. Patient will continue with home medication Keppra. Patient had a EEG done prior to discharge. Patient was discharged 2 L nasal cannula. Picc line was removed prior to discharge    Consultants:  [address]    Surgeries/procedures Performed:       Treatments:           Discharge Plan/Disposition:  Home    Hospital/Incidental Findings Requiring Follow Up:    Patient Instructions:    Diet:    Activity:  For number of days (if applicable): Other Instructions:    Provider Follow-Up:   No follow-ups on file. Significant Diagnostic Studies:    Recent Labs:  Admission on 12/22/2021, Discharged on 12/29/2021No results displayed because visit has over 200 results. ------------    Radiology last 7 days:  IR PICC WO SQ PORT/PUMP > 5 YEARSResult Date: 12/24/2021Successful fluoroscopic and ultrasound-guided placement of a right upper extremity PICC line.       Pending Labs   Order Current Status  Urine Reflex to Culture Collected (12/25/21 0302)      Discharge Medications    Discharge Medication List as of 12/29/2021  3:45 PMSTART taking these medicationsdexamethasone (DECADRON) 6 MG tabletTake 1 tablet by mouth daily (with breakfast) for 3 days, Disp-3 tablet, R-0Normal    Discharge Medication List as of 12/29/2021  3:45 PM    Discharge Medication List as of 12/29/2021  3:45 PMCONTINUE these medications which have NOT CHANGEDlevETIRAcetam (KEPPRA) 500 MG tabletTake 1 tablet by mouth 2 times daily, Disp-60 tablet, R-3Normallevothyroxine (SYNTHROID) 25 MCG tabletTake 25 mcg by mouth DailyHistorical Medaspirin 81 MG EC tabletTake 1 tablet by mouth daily, Disp-30 tablet, R-3Normalcoenzyme Q10 100 MG CAPS capsuleTake 1 capsule by mouth daily, Disp-60 capsule, R-5Normalapixaban (ELIQUIS) 5 MG TABS tabletTake 1 tablet by mouth 2 times daily, Disp-60 tablet, R-5NormalVitamin D (CHOLECALCIFEROL) 50 MCG (2000 UT) TABS tabletTake 1 tablet by mouth Daily with supper, Disp-60 tablet, R-3Labeling may look different. 25 mcg=1000 Units. Please double check dosages. Normalferrous sulfate (IRON 325) 325 (65 Fe) MG tabletTake 1 tablet by mouth 2 times daily (with meals), Disp-30 tablet, R-3Normalfolic acid (FOLVITE) 1 MG tabletTake 1 tablet by mouth daily, Disp-30 tablet, R-3NormalnitroGLYCERIN (NITROSTAT) 0.4 MG SL tabletPlace 1 tablet under the tongue every 5 minutes as needed for Chest pain, Disp-25 tablet, R-0Normalalbuterol sulfate HFA (PROVENTIL HFA) 108 (90 Base) MCG/ACT inhalerInhale 2 puffs into the lungs every 6 hours as needed for Wheezing, Disp-1 Inhaler, R-3Printipratropium-albuterol (DUONEB) 0.5-2.5 (3) MG/3ML SOLN nebulizer solutionInhale 3 mLs into the lungs three times daily, Disp-100 mL, R-0Printatorvastatin (LIPITOR) 40 MG tabletTake 40 mg by mouth dailyursodiol (ACTIGALL) 300 MG capsuleTake 300 mg by mouth 2 times dailyesomeprazole Magnesium (NEXIUM) 40 MG PACKTake 40 mg by mouth dailybudesonide-formoterol (SYMBICORT) 80-4.5 MCG/ACT AEROInhale 2 puffs into the lungs 2 times daily.     Discharge Medication List as of 12/29/2021  3:45 PMSTOP taking these medicationsramipril (ALTACE) 5 MG capsuleComments:Reason for Stopping:carvedilol (COREG) 12.5 MG tabletComments:Reason for Stopping:    Time Spent on Discharge:E] minutes were spent in patient examination, evaluation, counseling as well as medication reconciliation, prescriptions for required medications, discharge plan, and follow up.     Electronically signed by Zaheer Morin MD on 12/30/21 at 1:43 PM EST   overtime on dc summary was 45 min

## 2021-12-30 NOTE — PROCEDURES
[address]                      [facility], [address] [facility]                          ELECTROENCEPHALOGRAM REPORT    PATIENT NAME: Amina Mckeon                     :        1929  MED REC NO:   [id number]                            ROOM:       [id number]  ACCOUNT NO:   [de-identified]                           ADMIT DATE: 2021  PROVIDER:     Sandor Carrasco MD    DATE OF EE2021    EEG FINDINGS:  This is a spontaneous 21-channel recording for this  patient with encephalopathy. The background rhythm of this EEG shows  intermittent alpha rhythms of 8 Hz. The patient appears to be somewhat  agitated and therefore some of these are contaminated though the record  otherwise remains symmetrical.  There is no suggestion of other  asymmetries. Mild slowing starts to occur at the beginning of the  recording intermittently for 4-6 theta slowing. There are no other  asymmetries or triphasic waves. No definite epileptiform discharge. IMPRESSION:  This is a mildly abnormal EEG recording with true normal  features in between seen intermittently which may represent drowsy  patterns superimposed upon normal alpha rhythms. There is no suggestion  of asymmetries or epileptiform discharge. Clinical correlation is  recommended.         Waleska Mejia MD    D: 2021 11:45:06       T: 2021 11:47:23     [id number]  Job#: [id number]     Doc#: [id number]    CC:

## 2022-01-30 NOTE — PROGRESS NOTES
Patient Name: Aditi Gomez  YOB: 1929  Medical Record Number: [id number]    History of Present Illness: This 51-year-old gentleman admitted here from [facility] patient presented there with shortness of breath has baseline dementia has been chronic anticoagulated patient have been unable to ambulate since the position patient hematest positive COVID-19 did undergo isolation patient was stabilized and returned back to his facility. Patient had ongoing confusional episodes. Labs acute pain crisis. Review of Systems   Unable to perform ROS: Dementia   All other systems reviewed and are negative.       Review of Systems: All 14 review of systems negative other than as stated above    Social History     Tobacco Use    Smoking status: Former Smoker    Smokeless tobacco: Never Used   Vaping Use    Vaping Use: Never used   Substance Use Topics    Alcohol use: No    Drug use: No         Past Medical History:   Diagnosis Date    Anemia     CAD (coronary artery disease) 4/16/2015    DM [facility]di[facility] mellitus) [facility] [address]     Dyslipidemia     History of tobacco abuse     HTN (hypertension)     Hypothyroidism     Non-ST elevation MI (NSTEMI) ([facility] [address]     Pacemaker 4/16/2015           Past Surgical History:   Procedure Laterality Date    CATARACT REMOVAL      MIDDLE EAR SURGERY Left 1998    \"they had to reset the bones\" after an infection         Current Outpatient Medications on File Prior to Visit   Medication Sig Dispense Refill    ascorbic acid (VITAMIN C) 500 MG tablet Take 500 mg by mouth 2 times daily Indications: 2019 Novel Coronavirus      levETIRAcetam (KEPPRA) 500 MG tablet Take 1 tablet by mouth 2 times daily (Patient taking differently: Take 500 mg by mouth 2 times daily Indications: Seizure ) 60 tablet 3    levothyroxine (SYNTHROID) 25 MCG tablet Take 25 mcg by mouth Daily Indications: Underactive Thyroid       aspirin 81 MG EC tablet Take 1 tablet by mouth daily (Patient taking differently: Take 81 mg by mouth daily Indications: CORONARY ARTERY DISEASE ) 30 tablet 3    coenzyme Q10 100 MG CAPS capsule Take 1 capsule by mouth daily (Patient taking differently: Take 100 mg by mouth daily Indications: Nutritional Support ) 60 capsule 5    apixaban (ELIQUIS) 5 MG TABS tablet Take 1 tablet by mouth 2 times daily (Patient taking differently: Take 5 mg by mouth 2 times daily Indications: Anticoagulant Therapy ) 60 tablet 5    Vitamin D (CHOLECALCIFEROL) 50 MCG (2000 UT) TABS tablet Take 1 tablet by mouth Daily with supper (Patient taking differently: Take 2,000 Units by mouth 2 times daily Indications: 2019 Novel Coronavirus ) 60 tablet 3    ferrous sulfate (IRON 325) 325 (65 Fe) MG tablet Take 1 tablet by mouth 2 times daily (with meals) (Patient taking differently: Take 325 mg by mouth daily (with breakfast) Indications: Nutritional Support ) 30 tablet 3    folic acid (FOLVITE) 1 MG tablet Take 1 tablet by mouth daily (Patient taking differently: Take 1 mg by mouth daily Indications: Nutritional Support ) 30 tablet 3    nitroGLYCERIN (NITROSTAT) 0.4 MG SL tablet Place 1 tablet under the tongue every 5 minutes as needed for Chest pain 25 tablet 0    albuterol sulfate HFA (PROVENTIL HFA) 108 (90 Base) MCG/ACT inhaler Inhale 2 puffs into the lungs every 6 hours as needed for Wheezing (Patient taking differently: Inhale 2 puffs into the lungs every 6 hours as needed for Wheezing or Shortness of Breath ) 1 Inhaler 3    atorvastatin (LIPITOR) 40 MG tablet Take 40 mg by mouth daily Indications: High Amount of Fats in the Blood       ursodiol (ACTIGALL) 300 MG capsule Take 300 mg by mouth 2 times daily Indications: Digestive Tract Discomfort       esomeprazole Magnesium (NEXIUM) 40 MG PACK Take 40 mg by mouth daily Indications: Digestive Tract Discomfort        No current facility-administered medications on file prior to visit.        No Known Allergies      No family history on file.      Physical Exam:      Physical Exam  Vitals and nursing note reviewed. Constitutional:       Appearance: Normal appearance. He is normal weight. HENT:      Head: Normocephalic. Nose: Nose normal.      Mouth/Throat:      Mouth: Mucous membranes are dry. Eyes:      Extraocular Movements: Extraocular movements intact. Cardiovascular:      Rate and Rhythm: Normal rate and regular rhythm. Pulses: Normal pulses. Heart sounds: Normal heart sounds. Pulmonary:      Breath sounds: Normal breath sounds. No rhonchi. Abdominal:      General: Bowel sounds are normal.      Palpations: Abdomen is soft. Musculoskeletal:         General: No swelling. Normal range of motion. Cervical back: Neck supple. Skin:     General: Skin is warm and dry. Neurological:      Mental Status: He is disoriented. Motor: Weakness present. Psychiatric:         Mood and Affect: Mood normal.         There were no vitals taken for this visit.       .   Lab Results   Component Value Date    WBC 5.3 02/08/2022    HGB 10.3 (L) 02/08/2022    HCT 30.5 (L) 02/08/2022    MCV 94.7 02/08/2022     02/08/2022     Lab Results   Component Value Date     02/08/2022    K 3.5 02/08/2022     02/08/2022    CO2 18 02/08/2022    BUN 14 02/08/2022    CREATININE 1.05 02/08/2022    GLUCOSE 91 02/08/2022    CALCIUM 8.0 02/08/2022                ASSESSMENT:  Patient Active Problem List   Diagnosis    HTN (hypertension)    Dyslipidemia    DM (diabetes mellitus) ([facility] [address]    Hypothyroidism    History of tobacco abuse    Anemia    CAD (coronary artery disease)    Bronchitis    COPD with acute exacerbation (HCC)    SOB (shortness of breath)    Anginal equivalent (HCC)    Hypotension    Chest pain    NSTEMI (non-ST elevated myocardial infarction) (HCC)    COPD exacerbation (HCC)    Syncope and collapse    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HCC)    Venous thrombosis of leg    Hyponatremia    PVD (peripheral vascular disease) ([address]    Gait abnormality dt PVD--right femoral artery occlusion    Chronic right shoulder pain    CKD (chronic kidney disease)    Arterial occlusion    General weakness    Hyperkalemia    Anticoagulation adequate with anticoagulant therapy    Acute CVA (cerebrovascular accident) ([address]    Aphasia    Impaired gait and mobility dt R occipital CVA    Abnormality of gait and mobility    Neurogenic bladder    Bilateral hearing loss    Left hemiparesis ([address]    AMS (altered mental status)    Acute respiratory failure (HCC)    Lethargy    Encephalopathy    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 COVID-19     2. COPD with acute exacerbation ([facility] [address]     3. Dementia without behavioral disturbance, unspecified dementia type ([facility] [address]     4. Weakness       Continue isolation at this time external support may need steroid burst.  Patient submental oxygen. Continue radiation as able high risk for increased delirium course of physical therapy vaccinations for functional status.

## 2022-02-07 ENCOUNTER — APPOINTMENT (OUTPATIENT)
Dept: GENERAL RADIOLOGY | Age: 87
End: 2022-02-07
Payer: MEDICARE

## 2022-02-07 ENCOUNTER — APPOINTMENT (OUTPATIENT)
Dept: CT IMAGING | Age: 87
End: 2022-02-07
Payer: MEDICARE

## 2022-02-07 ENCOUNTER — HOSPITAL ENCOUNTER (OUTPATIENT)
Age: 87
Setting detail: OBSERVATION
Discharge: HOME | End: 2022-02-08
Attending: STUDENT IN AN ORGANIZED HEALTH CARE EDUCATION/TRAINING PROGRAM | Admitting: INTERNAL MEDICINE
Payer: MEDICARE

## 2022-02-07 DIAGNOSIS — I95.9 TRANSIENT HYPOTENSION: ICD-10-CM

## 2022-02-07 DIAGNOSIS — R07.9 CHEST PAIN, UNSPECIFIED TYPE: ICD-10-CM

## 2022-02-07 DIAGNOSIS — R65.10 SIRS (SYSTEMIC INFLAMMATORY RESPONSE SYNDROME) (HCC): Primary | ICD-10-CM

## 2022-02-07 DIAGNOSIS — R41.82 ALTERED MENTAL STATUS, UNSPECIFIED ALTERED MENTAL STATUS TYPE: ICD-10-CM

## 2022-02-07 PROBLEM — R06.00 DYSPNEA: Status: ACTIVE | Noted: 2022-02-07

## 2022-02-07 LAB
ALBUMIN SERPL-MCNC: 3.2 G/DL (ref 3.5–4.6)
ALP BLD-CCNC: 58 U/L (ref 35–104)
ALT SERPL-CCNC: 10 U/L (ref 0–41)
ANION GAP SERPL CALCULATED.3IONS-SCNC: 11 MEQ/L (ref 9–15)
APTT: 35.4 SEC (ref 24.4–36.8)
AST SERPL-CCNC: 16 U/L (ref 0–40)
BACTERIA: NEGATIVE /HPF
BASE EXCESS VENOUS: 2 (ref -3–3)
BASOPHILS ABSOLUTE: 0 K/UL (ref 0–0.2)
BASOPHILS RELATIVE PERCENT: 1 %
BILIRUB SERPL-MCNC: 0.6 MG/DL (ref 0.2–0.7)
BILIRUBIN URINE: NEGATIVE
BLOOD, URINE: NEGATIVE
BUN BLDV-MCNC: 16 MG/DL (ref 8–23)
C-REACTIVE PROTEIN, HIGH SENSITIVITY: 1.3 MG/L (ref 0–5)
CALCIUM IONIZED: 1.11 MMOL/L (ref 1.12–1.32)
CALCIUM SERPL-MCNC: 8.5 MG/DL (ref 8.5–9.9)
CHLORIDE BLD-SCNC: 106 MEQ/L (ref 95–107)
CLARITY: CLEAR
CO2: 25 MEQ/L (ref 20–31)
COLOR: YELLOW
CREAT SERPL-MCNC: 1.22 MG/DL (ref 0.7–1.2)
EKG ATRIAL RATE: 63 BPM
EKG P-R INTERVAL: 222 MS
EKG Q-T INTERVAL: 472 MS
EKG QRS DURATION: 94 MS
EKG QTC CALCULATION (BAZETT): 483 MS
EKG R AXIS: -80 DEGREES
EKG T AXIS: 270 DEGREES
EKG VENTRICULAR RATE: 63 BPM
EOSINOPHILS ABSOLUTE: 0.2 K/UL (ref 0–0.7)
EOSINOPHILS RELATIVE PERCENT: 3.6 %
EPITHELIAL CELLS, UA: ABNORMAL /HPF (ref 0–5)
GFR AFRICAN AMERICAN: >60
GFR AFRICAN AMERICAN: >60
GFR NON-AFRICAN AMERICAN: 55.4
GFR NON-AFRICAN AMERICAN: 57
GLOBULIN: 2.5 G/DL (ref 2.3–3.5)
GLUCOSE BLD-MCNC: 109 MG/DL (ref 70–99)
GLUCOSE BLD-MCNC: 142 MG/DL (ref 70–99)
GLUCOSE BLD-MCNC: 148 MG/DL (ref 70–99)
GLUCOSE URINE: NEGATIVE MG/DL
HCO3 VENOUS: 27.1 MMOL/L (ref 23–29)
HCT VFR BLD CALC: 31.8 % (ref 42–52)
HEMOGLOBIN: 10.5 G/DL (ref 14–18)
HEMOGLOBIN: 10.7 GM/DL (ref 13.5–17.5)
HYALINE CASTS: ABNORMAL /HPF (ref 0–5)
INR BLD: 1.3
KETONES, URINE: NEGATIVE MG/DL
LACTATE: 1.89 MMOL/L (ref 0.4–2)
LACTIC ACID: 2.3 MMOL/L (ref 0.5–2.2)
LEUKOCYTE ESTERASE, URINE: ABNORMAL
LYMPHOCYTES ABSOLUTE: 1.9 K/UL (ref 1–4.8)
LYMPHOCYTES RELATIVE PERCENT: 40.1 %
MAGNESIUM: 1.8 MG/DL (ref 1.7–2.4)
MCH RBC QN AUTO: 31.8 PG (ref 27–31.3)
MCHC RBC AUTO-ENTMCNC: 33.1 % (ref 33–37)
MCV RBC AUTO: 96 FL (ref 80–100)
MONOCYTES ABSOLUTE: 0.5 K/UL (ref 0.2–0.8)
MONOCYTES RELATIVE PERCENT: 10.6 %
NEUTROPHILS ABSOLUTE: 2.1 K/UL (ref 1.4–6.5)
NEUTROPHILS RELATIVE PERCENT: 44.7 %
NITRITE, URINE: NEGATIVE
O2 SAT, VEN: 48 %
PCO2, VEN: 47.7 MM HG (ref 40–50)
PDW BLD-RTO: 15.5 % (ref 11.5–14.5)
PERFORMED ON: ABNORMAL
PERFORMED ON: ABNORMAL
PH UA: 5 (ref 5–9)
PH VENOUS: 7.36 (ref 7.32–7.42)
PLATELET # BLD: 170 K/UL (ref 130–400)
PO2, VEN: 27 MM HG
POC CHLORIDE: 104 MEQ/L (ref 99–110)
POC CREATININE WHOLE BLOOD: 1.3
POC CREATININE: 1.2 MG/DL (ref 0.8–1.3)
POC HEMATOCRIT: 31 % (ref 41–53)
POC POTASSIUM: 3.9 MEQ/L (ref 3.5–5.1)
POC SAMPLE TYPE: ABNORMAL
POC SODIUM: 141 MEQ/L (ref 136–145)
POTASSIUM SERPL-SCNC: 3.8 MEQ/L (ref 3.4–4.9)
PRO-BNP: 1141 PG/ML
PROTEIN UA: NEGATIVE MG/DL
PROTHROMBIN TIME: 15.8 SEC (ref 12.3–14.9)
RBC # BLD: 3.31 M/UL (ref 4.7–6.1)
RBC UA: ABNORMAL /HPF (ref 0–5)
SODIUM BLD-SCNC: 142 MEQ/L (ref 135–144)
SPECIFIC GRAVITY UA: 1.01 (ref 1–1.03)
TCO2 CALC VENOUS: 29 MMOL/L
TOTAL CK: 53 U/L (ref 0–190)
TOTAL PROTEIN: 5.7 G/DL (ref 6.3–8)
TROPONIN: 0.09 NG/ML (ref 0–0.01)
TSH SERPL DL<=0.05 MIU/L-ACNC: 3.54 UIU/ML (ref 0.44–3.86)
URINE REFLEX TO CULTURE: YES
UROBILINOGEN, URINE: 0.2 E.U./DL
WBC # BLD: 4.7 K/UL (ref 4.8–10.8)
WBC UA: ABNORMAL /HPF (ref 0–5)

## 2022-02-07 PROCEDURE — 82435 ASSAY OF BLOOD CHLORIDE: CPT

## 2022-02-07 PROCEDURE — 82330 ASSAY OF CALCIUM: CPT

## 2022-02-07 PROCEDURE — 86141 C-REACTIVE PROTEIN HS: CPT

## 2022-02-07 PROCEDURE — 85730 THROMBOPLASTIN TIME PARTIAL: CPT

## 2022-02-07 PROCEDURE — 87077 CULTURE AEROBIC IDENTIFY: CPT

## 2022-02-07 PROCEDURE — 85610 PROTHROMBIN TIME: CPT

## 2022-02-07 PROCEDURE — 36415 COLL VENOUS BLD VENIPUNCTURE: CPT

## 2022-02-07 PROCEDURE — 6360000002 HC RX W HCPCS: Performed by: INTERNAL MEDICINE

## 2022-02-07 PROCEDURE — 87086 URINE CULTURE/COLONY COUNT: CPT

## 2022-02-07 PROCEDURE — 84295 ASSAY OF SERUM SODIUM: CPT

## 2022-02-07 PROCEDURE — 2580000003 HC RX 258: Performed by: INTERNAL MEDICINE

## 2022-02-07 PROCEDURE — 84484 ASSAY OF TROPONIN QUANT: CPT

## 2022-02-07 PROCEDURE — 82803 BLOOD GASES ANY COMBINATION: CPT

## 2022-02-07 PROCEDURE — 93005 ELECTROCARDIOGRAM TRACING: CPT | Performed by: STUDENT IN AN ORGANIZED HEALTH CARE EDUCATION/TRAINING PROGRAM

## 2022-02-07 PROCEDURE — 71045 X-RAY EXAM CHEST 1 VIEW: CPT

## 2022-02-07 PROCEDURE — 6360000002 HC RX W HCPCS: Performed by: STUDENT IN AN ORGANIZED HEALTH CARE EDUCATION/TRAINING PROGRAM

## 2022-02-07 PROCEDURE — 99285 EMERGENCY DEPT VISIT HI MDM: CPT

## 2022-02-07 PROCEDURE — 84132 ASSAY OF SERUM POTASSIUM: CPT

## 2022-02-07 PROCEDURE — 6370000000 HC RX 637 (ALT 250 FOR IP): Performed by: STUDENT IN AN ORGANIZED HEALTH CARE EDUCATION/TRAINING PROGRAM

## 2022-02-07 PROCEDURE — 82565 ASSAY OF CREATININE: CPT

## 2022-02-07 PROCEDURE — 85025 COMPLETE CBC W/AUTO DIFF WBC: CPT

## 2022-02-07 PROCEDURE — 93010 ELECTROCARDIOGRAM REPORT: CPT | Performed by: INTERNAL MEDICINE

## 2022-02-07 PROCEDURE — 6360000004 HC RX CONTRAST MEDICATION: Performed by: STUDENT IN AN ORGANIZED HEALTH CARE EDUCATION/TRAINING PROGRAM

## 2022-02-07 PROCEDURE — 96365 THER/PROPH/DIAG IV INF INIT: CPT

## 2022-02-07 PROCEDURE — 83735 ASSAY OF MAGNESIUM: CPT

## 2022-02-07 PROCEDURE — 83880 ASSAY OF NATRIURETIC PEPTIDE: CPT

## 2022-02-07 PROCEDURE — 96367 TX/PROPH/DG ADDL SEQ IV INF: CPT

## 2022-02-07 PROCEDURE — 2580000003 HC RX 258: Performed by: STUDENT IN AN ORGANIZED HEALTH CARE EDUCATION/TRAINING PROGRAM

## 2022-02-07 PROCEDURE — 71275 CT ANGIOGRAPHY CHEST: CPT

## 2022-02-07 PROCEDURE — 82550 ASSAY OF CK (CPK): CPT

## 2022-02-07 PROCEDURE — 83605 ASSAY OF LACTIC ACID: CPT

## 2022-02-07 PROCEDURE — 87040 BLOOD CULTURE FOR BACTERIA: CPT

## 2022-02-07 PROCEDURE — 36600 WITHDRAWAL OF ARTERIAL BLOOD: CPT

## 2022-02-07 PROCEDURE — 74177 CT ABD & PELVIS W/CONTRAST: CPT

## 2022-02-07 PROCEDURE — 70450 CT HEAD/BRAIN W/O DYE: CPT

## 2022-02-07 PROCEDURE — G0378 HOSPITAL OBSERVATION PER HR: HCPCS

## 2022-02-07 PROCEDURE — 80053 COMPREHEN METABOLIC PANEL: CPT

## 2022-02-07 PROCEDURE — 85014 HEMATOCRIT: CPT

## 2022-02-07 PROCEDURE — 84443 ASSAY THYROID STIM HORMONE: CPT

## 2022-02-07 PROCEDURE — 81001 URINALYSIS AUTO W/SCOPE: CPT

## 2022-02-07 RX ORDER — SODIUM CHLORIDE 0.9 % (FLUSH) 0.9 %
10 SYRINGE (ML) INJECTION ONCE
Status: COMPLETED | OUTPATIENT
Start: 2022-02-07 | End: 2022-02-07

## 2022-02-07 RX ORDER — POLYETHYLENE GLYCOL 3350 17 G/17G
17 POWDER, FOR SOLUTION ORAL DAILY PRN
Status: DISCONTINUED | OUTPATIENT
Start: 2022-02-07 | End: 2022-02-08 | Stop reason: HOSPADM

## 2022-02-07 RX ORDER — ACETAMINOPHEN 325 MG/1
650 TABLET ORAL EVERY 6 HOURS PRN
Status: DISCONTINUED | OUTPATIENT
Start: 2022-02-07 | End: 2022-02-08 | Stop reason: HOSPADM

## 2022-02-07 RX ORDER — POTASSIUM BICARBONATE 25 MEQ/1
40 TABLET, EFFERVESCENT ORAL PRN
Status: DISCONTINUED | OUTPATIENT
Start: 2022-02-07 | End: 2022-02-08 | Stop reason: HOSPADM

## 2022-02-07 RX ORDER — POTASSIUM CHLORIDE 20 MEQ/1
40 TABLET, EXTENDED RELEASE ORAL PRN
Status: DISCONTINUED | OUTPATIENT
Start: 2022-02-07 | End: 2022-02-08 | Stop reason: HOSPADM

## 2022-02-07 RX ORDER — NICOTINE POLACRILEX 4 MG
15 LOZENGE BUCCAL PRN
Status: DISCONTINUED | OUTPATIENT
Start: 2022-02-07 | End: 2022-02-08 | Stop reason: HOSPADM

## 2022-02-07 RX ORDER — POTASSIUM CHLORIDE 7.45 MG/ML
10 INJECTION INTRAVENOUS PRN
Status: DISCONTINUED | OUTPATIENT
Start: 2022-02-07 | End: 2022-02-08 | Stop reason: HOSPADM

## 2022-02-07 RX ORDER — 0.9 % SODIUM CHLORIDE 0.9 %
1000 INTRAVENOUS SOLUTION INTRAVENOUS ONCE
Status: COMPLETED | OUTPATIENT
Start: 2022-02-07 | End: 2022-02-07

## 2022-02-07 RX ORDER — SODIUM CHLORIDE 9 MG/ML
INJECTION, SOLUTION INTRAVENOUS CONTINUOUS
Status: DISCONTINUED | OUTPATIENT
Start: 2022-02-07 | End: 2022-02-08 | Stop reason: HOSPADM

## 2022-02-07 RX ORDER — SODIUM CHLORIDE 0.9 % (FLUSH) 0.9 %
5-40 SYRINGE (ML) INJECTION EVERY 12 HOURS SCHEDULED
Status: DISCONTINUED | OUTPATIENT
Start: 2022-02-07 | End: 2022-02-08 | Stop reason: HOSPADM

## 2022-02-07 RX ORDER — SODIUM CHLORIDE 0.9 % (FLUSH) 0.9 %
5-40 SYRINGE (ML) INJECTION PRN
Status: DISCONTINUED | OUTPATIENT
Start: 2022-02-07 | End: 2022-02-08 | Stop reason: HOSPADM

## 2022-02-07 RX ORDER — ACETAMINOPHEN 650 MG/1
650 SUPPOSITORY RECTAL EVERY 6 HOURS PRN
Status: DISCONTINUED | OUTPATIENT
Start: 2022-02-07 | End: 2022-02-08 | Stop reason: HOSPADM

## 2022-02-07 RX ORDER — 0.9 % SODIUM CHLORIDE 0.9 %
500 INTRAVENOUS SOLUTION INTRAVENOUS ONCE
Status: DISCONTINUED | OUTPATIENT
Start: 2022-02-07 | End: 2022-02-07

## 2022-02-07 RX ORDER — ASPIRIN 81 MG/1
324 TABLET, CHEWABLE ORAL ONCE
Status: COMPLETED | OUTPATIENT
Start: 2022-02-07 | End: 2022-02-07

## 2022-02-07 RX ORDER — SODIUM CHLORIDE 9 MG/ML
25 INJECTION, SOLUTION INTRAVENOUS PRN
Status: DISCONTINUED | OUTPATIENT
Start: 2022-02-07 | End: 2022-02-08 | Stop reason: HOSPADM

## 2022-02-07 RX ORDER — ONDANSETRON 2 MG/ML
4 INJECTION INTRAMUSCULAR; INTRAVENOUS EVERY 6 HOURS PRN
Status: DISCONTINUED | OUTPATIENT
Start: 2022-02-07 | End: 2022-02-08 | Stop reason: HOSPADM

## 2022-02-07 RX ORDER — DEXTROSE MONOHYDRATE 50 MG/ML
100 INJECTION, SOLUTION INTRAVENOUS PRN
Status: DISCONTINUED | OUTPATIENT
Start: 2022-02-07 | End: 2022-02-08 | Stop reason: HOSPADM

## 2022-02-07 RX ORDER — DEXTROSE MONOHYDRATE 25 G/50ML
12.5 INJECTION, SOLUTION INTRAVENOUS PRN
Status: DISCONTINUED | OUTPATIENT
Start: 2022-02-07 | End: 2022-02-07

## 2022-02-07 RX ORDER — ONDANSETRON 4 MG/1
4 TABLET, ORALLY DISINTEGRATING ORAL EVERY 8 HOURS PRN
Status: DISCONTINUED | OUTPATIENT
Start: 2022-02-07 | End: 2022-02-08 | Stop reason: HOSPADM

## 2022-02-07 RX ADMIN — SODIUM CHLORIDE, PRESERVATIVE FREE 10 ML: 5 INJECTION INTRAVENOUS at 13:25

## 2022-02-07 RX ADMIN — SODIUM CHLORIDE 1000 ML: 9 INJECTION, SOLUTION INTRAVENOUS at 12:39

## 2022-02-07 RX ADMIN — SODIUM CHLORIDE: 9 INJECTION, SOLUTION INTRAVENOUS at 19:27

## 2022-02-07 RX ADMIN — ASPIRIN 324 MG: 81 TABLET, CHEWABLE ORAL at 15:43

## 2022-02-07 RX ADMIN — PIPERACILLIN AND TAZOBACTAM 3375 MG: 3; .375 INJECTION, POWDER, LYOPHILIZED, FOR SOLUTION INTRAVENOUS at 12:39

## 2022-02-07 RX ADMIN — CEFTRIAXONE SODIUM 1000 MG: 1 INJECTION, POWDER, FOR SOLUTION INTRAMUSCULAR; INTRAVENOUS at 19:30

## 2022-02-07 RX ADMIN — IOPAMIDOL 100 ML: 612 INJECTION, SOLUTION INTRAVENOUS at 13:25

## 2022-02-07 RX ADMIN — Medication 10 ML: at 20:49

## 2022-02-07 ASSESSMENT — PAIN SCALES - GENERAL: PAINLEVEL_OUTOF10: 0

## 2022-02-07 ASSESSMENT — ENCOUNTER SYMPTOMS: SHORTNESS OF BREATH: 1

## 2022-02-07 NOTE — ED TRIAGE NOTES
Wife called squad and stated that pt was sob and altered mental status. Pt [place] and unable to give us his name.  Screaming out but unable to tell us where the pain is

## 2022-02-07 NOTE — ED NOTES
Pt to CT scan via cart w/this RN connected to bedside monitor.      Genevieve Smith, YESI  02/07/22 [id number]

## 2022-02-07 NOTE — ED TRIAGE NOTES
Pt presents to ED from home via EMS with c/o AMS and SOB. Per EMS, they were called to pt's home for SOB with pulse-ox 84% on 2L. Additionally, pt's daughter reported that pt was altered. EMS states that pt was hypotensive at 50/20 upon arrived - pt arrived w/o IV in place. EMS reports that pt was combative in route to ED. Upon assessment, pt is A/O to self, skin pale/cool/dry, resp even and unlabored, msp's intact. Pt has no c/o's.

## 2022-02-07 NOTE — ED PROVIDER NOTES
[facility] ED  eMERGENCY dEPARTMENT eNCOUnter      Pt Name: Gerhard Antoine  MRN: [id number]  [place] 3/5/1929  Date of evaluation: 2/7/2022  Provider: Brandy Benavides DO    CHIEF COMPLAINT       Chief Complaint   Patient presents with    Altered Mental Status    Shortness of Breath         HISTORY OF PRESENT ILLNESS   (Location/Symptom, Timing/Onset,Context/Setting, Quality, Duration, Modifying Factors, Severity)  Note limiting factors. Gerhard Antoine is a 80 y.o. male who presents to the emergency department with a complaint of confusion and shortness of breath. The patient had just got home from a stay at the nursing home. His family had reported to EMS that he had complained of chest pain and shortness of breath. EMS reports that the patient systolic pressure was in the 70s. The patient is confused. He is hard of hearing. The ER physician is yelling and he is still not able to answer the questions. EMS unable to get iv line. EMS had reported that the patient's blood pressure was 50/20 and eight a pulse ox of 84%. Patient was placed on 2 L nasal cannula. The patient is moving all four extremities. The patient does have clear speech. The patient does not have a facial droop. And the patient does acknowledge one of the nurses questions when she yells and his right ear. EMS was reported that he had a history of stroke they think in December. While in the ER the patient stated he had severe pain but was not able to state where. When asked where he was having pain the patient did not answer the question. The history is provided by the patient and the EMS personnel. NursingNotes were reviewed. REVIEW OF SYSTEMS    (2-9 systems for level 4, 10 or more for level 5)     Review of Systems   Unable to perform ROS: Mental status change   Respiratory: Positive for shortness of breath. Cardiovascular: Positive for chest pain.    Psychiatric/Behavioral: Positive for NITROGLYCERIN (NITROSTAT) 0.4 MG SL TABLET    Place 1 tablet under the tongue every 5 minutes as needed for Chest pain    URSODIOL (ACTIGALL) 300 MG CAPSULE    Take 300 mg by mouth 2 times daily Indications: Digestive Tract Discomfort     VITAMIN D (CHOLECALCIFEROL) 50 MCG (2000 UT) TABS TABLET    Take 1 tablet by mouth Daily with supper       ALLERGIES     Patient has no known allergies. FAMILY HISTORY     History reviewed. No pertinent family history. SOCIAL HISTORY       Social History     Socioeconomic History    Marital status:      Spouse name: None    Number of children: None    Years of education: None    Highest education level: None   Occupational History    None   Tobacco Use    Smoking status: Former Smoker    Smokeless tobacco: Never Used   Vaping Use    Vaping Use: Never used   Substance and Sexual Activity    Alcohol use: No    Drug use: No    Sexual activity: None   Other Topics Concern    None   Social History Narrative    Lives With: granddaughter Shira  and family-versus significant other and their 3 children are in the household Shira works to take care of her her uncle who is Mr. Emilee Deleon son who is a paraplegic who lives next door    SO named Shama    Type of Home: House  Two level, Bed/Bath upstairs-[address] [facility] Access: Stairs to enter with rails- Number of Steps: 14    Bathroom Shower/Tub: Tub/Shower unit    Home Equipment: Rolling walker, Cane    ADL Assistance: Independent    Homemaking Assistance: Independent(granddaughter completes)    Ambulation Assistance: Independent    Transfer Assistance: Independent    Active : Yes    Additional Comments: pt is significantly [place]; information gathered via chart review and via writing.          Social Determinants of Health     Financial Resource Strain:     Difficulty of Paying Living Expenses: Not on file   Food Insecurity:     Worried About Running Out of Food in the Last Year: Not on file  Ran Out of Food in the Last Year: Not on file   Transportation Needs:     Lack of Transportation (Medical): Not on file    Lack of Transportation (Non-Medical): Not on file   Physical Activity:     Days of Exercise per Week: Not on file    Minutes of Exercise per Session: Not on file   Stress:     Feeling of Stress : Not on file   Social Connections:     Frequency of Communication with Friends and Family: Not on file    Frequency of Social Gatherings with Friends and Family: Not on file    Attends Orthodoxy Services: Not on file    Active Member of [address] [place] [address] [employer] or Organizations: Not on file    Attends Club or Organization Meetings: Not on file    Marital Status: Not on file   Intimate Partner Violence:     Fear of Current or Ex-Partner: Not on file    Emotionally Abused: Not on file    Physically Abused: Not on file    Sexually Abused: Not on file   Housing Stability:     Unable to Pay for Housing in the Last Year: Not on file    Number of [place] in the Last Year: Not on file    Unstable Housing in the Last Year: Not on file       SCREENINGS    [place] Coma Scale  Eye Opening: To speech  Best Verbal Response: Confused  Best Motor Response: Obeys commands  [place] Coma Scale Score: 13 @FLOW([id number])@      PHYSICAL EXAM    (up to 7 for level 4, 8 or more for level 5)     ED Triage Vitals   BP Temp Temp Source Pulse Resp SpO2 Height Weight   02/07/22 1226 02/07/22 1233 02/07/22 1233 02/07/22 1226 02/07/22 1226 02/07/22 1226 02/07/22 1226 02/07/22 1226   94/63 97 °F (36.1 °C) Temporal 83 19 97 % 5' 6\" (1.676 m) 145 lb (65.8 kg)       Physical Exam  Vitals and nursing note reviewed. Constitutional:       General: He is not in acute distress. Appearance: He is well-developed. He is not diaphoretic. HENT:      Head: Normocephalic and atraumatic. No Snyder's sign. Comments: No snyder sign. No raccoon eyes. No scalp step-off.  No facial crepitus peer     Right Ear: External ear normal. Left Ear: External ear normal.      Nose: Nose normal.      Mouth/Throat:      Pharynx: No oropharyngeal exudate. Eyes:      General: No scleral icterus. Right eye: No foreign body. Conjunctiva/sclera: Conjunctivae normal.      Left eye: No exudate. Pupils: Pupils are equal, round, and reactive to light. Neck:      Vascular: No JVD. Trachea: No tracheal deviation. Cardiovascular:      Rate and Rhythm: Normal rate and regular rhythm. Heart sounds: Normal heart sounds. Heart sounds not distant. No murmur heard. No friction rub. No gallop. Pulmonary:      Effort: Pulmonary effort is normal. No respiratory distress. Breath sounds: Normal breath sounds. No stridor. No wheezing. Abdominal:      General: Abdomen is flat. Bowel sounds are normal. There is no distension or abdominal bruit. There are no signs of injury. Palpations: Abdomen is soft. There is no shifting dullness, fluid wave, hepatomegaly, splenomegaly, mass or pulsatile mass. Tenderness: There is no abdominal tenderness. There is no right CVA tenderness, left CVA tenderness, guarding or rebound. Negative signs include Bates's sign. Musculoskeletal:         General: No tenderness. Normal range of motion. Cervical back: Normal range of motion and neck supple. No rigidity. Lymphadenopathy:      Head:      Right side of head: No submental adenopathy. Left side of head: No submental adenopathy. Skin:     General: Skin is warm and dry. Findings: No erythema or rash. Neurological:      Mental Status: He is alert. GCS: GCS eye subscore is 4. GCS verbal subscore is 4. GCS motor subscore is 5. Cranial Nerves: No cranial nerve deficit, dysarthria or facial asymmetry. Motor: No weakness. Coordination: Coordination normal.      Deep Tendon Reflexes: Reflexes are normal and symmetric. Psychiatric:         Behavior: Behavior normal.         Thought Content:  Thought content normal.         Judgment: Judgment normal.         DIAGNOSTIC RESULTS     EKG: All EKG's are interpreted by the Emergency Department Physician who either signs or Co-signsthis chart in the absence of a cardiologist.    EKG: Electronic atrial paced rhythm at 63 bpm. Left axis. [place] baseline. Artifact. T wave inversions in the inferior leads. There is a T wave inversion in V5. T wave flattening in V6. Left axis. RADIOLOGY:   Non-plain filmimages such as CT, Ultrasound and MRI are read by the radiologist. Plain radiographic images are visualized and preliminarily interpreted by the emergency physician with the below findings:        Interpretation per the Radiologist below, if available at the time ofthis note:    CT HEAD WO CONTRAST   Final Result      No acute intracranial abnormality; no intracranial hemorrhage or extra-axial collection. Encephalomalacia from recent right PCA distribution infarct, and prior bilateral cerebral hemispheres and basal ganglia infarcts, moderate chronic microvascular ischemia and inflammatory changes. Left maxillary sinus air-fluid levels likely secondary to acute sinusitis. Moderate right mastoid effusions. CTA CHEST W WO CONTRAST   Final Result      Negative CTA of the chest. No evidence of thoracic aortic dissection or aneurysm. The study is negative for pulmonary emboli. There are no acute infiltrates or effusions. There are chronic emphysematous changes throughout both lungs. CT ABDOMEN PELVIS W IV CONTRAST Additional Contrast? None   Final Result   There are no acute intra-abdominal changes. There is layering material in the gallbladder which may represent sludge or small stones without evidence of acute cholecystitis. There is diverticulosis without diverticulitis. XR CHEST PORTABLE   Final Result      No acute cardiopulmonary abnormality.             ED BEDSIDE ULTRASOUND:   Performed by ED Physician - none    LABS:  Labs Reviewed   CBC WITH AUTO DIFFERENTIAL - Abnormal; Notable for the following components:       Result Value    WBC 4.7 (*)     RBC 3.31 (*)     Hemoglobin 10.5 (*)     Hematocrit 31.8 (*)     MCH 31.8 (*)     RDW 15.5 (*)     All other components within normal limits   COMPREHENSIVE METABOLIC PANEL - Abnormal; Notable for the following components:    Glucose 148 (*)     CREATININE 1.22 (*)     GFR Non- 55.4 (*)     Total Protein 5.7 (*)     Albumin 3.2 (*)     All other components within normal limits   LACTIC ACID, PLASMA - Abnormal; Notable for the following components:    Lactic Acid 2.3 (*)     All other components within normal limits   PROTIME-INR - Abnormal; Notable for the following components:    Protime 15.8 (*)     All other components within normal limits   TROPONIN - Abnormal; Notable for the following components:    Troponin 0.085 (*)     All other components within normal limits    Narrative:     CALL  [facility] tel. [phone],  Troponin results called to and read back by Matthias Salazar, 02/07/2022 13:49,  by Haris Galloway   URINE RT REFLEX TO CULTURE - Abnormal; Notable for the following components:    Leukocyte Esterase, Urine SMALL (*)     All other components within normal limits   MICROSCOPIC URINALYSIS - Abnormal; Notable for the following components:    WBC, UA 10-20 (*)     All other components within normal limits   POCT VENOUS - Abnormal; Notable for the following components:    POC Glucose 142 (*)     GFR Non-African American 57 (*)     Calcium, Ion 1.11 (*)     Hemoglobin 10.7 (*)     POC Hematocrit 31 (*)     All other components within normal limits   POCT CREATININE - URINE - Normal   CULTURE, BLOOD 1   CULTURE, BLOOD 2   CULTURE, URINE   APTT   BRAIN NATRIURETIC PEPTIDE    Narrative:     CALL  [facility] tel. [phone],  Troponin results called to and read back by Matthias Salazar, 02/07/2022 13:49,  by UBEMA   CK   HIGH SENSITIVITY CRP   MAGNESIUM   TSH WITHOUT REFLEX    Narrative:     CALL [facility]  [facility] tel. [phone],  Troponin results called to and read back by [facility], 02/07/2022 13:49,  by Chet Chen   [facility] BLOOD GAS, LACTIC ACID, ICA       All other labs were within normal range or not returned as of this dictation. EMERGENCY DEPARTMENT COURSE and DIFFERENTIAL DIAGNOSIS/MDM:   Vitals:    Vitals:    02/07/22 1411 02/07/22 1442 02/07/22 1507 02/07/22 1526   BP:  (!) 100/52 (!) 102/50 (!) 104/54   Pulse: 60 60 60 68   Resp: 20 16 19 19   Temp:       TempSrc:       SpO2: 99% 97% 98% 100%   Weight:       Height:               MDM  Was even IV fluids immediately upon the emergency room. Systolic pressures in the 50s per EMS remeasured in the 70s and upon arrival initially of blood pressure is not able to be obtained. IV fluids were running patient systolic was in the 71U. SIRS criteria is suspected with the patient being confused and hypotensive. Patient given IV Zosyn in case he is septic. Patient's gallbladder shows some sludge but no gallbladder wall thickening. Case was discussed in detail with Dr. Abimael Ellsworth who accepted the patient. Surgical consult was obtained with Dr. Camacho Salas regarding gallbladder sludge. He will follow in consultation. CRITICAL CARE TIME   Total Critical Care time was 36 minutes, excluding separately reportableprocedures. There was a high probability of clinicallysignificant/life threatening deterioration in the patient's condition which required my urgent intervention. CONSULTS:  None    PROCEDURES:  Unless otherwise noted below, none     Procedures    FINAL IMPRESSION      1. SIRS (systemic inflammatory response syndrome) (HCC)    2. Altered mental status, unspecified altered mental status type    3. Chest pain, unspecified type    4. Transient hypotension          DISPOSITION/PLAN   DISPOSITION        PATIENT REFERRED TO:  No follow-up provider specified.     DISCHARGE MEDICATIONS:  New Prescriptions    No medications on file          (Please note that portions of this note were completed with a voice recognition program.  Efforts were made to edit the dictations but occasionally words are mis-transcribed.)    Bertin Torrez DO (electronically signed)  Attending Emergency Physician          Bertin Torrez DO  02/07/22 1015 Roslyn Hyatt DO  02/07/22 3721

## 2022-02-07 NOTE — ED NOTES
Bed: 03  Expected date:   Expected time:   Means of arrival:   Comments:  Trauma room      [address] [place] [address] ANGE RN  02/07/22 5703

## 2022-02-07 NOTE — H&P
History and Physical    Admit Date: 2/7/2022  PCP: April Killian MD, MD    CHIEF COMPLAINT:    Chief Complaint   Patient presents with    Altered Mental Status    Shortness of Breath        HISTORY OF PRESENT ILLNESS:    The patient is a 80 y.o. male  has a past medical history of Anemia, CAD (coronary artery disease) (4/16/2015), DM (diabetes mellitus) ([facility] [address] Dyslipidemia, History of tobacco abuse, HTN (hypertension), Hypothyroidism, Non-ST elevation MI (NSTEMI) ([facility] [address] and Pacemaker (4/16/2015). Patient is a poor historian due to altered mentation and very hard of hearing. No family present at bedside. Multiple numbers listed in his chart but no response so far. presents with change in mentation and dyspnea. Apparently, patient wife called EMS for patient change in mental status. Patient also had chest pain and his blood pressure was low. Per ED provider report, patient was yelling in pain in the emergency room. CTA of the chest did not reveal any PE or aortic dissection. No no pneumothorax. No acute findings. CT of the abdomen did not reveal acute pathology. White blood cell count is 4.7. Patient was given broad-spectrum antibiotic in the emergency room. Past Medical History:        Diagnosis Date    Anemia     CAD (coronary artery disease) 4/16/2015    DM (diabetes mellitus) ([facility] [address]     Dyslipidemia     History of tobacco abuse     HTN (hypertension)     Hypothyroidism     Non-ST elevation MI (NSTEMI) ([facility] [address]     Pacemaker 4/16/2015       Past Surgical History:        Procedure Laterality Date    CATARACT REMOVAL      MIDDLE EAR SURGERY Left 1998    \"they had to reset the bones\" after an infection       Social History:   Social History     Socioeconomic History    Marital status:       Spouse name: Not on file    Number of children: Not on file    Years of education: Not on file    Highest education level: Not on file   Occupational History    Not on file   Tobacco Use  Smoking status: Former Smoker    Smokeless tobacco: Never Used   Vaping Use    Vaping Use: Never used   Substance and Sexual Activity    Alcohol use: No    Drug use: No    Sexual activity: Not on file   Other Topics Concern    Not on file   Social History Narrative    Lives With: granddaughter Shira  and family-versus significant other and their 3 children are in the household Shira works to take care of her her uncle who is Mr. Maikel Garcia son who is a paraplegic who lives next door    SO named Shama    Type of Home: House  Two level, Bed/Bath upstairs-[address] Access: Stairs to enter with rails- Number of Steps: 14    Bathroom Shower/Tub: Tub/Shower unit    Home Equipment: Rolling walker, Cane    ADL Assistance: Independent    Homemaking Assistance: Independent(granddaughter completes)    Ambulation Assistance: Independent    Transfer Assistance: Independent    Active : Yes    Additional Comments: pt is significantly [place]; information gathered via chart review and via writing. Social Determinants of Health     Financial Resource Strain:     Difficulty of Paying Living Expenses: Not on file   Food Insecurity:     Worried About Running Out of Food in the Last Year: Not on file    Hailey of Food in the Last Year: Not on file   Transportation Needs:     Lack of Transportation (Medical): Not on file    Lack of Transportation (Non-Medical):  Not on file   Physical Activity:     Days of Exercise per Week: Not on file    Minutes of Exercise per Session: Not on file   Stress:     Feeling of Stress : Not on file   Social Connections:     Frequency of Communication with Friends and Family: Not on file    Frequency of Social Gatherings with Friends and Family: Not on file    Attends Judaism Services: Not on file    Active Member of Clubs or Organizations: Not on file    Attends Club or Organization Meetings: Not on file    Marital Status: Not on file   Intimate DO Yuki   ferrous sulfate (IRON 325) 325 (65 Fe) MG tablet Take 1 tablet by mouth 2 times daily (with meals)  Patient taking differently: Take 325 mg by mouth 2 times daily (with meals) Indications: Nutritional Support  2/13/21   CHRISTINE Chandra CNP   folic acid (FOLVITE) 1 MG tablet Take 1 tablet by mouth daily  Patient taking differently: Take 1 mg by mouth daily Indications: Nutritional Support  2/14/21   CHRISTINE Chandra CNP   nitroGLYCERIN (NITROSTAT) 0.4 MG SL tablet Place 1 tablet under the tongue every 5 minutes as needed for Chest pain 9/21/19 12/30/21  Farrel MinorsCHRISTINE CNP   albuterol sulfate HFA (PROVENTIL HFA) 108 (90 Base) MCG/ACT inhaler Inhale 2 puffs into the lungs every 6 hours as needed for Wheezing  Patient taking differently: Inhale 2 puffs into the lungs every 6 hours as needed for Wheezing or Shortness of Breath  12/29/18   Lennox Deeds, MD   ipratropium-albuterol (DUONEB) 0.5-2.5 (3) MG/3ML SOLN nebulizer solution Inhale 3 mLs into the lungs three times daily  Patient taking differently: Take 3 mLs by nebulization three times daily Indications: Difficulty Breathing  2/17/18   Brodie Duron MD   atorvastatin (LIPITOR) 40 MG tablet Take 40 mg by mouth daily Indications: High Amount of Fats in the Blood     Historical Provider, MD   ursodiol (ACTIGALL) 300 MG capsule Take 300 mg by mouth 2 times daily Indications: Digestive Tract Discomfort     Historical Provider, MD   esomeprazole Magnesium (NEXIUM) 40 MG PACK Take 40 mg by mouth daily Indications: Digestive Tract Discomfort     Historical Provider, MD       Allergies:  Patient has no known allergies. REVIEW OF SYSTEMS:  Not able to review organ systems due to altered mentation      PHYSICAL EXAM:  Vitals:  BP (!) 104/54   Pulse 68   Temp 97 °F (36.1 °C) (Temporal)   Resp 19   Ht 5' 6\" (1.676 m)   Wt 145 lb (65.8 kg)   SpO2 100%   BMI 23.40 kg/m²   BMI Classification: Normal Weight (BMI 18.5-24. 9)  Pulse Ox: SpO2  Av.6 %  Min: 97 %  Max: 100 %  Supplemental O2: O2 Flow Rate (L/min): 2 L/min    CONSTITUTIONAL:  awake, alert, no apparent distress, and appears stated age. Very hard of hearing  HEENT: Normocephalic  NECK: no JVD, no LAD  HEART: RRR, no murmurs, gallops, or rubs  LUNGS: clear to auscultation bilaterally, no wheezes, crackles, or rhonchi. ABDOMEN: soft/NT/ND, positive BS  MUSCULOSKELETAL: negative for edema, +2 pulses  SKIN: intact without rash or jaundice  NEURO: Nonfocal, alert, moving all extremities. Confused.     DATA:  Recent Results (from the past 24 hour(s))   POCT CREATININE    Collection Time: 22 12:39 PM   Result Value Ref Range    POC CREATININE WHOLE BLOOD 1.3    EKG 12 Lead    Collection Time: 22 12:40 PM   Result Value Ref Range    Ventricular Rate 63 BPM    Atrial Rate 63 BPM    P-R Interval 222 ms    QRS Duration 94 ms    Q-T Interval 472 ms    QTc Calculation (Bazett) 483 ms    R Axis -80 degrees    T Axis 270 degrees   APTT    Collection Time: 22 12:45 PM   Result Value Ref Range    aPTT 35.4 24.4 - 36.8 sec   Brain Natriuretic Peptide    Collection Time: 22 12:45 PM   Result Value Ref Range    Pro-BNP 1,141 pg/mL   CBC Auto Differential    Collection Time: 22 12:45 PM   Result Value Ref Range    WBC 4.7 (L) 4.8 - 10.8 K/uL    RBC 3.31 (L) 4.70 - 6.10 M/uL    Hemoglobin 10.5 (L) 14.0 - 18.0 g/dL    Hematocrit 31.8 (L) 42.0 - 52.0 %    MCV 96.0 80.0 - 100.0 fL    MCH 31.8 (H) 27.0 - 31.3 pg    MCHC 33.1 33.0 - 37.0 %    RDW 15.5 (H) 11.5 - 14.5 %    Platelets 654 477 - 927 K/uL    Neutrophils % 44.7 %    Lymphocytes % 40.1 %    Monocytes % 10.6 %    Eosinophils % 3.6 %    Basophils % 1.0 %    Neutrophils Absolute 2.1 1.4 - 6.5 K/uL    Lymphocytes Absolute 1.9 1.0 - 4.8 K/uL    Monocytes Absolute 0.5 0.2 - 0.8 K/uL    Eosinophils Absolute 0.2 0.0 - 0.7 K/uL    Basophils Absolute 0.0 0.0 - 0.2 K/uL   CK    Collection Time: 22 12:45 PM   Result Value Ref Range    Total CK 53 0 - 190 U/L   Comprehensive Metabolic Panel    Collection Time: 02/07/22 12:45 PM   Result Value Ref Range    Sodium 142 135 - 144 mEq/L    Potassium 3.8 3.4 - 4.9 mEq/L    Chloride 106 95 - 107 mEq/L    CO2 25 20 - 31 mEq/L    Anion Gap 11 9 - 15 mEq/L    Glucose 148 (H) 70 - 99 mg/dL    BUN 16 8 - 23 mg/dL    CREATININE 1.22 (H) 0.70 - 1.20 mg/dL    GFR Non-African American 55.4 (L) >60    GFR  >60.0 >60    Calcium 8.5 8.5 - 9.9 mg/dL    Total Protein 5.7 (L) 6.3 - 8.0 g/dL    Albumin 3.2 (L) 3.5 - 4.6 g/dL    Total Bilirubin 0.6 0.2 - 0.7 mg/dL    Alkaline Phosphatase 58 35 - 104 U/L    ALT 10 0 - 41 U/L    AST 16 0 - 40 U/L    Globulin 2.5 2.3 - 3.5 g/dL   High sensitivity CRP    Collection Time: 02/07/22 12:45 PM   Result Value Ref Range    CRP High Sensitivity 1.3 0.0 - 5.0 mg/L   Lactic Acid, Plasma    Collection Time: 02/07/22 12:45 PM   Result Value Ref Range    Lactic Acid 2.3 (H) 0.5 - 2.2 mmol/L   Magnesium    Collection Time: 02/07/22 12:45 PM   Result Value Ref Range    Magnesium 1.8 1.7 - 2.4 mg/dL   Protime-INR    Collection Time: 02/07/22 12:45 PM   Result Value Ref Range    Protime 15.8 (H) 12.3 - 14.9 sec    INR 1.3    Troponin    Collection Time: 02/07/22 12:45 PM   Result Value Ref Range    Troponin 0.085 (HH) 0.000 - 0.010 ng/mL   TSH without Reflex    Collection Time: 02/07/22 12:45 PM   Result Value Ref Range    TSH 3.540 0.440 - 3.860 uIU/mL   Urinalysis Reflex to Culture    Collection Time: 02/07/22 12:45 PM    Specimen: Urine, straight catheter   Result Value Ref Range    Color, UA Yellow Straw/Yellow    Clarity, UA Clear Clear    Glucose, Ur Negative Negative mg/dL    Bilirubin Urine Negative Negative    Ketones, Urine Negative Negative mg/dL    Specific Gravity, UA 1.012 1.005 - 1.030    Blood, Urine Negative Negative    pH, UA 5.0 5.0 - 9.0    Protein, UA Negative Negative mg/dL    Urobilinogen, Urine 0.2 <2.0 E.U./dL    Nitrite, Urine Negative Speaking Coherently Negative    Leukocyte Esterase, Urine SMALL (A) Negative    Urine Reflex to Culture Yes    Microscopic Urinalysis    Collection Time: 02/07/22 12:45 PM   Result Value Ref Range    Bacteria, UA Negative Negative /HPF    Hyaline Casts, UA 5-10 0 - 5 /HPF    WBC, UA 10-20 (A) 0 - 5 /HPF    RBC, UA 0-2 0 - 5 /HPF    Epithelial Cells, UA 3-5 0 - 5 /HPF   POCT Venous    Collection Time: 02/07/22 12:55 PM   Result Value Ref Range    POC Sodium 141 136 - 145 mEq/L    POC Potassium 3.9 3.5 - 5.1 mEq/L    POC Chloride 104 99 - 110 mEq/L    POC Glucose 142 (H) 70 - 99 mg/dl    POC Creatinine 1.2 0.8 - 1.3 mg/dL    GFR Non- 57 (A) >60    GFR African American >60 >60    Calcium, Ion 1.11 (L) 1.12 - 1.32 mmol/L    pH, Christopher 7.363 7.320 - 7.420    pCO2, Christopher 47.7 40.0 - 50.0 mm Hg    pO2, Christopher 27 Not Established mm Hg    HCO3, Venous 27.1 23.0 - 29.0 mmol/L    Base Excess, Christopher 2 -3 - 3    O2 Sat, Christopher 48 Not Established %    TC02 (Calc), Christopher 29 Not Established mmol/L    Lactate 1.89 0.40 - 2.00 mmol/L    Hemoglobin 10.7 (L) 13.5 - 17.5 gm/dL    POC Hematocrit 31 (L) 41 - 53 %    Sample Type CHRISTOPHER     Performed on SEE BELOW            ASSESSMENT AND PLAN:    SIRS   Acute encephalopathy-unspecified  Chest pain  Dyspnea-saturation is well in the high 90s, CT of the chest negative for acute process  Transient hypotension-reported by EMS, no evidence of hypotension while hospitalized so far  HLD  History of HTN  Diabetes  Hypothyroidism  CAD  COPD-no exacerbation    Plan  1. We will observe her [facility] floor  2. Given antibiotics in the ED, no evidence of infection so far except for possible UTI. I will give Rocephin. Low threshold for broadening antibiotic if he becomes hypotensive or has fever. Follow-up blood and urine cultures  3. Monitor on telemetry  4. Trend cardiac enzymes  5. Monitor BP  6. Resume home medication once reconciled  7. Sliding scale insulin, hypoglycemia protocol  8. PT/OT  9.  DVT prophylaxis-already on Eliquis       DISCHARGE PLANNING  Observation    Code status: DNR CCA  Electronically signed by Anabel Pruett DO on 2/7/22 at 3:39 PM EST

## 2022-02-08 ENCOUNTER — APPOINTMENT (OUTPATIENT)
Dept: ULTRASOUND IMAGING | Age: 87
End: 2022-02-08
Payer: MEDICARE

## 2022-02-08 VITALS
BODY MASS INDEX: 23.14 KG/M2 | HEIGHT: 66 IN | WEIGHT: 144 LBS | DIASTOLIC BLOOD PRESSURE: 39 MMHG | HEART RATE: 73 BPM | OXYGEN SATURATION: 98 % | SYSTOLIC BLOOD PRESSURE: 115 MMHG | TEMPERATURE: 97.5 F | RESPIRATION RATE: 16 BRPM

## 2022-02-08 LAB
ANION GAP SERPL CALCULATED.3IONS-SCNC: 14 MEQ/L (ref 9–15)
BASOPHILS ABSOLUTE: 0 K/UL (ref 0–0.2)
BASOPHILS RELATIVE PERCENT: 0.7 %
BUN BLDV-MCNC: 14 MG/DL (ref 8–23)
CALCIUM SERPL-MCNC: 8 MG/DL (ref 8.5–9.9)
CHLORIDE BLD-SCNC: 109 MEQ/L (ref 95–107)
CO2: 18 MEQ/L (ref 20–31)
CREAT SERPL-MCNC: 1.05 MG/DL (ref 0.7–1.2)
EOSINOPHILS ABSOLUTE: 0.3 K/UL (ref 0–0.7)
EOSINOPHILS RELATIVE PERCENT: 5.7 %
GFR AFRICAN AMERICAN: >60
GFR AFRICAN AMERICAN: >60
GFR NON-AFRICAN AMERICAN: 52
GFR NON-AFRICAN AMERICAN: >60
GLUCOSE BLD-MCNC: 117 MG/DL (ref 70–99)
GLUCOSE BLD-MCNC: 155 MG/DL (ref 70–99)
GLUCOSE BLD-MCNC: 91 MG/DL (ref 70–99)
HCT VFR BLD CALC: 30.5 % (ref 42–52)
HEMOGLOBIN: 10.3 G/DL (ref 14–18)
LYMPHOCYTES ABSOLUTE: 1.8 K/UL (ref 1–4.8)
LYMPHOCYTES RELATIVE PERCENT: 33.6 %
MAGNESIUM: 1.6 MG/DL (ref 1.7–2.4)
MCH RBC QN AUTO: 31.9 PG (ref 27–31.3)
MCHC RBC AUTO-ENTMCNC: 33.7 % (ref 33–37)
MCV RBC AUTO: 94.7 FL (ref 80–100)
MONOCYTES ABSOLUTE: 0.5 K/UL (ref 0.2–0.8)
MONOCYTES RELATIVE PERCENT: 9.6 %
NEUTROPHILS ABSOLUTE: 2.7 K/UL (ref 1.4–6.5)
NEUTROPHILS RELATIVE PERCENT: 50.4 %
ORGANISM: ABNORMAL
PDW BLD-RTO: 15.7 % (ref 11.5–14.5)
PERFORMED ON: ABNORMAL
PLATELET # BLD: 183 K/UL (ref 130–400)
POC CREATININE: 1.3 MG/DL (ref 0.8–1.3)
POC SAMPLE TYPE: ABNORMAL
POTASSIUM REFLEX MAGNESIUM: 3.5 MEQ/L (ref 3.4–4.9)
PROCALCITONIN: 0.05 NG/ML (ref 0–0.15)
RBC # BLD: 3.21 M/UL (ref 4.7–6.1)
SODIUM BLD-SCNC: 141 MEQ/L (ref 135–144)
TROPONIN: 0.04 NG/ML (ref 0–0.01)
TROPONIN: 0.06 NG/ML (ref 0–0.01)
TROPONIN: 0.06 NG/ML (ref 0–0.01)
URINE CULTURE, ROUTINE: ABNORMAL
WBC # BLD: 5.3 K/UL (ref 4.8–10.8)

## 2022-02-08 PROCEDURE — 6360000002 HC RX W HCPCS: Performed by: INTERNAL MEDICINE

## 2022-02-08 PROCEDURE — G0378 HOSPITAL OBSERVATION PER HR: HCPCS

## 2022-02-08 PROCEDURE — 2580000003 HC RX 258: Performed by: INTERNAL MEDICINE

## 2022-02-08 PROCEDURE — 96361 HYDRATE IV INFUSION ADD-ON: CPT

## 2022-02-08 PROCEDURE — 36415 COLL VENOUS BLD VENIPUNCTURE: CPT

## 2022-02-08 PROCEDURE — 6370000000 HC RX 637 (ALT 250 FOR IP): Performed by: INTERNAL MEDICINE

## 2022-02-08 PROCEDURE — 94664 DEMO&/EVAL PT USE INHALER: CPT

## 2022-02-08 PROCEDURE — 97166 OT EVAL MOD COMPLEX 45 MIN: CPT

## 2022-02-08 PROCEDURE — 84145 PROCALCITONIN (PCT): CPT

## 2022-02-08 PROCEDURE — 99214 OFFICE O/P EST MOD 30 MIN: CPT | Performed by: SURGERY

## 2022-02-08 PROCEDURE — 96367 TX/PROPH/DG ADDL SEQ IV INF: CPT

## 2022-02-08 PROCEDURE — 84484 ASSAY OF TROPONIN QUANT: CPT

## 2022-02-08 PROCEDURE — 76705 ECHO EXAM OF ABDOMEN: CPT

## 2022-02-08 PROCEDURE — 80048 BASIC METABOLIC PNL TOTAL CA: CPT

## 2022-02-08 PROCEDURE — 85025 COMPLETE CBC W/AUTO DIFF WBC: CPT

## 2022-02-08 PROCEDURE — 96366 THER/PROPH/DIAG IV INF ADDON: CPT

## 2022-02-08 PROCEDURE — 83735 ASSAY OF MAGNESIUM: CPT

## 2022-02-08 RX ORDER — URSODIOL 300 MG/1
300 CAPSULE ORAL 2 TIMES DAILY
Status: DISCONTINUED | OUTPATIENT
Start: 2022-02-08 | End: 2022-02-08 | Stop reason: HOSPADM

## 2022-02-08 RX ORDER — FERROUS SULFATE 325(65) MG
325 TABLET ORAL
Status: DISCONTINUED | OUTPATIENT
Start: 2022-02-08 | End: 2022-02-08 | Stop reason: HOSPADM

## 2022-02-08 RX ORDER — ALBUTEROL SULFATE 90 UG/1
2 AEROSOL, METERED RESPIRATORY (INHALATION) EVERY 6 HOURS PRN
Status: DISCONTINUED | OUTPATIENT
Start: 2022-02-08 | End: 2022-02-08 | Stop reason: HOSPADM

## 2022-02-08 RX ORDER — CEFUROXIME AXETIL 500 MG/1
500 TABLET ORAL 2 TIMES DAILY
Qty: 6 TABLET | Refills: 0 | Status: SHIPPED | OUTPATIENT
Start: 2022-02-08 | End: 2022-02-11

## 2022-02-08 RX ORDER — PANTOPRAZOLE SODIUM 40 MG/1
40 TABLET, DELAYED RELEASE ORAL
Status: DISCONTINUED | OUTPATIENT
Start: 2022-02-09 | End: 2022-02-08 | Stop reason: HOSPADM

## 2022-02-08 RX ORDER — FOLIC ACID 1 MG/1
1 TABLET ORAL DAILY
Status: DISCONTINUED | OUTPATIENT
Start: 2022-02-08 | End: 2022-02-08 | Stop reason: HOSPADM

## 2022-02-08 RX ORDER — MAGNESIUM SULFATE IN WATER 40 MG/ML
2000 INJECTION, SOLUTION INTRAVENOUS ONCE
Status: COMPLETED | OUTPATIENT
Start: 2022-02-08 | End: 2022-02-08

## 2022-02-08 RX ORDER — LEVETIRACETAM 500 MG/1
500 TABLET ORAL 2 TIMES DAILY
Status: DISCONTINUED | OUTPATIENT
Start: 2022-02-08 | End: 2022-02-08 | Stop reason: HOSPADM

## 2022-02-08 RX ORDER — ATORVASTATIN CALCIUM 40 MG/1
40 TABLET, FILM COATED ORAL NIGHTLY
Status: DISCONTINUED | OUTPATIENT
Start: 2022-02-08 | End: 2022-02-08 | Stop reason: HOSPADM

## 2022-02-08 RX ORDER — ASPIRIN 81 MG/1
81 TABLET ORAL DAILY
Status: DISCONTINUED | OUTPATIENT
Start: 2022-02-08 | End: 2022-02-08 | Stop reason: HOSPADM

## 2022-02-08 RX ORDER — LEVOTHYROXINE SODIUM 0.03 MG/1
25 TABLET ORAL DAILY
Status: DISCONTINUED | OUTPATIENT
Start: 2022-02-08 | End: 2022-02-08 | Stop reason: HOSPADM

## 2022-02-08 RX ORDER — POTASSIUM CHLORIDE 20 MEQ/1
40 TABLET, EXTENDED RELEASE ORAL ONCE
Status: COMPLETED | OUTPATIENT
Start: 2022-02-08 | End: 2022-02-08

## 2022-02-08 RX ORDER — ESOMEPRAZOLE MAGNESIUM 40 MG/1
40 FOR SUSPENSION ORAL DAILY
Status: DISCONTINUED | OUTPATIENT
Start: 2022-02-08 | End: 2022-02-08 | Stop reason: CLARIF

## 2022-02-08 RX ADMIN — CEFTRIAXONE SODIUM 1000 MG: 1 INJECTION, POWDER, FOR SOLUTION INTRAMUSCULAR; INTRAVENOUS at 18:04

## 2022-02-08 RX ADMIN — FERROUS SULFATE TAB 325 MG (65 MG ELEMENTAL FE) 325 MG: 325 (65 FE) TAB at 09:51

## 2022-02-08 RX ADMIN — LEVOTHYROXINE SODIUM 25 MCG: 0.03 TABLET ORAL at 09:51

## 2022-02-08 RX ADMIN — MAGNESIUM SULFATE HEPTAHYDRATE 2000 MG: 2 INJECTION, SOLUTION INTRAVENOUS at 10:37

## 2022-02-08 RX ADMIN — APIXABAN 5 MG: 5 TABLET, FILM COATED ORAL at 09:51

## 2022-02-08 RX ADMIN — ASPIRIN 81 MG: 81 TABLET, COATED ORAL at 09:51

## 2022-02-08 RX ADMIN — LEVETIRACETAM 500 MG: 500 TABLET, FILM COATED ORAL at 09:51

## 2022-02-08 RX ADMIN — POTASSIUM CHLORIDE 40 MEQ: 1500 TABLET, EXTENDED RELEASE ORAL at 09:51

## 2022-02-08 RX ADMIN — FOLIC ACID 1 MG: 1 TABLET ORAL at 09:51

## 2022-02-08 ASSESSMENT — PAIN SCALES - GENERAL
PAINLEVEL_OUTOF10: 0
PAINLEVEL_OUTOF10: 0

## 2022-02-08 NOTE — PROGRESS NOTES
[facility] Respiratory Therapy Evaluation   Current Order:  ALBUTEROL 2 PUFFS Q6 PRN    Home Regimen: PRN      Ordering Physician: Divine Dunn  Re-evaluation Date:  N/A     Diagnosis: CHEST PAIN      Patient Status: Stable / Unstable + Physician notified    The following MDI Criteria must be met in order to convert aerosol to MDI with spacer.  If unable to meet, MDI will be converted to aerosol:  []  Patient able to demonstrate the ability to use MDI effectively  []  Patient alert and cooperative  []  Patient able to take deep breath with 5-10 second hold  []  Medication(s) available in this delivery method   []  Peak flow greater than or equal to 200 ml/min            Current Order Substituted To  (same drug, same frequency)   Aerosol to MDI [] Albuterol Sulfate 0.083% unit dose by aerosol Albuterol Sulfate MDI 2 puffs by inhalation with spacer    [] Levalbuterol 1.25 mg unit dose by aerosol Levalbuterol MDI 2 puffs by inhalation with spacer    [] Levalbuterol 0.63 mg unit dose by aerosol Levalbuterol MDI 2 puffs by inhalation with spacer    [] Ipratropium Bromide 0.02% unit dose by aerosol Ipratropium Bromide MDI 2 puffs by inhalation with spacer    [] Duoneb (Ipratropium + Albuterol) unit dose by aerosol Ipratropium MDI + Albuterol MDI 2 puffs by inhalation w/spacer   MDI to Aerosol [] Albuterol Sulfate MDI Albuterol Sulfate 0.083% unit dose by aerosol    [] Levalbuterol MDI 2 puffs by inhalation Levalbuterol 1.25 mg unit dose by aerosol    [] Ipratropium Bromide MDI by inhalation Ipratropium Bromide 0.02% unit dose by aerosol    [] Combivent (Ipratropium + Albuterol) MDI by inhalation Duoneb (Ipratropium + Albuterol) unit dose by aerosol       Treatment Assessment [Frequency/Schedule]:  Change frequency to: __________NO CHANGES TO CURRENT ORDER________________________________________per Protocol, P&T, [facility]      Points 0 1 2 3 4   Pulmonary Status  Non-Smoker  []   Smoking history   < 20 pack years  []   Smoking history  ?  20 pack years  []   Pulmonary Disorder  (acute or chronic)  [x]   Severe or Chronic w/ Exacerbation  []     Surgical Status No [x]   Surgeries     General []   Surgery Lower []   Abdominal Thoracic or []   Upper Abdominal Thoracic with  PulmonaryDisorder  []     Chest X-ray Clear/Not  Ordered     [x]  Chronic Changes  Results Pending  []  Infiltrates, atelectasis, pleural effusion, or edema  []  Infiltrates in more than one lobe []  Infiltrate + Atelectasis, &/or pleural effusion  []    Respiratory Pattern Regular,  RR = 12-20 [x]  Increased,  RR = 21-25 []  BUTT, irregular,  or RR = 26-30 []  Decreased FEV1  or RR = 31-35 []  Severe SOB, use  of accessory muscles, or RR ? 35  []    Mental Status Alert, oriented,  Cooperative []  Confused but Follows commands [x]  Lethargic or unable to follow commands []  Obtunded  []  Comatose  []    Breath Sounds Clear to  auscultation  [x]  Decreased unilaterally or  in bases only []  Decreased  bilaterally  []  Crackles or intermittent wheezes []  Wheezes []    Cough Strong, Spontan., & nonproductive [x]  Strong,  spontaneous, &  productive []  Weak,  Nonproductive []  Weak, productive or  with wheezes []  No spontaneous  cough or may require suctioning []    Level of Activity Ambulatory []  Ambulatory w/ Assist  [x]  Non-ambulatory []  Paraplegic []  Quadriplegic []    Total    Score:__5_____     Triage Score:___5_____      Tri       Triage:     1. (>20) Freq: Q3    2. (16-20) Freq: Q4   3. (11-15) Freq: QID & Albuterol Q2 PRN    4. (6-10) Freq: TID & Albuterol Q2 PRN    5. (0-5) Freq Q4prn

## 2022-02-08 NOTE — DISCHARGE SUMMARY
Hospital Medicine Discharge Summary    Warden Calero  :  3/5/1929  MRN:  [id number]    Admit date:  2022  Discharge date:  2022    Admitting Physician:  Ashish Tavarez DO  Primary Care Physician:  Iraj Wagoner MD, MD    This patient was seen during a global health during the COVID-19 pandemic and its resultant significant effects on the delivery of emergency care. This includes but is not limited to the following: significant ED boarding, hospital overcrowding,  limited bed availability (floor and especially ICU), limited resources (personnel, supplies, testing, services etc). While every and all efforts are made to delivery the highest quality care in a prompt way there may or may not be significant delays in care as a result. Discharge Diagnoses:      SIRS   Acute encephalopathy-unspecified  Chest pain  Dyspnea-saturation is well in the high 90s, CT of the chest negative for acute process  Transient hypotension-reported by EMS, no evidence of hypotension while hospitalized so far  HLD  History of HTN  Diabetes  Hypothyroidism  CAD  COPD-no exacerbation    Chief Complaint   Patient presents with    Altered Mental Status    [facility] Course:     Patient was admitted with change in mentation, chest pain and dyspnea. He was monitored overnight. ACS was ruled out. There was concern for transient hypotension at home. Blood pressure remained stable. No leukocytosis, procalcitonin is 0.05. No evidence of infection except possible UTI. He was treated with IV Rocephin. Discharged on p.o. antibiotics. I discussed plan of care with patient's granddaughter, patient stays with her. She understands the patient needs to come back to the hospital if he has fever or any new symptoms. Surgery consult requested by ED provider regarding gallbladder sludge to rule out cholecystitis, ultrasound of the gallbladder is pending at the time of this note.     Exam on discharge:   BP (!) 128/39   Pulse 66   Temp 97.5 °F (36.4 °C) (Axillary)   Resp 18   Ht 5' 6\" (1.676 m)   Wt 130 lb 4.8 oz (59.1 kg)   SpO2 97%   BMI 21.03 kg/m²   General appearance: No apparent distress, appears stated age and cooperative. HEENT: Pupils equal, round, and reactive to light. Conjunctivae/corneas clear. Neck: Supple, with full range of motion. No jugular venous distention. Trachea midline. Respiratory:  Normal respiratory effort. Clear to auscultation, bilaterally without Rales/Wheezes/Rhonchi. Cardiovascular: Regular rate and rhythm with normal S1/S2 without murmurs, rubs or gallops. Abdomen: Soft, non-tender, non-distended with normal bowel sounds. Musculoskeletal: No clubbing, cyanosis or edema bilaterally. Full range of motion without deformity. Skin: Skin color, texture, turgor normal.  No rashes or lesions. Neuro: Non Focal. Symetrical motor and tone. Nl Comprehension, Alert,awake and oriented. NL CN. Symetrical tone and reflexes. Psychiatric: Alert and oriented, thought content appropriate, normal insight  Capillary Refill: Brisk,< 3 seconds   Peripheral Pulses: +2 palpable, equal bilaterally     Patient was seen by the following consultants   Consults:  IP CONSULT TO GENERAL SURGERY    Significant Diagnostic Studies:    Refer to chart     Please refer to chart if no studies are shown here    CT HEAD WO CONTRAST    Result Date: 2/7/2022  EXAMINATION:  CT HEAD WO CONTRAST HISTORY:   hypotensive and confusion; Hx CVA few months ago  TECHNIQUE: CT head without contrast. All CT scans at this facility use dose modulation, iterative reconstruction, and/or weight based dosing when appropriate to reduce radiation dose to as low as reasonably achievable. COMPARISON:  Multiple CTs of the head most recently 12/23/2021. RESULT: Post-operative change:  None. Acute change:   No evidence of an acute intracranial process. Hemorrhage:    No evidence of acute intracranial hemorrhage.  Mass Lesion / Mass Effect: No evidence of an intracranial mass or extraaxial fluid collection. No significant mass effect. Chronic change: Redemonstration of encephalomalacia in the right temporo-occipital lobes from prior right PCA distortion infarct. Prior bilateral cerebellar hemisphere and left basal ganglia infarct. Patchy foci of  low attenuation coefficient are present within the supratentorial white matter which is a nonspecific finding but likely represents moderate microvascular ischemia. Atherosclerotic calcification of the carotid siphons and vertebrobasilar arteries. Parenchyma:  No significant parenchymal volume loss. Ventricles:   Ventricular enlargement concordant with the degree of parenchymal volume loss. Other: The calvarium, skull base, mastoids, orbits and extracranial soft tissues are unremarkable. Postoperative changes of left canal wall up mastoidectomy. Moderate right mastoid effusions. Diffuse paranasal sinus mucosal thickening with left maxillary sinus air-fluid levels and frothy subcutaneous secretions. Right maxillary sinus mucus retention cysts. No acute intracranial abnormality; no intracranial hemorrhage or extra-axial collection. Encephalomalacia from recent right PCA distribution infarct, and prior bilateral cerebral hemispheres and basal ganglia infarcts, moderate chronic microvascular ischemia and inflammatory changes. Left maxillary sinus air-fluid levels likely secondary to acute sinusitis. Moderate right mastoid effusions. CTA CHEST W WO CONTRAST    Result Date: 2/7/2022  EXAM:CTA CHEST W WO CONTRAST DATE2/7/2022 1:10 PM: REASON FOR EXAM:Acute severe anterior chest wall pain. SOB, CP, hypotension and confusion COMPARISON: none Technique: Helical CT was performed through the chest following the IV administration of100  cc of nonionic contrast. 3-D MIP reconstructions were performed on an independent workstation in the coronal plane with thick slab technique utilized in the interpretation. 3-D maximum intensity projection performed. All CT scans at this facility use dose modulation, iterative reconstruction, and/or weight based dosing when appropriate to reduce radiation dose to as low as reasonably achievable. CHEST CTA  FINDINGS: Mediastinum: Mediastinum and choco are normal. There are no pathologically enlarged lymph nodes. The chest wall and lower neck are normal Heart: The heart is within normal limits. There is no pericardial effusion. Vascular structures: There is no evidence for thoracic aortic aneurysm or dissection. No evidence of traumatic aortic injury. There are no persistent filling defects within the pulmonary arteries. Lungs: There are no focal infiltrates or consolidations. There is no pneumothorax. There is hyperinflated hyperlucent consistent chronic emphysematous changes. There is scarring in the posterior portions of both lungs. Pleura: No pleural effusion or thickening. Upper abdomen: The visualized portions of the upper abdomen are unremarkable. Bones/axillae/soft tissues: Osseous structures and chest wall are normal.     Negative CTA of the chest. No evidence of thoracic aortic dissection or aneurysm. The study is negative for pulmonary emboli. There are no acute infiltrates or effusions. There are chronic emphysematous changes throughout both lungs. CT ABDOMEN PELVIS W IV CONTRAST Additional Contrast? None    Result Date: 2/7/2022  EXAMINATION: CT ABDOMEN PELVIS W IV CONTRAST HISTORY:  SOB, CP, hypotension and confusion  COMPARISON: None TECHNIQUE: Contiguous axial CT sections of the abdomen and pelvis. Imaging was obtained after IV contrast administration. .  Sagittal and coronal reformats have been obtained. All CT scans at this facility use dose modulation, iterative reconstruction, and/or weight based dosing when appropriate to reduce radiation dose to as low as reasonably achievable. FINDINGS  Lung bases:Visualized lung bases show no significant pathology Liver:  The right-sided ICD. Status post median sternotomy. Lungs and pleura:  No focal consolidation. No pneumothorax. No pleural effusion. Bibasilar atelectasis. Cardiomediastinal silhouette:  Stable cardiomediastinal silhouette. Atherosclerotic calcification of the aortic arch. Other: No acute osseous process. No acute cardiopulmonary abnormality. Discharge Medications:         Medication List      ASK your doctor about these medications    albuterol sulfate  (90 Base) MCG/ACT inhaler  Commonly known as: Proventil HFA  Inhale 2 puffs into the lungs every 6 hours as needed for Wheezing     apixaban 5 MG Tabs tablet  Commonly known as: ELIQUIS  Take 1 tablet by mouth 2 times daily     ascorbic acid 500 MG tablet  Commonly known as: VITAMIN C     aspirin 81 MG EC tablet  Take 1 tablet by mouth daily     atorvastatin 40 MG tablet  Commonly known as: LIPITOR     coenzyme Q10 100 MG Caps capsule  Take 1 capsule by mouth daily     esomeprazole Magnesium 40 MG Pack  Commonly known as: NEXIUM     ferrous sulfate 325 (65 Fe) MG tablet  Commonly known as: IRON 325  Take 1 tablet by mouth 2 times daily (with meals)     folic acid 1 MG tablet  Commonly known as: FOLVITE  Take 1 tablet by mouth daily     levETIRAcetam 500 MG tablet  Commonly known as: KEPPRA  Take 1 tablet by mouth 2 times daily     levothyroxine 25 MCG tablet  Commonly known as: SYNTHROID     nitroGLYCERIN 0.4 MG SL tablet  Commonly known as: NITROSTAT  Place 1 tablet under the tongue every 5 minutes as needed for Chest pain     ursodiol 300 MG capsule  Commonly known as: ACTIGALL     vitamin D 50 MCG (2000 UT) Tabs tablet  Commonly known as: CHOLECALCIFEROL  Take 1 tablet by mouth Daily with supper            Disposition:   If discharged to Home, Any [place] [address] needs that were indicated and/or required as been addressed and set up by Social Work.      Condition at discharge: good     Activity: activity as tolerated    Total time taken for discharging this patient: 40 minutes. Greater than 70% of time was spent focused exclusively on this patient. Time was taken to review chart, discuss plans with consultants, reconciling medications, discussing plan answering questions with patient.      Hanane Chapa DO  2/8/2022, 10:06 AM  ----------------------------------------------------------------------------------------------------------------------    Gerhard Antoine

## 2022-02-08 NOTE — CONSULTS
Pt Name: Javier Olmos  MRN: [id number]  YOB: 1929  Date of evaluation: 2/8/2022  Primary Care Physician: Buddy Gomez MD, MD  Patient evaluated at the request of  Dr. Holly Cummings  Reason for evaluation: abnormal GB on CT scan    IMPRESSIONS:  1. Gallbladder has sludge versus stones but has no GB symptomatology  2. Failure to thrive    PLANS  1. Gallbladder ultrasound prior to discharge  2. Due to lack of symptomatology and her advanced age, I would recommend no surgical intervention at this time for her gallbladder. SUBJECTIVE:  History of Chief Complaint:    Rosanna Perkins is a 80 y.o.male who presents to the emergency room yesterday with shortness of breath and altered mental status. The family reports she had some chest pain. The patient is deaf and unable to answer questions. He also appears demented. CT scan in the emergency room of the abdomen and pelvis shows a normal gallbladder wall thickening with no evidence of inflammation filled with sludge or small stones. No history of nausea, vomiting or abdominal pain. White blood cell count 5300 and an elevated troponin on admission of 0.085. The patient was hypotensive on admission but responded to fluid replacement. Since being in the hospital the patient's appetite has been good. History is obtained through recent medical records and the nurses. Patient had a recent CVA and is on Eliquis. Past Medical History   has a past medical history of Anemia, CAD (coronary artery disease), DM (diabetes mellitus) ([facility] [address] Dyslipidemia, History of tobacco abuse, HTN (hypertension), Hypothyroidism, Non-ST elevation MI (NSTEMI) ([facility] [address] and Pacemaker. Past Surgical History   has a past surgical history that includes Cataract removal and Middle ear surgery (Left, 1998). Medications  Prior to Admission medications    Medication Sig Start Date End Date Taking?  Authorizing Provider   cefUROXime (CEFTIN) 500 MG tablet Take 1 tablet by mouth 2 times daily for 3 days 2/8/22 2/11/22 Yes [address], DO   ascorbic acid (VITAMIN C) 500 MG tablet Take 500 mg by mouth 2 times daily Indications: 2019 Novel Coronavirus   Yes Historical Provider, MD   levETIRAcetam (KEPPRA) 500 MG tablet Take 1 tablet by mouth 2 times daily  Patient taking differently: Take 500 mg by mouth 2 times daily Indications: Seizure  10/30/21  Yes Soniya Vazquez PA-C   levothyroxine (SYNTHROID) 25 MCG tablet Take 25 mcg by mouth Daily Indications: Underactive Thyroid    Yes Historical Provider, MD   aspirin 81 MG EC tablet Take 1 tablet by mouth daily  Patient taking differently: Take 81 mg by mouth daily Indications: CORONARY ARTERY DISEASE  9/30/21  Yes Pat Mirza DO   coenzyme Q10 100 MG CAPS capsule Take 1 capsule by mouth daily  Patient taking differently: Take 100 mg by mouth daily Indications: Nutritional Support  9/30/21  Yes Pat Mirza DO   apixaban (ELIQUIS) 5 MG TABS tablet Take 1 tablet by mouth 2 times daily  Patient taking differently: Take 5 mg by mouth 2 times daily Indications: Anticoagulant Therapy  6/26/21  Yes Héctor Bender MD   ferrous sulfate (IRON 325) 325 (65 Fe) MG tablet Take 1 tablet by mouth 2 times daily (with meals)  Patient taking differently: Take 325 mg by mouth daily (with breakfast) Indications: Nutritional Support  2/13/21  Yes CHRISTINE Granados CNP   folic acid (FOLVITE) 1 MG tablet Take 1 tablet by mouth daily  Patient taking differently: Take 1 mg by mouth daily Indications: Nutritional Support  2/14/21  Yes CHRISTINE Granados CNP   nitroGLYCERIN (NITROSTAT) 0.4 MG SL tablet Place 1 tablet under the tongue every 5 minutes as needed for Chest pain 9/21/19 2/7/22 Yes CHRISTINE Ames CNP   albuterol sulfate HFA (PROVENTIL HFA) 108 (90 Base) MCG/ACT inhaler Inhale 2 puffs into the lungs every 6 hours as needed for Wheezing  Patient taking differently: Inhale 2 puffs into the lungs every 6 hours as needed for Wheezing or Shortness of Breath 12/29/18  Yes Regina Liz MD   atorvastatin (LIPITOR) 40 MG tablet Take 40 mg by mouth daily Indications: High Amount of Fats in the Blood    Yes Historical Provider, MD   ursodiol (ACTIGALL) 300 MG capsule Take 300 mg by mouth 2 times daily Indications: Digestive Tract Discomfort    Yes Historical Provider, MD   esomeprazole Magnesium (NEXIUM) 40 MG PACK Take 40 mg by mouth daily Indications: Digestive Tract Discomfort    Yes Historical Provider, MD   Vitamin D (CHOLECALCIFEROL) 50 MCG (2000 UT) TABS tablet Take 1 tablet by mouth Daily with supper  Patient taking differently: Take 2,000 Units by mouth 2 times daily Indications: 2019 Novel Coronavirus  2/13/21   Nasra Bagley DO    Scheduled Meds:   apixaban  5 mg Oral BID    aspirin  81 mg Oral Daily    atorvastatin  40 mg Oral Nightly    ferrous sulfate  325 mg Oral Daily with breakfast    folic acid  1 mg Oral Daily    levETIRAcetam  500 mg Oral BID    levothyroxine  25 mcg Oral Daily    ursodiol  300 mg Oral BID    magnesium sulfate  2,000 mg IntraVENous Once    [START ON 2/9/2022] pantoprazole  40 mg Oral QAM AC    sodium chloride flush  5-40 mL IntraVENous 2 times per day    insulin lispro  0-6 Units SubCUTAneous TID WC    insulin lispro  0-3 Units SubCUTAneous Nightly    cefTRIAXone (ROCEPHIN) IV  1,000 mg IntraVENous Q24H     Continuous Infusions:   sodium chloride      dextrose      sodium chloride Stopped (02/08/22 0248)     PRN Meds:.albuterol sulfate HFA, sodium chloride flush, sodium chloride, ondansetron **OR** ondansetron, polyethylene glycol, acetaminophen **OR** acetaminophen, potassium chloride **OR** potassium alternative oral replacement **OR** potassium chloride, glucose, glucagon (rDNA), dextrose, dextrose bolus (hypoglycemia) **OR** dextrose bolus (hypoglycemia)  Allergies  has No Known Allergies. Family History  family history is not on file. Social History   reports that he has quit smoking.  He has never used smokeless tobacco. He reports that he does not drink alcohol and does not use drugs. Review of Systems:  14 systems were reviewed and negative other than [place]    OBJECTIVE  CURRENT VITALS:  height is 5' 6\" (1.676 m) and weight is 130 lb 4.8 oz (59.1 kg). His axillary temperature is 97.5 °F (36.4 °C). His blood pressure is 128/39 (abnormal) and his pulse is 65. His respiration is 18 and oxygen saturation is 94%. Temperature Range (24h):Temp: 97.5 °F (36.4 °C) Temp  Av.3 °F (36.3 °C)  Min: 97 °F (36.1 °C)  Max: 97.5 °F (36.4 °C)  BP Range (55Z): Systolic (48BLR), NTL:925 , Min:94 , ELW:480     Diastolic (70BYM), UFO:31, Min:39, Max:107    Pulse Range (24h): Pulse  Av.4  Min: 57  Max: 83  Respiration Range (24h): Resp  Av.8  Min: 12  Max: 20  Current Pulse Ox (24h):  SpO2: 94 %  Pulse Ox Range (24h):  SpO2  Av.6 %  Min: 94 %  Max: 100 %  Oxygen Amount and Delivery: O2 Flow Rate (L/min): 2 L/min  CONSTITUTIONAL: Alert and nonverbal, no acute distress  SKIN: Skin color, texture, turgor normal. No rashes or lesions. , no bruising  LYMPH: no cervical nodes, no inguinal nodes  HEENT: Head is normocephalic, atraumatic. PERRLA, Mucous membranes are moist.   NECK: Supple, symmetrical, trachea midline, no adenopathy, thyroid symmetric, not enlarged and no tenderness, skin normal.  CHEST/LUNGS: chest symmetric with normal A/P diameter, normal respiratory rate and rhythm, lungs clear to auscultation without wheezes, rales or rhonchi. No accessory muscle use. CARDIOVASCULAR: Heart sounds are normal.  Regular rate and rhythm without murmur, gallop or rub. Normal S1 and S2. Carotid and femoral pulses 2+/4 and equal bilaterally. ABDOMEN: flat. Normal bowel sounds. No bruits. Soft, nondistended, no masses or organomegaly. no evidence of hernia. Tenderness: absent. RECTAL: deferred, not clinically indicated  NEUROLOGIC: There are no focalizing motor or sensory deficits. CN II-XII are grossly intact. Mihir Neighbours EXTREMITIES: no cyanosis, no clubbing and no edema. PYSCH: Patient nonverbal  LABS:  Recent Labs     02/07/22  1245 02/07/22  1255 02/08/22  0506   WBC 4.7*  --  5.3   HGB 10.5* 10.7* 10.3*   HCT 31.8*  --  30.5*     --  183     --  141   K 3.8  --  3.5     --  109*   CO2 25  --  18*   BUN 16  --  14   CREATININE 1.22* 1.2 1.05   MG 1.8  --  1.6*   CALCIUM 8.5  --  8.0*   INR 1.3  --   --    AST 16  --   --    ALT 10  --   --    BILITOT 0.6  --   --    LACTA 2.3*  --   --    NITRU Negative  --   --    COLORU Yellow  --   --    BACTERIA Negative  --   --        RADIOLOGY:  I have personally reviewed the following films:  CT scan abd/pelvis: Layering of material in the gallbladder that may represent sludge versus small stones without evidence of cholecystitis. Thank you for the interesting evaluation. Further recommendations to follow.     Electronically signed by Charley Ferreira MD on 2/8/22 at 10:49 AM EST

## 2022-02-08 NOTE — FLOWSHEET NOTE
Pt woke up and ate 100% of breakfast and took all po meds. Pt on RA 94%. Denies pain. Up to BR with steady gait stand by assist. Updated daughter Melissa Mejia. Dr Oneida Carr in to see patient. PT/OT ordered. Dr Oneida Carr to speak with ramirez Niño fpr possible discharge later today. 1830 pt ate dinner. Receiving iv rocephin before discharge. Reviewed discharge instructions over the phone with mya Mullen.  Family to  patient about 7 PM.

## 2022-02-08 NOTE — PROGRESS NOTES
[facility] OCCUPATIONAL THERAPY EVALUATION - ACUTE     NAME: Warden Calero  : 3/5/1929 (80 y.o.)  MRN: [id number]  CODE STATUS: DNR-CCA  Room: U748/G098-65    Date of Service: 2022    Patient Diagnosis(es): Dyspnea [R06.00]  SIRS (systemic inflammatory response syndrome) (HCC) [R65.10]  Transient hypotension [I95.9]  Altered mental status, unspecified altered mental status type [R41.82]  Chest pain, unspecified type [R07.9]   Chief Complaint   Patient presents with    Altered Mental Status    Shortness of Breath     Patient Active Problem List    Diagnosis Date Noted    Anginal equivalent ([facility]     Dyspnea 2022    COVID-19 2021    Late effects of cerebrovascular accident     Ataxia     Encephalopathy     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) ([address]     Lethargy     AMS (altered mental status) 10/27/2021    Acute respiratory failure ([address] 10/27/2021    Abnormality of gait and mobility 2021    Neurogenic bladder 2021    Bilateral hearing loss 2021    Left hemiparesis ([address] 2021    Impaired gait and mobility dt R occipital CVA 2021    Aphasia     Acute CVA (cerebrovascular accident) ([address] 2021    General weakness 09/15/2021    Hyperkalemia 09/15/2021    Anticoagulation adequate with anticoagulant therapy 09/15/2021    Arterial occlusion 2021    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    CKD (chronic kidney disease) 2021    PVD (peripheral vascular disease) ([address] 2021    Venous thrombosis of leg 2021    Hyponatremia 2021    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    Syncope and collapse 2020    COPD exacerbation ([address] 2020    NSTEMI (non-ST elevated myocardial infarction) ([address] 2019    Chest pain 2019    Hypotension 2019    SOB (shortness of breath) 2018    Chronic right shoulder pain 2018    COPD with acute exacerbation ([facility] 02/16/2018    Bronchitis 12/23/2016    CAD (coronary artery disease) 04/16/2015    HTN (hypertension)     Dyslipidemia     DM (diabetes mellitus) ([facility]     Hypothyroidism     History of tobacco abuse     Anemia         Past Medical History:   Diagnosis Date    Anemia     CAD (coronary artery disease) 4/16/2015    DM (diabetes mellitus) ([facility]     Dyslipidemia     History of tobacco abuse     HTN (hypertension)     Hypothyroidism     Non-ST elevation MI (NSTEMI) ([facility]     Pacemaker 4/16/2015     Past Surgical History:   Procedure Laterality Date    CATARACT REMOVAL      MIDDLE EAR SURGERY Left 1998    \"they had to reset the bones\" after an infection        Restrictions  Restrictions/Precautions: General Precautions     Safety Devices: Safety Devices  Safety Devices in place: Not Applicable        Subjective       Pain Reassessment:   Pain Assessment  Patient Currently in Pain: No       Prior Level of Function:  Social/Functional History  Additional Comments: pt not answering questions about PLOF, pt not responding to written communication about PLOF    OBJECTIVE:     Orientation Status:  Orientation  Overall Orientation Status: Impaired (pt did not respond to any questions)    Observation:  Observation/Palpation  Observation: pt sleeping, easily awoken, no acute signs of distress    Cognition Status:  Cognition  Overall Cognitive Status: Exceptions  Following Commands: Inconsistently follows commands  Attention Span: Difficulty attending to directions  Memory:  (unable to assess)  Safety Judgement: Decreased awareness of need for safety,Decreased awareness of need for assistance  Problem Solving: Assistance required to generate solutions,Assistance required to implement solutions,Assistance required to identify errors made,Assistance required to correct errors made,Decreased awareness of errors  Insights: Not aware of deficits  Initiation: Requires cues for all  Sequencing: Requires cues for all    Perception Status:  Perception  Overall Perceptual Status:  (not formally assessed)    Sensation Status:  Sensation  Overall Sensation Status:  (pt not answering questions, turn off hot water then turns on cold at sink after placing hand in water several times)    Vision and Hearing Status:  Vision  Vision:  (pt makes eye contact, able to reach for items in environment)  Hearing  Hearing: Exceptions to [facility]  Hearing Exceptions: Hard of hearing/hearing concerns     ROM:   LUE AROM (degrees)  LUE General AROM: pt able to flex shoulders to 90°, WFL elbow to hand  Left Hand AROM (degrees)  Left Hand General AROM: some arthritic changes  RUE AROM (degrees)  RUE General AROM: pt able to flex shoulders to 90°, WFL elbow to hand  Right Hand AROM (degrees)  Right Hand General AROM: some arthritic changes    Strength:  LUE Strength  L Hand General: 4/5  LUE Strength Comment: 4/5 grossly assessed  RUE Strength  R Hand General: 4/5  RUE Strength Comment: 4/5 grossly assessed    Coordination, Tone, Quality of Movement: Tone RUE  RUE Tone: Normotonic  Tone LUE  LUE Tone: Normotonic  Coordination  Movements Are Fluid And Coordinated: No  Coordination and Movement description: Decreased speed,Right UE,Left UE    Hand Dominance:  Hand Dominance  Hand Dominance:  (pt inconsistent using a dominant hand when turning on/off faucets at sink)    ADL Status:  ADL  Additional Comments: RN stated pt able to feed self. Pt with increased visual cues to wash hands at sink. Pt turning faucets on and off. Sink about  to overflow. Pt turned back on after therapist turned off. Visual cues to dry hands. Pt directed back to bed. Unable to simulate other ADL's.   Toilet Transfers  Toilet Transfer: Unable to assess  Toilet Transfers Comments: anticipated SBA       Therapy key for assistance levels -   Independent = Pt. is able to perform task with no assistance but may require a device   Stand by assistance = Pt. does not perform task at an independent level but does not need physical assistance, requires verbal cues  Minimal, Moderate, Maximal Assistance = Pt. requires physical assistance (25%, 50%, 75% assist from helper) for task but is able to actively participate in task   Dependent = Pt. requires total assistance with task and is not able to actively participate with task completion     Functional Mobility:  Functional Mobility  Functional - Mobility Device: No device  Activity: Other (to sink)  Assist Level: Stand by assistance (pt with slight sway and at times reaching out for furniture)  Transfers  Sit to stand: Stand by assistance  Stand to sit: Stand by assistance    Bed Mobility  Bed mobility  Supine to Sit: Supervision  Sit to Supine: Supervision    Seated and Standing Balance:  Balance  Sitting Balance: Supervision  Standing Balance: Stand by assistance    Functional Endurance:  Activity Tolerance  Activity Tolerance: Treatment limited secondary to decreased cognition  Activity Tolerance: SPO2 on RA 98%, NC in bed pt not donning upon arrival, SPO2 86% after ambulating to sink, donned O2 at 2L, pt with quick recovery >93%    D/C Recommendations:  OT D/C RECOMMENDATIONS  REQUIRES OT FOLLOW UP: Yes    Equipment Recommendations:  OT Equipment Recommendations  Other: Continue to assess    OT Education:   OT Education  OT Education: OT Role,Plan of Care  Barriers to Learning: decreased cognition    OT Follow Up:  OT D/C RECOMMENDATIONS  REQUIRES OT FOLLOW UP: Yes       Assessment/Discharge Disposition:  Assessment: Pt is a 80 y.o. male. Unable to obtain PLOF. Pt not answering questions. Per chart review, pt with severe [place] and confusion. Per RN Zabrina Bach, pt stays with granddtr and pt has history of decreased cognition. Pt recently d/c home from SNF. Pt may benefit from trial OT services to maximize safety and function during ADL's.   Prognosis: Fair  Discharge Recommendations: Continue to assess pending progress  Decision Making: Medium Complexity  History: Multi comorb  Exam: Pt with SBA, unable to follow verbal or visual commands consistently. Assistance / Modification: SBA    Six Click Score   How much help for putting on and taking off regular lower body clothing?: A Little  How much help for Bathing?: A Little  How much help for Toileting?: A Little  How much help for putting on and taking off regular upper body clothing?: A Little  How much help for taking care of personal grooming?: A Little  How much help for eating meals?: None  AM-PAC Inpatient Daily Activity Raw Score: 19  AM-PAC Inpatient ADL T-Scale Score : 40.22  ADL Inpatient CMS 0-100% Score: 42.8    Plan:  Plan  Times per week: trial 1-2 visits,  Current Treatment Recommendations: Cognitive/Perceptual Training,Cognitive Reorientation,Self-Care / ADL,Safety Education & Training,Patient/Caregiver Education & Training    Goals:   Patient will:    - Access appropriate D/C site with as few architectural barriers as possible. - Sequence self-care tasks with least amount of verbal/tactile cues. Recommend 24 hr supervision     Patient Goal:    Not stated     Therapy Time:   OT Individual Minutes  Time In: 1520  Time Out: 1540  Minutes: 20    Eval: 20 minutes     Electronically signed by:     Isabell Robbins OTR/L, OTR/L  2/8/2022, 4:05 PM

## 2022-02-12 LAB
BLOOD CULTURE, ROUTINE: NORMAL
CULTURE, BLOOD 2: NORMAL

## 2022-02-16 RX ORDER — LEVETIRACETAM 500 MG/1
TABLET ORAL
Qty: 180 TABLET | OUTPATIENT
Start: 2022-02-16

## 2022-06-30 ENCOUNTER — HOSPITAL ENCOUNTER (OUTPATIENT)
Dept: CARDIOLOGY | Age: 87
Discharge: HOME | End: 2022-06-30
Payer: MEDICARE

## 2022-06-30 PROCEDURE — 93296 REM INTERROG EVL PM/IDS: CPT

## 2024-12-12 NOTE — PROGRESS NOTES
and gums benign  Musculoskeletal: No significant change in strength or tone. All joints stable. Inspection and palpation of digits and nails show no clubbing,       cyanosis or inflammatory conditions. Neuro/Psychiatric: Affect: flat but pleasant. Alert and oriented to person, place and     Situation with  mod cues. No significant change in deep tendon reflexes or     sensation  Lungs:  Diminished, CTA-B. Respiration effort is normal at rest.     Heart:   S1 = S2, RRR. No loud murmurs. Abdomen:  Soft, non-tender, no enlargement of liver or spleen. Extremities:  No significant lower extremity edema or tenderness. Skin:   Intact to general survey, no visualized or palpated problems. Rehabilitation:  Physical therapy:   Bed Mobility: Scooting: Independent    Transfers: Sit to Stand: Stand by assistance, Supervision  Stand to sit: Stand by assistance, Supervision  Bed to Chair: Stand by assistance, Supervision, Ambulation 1  Surface: carpet  Device: No Device, Hand-Held Assist  Assistance: Contact guard assistance  Quality of Gait: Pt reaching out with other hand to steady self. short step length with shuffling type gait but without LOB  Distance: 48' with 2 turns  Comments: Pt ambulated 30 feet with ww with SBA, Stairs  # Steps : 4  Stairs Height: 6\"  Rails: Right ascending, Bilateral  Assistance: Stand by assistance, Contact guard assistance  Comment: Non-reciprocal pattern. Pt pulling self up heavily on rail with RUE. Pt used two rails while descending. NO LOB. FIMS:  ,  , Assessment: Unable to test gait in PM with furniture, as planned, after consulting PT. Occupational therapy:    ,  , Assessment: Pt is a 81 y/o male admitted to hospital with declin in function noted.   Pt would benefit from continued OT services to increase independence with ADL self care and functional mobility    Speech therapy:        Lab/X-ray studies reviewed, analyzed and discussed with patient and staff:   Recent Results (from the past 24 hour(s))   POCT Glucose    Collection Time: 09/27/21 11:10 AM   Result Value Ref Range    POC Glucose 120 (H) 60 - 115 mg/dl    Performed on ACCU-CHEK    POCT Glucose    Collection Time: 09/27/21  4:03 PM   Result Value Ref Range    POC Glucose 253 (H) 60 - 115 mg/dl    Performed on ACCU-CHEK    POCT Glucose    Collection Time: 09/27/21  7:47 PM   Result Value Ref Range    POC Glucose 162 (H) 60 - 115 mg/dl    Performed on ACCU-CHEK    POCT Glucose    Collection Time: 09/28/21  6:08 AM   Result Value Ref Range    POC Glucose 140 (H) 60 - 115 mg/dl    Performed on ACCU-CHEK        Echocardiogram   9/17/2021   Transthoracic Echocardiography Report   Left Ventricle Normal left ventricular systolic function, no regional wall motion abnormalities, estimated ejection fraction of 50%. Normal left ventricular size and function. Right Ventricle Normal right ventricle structure and function. Normal right ventricle systolic pressure. Left Atrium The left atrium is Mildly dilated. Right Atrium Normal right atrium. Mitral Valve Diffusely thickened and pliable mitral valve leaflets with normal excursion. Mild (1+) mitral regurgitation is present. No evidence of mitral valve stenosis. Tricuspid Valve Normal tricuspid valve structure and function. Aortic Valve The aortic valve leaflets were not well visualized. No evidence of aortic valve regurgitation . No evidence of aortic valve stenosis. Pericardial Effusion No evidence of pericardial effusion. Aorta \ Miscellaneous Miscellaneous normal findings were found.       M-Mode Measurements (cm)   LVIDd: 3.33 cm            LVIDs: 2.3 cm  IVSd: 1.44 cm             IVSs: 1.73 cm  LVPWd: 0.8 cm             AO Root Dimension: 3.45 cm                             LVOT: 1.98 cm  Valves  LVOT   LVOT Diameter: 1.98 cm  Structures  Left Ventricle   Diastolic Dimension: 5.02 cm         Systolic Dimension: 2.3 cm  Septum Diastolic: 9.34 cm            Septum Systolic: 3.61 cm  PW Diastolic: 0.8 cm                                       FS: 30.9 %  LV EDV/LV EDV Index: 45.04 ml/25 m^2 LV ESV/LV ESV Index: 18.13 ml/10 m^2  EF Calculated: 59.8 %   LVOT Diameter: 1.98 cm  Aorta/ Miscellaneous Aorta   Aortic Root: 3.45 cm  LVOT Diameter: 1.98 cm      CT Head   9/16/2021    BRAIN CT FINDINGS: There has been interval development of a 3 cm area of hypodensity in the right occipital lobe since the previous day which is worrisome for an acute, subacute ischemia. There are persistent, chronic areas of ischemia in the anterior right temporal lobe, unchanged since previous study. No acute hemorrhage, mass, mass effect, or midline shift. There is prominence of sulci and ventricles indicating mild global cerebral atrophy and chronic involutional changes. Moderate periventricular white matter hypodensities indicating chronic microangiopathy are noted. The basal ganglia are within normal limits. There are no acute changes or space-occupying lesions in the posterior fossa. The visualized portions of the orbits are within normal limits. The globes are intact. The imaged portions of the paranasal sinuses are unremarkable. The calvarium is intact. There is a 3 cm new area of hypodensity worrisome for acute in the right occipital lobe. CT Head   9/15/2021: The ventricles are dilated. Unchanged size configuration. No mass. No midline shift. The cisterns are patent. There are white matter and periventricular changes most likely consistent with chronic small vessel disease. Again note is made of a focal area of encephalomalacia at the inferolateral medial aspect the right temporal lobe. No acute intra-axial or extra-axial findings. The visualized osseous structures are unremarkable. There is mucoperiosteal thickening in the right maxillary sinus. There is a small amount of patchy opacification right mastoid. There is resection of the left mastoid.  The middle ear bones are <40             <2:1           ---                 --- 50-69              125-225                  >2:1           ---                 --- 70-89               225-325          >100          >4:1           >5:1              --- 90%+                >325              >100          >4:1           >9:1           Damped resistive CCA >95%               May be            May be      Damped      ---              Damped resistive CCA                       decreased      decreased  resistive CCA        Previous extensive, complex labs, notes and diagnostics reviewed and analyzed. ALLERGIES:    Allergies as of 09/23/2021    (No Known Allergies)      (please also verify by checking MAR)      Yesterday I evaluated this patient for periodic reassessment of medical and functional status. The patient was discussed in detail at the treatment team meeting focusing on current medical issues, progress in therapies, social issues, psychological issues, barriers to progress and strategies to address these barriers, and discharge planning. See the hand written addendum to rehab progress note. The patient continues to be high risk for future disability and their medical and rehabilitation prognosis continue to be good and therefore, we will continue the patient's rehabilitation course as planned. The patient's tentative discharge date was set. Patient and family education was discussed. The patient was made aware of the team discussion regarding their progress. Discharge plans were discussed along with barriers to progress and strategies to address these barriers, patient encouraged to continue to discuss discharge plans with . Complex Physical Medicine & Rehab Issues Assess & Plan:   1. 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 Cherie Stands Functional and medical status reassessed regarding patients ability to participate in therapies and patient found to be able to participate in acute intensive comprehensive inpatient rehabilitation program including PT/OT to improve balance, ambulation, ADLs, and to improve the P/AROM. Therapeutic modifications regarding activities in therapies, place, amount of time per day and intensity of therapy made daily. In bed therapies or bedside therapies prn.   2. Bowel and Bladder dysfunction  Neurogenic bowel and Bladder:  frequent toileting, ambulate to bathroom with assistance, check post void residuals. Check for C.difficile x1 if >2 loose stools in 24 hours, continue bowel & bladder program.  Monitor bowel and bladder function. Lactinex 2 PO every AC. MOM prn, Brown Bomb prn, Glycerin suppository prn, enema prn. 3. Moderate to severe LBP pain as well as generalized OA pain: reassess pain every shift and prior to and after each therapy session, give prn Tylenol and   Scheduled Tylenol, modalities prn in therapy, masage, Lidoderm, K-pad prn. Consider scheduled AM pain meds. 4. Skin healing and breakdown risk:  continue pressure relief program.  Daily skin exams and reports from nursing. 5. Severe fatigue due to nutritional and hydration deficiency: Add and titrate vitamin B12 vitamin D and CoQ10 continue to monitor I&Os, calorie counts prn, dietary consult prn.  6. Acute episodic insomnia with situational adjustment disorder:  prn Ambien, monitor for day time sedation. 7. Falls risk elevated:  patient to use call light to get nursing assistance to get up, bed and chair alarm. 8. Elevated DVT risk: progressive activities in PT, continue prophylaxis BELLE hose, elevation and  Elliquis . 9. Complex discharge planning: We will continue to work since his planned DC 10/2/21-home with grand dtr-and [facility]. Focus on balance and gait ataxia safety issues and endurance.  Continue weekly team meeting every Monday  to assess progress towards goals, discuss and address social, psychological and medical comorbidities and to address difficulties they may be having progressing in therapy. Patient and family education is in progress. The patient is to follow-up with their family physician after discharge. Complex Active General Medical Issues that complicate care Assess & Plan:    1. HTN (hypertension),  CAD (coronary artery disease),   PVD (peripheral vascular disease) -continue blood signs every shift focusing on heart rate and blood pressure checks, consult hospitalist for backup medical and adjust/add medications ( Eliquis, ASA, Coreg, Lipitor, Altace )  2. DM (diabetes mellitus) with neuropathy-Continue blood sugar checks every shift, diet, add diabetic add dietary education, restrict carbohydrates to lowest effective and safe carb count per meal advising 4 carbs per meal, add at bedtime snack to prevent a.m. hypoglycemia, adjust/add medications ( SSI )  3. Anemia-monitor closely on Eliquis--add Iron and Vit B12  4. Hypothyroidism-titrate Synthroid  5. COPD exacerbation-Pulse oximeter checks to shift dose and titrate oxygen and aerosol treatments monitor for nocturnal hypoxemia, monitor vital signs, oxygen prn. Symbicort, Albuterol  6. 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 Chronic right shoulder pain-Add Lidoderm and Tylenol. 8.   Acute CVA (cerebrovascular accident)with  Aphasia, Dysphagia and Left hemiparesis -focus on balance and cognition--pocket talker helps  9. Neurogenic bladder-bladder training program  10. Bilateral hearing loss--SLP and AD for hearing    11.  Depression-emotional support provided daily, vitamin B12, encourage participation in rehabilitation support group and recreational therapy, adjust/add medications ( Vit B12 )  add rehab Psych       Electronically signed by Jackie Kerr DO on 9/27/21 at 8:15 AM SOPHIET       Cleopatra Rao D.O., PM&R     Attending yes